# Patient Record
Sex: FEMALE | Race: WHITE | Employment: OTHER | URBAN - METROPOLITAN AREA
[De-identification: names, ages, dates, MRNs, and addresses within clinical notes are randomized per-mention and may not be internally consistent; named-entity substitution may affect disease eponyms.]

---

## 2017-11-01 ENCOUNTER — HOSPITAL ENCOUNTER (OUTPATIENT)
Age: 77
Setting detail: OUTPATIENT SURGERY
Discharge: HOME | End: 2017-11-01
Attending: INTERNAL MEDICINE | Admitting: INTERNAL MEDICINE
Payer: MEDICARE

## 2017-11-01 ENCOUNTER — ANESTHESIA EVENT (OUTPATIENT)
Dept: ENDOSCOPY | Age: 77
End: 2017-11-01
Payer: MEDICARE

## 2017-11-01 ENCOUNTER — ANESTHESIA (OUTPATIENT)
Dept: ENDOSCOPY | Age: 77
End: 2017-11-01
Payer: MEDICARE

## 2017-11-01 VITALS
BODY MASS INDEX: 23.73 KG/M2 | SYSTOLIC BLOOD PRESSURE: 146 MMHG | OXYGEN SATURATION: 100 % | DIASTOLIC BLOOD PRESSURE: 74 MMHG | RESPIRATION RATE: 16 BRPM | HEIGHT: 64 IN | HEART RATE: 77 BPM | TEMPERATURE: 97.8 F | WEIGHT: 139 LBS

## 2017-11-01 PROCEDURE — 74011250637 HC RX REV CODE- 250/637: Performed by: INTERNAL MEDICINE

## 2017-11-01 PROCEDURE — 76060000031 HC ANESTHESIA FIRST 0.5 HR: Performed by: INTERNAL MEDICINE

## 2017-11-01 PROCEDURE — 77030009426 HC FCPS BIOP ENDOSC BSC -B: Performed by: INTERNAL MEDICINE

## 2017-11-01 PROCEDURE — 77030013992 HC SNR POLYP ENDOSC BSC -B: Performed by: INTERNAL MEDICINE

## 2017-11-01 PROCEDURE — 74011250636 HC RX REV CODE- 250/636

## 2017-11-01 PROCEDURE — 77030027957 HC TBNG IRR ENDOGTR BUSS -B: Performed by: INTERNAL MEDICINE

## 2017-11-01 PROCEDURE — 76040000019: Performed by: INTERNAL MEDICINE

## 2017-11-01 PROCEDURE — 88305 TISSUE EXAM BY PATHOLOGIST: CPT | Performed by: INTERNAL MEDICINE

## 2017-11-01 PROCEDURE — 77030011640 HC PAD GRND REM COVD -A: Performed by: INTERNAL MEDICINE

## 2017-11-01 RX ORDER — ATROPINE SULFATE 0.1 MG/ML
0.5 INJECTION INTRAVENOUS
Status: DISCONTINUED | OUTPATIENT
Start: 2017-11-01 | End: 2017-11-01 | Stop reason: HOSPADM

## 2017-11-01 RX ORDER — EPINEPHRINE 0.1 MG/ML
1 INJECTION INTRACARDIAC; INTRAVENOUS
Status: DISCONTINUED | OUTPATIENT
Start: 2017-11-01 | End: 2017-11-01 | Stop reason: HOSPADM

## 2017-11-01 RX ORDER — SODIUM CHLORIDE 0.9 % (FLUSH) 0.9 %
5-10 SYRINGE (ML) INJECTION EVERY 8 HOURS
Status: DISCONTINUED | OUTPATIENT
Start: 2017-11-01 | End: 2017-11-01 | Stop reason: HOSPADM

## 2017-11-01 RX ORDER — PROPOFOL 10 MG/ML
INJECTION, EMULSION INTRAVENOUS AS NEEDED
Status: DISCONTINUED | OUTPATIENT
Start: 2017-11-01 | End: 2017-11-01 | Stop reason: HOSPADM

## 2017-11-01 RX ORDER — SODIUM CHLORIDE 9 MG/ML
150 INJECTION, SOLUTION INTRAVENOUS CONTINUOUS
Status: DISCONTINUED | OUTPATIENT
Start: 2017-11-01 | End: 2017-11-01 | Stop reason: HOSPADM

## 2017-11-01 RX ORDER — SODIUM CHLORIDE 0.9 % (FLUSH) 0.9 %
5-10 SYRINGE (ML) INJECTION AS NEEDED
Status: DISCONTINUED | OUTPATIENT
Start: 2017-11-01 | End: 2017-11-01 | Stop reason: HOSPADM

## 2017-11-01 RX ORDER — DEXTROMETHORPHAN/PSEUDOEPHED 2.5-7.5/.8
1.2 DROPS ORAL
Status: DISCONTINUED | OUTPATIENT
Start: 2017-11-01 | End: 2017-11-01 | Stop reason: HOSPADM

## 2017-11-01 RX ORDER — SODIUM CHLORIDE 9 MG/ML
INJECTION, SOLUTION INTRAVENOUS
Status: DISCONTINUED | OUTPATIENT
Start: 2017-11-01 | End: 2017-11-01 | Stop reason: HOSPADM

## 2017-11-01 RX ORDER — MIDAZOLAM HYDROCHLORIDE 1 MG/ML
.25-5 INJECTION, SOLUTION INTRAMUSCULAR; INTRAVENOUS
Status: DISCONTINUED | OUTPATIENT
Start: 2017-11-01 | End: 2017-11-01 | Stop reason: HOSPADM

## 2017-11-01 RX ADMIN — PROPOFOL 20 MG: 10 INJECTION, EMULSION INTRAVENOUS at 14:36

## 2017-11-01 RX ADMIN — SODIUM CHLORIDE: 9 INJECTION, SOLUTION INTRAVENOUS at 14:15

## 2017-11-01 RX ADMIN — PROPOFOL 20 MG: 10 INJECTION, EMULSION INTRAVENOUS at 14:45

## 2017-11-01 RX ADMIN — PROPOFOL 100 MG: 10 INJECTION, EMULSION INTRAVENOUS at 14:35

## 2017-11-01 RX ADMIN — PROPOFOL 20 MG: 10 INJECTION, EMULSION INTRAVENOUS at 14:48

## 2017-11-01 RX ADMIN — PROPOFOL 30 MG: 10 INJECTION, EMULSION INTRAVENOUS at 14:43

## 2017-11-01 RX ADMIN — PROPOFOL 30 MG: 10 INJECTION, EMULSION INTRAVENOUS at 14:41

## 2017-11-01 RX ADMIN — PROPOFOL 20 MG: 10 INJECTION, EMULSION INTRAVENOUS at 14:50

## 2017-11-01 NOTE — ROUTINE PROCESS
Rebecca Delvalle  1940  [id number]    Situation:  Verbal report received from: Reece Macias RN  Procedure: Procedure(s):  COLONOSCOPY  COLON BIOPSY  ENDOSCOPIC POLYPECTOMY    Background:    Preoperative diagnosis: RECTAL BLEEDING  Postoperative diagnosis: 1. Colon Polyps (Descending and Sigmoid)  2. Internal Hemmorhoids    :  Dr. Keon Kim  Assistant(s): Endoscopy Technician-1: Caro Waggoner  Endoscopy RN-1: Eulene Siemens    Specimens:   ID Type Source Tests Collected by Time Destination   1 : descending colon polyp Preservative Colon, Descending  Willia Hamman, MD 11/1/2017 1441 Pathology   2 : Sigmoid Colon Polyp Preservative Sigmoid  Willia Hamman, MD 11/1/2017 1449 Pathology     H. Pylori  no    Assessment:  Intra-procedure medications   Anesthesia gave intra-procedure sedation and medications, see anesthesia flow sheet yes    Intravenous fluids: NS@ KVO     Vital signs stable     Abdominal assessment: round and soft     Recommendation:  Discharge patient per MD order.   Family   Permission to share finding with family or friend yes

## 2017-11-01 NOTE — PERIOP NOTES

## 2017-11-01 NOTE — ANESTHESIA POSTPROCEDURE EVALUATION
Post-Anesthesia Evaluation and Assessment    Patient: Randa Paniagua MRN: [id number]  SSN: [id number]    YOB: 1940  Age: 78 y.o. Sex: female       Cardiovascular Function/Vital Signs  Visit Vitals    BP (!) 126/93    Pulse 82    Temp 36.6 °C (97.8 °F)    Resp 14    Ht 5' 4\" (1.626 m)    Wt 63 kg (139 lb)    SpO2 100%    Breastfeeding No    BMI 23.86 kg/m2       Patient is status post MAC anesthesia for Procedure(s):  COLONOSCOPY  COLON BIOPSY  ENDOSCOPIC POLYPECTOMY. Nausea/Vomiting: None    Postoperative hydration reviewed and adequate. Pain:  Pain Scale 1: Numeric (0 - 10) (11/01/17 1505)  Pain Intensity 1: 0 (11/01/17 1505)   Managed    Neurological Status: At baseline    Mental Status and Level of Consciousness: Arousable    Pulmonary Status:   O2 Device: Room air (11/01/17 1505)   Adequate oxygenation and airway patent    Complications related to anesthesia: None    Post-anesthesia assessment completed.  No concerns    Signed By: Dori Rg MD     November 1, 2017

## 2017-11-01 NOTE — PROCEDURES
Zaira [facility] 912 Ayaka Mistry M.D.  174 [facility], [address] [place] [address]  [phone]               Colonoscopy Procedure Note    NAME: Aleksandr Lopez  :    MRN:  [id number]    Indications:   Lower GI bleeding     : Ankita Merrill MD    Referring Provider:  Britney Zaidi MD    Medicines:  MAC anesthesia      Procedure Details:  After informed consent was obtained with all risks and benefits of the procedure explained and preprocedure exam completed, the patient was placed in the left lateral decubitus position. Universal protocol for patient identification was performed and documented in the nursing notes. Throughout the procedure, the patient's blood pressure was monitored at least every five minutes; pulse, and oxygen saturations were monitored continuously. All vital signs were documented in the nursing notes. A digital rectal exam was performed and was normal.  The Olympus videocolonoscope  was inserted in the rectum and carefully advanced to the cecum, which was identified by the ileocecal valve and appendiceal orifice. The colonoscope was slowly withdrawn with careful evaluation between folds. Retroflexion in the rectum was performed; findings and interventions are described below. Procedure start time, extent reached time/cecum time, and procedure end time are documented in the nursing notes. The quality of preparation was good. Findings:   Rectum: Grade moderate internal hemorrhoid(s);   Sigmoid: 1  Sessile polyp(s), the largest 9 mm in size; s/p hot snare polypectomy  Descending Colon: 1  Sessile polyp(s), the largest 7 mm in size; s/p jumbo cold forceps polypectomy  Transverse Colon: normal  Ascending Colon: normal  Cecum: normal    Interventions:    2 complete polypectomy were performed using hot snare  /cold forceps and the polyps were  retrieved    Specimens:     ID Type Source Tests Collected by Time Destination   1 : descending colon polyp Preservative Colon, Descending  Radha Monteiro MD 11/1/2017 1441 Pathology   2 : Sigmoid Colon Polyp Preservative Sigmoid  Radha Monteiro MD 11/1/2017 1449 Pathology       EBL:  None. Complications:   No immediate complications     Impression:  -Benign appearing colon polyps, removed as above. -Moderate internal hemorrhoids as the likely cause of the bleeding. Recommendations:   -Repeat colonoscopy in 5 years. If < 10 years, reason:  above average risk patient    Resume normal medication(s). Signed by:  Radha Monteiro MD          11/1/2017  2:59 PM

## 2017-11-01 NOTE — H&P
[address] [place] [facility] 12, 895 [facility]          Pre-procedure History and Physical       NAME:  Neeru Patel   :   1940   MRN:   [id number]     CHIEF COMPLAINT/HPI: See procedure note    PMH:  Past Medical History:   Diagnosis Date    Other ill-defined conditions(679.89)     IBS       PSH:  Past Surgical History:   Procedure Laterality Date    HX APPENDECTOMY      HX CHOLECYSTECTOMY      HX GI      surgery for hiatal hernia       Allergies: Allergies   Allergen Reactions    Penicillins Hives       Home Medications:  Prior to Admission Medications   Prescriptions Last Dose Informant Patient Reported? Taking? CALCIUM CARBONATE/VITAMIN D3 (CALTRATE 600 + D PO)   Yes No   Sig: Take 600 mg by mouth daily. MULTIVITAMIN PO   Yes No   Sig: Take  by mouth. Takes one women's one-a-day formula po daily.       Facility-Administered Medications: None       Hospital Medications:  Current Facility-Administered Medications   Medication Dose Route Frequency    0.9% sodium chloride infusion  150 mL/hr IntraVENous CONTINUOUS    sodium chloride (NS) flush 5-10 mL  5-10 mL IntraVENous Q8H    sodium chloride (NS) flush 5-10 mL  5-10 mL IntraVENous PRN    midazolam (VERSED) injection 0.25-5 mg  0.25-5 mg IntraVENous Multiple    simethicone (MYLICON) 29WY/2.3OV oral drops 80 mg  1.2 mL Oral Multiple    atropine injection 0.5 mg  0.5 mg IntraVENous ONCE PRN    EPINEPHrine (ADRENALIN) 0.1 mg/mL syringe 1 mg  1 mg Endoscopically ONCE PRN       Family History:  Family History   Problem Relation Age of Onset    Dementia Mother      alzheimers    Cancer Sister      gallbladder    Cancer Brother      pancreatic    Breast Cancer Paternal Grandmother     Dementia Sister      alzheimers    Heart Surgery Brother      bypass       Social History:  Social History   Substance Use Topics    Smoking status: Former Smoker    Smokeless tobacco: Not on file      Comment: quit smoking cigarettes 6 yrs ago    Alcohol use Yes      Comment: very occasional         PHYSICAL EXAM PRIOR TO SEDATION:  General: Alert, in no acute distress    Lungs:            CTA bilaterally  Heart:  Normal S1, S2    Abdomen: Soft, Non distended, Non tender. Normoactive bowel sounds. Assessment:   Stable for sedation administration.   Date of last colonoscopy: 4 yrs, Polyps  Yes    Plan:   · Endoscopic procedure with sedation     Signed By: Na Allan MD     11/1/2017  2:36 PM

## 2017-11-01 NOTE — IP AVS SNAPSHOT
[address] 
[phone] Patient: Gray Moralez MRN: [id number] IOP:4/10/7648 About your hospitalization You were admitted on:  November 1, 2017 You last received care in the:  [facility] ENDOSCOPY You were discharged on:  November 1, 2017 Why you were hospitalized Your primary diagnosis was:  Not on File Discharge Orders None A check alvarez indicates which time of day the medication should be taken. My Medications TAKE these medications as instructed Instructions Each Dose to Equal  
 Morning Noon Evening Bedtime CALTRATE 600 + D PO Your last dose was: Your next dose is: Take 600 mg by mouth daily. 600 mg MULTIVITAMIN PO Your last dose was: Your next dose is: Take  by mouth. Takes one women's one-a-day formula po daily. Discharge Instructions [address] [place] Madalyn Magaña. Sofía Aranda M.D. 
[address] [place], [place] [address] 
[phone] COLONOSCOPY DISCHARGE INSTRUCTIONS Gray Moralez 
[id number] 1940 DISCOMFORT: 
Redness at IV site- apply warm compress to area; if redness or soreness persist- contact your physician There may be a slight amount of blood passed from the rectum Gaseous discomfort- walking, belching will help relieve any discomfort You may not operate a vehicle for 12 hours You may not engage in an occupation involving machinery or appliances for the  rest of today You may not drink alcoholic beverages for at least 12 hours Avoid making any critical decisions for at least 24 hours DIET: 
 You may resume your normal diet, but some patients find that heavy or large  meals may lead to indigestion or vomiting. I suggest a light meal as first food  intake.  
 
ACTIVITY: 
 You may resume your normal daily activities. It is recommended that you spend the remainder of the day resting - avoid any strenuous activity. CALL SHARAN Lainez ANY SIGN OF: Increasing pain, nausea, vomiting Abdominal distension (swelling) Significant bleeding (oral or rectal) Fever Pain in chest area Shortness of breath Additional Instructions: 
 Call Dr. Vanesa Parkinson if any questions or problems at [phone] You should receive the biopsy results by phone or mail within 3 weeks, if not, call  my office for the results Should have a repeat colonoscopy in 5 years. Colon with 2 polyps removed, moderate internal hemorrhoids as the likely cause of bleeding. Introducing hospitals & HEALTH SERVICES! Dear Alexandro Epley: Thank you for requesting a [employer] account. Our records indicate that you already have an active [employer] account. You can access your account anytime at [email or web address] Did you know that you can access your hospital and ER discharge instructions at any time in [employer]? You can also review all of your test results from your hospital stay or ER visit. Additional Information If you have questions, please visit the Frequently Asked Questions section of the [employer] website at [email or web address] Remember, [employer] is NOT to be used for urgent needs. For medical emergencies, dial 911. Now available from your iPhone and Android! Providers Seen During Your Hospitalization Provider Specialty Primary office phone Nima Ritter MD Gastroenterology [phone] Your Primary Care Physician (PCP) Primary Care Physician Office Phone Office Fax [address] [phone] You are allergic to the following Allergen Reactions Penicillins Hives Recent Documentation Height Weight Breastfeeding? BMI OB Status Smoking Status 1.626 m 63 kg No 23.86 kg/m2 Postmenopausal Former Smoker Emergency Contacts Name Discharge Info Relation Home Work Mobile Jenny Elizalde  Other Relative [6]  [phone] Patient Belongings The following personal items are in your possession at time of discharge: 
  Dental Appliances: None  Visual Aid: None Please provide this summary of care documentation to your next provider. Signatures-by signing, you are acknowledging that this After Visit Summary has been reviewed with you and you have received a copy. Patient Signature:  ____________________________________________________________ Date:  ____________________________________________________________  
  
[facility] Provider Signature:  ____________________________________________________________ Date:  ____________________________________________________________

## 2017-11-01 NOTE — ANESTHESIA PREPROCEDURE EVALUATION
Anesthetic History   No history of anesthetic complications            Review of Systems / Medical History  Patient summary reviewed, nursing notes reviewed and pertinent labs reviewed    Pulmonary  Within defined limits                 Neuro/Psych   Within defined limits           Cardiovascular  Within defined limits                Exercise tolerance: >4 METS     GI/Hepatic/Renal  Within defined limits              Endo/Other  Within defined limits           Other Findings              Physical Exam    Airway  Mallampati: II  TM Distance: > 6 cm  Neck ROM: normal range of motion   Mouth opening: Normal     Cardiovascular  Regular rate and rhythm,  S1 and S2 normal,  no murmur, click, rub, or gallop             Dental    Dentition: Poor dentition     Pulmonary  Breath sounds clear to auscultation               Abdominal  GI exam deferred       Other Findings            Anesthetic Plan    ASA: 2  Anesthesia type: MAC          Induction: Intravenous  Anesthetic plan and risks discussed with: Patient

## 2019-01-01 ENCOUNTER — PATIENT OUTREACH (OUTPATIENT)
Dept: CASE MANAGEMENT | Age: 79
End: 2019-01-01

## 2019-01-01 ENCOUNTER — ANESTHESIA EVENT (OUTPATIENT)
Dept: ENDOSCOPY | Age: 79
End: 2019-01-01
Payer: MEDICARE

## 2019-01-01 ENCOUNTER — DOCUMENTATION ONLY (OUTPATIENT)
Dept: CASE MANAGEMENT | Age: 79
End: 2019-01-01

## 2019-01-01 ENCOUNTER — APPOINTMENT (OUTPATIENT)
Dept: GENERAL RADIOLOGY | Age: 79
DRG: 326 | End: 2019-01-01
Attending: INTERNAL MEDICINE
Payer: MEDICARE

## 2019-01-01 ENCOUNTER — TELEPHONE (OUTPATIENT)
Dept: CARDIOLOGY CLINIC | Age: 79
End: 2019-01-01

## 2019-01-01 ENCOUNTER — APPOINTMENT (OUTPATIENT)
Dept: GENERAL RADIOLOGY | Age: 79
DRG: 193 | End: 2019-01-01
Attending: INTERNAL MEDICINE
Payer: MEDICARE

## 2019-01-01 ENCOUNTER — HOSPITAL ENCOUNTER (INPATIENT)
Age: 79
LOS: 4 days | Discharge: HOME | DRG: 439 | End: 2019-01-19
Attending: EMERGENCY MEDICINE | Admitting: FAMILY MEDICINE
Payer: MEDICARE

## 2019-01-01 ENCOUNTER — APPOINTMENT (OUTPATIENT)
Dept: MRI IMAGING | Age: 79
DRG: 326 | End: 2019-01-01
Attending: INTERNAL MEDICINE
Payer: MEDICARE

## 2019-01-01 ENCOUNTER — HOSPITAL ENCOUNTER (OUTPATIENT)
Dept: CT IMAGING | Age: 79
Discharge: HOME | End: 2019-03-04
Attending: FAMILY MEDICINE
Payer: MEDICARE

## 2019-01-01 ENCOUNTER — TELEPHONE (OUTPATIENT)
Dept: SURGERY | Age: 79
End: 2019-01-01

## 2019-01-01 ENCOUNTER — HOSPITAL ENCOUNTER (INPATIENT)
Age: 79
LOS: 14 days | Discharge: NURSING, REHAB OR LONG-TERM CARE | DRG: 193 | End: 2019-02-08
Attending: EMERGENCY MEDICINE | Admitting: INTERNAL MEDICINE
Payer: MEDICARE

## 2019-01-01 ENCOUNTER — ANESTHESIA (OUTPATIENT)
Dept: ENDOSCOPY | Age: 79
End: 2019-01-01
Payer: MEDICARE

## 2019-01-01 ENCOUNTER — PATIENT OUTREACH (OUTPATIENT)
Dept: FAMILY MEDICINE CLINIC | Age: 79
End: 2019-01-01

## 2019-01-01 ENCOUNTER — OFFICE VISIT (OUTPATIENT)
Dept: SURGERY | Age: 79
End: 2019-01-01

## 2019-01-01 ENCOUNTER — APPOINTMENT (OUTPATIENT)
Dept: GENERAL RADIOLOGY | Age: 79
DRG: 326 | End: 2019-01-01
Attending: HOSPITALIST
Payer: MEDICARE

## 2019-01-01 ENCOUNTER — APPOINTMENT (OUTPATIENT)
Dept: NON INVASIVE DIAGNOSTICS | Age: 79
DRG: 193 | End: 2019-01-01
Attending: NURSE PRACTITIONER
Payer: MEDICARE

## 2019-01-01 ENCOUNTER — APPOINTMENT (OUTPATIENT)
Dept: CT IMAGING | Age: 79
DRG: 640 | End: 2019-01-01
Attending: EMERGENCY MEDICINE
Payer: MEDICARE

## 2019-01-01 ENCOUNTER — APPOINTMENT (OUTPATIENT)
Dept: GENERAL RADIOLOGY | Age: 79
DRG: 640 | End: 2019-01-01
Attending: EMERGENCY MEDICINE
Payer: MEDICARE

## 2019-01-01 ENCOUNTER — OFFICE VISIT (OUTPATIENT)
Dept: CARDIOLOGY CLINIC | Age: 79
End: 2019-01-01

## 2019-01-01 ENCOUNTER — APPOINTMENT (OUTPATIENT)
Dept: GENERAL RADIOLOGY | Age: 79
DRG: 193 | End: 2019-01-01
Attending: HOSPITALIST
Payer: MEDICARE

## 2019-01-01 ENCOUNTER — DOCUMENTATION ONLY (OUTPATIENT)
Dept: FAMILY MEDICINE CLINIC | Age: 79
End: 2019-01-01

## 2019-01-01 ENCOUNTER — HOSPITAL ENCOUNTER (OUTPATIENT)
Age: 79
Setting detail: OUTPATIENT SURGERY
Discharge: HOME | End: 2019-04-15
Attending: INTERNAL MEDICINE | Admitting: INTERNAL MEDICINE
Payer: MEDICARE

## 2019-01-01 ENCOUNTER — ANESTHESIA (OUTPATIENT)
Dept: ENDOSCOPY | Age: 79
DRG: 326 | End: 2019-01-01
Payer: MEDICARE

## 2019-01-01 ENCOUNTER — TELEPHONE (OUTPATIENT)
Dept: CARDIAC REHAB | Age: 79
End: 2019-01-01

## 2019-01-01 ENCOUNTER — APPOINTMENT (OUTPATIENT)
Dept: GENERAL RADIOLOGY | Age: 79
DRG: 193 | End: 2019-01-01
Attending: NURSE PRACTITIONER
Payer: MEDICARE

## 2019-01-01 ENCOUNTER — APPOINTMENT (OUTPATIENT)
Dept: GENERAL RADIOLOGY | Age: 79
DRG: 326 | End: 2019-01-01
Attending: NURSE PRACTITIONER
Payer: MEDICARE

## 2019-01-01 ENCOUNTER — APPOINTMENT (OUTPATIENT)
Dept: CT IMAGING | Age: 79
DRG: 326 | End: 2019-01-01
Attending: SURGERY
Payer: MEDICARE

## 2019-01-01 ENCOUNTER — ANESTHESIA EVENT (OUTPATIENT)
Dept: ENDOSCOPY | Age: 79
DRG: 326 | End: 2019-01-01
Payer: MEDICARE

## 2019-01-01 ENCOUNTER — APPOINTMENT (OUTPATIENT)
Dept: CT IMAGING | Age: 79
DRG: 193 | End: 2019-01-01
Attending: EMERGENCY MEDICINE
Payer: MEDICARE

## 2019-01-01 ENCOUNTER — APPOINTMENT (OUTPATIENT)
Dept: INTERVENTIONAL RADIOLOGY/VASCULAR | Age: 79
DRG: 193 | End: 2019-01-01
Attending: INTERNAL MEDICINE
Payer: MEDICARE

## 2019-01-01 ENCOUNTER — APPOINTMENT (OUTPATIENT)
Dept: GENERAL RADIOLOGY | Age: 79
DRG: 326 | End: 2019-01-01
Attending: SURGERY
Payer: MEDICARE

## 2019-01-01 ENCOUNTER — APPOINTMENT (OUTPATIENT)
Dept: ULTRASOUND IMAGING | Age: 79
End: 2019-01-01
Attending: INTERNAL MEDICINE
Payer: MEDICARE

## 2019-01-01 ENCOUNTER — HOSPITAL ENCOUNTER (INPATIENT)
Age: 79
LOS: 6 days | End: 2019-10-29
Attending: INTERNAL MEDICINE | Admitting: INTERNAL MEDICINE
Payer: MEDICARE

## 2019-01-01 ENCOUNTER — ANESTHESIA (OUTPATIENT)
Dept: SURGERY | Age: 79
DRG: 326 | End: 2019-01-01
Payer: MEDICARE

## 2019-01-01 ENCOUNTER — HOSPITAL ENCOUNTER (INPATIENT)
Age: 79
LOS: 26 days | Discharge: NURSING, REHAB OR LONG-TERM CARE | DRG: 326 | End: 2019-10-10
Attending: EMERGENCY MEDICINE | Admitting: INTERNAL MEDICINE
Payer: MEDICARE

## 2019-01-01 ENCOUNTER — HOSPITAL ENCOUNTER (EMERGENCY)
Age: 79
Discharge: HOME | End: 2019-01-21
Attending: EMERGENCY MEDICINE | Admitting: EMERGENCY MEDICINE
Payer: MEDICARE

## 2019-01-01 ENCOUNTER — ANESTHESIA EVENT (OUTPATIENT)
Dept: SURGERY | Age: 79
DRG: 326 | End: 2019-01-01
Payer: MEDICARE

## 2019-01-01 ENCOUNTER — TELEPHONE (OUTPATIENT)
Dept: ONCOLOGY | Age: 79
End: 2019-01-01

## 2019-01-01 ENCOUNTER — APPOINTMENT (OUTPATIENT)
Dept: ULTRASOUND IMAGING | Age: 79
DRG: 326 | End: 2019-01-01
Attending: INTERNAL MEDICINE
Payer: MEDICARE

## 2019-01-01 ENCOUNTER — APPOINTMENT (OUTPATIENT)
Dept: CT IMAGING | Age: 79
DRG: 193 | End: 2019-01-01
Attending: INTERNAL MEDICINE
Payer: MEDICARE

## 2019-01-01 ENCOUNTER — APPOINTMENT (OUTPATIENT)
Dept: MRI IMAGING | Age: 79
DRG: 439 | End: 2019-01-01
Attending: FAMILY MEDICINE
Payer: MEDICARE

## 2019-01-01 ENCOUNTER — HOSPICE ADMISSION (OUTPATIENT)
Dept: HOSPICE | Facility: HOSPICE | Age: 79
End: 2019-01-01
Payer: MEDICARE

## 2019-01-01 ENCOUNTER — APPOINTMENT (OUTPATIENT)
Dept: GENERAL RADIOLOGY | Age: 79
DRG: 326 | End: 2019-01-01
Attending: FAMILY MEDICINE
Payer: MEDICARE

## 2019-01-01 ENCOUNTER — HOSPITAL ENCOUNTER (INPATIENT)
Age: 79
LOS: 2 days | Discharge: HOSPICE | DRG: 640 | End: 2019-10-23
Attending: EMERGENCY MEDICINE | Admitting: HOSPITALIST
Payer: MEDICARE

## 2019-01-01 ENCOUNTER — APPOINTMENT (OUTPATIENT)
Dept: CT IMAGING | Age: 79
DRG: 326 | End: 2019-01-01
Attending: PHYSICIAN ASSISTANT
Payer: MEDICARE

## 2019-01-01 ENCOUNTER — APPOINTMENT (OUTPATIENT)
Dept: ULTRASOUND IMAGING | Age: 79
DRG: 439 | End: 2019-01-01
Attending: EMERGENCY MEDICINE
Payer: MEDICARE

## 2019-01-01 ENCOUNTER — APPOINTMENT (OUTPATIENT)
Dept: GENERAL RADIOLOGY | Age: 79
End: 2019-01-01
Attending: EMERGENCY MEDICINE
Payer: MEDICARE

## 2019-01-01 VITALS
BODY MASS INDEX: 24.65 KG/M2 | SYSTOLIC BLOOD PRESSURE: 133 MMHG | OXYGEN SATURATION: 93 % | RESPIRATION RATE: 16 BRPM | HEART RATE: 78 BPM | HEIGHT: 64 IN | WEIGHT: 144.4 LBS | DIASTOLIC BLOOD PRESSURE: 61 MMHG | TEMPERATURE: 98.4 F

## 2019-01-01 VITALS
RESPIRATION RATE: 15 BRPM | WEIGHT: 111.4 LBS | DIASTOLIC BLOOD PRESSURE: 66 MMHG | HEART RATE: 90 BPM | TEMPERATURE: 97.9 F | SYSTOLIC BLOOD PRESSURE: 88 MMHG | HEIGHT: 64 IN | OXYGEN SATURATION: 93 % | BODY MASS INDEX: 19.02 KG/M2

## 2019-01-01 VITALS
HEART RATE: 90 BPM | OXYGEN SATURATION: 100 % | DIASTOLIC BLOOD PRESSURE: 71 MMHG | SYSTOLIC BLOOD PRESSURE: 124 MMHG | BODY MASS INDEX: 21.77 KG/M2 | TEMPERATURE: 98.5 F | RESPIRATION RATE: 21 BRPM | HEIGHT: 64 IN | WEIGHT: 127.5 LBS

## 2019-01-01 VITALS
HEART RATE: 91 BPM | WEIGHT: 130.07 LBS | OXYGEN SATURATION: 99 % | HEIGHT: 64 IN | DIASTOLIC BLOOD PRESSURE: 72 MMHG | RESPIRATION RATE: 16 BRPM | TEMPERATURE: 97.8 F | BODY MASS INDEX: 22.21 KG/M2 | SYSTOLIC BLOOD PRESSURE: 116 MMHG

## 2019-01-01 VITALS
HEIGHT: 64 IN | OXYGEN SATURATION: 99 % | TEMPERATURE: 98 F | WEIGHT: 136 LBS | RESPIRATION RATE: 18 BRPM | HEART RATE: 63 BPM | BODY MASS INDEX: 23.22 KG/M2 | DIASTOLIC BLOOD PRESSURE: 73 MMHG | SYSTOLIC BLOOD PRESSURE: 132 MMHG

## 2019-01-01 VITALS
RESPIRATION RATE: 18 BRPM | OXYGEN SATURATION: 72 % | TEMPERATURE: 99.8 F | DIASTOLIC BLOOD PRESSURE: 38 MMHG | HEART RATE: 120 BPM | SYSTOLIC BLOOD PRESSURE: 90 MMHG

## 2019-01-01 VITALS
HEART RATE: 64 BPM | DIASTOLIC BLOOD PRESSURE: 60 MMHG | SYSTOLIC BLOOD PRESSURE: 110 MMHG | OXYGEN SATURATION: 99 % | WEIGHT: 125 LBS | HEIGHT: 64 IN | BODY MASS INDEX: 21.34 KG/M2 | RESPIRATION RATE: 16 BRPM

## 2019-01-01 VITALS
HEART RATE: 88 BPM | HEIGHT: 64 IN | SYSTOLIC BLOOD PRESSURE: 151 MMHG | WEIGHT: 149 LBS | BODY MASS INDEX: 25.44 KG/M2 | RESPIRATION RATE: 23 BRPM | DIASTOLIC BLOOD PRESSURE: 57 MMHG | OXYGEN SATURATION: 99 % | TEMPERATURE: 98 F

## 2019-01-01 VITALS
OXYGEN SATURATION: 92 % | HEART RATE: 96 BPM | DIASTOLIC BLOOD PRESSURE: 64 MMHG | RESPIRATION RATE: 18 BRPM | WEIGHT: 139.99 LBS | BODY MASS INDEX: 23.3 KG/M2 | TEMPERATURE: 99.7 F | SYSTOLIC BLOOD PRESSURE: 180 MMHG

## 2019-01-01 VITALS
SYSTOLIC BLOOD PRESSURE: 120 MMHG | BODY MASS INDEX: 23.22 KG/M2 | HEIGHT: 64 IN | OXYGEN SATURATION: 96 % | WEIGHT: 136 LBS | DIASTOLIC BLOOD PRESSURE: 60 MMHG | HEART RATE: 91 BPM | RESPIRATION RATE: 16 BRPM

## 2019-01-01 DIAGNOSIS — C25.9 MALIGNANT NEOPLASM OF PANCREAS, UNSPECIFIED LOCATION OF MALIGNANCY (HCC): ICD-10-CM

## 2019-01-01 DIAGNOSIS — K92.1 BLOOD IN STOOL: Primary | ICD-10-CM

## 2019-01-01 DIAGNOSIS — Z09 POSTOPERATIVE EXAMINATION: Primary | ICD-10-CM

## 2019-01-01 DIAGNOSIS — J10.1 INFLUENZA A: Primary | ICD-10-CM

## 2019-01-01 DIAGNOSIS — R53.1 WEAKNESS GENERALIZED: ICD-10-CM

## 2019-01-01 DIAGNOSIS — I25.10 CORONARY ARTERY DISEASE INVOLVING NATIVE CORONARY ARTERY OF NATIVE HEART WITHOUT ANGINA PECTORIS: ICD-10-CM

## 2019-01-01 DIAGNOSIS — R53.0 NEOPLASTIC (MALIGNANT) RELATED FATIGUE: ICD-10-CM

## 2019-01-01 DIAGNOSIS — K11.7 INCREASED OROPHARYNGEAL SECRETIONS: ICD-10-CM

## 2019-01-01 DIAGNOSIS — G89.3 NEOPLASM RELATED PAIN: ICD-10-CM

## 2019-01-01 DIAGNOSIS — I50.22 CHRONIC SYSTOLIC CONGESTIVE HEART FAILURE (HCC): Primary | ICD-10-CM

## 2019-01-01 DIAGNOSIS — S30.1XXD HEMATOMA OF RECTUS SHEATH, SUBSEQUENT ENCOUNTER: ICD-10-CM

## 2019-01-01 DIAGNOSIS — R77.8 ELEVATED TROPONIN I LEVEL: ICD-10-CM

## 2019-01-01 DIAGNOSIS — I50.22 SYSTOLIC CHF, CHRONIC (HCC): Primary | ICD-10-CM

## 2019-01-01 DIAGNOSIS — E78.5 DYSLIPIDEMIA: ICD-10-CM

## 2019-01-01 DIAGNOSIS — R50.81 FEVER IN OTHER DISEASES: ICD-10-CM

## 2019-01-01 DIAGNOSIS — Z71.89 GOALS OF CARE, COUNSELING/DISCUSSION: ICD-10-CM

## 2019-01-01 DIAGNOSIS — R05.8 COUGH DUE TO ACE INHIBITOR: ICD-10-CM

## 2019-01-01 DIAGNOSIS — K85.90 ACUTE PANCREATITIS, UNSPECIFIED COMPLICATION STATUS, UNSPECIFIED PANCREATITIS TYPE: Primary | ICD-10-CM

## 2019-01-01 DIAGNOSIS — N94.89 PELVIC HEMATOMA IN FEMALE: ICD-10-CM

## 2019-01-01 DIAGNOSIS — F41.9 ANXIETY: ICD-10-CM

## 2019-01-01 DIAGNOSIS — I21.4 NSTEMI (NON-ST ELEVATED MYOCARDIAL INFARCTION) (HCC): ICD-10-CM

## 2019-01-01 DIAGNOSIS — R64 MALIGNANT CACHEXIA (HCC): ICD-10-CM

## 2019-01-01 DIAGNOSIS — T46.4X5A COUGH DUE TO ACE INHIBITOR: ICD-10-CM

## 2019-01-01 DIAGNOSIS — R63.0 ANOREXIA: ICD-10-CM

## 2019-01-01 DIAGNOSIS — E86.0 DEHYDRATION: ICD-10-CM

## 2019-01-01 DIAGNOSIS — R05.9 COUGH: ICD-10-CM

## 2019-01-01 DIAGNOSIS — I25.118 CORONARY ARTERY DISEASE OF NATIVE ARTERY OF NATIVE HEART WITH STABLE ANGINA PECTORIS (HCC): ICD-10-CM

## 2019-01-01 DIAGNOSIS — E78.00 PURE HYPERCHOLESTEROLEMIA: ICD-10-CM

## 2019-01-01 DIAGNOSIS — Z51.5 COMFORT MEASURES ONLY STATUS: ICD-10-CM

## 2019-01-01 DIAGNOSIS — K85.90 ACUTE PANCREATITIS, UNSPECIFIED COMPLICATION STATUS, UNSPECIFIED PANCREATITIS TYPE: ICD-10-CM

## 2019-01-01 DIAGNOSIS — I95.9 HYPOTENSION, UNSPECIFIED HYPOTENSION TYPE: Primary | ICD-10-CM

## 2019-01-01 DIAGNOSIS — C25.7 MALIGNANT NEOPLASM OF OTHER PARTS OF PANCREAS (HCC): ICD-10-CM

## 2019-01-01 DIAGNOSIS — Z71.89 DNR (DO NOT RESUSCITATE) DISCUSSION: ICD-10-CM

## 2019-01-01 DIAGNOSIS — J18.9 COMMUNITY ACQUIRED PNEUMONIA, UNSPECIFIED LATERALITY: ICD-10-CM

## 2019-01-01 DIAGNOSIS — R06.4 LABORED BREATHING: ICD-10-CM

## 2019-01-01 DIAGNOSIS — C25.9 PANCREATIC CANCER METASTASIZED TO LIVER (HCC): ICD-10-CM

## 2019-01-01 DIAGNOSIS — R06.02 SOB (SHORTNESS OF BREATH): Primary | ICD-10-CM

## 2019-01-01 DIAGNOSIS — R11.15 NON-INTRACTABLE CYCLICAL VOMITING WITH NAUSEA: ICD-10-CM

## 2019-01-01 DIAGNOSIS — C78.7 PANCREATIC CANCER METASTASIZED TO LIVER (HCC): ICD-10-CM

## 2019-01-01 LAB
ABO + RH BLD: NORMAL
ALBUMIN SERPL-MCNC: 1.9 G/DL (ref 3.5–5)
ALBUMIN SERPL-MCNC: 2.1 G/DL (ref 3.5–5)
ALBUMIN SERPL-MCNC: 2.6 G/DL (ref 3.5–5)
ALBUMIN SERPL-MCNC: 2.6 G/DL (ref 3.5–5)
ALBUMIN SERPL-MCNC: 2.7 G/DL (ref 3.5–5)
ALBUMIN SERPL-MCNC: 2.8 G/DL (ref 3.5–5)
ALBUMIN SERPL-MCNC: 2.8 G/DL (ref 3.5–5)
ALBUMIN SERPL-MCNC: 2.9 G/DL (ref 3.5–5)
ALBUMIN SERPL-MCNC: 3 G/DL (ref 3.5–5)
ALBUMIN SERPL-MCNC: 3.1 G/DL (ref 3.5–5)
ALBUMIN SERPL-MCNC: 3.2 G/DL (ref 3.5–5)
ALBUMIN SERPL-MCNC: 3.2 G/DL (ref 3.5–5)
ALBUMIN SERPL-MCNC: 3.5 G/DL (ref 3.5–5)
ALBUMIN SERPL-MCNC: 3.6 G/DL (ref 3.5–5)
ALBUMIN SERPL-MCNC: 3.6 G/DL (ref 3.5–5)
ALBUMIN SERPL-MCNC: 3.8 G/DL (ref 3.5–5)
ALBUMIN SERPL-MCNC: 4 G/DL (ref 3.5–5)
ALBUMIN SERPL-MCNC: 4.1 G/DL (ref 3.5–5)
ALBUMIN SERPL-MCNC: 4.1 G/DL (ref 3.5–5)
ALBUMIN SERPL-MCNC: 4.2 G/DL (ref 3.5–5)
ALBUMIN SERPL-MCNC: 4.3 G/DL (ref 3.5–5)
ALBUMIN SERPL-MCNC: 4.5 G/DL (ref 3.5–5)
ALBUMIN/GLOB SERPL: 0.5 {RATIO} (ref 1.1–2.2)
ALBUMIN/GLOB SERPL: 0.6 {RATIO} (ref 1.1–2.2)
ALBUMIN/GLOB SERPL: 0.7 {RATIO} (ref 1.1–2.2)
ALBUMIN/GLOB SERPL: 0.7 {RATIO} (ref 1.1–2.2)
ALBUMIN/GLOB SERPL: 0.8 {RATIO} (ref 1.1–2.2)
ALBUMIN/GLOB SERPL: 0.8 {RATIO} (ref 1.1–2.2)
ALBUMIN/GLOB SERPL: 0.9 {RATIO} (ref 1.1–2.2)
ALBUMIN/GLOB SERPL: 1 {RATIO} (ref 1.1–2.2)
ALBUMIN/GLOB SERPL: 1.1 {RATIO} (ref 1.1–2.2)
ALBUMIN/GLOB SERPL: 1.3 {RATIO} (ref 1.1–2.2)
ALBUMIN/GLOB SERPL: 1.4 {RATIO} (ref 1.1–2.2)
ALBUMIN/GLOB SERPL: 1.7 {RATIO} (ref 1.1–2.2)
ALBUMIN/GLOB SERPL: 1.9 {RATIO} (ref 1.1–2.2)
ALP SERPL-CCNC: 102 U/L (ref 45–117)
ALP SERPL-CCNC: 104 U/L (ref 45–117)
ALP SERPL-CCNC: 110 U/L (ref 45–117)
ALP SERPL-CCNC: 121 U/L (ref 45–117)
ALP SERPL-CCNC: 127 U/L (ref 45–117)
ALP SERPL-CCNC: 131 U/L (ref 45–117)
ALP SERPL-CCNC: 135 U/L (ref 45–117)
ALP SERPL-CCNC: 144 U/L (ref 45–117)
ALP SERPL-CCNC: 206 U/L (ref 45–117)
ALP SERPL-CCNC: 30 U/L (ref 45–117)
ALP SERPL-CCNC: 35 U/L (ref 45–117)
ALP SERPL-CCNC: 40 U/L (ref 45–117)
ALP SERPL-CCNC: 55 U/L (ref 45–117)
ALP SERPL-CCNC: 629 U/L (ref 45–117)
ALP SERPL-CCNC: 75 U/L (ref 45–117)
ALP SERPL-CCNC: 87 U/L (ref 45–117)
ALP SERPL-CCNC: 942 U/L (ref 45–117)
ALP SERPL-CCNC: 96 U/L (ref 45–117)
ALT SERPL-CCNC: 11 U/L (ref 12–78)
ALT SERPL-CCNC: 12 U/L (ref 12–78)
ALT SERPL-CCNC: 13 U/L (ref 12–78)
ALT SERPL-CCNC: 13 U/L (ref 12–78)
ALT SERPL-CCNC: 14 U/L (ref 12–78)
ALT SERPL-CCNC: 15 U/L (ref 12–78)
ALT SERPL-CCNC: 17 U/L (ref 12–78)
ALT SERPL-CCNC: 19 U/L (ref 12–78)
ALT SERPL-CCNC: 25 U/L (ref 12–78)
ALT SERPL-CCNC: 29 U/L (ref 12–78)
ALT SERPL-CCNC: 39 U/L (ref 12–78)
ALT SERPL-CCNC: 44 U/L (ref 12–78)
ALT SERPL-CCNC: 45 U/L (ref 12–78)
ALT SERPL-CCNC: 51 U/L (ref 12–78)
ALT SERPL-CCNC: 61 U/L (ref 12–78)
ALT SERPL-CCNC: 65 U/L (ref 12–78)
ALT SERPL-CCNC: 73 U/L (ref 12–78)
ALT SERPL-CCNC: 83 U/L (ref 12–78)
AMYLASE SERPL-CCNC: 149 U/L (ref 25–115)
AMYLASE SERPL-CCNC: 260 U/L (ref 25–115)
ANION GAP SERPL CALC-SCNC: 10 MMOL/L (ref 5–15)
ANION GAP SERPL CALC-SCNC: 11 MMOL/L (ref 5–15)
ANION GAP SERPL CALC-SCNC: 12 MMOL/L (ref 5–15)
ANION GAP SERPL CALC-SCNC: 13 MMOL/L (ref 5–15)
ANION GAP SERPL CALC-SCNC: 14 MMOL/L (ref 5–15)
ANION GAP SERPL CALC-SCNC: 4 MMOL/L (ref 5–15)
ANION GAP SERPL CALC-SCNC: 4 MMOL/L (ref 5–15)
ANION GAP SERPL CALC-SCNC: 5 MMOL/L (ref 5–15)
ANION GAP SERPL CALC-SCNC: 6 MMOL/L (ref 5–15)
ANION GAP SERPL CALC-SCNC: 7 MMOL/L (ref 5–15)
ANION GAP SERPL CALC-SCNC: 8 MMOL/L (ref 5–15)
ANION GAP SERPL CALC-SCNC: 9 MMOL/L (ref 5–15)
APPEARANCE UR: ABNORMAL
APPEARANCE UR: CLEAR
APTT PPP: 100.9 SEC (ref 22.1–32)
APTT PPP: 105.5 SEC (ref 22.1–32)
APTT PPP: 109.4 SEC (ref 22.1–32)
APTT PPP: 21 SEC (ref 22.1–32)
APTT PPP: 22.5 SEC (ref 22.1–32)
APTT PPP: 22.8 SEC (ref 22.1–32)
APTT PPP: 23.5 SEC (ref 22.1–32)
APTT PPP: 24.1 SEC (ref 22.1–32)
APTT PPP: 26.5 SEC (ref 22.1–32)
APTT PPP: 26.8 SEC (ref 22.1–32)
APTT PPP: 28.3 SEC (ref 22.1–32)
APTT PPP: 32.8 SEC (ref 22.1–32)
APTT PPP: 43.5 SEC (ref 22.1–32)
APTT PPP: 44.7 SEC (ref 22.1–32)
APTT PPP: 46 SEC (ref 22.1–32)
APTT PPP: 48 SEC (ref 22.1–32)
APTT PPP: 54.1 SEC (ref 22.1–32)
APTT PPP: 57.3 SEC (ref 22.1–32)
APTT PPP: 61.1 SEC (ref 22.1–32)
APTT PPP: 61.4 SEC (ref 22.1–32)
APTT PPP: 61.8 SEC (ref 22.1–32)
APTT PPP: 62.4 SEC (ref 22.1–32)
APTT PPP: 62.8 SEC (ref 22.1–32)
APTT PPP: 64.1 SEC (ref 22.1–32)
APTT PPP: 65 SEC (ref 22.1–32)
APTT PPP: 65 SEC (ref 22.1–32)
APTT PPP: 65.5 SEC (ref 22.1–32)
APTT PPP: 67.8 SEC (ref 22.1–32)
APTT PPP: 68 SEC (ref 22.1–32)
APTT PPP: 70 SEC (ref 22.1–32)
APTT PPP: 72.7 SEC (ref 22.1–32)
APTT PPP: 76.8 SEC (ref 22.1–32)
APTT PPP: 76.9 SEC (ref 22.1–32)
APTT PPP: 79.5 SEC (ref 22.1–32)
APTT PPP: 80.5 SEC (ref 22.1–32)
APTT PPP: 85 SEC (ref 22.1–32)
APTT PPP: 93.5 SEC (ref 22.1–32)
ARTERIAL PATENCY WRIST A: ABNORMAL
ARTERIAL PATENCY WRIST A: YES
ARTERIAL PATENCY WRIST A: YES
AST SERPL-CCNC: 13 U/L (ref 15–37)
AST SERPL-CCNC: 14 U/L (ref 15–37)
AST SERPL-CCNC: 15 U/L (ref 15–37)
AST SERPL-CCNC: 17 U/L (ref 15–37)
AST SERPL-CCNC: 19 U/L (ref 15–37)
AST SERPL-CCNC: 19 U/L (ref 15–37)
AST SERPL-CCNC: 22 U/L (ref 15–37)
AST SERPL-CCNC: 23 U/L (ref 15–37)
AST SERPL-CCNC: 23 U/L (ref 15–37)
AST SERPL-CCNC: 24 U/L (ref 15–37)
AST SERPL-CCNC: 24 U/L (ref 15–37)
AST SERPL-CCNC: 25 U/L (ref 15–37)
AST SERPL-CCNC: 30 U/L (ref 15–37)
AST SERPL-CCNC: 38 U/L (ref 15–37)
AST SERPL-CCNC: 44 U/L (ref 15–37)
AST SERPL-CCNC: 62 U/L (ref 15–37)
AST SERPL-CCNC: 67 U/L (ref 15–37)
AST SERPL-CCNC: 76 U/L (ref 15–37)
ATRIAL RATE: 101 BPM
ATRIAL RATE: 107 BPM
ATRIAL RATE: 107 BPM
ATRIAL RATE: 147 BPM
ATRIAL RATE: 150 BPM
ATRIAL RATE: 78 BPM
ATRIAL RATE: 79 BPM
ATRIAL RATE: 85 BPM
ATRIAL RATE: 85 BPM
ATRIAL RATE: 89 BPM
ATRIAL RATE: 97 BPM
ATRIAL RATE: 98 BPM
BACTERIA SPEC CULT: NORMAL
BACTERIA URNS QL MICRO: ABNORMAL /HPF
BACTERIA URNS QL MICRO: NEGATIVE /HPF
BASE DEFICIT BLD-SCNC: 1 MMOL/L
BASE EXCESS BLD CALC-SCNC: 0 MMOL/L
BASE EXCESS BLD CALC-SCNC: 3 MMOL/L
BASOPHILS # BLD: 0 K/UL (ref 0–0.1)
BASOPHILS # BLD: 0.1 K/UL (ref 0–0.1)
BASOPHILS NFR BLD: 0 % (ref 0–1)
BASOPHILS NFR BLD: 1 % (ref 0–1)
BDY SITE: ABNORMAL
BILIRUB DIRECT SERPL-MCNC: 0.1 MG/DL (ref 0–0.2)
BILIRUB DIRECT SERPL-MCNC: 0.1 MG/DL (ref 0–0.2)
BILIRUB SERPL-MCNC: 0.3 MG/DL (ref 0.2–1)
BILIRUB SERPL-MCNC: 0.4 MG/DL (ref 0.2–1)
BILIRUB SERPL-MCNC: 0.4 MG/DL (ref 0.2–1)
BILIRUB SERPL-MCNC: 0.6 MG/DL (ref 0.2–1)
BILIRUB SERPL-MCNC: 0.6 MG/DL (ref 0.2–1)
BILIRUB SERPL-MCNC: 0.7 MG/DL (ref 0.2–1)
BILIRUB SERPL-MCNC: 0.8 MG/DL (ref 0.2–1)
BILIRUB SERPL-MCNC: 0.9 MG/DL (ref 0.2–1)
BILIRUB SERPL-MCNC: 0.9 MG/DL (ref 0.2–1)
BILIRUB SERPL-MCNC: 1 MG/DL (ref 0.2–1)
BILIRUB SERPL-MCNC: 1.1 MG/DL (ref 0.2–1)
BILIRUB SERPL-MCNC: 1.1 MG/DL (ref 0.2–1)
BILIRUB SERPL-MCNC: 1.3 MG/DL (ref 0.2–1)
BILIRUB SERPL-MCNC: 1.3 MG/DL (ref 0.2–1)
BILIRUB SERPL-MCNC: 1.5 MG/DL (ref 0.2–1)
BILIRUB UR QL CFM: NEGATIVE
BILIRUB UR QL: NEGATIVE
BLD PROD TYP BPU: NORMAL
BLOOD GROUP ANTIBODIES SERPL: NORMAL
BNP SERPL-MCNC: 241 PG/ML
BNP SERPL-MCNC: 6084 PG/ML (ref 0–450)
BNP SERPL-MCNC: ABNORMAL PG/ML (ref 0–450)
BPU ID: NORMAL
BUN SERPL-MCNC: 10 MG/DL (ref 6–20)
BUN SERPL-MCNC: 10 MG/DL (ref 6–20)
BUN SERPL-MCNC: 11 MG/DL (ref 6–20)
BUN SERPL-MCNC: 12 MG/DL (ref 6–20)
BUN SERPL-MCNC: 17 MG/DL (ref 6–20)
BUN SERPL-MCNC: 20 MG/DL (ref 6–20)
BUN SERPL-MCNC: 21 MG/DL (ref 6–20)
BUN SERPL-MCNC: 22 MG/DL (ref 6–20)
BUN SERPL-MCNC: 23 MG/DL (ref 6–20)
BUN SERPL-MCNC: 24 MG/DL (ref 6–20)
BUN SERPL-MCNC: 24 MG/DL (ref 6–20)
BUN SERPL-MCNC: 25 MG/DL (ref 6–20)
BUN SERPL-MCNC: 26 MG/DL (ref 6–20)
BUN SERPL-MCNC: 27 MG/DL (ref 6–20)
BUN SERPL-MCNC: 27 MG/DL (ref 6–20)
BUN SERPL-MCNC: 28 MG/DL (ref 6–20)
BUN SERPL-MCNC: 30 MG/DL (ref 6–20)
BUN SERPL-MCNC: 30 MG/DL (ref 6–20)
BUN SERPL-MCNC: 31 MG/DL (ref 6–20)
BUN SERPL-MCNC: 33 MG/DL (ref 6–20)
BUN SERPL-MCNC: 33 MG/DL (ref 6–20)
BUN SERPL-MCNC: 35 MG/DL (ref 6–20)
BUN SERPL-MCNC: 37 MG/DL (ref 6–20)
BUN SERPL-MCNC: 37 MG/DL (ref 6–20)
BUN SERPL-MCNC: 38 MG/DL (ref 6–20)
BUN SERPL-MCNC: 38 MG/DL (ref 6–20)
BUN SERPL-MCNC: 39 MG/DL (ref 6–20)
BUN SERPL-MCNC: 39 MG/DL (ref 6–20)
BUN SERPL-MCNC: 40 MG/DL (ref 6–20)
BUN SERPL-MCNC: 40 MG/DL (ref 6–20)
BUN SERPL-MCNC: 43 MG/DL (ref 6–20)
BUN SERPL-MCNC: 44 MG/DL (ref 6–20)
BUN SERPL-MCNC: 48 MG/DL (ref 6–20)
BUN SERPL-MCNC: 60 MG/DL (ref 6–20)
BUN SERPL-MCNC: 66 MG/DL (ref 6–20)
BUN SERPL-MCNC: 7 MG/DL (ref 6–20)
BUN SERPL-MCNC: 74 MG/DL (ref 6–20)
BUN SERPL-MCNC: 8 MG/DL (ref 6–20)
BUN SERPL-MCNC: 9 MG/DL (ref 6–20)
BUN/CREAT SERPL: 10 (ref 12–20)
BUN/CREAT SERPL: 12 (ref 12–20)
BUN/CREAT SERPL: 13 (ref 12–20)
BUN/CREAT SERPL: 14 (ref 12–20)
BUN/CREAT SERPL: 15 (ref 12–20)
BUN/CREAT SERPL: 15 (ref 12–20)
BUN/CREAT SERPL: 16 (ref 12–20)
BUN/CREAT SERPL: 17 (ref 12–20)
BUN/CREAT SERPL: 17 (ref 12–20)
BUN/CREAT SERPL: 18 (ref 12–20)
BUN/CREAT SERPL: 19 (ref 12–20)
BUN/CREAT SERPL: 20 (ref 12–20)
BUN/CREAT SERPL: 22 (ref 12–20)
BUN/CREAT SERPL: 22 (ref 12–20)
BUN/CREAT SERPL: 24 (ref 12–20)
BUN/CREAT SERPL: 24 (ref 12–20)
BUN/CREAT SERPL: 25 (ref 12–20)
BUN/CREAT SERPL: 28 (ref 12–20)
BUN/CREAT SERPL: 28 (ref 12–20)
BUN/CREAT SERPL: 29 (ref 12–20)
BUN/CREAT SERPL: 29 (ref 12–20)
BUN/CREAT SERPL: 30 (ref 12–20)
BUN/CREAT SERPL: 31 (ref 12–20)
BUN/CREAT SERPL: 33 (ref 12–20)
BUN/CREAT SERPL: 36 (ref 12–20)
BUN/CREAT SERPL: 36 (ref 12–20)
BUN/CREAT SERPL: 37 (ref 12–20)
BUN/CREAT SERPL: 40 (ref 12–20)
BUN/CREAT SERPL: 41 (ref 12–20)
BUN/CREAT SERPL: 42 (ref 12–20)
BUN/CREAT SERPL: 51 (ref 12–20)
BUN/CREAT SERPL: 52 (ref 12–20)
BUN/CREAT SERPL: 57 (ref 12–20)
BUN/CREAT SERPL: 9 (ref 12–20)
C-ANCA TITR SER IF: NORMAL TITER
CALCIUM SERPL-MCNC: 10 MG/DL (ref 8.5–10.1)
CALCIUM SERPL-MCNC: 10.3 MG/DL (ref 8.5–10.1)
CALCIUM SERPL-MCNC: 7.5 MG/DL (ref 8.5–10.1)
CALCIUM SERPL-MCNC: 7.5 MG/DL (ref 8.5–10.1)
CALCIUM SERPL-MCNC: 7.6 MG/DL (ref 8.5–10.1)
CALCIUM SERPL-MCNC: 7.8 MG/DL (ref 8.5–10.1)
CALCIUM SERPL-MCNC: 7.9 MG/DL (ref 8.5–10.1)
CALCIUM SERPL-MCNC: 8 MG/DL (ref 8.5–10.1)
CALCIUM SERPL-MCNC: 8.1 MG/DL (ref 8.5–10.1)
CALCIUM SERPL-MCNC: 8.2 MG/DL (ref 8.5–10.1)
CALCIUM SERPL-MCNC: 8.2 MG/DL (ref 8.5–10.1)
CALCIUM SERPL-MCNC: 8.3 MG/DL (ref 8.5–10.1)
CALCIUM SERPL-MCNC: 8.3 MG/DL (ref 8.5–10.1)
CALCIUM SERPL-MCNC: 8.4 MG/DL (ref 8.5–10.1)
CALCIUM SERPL-MCNC: 8.4 MG/DL (ref 8.5–10.1)
CALCIUM SERPL-MCNC: 8.5 MG/DL (ref 8.5–10.1)
CALCIUM SERPL-MCNC: 8.6 MG/DL (ref 8.5–10.1)
CALCIUM SERPL-MCNC: 8.7 MG/DL (ref 8.5–10.1)
CALCIUM SERPL-MCNC: 8.7 MG/DL (ref 8.5–10.1)
CALCIUM SERPL-MCNC: 8.8 MG/DL (ref 8.5–10.1)
CALCIUM SERPL-MCNC: 8.8 MG/DL (ref 8.5–10.1)
CALCIUM SERPL-MCNC: 8.9 MG/DL (ref 8.5–10.1)
CALCIUM SERPL-MCNC: 9 MG/DL (ref 8.5–10.1)
CALCIUM SERPL-MCNC: 9 MG/DL (ref 8.5–10.1)
CALCIUM SERPL-MCNC: 9.1 MG/DL (ref 8.5–10.1)
CALCIUM SERPL-MCNC: 9.2 MG/DL (ref 8.5–10.1)
CALCULATED P AXIS, ECG09: -16 DEGREES
CALCULATED P AXIS, ECG09: 12 DEGREES
CALCULATED P AXIS, ECG09: 51 DEGREES
CALCULATED P AXIS, ECG09: 58 DEGREES
CALCULATED P AXIS, ECG09: 61 DEGREES
CALCULATED P AXIS, ECG09: 68 DEGREES
CALCULATED P AXIS, ECG09: 72 DEGREES
CALCULATED P AXIS, ECG09: 73 DEGREES
CALCULATED P AXIS, ECG09: 74 DEGREES
CALCULATED P AXIS, ECG09: 8 DEGREES
CALCULATED R AXIS, ECG10: 22 DEGREES
CALCULATED R AXIS, ECG10: 26 DEGREES
CALCULATED R AXIS, ECG10: 3 DEGREES
CALCULATED R AXIS, ECG10: 33 DEGREES
CALCULATED R AXIS, ECG10: 35 DEGREES
CALCULATED R AXIS, ECG10: 39 DEGREES
CALCULATED R AXIS, ECG10: 46 DEGREES
CALCULATED R AXIS, ECG10: 47 DEGREES
CALCULATED R AXIS, ECG10: 47 DEGREES
CALCULATED R AXIS, ECG10: 51 DEGREES
CALCULATED R AXIS, ECG10: 54 DEGREES
CALCULATED R AXIS, ECG10: 66 DEGREES
CALCULATED T AXIS, ECG11: -111 DEGREES
CALCULATED T AXIS, ECG11: -133 DEGREES
CALCULATED T AXIS, ECG11: -134 DEGREES
CALCULATED T AXIS, ECG11: -143 DEGREES
CALCULATED T AXIS, ECG11: -53 DEGREES
CALCULATED T AXIS, ECG11: 149 DEGREES
CALCULATED T AXIS, ECG11: 175 DEGREES
CALCULATED T AXIS, ECG11: 34 DEGREES
CALCULATED T AXIS, ECG11: 35 DEGREES
CALCULATED T AXIS, ECG11: 59 DEGREES
CALCULATED T AXIS, ECG11: 63 DEGREES
CALCULATED T AXIS, ECG11: 79 DEGREES
CANCER AG19-9 SERPL-ACNC: 3596 U/ML (ref 0–35)
CC UR VC: NORMAL
CEA SERPL-MCNC: 16.8 NG/ML
CHLORIDE SERPL-SCNC: 101 MMOL/L (ref 97–108)
CHLORIDE SERPL-SCNC: 101 MMOL/L (ref 97–108)
CHLORIDE SERPL-SCNC: 102 MMOL/L (ref 97–108)
CHLORIDE SERPL-SCNC: 103 MMOL/L (ref 97–108)
CHLORIDE SERPL-SCNC: 104 MMOL/L (ref 97–108)
CHLORIDE SERPL-SCNC: 105 MMOL/L (ref 97–108)
CHLORIDE SERPL-SCNC: 106 MMOL/L (ref 97–108)
CHLORIDE SERPL-SCNC: 107 MMOL/L (ref 97–108)
CHLORIDE SERPL-SCNC: 108 MMOL/L (ref 97–108)
CHLORIDE SERPL-SCNC: 109 MMOL/L (ref 97–108)
CHLORIDE SERPL-SCNC: 109 MMOL/L (ref 97–108)
CHLORIDE SERPL-SCNC: 110 MMOL/L (ref 97–108)
CHLORIDE SERPL-SCNC: 111 MMOL/L (ref 97–108)
CHLORIDE SERPL-SCNC: 111 MMOL/L (ref 97–108)
CHLORIDE SERPL-SCNC: 112 MMOL/L (ref 97–108)
CHLORIDE SERPL-SCNC: 112 MMOL/L (ref 97–108)
CHLORIDE SERPL-SCNC: 114 MMOL/L (ref 97–108)
CHLORIDE SERPL-SCNC: 114 MMOL/L (ref 97–108)
CHLORIDE SERPL-SCNC: 115 MMOL/L (ref 97–108)
CHLORIDE SERPL-SCNC: 89 MMOL/L (ref 97–108)
CHLORIDE SERPL-SCNC: 95 MMOL/L (ref 97–108)
CHLORIDE SERPL-SCNC: 99 MMOL/L (ref 97–108)
CHLORIDE UR-SCNC: 23 MMOL/L
CHOLEST SERPL-MCNC: 185 MG/DL
CHOLEST SERPL-MCNC: 85 MG/DL
CO2 SERPL-SCNC: 17 MMOL/L (ref 21–32)
CO2 SERPL-SCNC: 19 MMOL/L (ref 21–32)
CO2 SERPL-SCNC: 20 MMOL/L (ref 21–32)
CO2 SERPL-SCNC: 21 MMOL/L (ref 21–32)
CO2 SERPL-SCNC: 22 MMOL/L (ref 21–32)
CO2 SERPL-SCNC: 23 MMOL/L (ref 21–32)
CO2 SERPL-SCNC: 24 MMOL/L (ref 21–32)
CO2 SERPL-SCNC: 25 MMOL/L (ref 21–32)
CO2 SERPL-SCNC: 26 MMOL/L (ref 21–32)
CO2 SERPL-SCNC: 26 MMOL/L (ref 21–32)
CO2 SERPL-SCNC: 27 MMOL/L (ref 21–32)
CO2 SERPL-SCNC: 28 MMOL/L (ref 21–32)
CO2 SERPL-SCNC: 29 MMOL/L (ref 21–32)
CO2 SERPL-SCNC: 29 MMOL/L (ref 21–32)
CO2 SERPL-SCNC: 30 MMOL/L (ref 21–32)
CO2 SERPL-SCNC: 30 MMOL/L (ref 21–32)
CO2 SERPL-SCNC: 32 MMOL/L (ref 21–32)
CO2 SERPL-SCNC: 33 MMOL/L (ref 21–32)
CO2 SERPL-SCNC: 33 MMOL/L (ref 21–32)
CO2 SERPL-SCNC: 34 MMOL/L (ref 21–32)
CO2 SERPL-SCNC: 36 MMOL/L (ref 21–32)
COLOR UR: ABNORMAL
COLOR UR: NORMAL
COMMENT, HOLDF: NORMAL
CREAT SERPL-MCNC: 0.67 MG/DL (ref 0.55–1.02)
CREAT SERPL-MCNC: 0.68 MG/DL (ref 0.55–1.02)
CREAT SERPL-MCNC: 0.72 MG/DL (ref 0.55–1.02)
CREAT SERPL-MCNC: 0.74 MG/DL (ref 0.55–1.02)
CREAT SERPL-MCNC: 0.78 MG/DL (ref 0.55–1.02)
CREAT SERPL-MCNC: 0.81 MG/DL (ref 0.55–1.02)
CREAT SERPL-MCNC: 0.82 MG/DL (ref 0.55–1.02)
CREAT SERPL-MCNC: 0.85 MG/DL (ref 0.55–1.02)
CREAT SERPL-MCNC: 0.85 MG/DL (ref 0.55–1.02)
CREAT SERPL-MCNC: 0.9 MG/DL (ref 0.55–1.02)
CREAT SERPL-MCNC: 0.9 MG/DL (ref 0.55–1.02)
CREAT SERPL-MCNC: 0.92 MG/DL (ref 0.55–1.02)
CREAT SERPL-MCNC: 0.94 MG/DL (ref 0.55–1.02)
CREAT SERPL-MCNC: 0.95 MG/DL (ref 0.55–1.02)
CREAT SERPL-MCNC: 0.99 MG/DL (ref 0.55–1.02)
CREAT SERPL-MCNC: 1 MG/DL (ref 0.55–1.02)
CREAT SERPL-MCNC: 1 MG/DL (ref 0.55–1.02)
CREAT SERPL-MCNC: 1.01 MG/DL (ref 0.55–1.02)
CREAT SERPL-MCNC: 1.01 MG/DL (ref 0.55–1.02)
CREAT SERPL-MCNC: 1.02 MG/DL (ref 0.55–1.02)
CREAT SERPL-MCNC: 1.04 MG/DL (ref 0.55–1.02)
CREAT SERPL-MCNC: 1.04 MG/DL (ref 0.55–1.02)
CREAT SERPL-MCNC: 1.06 MG/DL (ref 0.55–1.02)
CREAT SERPL-MCNC: 1.07 MG/DL (ref 0.55–1.02)
CREAT SERPL-MCNC: 1.09 MG/DL (ref 0.55–1.02)
CREAT SERPL-MCNC: 1.13 MG/DL (ref 0.55–1.02)
CREAT SERPL-MCNC: 1.14 MG/DL (ref 0.55–1.02)
CREAT SERPL-MCNC: 1.15 MG/DL (ref 0.55–1.02)
CREAT SERPL-MCNC: 1.16 MG/DL (ref 0.55–1.02)
CREAT SERPL-MCNC: 1.18 MG/DL (ref 0.55–1.02)
CREAT SERPL-MCNC: 1.18 MG/DL (ref 0.55–1.02)
CREAT SERPL-MCNC: 1.19 MG/DL (ref 0.55–1.02)
CREAT SERPL-MCNC: 1.23 MG/DL (ref 0.55–1.02)
CREAT SERPL-MCNC: 1.26 MG/DL (ref 0.55–1.02)
CREAT SERPL-MCNC: 1.27 MG/DL (ref 0.55–1.02)
CREAT SERPL-MCNC: 1.28 MG/DL (ref 0.55–1.02)
CREAT SERPL-MCNC: 1.29 MG/DL (ref 0.55–1.02)
CREAT SERPL-MCNC: 1.33 MG/DL (ref 0.55–1.02)
CREAT SERPL-MCNC: 1.44 MG/DL (ref 0.55–1.02)
CREAT SERPL-MCNC: 1.59 MG/DL (ref 0.55–1.02)
CREAT SERPL-MCNC: 1.77 MG/DL (ref 0.55–1.02)
CREAT SERPL-MCNC: 1.78 MG/DL (ref 0.55–1.02)
CREAT SERPL-MCNC: 1.8 MG/DL (ref 0.55–1.02)
CREAT SERPL-MCNC: 2.06 MG/DL (ref 0.55–1.02)
CREAT SERPL-MCNC: 2.09 MG/DL (ref 0.55–1.02)
CREAT SERPL-MCNC: 2.32 MG/DL (ref 0.55–1.02)
CREAT SERPL-MCNC: 2.41 MG/DL (ref 0.55–1.02)
CREAT UR-MCNC: 79.1 MG/DL
CROSSMATCH RESULT,%XM: NORMAL
DIAGNOSIS, 93000: NORMAL
DIFFERENTIAL METHOD BLD: ABNORMAL
ECHO AO ROOT DIAM: 2.66 CM
ECHO AV PEAK GRADIENT: 8.9 MMHG
ECHO AV PEAK VELOCITY: 149.49 CM/S
ECHO LA MAJOR AXIS: 4.11 CM
ECHO LA TO AORTIC ROOT RATIO: 1.55
ECHO LV E' LATERAL VELOCITY: 11.3 CM/S
ECHO LV E' SEPTAL VELOCITY: 6.75 CM/S
ECHO LV INTERNAL DIMENSION DIASTOLIC: 4.05 CM (ref 3.9–5.3)
ECHO LV INTERNAL DIMENSION SYSTOLIC: 2.98 CM
ECHO LV IVSD: 0.99 CM (ref 0.6–0.9)
ECHO LV MASS 2D: 138.2 G (ref 67–162)
ECHO LV MASS INDEX 2D: 81.2 G/M2 (ref 43–95)
ECHO LV POSTERIOR WALL DIASTOLIC: 0.93 CM (ref 0.6–0.9)
ECHO LVOT PEAK GRADIENT: 3 MMHG
ECHO LVOT PEAK VELOCITY: 85.96 CM/S
ECHO MV A VELOCITY: 86.51 CM/S
ECHO MV AREA PHT: 3.2 CM2
ECHO MV E DECELERATION TIME (DT): 235 MS
ECHO MV E VELOCITY: 0.91 CM/S
ECHO MV E/A RATIO: 0.01
ECHO MV E/E' LATERAL: 0.08
ECHO MV E/E' RATIO (AVERAGED): 0.11
ECHO MV E/E' SEPTAL: 0.13
ECHO MV PRESSURE HALF TIME (PHT): 68.2 MS
ECHO PV MAX VELOCITY: 107.2 CM/S
ECHO PV PEAK GRADIENT: 4.6 MMHG
ECHO RV TAPSE: 2 CM (ref 1.5–2)
ECHO TV REGURGITANT MAX VELOCITY: 236.67 CM/S
ECHO TV REGURGITANT PEAK GRADIENT: 22.4 MMHG
EOSINOPHIL # BLD: 0 K/UL (ref 0–0.4)
EOSINOPHIL # BLD: 0.1 K/UL (ref 0–0.4)
EOSINOPHIL # BLD: 0.2 K/UL (ref 0–0.4)
EOSINOPHIL # BLD: 0.3 K/UL (ref 0–0.4)
EOSINOPHIL # BLD: 0.4 K/UL (ref 0–0.4)
EOSINOPHIL NFR BLD: 0 % (ref 0–7)
EOSINOPHIL NFR BLD: 1 % (ref 0–7)
EOSINOPHIL NFR BLD: 2 % (ref 0–7)
EOSINOPHIL NFR BLD: 2 % (ref 0–7)
EOSINOPHIL NFR BLD: 3 % (ref 0–7)
EOSINOPHIL NFR BLD: 4 % (ref 0–7)
EOSINOPHIL NFR BLD: 4 % (ref 0–7)
EPITH CASTS URNS QL MICRO: ABNORMAL /LPF
EPITH CASTS URNS QL MICRO: NORMAL /LPF
ERYTHROCYTE [DISTWIDTH] IN BLOOD BY AUTOMATED COUNT: 12.2 % (ref 11.5–14.5)
ERYTHROCYTE [DISTWIDTH] IN BLOOD BY AUTOMATED COUNT: 12.7 % (ref 11.5–14.5)
ERYTHROCYTE [DISTWIDTH] IN BLOOD BY AUTOMATED COUNT: 12.8 % (ref 11.5–14.5)
ERYTHROCYTE [DISTWIDTH] IN BLOOD BY AUTOMATED COUNT: 12.9 % (ref 11.5–14.5)
ERYTHROCYTE [DISTWIDTH] IN BLOOD BY AUTOMATED COUNT: 13 % (ref 11.5–14.5)
ERYTHROCYTE [DISTWIDTH] IN BLOOD BY AUTOMATED COUNT: 13.1 % (ref 11.5–14.5)
ERYTHROCYTE [DISTWIDTH] IN BLOOD BY AUTOMATED COUNT: 13.2 % (ref 11.5–14.5)
ERYTHROCYTE [DISTWIDTH] IN BLOOD BY AUTOMATED COUNT: 13.4 % (ref 11.5–14.5)
ERYTHROCYTE [DISTWIDTH] IN BLOOD BY AUTOMATED COUNT: 13.5 % (ref 11.5–14.5)
ERYTHROCYTE [DISTWIDTH] IN BLOOD BY AUTOMATED COUNT: 13.6 % (ref 11.5–14.5)
ERYTHROCYTE [DISTWIDTH] IN BLOOD BY AUTOMATED COUNT: 14 % (ref 11.5–14.5)
ERYTHROCYTE [DISTWIDTH] IN BLOOD BY AUTOMATED COUNT: 14.1 % (ref 11.5–14.5)
ERYTHROCYTE [DISTWIDTH] IN BLOOD BY AUTOMATED COUNT: 14.1 % (ref 11.5–14.5)
ERYTHROCYTE [DISTWIDTH] IN BLOOD BY AUTOMATED COUNT: 14.2 % (ref 11.5–14.5)
ERYTHROCYTE [DISTWIDTH] IN BLOOD BY AUTOMATED COUNT: 14.4 % (ref 11.5–14.5)
ERYTHROCYTE [DISTWIDTH] IN BLOOD BY AUTOMATED COUNT: 14.4 % (ref 11.5–14.5)
ERYTHROCYTE [DISTWIDTH] IN BLOOD BY AUTOMATED COUNT: 14.6 % (ref 11.5–14.5)
ERYTHROCYTE [DISTWIDTH] IN BLOOD BY AUTOMATED COUNT: 15 % (ref 11.5–14.5)
ERYTHROCYTE [DISTWIDTH] IN BLOOD BY AUTOMATED COUNT: 15.2 % (ref 11.5–14.5)
ERYTHROCYTE [DISTWIDTH] IN BLOOD BY AUTOMATED COUNT: 15.3 % (ref 11.5–14.5)
ERYTHROCYTE [DISTWIDTH] IN BLOOD BY AUTOMATED COUNT: 15.4 % (ref 11.5–14.5)
ERYTHROCYTE [DISTWIDTH] IN BLOOD BY AUTOMATED COUNT: 15.6 % (ref 11.5–14.5)
ERYTHROCYTE [DISTWIDTH] IN BLOOD BY AUTOMATED COUNT: 15.9 % (ref 11.5–14.5)
ERYTHROCYTE [DISTWIDTH] IN BLOOD BY AUTOMATED COUNT: 16.3 % (ref 11.5–14.5)
ERYTHROCYTE [DISTWIDTH] IN BLOOD BY AUTOMATED COUNT: 16.4 % (ref 11.5–14.5)
ERYTHROCYTE [DISTWIDTH] IN BLOOD BY AUTOMATED COUNT: 16.4 % (ref 11.5–14.5)
ERYTHROCYTE [DISTWIDTH] IN BLOOD BY AUTOMATED COUNT: 16.5 % (ref 11.5–14.5)
ERYTHROCYTE [DISTWIDTH] IN BLOOD BY AUTOMATED COUNT: 16.6 % (ref 11.5–14.5)
ERYTHROCYTE [SEDIMENTATION RATE] IN BLOOD: 17 MM/HR (ref 0–30)
FIBRINOGEN PPP-MCNC: 237 MG/DL (ref 200–475)
FIBRINOGEN PPP-MCNC: 270 MG/DL (ref 200–475)
FLUAV AG NPH QL IA: NEGATIVE
FLUAV AG NPH QL IA: POSITIVE
FLUBV AG NOSE QL IA: NEGATIVE
FLUBV AG NOSE QL IA: NEGATIVE
GAS FLOW.O2 O2 DELIVERY SYS: ABNORMAL L/MIN
GAS FLOW.O2 SETTING OXYMISER: 4 L/M
GASTROCULT GAST QL: POSITIVE
GLOBULIN SER CALC-MCNC: 2.3 G/DL (ref 2–4)
GLOBULIN SER CALC-MCNC: 2.4 G/DL (ref 2–4)
GLOBULIN SER CALC-MCNC: 2.5 G/DL (ref 2–4)
GLOBULIN SER CALC-MCNC: 2.8 G/DL (ref 2–4)
GLOBULIN SER CALC-MCNC: 2.8 G/DL (ref 2–4)
GLOBULIN SER CALC-MCNC: 3.1 G/DL (ref 2–4)
GLOBULIN SER CALC-MCNC: 3.1 G/DL (ref 2–4)
GLOBULIN SER CALC-MCNC: 3.4 G/DL (ref 2–4)
GLOBULIN SER CALC-MCNC: 3.4 G/DL (ref 2–4)
GLOBULIN SER CALC-MCNC: 3.5 G/DL (ref 2–4)
GLOBULIN SER CALC-MCNC: 3.6 G/DL (ref 2–4)
GLOBULIN SER CALC-MCNC: 3.8 G/DL (ref 2–4)
GLOBULIN SER CALC-MCNC: 3.8 G/DL (ref 2–4)
GLOBULIN SER CALC-MCNC: 3.9 G/DL (ref 2–4)
GLOBULIN SER CALC-MCNC: 4.6 G/DL (ref 2–4)
GLOBULIN SER CALC-MCNC: 4.8 G/DL (ref 2–4)
GLOBULIN SER CALC-MCNC: 5 G/DL (ref 2–4)
GLOBULIN SER CALC-MCNC: 5.5 G/DL (ref 2–4)
GLUCOSE BLD STRIP.AUTO-MCNC: 101 MG/DL (ref 65–100)
GLUCOSE BLD STRIP.AUTO-MCNC: 102 MG/DL (ref 65–100)
GLUCOSE BLD STRIP.AUTO-MCNC: 105 MG/DL (ref 65–100)
GLUCOSE BLD STRIP.AUTO-MCNC: 105 MG/DL (ref 65–100)
GLUCOSE BLD STRIP.AUTO-MCNC: 106 MG/DL (ref 65–100)
GLUCOSE BLD STRIP.AUTO-MCNC: 108 MG/DL (ref 65–100)
GLUCOSE BLD STRIP.AUTO-MCNC: 110 MG/DL (ref 65–100)
GLUCOSE BLD STRIP.AUTO-MCNC: 111 MG/DL (ref 65–100)
GLUCOSE BLD STRIP.AUTO-MCNC: 112 MG/DL (ref 65–100)
GLUCOSE BLD STRIP.AUTO-MCNC: 114 MG/DL (ref 65–100)
GLUCOSE BLD STRIP.AUTO-MCNC: 115 MG/DL (ref 65–100)
GLUCOSE BLD STRIP.AUTO-MCNC: 117 MG/DL (ref 65–100)
GLUCOSE BLD STRIP.AUTO-MCNC: 117 MG/DL (ref 65–100)
GLUCOSE BLD STRIP.AUTO-MCNC: 119 MG/DL (ref 65–100)
GLUCOSE BLD STRIP.AUTO-MCNC: 120 MG/DL (ref 65–100)
GLUCOSE BLD STRIP.AUTO-MCNC: 124 MG/DL (ref 65–100)
GLUCOSE BLD STRIP.AUTO-MCNC: 125 MG/DL (ref 65–100)
GLUCOSE BLD STRIP.AUTO-MCNC: 132 MG/DL (ref 65–100)
GLUCOSE BLD STRIP.AUTO-MCNC: 132 MG/DL (ref 65–100)
GLUCOSE BLD STRIP.AUTO-MCNC: 133 MG/DL (ref 65–100)
GLUCOSE BLD STRIP.AUTO-MCNC: 134 MG/DL (ref 65–100)
GLUCOSE BLD STRIP.AUTO-MCNC: 137 MG/DL (ref 65–100)
GLUCOSE BLD STRIP.AUTO-MCNC: 137 MG/DL (ref 65–100)
GLUCOSE BLD STRIP.AUTO-MCNC: 139 MG/DL (ref 65–100)
GLUCOSE BLD STRIP.AUTO-MCNC: 139 MG/DL (ref 65–100)
GLUCOSE BLD STRIP.AUTO-MCNC: 141 MG/DL (ref 65–100)
GLUCOSE BLD STRIP.AUTO-MCNC: 141 MG/DL (ref 65–100)
GLUCOSE BLD STRIP.AUTO-MCNC: 143 MG/DL (ref 65–100)
GLUCOSE BLD STRIP.AUTO-MCNC: 145 MG/DL (ref 65–100)
GLUCOSE BLD STRIP.AUTO-MCNC: 145 MG/DL (ref 65–100)
GLUCOSE BLD STRIP.AUTO-MCNC: 153 MG/DL (ref 65–100)
GLUCOSE BLD STRIP.AUTO-MCNC: 157 MG/DL (ref 65–100)
GLUCOSE BLD STRIP.AUTO-MCNC: 161 MG/DL (ref 65–100)
GLUCOSE BLD STRIP.AUTO-MCNC: 179 MG/DL (ref 65–100)
GLUCOSE BLD STRIP.AUTO-MCNC: 183 MG/DL (ref 65–100)
GLUCOSE BLD STRIP.AUTO-MCNC: 185 MG/DL (ref 65–100)
GLUCOSE BLD STRIP.AUTO-MCNC: 98 MG/DL (ref 65–100)
GLUCOSE BLD STRIP.AUTO-MCNC: 99 MG/DL (ref 65–100)
GLUCOSE SERPL-MCNC: 100 MG/DL (ref 65–100)
GLUCOSE SERPL-MCNC: 101 MG/DL (ref 65–100)
GLUCOSE SERPL-MCNC: 105 MG/DL (ref 65–100)
GLUCOSE SERPL-MCNC: 112 MG/DL (ref 65–100)
GLUCOSE SERPL-MCNC: 112 MG/DL (ref 65–100)
GLUCOSE SERPL-MCNC: 113 MG/DL (ref 65–100)
GLUCOSE SERPL-MCNC: 114 MG/DL (ref 65–100)
GLUCOSE SERPL-MCNC: 115 MG/DL (ref 65–100)
GLUCOSE SERPL-MCNC: 116 MG/DL (ref 65–100)
GLUCOSE SERPL-MCNC: 118 MG/DL (ref 65–100)
GLUCOSE SERPL-MCNC: 118 MG/DL (ref 65–100)
GLUCOSE SERPL-MCNC: 120 MG/DL (ref 65–100)
GLUCOSE SERPL-MCNC: 121 MG/DL (ref 65–100)
GLUCOSE SERPL-MCNC: 122 MG/DL (ref 65–100)
GLUCOSE SERPL-MCNC: 126 MG/DL (ref 65–100)
GLUCOSE SERPL-MCNC: 126 MG/DL (ref 65–100)
GLUCOSE SERPL-MCNC: 127 MG/DL (ref 65–100)
GLUCOSE SERPL-MCNC: 127 MG/DL (ref 65–100)
GLUCOSE SERPL-MCNC: 129 MG/DL (ref 65–100)
GLUCOSE SERPL-MCNC: 132 MG/DL (ref 65–100)
GLUCOSE SERPL-MCNC: 132 MG/DL (ref 65–100)
GLUCOSE SERPL-MCNC: 136 MG/DL (ref 65–100)
GLUCOSE SERPL-MCNC: 137 MG/DL (ref 65–100)
GLUCOSE SERPL-MCNC: 138 MG/DL (ref 65–100)
GLUCOSE SERPL-MCNC: 139 MG/DL (ref 65–100)
GLUCOSE SERPL-MCNC: 150 MG/DL (ref 65–100)
GLUCOSE SERPL-MCNC: 150 MG/DL (ref 65–100)
GLUCOSE SERPL-MCNC: 157 MG/DL (ref 65–100)
GLUCOSE SERPL-MCNC: 161 MG/DL (ref 65–100)
GLUCOSE SERPL-MCNC: 165 MG/DL (ref 65–100)
GLUCOSE SERPL-MCNC: 167 MG/DL (ref 65–100)
GLUCOSE SERPL-MCNC: 176 MG/DL (ref 65–100)
GLUCOSE SERPL-MCNC: 89 MG/DL (ref 65–100)
GLUCOSE SERPL-MCNC: 96 MG/DL (ref 65–100)
GLUCOSE SERPL-MCNC: 98 MG/DL (ref 65–100)
GLUCOSE UR STRIP.AUTO-MCNC: NEGATIVE MG/DL
GRAN CASTS URNS QL MICRO: ABNORMAL /LPF
HCO3 BLD-SCNC: 23.4 MMOL/L (ref 22–26)
HCO3 BLD-SCNC: 25 MMOL/L (ref 22–26)
HCO3 BLD-SCNC: 27.4 MMOL/L (ref 22–26)
HCT VFR BLD AUTO: 17.5 % (ref 35–47)
HCT VFR BLD AUTO: 18.2 % (ref 35–47)
HCT VFR BLD AUTO: 18.5 % (ref 35–47)
HCT VFR BLD AUTO: 18.8 % (ref 35–47)
HCT VFR BLD AUTO: 18.9 % (ref 35–47)
HCT VFR BLD AUTO: 20.9 % (ref 35–47)
HCT VFR BLD AUTO: 22.1 % (ref 35–47)
HCT VFR BLD AUTO: 23.1 % (ref 35–47)
HCT VFR BLD AUTO: 24.7 % (ref 35–47)
HCT VFR BLD AUTO: 24.9 % (ref 35–47)
HCT VFR BLD AUTO: 25.4 % (ref 35–47)
HCT VFR BLD AUTO: 26 % (ref 35–47)
HCT VFR BLD AUTO: 26.3 % (ref 35–47)
HCT VFR BLD AUTO: 26.6 % (ref 35–47)
HCT VFR BLD AUTO: 26.7 % (ref 35–47)
HCT VFR BLD AUTO: 26.8 % (ref 35–47)
HCT VFR BLD AUTO: 27.1 % (ref 35–47)
HCT VFR BLD AUTO: 27.2 % (ref 35–47)
HCT VFR BLD AUTO: 28.3 % (ref 35–47)
HCT VFR BLD AUTO: 28.3 % (ref 35–47)
HCT VFR BLD AUTO: 28.6 % (ref 35–47)
HCT VFR BLD AUTO: 28.6 % (ref 35–47)
HCT VFR BLD AUTO: 29.7 % (ref 35–47)
HCT VFR BLD AUTO: 29.7 % (ref 35–47)
HCT VFR BLD AUTO: 29.8 % (ref 35–47)
HCT VFR BLD AUTO: 30.5 % (ref 35–47)
HCT VFR BLD AUTO: 30.6 % (ref 35–47)
HCT VFR BLD AUTO: 30.8 % (ref 35–47)
HCT VFR BLD AUTO: 31.1 % (ref 35–47)
HCT VFR BLD AUTO: 31.5 % (ref 35–47)
HCT VFR BLD AUTO: 31.8 % (ref 35–47)
HCT VFR BLD AUTO: 32.2 % (ref 35–47)
HCT VFR BLD AUTO: 32.8 % (ref 35–47)
HCT VFR BLD AUTO: 33.2 % (ref 35–47)
HCT VFR BLD AUTO: 34.9 % (ref 35–47)
HCT VFR BLD AUTO: 35 % (ref 35–47)
HCT VFR BLD AUTO: 35.1 % (ref 35–47)
HCT VFR BLD AUTO: 35.2 % (ref 35–47)
HCT VFR BLD AUTO: 35.4 % (ref 35–47)
HCT VFR BLD AUTO: 36.1 % (ref 35–47)
HCT VFR BLD AUTO: 36.1 % (ref 35–47)
HCT VFR BLD AUTO: 38 % (ref 35–47)
HCT VFR BLD AUTO: 40.6 % (ref 35–47)
HCT VFR BLD AUTO: 41.1 % (ref 35–47)
HCT VFR BLD AUTO: 41.3 % (ref 35–47)
HCT VFR BLD AUTO: 41.8 % (ref 35–47)
HDLC SERPL-MCNC: 43 MG/DL
HDLC SERPL-MCNC: 52 MG/DL
HDLC SERPL: 2 {RATIO} (ref 0–5)
HDLC SERPL: 3.6 {RATIO} (ref 0–5)
HEMOCCULT STL QL: POSITIVE
HGB BLD-MCNC: 10 G/DL (ref 11.5–16)
HGB BLD-MCNC: 10.3 G/DL (ref 11.5–16)
HGB BLD-MCNC: 10.3 G/DL (ref 11.5–16)
HGB BLD-MCNC: 10.5 G/DL (ref 11.5–16)
HGB BLD-MCNC: 10.6 G/DL (ref 11.5–16)
HGB BLD-MCNC: 10.9 G/DL (ref 11.5–16)
HGB BLD-MCNC: 11.1 G/DL (ref 11.5–16)
HGB BLD-MCNC: 11.2 G/DL (ref 11.5–16)
HGB BLD-MCNC: 11.3 G/DL (ref 11.5–16)
HGB BLD-MCNC: 11.4 G/DL (ref 11.5–16)
HGB BLD-MCNC: 11.4 G/DL (ref 11.5–16)
HGB BLD-MCNC: 11.5 G/DL (ref 11.5–16)
HGB BLD-MCNC: 12 G/DL (ref 11.5–16)
HGB BLD-MCNC: 12.5 G/DL (ref 11.5–16)
HGB BLD-MCNC: 12.8 G/DL (ref 11.5–16)
HGB BLD-MCNC: 13 G/DL (ref 11.5–16)
HGB BLD-MCNC: 13.3 G/DL (ref 11.5–16)
HGB BLD-MCNC: 13.7 G/DL (ref 11.5–16)
HGB BLD-MCNC: 5.4 G/DL (ref 11.5–16)
HGB BLD-MCNC: 5.6 G/DL (ref 11.5–16)
HGB BLD-MCNC: 5.7 G/DL (ref 11.5–16)
HGB BLD-MCNC: 6 G/DL (ref 11.5–16)
HGB BLD-MCNC: 6.2 G/DL (ref 11.5–16)
HGB BLD-MCNC: 6.8 G/DL (ref 11.5–16)
HGB BLD-MCNC: 7.3 G/DL (ref 11.5–16)
HGB BLD-MCNC: 7.5 G/DL (ref 11.5–16)
HGB BLD-MCNC: 7.8 G/DL (ref 11.5–16)
HGB BLD-MCNC: 8.1 G/DL (ref 11.5–16)
HGB BLD-MCNC: 8.2 G/DL (ref 11.5–16)
HGB BLD-MCNC: 8.4 G/DL (ref 11.5–16)
HGB BLD-MCNC: 8.6 G/DL (ref 11.5–16)
HGB BLD-MCNC: 8.8 G/DL (ref 11.5–16)
HGB BLD-MCNC: 8.8 G/DL (ref 11.5–16)
HGB BLD-MCNC: 8.9 G/DL (ref 11.5–16)
HGB BLD-MCNC: 9 G/DL (ref 11.5–16)
HGB BLD-MCNC: 9.1 G/DL (ref 11.5–16)
HGB BLD-MCNC: 9.2 G/DL (ref 11.5–16)
HGB BLD-MCNC: 9.2 G/DL (ref 11.5–16)
HGB BLD-MCNC: 9.3 G/DL (ref 11.5–16)
HGB BLD-MCNC: 9.4 G/DL (ref 11.5–16)
HGB BLD-MCNC: 9.6 G/DL (ref 11.5–16)
HGB BLD-MCNC: 9.8 G/DL (ref 11.5–16)
HGB BLD-MCNC: 9.8 G/DL (ref 11.5–16)
HGB UR QL STRIP: ABNORMAL
HGB UR QL STRIP: NEGATIVE
HYALINE CASTS URNS QL MICRO: >20 /LPF (ref 0–5)
HYALINE CASTS URNS QL MICRO: ABNORMAL /LPF (ref 0–5)
HYALINE CASTS URNS QL MICRO: NORMAL /LPF (ref 0–5)
IMM GRANULOCYTES # BLD AUTO: 0 K/UL
IMM GRANULOCYTES # BLD AUTO: 0 K/UL (ref 0–0.04)
IMM GRANULOCYTES # BLD AUTO: 0.1 K/UL (ref 0–0.04)
IMM GRANULOCYTES # BLD AUTO: 0.2 K/UL (ref 0–0.04)
IMM GRANULOCYTES # BLD AUTO: 0.3 K/UL (ref 0–0.04)
IMM GRANULOCYTES NFR BLD AUTO: 0 %
IMM GRANULOCYTES NFR BLD AUTO: 0 % (ref 0–0.5)
IMM GRANULOCYTES NFR BLD AUTO: 1 % (ref 0–0.5)
IMM GRANULOCYTES NFR BLD AUTO: 2 % (ref 0–0.5)
IMM GRANULOCYTES NFR BLD AUTO: 2 % (ref 0–0.5)
INR PPP: 1 (ref 0.9–1.1)
INR PPP: 1 (ref 0.9–1.1)
INR PPP: 1.2 (ref 0.9–1.1)
INR PPP: 1.3 (ref 0.9–1.1)
INR PPP: 1.3 (ref 0.9–1.1)
IRON SATN MFR SERPL: 18 % (ref 20–50)
IRON SERPL-MCNC: 61 UG/DL (ref 35–150)
KETONES UR QL STRIP.AUTO: 40 MG/DL
KETONES UR QL STRIP.AUTO: NEGATIVE MG/DL
LACTATE BLD-SCNC: 1.49 MMOL/L (ref 0.4–2)
LACTATE BLD-SCNC: 3.66 MMOL/L (ref 0.4–2)
LACTATE SERPL-SCNC: 1.1 MMOL/L (ref 0.4–2)
LACTATE SERPL-SCNC: 1.2 MMOL/L (ref 0.4–2)
LACTATE SERPL-SCNC: 1.6 MMOL/L (ref 0.4–2)
LACTATE SERPL-SCNC: 1.7 MMOL/L (ref 0.4–2)
LDLC SERPL CALC-MCNC: 109.6 MG/DL (ref 0–100)
LDLC SERPL CALC-MCNC: 29 MG/DL (ref 0–100)
LEUKOCYTE ESTERASE UR QL STRIP.AUTO: ABNORMAL
LEUKOCYTE ESTERASE UR QL STRIP.AUTO: NEGATIVE
LIPASE SERPL-CCNC: 1012 U/L (ref 73–393)
LIPASE SERPL-CCNC: 1139 U/L (ref 73–393)
LIPASE SERPL-CCNC: 2012 U/L (ref 73–393)
LIPASE SERPL-CCNC: 411 U/L (ref 73–393)
LIPASE SERPL-CCNC: 812 U/L (ref 73–393)
LIPASE SERPL-CCNC: >3000 U/L (ref 73–393)
LIPID PROFILE,FLP: ABNORMAL
LIPID PROFILE,FLP: NORMAL
LYMPHOCYTES # BLD: 0.4 K/UL (ref 0.8–3.5)
LYMPHOCYTES # BLD: 0.6 K/UL (ref 0.8–3.5)
LYMPHOCYTES # BLD: 0.8 K/UL (ref 0.8–3.5)
LYMPHOCYTES # BLD: 1.1 K/UL (ref 0.8–3.5)
LYMPHOCYTES # BLD: 1.1 K/UL (ref 0.8–3.5)
LYMPHOCYTES # BLD: 1.3 K/UL (ref 0.8–3.5)
LYMPHOCYTES # BLD: 1.3 K/UL (ref 0.8–3.5)
LYMPHOCYTES # BLD: 1.4 K/UL (ref 0.8–3.5)
LYMPHOCYTES # BLD: 1.4 K/UL (ref 0.8–3.5)
LYMPHOCYTES # BLD: 1.5 K/UL (ref 0.8–3.5)
LYMPHOCYTES # BLD: 1.6 K/UL (ref 0.8–3.5)
LYMPHOCYTES # BLD: 1.6 K/UL (ref 0.8–3.5)
LYMPHOCYTES # BLD: 1.8 K/UL (ref 0.8–3.5)
LYMPHOCYTES # BLD: 1.9 K/UL (ref 0.8–3.5)
LYMPHOCYTES # BLD: 2 K/UL (ref 0.8–3.5)
LYMPHOCYTES # BLD: 2.1 K/UL (ref 0.8–3.5)
LYMPHOCYTES # BLD: 2.2 K/UL (ref 0.8–3.5)
LYMPHOCYTES # BLD: 2.3 K/UL (ref 0.8–3.5)
LYMPHOCYTES # BLD: 2.4 K/UL (ref 0.8–3.5)
LYMPHOCYTES # BLD: 2.4 K/UL (ref 0.8–3.5)
LYMPHOCYTES NFR BLD: 10 % (ref 12–49)
LYMPHOCYTES NFR BLD: 12 % (ref 12–49)
LYMPHOCYTES NFR BLD: 13 % (ref 12–49)
LYMPHOCYTES NFR BLD: 13 % (ref 12–49)
LYMPHOCYTES NFR BLD: 14 % (ref 12–49)
LYMPHOCYTES NFR BLD: 15 % (ref 12–49)
LYMPHOCYTES NFR BLD: 16 % (ref 12–49)
LYMPHOCYTES NFR BLD: 17 % (ref 12–49)
LYMPHOCYTES NFR BLD: 17 % (ref 12–49)
LYMPHOCYTES NFR BLD: 18 % (ref 12–49)
LYMPHOCYTES NFR BLD: 3 % (ref 12–49)
LYMPHOCYTES NFR BLD: 35 % (ref 12–49)
LYMPHOCYTES NFR BLD: 4 % (ref 12–49)
LYMPHOCYTES NFR BLD: 5 % (ref 12–49)
LYMPHOCYTES NFR BLD: 6 % (ref 12–49)
LYMPHOCYTES NFR BLD: 8 % (ref 12–49)
LYMPHOCYTES NFR BLD: 9 % (ref 12–49)
MAGNESIUM SERPL-MCNC: 1.5 MG/DL (ref 1.6–2.4)
MAGNESIUM SERPL-MCNC: 1.6 MG/DL (ref 1.6–2.4)
MAGNESIUM SERPL-MCNC: 1.6 MG/DL (ref 1.6–2.4)
MAGNESIUM SERPL-MCNC: 1.7 MG/DL (ref 1.6–2.4)
MAGNESIUM SERPL-MCNC: 1.8 MG/DL (ref 1.6–2.4)
MAGNESIUM SERPL-MCNC: 1.8 MG/DL (ref 1.6–2.4)
MAGNESIUM SERPL-MCNC: 1.9 MG/DL (ref 1.6–2.4)
MAGNESIUM SERPL-MCNC: 2.1 MG/DL (ref 1.6–2.4)
MAGNESIUM SERPL-MCNC: 2.3 MG/DL (ref 1.6–2.4)
MAGNESIUM SERPL-MCNC: 2.4 MG/DL (ref 1.6–2.4)
MAGNESIUM SERPL-MCNC: 3 MG/DL (ref 1.6–2.4)
MCH RBC QN AUTO: 28.1 PG (ref 26–34)
MCH RBC QN AUTO: 28.4 PG (ref 26–34)
MCH RBC QN AUTO: 28.5 PG (ref 26–34)
MCH RBC QN AUTO: 28.6 PG (ref 26–34)
MCH RBC QN AUTO: 28.6 PG (ref 26–34)
MCH RBC QN AUTO: 28.7 PG (ref 26–34)
MCH RBC QN AUTO: 28.7 PG (ref 26–34)
MCH RBC QN AUTO: 28.8 PG (ref 26–34)
MCH RBC QN AUTO: 28.8 PG (ref 26–34)
MCH RBC QN AUTO: 29 PG (ref 26–34)
MCH RBC QN AUTO: 29.1 PG (ref 26–34)
MCH RBC QN AUTO: 29.2 PG (ref 26–34)
MCH RBC QN AUTO: 29.2 PG (ref 26–34)
MCH RBC QN AUTO: 29.5 PG (ref 26–34)
MCH RBC QN AUTO: 29.9 PG (ref 26–34)
MCH RBC QN AUTO: 30 PG (ref 26–34)
MCH RBC QN AUTO: 30 PG (ref 26–34)
MCH RBC QN AUTO: 30.1 PG (ref 26–34)
MCH RBC QN AUTO: 30.2 PG (ref 26–34)
MCH RBC QN AUTO: 30.3 PG (ref 26–34)
MCH RBC QN AUTO: 30.4 PG (ref 26–34)
MCH RBC QN AUTO: 30.4 PG (ref 26–34)
MCH RBC QN AUTO: 30.5 PG (ref 26–34)
MCH RBC QN AUTO: 30.5 PG (ref 26–34)
MCH RBC QN AUTO: 30.6 PG (ref 26–34)
MCH RBC QN AUTO: 30.6 PG (ref 26–34)
MCH RBC QN AUTO: 30.7 PG (ref 26–34)
MCH RBC QN AUTO: 30.7 PG (ref 26–34)
MCH RBC QN AUTO: 30.8 PG (ref 26–34)
MCH RBC QN AUTO: 30.9 PG (ref 26–34)
MCH RBC QN AUTO: 31.2 PG (ref 26–34)
MCH RBC QN AUTO: 31.3 PG (ref 26–34)
MCHC RBC AUTO-ENTMCNC: 30.2 G/DL (ref 30–36.5)
MCHC RBC AUTO-ENTMCNC: 30.3 G/DL (ref 30–36.5)
MCHC RBC AUTO-ENTMCNC: 30.3 G/DL (ref 30–36.5)
MCHC RBC AUTO-ENTMCNC: 30.6 G/DL (ref 30–36.5)
MCHC RBC AUTO-ENTMCNC: 30.8 G/DL (ref 30–36.5)
MCHC RBC AUTO-ENTMCNC: 31.1 G/DL (ref 30–36.5)
MCHC RBC AUTO-ENTMCNC: 31.2 G/DL (ref 30–36.5)
MCHC RBC AUTO-ENTMCNC: 31.3 G/DL (ref 30–36.5)
MCHC RBC AUTO-ENTMCNC: 31.4 G/DL (ref 30–36.5)
MCHC RBC AUTO-ENTMCNC: 31.5 G/DL (ref 30–36.5)
MCHC RBC AUTO-ENTMCNC: 31.6 G/DL (ref 30–36.5)
MCHC RBC AUTO-ENTMCNC: 31.8 G/DL (ref 30–36.5)
MCHC RBC AUTO-ENTMCNC: 31.9 G/DL (ref 30–36.5)
MCHC RBC AUTO-ENTMCNC: 32 G/DL (ref 30–36.5)
MCHC RBC AUTO-ENTMCNC: 32.2 G/DL (ref 30–36.5)
MCHC RBC AUTO-ENTMCNC: 32.4 G/DL (ref 30–36.5)
MCHC RBC AUTO-ENTMCNC: 32.4 G/DL (ref 30–36.5)
MCHC RBC AUTO-ENTMCNC: 32.5 G/DL (ref 30–36.5)
MCHC RBC AUTO-ENTMCNC: 32.5 G/DL (ref 30–36.5)
MCHC RBC AUTO-ENTMCNC: 32.7 G/DL (ref 30–36.5)
MCHC RBC AUTO-ENTMCNC: 32.8 G/DL (ref 30–36.5)
MCHC RBC AUTO-ENTMCNC: 32.8 G/DL (ref 30–36.5)
MCHC RBC AUTO-ENTMCNC: 32.9 G/DL (ref 30–36.5)
MCHC RBC AUTO-ENTMCNC: 33 G/DL (ref 30–36.5)
MCV RBC AUTO: 101.1 FL (ref 80–99)
MCV RBC AUTO: 101.7 FL (ref 80–99)
MCV RBC AUTO: 87.5 FL (ref 80–99)
MCV RBC AUTO: 88.7 FL (ref 80–99)
MCV RBC AUTO: 88.9 FL (ref 80–99)
MCV RBC AUTO: 89 FL (ref 80–99)
MCV RBC AUTO: 89.2 FL (ref 80–99)
MCV RBC AUTO: 89.3 FL (ref 80–99)
MCV RBC AUTO: 89.7 FL (ref 80–99)
MCV RBC AUTO: 89.7 FL (ref 80–99)
MCV RBC AUTO: 90.4 FL (ref 80–99)
MCV RBC AUTO: 91.7 FL (ref 80–99)
MCV RBC AUTO: 91.8 FL (ref 80–99)
MCV RBC AUTO: 92.1 FL (ref 80–99)
MCV RBC AUTO: 92.2 FL (ref 80–99)
MCV RBC AUTO: 92.6 FL (ref 80–99)
MCV RBC AUTO: 92.7 FL (ref 80–99)
MCV RBC AUTO: 92.9 FL (ref 80–99)
MCV RBC AUTO: 93.3 FL (ref 80–99)
MCV RBC AUTO: 94.3 FL (ref 80–99)
MCV RBC AUTO: 94.8 FL (ref 80–99)
MCV RBC AUTO: 95.5 FL (ref 80–99)
MCV RBC AUTO: 95.5 FL (ref 80–99)
MCV RBC AUTO: 95.6 FL (ref 80–99)
MCV RBC AUTO: 95.8 FL (ref 80–99)
MCV RBC AUTO: 95.8 FL (ref 80–99)
MCV RBC AUTO: 96.2 FL (ref 80–99)
MCV RBC AUTO: 96.2 FL (ref 80–99)
MCV RBC AUTO: 96.4 FL (ref 80–99)
MCV RBC AUTO: 96.4 FL (ref 80–99)
MCV RBC AUTO: 96.6 FL (ref 80–99)
MCV RBC AUTO: 97.1 FL (ref 80–99)
MCV RBC AUTO: 97.3 FL (ref 80–99)
MCV RBC AUTO: 97.8 FL (ref 80–99)
MCV RBC AUTO: 98.1 FL (ref 80–99)
MCV RBC AUTO: 98.6 FL (ref 80–99)
MCV RBC AUTO: 98.9 FL (ref 80–99)
MCV RBC AUTO: 99.2 FL (ref 80–99)
MCV RBC AUTO: 99.6 FL (ref 80–99)
METAMYELOCYTES NFR BLD MANUAL: 5 %
MONOCYTES # BLD: 0.3 K/UL (ref 0–1)
MONOCYTES # BLD: 0.3 K/UL (ref 0–1)
MONOCYTES # BLD: 0.4 K/UL (ref 0–1)
MONOCYTES # BLD: 0.4 K/UL (ref 0–1)
MONOCYTES # BLD: 0.6 K/UL (ref 0–1)
MONOCYTES # BLD: 0.7 K/UL (ref 0–1)
MONOCYTES # BLD: 0.7 K/UL (ref 0–1)
MONOCYTES # BLD: 0.8 K/UL (ref 0–1)
MONOCYTES # BLD: 0.9 K/UL (ref 0–1)
MONOCYTES # BLD: 1 K/UL (ref 0–1)
MONOCYTES # BLD: 1 K/UL (ref 0–1)
MONOCYTES # BLD: 1.1 K/UL (ref 0–1)
MONOCYTES # BLD: 1.1 K/UL (ref 0–1)
MONOCYTES # BLD: 1.2 K/UL (ref 0–1)
MONOCYTES # BLD: 1.2 K/UL (ref 0–1)
MONOCYTES # BLD: 1.5 K/UL (ref 0–1)
MONOCYTES NFR BLD: 10 % (ref 5–13)
MONOCYTES NFR BLD: 10 % (ref 5–13)
MONOCYTES NFR BLD: 3 % (ref 5–13)
MONOCYTES NFR BLD: 4 % (ref 5–13)
MONOCYTES NFR BLD: 6 % (ref 5–13)
MONOCYTES NFR BLD: 7 % (ref 5–13)
MONOCYTES NFR BLD: 8 % (ref 5–13)
MONOCYTES NFR BLD: 9 % (ref 5–13)
MYELOCYTES NFR BLD MANUAL: 1 %
MYELOPEROXIDASE AB SER IA-ACNC: <9 U/ML (ref 0–9)
NEUTS BAND NFR BLD MANUAL: 2 % (ref 0–6)
NEUTS SEG # BLD: 10.3 K/UL (ref 1.8–8)
NEUTS SEG # BLD: 11 K/UL (ref 1.8–8)
NEUTS SEG # BLD: 11.5 K/UL (ref 1.8–8)
NEUTS SEG # BLD: 11.7 K/UL (ref 1.8–8)
NEUTS SEG # BLD: 11.7 K/UL (ref 1.8–8)
NEUTS SEG # BLD: 13.9 K/UL (ref 1.8–8)
NEUTS SEG # BLD: 15.5 K/UL (ref 1.8–8)
NEUTS SEG # BLD: 17.9 K/UL (ref 1.8–8)
NEUTS SEG # BLD: 3.9 K/UL (ref 1.8–8)
NEUTS SEG # BLD: 5.1 K/UL (ref 1.8–8)
NEUTS SEG # BLD: 6.3 K/UL (ref 1.8–8)
NEUTS SEG # BLD: 6.5 K/UL (ref 1.8–8)
NEUTS SEG # BLD: 6.7 K/UL (ref 1.8–8)
NEUTS SEG # BLD: 6.9 K/UL (ref 1.8–8)
NEUTS SEG # BLD: 7.1 K/UL (ref 1.8–8)
NEUTS SEG # BLD: 8.2 K/UL (ref 1.8–8)
NEUTS SEG # BLD: 8.4 K/UL (ref 1.8–8)
NEUTS SEG # BLD: 9.1 K/UL (ref 1.8–8)
NEUTS SEG # BLD: 9.2 K/UL (ref 1.8–8)
NEUTS SEG # BLD: 9.6 K/UL (ref 1.8–8)
NEUTS SEG NFR BLD: 56 % (ref 32–75)
NEUTS SEG NFR BLD: 68 % (ref 32–75)
NEUTS SEG NFR BLD: 68 % (ref 32–75)
NEUTS SEG NFR BLD: 71 % (ref 32–75)
NEUTS SEG NFR BLD: 71 % (ref 32–75)
NEUTS SEG NFR BLD: 72 % (ref 32–75)
NEUTS SEG NFR BLD: 73 % (ref 32–75)
NEUTS SEG NFR BLD: 73 % (ref 32–75)
NEUTS SEG NFR BLD: 74 % (ref 32–75)
NEUTS SEG NFR BLD: 75 % (ref 32–75)
NEUTS SEG NFR BLD: 76 % (ref 32–75)
NEUTS SEG NFR BLD: 77 % (ref 32–75)
NEUTS SEG NFR BLD: 79 % (ref 32–75)
NEUTS SEG NFR BLD: 79 % (ref 32–75)
NEUTS SEG NFR BLD: 80 % (ref 32–75)
NEUTS SEG NFR BLD: 80 % (ref 32–75)
NEUTS SEG NFR BLD: 81 % (ref 32–75)
NEUTS SEG NFR BLD: 84 % (ref 32–75)
NEUTS SEG NFR BLD: 87 % (ref 32–75)
NEUTS SEG NFR BLD: 88 % (ref 32–75)
NEUTS SEG NFR BLD: 89 % (ref 32–75)
NEUTS SEG NFR BLD: 94 % (ref 32–75)
NITRITE UR QL STRIP.AUTO: NEGATIVE
NRBC # BLD: 0 K/UL (ref 0–0.01)
NRBC # BLD: 0.02 K/UL (ref 0–0.01)
NRBC # BLD: 0.02 K/UL (ref 0–0.01)
NRBC # BLD: 0.06 K/UL (ref 0–0.01)
NRBC # BLD: 0.09 K/UL (ref 0–0.01)
NRBC # BLD: 0.12 K/UL (ref 0–0.01)
NRBC # BLD: 0.13 K/UL (ref 0–0.01)
NRBC # BLD: 0.16 K/UL (ref 0–0.01)
NRBC # BLD: 0.35 K/UL (ref 0–0.01)
NRBC BLD-RTO: 0 PER 100 WBC
NRBC BLD-RTO: 0.1 PER 100 WBC
NRBC BLD-RTO: 0.2 PER 100 WBC
NRBC BLD-RTO: 0.6 PER 100 WBC
NRBC BLD-RTO: 0.6 PER 100 WBC
NRBC BLD-RTO: 0.9 PER 100 WBC
NRBC BLD-RTO: 1 PER 100 WBC
NRBC BLD-RTO: 1.4 PER 100 WBC
NRBC BLD-RTO: 2.3 PER 100 WBC
O2/TOTAL GAS SETTING VFR VENT: 60 %
P-ANCA ATYPICAL TITR SER IF: NORMAL TITER
P-ANCA TITR SER IF: NORMAL TITER
P-R INTERVAL, ECG05: 122 MS
P-R INTERVAL, ECG05: 128 MS
P-R INTERVAL, ECG05: 132 MS
P-R INTERVAL, ECG05: 138 MS
P-R INTERVAL, ECG05: 138 MS
P-R INTERVAL, ECG05: 140 MS
P-R INTERVAL, ECG05: 142 MS
P-R INTERVAL, ECG05: 146 MS
P-R INTERVAL, ECG05: 146 MS
P-R INTERVAL, ECG05: 148 MS
PATH REV BLD -IMP: ABNORMAL
PCO2 BLD: 35.9 MMHG (ref 35–45)
PCO2 BLD: 41.4 MMHG (ref 35–45)
PCO2 BLD: 42.1 MMHG (ref 35–45)
PEEP RESPIRATORY: 5 CMH2O
PH BLD: 7.38 [PH] (ref 7.35–7.45)
PH BLD: 7.42 [PH] (ref 7.35–7.45)
PH BLD: 7.43 [PH] (ref 7.35–7.45)
PH GAST: ABNORMAL [PH] (ref 1.5–3.5)
PH UR STRIP: 5.5 [PH] (ref 5–8)
PH UR STRIP: 5.5 [PH] (ref 5–8)
PH UR STRIP: 6 [PH] (ref 5–8)
PH UR STRIP: 6 [PH] (ref 5–8)
PHOSPHATE SERPL-MCNC: 1.6 MG/DL (ref 2.6–4.7)
PHOSPHATE SERPL-MCNC: 1.9 MG/DL (ref 2.6–4.7)
PHOSPHATE SERPL-MCNC: 2 MG/DL (ref 2.6–4.7)
PHOSPHATE SERPL-MCNC: 2.2 MG/DL (ref 2.6–4.7)
PHOSPHATE SERPL-MCNC: 2.6 MG/DL (ref 2.6–4.7)
PHOSPHATE SERPL-MCNC: 2.8 MG/DL (ref 2.6–4.7)
PHOSPHATE SERPL-MCNC: 3 MG/DL (ref 2.6–4.7)
PHOSPHATE SERPL-MCNC: 3.1 MG/DL (ref 2.6–4.7)
PHOSPHATE SERPL-MCNC: 3.1 MG/DL (ref 2.6–4.7)
PHOSPHATE SERPL-MCNC: 3.7 MG/DL (ref 2.6–4.7)
PHOSPHATE SERPL-MCNC: 3.7 MG/DL (ref 2.6–4.7)
PHOSPHATE SERPL-MCNC: 4.3 MG/DL (ref 2.6–4.7)
PHOSPHATE SERPL-MCNC: 5.2 MG/DL (ref 2.6–4.7)
PIP ISTAT,IPIP: 11
PLATELET # BLD AUTO: 110 K/UL (ref 150–400)
PLATELET # BLD AUTO: 117 K/UL (ref 150–400)
PLATELET # BLD AUTO: 118 K/UL (ref 150–400)
PLATELET # BLD AUTO: 121 K/UL (ref 150–400)
PLATELET # BLD AUTO: 123 K/UL (ref 150–400)
PLATELET # BLD AUTO: 128 K/UL (ref 150–400)
PLATELET # BLD AUTO: 136 K/UL (ref 150–400)
PLATELET # BLD AUTO: 137 K/UL (ref 150–400)
PLATELET # BLD AUTO: 139 K/UL (ref 150–400)
PLATELET # BLD AUTO: 145 K/UL (ref 150–400)
PLATELET # BLD AUTO: 152 K/UL (ref 150–400)
PLATELET # BLD AUTO: 157 K/UL (ref 150–400)
PLATELET # BLD AUTO: 161 K/UL (ref 150–400)
PLATELET # BLD AUTO: 162 K/UL (ref 150–400)
PLATELET # BLD AUTO: 170 K/UL (ref 150–400)
PLATELET # BLD AUTO: 170 K/UL (ref 150–400)
PLATELET # BLD AUTO: 171 K/UL (ref 150–400)
PLATELET # BLD AUTO: 171 K/UL (ref 150–400)
PLATELET # BLD AUTO: 173 K/UL (ref 150–400)
PLATELET # BLD AUTO: 175 K/UL (ref 150–400)
PLATELET # BLD AUTO: 176 K/UL (ref 150–400)
PLATELET # BLD AUTO: 181 K/UL (ref 150–400)
PLATELET # BLD AUTO: 185 K/UL (ref 150–400)
PLATELET # BLD AUTO: 186 K/UL (ref 150–400)
PLATELET # BLD AUTO: 187 K/UL (ref 150–400)
PLATELET # BLD AUTO: 194 K/UL (ref 150–400)
PLATELET # BLD AUTO: 196 K/UL (ref 150–400)
PLATELET # BLD AUTO: 196 K/UL (ref 150–400)
PLATELET # BLD AUTO: 200 K/UL (ref 150–400)
PLATELET # BLD AUTO: 201 K/UL (ref 150–400)
PLATELET # BLD AUTO: 203 K/UL (ref 150–400)
PLATELET # BLD AUTO: 206 K/UL (ref 150–400)
PLATELET # BLD AUTO: 231 K/UL (ref 150–400)
PLATELET # BLD AUTO: 246 K/UL (ref 150–400)
PLATELET # BLD AUTO: 247 K/UL (ref 150–400)
PLATELET # BLD AUTO: 251 K/UL (ref 150–400)
PLATELET # BLD AUTO: 298 K/UL (ref 150–400)
PLATELET # BLD AUTO: 318 K/UL (ref 150–400)
PLATELET # BLD AUTO: 328 K/UL (ref 150–400)
PLATELET COMMENTS,PCOM: ABNORMAL
PMV BLD AUTO: 10.1 FL (ref 8.9–12.9)
PMV BLD AUTO: 10.3 FL (ref 8.9–12.9)
PMV BLD AUTO: 10.6 FL (ref 8.9–12.9)
PMV BLD AUTO: 10.6 FL (ref 8.9–12.9)
PMV BLD AUTO: 10.8 FL (ref 8.9–12.9)
PMV BLD AUTO: 10.9 FL (ref 8.9–12.9)
PMV BLD AUTO: 11 FL (ref 8.9–12.9)
PMV BLD AUTO: 11.1 FL (ref 8.9–12.9)
PMV BLD AUTO: 11.1 FL (ref 8.9–12.9)
PMV BLD AUTO: 11.2 FL (ref 8.9–12.9)
PMV BLD AUTO: 11.3 FL (ref 8.9–12.9)
PMV BLD AUTO: 11.4 FL (ref 8.9–12.9)
PMV BLD AUTO: 11.5 FL (ref 8.9–12.9)
PMV BLD AUTO: 11.6 FL (ref 8.9–12.9)
PMV BLD AUTO: 11.7 FL (ref 8.9–12.9)
PMV BLD AUTO: 11.7 FL (ref 8.9–12.9)
PMV BLD AUTO: 11.8 FL (ref 8.9–12.9)
PMV BLD AUTO: 11.9 FL (ref 8.9–12.9)
PMV BLD AUTO: 12 FL (ref 8.9–12.9)
PMV BLD AUTO: 12.1 FL (ref 8.9–12.9)
PMV BLD AUTO: 12.5 FL (ref 8.9–12.9)
PMV BLD AUTO: 12.6 FL (ref 8.9–12.9)
PMV BLD AUTO: 12.7 FL (ref 8.9–12.9)
PO2 BLD: 115 MMHG (ref 80–100)
PO2 BLD: 17 MMHG (ref 80–100)
PO2 BLD: 69 MMHG (ref 80–100)
POTASSIUM SERPL-SCNC: 2.7 MMOL/L (ref 3.5–5.1)
POTASSIUM SERPL-SCNC: 2.9 MMOL/L (ref 3.5–5.1)
POTASSIUM SERPL-SCNC: 3 MMOL/L (ref 3.5–5.1)
POTASSIUM SERPL-SCNC: 3.1 MMOL/L (ref 3.5–5.1)
POTASSIUM SERPL-SCNC: 3.1 MMOL/L (ref 3.5–5.1)
POTASSIUM SERPL-SCNC: 3.2 MMOL/L (ref 3.5–5.1)
POTASSIUM SERPL-SCNC: 3.3 MMOL/L (ref 3.5–5.1)
POTASSIUM SERPL-SCNC: 3.3 MMOL/L (ref 3.5–5.1)
POTASSIUM SERPL-SCNC: 3.4 MMOL/L (ref 3.5–5.1)
POTASSIUM SERPL-SCNC: 3.5 MMOL/L (ref 3.5–5.1)
POTASSIUM SERPL-SCNC: 3.6 MMOL/L (ref 3.5–5.1)
POTASSIUM SERPL-SCNC: 3.7 MMOL/L (ref 3.5–5.1)
POTASSIUM SERPL-SCNC: 3.8 MMOL/L (ref 3.5–5.1)
POTASSIUM SERPL-SCNC: 3.8 MMOL/L (ref 3.5–5.1)
POTASSIUM SERPL-SCNC: 3.9 MMOL/L (ref 3.5–5.1)
POTASSIUM SERPL-SCNC: 3.9 MMOL/L (ref 3.5–5.1)
POTASSIUM SERPL-SCNC: 4 MMOL/L (ref 3.5–5.1)
POTASSIUM SERPL-SCNC: 4.1 MMOL/L (ref 3.5–5.1)
POTASSIUM SERPL-SCNC: 4.2 MMOL/L (ref 3.5–5.1)
POTASSIUM SERPL-SCNC: 4.2 MMOL/L (ref 3.5–5.1)
POTASSIUM SERPL-SCNC: 4.3 MMOL/L (ref 3.5–5.1)
POTASSIUM SERPL-SCNC: 4.4 MMOL/L (ref 3.5–5.1)
POTASSIUM SERPL-SCNC: 4.5 MMOL/L (ref 3.5–5.1)
POTASSIUM SERPL-SCNC: 4.5 MMOL/L (ref 3.5–5.1)
POTASSIUM SERPL-SCNC: 4.9 MMOL/L (ref 3.5–5.1)
POTASSIUM SERPL-SCNC: 5 MMOL/L (ref 3.5–5.1)
PRESSURE SUPPORT SETTING VENT: 5 CMH2O
PROT SERPL-MCNC: 4.9 G/DL (ref 6.4–8.2)
PROT SERPL-MCNC: 5.8 G/DL (ref 6.4–8.2)
PROT SERPL-MCNC: 5.8 G/DL (ref 6.4–8.2)
PROT SERPL-MCNC: 6.1 G/DL (ref 6.4–8.2)
PROT SERPL-MCNC: 6.1 G/DL (ref 6.4–8.2)
PROT SERPL-MCNC: 6.3 G/DL (ref 6.4–8.2)
PROT SERPL-MCNC: 6.5 G/DL (ref 6.4–8.2)
PROT SERPL-MCNC: 6.8 G/DL (ref 6.4–8.2)
PROT SERPL-MCNC: 6.8 G/DL (ref 6.4–8.2)
PROT SERPL-MCNC: 6.9 G/DL (ref 6.4–8.2)
PROT SERPL-MCNC: 7.4 G/DL (ref 6.4–8.2)
PROT SERPL-MCNC: 7.4 G/DL (ref 6.4–8.2)
PROT SERPL-MCNC: 7.8 G/DL (ref 6.4–8.2)
PROT SERPL-MCNC: 8.3 G/DL (ref 6.4–8.2)
PROT SERPL-MCNC: 8.6 G/DL (ref 6.4–8.2)
PROT UR STRIP-MCNC: 100 MG/DL
PROT UR STRIP-MCNC: 100 MG/DL
PROT UR STRIP-MCNC: ABNORMAL MG/DL
PROT UR STRIP-MCNC: NEGATIVE MG/DL
PROTEINASE3 AB SER IA-ACNC: <3.5 U/ML (ref 0–3.5)
PROTHROMBIN TIME: 10.2 SEC (ref 9–11.1)
PROTHROMBIN TIME: 10.6 SEC (ref 9–11.1)
PROTHROMBIN TIME: 12.4 SEC (ref 9–11.1)
PROTHROMBIN TIME: 12.8 SEC (ref 9–11.1)
PROTHROMBIN TIME: 13 SEC (ref 9–11.1)
Q-T INTERVAL, ECG07: 240 MS
Q-T INTERVAL, ECG07: 296 MS
Q-T INTERVAL, ECG07: 332 MS
Q-T INTERVAL, ECG07: 368 MS
Q-T INTERVAL, ECG07: 370 MS
Q-T INTERVAL, ECG07: 374 MS
Q-T INTERVAL, ECG07: 374 MS
Q-T INTERVAL, ECG07: 380 MS
Q-T INTERVAL, ECG07: 384 MS
Q-T INTERVAL, ECG07: 420 MS
Q-T INTERVAL, ECG07: 480 MS
Q-T INTERVAL, ECG07: 516 MS
QRS DURATION, ECG06: 62 MS
QRS DURATION, ECG06: 64 MS
QRS DURATION, ECG06: 66 MS
QRS DURATION, ECG06: 68 MS
QRS DURATION, ECG06: 70 MS
QRS DURATION, ECG06: 72 MS
QRS DURATION, ECG06: 72 MS
QRS DURATION, ECG06: 74 MS
QRS DURATION, ECG06: 76 MS
QRS DURATION, ECG06: 76 MS
QTC CALCULATION (BEZET), ECG08: 386 MS
QTC CALCULATION (BEZET), ECG08: 421 MS
QTC CALCULATION (BEZET), ECG08: 440 MS
QTC CALCULATION (BEZET), ECG08: 445 MS
QTC CALCULATION (BEZET), ECG08: 467 MS
QTC CALCULATION (BEZET), ECG08: 475 MS
QTC CALCULATION (BEZET), ECG08: 477 MS
QTC CALCULATION (BEZET), ECG08: 485 MS
QTC CALCULATION (BEZET), ECG08: 499 MS
QTC CALCULATION (BEZET), ECG08: 547 MS
QTC CALCULATION (BEZET), ECG08: 560 MS
QTC CALCULATION (BEZET), ECG08: 591 MS
RBC # BLD AUTO: 1.79 M/UL (ref 3.8–5.2)
RBC # BLD AUTO: 2.03 M/UL (ref 3.8–5.2)
RBC # BLD AUTO: 2.34 M/UL (ref 3.8–5.2)
RBC # BLD AUTO: 2.49 M/UL (ref 3.8–5.2)
RBC # BLD AUTO: 2.63 M/UL (ref 3.8–5.2)
RBC # BLD AUTO: 2.64 M/UL (ref 3.8–5.2)
RBC # BLD AUTO: 2.65 M/UL (ref 3.8–5.2)
RBC # BLD AUTO: 2.68 M/UL (ref 3.8–5.2)
RBC # BLD AUTO: 2.69 M/UL (ref 3.8–5.2)
RBC # BLD AUTO: 2.76 M/UL (ref 3.8–5.2)
RBC # BLD AUTO: 2.77 M/UL (ref 3.8–5.2)
RBC # BLD AUTO: 2.85 M/UL (ref 3.8–5.2)
RBC # BLD AUTO: 2.94 M/UL (ref 3.8–5.2)
RBC # BLD AUTO: 3.05 M/UL (ref 3.8–5.2)
RBC # BLD AUTO: 3.06 M/UL (ref 3.8–5.2)
RBC # BLD AUTO: 3.07 M/UL (ref 3.8–5.2)
RBC # BLD AUTO: 3.09 M/UL (ref 3.8–5.2)
RBC # BLD AUTO: 3.13 M/UL (ref 3.8–5.2)
RBC # BLD AUTO: 3.15 M/UL (ref 3.8–5.2)
RBC # BLD AUTO: 3.19 M/UL (ref 3.8–5.2)
RBC # BLD AUTO: 3.2 M/UL (ref 3.8–5.2)
RBC # BLD AUTO: 3.24 M/UL (ref 3.8–5.2)
RBC # BLD AUTO: 3.28 M/UL (ref 3.8–5.2)
RBC # BLD AUTO: 3.37 M/UL (ref 3.8–5.2)
RBC # BLD AUTO: 3.43 M/UL (ref 3.8–5.2)
RBC # BLD AUTO: 3.54 M/UL (ref 3.8–5.2)
RBC # BLD AUTO: 3.64 M/UL (ref 3.8–5.2)
RBC # BLD AUTO: 3.65 M/UL (ref 3.8–5.2)
RBC # BLD AUTO: 3.72 M/UL (ref 3.8–5.2)
RBC # BLD AUTO: 3.77 M/UL (ref 3.8–5.2)
RBC # BLD AUTO: 3.78 M/UL (ref 3.8–5.2)
RBC # BLD AUTO: 3.8 M/UL (ref 3.8–5.2)
RBC # BLD AUTO: 3.81 M/UL (ref 3.8–5.2)
RBC # BLD AUTO: 3.97 M/UL (ref 3.8–5.2)
RBC # BLD AUTO: 4.1 M/UL (ref 3.8–5.2)
RBC # BLD AUTO: 4.21 M/UL (ref 3.8–5.2)
RBC # BLD AUTO: 4.31 M/UL (ref 3.8–5.2)
RBC # BLD AUTO: 4.54 M/UL (ref 3.8–5.2)
RBC # BLD AUTO: 4.58 M/UL (ref 3.8–5.2)
RBC #/AREA URNS HPF: ABNORMAL /HPF (ref 0–5)
RBC #/AREA URNS HPF: NORMAL /HPF (ref 0–5)
RBC MORPH BLD: ABNORMAL
SAMPLES BEING HELD,HOLD: NORMAL
SAO2 % BLD: 25 % (ref 92–97)
SAO2 % BLD: 94 % (ref 92–97)
SAO2 % BLD: 98 % (ref 92–97)
SERVICE CMNT-IMP: ABNORMAL
SERVICE CMNT-IMP: NORMAL
SODIUM SERPL-SCNC: 132 MMOL/L (ref 136–145)
SODIUM SERPL-SCNC: 133 MMOL/L (ref 136–145)
SODIUM SERPL-SCNC: 134 MMOL/L (ref 136–145)
SODIUM SERPL-SCNC: 134 MMOL/L (ref 136–145)
SODIUM SERPL-SCNC: 135 MMOL/L (ref 136–145)
SODIUM SERPL-SCNC: 136 MMOL/L (ref 136–145)
SODIUM SERPL-SCNC: 138 MMOL/L (ref 136–145)
SODIUM SERPL-SCNC: 139 MMOL/L (ref 136–145)
SODIUM SERPL-SCNC: 140 MMOL/L (ref 136–145)
SODIUM SERPL-SCNC: 141 MMOL/L (ref 136–145)
SODIUM SERPL-SCNC: 142 MMOL/L (ref 136–145)
SODIUM SERPL-SCNC: 143 MMOL/L (ref 136–145)
SODIUM SERPL-SCNC: 144 MMOL/L (ref 136–145)
SODIUM SERPL-SCNC: 145 MMOL/L (ref 136–145)
SODIUM SERPL-SCNC: 147 MMOL/L (ref 136–145)
SODIUM SERPL-SCNC: 148 MMOL/L (ref 136–145)
SODIUM UR-SCNC: 16 MMOL/L
SP GR UR REFRACTOMETRY: 1.01 (ref 1–1.03)
SP GR UR REFRACTOMETRY: 1.01 (ref 1–1.03)
SP GR UR REFRACTOMETRY: 1.02 (ref 1–1.03)
SP GR UR REFRACTOMETRY: 1.03 (ref 1–1.03)
SPECIMEN EXP DATE BLD: NORMAL
SPECIMEN TYPE: ABNORMAL
STATUS OF UNIT,%ST: NORMAL
T3FREE SERPL-MCNC: 1.4 PG/ML (ref 2.2–4)
THERAPEUTIC RANGE,PTTT: ABNORMAL SECS (ref 58–77)
THERAPEUTIC RANGE,PTTT: NORMAL SECS (ref 58–77)
TIBC SERPL-MCNC: 344 UG/DL (ref 250–450)
TOTAL RESP. RATE, ITRR: 19
TOTAL RESP. RATE, ITRR: 30
TRIGL SERPL-MCNC: 117 MG/DL (ref ?–150)
TRIGL SERPL-MCNC: 65 MG/DL (ref ?–150)
TROPONIN I SERPL-MCNC: 0.11 NG/ML
TROPONIN I SERPL-MCNC: 0.15 NG/ML
TROPONIN I SERPL-MCNC: 0.26 NG/ML
TROPONIN I SERPL-MCNC: 2.38 NG/ML
TROPONIN I SERPL-MCNC: 3.26 NG/ML
TROPONIN I SERPL-MCNC: <0.05 NG/ML
TSH SERPL DL<=0.05 MIU/L-ACNC: 1.24 UIU/ML (ref 0.36–3.74)
UA: UC IF INDICATED,UAUC: ABNORMAL
UA: UC IF INDICATED,UAUC: NORMAL
UNIT DIVISION, %UDIV: 0
UR CULT HOLD, URHOLD: NORMAL
UR CULT HOLD, URHOLD: NORMAL
UROBILINOGEN UR QL STRIP.AUTO: 0.2 EU/DL (ref 0.2–1)
UROBILINOGEN UR QL STRIP.AUTO: 1 EU/DL (ref 0.2–1)
VENTRICULAR RATE, ECG03: 101 BPM
VENTRICULAR RATE, ECG03: 107 BPM
VENTRICULAR RATE, ECG03: 107 BPM
VENTRICULAR RATE, ECG03: 155 BPM
VENTRICULAR RATE, ECG03: 156 BPM
VENTRICULAR RATE, ECG03: 78 BPM
VENTRICULAR RATE, ECG03: 79 BPM
VENTRICULAR RATE, ECG03: 85 BPM
VENTRICULAR RATE, ECG03: 85 BPM
VENTRICULAR RATE, ECG03: 89 BPM
VENTRICULAR RATE, ECG03: 97 BPM
VENTRICULAR RATE, ECG03: 98 BPM
VLDLC SERPL CALC-MCNC: 13 MG/DL
VLDLC SERPL CALC-MCNC: 23.4 MG/DL
WBC # BLD AUTO: 10 K/UL (ref 3.6–11)
WBC # BLD AUTO: 10.1 K/UL (ref 3.6–11)
WBC # BLD AUTO: 10.2 K/UL (ref 3.6–11)
WBC # BLD AUTO: 10.6 K/UL (ref 3.6–11)
WBC # BLD AUTO: 10.7 K/UL (ref 3.6–11)
WBC # BLD AUTO: 10.9 K/UL (ref 3.6–11)
WBC # BLD AUTO: 11.1 K/UL (ref 3.6–11)
WBC # BLD AUTO: 11.5 K/UL (ref 3.6–11)
WBC # BLD AUTO: 11.8 K/UL (ref 3.6–11)
WBC # BLD AUTO: 11.8 K/UL (ref 3.6–11)
WBC # BLD AUTO: 11.9 K/UL (ref 3.6–11)
WBC # BLD AUTO: 12.7 K/UL (ref 3.6–11)
WBC # BLD AUTO: 13.1 K/UL (ref 3.6–11)
WBC # BLD AUTO: 13.2 K/UL (ref 3.6–11)
WBC # BLD AUTO: 13.9 K/UL (ref 3.6–11)
WBC # BLD AUTO: 14.7 K/UL (ref 3.6–11)
WBC # BLD AUTO: 14.7 K/UL (ref 3.6–11)
WBC # BLD AUTO: 15.3 K/UL (ref 3.6–11)
WBC # BLD AUTO: 15.8 K/UL (ref 3.6–11)
WBC # BLD AUTO: 15.9 K/UL (ref 3.6–11)
WBC # BLD AUTO: 15.9 K/UL (ref 3.6–11)
WBC # BLD AUTO: 19.3 K/UL (ref 3.6–11)
WBC # BLD AUTO: 20.1 K/UL (ref 3.6–11)
WBC # BLD AUTO: 20.1 K/UL (ref 3.6–11)
WBC # BLD AUTO: 6.9 K/UL (ref 3.6–11)
WBC # BLD AUTO: 7.3 K/UL (ref 3.6–11)
WBC # BLD AUTO: 7.4 K/UL (ref 3.6–11)
WBC # BLD AUTO: 7.5 K/UL (ref 3.6–11)
WBC # BLD AUTO: 7.6 K/UL (ref 3.6–11)
WBC # BLD AUTO: 7.6 K/UL (ref 3.6–11)
WBC # BLD AUTO: 7.7 K/UL (ref 3.6–11)
WBC # BLD AUTO: 8.4 K/UL (ref 3.6–11)
WBC # BLD AUTO: 8.5 K/UL (ref 3.6–11)
WBC # BLD AUTO: 8.5 K/UL (ref 3.6–11)
WBC # BLD AUTO: 8.6 K/UL (ref 3.6–11)
WBC # BLD AUTO: 8.6 K/UL (ref 3.6–11)
WBC # BLD AUTO: 8.7 K/UL (ref 3.6–11)
WBC # BLD AUTO: 9 K/UL (ref 3.6–11)
WBC # BLD AUTO: 9.1 K/UL (ref 3.6–11)
WBC # BLD AUTO: 9.5 K/UL (ref 3.6–11)
WBC # BLD AUTO: 9.7 K/UL (ref 3.6–11)
WBC URNS QL MICRO: ABNORMAL /HPF (ref 0–4)
WBC URNS QL MICRO: NORMAL /HPF (ref 0–4)

## 2019-01-01 PROCEDURE — 36430 TRANSFUSION BLD/BLD COMPNT: CPT

## 2019-01-01 PROCEDURE — 81001 URINALYSIS AUTO W/SCOPE: CPT

## 2019-01-01 PROCEDURE — 36415 COLL VENOUS BLD VENIPUNCTURE: CPT

## 2019-01-01 PROCEDURE — 85730 THROMBOPLASTIN TIME PARTIAL: CPT

## 2019-01-01 PROCEDURE — 80048 BASIC METABOLIC PNL TOTAL CA: CPT

## 2019-01-01 PROCEDURE — 71045 X-RAY EXAM CHEST 1 VIEW: CPT

## 2019-01-01 PROCEDURE — C1887 CATHETER, GUIDING: HCPCS

## 2019-01-01 PROCEDURE — 77030028837 HC SYR ANGI PWR INJ COEU -A

## 2019-01-01 PROCEDURE — 74011000250 HC RX REV CODE- 250: Performed by: INTERNAL MEDICINE

## 2019-01-01 PROCEDURE — 74011250636 HC RX REV CODE- 250/636: Performed by: INTERNAL MEDICINE

## 2019-01-01 PROCEDURE — 77030029684 HC NEB SM VOL KT MONA -A

## 2019-01-01 PROCEDURE — 74011250636 HC RX REV CODE- 250/636: Performed by: FAMILY MEDICINE

## 2019-01-01 PROCEDURE — C1894 INTRO/SHEATH, NON-LASER: HCPCS

## 2019-01-01 PROCEDURE — 36600 WITHDRAWAL OF ARTERIAL BLOOD: CPT

## 2019-01-01 PROCEDURE — 77030018836 HC SOL IRR NACL ICUM -A: Performed by: SURGERY

## 2019-01-01 PROCEDURE — 74011000258 HC RX REV CODE- 258: Performed by: RADIOLOGY

## 2019-01-01 PROCEDURE — 80069 RENAL FUNCTION PANEL: CPT

## 2019-01-01 PROCEDURE — 83735 ASSAY OF MAGNESIUM: CPT

## 2019-01-01 PROCEDURE — 74011000258 HC RX REV CODE- 258: Performed by: INTERNAL MEDICINE

## 2019-01-01 PROCEDURE — 77030018846 HC SOL IRR STRL H20 ICUM -A: Performed by: SURGERY

## 2019-01-01 PROCEDURE — 77010033678 HC OXYGEN DAILY

## 2019-01-01 PROCEDURE — 82962 GLUCOSE BLOOD TEST: CPT

## 2019-01-01 PROCEDURE — 74011000258 HC RX REV CODE- 258: Performed by: SURGERY

## 2019-01-01 PROCEDURE — 82803 BLOOD GASES ANY COMBINATION: CPT

## 2019-01-01 PROCEDURE — 74011250636 HC RX REV CODE- 250/636: Performed by: NURSE ANESTHETIST, CERTIFIED REGISTERED

## 2019-01-01 PROCEDURE — 94762 N-INVAS EAR/PLS OXIMTRY CONT: CPT

## 2019-01-01 PROCEDURE — 65270000032 HC RM SEMIPRIVATE

## 2019-01-01 PROCEDURE — 80053 COMPREHEN METABOLIC PANEL: CPT

## 2019-01-01 PROCEDURE — 65660000000 HC RM CCU STEPDOWN

## 2019-01-01 PROCEDURE — 74011250637 HC RX REV CODE- 250/637: Performed by: INTERNAL MEDICINE

## 2019-01-01 PROCEDURE — 74011250636 HC RX REV CODE- 250/636: Performed by: RADIOLOGY

## 2019-01-01 PROCEDURE — 74176 CT ABD & PELVIS W/O CONTRAST: CPT

## 2019-01-01 PROCEDURE — 65660000001 HC RM ICU INTERMED STEPDOWN

## 2019-01-01 PROCEDURE — P9016 RBC LEUKOCYTES REDUCED: HCPCS

## 2019-01-01 PROCEDURE — 83690 ASSAY OF LIPASE: CPT

## 2019-01-01 PROCEDURE — 83880 ASSAY OF NATRIURETIC PEPTIDE: CPT

## 2019-01-01 PROCEDURE — 74011250636 HC RX REV CODE- 250/636: Performed by: SURGERY

## 2019-01-01 PROCEDURE — 74018 RADEX ABDOMEN 1 VIEW: CPT

## 2019-01-01 PROCEDURE — 74011250636 HC RX REV CODE- 250/636: Performed by: NURSE PRACTITIONER

## 2019-01-01 PROCEDURE — 71046 X-RAY EXAM CHEST 2 VIEWS: CPT

## 2019-01-01 PROCEDURE — 74011250636 HC RX REV CODE- 250/636: Performed by: HOSPITALIST

## 2019-01-01 PROCEDURE — 85025 COMPLETE CBC W/AUTO DIFF WBC: CPT

## 2019-01-01 PROCEDURE — C9113 INJ PANTOPRAZOLE SODIUM, VIA: HCPCS | Performed by: INTERNAL MEDICINE

## 2019-01-01 PROCEDURE — 74011636320 HC RX REV CODE- 636/320: Performed by: RADIOLOGY

## 2019-01-01 PROCEDURE — 74011000250 HC RX REV CODE- 250: Performed by: NURSE PRACTITIONER

## 2019-01-01 PROCEDURE — 65270000029 HC RM PRIVATE

## 2019-01-01 PROCEDURE — 94640 AIRWAY INHALATION TREATMENT: CPT

## 2019-01-01 PROCEDURE — 0656 HSPC GENERAL INPATIENT

## 2019-01-01 PROCEDURE — 74011000250 HC RX REV CODE- 250: Performed by: SURGERY

## 2019-01-01 PROCEDURE — 76937 US GUIDE VASCULAR ACCESS: CPT

## 2019-01-01 PROCEDURE — 93005 ELECTROCARDIOGRAM TRACING: CPT

## 2019-01-01 PROCEDURE — 74011250637 HC RX REV CODE- 250/637: Performed by: SURGERY

## 2019-01-01 PROCEDURE — 83605 ASSAY OF LACTIC ACID: CPT

## 2019-01-01 PROCEDURE — 85027 COMPLETE CBC AUTOMATED: CPT

## 2019-01-01 PROCEDURE — 84484 ASSAY OF TROPONIN QUANT: CPT

## 2019-01-01 PROCEDURE — 76705 ECHO EXAM OF ABDOMEN: CPT

## 2019-01-01 PROCEDURE — C9113 INJ PANTOPRAZOLE SODIUM, VIA: HCPCS | Performed by: FAMILY MEDICINE

## 2019-01-01 PROCEDURE — 65620000000 HC RM CCU GENERAL

## 2019-01-01 PROCEDURE — 74011250637 HC RX REV CODE- 250/637: Performed by: HOSPITALIST

## 2019-01-01 PROCEDURE — 74011250637 HC RX REV CODE- 250/637: Performed by: SPECIALIST

## 2019-01-01 PROCEDURE — 0DB98ZX EXCISION OF DUODENUM, VIA NATURAL OR ARTIFICIAL OPENING ENDOSCOPIC, DIAGNOSTIC: ICD-10-PCS | Performed by: INTERNAL MEDICINE

## 2019-01-01 PROCEDURE — 0D9670Z DRAINAGE OF STOMACH WITH DRAINAGE DEVICE, VIA NATURAL OR ARTIFICIAL OPENING: ICD-10-PCS | Performed by: HOSPITALIST

## 2019-01-01 PROCEDURE — C1760 CLOSURE DEV, VASC: HCPCS

## 2019-01-01 PROCEDURE — 97161 PT EVAL LOW COMPLEX 20 MIN: CPT

## 2019-01-01 PROCEDURE — 99233 SBSQ HOSP IP/OBS HIGH 50: CPT | Performed by: INTERNAL MEDICINE

## 2019-01-01 PROCEDURE — 77030021668 HC NEB PREFIL KT VYRM -A

## 2019-01-01 PROCEDURE — 74011250636 HC RX REV CODE- 250/636

## 2019-01-01 PROCEDURE — 85610 PROTHROMBIN TIME: CPT

## 2019-01-01 PROCEDURE — 74019 RADEX ABDOMEN 2 VIEWS: CPT

## 2019-01-01 PROCEDURE — 94760 N-INVAS EAR/PLS OXIMETRY 1: CPT

## 2019-01-01 PROCEDURE — 87086 URINE CULTURE/COLONY COUNT: CPT

## 2019-01-01 PROCEDURE — 96361 HYDRATE IV INFUSION ADD-ON: CPT

## 2019-01-01 PROCEDURE — G0299 HHS/HOSPICE OF RN EA 15 MIN: HCPCS

## 2019-01-01 PROCEDURE — 84443 ASSAY THYROID STIM HORMONE: CPT

## 2019-01-01 PROCEDURE — 77030027138 HC INCENT SPIROMETER -A

## 2019-01-01 PROCEDURE — 74011000258 HC RX REV CODE- 258: Performed by: NURSE PRACTITIONER

## 2019-01-01 PROCEDURE — 93306 TTE W/DOPPLER COMPLETE: CPT

## 2019-01-01 PROCEDURE — 99285 EMERGENCY DEPT VISIT HI MDM: CPT

## 2019-01-01 PROCEDURE — 86923 COMPATIBILITY TEST ELECTRIC: CPT

## 2019-01-01 PROCEDURE — B41G1ZZ FLUOROSCOPY OF LEFT LOWER EXTREMITY ARTERIES USING LOW OSMOLAR CONTRAST: ICD-10-PCS | Performed by: RADIOLOGY

## 2019-01-01 PROCEDURE — A9585 GADOBUTROL INJECTION: HCPCS | Performed by: INTERNAL MEDICINE

## 2019-01-01 PROCEDURE — 83540 ASSAY OF IRON: CPT

## 2019-01-01 PROCEDURE — 74011250637 HC RX REV CODE- 250/637: Performed by: NURSE PRACTITIONER

## 2019-01-01 PROCEDURE — 51798 US URINE CAPACITY MEASURE: CPT

## 2019-01-01 PROCEDURE — 74011250637 HC RX REV CODE- 250/637: Performed by: EMERGENCY MEDICINE

## 2019-01-01 PROCEDURE — 77030005401 HC CATH RAD ARRO -A: Performed by: ANESTHESIOLOGY

## 2019-01-01 PROCEDURE — G0300 HHS/HOSPICE OF LPN EA 15 MIN: HCPCS

## 2019-01-01 PROCEDURE — 74177 CT ABD & PELVIS W/CONTRAST: CPT

## 2019-01-01 PROCEDURE — 74011250636 HC RX REV CODE- 250/636: Performed by: EMERGENCY MEDICINE

## 2019-01-01 PROCEDURE — 77030040361 HC SLV COMPR DVT MDII -B

## 2019-01-01 PROCEDURE — 74011000258 HC RX REV CODE- 258: Performed by: HOSPITALIST

## 2019-01-01 PROCEDURE — 82150 ASSAY OF AMYLASE: CPT

## 2019-01-01 PROCEDURE — 85018 HEMOGLOBIN: CPT

## 2019-01-01 PROCEDURE — 93308 TTE F-UP OR LMTD: CPT

## 2019-01-01 PROCEDURE — 84100 ASSAY OF PHOSPHORUS: CPT

## 2019-01-01 PROCEDURE — 97530 THERAPEUTIC ACTIVITIES: CPT

## 2019-01-01 PROCEDURE — P9047 ALBUMIN (HUMAN), 25%, 50ML: HCPCS | Performed by: HOSPITALIST

## 2019-01-01 PROCEDURE — 97116 GAIT TRAINING THERAPY: CPT

## 2019-01-01 PROCEDURE — 77030008771 HC TU NG SALEM SUMP -A: Performed by: INTERNAL MEDICINE

## 2019-01-01 PROCEDURE — 80076 HEPATIC FUNCTION PANEL: CPT

## 2019-01-01 PROCEDURE — 77030005402 HC CATH RAD ART LN KT TELE -B

## 2019-01-01 PROCEDURE — 82436 ASSAY OF URINE CHLORIDE: CPT

## 2019-01-01 PROCEDURE — 74011000250 HC RX REV CODE- 250: Performed by: FAMILY MEDICINE

## 2019-01-01 PROCEDURE — 87040 BLOOD CULTURE FOR BACTERIA: CPT

## 2019-01-01 PROCEDURE — 74011636637 HC RX REV CODE- 636/637: Performed by: NURSE PRACTITIONER

## 2019-01-01 PROCEDURE — 77030008771 HC TU NG SALEM SUMP -A

## 2019-01-01 PROCEDURE — 86301 IMMUNOASSAY TUMOR CA 19-9: CPT

## 2019-01-01 PROCEDURE — 05HM33Z INSERTION OF INFUSION DEVICE INTO RIGHT INTERNAL JUGULAR VEIN, PERCUTANEOUS APPROACH: ICD-10-PCS | Performed by: ANESTHESIOLOGY

## 2019-01-01 PROCEDURE — 77030019698 HC SYR ANGI MDLON MRTM -A

## 2019-01-01 PROCEDURE — 0DB58ZX EXCISION OF ESOPHAGUS, VIA NATURAL OR ARTIFICIAL OPENING ENDOSCOPIC, DIAGNOSTIC: ICD-10-PCS | Performed by: INTERNAL MEDICINE

## 2019-01-01 PROCEDURE — 74011000250 HC RX REV CODE- 250: Performed by: NURSE ANESTHETIST, CERTIFIED REGISTERED

## 2019-01-01 PROCEDURE — 74011636320 HC RX REV CODE- 636/320: Performed by: INTERNAL MEDICINE

## 2019-01-01 PROCEDURE — 74011000250 HC RX REV CODE- 250: Performed by: HOSPITALIST

## 2019-01-01 PROCEDURE — 74241 XR UPPER GI SERIES W KUB: CPT

## 2019-01-01 PROCEDURE — 77030008684 HC TU ET CUF COVD -B: Performed by: NURSE ANESTHETIST, CERTIFIED REGISTERED

## 2019-01-01 PROCEDURE — 74011000258 HC RX REV CODE- 258: Performed by: NURSE ANESTHETIST, CERTIFIED REGISTERED

## 2019-01-01 PROCEDURE — 74011250637 HC RX REV CODE- 250/637: Performed by: PHYSICIAN ASSISTANT

## 2019-01-01 PROCEDURE — 30233N1 TRANSFUSION OF NONAUTOLOGOUS RED BLOOD CELLS INTO PERIPHERAL VEIN, PERCUTANEOUS APPROACH: ICD-10-PCS | Performed by: SURGERY

## 2019-01-01 PROCEDURE — 02HV33Z INSERTION OF INFUSION DEVICE INTO SUPERIOR VENA CAVA, PERCUTANEOUS APPROACH: ICD-10-PCS | Performed by: INTERNAL MEDICINE

## 2019-01-01 PROCEDURE — 76040000019: Performed by: INTERNAL MEDICINE

## 2019-01-01 PROCEDURE — 76060000032 HC ANESTHESIA 0.5 TO 1 HR: Performed by: INTERNAL MEDICINE

## 2019-01-01 PROCEDURE — 87804 INFLUENZA ASSAY W/OPTIC: CPT

## 2019-01-01 PROCEDURE — 84481 FREE ASSAY (FT-3): CPT

## 2019-01-01 PROCEDURE — 99284 EMERGENCY DEPT VISIT MOD MDM: CPT

## 2019-01-01 PROCEDURE — 86900 BLOOD TYPING SEROLOGIC ABO: CPT

## 2019-01-01 PROCEDURE — 85384 FIBRINOGEN ACTIVITY: CPT

## 2019-01-01 PROCEDURE — 94660 CPAP INITIATION&MGMT: CPT

## 2019-01-01 PROCEDURE — 76010000133 HC OR TIME 3 TO 3.5 HR: Performed by: SURGERY

## 2019-01-01 PROCEDURE — 77030026438 HC STYL ET INTUB CARD -A: Performed by: NURSE ANESTHETIST, CERTIFIED REGISTERED

## 2019-01-01 PROCEDURE — 84300 ASSAY OF URINE SODIUM: CPT

## 2019-01-01 PROCEDURE — 77030011943

## 2019-01-01 PROCEDURE — 77030013797 HC KT TRNSDUC PRSSR EDWD -A

## 2019-01-01 PROCEDURE — 88305 TISSUE EXAM BY PATHOLOGIST: CPT

## 2019-01-01 PROCEDURE — 83520 IMMUNOASSAY QUANT NOS NONAB: CPT

## 2019-01-01 PROCEDURE — B41C1ZZ FLUOROSCOPY OF PELVIC ARTERIES USING LOW OSMOLAR CONTRAST: ICD-10-PCS | Performed by: RADIOLOGY

## 2019-01-01 PROCEDURE — P9047 ALBUMIN (HUMAN), 25%, 50ML: HCPCS | Performed by: INTERNAL MEDICINE

## 2019-01-01 PROCEDURE — 94664 DEMO&/EVAL PT USE INHALER: CPT

## 2019-01-01 PROCEDURE — 37244 VASC EMBOLIZE/OCCLUDE BLEED: CPT

## 2019-01-01 PROCEDURE — 74011000258 HC RX REV CODE- 258: Performed by: EMERGENCY MEDICINE

## 2019-01-01 PROCEDURE — 96374 THER/PROPH/DIAG INJ IV PUSH: CPT

## 2019-01-01 PROCEDURE — C1769 GUIDE WIRE: HCPCS

## 2019-01-01 PROCEDURE — 76060000031 HC ANESTHESIA FIRST 0.5 HR: Performed by: INTERNAL MEDICINE

## 2019-01-01 PROCEDURE — 74183 MRI ABD W/O CNTR FLWD CNTR: CPT

## 2019-01-01 PROCEDURE — 74011636637 HC RX REV CODE- 636/637: Performed by: SURGERY

## 2019-01-01 PROCEDURE — 80061 LIPID PANEL: CPT

## 2019-01-01 PROCEDURE — 77030020365 HC SOL INJ SOD CL 0.9% 50ML

## 2019-01-01 PROCEDURE — 77030014006 HC SPNG HEMSTAT J&J -A

## 2019-01-01 PROCEDURE — 5A09457 ASSISTANCE WITH RESPIRATORY VENTILATION, 24-96 CONSECUTIVE HOURS, CONTINUOUS POSITIVE AIRWAY PRESSURE: ICD-10-PCS | Performed by: INTERNAL MEDICINE

## 2019-01-01 PROCEDURE — 77030019563 HC DEV ATTCH FEED HOLL -A

## 2019-01-01 PROCEDURE — 0D160ZA BYPASS STOMACH TO JEJUNUM, OPEN APPROACH: ICD-10-PCS | Performed by: SURGERY

## 2019-01-01 PROCEDURE — 77030020847 HC STATLOK BARD -A

## 2019-01-01 PROCEDURE — 77030021593 HC FCPS BIOP ENDOSC BSC -A: Performed by: INTERNAL MEDICINE

## 2019-01-01 PROCEDURE — 77030014021 HC SYR ANGI FIX LOK MRTM -A

## 2019-01-01 PROCEDURE — 82570 ASSAY OF URINE CREATININE: CPT

## 2019-01-01 PROCEDURE — 74011000258 HC RX REV CODE- 258: Performed by: FAMILY MEDICINE

## 2019-01-01 PROCEDURE — 82271 OCCULT BLOOD OTHER SOURCES: CPT

## 2019-01-01 PROCEDURE — 74011636320 HC RX REV CODE- 636/320: Performed by: EMERGENCY MEDICINE

## 2019-01-01 PROCEDURE — 82378 CARCINOEMBRYONIC ANTIGEN: CPT

## 2019-01-01 PROCEDURE — 85652 RBC SED RATE AUTOMATED: CPT

## 2019-01-01 PROCEDURE — C1751 CATH, INF, PER/CENT/MIDLINE: HCPCS

## 2019-01-01 PROCEDURE — 77030005513 HC CATH URETH FOL11 MDII -B

## 2019-01-01 PROCEDURE — 71275 CT ANGIOGRAPHY CHEST: CPT

## 2019-01-01 PROCEDURE — 76040000007: Performed by: INTERNAL MEDICINE

## 2019-01-01 PROCEDURE — 76700 US EXAM ABDOM COMPLETE: CPT

## 2019-01-01 PROCEDURE — 77030031139 HC SUT VCRL2 J&J -A: Performed by: SURGERY

## 2019-01-01 PROCEDURE — 04L23DZ OCCLUSION OF GASTRIC ARTERY WITH INTRALUMINAL DEVICE, PERCUTANEOUS APPROACH: ICD-10-PCS | Performed by: RADIOLOGY

## 2019-01-01 PROCEDURE — 77030005513 HC CATH URETH FOL11 MDII -B: Performed by: SURGERY

## 2019-01-01 PROCEDURE — 96375 TX/PRO/DX INJ NEW DRUG ADDON: CPT

## 2019-01-01 PROCEDURE — 77030002996 HC SUT SLK J&J -A: Performed by: SURGERY

## 2019-01-01 PROCEDURE — 76210000016 HC OR PH I REC 1 TO 1.5 HR: Performed by: SURGERY

## 2019-01-01 PROCEDURE — 96365 THER/PROPH/DIAG IV INF INIT: CPT

## 2019-01-01 PROCEDURE — A9585 GADOBUTROL INJECTION: HCPCS | Performed by: FAMILY MEDICINE

## 2019-01-01 PROCEDURE — 82272 OCCULT BLD FECES 1-3 TESTS: CPT

## 2019-01-01 PROCEDURE — 77030002933 HC SUT MCRYL J&J -A: Performed by: SURGERY

## 2019-01-01 PROCEDURE — 77030020782 HC GWN BAIR PAWS FLX 3M -B

## 2019-01-01 PROCEDURE — 97116 GAIT TRAINING THERAPY: CPT | Performed by: PHYSICAL THERAPIST

## 2019-01-01 PROCEDURE — 3336500001 HSPC ELECTION

## 2019-01-01 PROCEDURE — 74011000250 HC RX REV CODE- 250

## 2019-01-01 PROCEDURE — 03HY32Z INSERTION OF MONITORING DEVICE INTO UPPER ARTERY, PERCUTANEOUS APPROACH: ICD-10-PCS | Performed by: ANESTHESIOLOGY

## 2019-01-01 PROCEDURE — 77030031753 HC SHR ENDO COAG HARM J&J -E: Performed by: SURGERY

## 2019-01-01 PROCEDURE — 77030021532 HC CATH ANGI DX IMPRS MRTM -B

## 2019-01-01 PROCEDURE — 0DJ68ZZ INSPECTION OF STOMACH, VIA NATURAL OR ARTIFICIAL OPENING ENDOSCOPIC: ICD-10-PCS | Performed by: INTERNAL MEDICINE

## 2019-01-01 PROCEDURE — 77030011640 HC PAD GRND REM COVD -A: Performed by: SURGERY

## 2019-01-01 PROCEDURE — 74011000272 HC RX REV CODE- 272: Performed by: SURGERY

## 2019-01-01 PROCEDURE — 77030010939 HC CLP LIG TELE -B: Performed by: SURGERY

## 2019-01-01 PROCEDURE — 77030021566 MRI ABD W MRCP W WO CONT

## 2019-01-01 PROCEDURE — 77030007163 HC COIL EMB AZUR TERU -G

## 2019-01-01 PROCEDURE — 76060000038 HC ANESTHESIA 3.5 TO 4 HR: Performed by: SURGERY

## 2019-01-01 PROCEDURE — 36573 INSJ PICC RS&I 5 YR+: CPT | Performed by: INTERNAL MEDICINE

## 2019-01-01 PROCEDURE — 3E0436Z INTRODUCTION OF NUTRITIONAL SUBSTANCE INTO CENTRAL VEIN, PERCUTANEOUS APPROACH: ICD-10-PCS | Performed by: INTERNAL MEDICINE

## 2019-01-01 PROCEDURE — 77030010516 HC APPL HEMA CLP TELE -B: Performed by: SURGERY

## 2019-01-01 PROCEDURE — 77030002966 HC SUT PDS J&J -A: Performed by: SURGERY

## 2019-01-01 PROCEDURE — 77030011256 HC DRSG MEPILEX <16IN NO BORD MOLN -A

## 2019-01-01 PROCEDURE — 77030008467 HC STPLR SKN COVD -B: Performed by: SURGERY

## 2019-01-01 PROCEDURE — P9040 RBC LEUKOREDUCED IRRADIATED: HCPCS

## 2019-01-01 RX ORDER — SODIUM CHLORIDE 0.9 % (FLUSH) 0.9 %
5-40 SYRINGE (ML) INJECTION AS NEEDED
Status: DISCONTINUED | OUTPATIENT
Start: 2019-01-01 | End: 2019-01-01 | Stop reason: HOSPADM

## 2019-01-01 RX ORDER — OXYCODONE HYDROCHLORIDE 5 MG/1
5 TABLET ORAL
Status: DISCONTINUED | OUTPATIENT
Start: 2019-01-01 | End: 2019-01-01 | Stop reason: HOSPADM

## 2019-01-01 RX ORDER — PANTOPRAZOLE SODIUM 40 MG/1
40 TABLET, DELAYED RELEASE ORAL 2 TIMES DAILY
Qty: 60 TAB | Refills: 0 | Status: SHIPPED | OUTPATIENT
Start: 2019-01-01

## 2019-01-01 RX ORDER — BUMETANIDE 1 MG/1
1 TABLET ORAL 2 TIMES DAILY
Qty: 60 TAB | Refills: 0 | Status: SHIPPED
Start: 2019-01-01 | End: 2019-01-01

## 2019-01-01 RX ORDER — MORPHINE SULFATE 2 MG/ML
2 INJECTION, SOLUTION INTRAMUSCULAR; INTRAVENOUS ONCE
Status: COMPLETED | OUTPATIENT
Start: 2019-01-01 | End: 2019-01-01

## 2019-01-01 RX ORDER — ACETYLCYSTEINE 200 MG/ML
200 SOLUTION ORAL; RESPIRATORY (INHALATION)
Status: DISCONTINUED | OUTPATIENT
Start: 2019-01-01 | End: 2019-01-01

## 2019-01-01 RX ORDER — CARVEDILOL 6.25 MG/1
TABLET ORAL 2 TIMES DAILY WITH MEALS
COMMUNITY
End: 2019-01-01 | Stop reason: SDUPTHER

## 2019-01-01 RX ORDER — LISINOPRIL 5 MG/1
2.5 TABLET ORAL DAILY
Status: DISCONTINUED | OUTPATIENT
Start: 2019-01-01 | End: 2019-01-01 | Stop reason: HOSPADM

## 2019-01-01 RX ORDER — SODIUM CHLORIDE 0.9 % (FLUSH) 0.9 %
5-40 SYRINGE (ML) INJECTION EVERY 8 HOURS
Status: DISCONTINUED | OUTPATIENT
Start: 2019-01-01 | End: 2019-01-01 | Stop reason: HOSPADM

## 2019-01-01 RX ORDER — POTASSIUM CHLORIDE 750 MG/1
20 TABLET, FILM COATED, EXTENDED RELEASE ORAL ONCE
Status: COMPLETED | OUTPATIENT
Start: 2019-01-01 | End: 2019-01-01

## 2019-01-01 RX ORDER — BUMETANIDE 1 MG/1
1 TABLET ORAL DAILY
COMMUNITY

## 2019-01-01 RX ORDER — METOCLOPRAMIDE HYDROCHLORIDE 5 MG/ML
5 INJECTION INTRAMUSCULAR; INTRAVENOUS
Status: DISCONTINUED | OUTPATIENT
Start: 2019-01-01 | End: 2019-01-01

## 2019-01-01 RX ORDER — HEPARIN SODIUM 1000 [USP'U]/ML
2080 INJECTION, SOLUTION INTRAVENOUS; SUBCUTANEOUS ONCE
Status: DISCONTINUED | OUTPATIENT
Start: 2019-01-01 | End: 2019-01-01

## 2019-01-01 RX ORDER — METRONIDAZOLE 500 MG/100ML
500 INJECTION, SOLUTION INTRAVENOUS EVERY 6 HOURS
Status: DISCONTINUED | OUTPATIENT
Start: 2019-01-01 | End: 2019-01-01 | Stop reason: CLARIF

## 2019-01-01 RX ORDER — ATROPINE SULFATE 0.1 MG/ML
0.5 INJECTION INTRAVENOUS
Status: DISCONTINUED | OUTPATIENT
Start: 2019-01-01 | End: 2019-01-01 | Stop reason: HOSPADM

## 2019-01-01 RX ORDER — SODIUM CHLORIDE 9 MG/ML
INJECTION, SOLUTION INTRAVENOUS
Status: DISCONTINUED | OUTPATIENT
Start: 2019-01-01 | End: 2019-01-01 | Stop reason: HOSPADM

## 2019-01-01 RX ORDER — MIDAZOLAM HYDROCHLORIDE 1 MG/ML
.25-1 INJECTION, SOLUTION INTRAMUSCULAR; INTRAVENOUS
Status: DISCONTINUED | OUTPATIENT
Start: 2019-01-01 | End: 2019-01-01 | Stop reason: HOSPADM

## 2019-01-01 RX ORDER — ACETAMINOPHEN 325 MG/1
650 TABLET ORAL
Status: DISCONTINUED | OUTPATIENT
Start: 2019-01-01 | End: 2019-01-01 | Stop reason: HOSPADM

## 2019-01-01 RX ORDER — ENOXAPARIN SODIUM 100 MG/ML
40 INJECTION SUBCUTANEOUS EVERY 24 HOURS
Status: DISCONTINUED | OUTPATIENT
Start: 2019-01-01 | End: 2019-01-01

## 2019-01-01 RX ORDER — POLYETHYLENE GLYCOL 3350 17 G/17G
17 POWDER, FOR SOLUTION ORAL
Status: DISCONTINUED | OUTPATIENT
Start: 2019-01-01 | End: 2019-01-01 | Stop reason: HOSPADM

## 2019-01-01 RX ORDER — POTASSIUM CHLORIDE 750 MG/1
40 TABLET, FILM COATED, EXTENDED RELEASE ORAL
Status: COMPLETED | OUTPATIENT
Start: 2019-01-01 | End: 2019-01-01

## 2019-01-01 RX ORDER — HEPARIN SODIUM 5000 [USP'U]/ML
2140 INJECTION, SOLUTION INTRAVENOUS; SUBCUTANEOUS ONCE
Status: COMPLETED | OUTPATIENT
Start: 2019-01-01 | End: 2019-01-01

## 2019-01-01 RX ORDER — SODIUM CHLORIDE 9 MG/ML
250 INJECTION, SOLUTION INTRAVENOUS AS NEEDED
Status: DISCONTINUED | OUTPATIENT
Start: 2019-01-01 | End: 2019-01-01

## 2019-01-01 RX ORDER — SUCCINYLCHOLINE CHLORIDE 20 MG/ML
INJECTION INTRAMUSCULAR; INTRAVENOUS AS NEEDED
Status: DISCONTINUED | OUTPATIENT
Start: 2019-01-01 | End: 2019-01-01 | Stop reason: HOSPADM

## 2019-01-01 RX ORDER — POTASSIUM CHLORIDE 750 MG/1
40 TABLET, FILM COATED, EXTENDED RELEASE ORAL DAILY
Status: DISCONTINUED | OUTPATIENT
Start: 2019-01-01 | End: 2019-01-01 | Stop reason: HOSPADM

## 2019-01-01 RX ORDER — ONDANSETRON 2 MG/ML
4 INJECTION INTRAMUSCULAR; INTRAVENOUS
Status: DISCONTINUED | OUTPATIENT
Start: 2019-01-01 | End: 2019-01-01 | Stop reason: HOSPADM

## 2019-01-01 RX ORDER — PROPOFOL 10 MG/ML
INJECTION, EMULSION INTRAVENOUS AS NEEDED
Status: DISCONTINUED | OUTPATIENT
Start: 2019-01-01 | End: 2019-01-01 | Stop reason: HOSPADM

## 2019-01-01 RX ORDER — CARVEDILOL 6.25 MG/1
6.25 TABLET ORAL 2 TIMES DAILY WITH MEALS
Status: DISCONTINUED | OUTPATIENT
Start: 2019-01-01 | End: 2019-01-01 | Stop reason: HOSPADM

## 2019-01-01 RX ORDER — SODIUM CHLORIDE 0.9 % (FLUSH) 0.9 %
5-40 SYRINGE (ML) INJECTION AS NEEDED
Status: DISCONTINUED | OUTPATIENT
Start: 2019-01-01 | End: 2019-01-01 | Stop reason: SDUPTHER

## 2019-01-01 RX ORDER — BUMETANIDE 0.25 MG/ML
0.5 INJECTION INTRAMUSCULAR; INTRAVENOUS ONCE
Status: COMPLETED | OUTPATIENT
Start: 2019-01-01 | End: 2019-01-01

## 2019-01-01 RX ORDER — SODIUM CHLORIDE 9 MG/ML
500 INJECTION, SOLUTION INTRAVENOUS ONCE
Status: COMPLETED | OUTPATIENT
Start: 2019-01-01 | End: 2019-01-01

## 2019-01-01 RX ORDER — AMOXICILLIN 250 MG
1 CAPSULE ORAL DAILY
Status: DISCONTINUED | OUTPATIENT
Start: 2019-01-01 | End: 2019-01-01 | Stop reason: HOSPADM

## 2019-01-01 RX ORDER — CARVEDILOL 6.25 MG/1
6.25 TABLET ORAL 2 TIMES DAILY WITH MEALS
Qty: 180 TAB | Refills: 1 | Status: ON HOLD | OUTPATIENT
Start: 2019-01-01 | End: 2019-01-01

## 2019-01-01 RX ORDER — POTASSIUM CHLORIDE 14.9 MG/ML
10 INJECTION INTRAVENOUS ONCE
Status: COMPLETED | OUTPATIENT
Start: 2019-01-01 | End: 2019-01-01

## 2019-01-01 RX ORDER — LISINOPRIL 5 MG/1
2.5 TABLET ORAL DAILY
Status: DISCONTINUED | OUTPATIENT
Start: 2019-01-01 | End: 2019-01-01

## 2019-01-01 RX ORDER — EPINEPHRINE 0.1 MG/ML
1 INJECTION INTRACARDIAC; INTRAVENOUS
Status: DISCONTINUED | OUTPATIENT
Start: 2019-01-01 | End: 2019-01-01 | Stop reason: HOSPADM

## 2019-01-01 RX ORDER — POLYETHYLENE GLYCOL 3350 17 G/17G
17 POWDER, FOR SOLUTION ORAL DAILY PRN
Status: DISCONTINUED | OUTPATIENT
Start: 2019-01-01 | End: 2019-01-01 | Stop reason: HOSPADM

## 2019-01-01 RX ORDER — SODIUM CHLORIDE 9 MG/ML
250 INJECTION, SOLUTION INTRAVENOUS AS NEEDED
Status: DISCONTINUED | OUTPATIENT
Start: 2019-01-01 | End: 2019-01-01 | Stop reason: HOSPADM

## 2019-01-01 RX ORDER — ROSUVASTATIN CALCIUM 10 MG/1
10 TABLET, COATED ORAL
COMMUNITY
End: 2019-01-01 | Stop reason: SDUPTHER

## 2019-01-01 RX ORDER — MORPHINE SULFATE 2 MG/ML
4 INJECTION, SOLUTION INTRAMUSCULAR; INTRAVENOUS EVERY 4 HOURS
Status: DISCONTINUED | OUTPATIENT
Start: 2019-01-01 | End: 2019-01-01

## 2019-01-01 RX ORDER — ONDANSETRON 2 MG/ML
4 INJECTION INTRAMUSCULAR; INTRAVENOUS
Status: COMPLETED | OUTPATIENT
Start: 2019-01-01 | End: 2019-01-01

## 2019-01-01 RX ORDER — ACETAMINOPHEN 325 MG/1
650 TABLET ORAL
Status: COMPLETED | OUTPATIENT
Start: 2019-01-01 | End: 2019-01-01

## 2019-01-01 RX ORDER — NITROGLYCERIN 0.4 MG/1
0.4 TABLET SUBLINGUAL
Status: DISCONTINUED | OUTPATIENT
Start: 2019-01-01 | End: 2019-01-01 | Stop reason: HOSPADM

## 2019-01-01 RX ORDER — SODIUM CHLORIDE, SODIUM LACTATE, POTASSIUM CHLORIDE, CALCIUM CHLORIDE 600; 310; 30; 20 MG/100ML; MG/100ML; MG/100ML; MG/100ML
125 INJECTION, SOLUTION INTRAVENOUS CONTINUOUS
Status: DISCONTINUED | OUTPATIENT
Start: 2019-01-01 | End: 2019-01-01

## 2019-01-01 RX ORDER — FACIAL-BODY WIPES
10 EACH TOPICAL DAILY PRN
Status: DISCONTINUED | OUTPATIENT
Start: 2019-01-01 | End: 2019-01-01 | Stop reason: HOSPADM

## 2019-01-01 RX ORDER — LORAZEPAM 0.5 MG/1
0.5 TABLET ORAL
Qty: 10 TAB | Refills: 0 | Status: SHIPPED | OUTPATIENT
Start: 2019-01-01

## 2019-01-01 RX ORDER — NITROGLYCERIN 0.4 MG/1
0.4 TABLET SUBLINGUAL
Status: DISCONTINUED | OUTPATIENT
Start: 2019-01-01 | End: 2019-01-01

## 2019-01-01 RX ORDER — LOPERAMIDE HYDROCHLORIDE 2 MG/1
2 CAPSULE ORAL
Status: DISCONTINUED | OUTPATIENT
Start: 2019-01-01 | End: 2019-01-01 | Stop reason: HOSPADM

## 2019-01-01 RX ORDER — KETAMINE HYDROCHLORIDE 10 MG/ML
INJECTION, SOLUTION INTRAMUSCULAR; INTRAVENOUS AS NEEDED
Status: DISCONTINUED | OUTPATIENT
Start: 2019-01-01 | End: 2019-01-01 | Stop reason: HOSPADM

## 2019-01-01 RX ORDER — DOCUSATE SODIUM 100 MG/1
100 CAPSULE, LIQUID FILLED ORAL 2 TIMES DAILY
Status: DISCONTINUED | OUTPATIENT
Start: 2019-01-01 | End: 2019-01-01 | Stop reason: HOSPADM

## 2019-01-01 RX ORDER — BACITRACIN 500 UNIT/G
1 PACKET (EA) TOPICAL
Status: COMPLETED | OUTPATIENT
Start: 2019-01-01 | End: 2019-01-01

## 2019-01-01 RX ORDER — IPRATROPIUM BROMIDE AND ALBUTEROL SULFATE 2.5; .5 MG/3ML; MG/3ML
3 SOLUTION RESPIRATORY (INHALATION)
Qty: 30 NEBULE | Refills: 0 | Status: SHIPPED
Start: 2019-01-01 | End: 2019-01-01

## 2019-01-01 RX ORDER — METOCLOPRAMIDE 10 MG/1
5 TABLET ORAL
Status: DISCONTINUED | OUTPATIENT
Start: 2019-01-01 | End: 2019-01-01

## 2019-01-01 RX ORDER — SODIUM CHLORIDE 9 MG/ML
50 INJECTION, SOLUTION INTRAVENOUS CONTINUOUS
Status: DISCONTINUED | OUTPATIENT
Start: 2019-01-01 | End: 2019-01-01

## 2019-01-01 RX ORDER — METRONIDAZOLE 500 MG/100ML
500 INJECTION, SOLUTION INTRAVENOUS ONCE
Status: COMPLETED | OUTPATIENT
Start: 2019-01-01 | End: 2019-01-01

## 2019-01-01 RX ORDER — LOSARTAN POTASSIUM 50 MG/1
50 TABLET ORAL DAILY
Qty: 30 TAB | Refills: 3 | Status: ON HOLD | OUTPATIENT
Start: 2019-01-01 | End: 2019-01-01

## 2019-01-01 RX ORDER — FLUMAZENIL 0.1 MG/ML
0.2 INJECTION INTRAVENOUS
Status: DISCONTINUED | OUTPATIENT
Start: 2019-01-01 | End: 2019-01-01 | Stop reason: HOSPADM

## 2019-01-01 RX ORDER — ROSUVASTATIN CALCIUM 10 MG/1
10 TABLET, COATED ORAL
Qty: 30 TAB | Refills: 0 | Status: SHIPPED
Start: 2019-01-01 | End: 2019-01-01 | Stop reason: SDUPTHER

## 2019-01-01 RX ORDER — SODIUM CHLORIDE 0.9 % (FLUSH) 0.9 %
5-40 SYRINGE (ML) INJECTION EVERY 8 HOURS
Status: DISCONTINUED | OUTPATIENT
Start: 2019-01-01 | End: 2019-01-01 | Stop reason: SDUPTHER

## 2019-01-01 RX ORDER — SODIUM CHLORIDE 0.9 % (FLUSH) 0.9 %
10 SYRINGE (ML) INJECTION
Status: COMPLETED | OUTPATIENT
Start: 2019-01-01 | End: 2019-01-01

## 2019-01-01 RX ORDER — MORPHINE SULFATE 4 MG/ML
4 INJECTION INTRAVENOUS
Status: DISCONTINUED | OUTPATIENT
Start: 2019-01-01 | End: 2019-01-01 | Stop reason: HOSPADM

## 2019-01-01 RX ORDER — SODIUM CHLORIDE 9 MG/ML
250 INJECTION, SOLUTION INTRAVENOUS AS NEEDED
Status: DISCONTINUED | OUTPATIENT
Start: 2019-01-01 | End: 2019-01-01 | Stop reason: ALTCHOICE

## 2019-01-01 RX ORDER — ONDANSETRON 2 MG/ML
8 INJECTION INTRAMUSCULAR; INTRAVENOUS
Status: COMPLETED | OUTPATIENT
Start: 2019-01-01 | End: 2019-01-01

## 2019-01-01 RX ORDER — BUMETANIDE 1 MG/1
1 TABLET ORAL DAILY
Status: DISCONTINUED | OUTPATIENT
Start: 2019-01-01 | End: 2019-01-01

## 2019-01-01 RX ORDER — HYDROMORPHONE HYDROCHLORIDE 2 MG/ML
INJECTION, SOLUTION INTRAMUSCULAR; INTRAVENOUS; SUBCUTANEOUS AS NEEDED
Status: DISCONTINUED | OUTPATIENT
Start: 2019-01-01 | End: 2019-01-01 | Stop reason: HOSPADM

## 2019-01-01 RX ORDER — LORAZEPAM 2 MG/ML
1 INJECTION INTRAMUSCULAR EVERY 4 HOURS
Status: DISCONTINUED | OUTPATIENT
Start: 2019-01-01 | End: 2019-01-01 | Stop reason: HOSPADM

## 2019-01-01 RX ORDER — POTASSIUM CHLORIDE 750 MG/1
20 TABLET, FILM COATED, EXTENDED RELEASE ORAL
Status: COMPLETED | OUTPATIENT
Start: 2019-01-01 | End: 2019-01-01

## 2019-01-01 RX ORDER — MAGNESIUM SULFATE 1 G/100ML
1 INJECTION INTRAVENOUS ONCE
Status: COMPLETED | OUTPATIENT
Start: 2019-01-01 | End: 2019-01-01

## 2019-01-01 RX ORDER — SODIUM CHLORIDE 9 MG/ML
500 INJECTION, SOLUTION INTRAVENOUS CONTINUOUS
Status: DISCONTINUED | OUTPATIENT
Start: 2019-01-01 | End: 2019-01-01 | Stop reason: HOSPADM

## 2019-01-01 RX ORDER — KETOROLAC TROMETHAMINE 30 MG/ML
30 INJECTION, SOLUTION INTRAMUSCULAR; INTRAVENOUS
Status: DISCONTINUED | OUTPATIENT
Start: 2019-01-01 | End: 2019-01-01 | Stop reason: HOSPADM

## 2019-01-01 RX ORDER — SODIUM CHLORIDE 9 MG/ML
75 INJECTION, SOLUTION INTRAVENOUS CONTINUOUS
Status: DISCONTINUED | OUTPATIENT
Start: 2019-01-01 | End: 2019-01-01

## 2019-01-01 RX ORDER — HEPARIN SODIUM 1000 [USP'U]/ML
2140 INJECTION, SOLUTION INTRAVENOUS; SUBCUTANEOUS ONCE
Status: COMPLETED | OUTPATIENT
Start: 2019-01-01 | End: 2019-01-01

## 2019-01-01 RX ORDER — LIDOCAINE 50 MG/G
1 PATCH TOPICAL EVERY 24 HOURS
Status: DISCONTINUED | OUTPATIENT
Start: 2019-01-01 | End: 2019-01-01 | Stop reason: HOSPADM

## 2019-01-01 RX ORDER — PHENYLEPHRINE HCL IN 0.9% NACL 0.4MG/10ML
SYRINGE (ML) INTRAVENOUS AS NEEDED
Status: DISCONTINUED | OUTPATIENT
Start: 2019-01-01 | End: 2019-01-01 | Stop reason: HOSPADM

## 2019-01-01 RX ORDER — PANTOPRAZOLE SODIUM 40 MG/1
40 TABLET, DELAYED RELEASE ORAL 2 TIMES DAILY
Status: DISCONTINUED | OUTPATIENT
Start: 2019-01-01 | End: 2019-01-01 | Stop reason: HOSPADM

## 2019-01-01 RX ORDER — HYOSCYAMINE SULFATE 0.12 MG/1
0.12 TABLET SUBLINGUAL
Status: DISCONTINUED | OUTPATIENT
Start: 2019-01-01 | End: 2019-01-01 | Stop reason: HOSPADM

## 2019-01-01 RX ORDER — FUROSEMIDE 10 MG/ML
40 INJECTION INTRAMUSCULAR; INTRAVENOUS 2 TIMES DAILY
Status: DISCONTINUED | OUTPATIENT
Start: 2019-01-01 | End: 2019-01-01

## 2019-01-01 RX ORDER — MIDAZOLAM HYDROCHLORIDE 1 MG/ML
.25-5 INJECTION, SOLUTION INTRAMUSCULAR; INTRAVENOUS
Status: DISCONTINUED | OUTPATIENT
Start: 2019-01-01 | End: 2019-01-01 | Stop reason: HOSPADM

## 2019-01-01 RX ORDER — GUAIFENESIN 100 MG/5ML
81 LIQUID (ML) ORAL DAILY
Qty: 30 TAB | Refills: 0 | Status: SHIPPED
Start: 2019-01-01 | End: 2019-01-01

## 2019-01-01 RX ORDER — GLYCOPYRROLATE 0.2 MG/ML
INJECTION INTRAMUSCULAR; INTRAVENOUS AS NEEDED
Status: DISCONTINUED | OUTPATIENT
Start: 2019-01-01 | End: 2019-01-01 | Stop reason: HOSPADM

## 2019-01-01 RX ORDER — FUROSEMIDE 10 MG/ML
INJECTION INTRAMUSCULAR; INTRAVENOUS
Status: DISPENSED
Start: 2019-01-01 | End: 2019-01-01

## 2019-01-01 RX ORDER — AMOXICILLIN 250 MG
1 CAPSULE ORAL 2 TIMES DAILY
Status: DISCONTINUED | OUTPATIENT
Start: 2019-01-01 | End: 2019-01-01 | Stop reason: HOSPADM

## 2019-01-01 RX ORDER — POTASSIUM CHLORIDE 29.8 MG/ML
20 INJECTION INTRAVENOUS
Status: COMPLETED | OUTPATIENT
Start: 2019-01-01 | End: 2019-01-01

## 2019-01-01 RX ORDER — GUAIFENESIN 600 MG/1
600 TABLET, EXTENDED RELEASE ORAL
Status: DISCONTINUED | OUTPATIENT
Start: 2019-01-01 | End: 2019-01-01 | Stop reason: HOSPADM

## 2019-01-01 RX ORDER — SODIUM CHLORIDE 0.9 % (FLUSH) 0.9 %
5-40 SYRINGE (ML) INJECTION EVERY 8 HOURS
Status: DISCONTINUED | OUTPATIENT
Start: 2019-01-01 | End: 2019-01-01

## 2019-01-01 RX ORDER — LORAZEPAM 0.5 MG/1
0.5 TABLET ORAL
Status: DISCONTINUED | OUTPATIENT
Start: 2019-01-01 | End: 2019-01-01

## 2019-01-01 RX ORDER — HALOPERIDOL 5 MG/ML
2 INJECTION INTRAMUSCULAR
Status: DISCONTINUED | OUTPATIENT
Start: 2019-01-01 | End: 2019-01-01 | Stop reason: HOSPADM

## 2019-01-01 RX ORDER — IPRATROPIUM BROMIDE AND ALBUTEROL SULFATE 2.5; .5 MG/3ML; MG/3ML
3 SOLUTION RESPIRATORY (INHALATION)
Status: DISCONTINUED | OUTPATIENT
Start: 2019-01-01 | End: 2019-01-01 | Stop reason: HOSPADM

## 2019-01-01 RX ORDER — POLYETHYLENE GLYCOL 3350 17 G/17G
17 POWDER, FOR SOLUTION ORAL DAILY
Status: DISCONTINUED | OUTPATIENT
Start: 2019-01-01 | End: 2019-01-01 | Stop reason: HOSPADM

## 2019-01-01 RX ORDER — GUAIFENESIN 600 MG/1
600 TABLET, EXTENDED RELEASE ORAL
Status: DISCONTINUED | OUTPATIENT
Start: 2019-01-01 | End: 2019-01-01

## 2019-01-01 RX ORDER — ONDANSETRON 2 MG/ML
4 INJECTION INTRAMUSCULAR; INTRAVENOUS
Status: DISCONTINUED | OUTPATIENT
Start: 2019-01-01 | End: 2019-01-01

## 2019-01-01 RX ORDER — POTASSIUM CHLORIDE 14.9 MG/ML
10 INJECTION INTRAVENOUS
Status: COMPLETED | OUTPATIENT
Start: 2019-01-01 | End: 2019-01-01

## 2019-01-01 RX ORDER — DEXAMETHASONE SODIUM PHOSPHATE 4 MG/ML
INJECTION, SOLUTION INTRA-ARTICULAR; INTRALESIONAL; INTRAMUSCULAR; INTRAVENOUS; SOFT TISSUE AS NEEDED
Status: DISCONTINUED | OUTPATIENT
Start: 2019-01-01 | End: 2019-01-01 | Stop reason: HOSPADM

## 2019-01-01 RX ORDER — SODIUM CHLORIDE 9 MG/ML
100 INJECTION, SOLUTION INTRAVENOUS CONTINUOUS
Status: DISCONTINUED | OUTPATIENT
Start: 2019-01-01 | End: 2019-01-01 | Stop reason: HOSPADM

## 2019-01-01 RX ORDER — LEVOFLOXACIN 5 MG/ML
250 INJECTION, SOLUTION INTRAVENOUS EVERY 24 HOURS
Status: COMPLETED | OUTPATIENT
Start: 2019-01-01 | End: 2019-01-01

## 2019-01-01 RX ORDER — LEVOFLOXACIN 5 MG/ML
750 INJECTION, SOLUTION INTRAVENOUS
Status: DISCONTINUED | OUTPATIENT
Start: 2019-01-01 | End: 2019-01-01

## 2019-01-01 RX ORDER — SODIUM CHLORIDE AND POTASSIUM CHLORIDE .9; .15 G/100ML; G/100ML
SOLUTION INTRAVENOUS CONTINUOUS
Status: DISCONTINUED | OUTPATIENT
Start: 2019-01-01 | End: 2019-01-01

## 2019-01-01 RX ORDER — METRONIDAZOLE 500 MG/100ML
500 INJECTION, SOLUTION INTRAVENOUS EVERY 12 HOURS
Status: DISCONTINUED | OUTPATIENT
Start: 2019-01-01 | End: 2019-01-01

## 2019-01-01 RX ORDER — SIMETHICONE 80 MG
80 TABLET,CHEWABLE ORAL
Status: DISCONTINUED | OUTPATIENT
Start: 2019-01-01 | End: 2019-01-01 | Stop reason: HOSPADM

## 2019-01-01 RX ORDER — BUMETANIDE 1 MG/1
1 TABLET ORAL 2 TIMES DAILY
Status: DISCONTINUED | OUTPATIENT
Start: 2019-01-01 | End: 2019-01-01 | Stop reason: HOSPADM

## 2019-01-01 RX ORDER — FUROSEMIDE 10 MG/ML
40 INJECTION INTRAMUSCULAR; INTRAVENOUS
Status: COMPLETED | OUTPATIENT
Start: 2019-01-01 | End: 2019-01-01

## 2019-01-01 RX ORDER — FUROSEMIDE 10 MG/ML
40 INJECTION INTRAMUSCULAR; INTRAVENOUS ONCE
Status: COMPLETED | OUTPATIENT
Start: 2019-01-01 | End: 2019-01-01

## 2019-01-01 RX ORDER — LEVOFLOXACIN 5 MG/ML
750 INJECTION, SOLUTION INTRAVENOUS
Status: COMPLETED | OUTPATIENT
Start: 2019-01-01 | End: 2019-01-01

## 2019-01-01 RX ORDER — SENNOSIDES 8.6 MG/1
1 TABLET ORAL DAILY
Status: ON HOLD | COMMUNITY
End: 2019-01-01

## 2019-01-01 RX ORDER — FENTANYL CITRATE 50 UG/ML
200 INJECTION, SOLUTION INTRAMUSCULAR; INTRAVENOUS
Status: DISCONTINUED | OUTPATIENT
Start: 2019-01-01 | End: 2019-01-01 | Stop reason: HOSPADM

## 2019-01-01 RX ORDER — DIPHENHYDRAMINE HYDROCHLORIDE 50 MG/ML
12.5 INJECTION, SOLUTION INTRAMUSCULAR; INTRAVENOUS ONCE
Status: COMPLETED | OUTPATIENT
Start: 2019-01-01 | End: 2019-01-01

## 2019-01-01 RX ORDER — LISINOPRIL 2.5 MG/1
2.5 TABLET ORAL DAILY
Qty: 30 TAB | Refills: 0 | Status: SHIPPED
Start: 2019-01-01 | End: 2019-01-01

## 2019-01-01 RX ORDER — CARVEDILOL 6.25 MG/1
6.25 TABLET ORAL 2 TIMES DAILY WITH MEALS
Qty: 180 TAB | Refills: 1 | Status: SHIPPED | OUTPATIENT
Start: 2019-01-01 | End: 2019-01-01 | Stop reason: SDUPTHER

## 2019-01-01 RX ORDER — AMIODARONE HYDROCHLORIDE 200 MG/1
400 TABLET ORAL 2 TIMES DAILY
Status: DISCONTINUED | OUTPATIENT
Start: 2019-01-01 | End: 2019-01-01

## 2019-01-01 RX ORDER — FENTANYL CITRATE 50 UG/ML
INJECTION, SOLUTION INTRAMUSCULAR; INTRAVENOUS AS NEEDED
Status: DISCONTINUED | OUTPATIENT
Start: 2019-01-01 | End: 2019-01-01 | Stop reason: HOSPADM

## 2019-01-01 RX ORDER — NYSTATIN 100000 U/G
CREAM TOPICAL 2 TIMES DAILY
Status: DISCONTINUED | OUTPATIENT
Start: 2019-01-01 | End: 2019-01-01 | Stop reason: HOSPADM

## 2019-01-01 RX ORDER — LEVOFLOXACIN 5 MG/ML
500 INJECTION, SOLUTION INTRAVENOUS EVERY 24 HOURS
Status: DISCONTINUED | OUTPATIENT
Start: 2019-01-01 | End: 2019-01-01

## 2019-01-01 RX ORDER — DEXTROMETHORPHAN/PSEUDOEPHED 2.5-7.5/.8
1.2 DROPS ORAL
Status: DISCONTINUED | OUTPATIENT
Start: 2019-01-01 | End: 2019-01-01 | Stop reason: HOSPADM

## 2019-01-01 RX ORDER — POLYETHYLENE GLYCOL 3350 17 G/17G
17 POWDER, FOR SOLUTION ORAL DAILY
Qty: 30 EACH | Refills: 0 | Status: SHIPPED
Start: 2019-01-01 | End: 2019-01-01

## 2019-01-01 RX ORDER — MORPHINE SULFATE 2 MG/ML
2 INJECTION, SOLUTION INTRAMUSCULAR; INTRAVENOUS
Status: DISCONTINUED | OUTPATIENT
Start: 2019-01-01 | End: 2019-01-01 | Stop reason: HOSPADM

## 2019-01-01 RX ORDER — HEPARIN SODIUM 10000 [USP'U]/100ML
18-36 INJECTION, SOLUTION INTRAVENOUS
Status: DISCONTINUED | OUTPATIENT
Start: 2019-01-01 | End: 2019-01-01

## 2019-01-01 RX ORDER — ALBUMIN HUMAN 250 G/1000ML
25 SOLUTION INTRAVENOUS EVERY 6 HOURS
Status: DISPENSED | OUTPATIENT
Start: 2019-01-01 | End: 2019-01-01

## 2019-01-01 RX ORDER — FACIAL-BODY WIPES
10 EACH TOPICAL ONCE
Status: COMPLETED | OUTPATIENT
Start: 2019-01-01 | End: 2019-01-01

## 2019-01-01 RX ORDER — ROSUVASTATIN CALCIUM 10 MG/1
10 TABLET, COATED ORAL
Status: DISCONTINUED | OUTPATIENT
Start: 2019-01-01 | End: 2019-01-01

## 2019-01-01 RX ORDER — POTASSIUM CHLORIDE 750 MG/1
40 TABLET, FILM COATED, EXTENDED RELEASE ORAL ONCE
Status: COMPLETED | OUTPATIENT
Start: 2019-01-01 | End: 2019-01-01

## 2019-01-01 RX ORDER — METOPROLOL TARTRATE 5 MG/5ML
5 INJECTION INTRAVENOUS EVERY 6 HOURS
Status: DISCONTINUED | OUTPATIENT
Start: 2019-01-01 | End: 2019-01-01

## 2019-01-01 RX ORDER — MORPHINE SULFATE 4 MG/ML
4 INJECTION INTRAVENOUS EVERY 4 HOURS
Status: DISCONTINUED | OUTPATIENT
Start: 2019-01-01 | End: 2019-01-01 | Stop reason: HOSPADM

## 2019-01-01 RX ORDER — BUMETANIDE 1 MG/1
1 TABLET ORAL DAILY
Status: DISCONTINUED | OUTPATIENT
Start: 2019-01-01 | End: 2019-01-01 | Stop reason: HOSPADM

## 2019-01-01 RX ORDER — CEFAZOLIN SODIUM 1 G/3ML
INJECTION, POWDER, FOR SOLUTION INTRAMUSCULAR; INTRAVENOUS
Status: COMPLETED
Start: 2019-01-01 | End: 2019-01-01

## 2019-01-01 RX ORDER — LORAZEPAM 0.5 MG/1
0.5 TABLET ORAL
Status: DISCONTINUED | OUTPATIENT
Start: 2019-01-01 | End: 2019-01-01 | Stop reason: HOSPADM

## 2019-01-01 RX ORDER — OSELTAMIVIR PHOSPHATE 6 MG/ML
30 FOR SUSPENSION ORAL EVERY 12 HOURS
Status: DISCONTINUED | OUTPATIENT
Start: 2019-01-01 | End: 2019-01-01

## 2019-01-01 RX ORDER — GUAIFENESIN 100 MG/5ML
81 LIQUID (ML) ORAL DAILY
Status: DISCONTINUED | OUTPATIENT
Start: 2019-01-01 | End: 2019-01-01 | Stop reason: HOSPADM

## 2019-01-01 RX ORDER — LANOLIN ALCOHOL/MO/W.PET/CERES
3 CREAM (GRAM) TOPICAL
Qty: 30 TAB | Refills: 1 | Status: SHIPPED | OUTPATIENT
Start: 2019-01-01

## 2019-01-01 RX ORDER — LANOLIN ALCOHOL/MO/W.PET/CERES
3 CREAM (GRAM) TOPICAL
Status: DISCONTINUED | OUTPATIENT
Start: 2019-01-01 | End: 2019-01-01

## 2019-01-01 RX ORDER — SENNOSIDES 8.8 MG/5ML
5 LIQUID ORAL
Status: DISCONTINUED | OUTPATIENT
Start: 2019-01-01 | End: 2019-01-01 | Stop reason: HOSPADM

## 2019-01-01 RX ORDER — POTASSIUM CHLORIDE 14.9 MG/ML
10 INJECTION INTRAVENOUS EVERY 6 HOURS
Status: COMPLETED | OUTPATIENT
Start: 2019-01-01 | End: 2019-01-01

## 2019-01-01 RX ORDER — ZOLPIDEM TARTRATE 5 MG/1
5 TABLET ORAL
Status: DISCONTINUED | OUTPATIENT
Start: 2019-01-01 | End: 2019-01-01 | Stop reason: HOSPADM

## 2019-01-01 RX ORDER — AMOXICILLIN 250 MG
2 CAPSULE ORAL
Status: DISCONTINUED | OUTPATIENT
Start: 2019-01-01 | End: 2019-01-01 | Stop reason: HOSPADM

## 2019-01-01 RX ORDER — POTASSIUM CHLORIDE 1.5 G/1.77G
20 POWDER, FOR SOLUTION ORAL 2 TIMES DAILY WITH MEALS
Status: DISCONTINUED | OUTPATIENT
Start: 2019-01-01 | End: 2019-01-01

## 2019-01-01 RX ORDER — MIDAZOLAM HYDROCHLORIDE 1 MG/ML
INJECTION, SOLUTION INTRAMUSCULAR; INTRAVENOUS AS NEEDED
Status: DISCONTINUED | OUTPATIENT
Start: 2019-01-01 | End: 2019-01-01 | Stop reason: HOSPADM

## 2019-01-01 RX ORDER — CARVEDILOL 6.25 MG/1
6.25 TABLET ORAL 2 TIMES DAILY WITH MEALS
Qty: 180 TAB | Refills: 3 | Status: SHIPPED | OUTPATIENT
Start: 2019-01-01

## 2019-01-01 RX ORDER — BALSAM PERU/CASTOR OIL
OINTMENT (GRAM) TOPICAL EVERY 8 HOURS
Status: DISCONTINUED | OUTPATIENT
Start: 2019-01-01 | End: 2019-01-01 | Stop reason: HOSPADM

## 2019-01-01 RX ORDER — DOCUSATE SODIUM 100 MG/1
100 CAPSULE, LIQUID FILLED ORAL 2 TIMES DAILY
Qty: 60 CAP | Refills: 2 | Status: ON HOLD
Start: 2019-01-01 | End: 2019-01-01

## 2019-01-01 RX ORDER — LORAZEPAM 2 MG/ML
1 INJECTION INTRAMUSCULAR
Status: DISCONTINUED | OUTPATIENT
Start: 2019-01-01 | End: 2019-01-01 | Stop reason: HOSPADM

## 2019-01-01 RX ORDER — SODIUM CHLORIDE 0.9 % (FLUSH) 0.9 %
10 SYRINGE (ML) INJECTION AS NEEDED
Status: DISCONTINUED | OUTPATIENT
Start: 2019-01-01 | End: 2019-01-01 | Stop reason: HOSPADM

## 2019-01-01 RX ORDER — KETOROLAC TROMETHAMINE 30 MG/ML
30 INJECTION, SOLUTION INTRAMUSCULAR; INTRAVENOUS
Status: DISCONTINUED | OUTPATIENT
Start: 2019-01-01 | End: 2019-01-01

## 2019-01-01 RX ORDER — NEOSTIGMINE METHYLSULFATE 1 MG/ML
INJECTION, SOLUTION INTRAVENOUS AS NEEDED
Status: DISCONTINUED | OUTPATIENT
Start: 2019-01-01 | End: 2019-01-01 | Stop reason: HOSPADM

## 2019-01-01 RX ORDER — ROCURONIUM BROMIDE 10 MG/ML
INJECTION, SOLUTION INTRAVENOUS AS NEEDED
Status: DISCONTINUED | OUTPATIENT
Start: 2019-01-01 | End: 2019-01-01 | Stop reason: HOSPADM

## 2019-01-01 RX ORDER — BACITRACIN 500 UNIT/G
PACKET (EA) TOPICAL
Status: DISCONTINUED
Start: 2019-01-01 | End: 2019-01-01 | Stop reason: HOSPADM

## 2019-01-01 RX ORDER — DOBUTAMINE HYDROCHLORIDE 200 MG/100ML
0-10 INJECTION INTRAVENOUS
Status: DISCONTINUED | OUTPATIENT
Start: 2019-01-01 | End: 2019-01-01

## 2019-01-01 RX ORDER — PROCHLORPERAZINE EDISYLATE 5 MG/ML
10 INJECTION INTRAMUSCULAR; INTRAVENOUS
Status: DISCONTINUED | OUTPATIENT
Start: 2019-01-01 | End: 2019-01-01 | Stop reason: HOSPADM

## 2019-01-01 RX ORDER — CARVEDILOL 6.25 MG/1
6.25 TABLET ORAL 2 TIMES DAILY WITH MEALS
Status: DISCONTINUED | OUTPATIENT
Start: 2019-01-01 | End: 2019-01-01

## 2019-01-01 RX ORDER — SODIUM CHLORIDE, SODIUM LACTATE, POTASSIUM CHLORIDE, CALCIUM CHLORIDE 600; 310; 30; 20 MG/100ML; MG/100ML; MG/100ML; MG/100ML
125 INJECTION, SOLUTION INTRAVENOUS CONTINUOUS
Status: DISCONTINUED | OUTPATIENT
Start: 2019-01-01 | End: 2019-01-01 | Stop reason: HOSPADM

## 2019-01-01 RX ORDER — ONDANSETRON 2 MG/ML
INJECTION INTRAMUSCULAR; INTRAVENOUS AS NEEDED
Status: DISCONTINUED | OUTPATIENT
Start: 2019-01-01 | End: 2019-01-01 | Stop reason: HOSPADM

## 2019-01-01 RX ORDER — NALOXONE HYDROCHLORIDE 0.4 MG/ML
0.4 INJECTION, SOLUTION INTRAMUSCULAR; INTRAVENOUS; SUBCUTANEOUS
Status: DISCONTINUED | OUTPATIENT
Start: 2019-01-01 | End: 2019-01-01 | Stop reason: HOSPADM

## 2019-01-01 RX ORDER — LANOLIN ALCOHOL/MO/W.PET/CERES
3 CREAM (GRAM) TOPICAL
Status: DISCONTINUED | OUTPATIENT
Start: 2019-01-01 | End: 2019-01-01 | Stop reason: HOSPADM

## 2019-01-01 RX ORDER — HEPARIN SODIUM 5000 [USP'U]/ML
5000 INJECTION, SOLUTION INTRAVENOUS; SUBCUTANEOUS EVERY 8 HOURS
Status: DISCONTINUED | OUTPATIENT
Start: 2019-01-01 | End: 2019-01-01 | Stop reason: HOSPADM

## 2019-01-01 RX ORDER — DOBUTAMINE HYDROCHLORIDE 200 MG/100ML
2.5 INJECTION INTRAVENOUS CONTINUOUS
Status: DISCONTINUED | OUTPATIENT
Start: 2019-01-01 | End: 2019-01-01

## 2019-01-01 RX ORDER — ACETYLCYSTEINE 200 MG/ML
200 SOLUTION ORAL; RESPIRATORY (INHALATION) 2 TIMES DAILY
Status: DISCONTINUED | OUTPATIENT
Start: 2019-01-01 | End: 2019-01-01

## 2019-01-01 RX ORDER — BUMETANIDE 1 MG/1
0.5 TABLET ORAL DAILY
Status: DISCONTINUED | OUTPATIENT
Start: 2019-01-01 | End: 2019-01-01

## 2019-01-01 RX ORDER — BUMETANIDE 0.25 MG/ML
INJECTION INTRAMUSCULAR; INTRAVENOUS
Status: DISPENSED
Start: 2019-01-01 | End: 2019-01-01

## 2019-01-01 RX ORDER — SUCRALFATE 1 G/1
1 TABLET ORAL
Qty: 120 TAB | Refills: 0 | Status: SHIPPED | OUTPATIENT
Start: 2019-01-01 | End: 2019-01-01

## 2019-01-01 RX ORDER — HYDROCORTISONE ACETATE 25 MG/1
25 SUPPOSITORY RECTAL EVERY 12 HOURS
Status: DISCONTINUED | OUTPATIENT
Start: 2019-01-01 | End: 2019-01-01 | Stop reason: HOSPADM

## 2019-01-01 RX ORDER — BUMETANIDE 1 MG/1
1 TABLET ORAL DAILY
Qty: 60 TAB | Refills: 0 | Status: SHIPPED
Start: 2019-01-01 | End: 2019-01-01 | Stop reason: SDUPTHER

## 2019-01-01 RX ORDER — SODIUM CHLORIDE, SODIUM LACTATE, POTASSIUM CHLORIDE, CALCIUM CHLORIDE 600; 310; 30; 20 MG/100ML; MG/100ML; MG/100ML; MG/100ML
INJECTION, SOLUTION INTRAVENOUS
Status: DISCONTINUED | OUTPATIENT
Start: 2019-01-01 | End: 2019-01-01 | Stop reason: HOSPADM

## 2019-01-01 RX ORDER — LEVOFLOXACIN 5 MG/ML
750 INJECTION, SOLUTION INTRAVENOUS EVERY 24 HOURS
Status: DISCONTINUED | OUTPATIENT
Start: 2019-01-01 | End: 2019-01-01 | Stop reason: DRUGHIGH

## 2019-01-01 RX ORDER — BUPIVACAINE HYDROCHLORIDE 2.5 MG/ML
INJECTION, SOLUTION EPIDURAL; INFILTRATION; INTRACAUDAL AS NEEDED
Status: DISCONTINUED | OUTPATIENT
Start: 2019-01-01 | End: 2019-01-01 | Stop reason: HOSPADM

## 2019-01-01 RX ORDER — LEVOFLOXACIN 750 MG/1
750 TABLET ORAL DAILY
Qty: 10 TAB | Refills: 0 | Status: SHIPPED | OUTPATIENT
Start: 2019-01-01 | End: 2019-01-01

## 2019-01-01 RX ORDER — ACETAMINOPHEN 325 MG/1
650 TABLET ORAL
COMMUNITY

## 2019-01-01 RX ORDER — MORPHINE SULFATE 2 MG/ML
4 INJECTION, SOLUTION INTRAMUSCULAR; INTRAVENOUS
Status: DISCONTINUED | OUTPATIENT
Start: 2019-01-01 | End: 2019-01-01

## 2019-01-01 RX ORDER — MORPHINE SULFATE 2 MG/ML
1 INJECTION, SOLUTION INTRAMUSCULAR; INTRAVENOUS
Status: DISCONTINUED | OUTPATIENT
Start: 2019-01-01 | End: 2019-01-01 | Stop reason: HOSPADM

## 2019-01-01 RX ORDER — BUMETANIDE 0.25 MG/ML
1 INJECTION INTRAMUSCULAR; INTRAVENOUS EVERY 12 HOURS
Status: DISCONTINUED | OUTPATIENT
Start: 2019-01-01 | End: 2019-01-01

## 2019-01-01 RX ORDER — CARVEDILOL 3.12 MG/1
3.12 TABLET ORAL 2 TIMES DAILY WITH MEALS
Qty: 60 TAB | Refills: 0 | Status: SHIPPED
Start: 2019-01-01 | End: 2019-01-01

## 2019-01-01 RX ORDER — POLYETHYLENE GLYCOL 3350 17 G/17G
17 POWDER, FOR SOLUTION ORAL
COMMUNITY

## 2019-01-01 RX ORDER — BUMETANIDE 1 MG/1
1 TABLET ORAL DAILY
Qty: 90 TAB | Refills: 2 | Status: ON HOLD | OUTPATIENT
Start: 2019-01-01 | End: 2019-01-01

## 2019-01-01 RX ORDER — ROSUVASTATIN CALCIUM 10 MG/1
10 TABLET, COATED ORAL
Status: DISCONTINUED | OUTPATIENT
Start: 2019-01-01 | End: 2019-01-01 | Stop reason: HOSPADM

## 2019-01-01 RX ORDER — ENOXAPARIN SODIUM 100 MG/ML
30 INJECTION SUBCUTANEOUS EVERY 24 HOURS
Status: DISCONTINUED | OUTPATIENT
Start: 2019-01-01 | End: 2019-01-01

## 2019-01-01 RX ORDER — BACITRACIN 500 UNIT/G
PACKET (EA) TOPICAL
Status: COMPLETED
Start: 2019-01-01 | End: 2019-01-01

## 2019-01-01 RX ORDER — ROSUVASTATIN CALCIUM 10 MG/1
10 TABLET, COATED ORAL
Qty: 90 TAB | Refills: 1 | Status: SHIPPED | OUTPATIENT
Start: 2019-01-01 | End: 2019-01-01

## 2019-01-01 RX ORDER — ENOXAPARIN SODIUM 100 MG/ML
40 INJECTION SUBCUTANEOUS EVERY 24 HOURS
Status: DISCONTINUED | OUTPATIENT
Start: 2019-01-01 | End: 2019-01-01 | Stop reason: HOSPADM

## 2019-01-01 RX ORDER — SODIUM CHLORIDE, SODIUM LACTATE, POTASSIUM CHLORIDE, CALCIUM CHLORIDE 600; 310; 30; 20 MG/100ML; MG/100ML; MG/100ML; MG/100ML
50 INJECTION, SOLUTION INTRAVENOUS CONTINUOUS
Status: DISCONTINUED | OUTPATIENT
Start: 2019-01-01 | End: 2019-01-01 | Stop reason: ALTCHOICE

## 2019-01-01 RX ORDER — ENOXAPARIN SODIUM 100 MG/ML
1 INJECTION SUBCUTANEOUS EVERY 12 HOURS
Status: DISCONTINUED | OUTPATIENT
Start: 2019-01-01 | End: 2019-01-01

## 2019-01-01 RX ORDER — GLYCOPYRROLATE 0.2 MG/ML
0.2 INJECTION INTRAMUSCULAR; INTRAVENOUS EVERY 6 HOURS
Status: DISCONTINUED | OUTPATIENT
Start: 2019-01-01 | End: 2019-01-01 | Stop reason: HOSPADM

## 2019-01-01 RX ORDER — LIDOCAINE HYDROCHLORIDE 20 MG/ML
20 INJECTION, SOLUTION INFILTRATION; PERINEURAL ONCE
Status: COMPLETED | OUTPATIENT
Start: 2019-01-01 | End: 2019-01-01

## 2019-01-01 RX ORDER — OSELTAMIVIR PHOSPHATE 75 MG/1
75 CAPSULE ORAL
Status: COMPLETED | OUTPATIENT
Start: 2019-01-01 | End: 2019-01-01

## 2019-01-01 RX ORDER — NITROGLYCERIN 0.4 MG/1
0.4 TABLET SUBLINGUAL
Qty: 1 BOTTLE | Refills: 0 | Status: SHIPPED
Start: 2019-01-01 | End: 2019-01-01

## 2019-01-01 RX ORDER — GUAIFENESIN 600 MG/1
600 TABLET, EXTENDED RELEASE ORAL EVERY 12 HOURS
Qty: 30 TAB | Refills: 0 | Status: SHIPPED
Start: 2019-01-01 | End: 2019-01-01

## 2019-01-01 RX ORDER — ROSUVASTATIN CALCIUM 10 MG/1
10 TABLET, COATED ORAL
Qty: 90 TAB | Refills: 2 | Status: ON HOLD | OUTPATIENT
Start: 2019-01-01 | End: 2019-01-01

## 2019-01-01 RX ORDER — GLYCOPYRROLATE 0.2 MG/ML
0.2 INJECTION INTRAMUSCULAR; INTRAVENOUS
Status: DISCONTINUED | OUTPATIENT
Start: 2019-01-01 | End: 2019-01-01

## 2019-01-01 RX ORDER — GUAIFENESIN 600 MG/1
600 TABLET, EXTENDED RELEASE ORAL
COMMUNITY
End: 2019-01-01

## 2019-01-01 RX ORDER — ENOXAPARIN SODIUM 100 MG/ML
40 INJECTION SUBCUTANEOUS EVERY 24 HOURS
Qty: 30 SYRINGE | Refills: 0 | Status: SHIPPED | OUTPATIENT
Start: 2019-01-01 | End: 2019-01-01

## 2019-01-01 RX ORDER — AMOXICILLIN 250 MG
1 CAPSULE ORAL 2 TIMES DAILY
Qty: 30 TAB | Refills: 1 | Status: SHIPPED | OUTPATIENT
Start: 2019-01-01

## 2019-01-01 RX ORDER — MORPHINE SULFATE 2 MG/ML
2 INJECTION, SOLUTION INTRAMUSCULAR; INTRAVENOUS
Status: DISCONTINUED | OUTPATIENT
Start: 2019-01-01 | End: 2019-01-01

## 2019-01-01 RX ORDER — ATROPINE SULFATE 10 MG/ML
3 SOLUTION/ DROPS OPHTHALMIC
Status: DISCONTINUED | OUTPATIENT
Start: 2019-01-01 | End: 2019-01-01 | Stop reason: HOSPADM

## 2019-01-01 RX ORDER — GUAIFENESIN 100 MG/5ML
324 LIQUID (ML) ORAL
Status: COMPLETED | OUTPATIENT
Start: 2019-01-01 | End: 2019-01-01

## 2019-01-01 RX ORDER — POLYETHYLENE GLYCOL 3350, SODIUM SULFATE, SODIUM CHLORIDE, POTASSIUM CHLORIDE, SODIUM ASCORBATE, AND ASCORBIC ACID 7.5-2.691G
KIT ORAL
COMMUNITY
End: 2019-01-01

## 2019-01-01 RX ORDER — BUMETANIDE 0.25 MG/ML
4 INJECTION INTRAMUSCULAR; INTRAVENOUS ONCE
Status: COMPLETED | OUTPATIENT
Start: 2019-01-01 | End: 2019-01-01

## 2019-01-01 RX ORDER — CEFAZOLIN SODIUM/WATER 2 G/20 ML
2 SYRINGE (ML) INTRAVENOUS ONCE
Status: DISCONTINUED | OUTPATIENT
Start: 2019-01-01 | End: 2019-01-01 | Stop reason: HOSPADM

## 2019-01-01 RX ORDER — EPHEDRINE SULFATE/0.9% NACL/PF 50 MG/5 ML
SYRINGE (ML) INTRAVENOUS AS NEEDED
Status: DISCONTINUED | OUTPATIENT
Start: 2019-01-01 | End: 2019-01-01 | Stop reason: HOSPADM

## 2019-01-01 RX ORDER — SODIUM CHLORIDE 9 MG/ML
150 INJECTION, SOLUTION INTRAVENOUS CONTINUOUS
Status: DISPENSED | OUTPATIENT
Start: 2019-01-01 | End: 2019-01-01

## 2019-01-01 RX ORDER — POTASSIUM CHLORIDE 1.5 G/1.77G
40 POWDER, FOR SOLUTION ORAL ONCE
Status: COMPLETED | OUTPATIENT
Start: 2019-01-01 | End: 2019-01-01

## 2019-01-01 RX ORDER — DOBUTAMINE HYDROCHLORIDE 200 MG/100ML
2.5 INJECTION INTRAVENOUS
Status: DISCONTINUED | OUTPATIENT
Start: 2019-01-01 | End: 2019-01-01

## 2019-01-01 RX ORDER — MIDAZOLAM HYDROCHLORIDE 1 MG/ML
5 INJECTION, SOLUTION INTRAMUSCULAR; INTRAVENOUS
Status: DISCONTINUED | OUTPATIENT
Start: 2019-01-01 | End: 2019-01-01 | Stop reason: HOSPADM

## 2019-01-01 RX ORDER — MORPHINE SULFATE 2 MG/ML
1 INJECTION, SOLUTION INTRAMUSCULAR; INTRAVENOUS ONCE
Status: COMPLETED | OUTPATIENT
Start: 2019-01-01 | End: 2019-01-01

## 2019-01-01 RX ORDER — LIDOCAINE HYDROCHLORIDE 20 MG/ML
INJECTION, SOLUTION EPIDURAL; INFILTRATION; INTRACAUDAL; PERINEURAL AS NEEDED
Status: DISCONTINUED | OUTPATIENT
Start: 2019-01-01 | End: 2019-01-01 | Stop reason: HOSPADM

## 2019-01-01 RX ORDER — CARVEDILOL 3.12 MG/1
3.12 TABLET ORAL 2 TIMES DAILY WITH MEALS
Status: DISCONTINUED | OUTPATIENT
Start: 2019-01-01 | End: 2019-01-01 | Stop reason: HOSPADM

## 2019-01-01 RX ORDER — ONDANSETRON 4 MG/1
4 TABLET, ORALLY DISINTEGRATING ORAL EVERY 6 HOURS
Status: DISCONTINUED | OUTPATIENT
Start: 2019-01-01 | End: 2019-01-01 | Stop reason: HOSPADM

## 2019-01-01 RX ORDER — ACETAMINOPHEN 650 MG/1
650 SUPPOSITORY RECTAL
Status: DISCONTINUED | OUTPATIENT
Start: 2019-01-01 | End: 2019-01-01

## 2019-01-01 RX ORDER — POTASSIUM CHLORIDE 7.45 MG/ML
10 INJECTION INTRAVENOUS
Status: COMPLETED | OUTPATIENT
Start: 2019-01-01 | End: 2019-01-01

## 2019-01-01 RX ORDER — HYDRALAZINE HYDROCHLORIDE 20 MG/ML
10 INJECTION INTRAMUSCULAR; INTRAVENOUS
Status: DISCONTINUED | OUTPATIENT
Start: 2019-01-01 | End: 2019-01-01 | Stop reason: HOSPADM

## 2019-01-01 RX ORDER — SODIUM CHLORIDE 9 MG/ML
150 INJECTION, SOLUTION INTRAVENOUS CONTINUOUS
Status: DISCONTINUED | OUTPATIENT
Start: 2019-01-01 | End: 2019-01-01

## 2019-01-01 RX ORDER — OSELTAMIVIR PHOSPHATE 75 MG/1
75 CAPSULE ORAL EVERY 12 HOURS
Status: DISPENSED | OUTPATIENT
Start: 2019-01-01 | End: 2019-01-01

## 2019-01-01 RX ORDER — HALOPERIDOL 2 MG/ML
2 SOLUTION ORAL
Status: DISCONTINUED | OUTPATIENT
Start: 2019-01-01 | End: 2019-01-01 | Stop reason: HOSPADM

## 2019-01-01 RX ORDER — FENTANYL CITRATE 50 UG/ML
100 INJECTION, SOLUTION INTRAMUSCULAR; INTRAVENOUS
Status: DISCONTINUED | OUTPATIENT
Start: 2019-01-01 | End: 2019-01-01 | Stop reason: HOSPADM

## 2019-01-01 RX ORDER — LISINOPRIL 5 MG/1
5 TABLET ORAL DAILY
Status: DISCONTINUED | OUTPATIENT
Start: 2019-01-01 | End: 2019-01-01

## 2019-01-01 RX ORDER — SODIUM CHLORIDE, SODIUM LACTATE, POTASSIUM CHLORIDE, CALCIUM CHLORIDE 600; 310; 30; 20 MG/100ML; MG/100ML; MG/100ML; MG/100ML
75 INJECTION, SOLUTION INTRAVENOUS CONTINUOUS
Status: DISCONTINUED | OUTPATIENT
Start: 2019-01-01 | End: 2019-01-01

## 2019-01-01 RX ORDER — MAGNESIUM SULFATE HEPTAHYDRATE 40 MG/ML
2 INJECTION, SOLUTION INTRAVENOUS ONCE
Status: COMPLETED | OUTPATIENT
Start: 2019-01-01 | End: 2019-01-01

## 2019-01-01 RX ORDER — LIDOCAINE HYDROCHLORIDE 20 MG/ML
15 SOLUTION OROPHARYNGEAL AS NEEDED
Status: DISCONTINUED | OUTPATIENT
Start: 2019-01-01 | End: 2019-01-01 | Stop reason: HOSPADM

## 2019-01-01 RX ORDER — ONDANSETRON 2 MG/ML
4 INJECTION INTRAMUSCULAR; INTRAVENOUS EVERY 4 HOURS
Status: DISCONTINUED | OUTPATIENT
Start: 2019-01-01 | End: 2019-01-01

## 2019-01-01 RX ORDER — GUAIFENESIN 600 MG/1
600 TABLET, EXTENDED RELEASE ORAL EVERY 12 HOURS
Status: DISCONTINUED | OUTPATIENT
Start: 2019-01-01 | End: 2019-01-01 | Stop reason: HOSPADM

## 2019-01-01 RX ORDER — ALBUMIN HUMAN 250 G/1000ML
25 SOLUTION INTRAVENOUS EVERY 6 HOURS
Status: COMPLETED | OUTPATIENT
Start: 2019-01-01 | End: 2019-01-01

## 2019-01-01 RX ORDER — SUCRALFATE 1 G/1
1 TABLET ORAL
Status: DISCONTINUED | OUTPATIENT
Start: 2019-01-01 | End: 2019-01-01 | Stop reason: HOSPADM

## 2019-01-01 RX ORDER — DIPHENHYDRAMINE HYDROCHLORIDE 50 MG/ML
50 INJECTION, SOLUTION INTRAMUSCULAR; INTRAVENOUS ONCE
Status: DISCONTINUED | OUTPATIENT
Start: 2019-01-01 | End: 2019-01-01 | Stop reason: HOSPADM

## 2019-01-01 RX ORDER — CARVEDILOL 3.12 MG/1
3.12 TABLET ORAL 2 TIMES DAILY WITH MEALS
Status: DISCONTINUED | OUTPATIENT
Start: 2019-01-01 | End: 2019-01-01

## 2019-01-01 RX ORDER — ONDANSETRON 2 MG/ML
INJECTION INTRAMUSCULAR; INTRAVENOUS
Status: DISPENSED
Start: 2019-01-01 | End: 2019-01-01

## 2019-01-01 RX ORDER — GUAIFENESIN 100 MG/5ML
81 LIQUID (ML) ORAL DAILY
Status: DISCONTINUED | OUTPATIENT
Start: 2019-01-01 | End: 2019-01-01

## 2019-01-01 RX ORDER — POLYETHYLENE GLYCOL 3350 17 G/17G
17 POWDER, FOR SOLUTION ORAL DAILY
Status: DISCONTINUED | OUTPATIENT
Start: 2019-01-01 | End: 2019-01-01

## 2019-01-01 RX ORDER — BUMETANIDE 1 MG/1
1 TABLET ORAL 2 TIMES DAILY
Status: DISCONTINUED | OUTPATIENT
Start: 2019-01-01 | End: 2019-01-01

## 2019-01-01 RX ADMIN — ENOXAPARIN SODIUM 70 MG: 80 INJECTION SUBCUTANEOUS at 21:56

## 2019-01-01 RX ADMIN — DOCUSATE SODIUM 100 MG: 100 CAPSULE, LIQUID FILLED ORAL at 17:12

## 2019-01-01 RX ADMIN — ONDANSETRON 4 MG: 2 INJECTION INTRAMUSCULAR; INTRAVENOUS at 14:49

## 2019-01-01 RX ADMIN — LORAZEPAM 1 MG: 2 INJECTION INTRAMUSCULAR; INTRAVENOUS at 07:49

## 2019-01-01 RX ADMIN — GUAIFENESIN 600 MG: 600 TABLET, EXTENDED RELEASE ORAL at 21:27

## 2019-01-01 RX ADMIN — SODIUM CHLORIDE, SODIUM LACTATE, POTASSIUM CHLORIDE, AND CALCIUM CHLORIDE 125 ML/HR: 600; 310; 30; 20 INJECTION, SOLUTION INTRAVENOUS at 20:33

## 2019-01-01 RX ADMIN — METOPROLOL TARTRATE 5 MG: 5 INJECTION INTRAVENOUS at 12:26

## 2019-01-01 RX ADMIN — IOPAMIDOL 100 ML: 755 INJECTION, SOLUTION INTRAVENOUS at 12:27

## 2019-01-01 RX ADMIN — SODIUM CHLORIDE, POTASSIUM CHLORIDE, SODIUM LACTATE AND CALCIUM CHLORIDE: 600; 310; 30; 20 INJECTION, SOLUTION INTRAVENOUS at 12:10

## 2019-01-01 RX ADMIN — NYSTATIN: 100000 CREAM TOPICAL at 09:18

## 2019-01-01 RX ADMIN — Medication 10 ML: at 09:48

## 2019-01-01 RX ADMIN — SUCRALFATE 1 G: 1 TABLET ORAL at 11:22

## 2019-01-01 RX ADMIN — SODIUM CHLORIDE 10 ML: 9 INJECTION INTRAMUSCULAR; INTRAVENOUS; SUBCUTANEOUS at 21:46

## 2019-01-01 RX ADMIN — SODIUM CHLORIDE 40 MG: 9 INJECTION, SOLUTION INTRAMUSCULAR; INTRAVENOUS; SUBCUTANEOUS at 20:46

## 2019-01-01 RX ADMIN — Medication 20 ML: at 19:44

## 2019-01-01 RX ADMIN — MORPHINE SULFATE 4 MG: 2 INJECTION, SOLUTION INTRAMUSCULAR; INTRAVENOUS at 20:04

## 2019-01-01 RX ADMIN — ALBUMIN (HUMAN) 25 G: 0.25 INJECTION, SOLUTION INTRAVENOUS at 09:56

## 2019-01-01 RX ADMIN — SODIUM CHLORIDE 10 ML: 9 INJECTION INTRAMUSCULAR; INTRAVENOUS; SUBCUTANEOUS at 05:23

## 2019-01-01 RX ADMIN — Medication 10 ML: at 21:57

## 2019-01-01 RX ADMIN — MIDAZOLAM HYDROCHLORIDE 0.5 MG: 1 INJECTION, SOLUTION INTRAMUSCULAR; INTRAVENOUS at 15:33

## 2019-01-01 RX ADMIN — SODIUM CHLORIDE 20 ML: 9 INJECTION INTRAMUSCULAR; INTRAVENOUS; SUBCUTANEOUS at 13:52

## 2019-01-01 RX ADMIN — SODIUM CHLORIDE 100 ML: 900 INJECTION, SOLUTION INTRAVENOUS at 09:48

## 2019-01-01 RX ADMIN — ONDANSETRON 4 MG: 4 TABLET, ORALLY DISINTEGRATING ORAL at 12:08

## 2019-01-01 RX ADMIN — SODIUM CHLORIDE 75 ML/HR: 900 INJECTION, SOLUTION INTRAVENOUS at 17:00

## 2019-01-01 RX ADMIN — HEPARIN SODIUM 19 UNITS/KG/HR: 10000 INJECTION, SOLUTION INTRAVENOUS at 01:07

## 2019-01-01 RX ADMIN — ASPIRIN 81 MG CHEWABLE TABLET 81 MG: 81 TABLET CHEWABLE at 09:05

## 2019-01-01 RX ADMIN — CALCIUM GLUCONATE: 94 INJECTION, SOLUTION INTRAVENOUS at 18:08

## 2019-01-01 RX ADMIN — SODIUM CHLORIDE 40 MG: 9 INJECTION INTRAMUSCULAR; INTRAVENOUS; SUBCUTANEOUS at 10:34

## 2019-01-01 RX ADMIN — ONDANSETRON 4 MG: 2 INJECTION INTRAMUSCULAR; INTRAVENOUS at 19:15

## 2019-01-01 RX ADMIN — ACETAMINOPHEN 650 MG: 325 TABLET, FILM COATED ORAL at 17:58

## 2019-01-01 RX ADMIN — CARVEDILOL 6.25 MG: 6.25 TABLET, FILM COATED ORAL at 08:21

## 2019-01-01 RX ADMIN — SODIUM CHLORIDE, POTASSIUM CHLORIDE, SODIUM LACTATE AND CALCIUM CHLORIDE: 600; 310; 30; 20 INJECTION, SOLUTION INTRAVENOUS at 16:18

## 2019-01-01 RX ADMIN — POTASSIUM CHLORIDE 10 MEQ: 10 INJECTION, SOLUTION INTRAVENOUS at 02:26

## 2019-01-01 RX ADMIN — GLYCOPYRROLATE 0.2 MG: 0.2 INJECTION, SOLUTION INTRAMUSCULAR; INTRAVENOUS at 13:03

## 2019-01-01 RX ADMIN — PHENYLEPHRINE HYDROCHLORIDE 30 MCG/MIN: 10 INJECTION INTRAVENOUS at 04:00

## 2019-01-01 RX ADMIN — FAMOTIDINE: 10 INJECTION, SOLUTION INTRAVENOUS at 19:15

## 2019-01-01 RX ADMIN — Medication 10 ML: at 13:36

## 2019-01-01 RX ADMIN — KETAMINE HYDROCHLORIDE 30 MG: 10 INJECTION, SOLUTION INTRAMUSCULAR; INTRAVENOUS at 13:28

## 2019-01-01 RX ADMIN — VANCOMYCIN HYDROCHLORIDE 1000 MG: 1 INJECTION, POWDER, LYOPHILIZED, FOR SOLUTION INTRAVENOUS at 16:40

## 2019-01-01 RX ADMIN — DOCUSATE SODIUM 100 MG: 100 CAPSULE, LIQUID FILLED ORAL at 17:19

## 2019-01-01 RX ADMIN — ASPIRIN 81 MG CHEWABLE TABLET 81 MG: 81 TABLET CHEWABLE at 08:18

## 2019-01-01 RX ADMIN — AMIODARONE HYDROCHLORIDE 0.5 MG/MIN: 50 INJECTION, SOLUTION INTRAVENOUS at 13:18

## 2019-01-01 RX ADMIN — BISACODYL 10 MG: 10 SUPPOSITORY RECTAL at 17:46

## 2019-01-01 RX ADMIN — POTASSIUM CHLORIDE AND SODIUM CHLORIDE: 900; 150 INJECTION, SOLUTION INTRAVENOUS at 13:36

## 2019-01-01 RX ADMIN — GLYCOPYRROLATE 0.2 MG: 0.2 INJECTION, SOLUTION INTRAMUSCULAR; INTRAVENOUS at 17:32

## 2019-01-01 RX ADMIN — KETOROLAC TROMETHAMINE 30 MG: 30 INJECTION, SOLUTION INTRAMUSCULAR at 22:11

## 2019-01-01 RX ADMIN — LORAZEPAM 1 MG: 2 INJECTION INTRAMUSCULAR; INTRAVENOUS at 11:07

## 2019-01-01 RX ADMIN — IPRATROPIUM BROMIDE AND ALBUTEROL SULFATE 3 ML: .5; 3 SOLUTION RESPIRATORY (INHALATION) at 21:06

## 2019-01-01 RX ADMIN — Medication 120 MCG: at 13:11

## 2019-01-01 RX ADMIN — SODIUM CHLORIDE 10 ML: 9 INJECTION INTRAMUSCULAR; INTRAVENOUS; SUBCUTANEOUS at 06:11

## 2019-01-01 RX ADMIN — AMIODARONE HYDROCHLORIDE 400 MG: 200 TABLET ORAL at 08:30

## 2019-01-01 RX ADMIN — HYDROMORPHONE HYDROCHLORIDE 0.5 MG: 2 INJECTION, SOLUTION INTRAMUSCULAR; INTRAVENOUS; SUBCUTANEOUS at 16:21

## 2019-01-01 RX ADMIN — LISINOPRIL 2.5 MG: 5 TABLET ORAL at 08:36

## 2019-01-01 RX ADMIN — ACETYLCYSTEINE 200 MG: 200 SOLUTION ORAL; RESPIRATORY (INHALATION) at 21:09

## 2019-01-01 RX ADMIN — SODIUM CHLORIDE 10 ML: 9 INJECTION INTRAMUSCULAR; INTRAVENOUS; SUBCUTANEOUS at 05:36

## 2019-01-01 RX ADMIN — Medication 10 ML: at 15:10

## 2019-01-01 RX ADMIN — PROPOFOL 10 MG: 10 INJECTION, EMULSION INTRAVENOUS at 15:23

## 2019-01-01 RX ADMIN — POTASSIUM CHLORIDE 10 MEQ: 14.9 INJECTION, SOLUTION INTRAVENOUS at 22:09

## 2019-01-01 RX ADMIN — SODIUM CHLORIDE 40 MG: 9 INJECTION INTRAMUSCULAR; INTRAVENOUS; SUBCUTANEOUS at 09:36

## 2019-01-01 RX ADMIN — CALCIUM GLUCONATE: 94 INJECTION, SOLUTION INTRAVENOUS at 19:36

## 2019-01-01 RX ADMIN — METRONIDAZOLE 500 MG: 500 INJECTION, SOLUTION INTRAVENOUS at 16:19

## 2019-01-01 RX ADMIN — Medication 10 ML: at 05:42

## 2019-01-01 RX ADMIN — MORPHINE SULFATE 4 MG: 2 INJECTION, SOLUTION INTRAMUSCULAR; INTRAVENOUS at 20:35

## 2019-01-01 RX ADMIN — METOPROLOL TARTRATE 5 MG: 5 INJECTION INTRAVENOUS at 18:04

## 2019-01-01 RX ADMIN — PHENYLEPHRINE HYDROCHLORIDE 75 MCG/MIN: 10 INJECTION INTRAVENOUS at 23:00

## 2019-01-01 RX ADMIN — ROSUVASTATIN CALCIUM 10 MG: 10 TABLET, FILM COATED ORAL at 21:53

## 2019-01-01 RX ADMIN — METOPROLOL TARTRATE 5 MG: 5 INJECTION INTRAVENOUS at 12:32

## 2019-01-01 RX ADMIN — DOCUSATE SODIUM 100 MG: 100 CAPSULE, LIQUID FILLED ORAL at 16:38

## 2019-01-01 RX ADMIN — ALBUMIN (HUMAN) 25 G: 0.25 INJECTION, SOLUTION INTRAVENOUS at 13:56

## 2019-01-01 RX ADMIN — LORAZEPAM 1 MG: 2 INJECTION INTRAMUSCULAR; INTRAVENOUS at 12:17

## 2019-01-01 RX ADMIN — SODIUM CHLORIDE, SODIUM LACTATE, POTASSIUM CHLORIDE, AND CALCIUM CHLORIDE 125 ML/HR: 600; 310; 30; 20 INJECTION, SOLUTION INTRAVENOUS at 14:18

## 2019-01-01 RX ADMIN — IOHEXOL 100 ML: 350 INJECTION, SOLUTION INTRAVENOUS at 07:00

## 2019-01-01 RX ADMIN — IPRATROPIUM BROMIDE AND ALBUTEROL SULFATE 3 ML: .5; 3 SOLUTION RESPIRATORY (INHALATION) at 21:09

## 2019-01-01 RX ADMIN — PANTOPRAZOLE SODIUM 40 MG: 40 TABLET, DELAYED RELEASE ORAL at 17:10

## 2019-01-01 RX ADMIN — GUAIFENESIN 600 MG: 600 TABLET, EXTENDED RELEASE ORAL at 22:28

## 2019-01-01 RX ADMIN — LORAZEPAM 1 MG: 2 INJECTION INTRAMUSCULAR; INTRAVENOUS at 11:39

## 2019-01-01 RX ADMIN — CARVEDILOL 6.25 MG: 6.25 TABLET, FILM COATED ORAL at 17:00

## 2019-01-01 RX ADMIN — CARVEDILOL 6.25 MG: 6.25 TABLET, FILM COATED ORAL at 07:02

## 2019-01-01 RX ADMIN — Medication 10 ML: at 06:53

## 2019-01-01 RX ADMIN — ONDANSETRON 4 MG: 2 INJECTION INTRAMUSCULAR; INTRAVENOUS at 05:24

## 2019-01-01 RX ADMIN — METOCLOPRAMIDE 5 MG: 5 INJECTION, SOLUTION INTRAMUSCULAR; INTRAVENOUS at 16:45

## 2019-01-01 RX ADMIN — POTASSIUM CHLORIDE 40 MEQ: 1.5 POWDER, FOR SOLUTION ORAL at 11:22

## 2019-01-01 RX ADMIN — SODIUM CHLORIDE 40 MG: 9 INJECTION INTRAMUSCULAR; INTRAVENOUS; SUBCUTANEOUS at 21:43

## 2019-01-01 RX ADMIN — POTASSIUM CHLORIDE 10 MEQ: 10 INJECTION, SOLUTION INTRAVENOUS at 03:32

## 2019-01-01 RX ADMIN — I.V. FAT EMULSION 500 ML: 20 EMULSION INTRAVENOUS at 20:07

## 2019-01-01 RX ADMIN — LORAZEPAM 1 MG: 2 INJECTION INTRAMUSCULAR; INTRAVENOUS at 04:38

## 2019-01-01 RX ADMIN — DIPHENHYDRAMINE HYDROCHLORIDE 12.5 MG: 50 INJECTION, SOLUTION INTRAMUSCULAR; INTRAVENOUS at 00:19

## 2019-01-01 RX ADMIN — POTASSIUM CHLORIDE 10 MEQ: 14.9 INJECTION, SOLUTION INTRAVENOUS at 17:46

## 2019-01-01 RX ADMIN — ASPIRIN 81 MG CHEWABLE TABLET 81 MG: 81 TABLET CHEWABLE at 11:57

## 2019-01-01 RX ADMIN — ONDANSETRON 4 MG: 2 INJECTION INTRAMUSCULAR; INTRAVENOUS at 19:14

## 2019-01-01 RX ADMIN — ALBUMIN (HUMAN) 25 G: 0.25 INJECTION, SOLUTION INTRAVENOUS at 02:07

## 2019-01-01 RX ADMIN — MORPHINE SULFATE 4 MG: 4 INJECTION INTRAVENOUS at 04:36

## 2019-01-01 RX ADMIN — MIDAZOLAM HYDROCHLORIDE 0.5 MG: 1 INJECTION, SOLUTION INTRAMUSCULAR; INTRAVENOUS at 15:53

## 2019-01-01 RX ADMIN — ALBUMIN (HUMAN) 25 G: 0.25 INJECTION, SOLUTION INTRAVENOUS at 23:06

## 2019-01-01 RX ADMIN — PANTOPRAZOLE SODIUM 40 MG: 40 TABLET, DELAYED RELEASE ORAL at 17:46

## 2019-01-01 RX ADMIN — ALBUMIN (HUMAN) 25 G: 0.25 INJECTION, SOLUTION INTRAVENOUS at 20:56

## 2019-01-01 RX ADMIN — POTASSIUM CHLORIDE 10 MEQ: 200 INJECTION, SOLUTION INTRAVENOUS at 09:00

## 2019-01-01 RX ADMIN — POTASSIUM CHLORIDE 10 MEQ: 200 INJECTION, SOLUTION INTRAVENOUS at 11:35

## 2019-01-01 RX ADMIN — SODIUM CHLORIDE 10 ML: 9 INJECTION INTRAMUSCULAR; INTRAVENOUS; SUBCUTANEOUS at 07:29

## 2019-01-01 RX ADMIN — MORPHINE SULFATE 4 MG: 2 INJECTION, SOLUTION INTRAMUSCULAR; INTRAVENOUS at 11:06

## 2019-01-01 RX ADMIN — ONDANSETRON 4 MG: 2 INJECTION INTRAMUSCULAR; INTRAVENOUS at 05:05

## 2019-01-01 RX ADMIN — SODIUM CHLORIDE 10 ML: 9 INJECTION INTRAMUSCULAR; INTRAVENOUS; SUBCUTANEOUS at 13:40

## 2019-01-01 RX ADMIN — SODIUM CHLORIDE 50 ML/HR: 900 INJECTION, SOLUTION INTRAVENOUS at 14:54

## 2019-01-01 RX ADMIN — SODIUM CHLORIDE 10 ML: 9 INJECTION INTRAMUSCULAR; INTRAVENOUS; SUBCUTANEOUS at 22:37

## 2019-01-01 RX ADMIN — LORAZEPAM 1 MG: 2 INJECTION INTRAMUSCULAR; INTRAVENOUS at 07:43

## 2019-01-01 RX ADMIN — CEFEPIME 2 G: 2 INJECTION, POWDER, FOR SOLUTION INTRAVENOUS at 15:53

## 2019-01-01 RX ADMIN — SODIUM CHLORIDE 250 ML: 900 INJECTION, SOLUTION INTRAVENOUS at 12:23

## 2019-01-01 RX ADMIN — SODIUM CHLORIDE 40 MG: 9 INJECTION INTRAMUSCULAR; INTRAVENOUS; SUBCUTANEOUS at 22:33

## 2019-01-01 RX ADMIN — CARVEDILOL 6.25 MG: 6.25 TABLET, FILM COATED ORAL at 09:05

## 2019-01-01 RX ADMIN — SODIUM CHLORIDE, SODIUM LACTATE, POTASSIUM CHLORIDE, AND CALCIUM CHLORIDE 75 ML/HR: 600; 310; 30; 20 INJECTION, SOLUTION INTRAVENOUS at 13:30

## 2019-01-01 RX ADMIN — MORPHINE SULFATE 4 MG: 2 INJECTION, SOLUTION INTRAMUSCULAR; INTRAVENOUS at 15:58

## 2019-01-01 RX ADMIN — MAGNESIUM SULFATE HEPTAHYDRATE 2 G: 40 INJECTION, SOLUTION INTRAVENOUS at 10:30

## 2019-01-01 RX ADMIN — PROPOFOL 90 MG: 10 INJECTION, EMULSION INTRAVENOUS at 13:11

## 2019-01-01 RX ADMIN — PHENYLEPHRINE HYDROCHLORIDE 100 MCG/MIN: 10 INJECTION INTRAVENOUS at 06:57

## 2019-01-01 RX ADMIN — FAMOTIDINE: 10 INJECTION, SOLUTION INTRAVENOUS at 18:50

## 2019-01-01 RX ADMIN — PROPOFOL 10 MG: 10 INJECTION, EMULSION INTRAVENOUS at 15:16

## 2019-01-01 RX ADMIN — POTASSIUM CHLORIDE 10 MEQ: 200 INJECTION, SOLUTION INTRAVENOUS at 10:15

## 2019-01-01 RX ADMIN — GLYCOPYRROLATE 0.4 MG: 0.2 INJECTION, SOLUTION INTRAMUSCULAR; INTRAVENOUS at 15:59

## 2019-01-01 RX ADMIN — PANTOPRAZOLE SODIUM 40 MG: 40 TABLET, DELAYED RELEASE ORAL at 17:00

## 2019-01-01 RX ADMIN — Medication 10 ML: at 12:56

## 2019-01-01 RX ADMIN — Medication 10 ML: at 21:31

## 2019-01-01 RX ADMIN — CEFTRIAXONE 1 G: 1 INJECTION, POWDER, FOR SOLUTION INTRAMUSCULAR; INTRAVENOUS at 07:28

## 2019-01-01 RX ADMIN — POTASSIUM CHLORIDE 10 MEQ: 200 INJECTION, SOLUTION INTRAVENOUS at 10:57

## 2019-01-01 RX ADMIN — BUMETANIDE 0.5 MG: 1 TABLET ORAL at 12:20

## 2019-01-01 RX ADMIN — AMIODARONE HYDROCHLORIDE 0.5 MG/MIN: 50 INJECTION, SOLUTION INTRAVENOUS at 00:15

## 2019-01-01 RX ADMIN — ONDANSETRON 4 MG: 2 INJECTION INTRAMUSCULAR; INTRAVENOUS at 13:40

## 2019-01-01 RX ADMIN — ONDANSETRON 4 MG: 2 INJECTION INTRAMUSCULAR; INTRAVENOUS at 00:53

## 2019-01-01 RX ADMIN — SODIUM CHLORIDE, SODIUM LACTATE, POTASSIUM CHLORIDE, AND CALCIUM CHLORIDE 125 ML/HR: 600; 310; 30; 20 INJECTION, SOLUTION INTRAVENOUS at 06:22

## 2019-01-01 RX ADMIN — ALBUMIN (HUMAN) 25 G: 0.25 INJECTION, SOLUTION INTRAVENOUS at 02:01

## 2019-01-01 RX ADMIN — MORPHINE SULFATE 4 MG: 4 INJECTION INTRAVENOUS at 15:59

## 2019-01-01 RX ADMIN — CARVEDILOL 6.25 MG: 6.25 TABLET, FILM COATED ORAL at 16:51

## 2019-01-01 RX ADMIN — CALCIUM GLUCONATE: 94 INJECTION, SOLUTION INTRAVENOUS at 19:46

## 2019-01-01 RX ADMIN — DOCUSATE SODIUM 100 MG: 100 CAPSULE, LIQUID FILLED ORAL at 08:36

## 2019-01-01 RX ADMIN — BUMETANIDE 1 MG: 1 TABLET ORAL at 18:37

## 2019-01-01 RX ADMIN — PANTOPRAZOLE SODIUM 40 MG: 40 TABLET, DELAYED RELEASE ORAL at 08:18

## 2019-01-01 RX ADMIN — ACETAMINOPHEN 650 MG: 325 TABLET, FILM COATED ORAL at 14:21

## 2019-01-01 RX ADMIN — CARVEDILOL 6.25 MG: 6.25 TABLET, FILM COATED ORAL at 17:42

## 2019-01-01 RX ADMIN — MORPHINE SULFATE 2 MG: 2 INJECTION, SOLUTION INTRAMUSCULAR; INTRAVENOUS at 08:54

## 2019-01-01 RX ADMIN — LORAZEPAM 1 MG: 2 INJECTION INTRAMUSCULAR; INTRAVENOUS at 00:30

## 2019-01-01 RX ADMIN — FAMOTIDINE: 10 INJECTION, SOLUTION INTRAVENOUS at 18:12

## 2019-01-01 RX ADMIN — ONDANSETRON 4 MG: 2 INJECTION INTRAMUSCULAR; INTRAVENOUS at 19:22

## 2019-01-01 RX ADMIN — METOPROLOL TARTRATE 5 MG: 5 INJECTION INTRAVENOUS at 16:45

## 2019-01-01 RX ADMIN — ONDANSETRON 4 MG: 4 TABLET, ORALLY DISINTEGRATING ORAL at 11:17

## 2019-01-01 RX ADMIN — METRONIDAZOLE 500 MG: 500 INJECTION, SOLUTION INTRAVENOUS at 16:45

## 2019-01-01 RX ADMIN — Medication 10 ML: at 20:46

## 2019-01-01 RX ADMIN — METOPROLOL TARTRATE 5 MG: 5 INJECTION INTRAVENOUS at 11:51

## 2019-01-01 RX ADMIN — ONDANSETRON 4 MG: 2 INJECTION INTRAMUSCULAR; INTRAVENOUS at 23:53

## 2019-01-01 RX ADMIN — SODIUM CHLORIDE 10 ML: 9 INJECTION INTRAMUSCULAR; INTRAVENOUS; SUBCUTANEOUS at 06:00

## 2019-01-01 RX ADMIN — METOCLOPRAMIDE 5 MG: 5 INJECTION, SOLUTION INTRAMUSCULAR; INTRAVENOUS at 16:19

## 2019-01-01 RX ADMIN — SODIUM CHLORIDE 40 MG: 9 INJECTION INTRAMUSCULAR; INTRAVENOUS; SUBCUTANEOUS at 21:32

## 2019-01-01 RX ADMIN — BUMETANIDE 1 MG: 0.25 INJECTION INTRAMUSCULAR; INTRAVENOUS at 21:45

## 2019-01-01 RX ADMIN — GUAIFENESIN 600 MG: 600 TABLET, EXTENDED RELEASE ORAL at 08:49

## 2019-01-01 RX ADMIN — HEPARIN SODIUM 14 UNITS/KG/HR: 10000 INJECTION, SOLUTION INTRAVENOUS at 12:20

## 2019-01-01 RX ADMIN — ROSUVASTATIN CALCIUM 10 MG: 10 TABLET, FILM COATED ORAL at 21:40

## 2019-01-01 RX ADMIN — SODIUM CHLORIDE 10 ML: 9 INJECTION INTRAMUSCULAR; INTRAVENOUS; SUBCUTANEOUS at 17:14

## 2019-01-01 RX ADMIN — CARVEDILOL 6.25 MG: 6.25 TABLET, FILM COATED ORAL at 07:05

## 2019-01-01 RX ADMIN — Medication 10 MG: at 13:13

## 2019-01-01 RX ADMIN — SIMETHICONE CHEW TAB 80 MG 80 MG: 80 TABLET ORAL at 06:18

## 2019-01-01 RX ADMIN — IPRATROPIUM BROMIDE AND ALBUTEROL SULFATE 3 ML: .5; 3 SOLUTION RESPIRATORY (INHALATION) at 07:38

## 2019-01-01 RX ADMIN — METRONIDAZOLE 500 MG: 500 SOLUTION INTRAVENOUS at 13:15

## 2019-01-01 RX ADMIN — SODIUM CHLORIDE 10 ML: 9 INJECTION INTRAMUSCULAR; INTRAVENOUS; SUBCUTANEOUS at 21:44

## 2019-01-01 RX ADMIN — MORPHINE SULFATE 4 MG: 4 INJECTION INTRAVENOUS at 00:27

## 2019-01-01 RX ADMIN — METOPROLOL TARTRATE 5 MG: 5 INJECTION INTRAVENOUS at 19:14

## 2019-01-01 RX ADMIN — FAMOTIDINE: 10 INJECTION, SOLUTION INTRAVENOUS at 19:09

## 2019-01-01 RX ADMIN — GUAIFENESIN 600 MG: 600 TABLET, EXTENDED RELEASE ORAL at 09:05

## 2019-01-01 RX ADMIN — METOPROLOL TARTRATE 5 MG: 5 INJECTION INTRAVENOUS at 18:11

## 2019-01-01 RX ADMIN — SODIUM CHLORIDE 40 MG: 9 INJECTION INTRAMUSCULAR; INTRAVENOUS; SUBCUTANEOUS at 21:45

## 2019-01-01 RX ADMIN — Medication 10 ML: at 21:41

## 2019-01-01 RX ADMIN — LEVOFLOXACIN 250 MG: 5 INJECTION, SOLUTION INTRAVENOUS at 12:00

## 2019-01-01 RX ADMIN — MORPHINE SULFATE 2 MG: 2 INJECTION, SOLUTION INTRAMUSCULAR; INTRAVENOUS at 21:41

## 2019-01-01 RX ADMIN — NYSTATIN: 100000 CREAM TOPICAL at 09:35

## 2019-01-01 RX ADMIN — METRONIDAZOLE 500 MG: 500 INJECTION, SOLUTION INTRAVENOUS at 05:05

## 2019-01-01 RX ADMIN — PROPOFOL 10 MG: 10 INJECTION, EMULSION INTRAVENOUS at 15:26

## 2019-01-01 RX ADMIN — OSELTAMIVIR PHOSPHATE 75 MG: 75 CAPSULE ORAL at 08:25

## 2019-01-01 RX ADMIN — PROPOFOL 10 MG: 10 INJECTION, EMULSION INTRAVENOUS at 15:19

## 2019-01-01 RX ADMIN — ASPIRIN 81 MG CHEWABLE TABLET 81 MG: 81 TABLET CHEWABLE at 09:07

## 2019-01-01 RX ADMIN — METOCLOPRAMIDE 5 MG: 5 INJECTION, SOLUTION INTRAMUSCULAR; INTRAVENOUS at 12:54

## 2019-01-01 RX ADMIN — SODIUM CHLORIDE 40 MG: 9 INJECTION INTRAMUSCULAR; INTRAVENOUS; SUBCUTANEOUS at 22:36

## 2019-01-01 RX ADMIN — CARVEDILOL 6.25 MG: 6.25 TABLET, FILM COATED ORAL at 12:50

## 2019-01-01 RX ADMIN — SODIUM CHLORIDE 40 MG: 9 INJECTION INTRAMUSCULAR; INTRAVENOUS; SUBCUTANEOUS at 20:45

## 2019-01-01 RX ADMIN — SODIUM CHLORIDE 10 ML: 9 INJECTION INTRAMUSCULAR; INTRAVENOUS; SUBCUTANEOUS at 21:43

## 2019-01-01 RX ADMIN — ONDANSETRON 4 MG: 2 INJECTION INTRAMUSCULAR; INTRAVENOUS at 12:54

## 2019-01-01 RX ADMIN — ONDANSETRON 4 MG: 2 INJECTION INTRAMUSCULAR; INTRAVENOUS at 19:09

## 2019-01-01 RX ADMIN — ONDANSETRON 4 MG: 2 INJECTION INTRAMUSCULAR; INTRAVENOUS at 11:48

## 2019-01-01 RX ADMIN — SODIUM CHLORIDE, SODIUM LACTATE, POTASSIUM CHLORIDE, AND CALCIUM CHLORIDE 125 ML/HR: 600; 310; 30; 20 INJECTION, SOLUTION INTRAVENOUS at 18:21

## 2019-01-01 RX ADMIN — HEPARIN SODIUM 16 UNITS/KG/HR: 10000 INJECTION, SOLUTION INTRAVENOUS at 19:57

## 2019-01-01 RX ADMIN — POTASSIUM CHLORIDE 10 MEQ: 200 INJECTION, SOLUTION INTRAVENOUS at 01:18

## 2019-01-01 RX ADMIN — ONDANSETRON 4 MG: 2 INJECTION INTRAMUSCULAR; INTRAVENOUS at 10:15

## 2019-01-01 RX ADMIN — POLYETHYLENE GLYCOL 3350 17 G: 17 POWDER, FOR SOLUTION ORAL at 09:28

## 2019-01-01 RX ADMIN — MORPHINE SULFATE 2 MG: 2 INJECTION, SOLUTION INTRAMUSCULAR; INTRAVENOUS at 14:01

## 2019-01-01 RX ADMIN — SODIUM CHLORIDE, SODIUM LACTATE, POTASSIUM CHLORIDE, AND CALCIUM CHLORIDE 125 ML/HR: 600; 310; 30; 20 INJECTION, SOLUTION INTRAVENOUS at 22:37

## 2019-01-01 RX ADMIN — Medication 10 ML: at 21:49

## 2019-01-01 RX ADMIN — CALCIUM GLUCONATE: 94 INJECTION, SOLUTION INTRAVENOUS at 19:53

## 2019-01-01 RX ADMIN — METOCLOPRAMIDE 5 MG: 5 INJECTION, SOLUTION INTRAMUSCULAR; INTRAVENOUS at 12:26

## 2019-01-01 RX ADMIN — ONDANSETRON 4 MG: 4 TABLET, ORALLY DISINTEGRATING ORAL at 17:24

## 2019-01-01 RX ADMIN — ALBUMIN (HUMAN) 25 G: 0.25 INJECTION, SOLUTION INTRAVENOUS at 12:57

## 2019-01-01 RX ADMIN — Medication 10 ML: at 06:00

## 2019-01-01 RX ADMIN — CARVEDILOL 6.25 MG: 6.25 TABLET, FILM COATED ORAL at 06:49

## 2019-01-01 RX ADMIN — Medication 10 ML: at 12:22

## 2019-01-01 RX ADMIN — FUROSEMIDE 40 MG: 10 INJECTION, SOLUTION INTRAMUSCULAR; INTRAVENOUS at 03:41

## 2019-01-01 RX ADMIN — PANTOPRAZOLE SODIUM 40 MG: 40 TABLET, DELAYED RELEASE ORAL at 08:14

## 2019-01-01 RX ADMIN — ASPIRIN 81 MG CHEWABLE TABLET 81 MG: 81 TABLET CHEWABLE at 08:27

## 2019-01-01 RX ADMIN — SODIUM CHLORIDE 10 ML: 9 INJECTION INTRAMUSCULAR; INTRAVENOUS; SUBCUTANEOUS at 20:01

## 2019-01-01 RX ADMIN — ACETYLCYSTEINE 200 MG: 200 SOLUTION ORAL; RESPIRATORY (INHALATION) at 07:44

## 2019-01-01 RX ADMIN — POTASSIUM CHLORIDE 20 MEQ: 750 TABLET, EXTENDED RELEASE ORAL at 11:17

## 2019-01-01 RX ADMIN — ONDANSETRON 4 MG: 2 INJECTION INTRAMUSCULAR; INTRAVENOUS at 09:38

## 2019-01-01 RX ADMIN — PANTOPRAZOLE SODIUM 40 MG: 40 TABLET, DELAYED RELEASE ORAL at 17:19

## 2019-01-01 RX ADMIN — HYDROMORPHONE HYDROCHLORIDE 0.5 MG: 2 INJECTION, SOLUTION INTRAMUSCULAR; INTRAVENOUS; SUBCUTANEOUS at 16:11

## 2019-01-01 RX ADMIN — BUMETANIDE 1 MG: 0.25 INJECTION INTRAMUSCULAR; INTRAVENOUS at 20:44

## 2019-01-01 RX ADMIN — DOCUSATE SODIUM 100 MG: 100 CAPSULE, LIQUID FILLED ORAL at 11:57

## 2019-01-01 RX ADMIN — HEPARIN SODIUM 5000 UNITS: 5000 INJECTION INTRAVENOUS; SUBCUTANEOUS at 10:56

## 2019-01-01 RX ADMIN — MORPHINE SULFATE 2 MG: 2 INJECTION, SOLUTION INTRAMUSCULAR; INTRAVENOUS at 08:10

## 2019-01-01 RX ADMIN — Medication 120 MCG: at 13:13

## 2019-01-01 RX ADMIN — ACETAMINOPHEN 650 MG: 325 TABLET, FILM COATED ORAL at 09:45

## 2019-01-01 RX ADMIN — ZOLPIDEM TARTRATE 5 MG: 5 TABLET, FILM COATED ORAL at 21:42

## 2019-01-01 RX ADMIN — MORPHINE SULFATE 2 MG: 2 INJECTION, SOLUTION INTRAMUSCULAR; INTRAVENOUS at 15:37

## 2019-01-01 RX ADMIN — MORPHINE SULFATE 2 MG: 2 INJECTION, SOLUTION INTRAMUSCULAR; INTRAVENOUS at 12:54

## 2019-01-01 RX ADMIN — Medication 10 ML: at 07:25

## 2019-01-01 RX ADMIN — LOPERAMIDE HYDROCHLORIDE 2 MG: 2 CAPSULE ORAL at 19:38

## 2019-01-01 RX ADMIN — ONDANSETRON 4 MG: 2 INJECTION INTRAMUSCULAR; INTRAVENOUS at 06:46

## 2019-01-01 RX ADMIN — MORPHINE SULFATE 4 MG: 2 INJECTION, SOLUTION INTRAMUSCULAR; INTRAVENOUS at 20:03

## 2019-01-01 RX ADMIN — IOPAMIDOL 80 ML: 755 INJECTION, SOLUTION INTRAVENOUS at 16:54

## 2019-01-01 RX ADMIN — ROCURONIUM BROMIDE 10 MG: 10 SOLUTION INTRAVENOUS at 13:11

## 2019-01-01 RX ADMIN — FENTANYL CITRATE 25 MCG: 50 INJECTION INTRAMUSCULAR; INTRAVENOUS at 15:17

## 2019-01-01 RX ADMIN — METOPROLOL TARTRATE 5 MG: 5 INJECTION INTRAVENOUS at 12:20

## 2019-01-01 RX ADMIN — MORPHINE SULFATE 4 MG: 2 INJECTION, SOLUTION INTRAMUSCULAR; INTRAVENOUS at 08:24

## 2019-01-01 RX ADMIN — SODIUM CHLORIDE 40 MG: 9 INJECTION INTRAMUSCULAR; INTRAVENOUS; SUBCUTANEOUS at 21:56

## 2019-01-01 RX ADMIN — ALBUMIN (HUMAN) 25 G: 0.25 INJECTION, SOLUTION INTRAVENOUS at 17:07

## 2019-01-01 RX ADMIN — SODIUM CHLORIDE 40 MG: 9 INJECTION, SOLUTION INTRAMUSCULAR; INTRAVENOUS; SUBCUTANEOUS at 09:17

## 2019-01-01 RX ADMIN — PROPOFOL 10 MG: 10 INJECTION, EMULSION INTRAVENOUS at 15:28

## 2019-01-01 RX ADMIN — MORPHINE SULFATE 4 MG: 2 INJECTION, SOLUTION INTRAMUSCULAR; INTRAVENOUS at 03:51

## 2019-01-01 RX ADMIN — GUAIFENESIN 600 MG: 600 TABLET, EXTENDED RELEASE ORAL at 08:24

## 2019-01-01 RX ADMIN — SUCRALFATE 1 G: 1 TABLET ORAL at 15:45

## 2019-01-01 RX ADMIN — ONDANSETRON 4 MG: 2 INJECTION INTRAMUSCULAR; INTRAVENOUS at 04:57

## 2019-01-01 RX ADMIN — HYDROCORTISONE ACETATE 25 MG: 25 SUPPOSITORY RECTAL at 09:17

## 2019-01-01 RX ADMIN — ONDANSETRON 4 MG: 2 INJECTION INTRAMUSCULAR; INTRAVENOUS at 16:00

## 2019-01-01 RX ADMIN — MORPHINE SULFATE 2 MG: 2 INJECTION, SOLUTION INTRAMUSCULAR; INTRAVENOUS at 12:56

## 2019-01-01 RX ADMIN — SODIUM CHLORIDE 10 ML: 9 INJECTION INTRAMUSCULAR; INTRAVENOUS; SUBCUTANEOUS at 21:15

## 2019-01-01 RX ADMIN — PANTOPRAZOLE SODIUM 40 MG: 40 TABLET, DELAYED RELEASE ORAL at 17:29

## 2019-01-01 RX ADMIN — SODIUM CHLORIDE 40 MG: 9 INJECTION INTRAMUSCULAR; INTRAVENOUS; SUBCUTANEOUS at 21:36

## 2019-01-01 RX ADMIN — BUMETANIDE 1 MG: 0.25 INJECTION INTRAMUSCULAR; INTRAVENOUS at 08:59

## 2019-01-01 RX ADMIN — POLYETHYLENE GLYCOL 3350 17 G: 17 POWDER, FOR SOLUTION ORAL at 08:31

## 2019-01-01 RX ADMIN — ONDANSETRON 4 MG: 2 INJECTION INTRAMUSCULAR; INTRAVENOUS at 07:10

## 2019-01-01 RX ADMIN — SODIUM CHLORIDE 1000 ML: 900 INJECTION, SOLUTION INTRAVENOUS at 14:23

## 2019-01-01 RX ADMIN — ONDANSETRON 4 MG: 2 INJECTION INTRAMUSCULAR; INTRAVENOUS at 16:16

## 2019-01-01 RX ADMIN — Medication 10 ML: at 12:34

## 2019-01-01 RX ADMIN — SODIUM CHLORIDE: 900 INJECTION, SOLUTION INTRAVENOUS at 16:04

## 2019-01-01 RX ADMIN — PROPOFOL 50 MG: 10 INJECTION, EMULSION INTRAVENOUS at 16:09

## 2019-01-01 RX ADMIN — SODIUM CHLORIDE 75 ML/HR: 900 INJECTION, SOLUTION INTRAVENOUS at 23:09

## 2019-01-01 RX ADMIN — Medication 10 ML: at 03:30

## 2019-01-01 RX ADMIN — METRONIDAZOLE 500 MG: 500 INJECTION, SOLUTION INTRAVENOUS at 04:57

## 2019-01-01 RX ADMIN — ACETYLCYSTEINE 200 MG: 200 SOLUTION ORAL; RESPIRATORY (INHALATION) at 07:43

## 2019-01-01 RX ADMIN — SODIUM CHLORIDE 40 MG: 9 INJECTION INTRAMUSCULAR; INTRAVENOUS; SUBCUTANEOUS at 10:45

## 2019-01-01 RX ADMIN — POTASSIUM CHLORIDE 40 MEQ: 750 TABLET, EXTENDED RELEASE ORAL at 11:17

## 2019-01-01 RX ADMIN — MORPHINE SULFATE 4 MG: 2 INJECTION, SOLUTION INTRAMUSCULAR; INTRAVENOUS at 00:33

## 2019-01-01 RX ADMIN — FAMOTIDINE: 10 INJECTION, SOLUTION INTRAVENOUS at 18:17

## 2019-01-01 RX ADMIN — ROSUVASTATIN CALCIUM 10 MG: 10 TABLET, FILM COATED ORAL at 20:15

## 2019-01-01 RX ADMIN — GUAIFENESIN 600 MG: 600 TABLET, EXTENDED RELEASE ORAL at 11:58

## 2019-01-01 RX ADMIN — Medication 10 ML: at 21:30

## 2019-01-01 RX ADMIN — SODIUM CHLORIDE 40 MG: 9 INJECTION, SOLUTION INTRAMUSCULAR; INTRAVENOUS; SUBCUTANEOUS at 20:32

## 2019-01-01 RX ADMIN — SODIUM CHLORIDE 40 MG: 9 INJECTION INTRAMUSCULAR; INTRAVENOUS; SUBCUTANEOUS at 09:35

## 2019-01-01 RX ADMIN — ACETYLCYSTEINE 200 MG: 200 SOLUTION ORAL; RESPIRATORY (INHALATION) at 07:47

## 2019-01-01 RX ADMIN — PHENOL 1 SPRAY: 1.5 LIQUID ORAL at 15:10

## 2019-01-01 RX ADMIN — ACETAMINOPHEN 650 MG: 325 TABLET ORAL at 13:42

## 2019-01-01 RX ADMIN — METRONIDAZOLE 500 MG: 500 INJECTION, SOLUTION INTRAVENOUS at 04:58

## 2019-01-01 RX ADMIN — ENOXAPARIN SODIUM 70 MG: 80 INJECTION SUBCUTANEOUS at 22:28

## 2019-01-01 RX ADMIN — ALBUMIN (HUMAN) 25 G: 0.25 INJECTION, SOLUTION INTRAVENOUS at 11:55

## 2019-01-01 RX ADMIN — LISINOPRIL 2.5 MG: 5 TABLET ORAL at 08:14

## 2019-01-01 RX ADMIN — ROSUVASTATIN CALCIUM 10 MG: 10 TABLET, FILM COATED ORAL at 22:24

## 2019-01-01 RX ADMIN — ONDANSETRON 4 MG: 2 INJECTION INTRAMUSCULAR; INTRAVENOUS at 17:08

## 2019-01-01 RX ADMIN — ENOXAPARIN SODIUM 40 MG: 40 INJECTION SUBCUTANEOUS at 15:25

## 2019-01-01 RX ADMIN — SODIUM CHLORIDE 1000 ML: 900 INJECTION, SOLUTION INTRAVENOUS at 15:52

## 2019-01-01 RX ADMIN — Medication 10 ML: at 14:09

## 2019-01-01 RX ADMIN — SODIUM CHLORIDE 10 ML: 9 INJECTION INTRAMUSCULAR; INTRAVENOUS; SUBCUTANEOUS at 21:57

## 2019-01-01 RX ADMIN — DOBUTAMINE HYDROCHLORIDE 2.5 MCG/KG/MIN: 200 INJECTION INTRAVENOUS at 09:04

## 2019-01-01 RX ADMIN — GUAIFENESIN 600 MG: 600 TABLET, EXTENDED RELEASE ORAL at 20:16

## 2019-01-01 RX ADMIN — SUCRALFATE 1 G: 1 TABLET ORAL at 17:17

## 2019-01-01 RX ADMIN — CALCIUM GLUCONATE: 94 INJECTION, SOLUTION INTRAVENOUS at 19:58

## 2019-01-01 RX ADMIN — ONDANSETRON 4 MG: 2 INJECTION INTRAMUSCULAR; INTRAVENOUS at 05:42

## 2019-01-01 RX ADMIN — SODIUM CHLORIDE 40 MG: 9 INJECTION, SOLUTION INTRAMUSCULAR; INTRAVENOUS; SUBCUTANEOUS at 11:02

## 2019-01-01 RX ADMIN — Medication 10 ML: at 07:13

## 2019-01-01 RX ADMIN — GLYCOPYRROLATE 0.2 MG: 0.2 INJECTION, SOLUTION INTRAMUSCULAR; INTRAVENOUS at 00:29

## 2019-01-01 RX ADMIN — Medication 10 ML: at 22:28

## 2019-01-01 RX ADMIN — ONDANSETRON 4 MG: 2 INJECTION INTRAMUSCULAR; INTRAVENOUS at 15:32

## 2019-01-01 RX ADMIN — FAMOTIDINE: 10 INJECTION, SOLUTION INTRAVENOUS at 20:03

## 2019-01-01 RX ADMIN — BUMETANIDE 1 MG: 0.25 INJECTION INTRAMUSCULAR; INTRAVENOUS at 08:46

## 2019-01-01 RX ADMIN — METOPROLOL TARTRATE 5 MG: 5 INJECTION INTRAVENOUS at 16:19

## 2019-01-01 RX ADMIN — POTASSIUM CHLORIDE 40 MEQ: 750 TABLET, EXTENDED RELEASE ORAL at 09:17

## 2019-01-01 RX ADMIN — CEFAZOLIN: 330 INJECTION, POWDER, FOR SOLUTION INTRAMUSCULAR; INTRAVENOUS at 15:00

## 2019-01-01 RX ADMIN — PHENYLEPHRINE HYDROCHLORIDE 40 MCG/MIN: 10 INJECTION INTRAVENOUS at 13:13

## 2019-01-01 RX ADMIN — SODIUM CHLORIDE 10 ML: 9 INJECTION INTRAMUSCULAR; INTRAVENOUS; SUBCUTANEOUS at 23:45

## 2019-01-01 RX ADMIN — PANTOPRAZOLE SODIUM 40 MG: 40 TABLET, DELAYED RELEASE ORAL at 17:45

## 2019-01-01 RX ADMIN — GUAIFENESIN 600 MG: 600 TABLET, EXTENDED RELEASE ORAL at 00:16

## 2019-01-01 RX ADMIN — PANTOPRAZOLE SODIUM 40 MG: 40 TABLET, DELAYED RELEASE ORAL at 17:42

## 2019-01-01 RX ADMIN — ONDANSETRON 4 MG: 2 INJECTION INTRAMUSCULAR; INTRAVENOUS at 02:07

## 2019-01-01 RX ADMIN — LEVOFLOXACIN 250 MG: 5 INJECTION, SOLUTION INTRAVENOUS at 11:54

## 2019-01-01 RX ADMIN — SODIUM CHLORIDE 10 ML: 9 INJECTION INTRAMUSCULAR; INTRAVENOUS; SUBCUTANEOUS at 13:53

## 2019-01-01 RX ADMIN — Medication 10 ML: at 05:14

## 2019-01-01 RX ADMIN — PANTOPRAZOLE SODIUM 40 MG: 40 TABLET, DELAYED RELEASE ORAL at 09:53

## 2019-01-01 RX ADMIN — LISINOPRIL 5 MG: 5 TABLET ORAL at 08:18

## 2019-01-01 RX ADMIN — ONDANSETRON 4 MG: 2 INJECTION INTRAMUSCULAR; INTRAVENOUS at 08:10

## 2019-01-01 RX ADMIN — ROSUVASTATIN CALCIUM 10 MG: 10 TABLET, FILM COATED ORAL at 21:45

## 2019-01-01 RX ADMIN — Medication 10 ML: at 07:19

## 2019-01-01 RX ADMIN — SODIUM CHLORIDE, SODIUM LACTATE, POTASSIUM CHLORIDE, AND CALCIUM CHLORIDE 125 ML/HR: 600; 310; 30; 20 INJECTION, SOLUTION INTRAVENOUS at 03:15

## 2019-01-01 RX ADMIN — HEPARIN SODIUM 5000 UNITS: 5000 INJECTION INTRAVENOUS; SUBCUTANEOUS at 03:00

## 2019-01-01 RX ADMIN — POTASSIUM CHLORIDE 40 MEQ: 750 TABLET, EXTENDED RELEASE ORAL at 08:27

## 2019-01-01 RX ADMIN — FENTANYL CITRATE 25 MCG: 50 INJECTION INTRAMUSCULAR; INTRAVENOUS at 14:59

## 2019-01-01 RX ADMIN — Medication 10 ML: at 21:27

## 2019-01-01 RX ADMIN — HEPARIN SODIUM 16 UNITS/KG/HR: 10000 INJECTION, SOLUTION INTRAVENOUS at 00:36

## 2019-01-01 RX ADMIN — POTASSIUM CHLORIDE 20 MEQ: 750 TABLET, EXTENDED RELEASE ORAL at 13:22

## 2019-01-01 RX ADMIN — CARVEDILOL 6.25 MG: 6.25 TABLET, FILM COATED ORAL at 17:55

## 2019-01-01 RX ADMIN — SODIUM CHLORIDE, SODIUM LACTATE, POTASSIUM CHLORIDE, AND CALCIUM CHLORIDE 100 ML/HR: 600; 310; 30; 20 INJECTION, SOLUTION INTRAVENOUS at 06:49

## 2019-01-01 RX ADMIN — MORPHINE SULFATE 2 MG: 2 INJECTION, SOLUTION INTRAMUSCULAR; INTRAVENOUS at 20:05

## 2019-01-01 RX ADMIN — SODIUM CHLORIDE 40 MG: 9 INJECTION INTRAMUSCULAR; INTRAVENOUS; SUBCUTANEOUS at 23:00

## 2019-01-01 RX ADMIN — PANTOPRAZOLE SODIUM 40 MG: 40 TABLET, DELAYED RELEASE ORAL at 18:57

## 2019-01-01 RX ADMIN — MORPHINE SULFATE 4 MG: 2 INJECTION, SOLUTION INTRAMUSCULAR; INTRAVENOUS at 04:06

## 2019-01-01 RX ADMIN — POTASSIUM CHLORIDE 20 MEQ: 400 INJECTION, SOLUTION INTRAVENOUS at 09:43

## 2019-01-01 RX ADMIN — FUROSEMIDE 40 MG: 10 INJECTION, SOLUTION INTRAMUSCULAR; INTRAVENOUS at 07:13

## 2019-01-01 RX ADMIN — SODIUM CHLORIDE 10 ML: 9 INJECTION INTRAMUSCULAR; INTRAVENOUS; SUBCUTANEOUS at 05:43

## 2019-01-01 RX ADMIN — BUMETANIDE 1 MG: 1 TABLET ORAL at 08:36

## 2019-01-01 RX ADMIN — ACETAMINOPHEN 650 MG: 325 TABLET, FILM COATED ORAL at 19:30

## 2019-01-01 RX ADMIN — PHENYLEPHRINE HYDROCHLORIDE 80 MCG: 10 INJECTION INTRAVENOUS at 11:07

## 2019-01-01 RX ADMIN — METOCLOPRAMIDE 5 MG: 5 INJECTION, SOLUTION INTRAMUSCULAR; INTRAVENOUS at 18:05

## 2019-01-01 RX ADMIN — SODIUM CHLORIDE 40 MG: 9 INJECTION INTRAMUSCULAR; INTRAVENOUS; SUBCUTANEOUS at 11:48

## 2019-01-01 RX ADMIN — SODIUM CHLORIDE 10 ML: 9 INJECTION INTRAMUSCULAR; INTRAVENOUS; SUBCUTANEOUS at 22:00

## 2019-01-01 RX ADMIN — SODIUM CHLORIDE 500 ML: 900 INJECTION, SOLUTION INTRAVENOUS at 15:45

## 2019-01-01 RX ADMIN — METOPROLOL TARTRATE 5 MG: 5 INJECTION INTRAVENOUS at 05:15

## 2019-01-01 RX ADMIN — ENOXAPARIN SODIUM 70 MG: 80 INJECTION SUBCUTANEOUS at 09:47

## 2019-01-01 RX ADMIN — METRONIDAZOLE 500 MG: 500 INJECTION, SOLUTION INTRAVENOUS at 18:04

## 2019-01-01 RX ADMIN — METOPROLOL TARTRATE 5 MG: 5 INJECTION INTRAVENOUS at 05:08

## 2019-01-01 RX ADMIN — ACETAMINOPHEN 650 MG: 650 SUPPOSITORY RECTAL at 14:26

## 2019-01-01 RX ADMIN — ROCURONIUM BROMIDE 30 MG: 10 SOLUTION INTRAVENOUS at 13:25

## 2019-01-01 RX ADMIN — FENTANYL CITRATE 50 MCG: 50 INJECTION, SOLUTION INTRAMUSCULAR; INTRAVENOUS at 13:11

## 2019-01-01 RX ADMIN — AMIODARONE HYDROCHLORIDE 400 MG: 200 TABLET ORAL at 17:10

## 2019-01-01 RX ADMIN — I.V. FAT EMULSION 500 ML: 20 EMULSION INTRAVENOUS at 18:50

## 2019-01-01 RX ADMIN — HEPARIN SODIUM 15 UNITS/KG/HR: 10000 INJECTION, SOLUTION INTRAVENOUS at 20:02

## 2019-01-01 RX ADMIN — ACETAMINOPHEN 650 MG: 650 SUPPOSITORY RECTAL at 10:18

## 2019-01-01 RX ADMIN — PANTOPRAZOLE SODIUM 40 MG: 40 TABLET, DELAYED RELEASE ORAL at 23:19

## 2019-01-01 RX ADMIN — POLYETHYLENE GLYCOL 3350 17 G: 17 POWDER, FOR SOLUTION ORAL at 08:27

## 2019-01-01 RX ADMIN — LIDOCAINE HYDROCHLORIDE 60 MG: 20 INJECTION, SOLUTION EPIDURAL; INFILTRATION; INTRACAUDAL; PERINEURAL at 13:11

## 2019-01-01 RX ADMIN — LEVOFLOXACIN 750 MG: 5 INJECTION, SOLUTION INTRAVENOUS at 16:24

## 2019-01-01 RX ADMIN — HEPARIN SODIUM 5000 UNITS: 5000 INJECTION INTRAVENOUS; SUBCUTANEOUS at 11:07

## 2019-01-01 RX ADMIN — LORAZEPAM 1 MG: 2 INJECTION INTRAMUSCULAR; INTRAVENOUS at 08:39

## 2019-01-01 RX ADMIN — MORPHINE SULFATE 4 MG: 2 INJECTION, SOLUTION INTRAMUSCULAR; INTRAVENOUS at 14:59

## 2019-01-01 RX ADMIN — ONDANSETRON 4 MG: 2 INJECTION INTRAMUSCULAR; INTRAVENOUS at 12:21

## 2019-01-01 RX ADMIN — SODIUM CHLORIDE 40 MG: 9 INJECTION, SOLUTION INTRAMUSCULAR; INTRAVENOUS; SUBCUTANEOUS at 22:05

## 2019-01-01 RX ADMIN — IPRATROPIUM BROMIDE AND ALBUTEROL SULFATE 3 ML: .5; 3 SOLUTION RESPIRATORY (INHALATION) at 11:54

## 2019-01-01 RX ADMIN — IPRATROPIUM BROMIDE AND ALBUTEROL SULFATE 3 ML: .5; 3 SOLUTION RESPIRATORY (INHALATION) at 21:10

## 2019-01-01 RX ADMIN — CARVEDILOL 3.12 MG: 3.12 TABLET, FILM COATED ORAL at 17:12

## 2019-01-01 RX ADMIN — MORPHINE SULFATE 4 MG: 2 INJECTION, SOLUTION INTRAMUSCULAR; INTRAVENOUS at 07:52

## 2019-01-01 RX ADMIN — CALCIUM GLUCONATE: 94 INJECTION, SOLUTION INTRAVENOUS at 19:13

## 2019-01-01 RX ADMIN — LORAZEPAM 1 MG: 2 INJECTION INTRAMUSCULAR; INTRAVENOUS at 15:57

## 2019-01-01 RX ADMIN — NYSTATIN: 100000 CREAM TOPICAL at 16:53

## 2019-01-01 RX ADMIN — IPRATROPIUM BROMIDE AND ALBUTEROL SULFATE 3 ML: .5; 3 SOLUTION RESPIRATORY (INHALATION) at 07:22

## 2019-01-01 RX ADMIN — IPRATROPIUM BROMIDE AND ALBUTEROL SULFATE 3 ML: .5; 3 SOLUTION RESPIRATORY (INHALATION) at 07:44

## 2019-01-01 RX ADMIN — HYDROCORTISONE ACETATE 25 MG: 25 SUPPOSITORY RECTAL at 09:41

## 2019-01-01 RX ADMIN — Medication 30 ML: at 20:29

## 2019-01-01 RX ADMIN — LORAZEPAM 1 MG: 2 INJECTION INTRAMUSCULAR; INTRAVENOUS at 14:58

## 2019-01-01 RX ADMIN — ALBUMIN (HUMAN) 25 G: 0.25 INJECTION, SOLUTION INTRAVENOUS at 13:27

## 2019-01-01 RX ADMIN — PANTOPRAZOLE SODIUM 40 MG: 40 TABLET, DELAYED RELEASE ORAL at 17:12

## 2019-01-01 RX ADMIN — GADOBUTROL 5 ML: 604.72 INJECTION INTRAVENOUS at 16:20

## 2019-01-01 RX ADMIN — Medication 10 ML: at 12:27

## 2019-01-01 RX ADMIN — CARVEDILOL 6.25 MG: 6.25 TABLET, FILM COATED ORAL at 19:20

## 2019-01-01 RX ADMIN — ONDANSETRON 4 MG: 2 INJECTION INTRAMUSCULAR; INTRAVENOUS at 01:35

## 2019-01-01 RX ADMIN — SODIUM CHLORIDE 40 MG: 9 INJECTION INTRAMUSCULAR; INTRAVENOUS; SUBCUTANEOUS at 09:45

## 2019-01-01 RX ADMIN — MORPHINE SULFATE 1 MG: 2 INJECTION, SOLUTION INTRAMUSCULAR; INTRAVENOUS at 02:57

## 2019-01-01 RX ADMIN — SODIUM CHLORIDE, SODIUM LACTATE, POTASSIUM CHLORIDE, AND CALCIUM CHLORIDE 125 ML/HR: 600; 310; 30; 20 INJECTION, SOLUTION INTRAVENOUS at 10:56

## 2019-01-01 RX ADMIN — GUAIFENESIN 600 MG: 600 TABLET, EXTENDED RELEASE ORAL at 08:46

## 2019-01-01 RX ADMIN — MORPHINE SULFATE 4 MG: 2 INJECTION, SOLUTION INTRAMUSCULAR; INTRAVENOUS at 12:40

## 2019-01-01 RX ADMIN — SENNOSIDES, DOCUSATE SODIUM 1 TABLET: 50; 8.6 TABLET, FILM COATED ORAL at 17:46

## 2019-01-01 RX ADMIN — GUAIFENESIN 600 MG: 600 TABLET, EXTENDED RELEASE ORAL at 08:39

## 2019-01-01 RX ADMIN — HEPARIN SODIUM 13 UNITS/KG/HR: 10000 INJECTION, SOLUTION INTRAVENOUS at 04:41

## 2019-01-01 RX ADMIN — PANTOPRAZOLE SODIUM 40 MG: 40 TABLET, DELAYED RELEASE ORAL at 08:21

## 2019-01-01 RX ADMIN — ACETAMINOPHEN 650 MG: 325 TABLET ORAL at 11:54

## 2019-01-01 RX ADMIN — MORPHINE SULFATE 4 MG: 2 INJECTION, SOLUTION INTRAMUSCULAR; INTRAVENOUS at 20:17

## 2019-01-01 RX ADMIN — ACETYLCYSTEINE 200 MG: 200 SOLUTION ORAL; RESPIRATORY (INHALATION) at 21:26

## 2019-01-01 RX ADMIN — PANTOPRAZOLE SODIUM 40 MG: 40 TABLET, DELAYED RELEASE ORAL at 08:46

## 2019-01-01 RX ADMIN — Medication 10 ML: at 22:39

## 2019-01-01 RX ADMIN — ACETYLCYSTEINE 200 MG: 200 SOLUTION ORAL; RESPIRATORY (INHALATION) at 08:17

## 2019-01-01 RX ADMIN — I.V. FAT EMULSION 500 ML: 20 EMULSION INTRAVENOUS at 18:12

## 2019-01-01 RX ADMIN — DOCUSATE SODIUM 100 MG: 100 CAPSULE, LIQUID FILLED ORAL at 17:45

## 2019-01-01 RX ADMIN — MIDAZOLAM HYDROCHLORIDE 0.5 MG: 1 INJECTION, SOLUTION INTRAMUSCULAR; INTRAVENOUS at 15:21

## 2019-01-01 RX ADMIN — POTASSIUM CHLORIDE AND SODIUM CHLORIDE: 900; 150 INJECTION, SOLUTION INTRAVENOUS at 17:29

## 2019-01-01 RX ADMIN — DOCUSATE SODIUM 100 MG: 100 CAPSULE, LIQUID FILLED ORAL at 08:27

## 2019-01-01 RX ADMIN — LISINOPRIL 2.5 MG: 5 TABLET ORAL at 08:21

## 2019-01-01 RX ADMIN — MORPHINE SULFATE 2 MG: 2 INJECTION, SOLUTION INTRAMUSCULAR; INTRAVENOUS at 00:48

## 2019-01-01 RX ADMIN — ONDANSETRON 4 MG: 2 INJECTION INTRAMUSCULAR; INTRAVENOUS at 02:09

## 2019-01-01 RX ADMIN — LORAZEPAM 1 MG: 2 INJECTION INTRAMUSCULAR; INTRAVENOUS at 20:02

## 2019-01-01 RX ADMIN — SODIUM CHLORIDE 10 ML: 9 INJECTION INTRAMUSCULAR; INTRAVENOUS; SUBCUTANEOUS at 21:36

## 2019-01-01 RX ADMIN — LORAZEPAM 1 MG: 2 INJECTION INTRAMUSCULAR; INTRAVENOUS at 11:56

## 2019-01-01 RX ADMIN — GLYCOPYRROLATE 0.2 MG: 0.2 INJECTION, SOLUTION INTRAMUSCULAR; INTRAVENOUS at 20:24

## 2019-01-01 RX ADMIN — ROSUVASTATIN CALCIUM 10 MG: 10 TABLET, COATED ORAL at 22:36

## 2019-01-01 RX ADMIN — Medication 10 ML: at 06:26

## 2019-01-01 RX ADMIN — SODIUM CHLORIDE 40 MG: 9 INJECTION INTRAMUSCULAR; INTRAVENOUS; SUBCUTANEOUS at 10:01

## 2019-01-01 RX ADMIN — OSELTAMIVIR PHOSPHATE 75 MG: 75 CAPSULE ORAL at 09:12

## 2019-01-01 RX ADMIN — HEPARIN SODIUM 18 UNITS/KG/HR: 10000 INJECTION, SOLUTION INTRAVENOUS at 18:58

## 2019-01-01 RX ADMIN — PANTOPRAZOLE SODIUM 40 MG: 40 TABLET, DELAYED RELEASE ORAL at 09:07

## 2019-01-01 RX ADMIN — BUMETANIDE 4 MG: 0.25 INJECTION INTRAMUSCULAR; INTRAVENOUS at 17:16

## 2019-01-01 RX ADMIN — GUAIFENESIN 600 MG: 600 TABLET, EXTENDED RELEASE ORAL at 22:24

## 2019-01-01 RX ADMIN — Medication 10 ML: at 15:17

## 2019-01-01 RX ADMIN — Medication 10 ML: at 05:07

## 2019-01-01 RX ADMIN — NYSTATIN: 100000 CREAM TOPICAL at 19:37

## 2019-01-01 RX ADMIN — ALTEPLASE 1 MG: 2.2 INJECTION, POWDER, LYOPHILIZED, FOR SOLUTION INTRAVENOUS at 17:00

## 2019-01-01 RX ADMIN — AMIODARONE HYDROCHLORIDE 150 MG: 50 INJECTION, SOLUTION INTRAVENOUS at 17:46

## 2019-01-01 RX ADMIN — ONDANSETRON 4 MG: 2 INJECTION INTRAMUSCULAR; INTRAVENOUS at 08:57

## 2019-01-01 RX ADMIN — Medication 10 ML: at 22:05

## 2019-01-01 RX ADMIN — HEPARIN SODIUM 5000 UNITS: 5000 INJECTION INTRAVENOUS; SUBCUTANEOUS at 10:31

## 2019-01-01 RX ADMIN — FUROSEMIDE 40 MG: 10 INJECTION, SOLUTION INTRAMUSCULAR; INTRAVENOUS at 00:10

## 2019-01-01 RX ADMIN — LIDOCAINE HYDROCHLORIDE 60 MG: 20 INJECTION, SOLUTION EPIDURAL; INFILTRATION; INTRACAUDAL; PERINEURAL at 11:07

## 2019-01-01 RX ADMIN — MORPHINE SULFATE 4 MG: 2 INJECTION, SOLUTION INTRAMUSCULAR; INTRAVENOUS at 00:12

## 2019-01-01 RX ADMIN — PANTOPRAZOLE SODIUM 40 MG: 40 TABLET, DELAYED RELEASE ORAL at 18:39

## 2019-01-01 RX ADMIN — HEPARIN SODIUM 17 UNITS/KG/HR: 10000 INJECTION, SOLUTION INTRAVENOUS at 01:45

## 2019-01-01 RX ADMIN — Medication 10 ML: at 14:00

## 2019-01-01 RX ADMIN — MIDAZOLAM HYDROCHLORIDE 1 MG: 1 INJECTION, SOLUTION INTRAMUSCULAR; INTRAVENOUS at 14:58

## 2019-01-01 RX ADMIN — OSELTAMIVIR PHOSPHATE 75 MG: 75 CAPSULE ORAL at 18:52

## 2019-01-01 RX ADMIN — AMIODARONE HYDROCHLORIDE 1 MG/MIN: 50 INJECTION, SOLUTION INTRAVENOUS at 18:07

## 2019-01-01 RX ADMIN — HEPARIN SODIUM 2140 UNITS: 1000 INJECTION INTRAVENOUS; SUBCUTANEOUS at 04:14

## 2019-01-01 RX ADMIN — ONDANSETRON 4 MG: 2 INJECTION INTRAMUSCULAR; INTRAVENOUS at 07:23

## 2019-01-01 RX ADMIN — CALCIUM GLUCONATE: 94 INJECTION, SOLUTION INTRAVENOUS at 16:40

## 2019-01-01 RX ADMIN — SODIUM CHLORIDE: 9 INJECTION, SOLUTION INTRAVENOUS at 15:09

## 2019-01-01 RX ADMIN — GUAIFENESIN 600 MG: 600 TABLET, EXTENDED RELEASE ORAL at 20:44

## 2019-01-01 RX ADMIN — POTASSIUM CHLORIDE 40 MEQ: 750 TABLET, EXTENDED RELEASE ORAL at 14:03

## 2019-01-01 RX ADMIN — SODIUM CHLORIDE 10 ML: 9 INJECTION INTRAMUSCULAR; INTRAVENOUS; SUBCUTANEOUS at 14:00

## 2019-01-01 RX ADMIN — GLYCOPYRROLATE 0.2 MG: 0.2 INJECTION, SOLUTION INTRAMUSCULAR; INTRAVENOUS at 05:55

## 2019-01-01 RX ADMIN — BUMETANIDE 1 MG: 0.25 INJECTION INTRAMUSCULAR; INTRAVENOUS at 22:24

## 2019-01-01 RX ADMIN — Medication 10 ML: at 21:54

## 2019-01-01 RX ADMIN — PANTOPRAZOLE SODIUM 40 MG: 40 TABLET, DELAYED RELEASE ORAL at 16:38

## 2019-01-01 RX ADMIN — ONDANSETRON HYDROCHLORIDE 4 MG: 2 INJECTION, SOLUTION INTRAMUSCULAR; INTRAVENOUS at 15:38

## 2019-01-01 RX ADMIN — GLYCOPYRROLATE 0.2 MG: 0.2 INJECTION, SOLUTION INTRAMUSCULAR; INTRAVENOUS at 12:27

## 2019-01-01 RX ADMIN — MORPHINE SULFATE 2 MG: 2 INJECTION, SOLUTION INTRAMUSCULAR; INTRAVENOUS at 01:35

## 2019-01-01 RX ADMIN — GLYCOPYRROLATE 0.2 MG: 0.2 INJECTION, SOLUTION INTRAMUSCULAR; INTRAVENOUS at 04:06

## 2019-01-01 RX ADMIN — FAMOTIDINE: 10 INJECTION, SOLUTION INTRAVENOUS at 21:47

## 2019-01-01 RX ADMIN — HYDROMORPHONE HYDROCHLORIDE 0.5 MG: 2 INJECTION, SOLUTION INTRAMUSCULAR; INTRAVENOUS; SUBCUTANEOUS at 16:05

## 2019-01-01 RX ADMIN — Medication 10 ML: at 13:19

## 2019-01-01 RX ADMIN — LOPERAMIDE HYDROCHLORIDE 2 MG: 2 CAPSULE ORAL at 15:26

## 2019-01-01 RX ADMIN — IOPAMIDOL 60 ML: 612 INJECTION, SOLUTION INTRAVENOUS at 16:08

## 2019-01-01 RX ADMIN — Medication 10 ML: at 13:27

## 2019-01-01 RX ADMIN — ONDANSETRON 4 MG: 2 INJECTION INTRAMUSCULAR; INTRAVENOUS at 16:19

## 2019-01-01 RX ADMIN — ONDANSETRON 4 MG: 2 INJECTION INTRAMUSCULAR; INTRAVENOUS at 12:26

## 2019-01-01 RX ADMIN — LIDOCAINE HYDROCHLORIDE 400 MG: 20 INJECTION, SOLUTION INFILTRATION; PERINEURAL at 15:09

## 2019-01-01 RX ADMIN — METOCLOPRAMIDE 5 MG: 5 INJECTION, SOLUTION INTRAMUSCULAR; INTRAVENOUS at 12:21

## 2019-01-01 RX ADMIN — POTASSIUM CHLORIDE 20 MEQ: 400 INJECTION, SOLUTION INTRAVENOUS at 08:33

## 2019-01-01 RX ADMIN — HEPARIN SODIUM 14 UNITS/KG/HR: 10000 INJECTION, SOLUTION INTRAVENOUS at 00:31

## 2019-01-01 RX ADMIN — SODIUM CHLORIDE 10 ML: 9 INJECTION INTRAMUSCULAR; INTRAVENOUS; SUBCUTANEOUS at 05:56

## 2019-01-01 RX ADMIN — PROCHLORPERAZINE EDISYLATE 5 MG: 5 INJECTION INTRAMUSCULAR; INTRAVENOUS at 06:30

## 2019-01-01 RX ADMIN — POTASSIUM CHLORIDE 10 MEQ: 10 INJECTION, SOLUTION INTRAVENOUS at 04:14

## 2019-01-01 RX ADMIN — SUCCINYLCHOLINE CHLORIDE 140 MG: 20 INJECTION, SOLUTION INTRAMUSCULAR; INTRAVENOUS at 13:11

## 2019-01-01 RX ADMIN — CARVEDILOL 6.25 MG: 6.25 TABLET, FILM COATED ORAL at 17:23

## 2019-01-01 RX ADMIN — PANTOPRAZOLE SODIUM 40 MG: 40 TABLET, DELAYED RELEASE ORAL at 09:11

## 2019-01-01 RX ADMIN — BUMETANIDE 1 MG: 1 TABLET ORAL at 08:18

## 2019-01-01 RX ADMIN — PROPOFOL 40 MG: 10 INJECTION, EMULSION INTRAVENOUS at 15:21

## 2019-01-01 RX ADMIN — OSELTAMIVIR PHOSPHATE 30 MG: 6 POWDER, FOR SUSPENSION ORAL at 09:07

## 2019-01-01 RX ADMIN — IPRATROPIUM BROMIDE AND ALBUTEROL SULFATE 3 ML: .5; 3 SOLUTION RESPIRATORY (INHALATION) at 08:17

## 2019-01-01 RX ADMIN — METOCLOPRAMIDE 5 MG: 5 INJECTION, SOLUTION INTRAMUSCULAR; INTRAVENOUS at 12:11

## 2019-01-01 RX ADMIN — LISINOPRIL 2.5 MG: 5 TABLET ORAL at 08:30

## 2019-01-01 RX ADMIN — IPRATROPIUM BROMIDE AND ALBUTEROL SULFATE 3 ML: .5; 3 SOLUTION RESPIRATORY (INHALATION) at 07:47

## 2019-01-01 RX ADMIN — OSELTAMIVIR PHOSPHATE 75 MG: 75 CAPSULE ORAL at 07:17

## 2019-01-01 RX ADMIN — GUAIFENESIN 600 MG: 600 TABLET, EXTENDED RELEASE ORAL at 21:41

## 2019-01-01 RX ADMIN — DEXAMETHASONE SODIUM PHOSPHATE 4 MG: 4 INJECTION, SOLUTION INTRAMUSCULAR; INTRAVENOUS at 14:08

## 2019-01-01 RX ADMIN — PANTOPRAZOLE SODIUM 40 MG: 40 TABLET, DELAYED RELEASE ORAL at 08:49

## 2019-01-01 RX ADMIN — Medication 10 ML: at 20:58

## 2019-01-01 RX ADMIN — POTASSIUM CHLORIDE 20 MEQ: 29.8 INJECTION, SOLUTION INTRAVENOUS at 14:10

## 2019-01-01 RX ADMIN — Medication 10 ML: at 05:52

## 2019-01-01 RX ADMIN — ENOXAPARIN SODIUM 70 MG: 80 INJECTION SUBCUTANEOUS at 09:33

## 2019-01-01 RX ADMIN — IOPAMIDOL: 612 INJECTION, SOLUTION INTRAVENOUS at 15:10

## 2019-01-01 RX ADMIN — Medication 10 ML: at 22:27

## 2019-01-01 RX ADMIN — CALCIUM GLUCONATE: 94 INJECTION, SOLUTION INTRAVENOUS at 19:22

## 2019-01-01 RX ADMIN — MORPHINE SULFATE 4 MG: 2 INJECTION, SOLUTION INTRAMUSCULAR; INTRAVENOUS at 14:20

## 2019-01-01 RX ADMIN — POTASSIUM CHLORIDE AND SODIUM CHLORIDE: 900; 150 INJECTION, SOLUTION INTRAVENOUS at 03:05

## 2019-01-01 RX ADMIN — ONDANSETRON 4 MG: 2 INJECTION INTRAMUSCULAR; INTRAVENOUS at 01:09

## 2019-01-01 RX ADMIN — LISINOPRIL 2.5 MG: 5 TABLET ORAL at 12:50

## 2019-01-01 RX ADMIN — FAMOTIDINE: 10 INJECTION, SOLUTION INTRAVENOUS at 20:24

## 2019-01-01 RX ADMIN — BUMETANIDE 1 MG: 0.25 INJECTION INTRAMUSCULAR; INTRAVENOUS at 08:49

## 2019-01-01 RX ADMIN — POTASSIUM CHLORIDE 20 MEQ: 29.8 INJECTION, SOLUTION INTRAVENOUS at 12:57

## 2019-01-01 RX ADMIN — ENOXAPARIN SODIUM 40 MG: 40 INJECTION SUBCUTANEOUS at 16:50

## 2019-01-01 RX ADMIN — LORAZEPAM 1 MG: 2 INJECTION INTRAMUSCULAR; INTRAVENOUS at 15:59

## 2019-01-01 RX ADMIN — DOBUTAMINE IN DEXTROSE 2.5 MCG/KG/MIN: 200 INJECTION, SOLUTION INTRAVENOUS at 15:55

## 2019-01-01 RX ADMIN — AMIODARONE HYDROCHLORIDE 0.5 MG/MIN: 50 INJECTION, SOLUTION INTRAVENOUS at 00:00

## 2019-01-01 RX ADMIN — LORAZEPAM 1 MG: 2 INJECTION INTRAMUSCULAR; INTRAVENOUS at 07:52

## 2019-01-01 RX ADMIN — PROPOFOL 50 MG: 10 INJECTION, EMULSION INTRAVENOUS at 16:06

## 2019-01-01 RX ADMIN — ONDANSETRON 4 MG: 2 INJECTION INTRAMUSCULAR; INTRAVENOUS at 08:34

## 2019-01-01 RX ADMIN — CEFEPIME HYDROCHLORIDE 2 G: 2 INJECTION, POWDER, FOR SOLUTION INTRAVENOUS at 14:25

## 2019-01-01 RX ADMIN — SODIUM CHLORIDE 10 ML: 9 INJECTION INTRAMUSCULAR; INTRAVENOUS; SUBCUTANEOUS at 06:10

## 2019-01-01 RX ADMIN — PROPOFOL 120 MG: 10 INJECTION, EMULSION INTRAVENOUS at 11:07

## 2019-01-01 RX ADMIN — POLYETHYLENE GLYCOL 3350 17 G: 17 POWDER, FOR SOLUTION ORAL at 08:46

## 2019-01-01 RX ADMIN — ACETYLCYSTEINE 200 MG: 200 SOLUTION ORAL; RESPIRATORY (INHALATION) at 07:54

## 2019-01-01 RX ADMIN — METOCLOPRAMIDE 5 MG: 5 INJECTION, SOLUTION INTRAMUSCULAR; INTRAVENOUS at 06:21

## 2019-01-01 RX ADMIN — PANTOPRAZOLE SODIUM 40 MG: 40 TABLET, DELAYED RELEASE ORAL at 17:06

## 2019-01-01 RX ADMIN — ENOXAPARIN SODIUM 40 MG: 40 INJECTION SUBCUTANEOUS at 15:59

## 2019-01-01 RX ADMIN — LORAZEPAM 1 MG: 2 INJECTION INTRAMUSCULAR; INTRAVENOUS at 20:05

## 2019-01-01 RX ADMIN — Medication 10 ML: at 22:09

## 2019-01-01 RX ADMIN — LEVOFLOXACIN 250 MG: 5 INJECTION, SOLUTION INTRAVENOUS at 11:17

## 2019-01-01 RX ADMIN — MORPHINE SULFATE 4 MG: 4 INJECTION INTRAVENOUS at 20:41

## 2019-01-01 RX ADMIN — LORAZEPAM 1 MG: 2 INJECTION INTRAMUSCULAR; INTRAVENOUS at 14:21

## 2019-01-01 RX ADMIN — OSELTAMIVIR PHOSPHATE 75 MG: 75 CAPSULE ORAL at 06:41

## 2019-01-01 RX ADMIN — SODIUM CHLORIDE 100 ML: 900 INJECTION, SOLUTION INTRAVENOUS at 11:29

## 2019-01-01 RX ADMIN — SODIUM CHLORIDE 40 MG: 9 INJECTION, SOLUTION INTRAMUSCULAR; INTRAVENOUS; SUBCUTANEOUS at 10:30

## 2019-01-01 RX ADMIN — FAMOTIDINE: 10 INJECTION, SOLUTION INTRAVENOUS at 19:59

## 2019-01-01 RX ADMIN — ROCURONIUM BROMIDE 5 MG: 10 SOLUTION INTRAVENOUS at 11:07

## 2019-01-01 RX ADMIN — SODIUM CHLORIDE 10 ML: 9 INJECTION INTRAMUSCULAR; INTRAVENOUS; SUBCUTANEOUS at 14:55

## 2019-01-01 RX ADMIN — MORPHINE SULFATE 4 MG: 2 INJECTION, SOLUTION INTRAMUSCULAR; INTRAVENOUS at 17:07

## 2019-01-01 RX ADMIN — MORPHINE SULFATE 4 MG: 2 INJECTION, SOLUTION INTRAMUSCULAR; INTRAVENOUS at 07:49

## 2019-01-01 RX ADMIN — POTASSIUM CHLORIDE 10 MEQ: 200 INJECTION, SOLUTION INTRAVENOUS at 11:23

## 2019-01-01 RX ADMIN — SODIUM CHLORIDE 10 ML: 9 INJECTION INTRAMUSCULAR; INTRAVENOUS; SUBCUTANEOUS at 06:31

## 2019-01-01 RX ADMIN — GUAIFENESIN 600 MG: 600 TABLET, EXTENDED RELEASE ORAL at 08:18

## 2019-01-01 RX ADMIN — PANTOPRAZOLE SODIUM 40 MG: 40 TABLET, DELAYED RELEASE ORAL at 08:27

## 2019-01-01 RX ADMIN — MORPHINE SULFATE 4 MG: 2 INJECTION, SOLUTION INTRAMUSCULAR; INTRAVENOUS at 12:17

## 2019-01-01 RX ADMIN — LORAZEPAM 1 MG: 2 INJECTION INTRAMUSCULAR; INTRAVENOUS at 17:12

## 2019-01-01 RX ADMIN — HEPARIN SODIUM 5000 UNITS: 5000 INJECTION INTRAVENOUS; SUBCUTANEOUS at 18:57

## 2019-01-01 RX ADMIN — BACITRACIN 1 PACKET: 500 OINTMENT TOPICAL at 16:27

## 2019-01-01 RX ADMIN — Medication 20 ML: at 06:00

## 2019-01-01 RX ADMIN — MORPHINE SULFATE 1 MG: 2 INJECTION, SOLUTION INTRAMUSCULAR; INTRAVENOUS at 14:27

## 2019-01-01 RX ADMIN — MORPHINE SULFATE 4 MG: 2 INJECTION, SOLUTION INTRAMUSCULAR; INTRAVENOUS at 04:10

## 2019-01-01 RX ADMIN — CEFEPIME HYDROCHLORIDE 2 G: 2 INJECTION, POWDER, FOR SOLUTION INTRAVENOUS at 10:00

## 2019-01-01 RX ADMIN — LORAZEPAM 1 MG: 2 INJECTION INTRAMUSCULAR; INTRAVENOUS at 00:26

## 2019-01-01 RX ADMIN — ALBUMIN (HUMAN) 25 G: 0.25 INJECTION, SOLUTION INTRAVENOUS at 11:28

## 2019-01-01 RX ADMIN — BACITRACIN: 500 OINTMENT TOPICAL at 18:00

## 2019-01-01 RX ADMIN — GLYCOPYRROLATE 0.2 MG: 0.2 INJECTION, SOLUTION INTRAMUSCULAR; INTRAVENOUS at 11:07

## 2019-01-01 RX ADMIN — LISINOPRIL 2.5 MG: 5 TABLET ORAL at 09:06

## 2019-01-01 RX ADMIN — Medication 10 MG: at 13:11

## 2019-01-01 RX ADMIN — ACETYLCYSTEINE 200 MG: 200 SOLUTION ORAL; RESPIRATORY (INHALATION) at 11:55

## 2019-01-01 RX ADMIN — POTASSIUM CHLORIDE 40 MEQ: 750 TABLET, EXTENDED RELEASE ORAL at 09:07

## 2019-01-01 RX ADMIN — MORPHINE SULFATE 4 MG: 2 INJECTION, SOLUTION INTRAMUSCULAR; INTRAVENOUS at 17:40

## 2019-01-01 RX ADMIN — MORPHINE SULFATE 4 MG: 4 INJECTION INTRAVENOUS at 08:39

## 2019-01-01 RX ADMIN — LEVOFLOXACIN 250 MG: 5 INJECTION, SOLUTION INTRAVENOUS at 12:33

## 2019-01-01 RX ADMIN — PROPOFOL 40 MG: 10 INJECTION, EMULSION INTRAVENOUS at 15:13

## 2019-01-01 RX ADMIN — ONDANSETRON 4 MG: 2 INJECTION INTRAMUSCULAR; INTRAVENOUS at 14:25

## 2019-01-01 RX ADMIN — FAMOTIDINE: 10 INJECTION, SOLUTION INTRAVENOUS at 19:07

## 2019-01-01 RX ADMIN — ZOLPIDEM TARTRATE 5 MG: 5 TABLET, FILM COATED ORAL at 22:00

## 2019-01-01 RX ADMIN — SODIUM CHLORIDE 40 MG: 9 INJECTION, SOLUTION INTRAMUSCULAR; INTRAVENOUS; SUBCUTANEOUS at 08:37

## 2019-01-01 RX ADMIN — MORPHINE SULFATE 4 MG: 2 INJECTION, SOLUTION INTRAMUSCULAR; INTRAVENOUS at 07:43

## 2019-01-01 RX ADMIN — SODIUM CHLORIDE 75 ML/HR: 900 INJECTION, SOLUTION INTRAVENOUS at 15:46

## 2019-01-01 RX ADMIN — FUROSEMIDE 40 MG: 10 INJECTION, SOLUTION INTRAMUSCULAR; INTRAVENOUS at 17:42

## 2019-01-01 RX ADMIN — AMIODARONE HYDROCHLORIDE 0.5 MG/MIN: 50 INJECTION, SOLUTION INTRAVENOUS at 12:26

## 2019-01-01 RX ADMIN — Medication 10 ML: at 16:54

## 2019-01-01 RX ADMIN — ACETYLCYSTEINE 200 MG: 200 SOLUTION ORAL; RESPIRATORY (INHALATION) at 21:10

## 2019-01-01 RX ADMIN — ONDANSETRON 4 MG: 2 INJECTION INTRAMUSCULAR; INTRAVENOUS at 09:52

## 2019-01-01 RX ADMIN — CARVEDILOL 6.25 MG: 6.25 TABLET, FILM COATED ORAL at 17:06

## 2019-01-01 RX ADMIN — Medication 10 ML: at 05:17

## 2019-01-01 RX ADMIN — ALBUMIN (HUMAN) 25 G: 0.25 INJECTION, SOLUTION INTRAVENOUS at 20:44

## 2019-01-01 RX ADMIN — Medication 120 MCG: at 13:12

## 2019-01-01 RX ADMIN — GUAIFENESIN 600 MG: 600 TABLET, EXTENDED RELEASE ORAL at 08:27

## 2019-01-01 RX ADMIN — LORAZEPAM 1 MG: 2 INJECTION INTRAMUSCULAR; INTRAVENOUS at 16:00

## 2019-01-01 RX ADMIN — SODIUM CHLORIDE 40 MG: 9 INJECTION INTRAMUSCULAR; INTRAVENOUS; SUBCUTANEOUS at 09:51

## 2019-01-01 RX ADMIN — METOCLOPRAMIDE 5 MG: 5 INJECTION, SOLUTION INTRAMUSCULAR; INTRAVENOUS at 16:15

## 2019-01-01 RX ADMIN — MORPHINE SULFATE 4 MG: 2 INJECTION, SOLUTION INTRAMUSCULAR; INTRAVENOUS at 17:12

## 2019-01-01 RX ADMIN — FAMOTIDINE: 10 INJECTION, SOLUTION INTRAVENOUS at 20:26

## 2019-01-01 RX ADMIN — CARVEDILOL 3.12 MG: 3.12 TABLET, FILM COATED ORAL at 07:37

## 2019-01-01 RX ADMIN — SODIUM CHLORIDE 40 MG: 9 INJECTION INTRAMUSCULAR; INTRAVENOUS; SUBCUTANEOUS at 09:30

## 2019-01-01 RX ADMIN — ACETAMINOPHEN 650 MG: 325 TABLET ORAL at 06:13

## 2019-01-01 RX ADMIN — SODIUM CHLORIDE 10 ML: 9 INJECTION INTRAMUSCULAR; INTRAVENOUS; SUBCUTANEOUS at 06:50

## 2019-01-01 RX ADMIN — ROSUVASTATIN CALCIUM 10 MG: 10 TABLET, FILM COATED ORAL at 21:41

## 2019-01-01 RX ADMIN — HEPARIN SODIUM 5000 UNITS: 5000 INJECTION INTRAVENOUS; SUBCUTANEOUS at 03:29

## 2019-01-01 RX ADMIN — LEVOFLOXACIN 500 MG: 5 INJECTION, SOLUTION INTRAVENOUS at 11:28

## 2019-01-01 RX ADMIN — METOPROLOL TARTRATE 5 MG: 5 INJECTION INTRAVENOUS at 12:54

## 2019-01-01 RX ADMIN — OSELTAMIVIR PHOSPHATE 75 MG: 75 CAPSULE ORAL at 19:36

## 2019-01-01 RX ADMIN — PANTOPRAZOLE SODIUM 40 MG: 40 TABLET, DELAYED RELEASE ORAL at 08:25

## 2019-01-01 RX ADMIN — DOCUSATE SODIUM 100 MG: 100 CAPSULE, LIQUID FILLED ORAL at 18:39

## 2019-01-01 RX ADMIN — SODIUM CHLORIDE 10 ML: 9 INJECTION INTRAMUSCULAR; INTRAVENOUS; SUBCUTANEOUS at 07:05

## 2019-01-01 RX ADMIN — LORAZEPAM 1 MG: 2 INJECTION INTRAMUSCULAR; INTRAVENOUS at 04:08

## 2019-01-01 RX ADMIN — SODIUM CHLORIDE 40 MG: 9 INJECTION INTRAMUSCULAR; INTRAVENOUS; SUBCUTANEOUS at 21:50

## 2019-01-01 RX ADMIN — ACETAMINOPHEN 650 MG: 325 TABLET, FILM COATED ORAL at 22:36

## 2019-01-01 RX ADMIN — MORPHINE SULFATE 4 MG: 2 INJECTION, SOLUTION INTRAMUSCULAR; INTRAVENOUS at 16:01

## 2019-01-01 RX ADMIN — ENOXAPARIN SODIUM 40 MG: 40 INJECTION SUBCUTANEOUS at 14:21

## 2019-01-01 RX ADMIN — ASPIRIN 81 MG CHEWABLE TABLET 81 MG: 81 TABLET CHEWABLE at 08:31

## 2019-01-01 RX ADMIN — FAMOTIDINE: 10 INJECTION, SOLUTION INTRAVENOUS at 18:21

## 2019-01-01 RX ADMIN — ONDANSETRON 8 MG: 2 INJECTION INTRAMUSCULAR; INTRAVENOUS at 15:47

## 2019-01-01 RX ADMIN — ACETAMINOPHEN 650 MG: 325 TABLET ORAL at 12:35

## 2019-01-01 RX ADMIN — POTASSIUM CHLORIDE 10 MEQ: 10 INJECTION, SOLUTION INTRAVENOUS at 05:35

## 2019-01-01 RX ADMIN — Medication 3 MG: at 15:59

## 2019-01-01 RX ADMIN — Medication 10 ML: at 14:33

## 2019-01-01 RX ADMIN — ONDANSETRON 4 MG: 2 INJECTION INTRAMUSCULAR; INTRAVENOUS at 10:30

## 2019-01-01 RX ADMIN — ONDANSETRON 4 MG: 2 INJECTION INTRAMUSCULAR; INTRAVENOUS at 07:15

## 2019-01-01 RX ADMIN — PROCHLORPERAZINE EDISYLATE 10 MG: 5 INJECTION INTRAMUSCULAR; INTRAVENOUS at 20:24

## 2019-01-01 RX ADMIN — POTASSIUM CHLORIDE 20 MEQ: 750 TABLET, EXTENDED RELEASE ORAL at 17:45

## 2019-01-01 RX ADMIN — SODIUM CHLORIDE 10 ML: 9 INJECTION INTRAMUSCULAR; INTRAVENOUS; SUBCUTANEOUS at 18:36

## 2019-01-01 RX ADMIN — SODIUM CHLORIDE, SODIUM LACTATE, POTASSIUM CHLORIDE, AND CALCIUM CHLORIDE 50 ML/HR: 600; 310; 30; 20 INJECTION, SOLUTION INTRAVENOUS at 11:03

## 2019-01-01 RX ADMIN — NYSTATIN: 100000 CREAM TOPICAL at 09:07

## 2019-01-01 RX ADMIN — SODIUM CHLORIDE: 900 INJECTION, SOLUTION INTRAVENOUS at 11:00

## 2019-01-01 RX ADMIN — MORPHINE SULFATE 2 MG: 2 INJECTION, SOLUTION INTRAMUSCULAR; INTRAVENOUS at 22:02

## 2019-01-01 RX ADMIN — GUAIFENESIN 600 MG: 600 TABLET, EXTENDED RELEASE ORAL at 21:45

## 2019-01-01 RX ADMIN — PROCHLORPERAZINE EDISYLATE 10 MG: 5 INJECTION INTRAMUSCULAR; INTRAVENOUS at 09:02

## 2019-01-01 RX ADMIN — ASPIRIN 81 MG CHEWABLE TABLET 81 MG: 81 TABLET CHEWABLE at 08:49

## 2019-01-01 RX ADMIN — ENOXAPARIN SODIUM 70 MG: 80 INJECTION SUBCUTANEOUS at 09:04

## 2019-01-01 RX ADMIN — LORAZEPAM 1 MG: 2 INJECTION INTRAMUSCULAR; INTRAVENOUS at 20:35

## 2019-01-01 RX ADMIN — MORPHINE SULFATE 2 MG: 2 INJECTION, SOLUTION INTRAMUSCULAR; INTRAVENOUS at 04:24

## 2019-01-01 RX ADMIN — DOCUSATE SODIUM 100 MG: 100 CAPSULE, LIQUID FILLED ORAL at 08:46

## 2019-01-01 RX ADMIN — MORPHINE SULFATE 2 MG: 2 INJECTION, SOLUTION INTRAMUSCULAR; INTRAVENOUS at 04:02

## 2019-01-01 RX ADMIN — IOPAMIDOL 120 ML: 612 INJECTION, SOLUTION INTRAVENOUS at 16:07

## 2019-01-01 RX ADMIN — ASPIRIN 81 MG CHEWABLE TABLET 81 MG: 81 TABLET CHEWABLE at 08:36

## 2019-01-01 RX ADMIN — I.V. FAT EMULSION 500 ML: 20 EMULSION INTRAVENOUS at 18:48

## 2019-01-01 RX ADMIN — SODIUM CHLORIDE 10 ML: 9 INJECTION INTRAMUSCULAR; INTRAVENOUS; SUBCUTANEOUS at 21:32

## 2019-01-01 RX ADMIN — MORPHINE SULFATE 4 MG: 2 INJECTION, SOLUTION INTRAMUSCULAR; INTRAVENOUS at 11:38

## 2019-01-01 RX ADMIN — Medication 40 ML: at 14:00

## 2019-01-01 RX ADMIN — CEFTRIAXONE 1 G: 1 INJECTION, POWDER, FOR SOLUTION INTRAMUSCULAR; INTRAVENOUS at 09:13

## 2019-01-01 RX ADMIN — LEVOFLOXACIN 750 MG: 5 INJECTION, SOLUTION INTRAVENOUS at 17:14

## 2019-01-01 RX ADMIN — Medication 10 ML: at 23:12

## 2019-01-01 RX ADMIN — POTASSIUM PHOSPHATE, MONOBASIC AND POTASSIUM PHOSPHATE, DIBASIC: 224; 236 INJECTION, SOLUTION INTRAVENOUS at 18:07

## 2019-01-01 RX ADMIN — POTASSIUM CHLORIDE 10 MEQ: 200 INJECTION, SOLUTION INTRAVENOUS at 17:57

## 2019-01-01 RX ADMIN — ONDANSETRON 4 MG: 2 INJECTION INTRAMUSCULAR; INTRAVENOUS at 09:01

## 2019-01-01 RX ADMIN — IOHEXOL 50 ML: 240 INJECTION, SOLUTION INTRATHECAL; INTRAVASCULAR; INTRAVENOUS; ORAL at 12:28

## 2019-01-01 RX ADMIN — HEPARIN SODIUM 14 UNITS/KG/HR: 10000 INJECTION, SOLUTION INTRAVENOUS at 16:24

## 2019-01-01 RX ADMIN — ENOXAPARIN SODIUM 70 MG: 80 INJECTION SUBCUTANEOUS at 20:44

## 2019-01-01 RX ADMIN — FAMOTIDINE: 10 INJECTION, SOLUTION INTRAVENOUS at 18:48

## 2019-01-01 RX ADMIN — CARVEDILOL 6.25 MG: 6.25 TABLET, FILM COATED ORAL at 08:14

## 2019-01-01 RX ADMIN — ONDANSETRON 4 MG: 2 INJECTION INTRAMUSCULAR; INTRAVENOUS at 22:01

## 2019-01-01 RX ADMIN — IPRATROPIUM BROMIDE AND ALBUTEROL SULFATE 3 ML: .5; 3 SOLUTION RESPIRATORY (INHALATION) at 07:54

## 2019-01-01 RX ADMIN — ONDANSETRON 4 MG: 2 INJECTION INTRAMUSCULAR; INTRAVENOUS at 18:35

## 2019-01-01 RX ADMIN — ROSUVASTATIN CALCIUM 10 MG: 10 TABLET, FILM COATED ORAL at 20:45

## 2019-01-01 RX ADMIN — LOPERAMIDE HYDROCHLORIDE 2 MG: 2 CAPSULE ORAL at 09:17

## 2019-01-01 RX ADMIN — BUMETANIDE 0.5 MG: 0.25 INJECTION INTRAMUSCULAR; INTRAVENOUS at 10:51

## 2019-01-01 RX ADMIN — ONDANSETRON 4 MG: 2 INJECTION INTRAMUSCULAR; INTRAVENOUS at 20:06

## 2019-01-01 RX ADMIN — FUROSEMIDE 40 MG: 10 INJECTION, SOLUTION INTRAMUSCULAR; INTRAVENOUS at 17:11

## 2019-01-01 RX ADMIN — KETOROLAC TROMETHAMINE 30 MG: 30 INJECTION, SOLUTION INTRAMUSCULAR at 05:56

## 2019-01-01 RX ADMIN — Medication 10 ML: at 11:29

## 2019-01-01 RX ADMIN — METOPROLOL TARTRATE 5 MG: 5 INJECTION INTRAVENOUS at 23:37

## 2019-01-01 RX ADMIN — GLYCOPYRROLATE 0.2 MG: 0.2 INJECTION, SOLUTION INTRAMUSCULAR; INTRAVENOUS at 08:04

## 2019-01-01 RX ADMIN — Medication 10 ML: at 15:05

## 2019-01-01 RX ADMIN — ASPIRIN 81 MG CHEWABLE TABLET 81 MG: 81 TABLET CHEWABLE at 09:11

## 2019-01-01 RX ADMIN — ENOXAPARIN SODIUM 70 MG: 80 INJECTION SUBCUTANEOUS at 09:45

## 2019-01-01 RX ADMIN — LORAZEPAM 1 MG: 2 INJECTION INTRAMUSCULAR; INTRAVENOUS at 00:11

## 2019-01-01 RX ADMIN — LORAZEPAM 1 MG: 2 INJECTION INTRAMUSCULAR; INTRAVENOUS at 20:40

## 2019-01-01 RX ADMIN — HEPARIN SODIUM 16 UNITS/KG/HR: 10000 INJECTION, SOLUTION INTRAVENOUS at 06:28

## 2019-01-01 RX ADMIN — PHENYLEPHRINE HYDROCHLORIDE 45 MCG/MIN: 10 INJECTION INTRAVENOUS at 07:14

## 2019-01-01 RX ADMIN — LIDOCAINE HYDROCHLORIDE 100 MG: 20 INJECTION, SOLUTION EPIDURAL; INFILTRATION; INTRACAUDAL; PERINEURAL at 16:06

## 2019-01-01 RX ADMIN — ONDANSETRON 4 MG: 4 TABLET, ORALLY DISINTEGRATING ORAL at 05:07

## 2019-01-01 RX ADMIN — ASPIRIN 81 MG CHEWABLE TABLET 81 MG: 81 TABLET CHEWABLE at 08:21

## 2019-01-01 RX ADMIN — SODIUM CHLORIDE 40 MG: 9 INJECTION, SOLUTION INTRAMUSCULAR; INTRAVENOUS; SUBCUTANEOUS at 21:31

## 2019-01-01 RX ADMIN — ACETYLCYSTEINE 200 MG: 200 SOLUTION ORAL; RESPIRATORY (INHALATION) at 07:38

## 2019-01-01 RX ADMIN — Medication 10 ML: at 22:00

## 2019-01-01 RX ADMIN — POTASSIUM CHLORIDE 10 MEQ: 200 INJECTION, SOLUTION INTRAVENOUS at 10:40

## 2019-01-01 RX ADMIN — HYDROMORPHONE HYDROCHLORIDE 0.5 MG: 2 INJECTION, SOLUTION INTRAMUSCULAR; INTRAVENOUS; SUBCUTANEOUS at 14:23

## 2019-01-01 RX ADMIN — BENZOCAINE AND MENTHOL 1 LOZENGE: 15; 3.6 LOZENGE ORAL at 16:00

## 2019-01-01 RX ADMIN — GUAIFENESIN 600 MG: 600 TABLET, EXTENDED RELEASE ORAL at 15:17

## 2019-01-01 RX ADMIN — ONDANSETRON 4 MG: 2 INJECTION INTRAMUSCULAR; INTRAVENOUS at 21:33

## 2019-01-01 RX ADMIN — IOPAMIDOL 100 ML: 755 INJECTION, SOLUTION INTRAVENOUS at 11:29

## 2019-01-01 RX ADMIN — LORAZEPAM 1 MG: 2 INJECTION INTRAMUSCULAR; INTRAVENOUS at 17:39

## 2019-01-01 RX ADMIN — ONDANSETRON HYDROCHLORIDE 4 MG: 2 INJECTION, SOLUTION INTRAMUSCULAR; INTRAVENOUS at 11:15

## 2019-01-01 RX ADMIN — GENTAMICIN SULFATE 260 MG: 40 INJECTION, SOLUTION INTRAMUSCULAR; INTRAVENOUS at 13:29

## 2019-01-01 RX ADMIN — POTASSIUM CHLORIDE 20 MEQ: 400 INJECTION, SOLUTION INTRAVENOUS at 06:48

## 2019-01-01 RX ADMIN — SODIUM CHLORIDE 50 ML/HR: 900 INJECTION, SOLUTION INTRAVENOUS at 14:26

## 2019-01-01 RX ADMIN — Medication 10 ML: at 06:41

## 2019-01-01 RX ADMIN — BUMETANIDE 1 MG: 1 TABLET ORAL at 17:12

## 2019-01-01 RX ADMIN — GUAIFENESIN 600 MG: 600 TABLET, EXTENDED RELEASE ORAL at 08:30

## 2019-01-01 RX ADMIN — GUAIFENESIN 600 MG: 600 TABLET, EXTENDED RELEASE ORAL at 08:20

## 2019-01-01 RX ADMIN — PANTOPRAZOLE SODIUM 40 MG: 40 TABLET, DELAYED RELEASE ORAL at 09:05

## 2019-01-01 RX ADMIN — ALBUMIN (HUMAN) 25 G: 0.25 INJECTION, SOLUTION INTRAVENOUS at 04:00

## 2019-01-01 RX ADMIN — SUCRALFATE 1 G: 1 TABLET ORAL at 21:13

## 2019-01-01 RX ADMIN — BUMETANIDE 1 MG: 1 TABLET ORAL at 08:27

## 2019-01-01 RX ADMIN — ONDANSETRON 4 MG: 2 INJECTION INTRAMUSCULAR; INTRAVENOUS at 05:56

## 2019-01-01 RX ADMIN — CASTOR OIL AND BALSAM, PERU: 788; 87 OINTMENT TOPICAL at 15:36

## 2019-01-01 RX ADMIN — I.V. FAT EMULSION 500 ML: 20 EMULSION INTRAVENOUS at 19:52

## 2019-01-01 RX ADMIN — SODIUM CHLORIDE 10 ML: 9 INJECTION INTRAMUSCULAR; INTRAVENOUS; SUBCUTANEOUS at 16:37

## 2019-01-01 RX ADMIN — ONDANSETRON 4 MG: 2 INJECTION INTRAMUSCULAR; INTRAVENOUS at 06:21

## 2019-01-01 RX ADMIN — MAGNESIUM SULFATE HEPTAHYDRATE 1 G: 1 INJECTION, SOLUTION INTRAVENOUS at 04:41

## 2019-01-01 RX ADMIN — CALCIUM GLUCONATE: 94 INJECTION, SOLUTION INTRAVENOUS at 19:50

## 2019-01-01 RX ADMIN — NYSTATIN: 100000 CREAM TOPICAL at 17:51

## 2019-01-01 RX ADMIN — METOCLOPRAMIDE 5 MG: 5 INJECTION, SOLUTION INTRAMUSCULAR; INTRAVENOUS at 07:16

## 2019-01-01 RX ADMIN — MORPHINE SULFATE 4 MG: 2 INJECTION, SOLUTION INTRAMUSCULAR; INTRAVENOUS at 23:46

## 2019-01-01 RX ADMIN — DOBUTAMINE HYDROCHLORIDE 5 MCG/KG/MIN: 200 INJECTION INTRAVENOUS at 14:00

## 2019-01-01 RX ADMIN — ONDANSETRON 4 MG: 2 INJECTION INTRAMUSCULAR; INTRAVENOUS at 18:46

## 2019-01-01 RX ADMIN — SUCCINYLCHOLINE CHLORIDE 140 MG: 20 INJECTION, SOLUTION INTRAMUSCULAR; INTRAVENOUS at 11:08

## 2019-01-01 RX ADMIN — GUAIFENESIN 600 MG: 600 TABLET, EXTENDED RELEASE ORAL at 21:40

## 2019-01-01 RX ADMIN — PANTOPRAZOLE SODIUM 40 MG: 40 TABLET, DELAYED RELEASE ORAL at 08:30

## 2019-01-01 RX ADMIN — LORAZEPAM 1 MG: 2 INJECTION INTRAMUSCULAR; INTRAVENOUS at 20:19

## 2019-01-01 RX ADMIN — SODIUM CHLORIDE 75 ML/HR: 900 INJECTION, SOLUTION INTRAVENOUS at 01:52

## 2019-01-01 RX ADMIN — LORAZEPAM 1 MG: 2 INJECTION INTRAMUSCULAR; INTRAVENOUS at 04:07

## 2019-01-01 RX ADMIN — ASPIRIN 81 MG CHEWABLE TABLET 81 MG: 81 TABLET CHEWABLE at 08:14

## 2019-01-01 RX ADMIN — HEPARIN SODIUM 19 UNITS/KG/HR: 10000 INJECTION, SOLUTION INTRAVENOUS at 07:52

## 2019-01-01 RX ADMIN — GUAIFENESIN 600 MG: 600 TABLET, EXTENDED RELEASE ORAL at 21:31

## 2019-01-01 RX ADMIN — ONDANSETRON 4 MG: 2 INJECTION INTRAMUSCULAR; INTRAVENOUS at 20:18

## 2019-01-01 RX ADMIN — BUMETANIDE 1 MG: 1 TABLET ORAL at 17:08

## 2019-01-01 RX ADMIN — ONDANSETRON 4 MG: 2 INJECTION INTRAMUSCULAR; INTRAVENOUS at 07:03

## 2019-01-01 RX ADMIN — ONDANSETRON 4 MG: 2 INJECTION INTRAMUSCULAR; INTRAVENOUS at 08:30

## 2019-01-01 RX ADMIN — AMIODARONE HYDROCHLORIDE 400 MG: 200 TABLET ORAL at 18:38

## 2019-01-01 RX ADMIN — ASPIRIN 81 MG CHEWABLE TABLET 81 MG: 81 TABLET CHEWABLE at 08:46

## 2019-01-01 RX ADMIN — DOCUSATE SODIUM 100 MG: 100 CAPSULE, LIQUID FILLED ORAL at 08:31

## 2019-01-01 RX ADMIN — ENOXAPARIN SODIUM 70 MG: 80 INJECTION SUBCUTANEOUS at 22:23

## 2019-01-01 RX ADMIN — LEVOFLOXACIN 750 MG: 5 INJECTION, SOLUTION INTRAVENOUS at 14:35

## 2019-01-01 RX ADMIN — Medication 10 ML: at 07:08

## 2019-01-01 RX ADMIN — ACETYLCYSTEINE 800 MG: 200 SOLUTION ORAL; RESPIRATORY (INHALATION) at 21:07

## 2019-01-01 RX ADMIN — SODIUM CHLORIDE 50 ML/HR: 900 INJECTION, SOLUTION INTRAVENOUS at 13:33

## 2019-01-01 RX ADMIN — SODIUM CHLORIDE 40 MG: 9 INJECTION INTRAMUSCULAR; INTRAVENOUS; SUBCUTANEOUS at 09:12

## 2019-01-01 RX ADMIN — Medication 40 MCG: at 13:10

## 2019-01-01 RX ADMIN — SODIUM CHLORIDE 10 ML: 9 INJECTION INTRAMUSCULAR; INTRAVENOUS; SUBCUTANEOUS at 18:10

## 2019-01-01 RX ADMIN — LEVOFLOXACIN 500 MG: 5 INJECTION, SOLUTION INTRAVENOUS at 11:19

## 2019-01-01 RX ADMIN — SODIUM CHLORIDE 100 ML: 900 INJECTION, SOLUTION INTRAVENOUS at 11:00

## 2019-01-01 RX ADMIN — CARVEDILOL 6.25 MG: 6.25 TABLET, FILM COATED ORAL at 16:54

## 2019-01-01 RX ADMIN — SODIUM CHLORIDE 10 ML: 9 INJECTION INTRAMUSCULAR; INTRAVENOUS; SUBCUTANEOUS at 21:22

## 2019-01-01 RX ADMIN — MORPHINE SULFATE 2 MG: 2 INJECTION, SOLUTION INTRAMUSCULAR; INTRAVENOUS at 13:28

## 2019-01-01 RX ADMIN — HEPARIN SODIUM 19.07 UNITS/KG/HR: 10000 INJECTION, SOLUTION INTRAVENOUS at 19:48

## 2019-01-01 RX ADMIN — ONDANSETRON 4 MG: 2 INJECTION INTRAMUSCULAR; INTRAVENOUS at 01:31

## 2019-01-01 RX ADMIN — POLYETHYLENE GLYCOL 3350 17 G: 17 POWDER, FOR SOLUTION ORAL at 15:05

## 2019-01-01 RX ADMIN — SODIUM CHLORIDE 100 ML: 900 INJECTION, SOLUTION INTRAVENOUS at 12:27

## 2019-01-01 RX ADMIN — SODIUM CHLORIDE 1000 ML: 900 INJECTION, SOLUTION INTRAVENOUS at 12:50

## 2019-01-01 RX ADMIN — OSELTAMIVIR PHOSPHATE 75 MG: 75 CAPSULE ORAL at 19:29

## 2019-01-01 RX ADMIN — HEPARIN SODIUM 2140 UNITS: 5000 INJECTION INTRAVENOUS; SUBCUTANEOUS at 02:37

## 2019-01-01 RX ADMIN — GLYCOPYRROLATE 0.2 MG: 0.2 INJECTION, SOLUTION INTRAMUSCULAR; INTRAVENOUS at 20:35

## 2019-01-01 RX ADMIN — HEPARIN SODIUM 18 UNITS/KG/HR: 10000 INJECTION, SOLUTION INTRAVENOUS at 19:27

## 2019-01-01 RX ADMIN — SUCRALFATE 1 G: 1 TABLET ORAL at 06:39

## 2019-01-01 RX ADMIN — IPRATROPIUM BROMIDE AND ALBUTEROL SULFATE 3 ML: .5; 3 SOLUTION RESPIRATORY (INHALATION) at 21:26

## 2019-01-01 RX ADMIN — METOPROLOL TARTRATE 5 MG: 5 INJECTION INTRAVENOUS at 06:17

## 2019-01-01 RX ADMIN — ONDANSETRON 4 MG: 2 INJECTION INTRAMUSCULAR; INTRAVENOUS at 17:11

## 2019-01-01 RX ADMIN — HEPARIN SODIUM 5000 UNITS: 5000 INJECTION INTRAVENOUS; SUBCUTANEOUS at 19:48

## 2019-01-01 RX ADMIN — GLYCOPYRROLATE 0.2 MG: 0.2 INJECTION, SOLUTION INTRAMUSCULAR; INTRAVENOUS at 00:28

## 2019-01-01 RX ADMIN — SUCRALFATE 1 G: 1 TABLET ORAL at 21:31

## 2019-01-01 RX ADMIN — ROSUVASTATIN CALCIUM 10 MG: 10 TABLET, FILM COATED ORAL at 21:27

## 2019-01-01 RX ADMIN — CARVEDILOL 6.25 MG: 6.25 TABLET, FILM COATED ORAL at 07:18

## 2019-01-01 RX ADMIN — METOCLOPRAMIDE 5 MG: 5 INJECTION, SOLUTION INTRAMUSCULAR; INTRAVENOUS at 07:18

## 2019-01-01 RX ADMIN — SODIUM CHLORIDE 10 ML: 9 INJECTION INTRAMUSCULAR; INTRAVENOUS; SUBCUTANEOUS at 06:55

## 2019-01-01 RX ADMIN — CEFTRIAXONE 1 G: 1 INJECTION, POWDER, FOR SOLUTION INTRAMUSCULAR; INTRAVENOUS at 07:01

## 2019-01-01 RX ADMIN — ALBUMIN (HUMAN) 25 G: 0.25 INJECTION, SOLUTION INTRAVENOUS at 08:22

## 2019-01-01 RX ADMIN — ACETAMINOPHEN 650 MG: 325 TABLET ORAL at 20:33

## 2019-01-01 RX ADMIN — ENOXAPARIN SODIUM 40 MG: 40 INJECTION SUBCUTANEOUS at 16:13

## 2019-01-01 RX ADMIN — SODIUM CHLORIDE 40 MG: 9 INJECTION INTRAMUSCULAR; INTRAVENOUS; SUBCUTANEOUS at 10:55

## 2019-01-01 RX ADMIN — POLYETHYLENE GLYCOL 3350 17 G: 17 POWDER, FOR SOLUTION ORAL at 11:57

## 2019-01-01 RX ADMIN — Medication 10 ML: at 18:21

## 2019-01-01 RX ADMIN — ONDANSETRON 4 MG: 2 INJECTION INTRAMUSCULAR; INTRAVENOUS at 04:01

## 2019-01-01 RX ADMIN — SODIUM CHLORIDE 10 ML: 9 INJECTION INTRAMUSCULAR; INTRAVENOUS; SUBCUTANEOUS at 17:36

## 2019-01-01 RX ADMIN — IOPAMIDOL 100 ML: 755 INJECTION, SOLUTION INTRAVENOUS at 09:48

## 2019-01-01 RX ADMIN — ACETYLCYSTEINE 200 MG: 200 SOLUTION ORAL; RESPIRATORY (INHALATION) at 07:22

## 2019-01-01 RX ADMIN — BUMETANIDE 1 MG: 0.25 INJECTION INTRAMUSCULAR; INTRAVENOUS at 08:31

## 2019-01-01 RX ADMIN — POTASSIUM CHLORIDE 40 MEQ: 750 TABLET, EXTENDED RELEASE ORAL at 07:11

## 2019-01-01 RX ADMIN — ONDANSETRON 4 MG: 2 INJECTION INTRAMUSCULAR; INTRAVENOUS at 21:27

## 2019-01-01 RX ADMIN — ENOXAPARIN SODIUM 70 MG: 80 INJECTION SUBCUTANEOUS at 21:30

## 2019-01-01 RX ADMIN — MORPHINE SULFATE 4 MG: 2 INJECTION, SOLUTION INTRAMUSCULAR; INTRAVENOUS at 04:08

## 2019-01-01 RX ADMIN — PROPOFOL 10 MG: 10 INJECTION, EMULSION INTRAVENOUS at 15:30

## 2019-01-01 RX ADMIN — SODIUM CHLORIDE 500 ML: 900 INJECTION, SOLUTION INTRAVENOUS at 16:37

## 2019-01-01 RX ADMIN — Medication 10 ML: at 15:00

## 2019-01-01 RX ADMIN — SENNOSIDES, DOCUSATE SODIUM 1 TABLET: 50; 8.6 TABLET, FILM COATED ORAL at 09:53

## 2019-01-01 RX ADMIN — SODIUM CHLORIDE 40 MG: 9 INJECTION INTRAMUSCULAR; INTRAVENOUS; SUBCUTANEOUS at 11:30

## 2019-01-01 RX ADMIN — FAMOTIDINE: 10 INJECTION, SOLUTION INTRAVENOUS at 18:44

## 2019-01-01 RX ADMIN — CARVEDILOL 3.12 MG: 3.12 TABLET, FILM COATED ORAL at 07:11

## 2019-01-01 RX ADMIN — ONDANSETRON 4 MG: 2 INJECTION INTRAMUSCULAR; INTRAVENOUS at 13:59

## 2019-01-01 RX ADMIN — GLYCOPYRROLATE 0.2 MG: 0.2 INJECTION, SOLUTION INTRAMUSCULAR; INTRAVENOUS at 16:00

## 2019-01-01 RX ADMIN — SODIUM CHLORIDE, SODIUM LACTATE, POTASSIUM CHLORIDE, AND CALCIUM CHLORIDE 125 ML/HR: 600; 310; 30; 20 INJECTION, SOLUTION INTRAVENOUS at 20:27

## 2019-01-01 RX ADMIN — FUROSEMIDE 40 MG: 10 INJECTION, SOLUTION INTRAMUSCULAR; INTRAVENOUS at 09:32

## 2019-01-01 RX ADMIN — CALCIUM GLUCONATE: 94 INJECTION, SOLUTION INTRAVENOUS at 19:26

## 2019-01-01 RX ADMIN — GADOBUTROL 6 ML: 604.72 INJECTION INTRAVENOUS at 21:00

## 2019-01-01 RX ADMIN — OSELTAMIVIR PHOSPHATE 75 MG: 75 CAPSULE ORAL at 18:56

## 2019-01-01 RX ADMIN — LEVOFLOXACIN 500 MG: 5 INJECTION, SOLUTION INTRAVENOUS at 12:57

## 2019-01-01 RX ADMIN — LORAZEPAM 1 MG: 2 INJECTION INTRAMUSCULAR; INTRAVENOUS at 08:21

## 2019-01-01 RX ADMIN — ONDANSETRON 4 MG: 2 INJECTION INTRAMUSCULAR; INTRAVENOUS at 04:58

## 2019-01-01 RX ADMIN — SODIUM CHLORIDE, SODIUM LACTATE, POTASSIUM CHLORIDE, AND CALCIUM CHLORIDE 125 ML/HR: 600; 310; 30; 20 INJECTION, SOLUTION INTRAVENOUS at 00:14

## 2019-01-01 RX ADMIN — MORPHINE SULFATE 4 MG: 2 INJECTION, SOLUTION INTRAMUSCULAR; INTRAVENOUS at 00:29

## 2019-01-01 RX ADMIN — Medication 10 ML: at 20:17

## 2019-01-01 RX ADMIN — LORAZEPAM 1 MG: 2 INJECTION INTRAMUSCULAR; INTRAVENOUS at 00:34

## 2019-01-01 RX ADMIN — LORAZEPAM 1 MG: 2 INJECTION INTRAMUSCULAR; INTRAVENOUS at 12:40

## 2019-01-01 RX ADMIN — ENOXAPARIN SODIUM 40 MG: 30 INJECTION SUBCUTANEOUS at 09:10

## 2019-01-01 RX ADMIN — Medication 20 MG: at 13:12

## 2019-01-01 RX ADMIN — ONDANSETRON 4 MG: 2 INJECTION INTRAMUSCULAR; INTRAVENOUS at 00:11

## 2019-01-01 RX ADMIN — ALBUMIN (HUMAN) 25 G: 0.25 INJECTION, SOLUTION INTRAVENOUS at 17:00

## 2019-01-01 RX ADMIN — OSELTAMIVIR PHOSPHATE 75 MG: 75 CAPSULE ORAL at 18:39

## 2019-01-01 RX ADMIN — HEPARIN SODIUM 5000 UNITS: 5000 INJECTION INTRAVENOUS; SUBCUTANEOUS at 20:32

## 2019-01-01 RX ADMIN — Medication 10 ML: at 17:30

## 2019-01-01 RX ADMIN — LIDOCAINE HYDROCHLORIDE 40 MG: 20 INJECTION, SOLUTION EPIDURAL; INFILTRATION; INTRACAUDAL; PERINEURAL at 15:13

## 2019-01-01 RX ADMIN — METOPROLOL TARTRATE 5 MG: 5 INJECTION INTRAVENOUS at 00:09

## 2019-01-01 RX ADMIN — PHENYLEPHRINE HYDROCHLORIDE 75 MCG/MIN: 10 INJECTION INTRAVENOUS at 09:23

## 2019-01-01 RX ADMIN — ONDANSETRON 4 MG: 2 INJECTION INTRAMUSCULAR; INTRAVENOUS at 19:44

## 2019-01-01 RX ADMIN — MIDAZOLAM 1 MG: 1 INJECTION INTRAMUSCULAR; INTRAVENOUS at 12:50

## 2019-01-01 RX ADMIN — ACETAMINOPHEN 650 MG: 325 TABLET, FILM COATED ORAL at 11:48

## 2019-01-01 RX ADMIN — PANTOPRAZOLE SODIUM 40 MG: 40 TABLET, DELAYED RELEASE ORAL at 12:04

## 2019-01-01 RX ADMIN — Medication 10 ML: at 06:21

## 2019-01-01 RX ADMIN — MORPHINE SULFATE 4 MG: 2 INJECTION, SOLUTION INTRAMUSCULAR; INTRAVENOUS at 22:28

## 2019-01-01 RX ADMIN — Medication 10 ML: at 21:46

## 2019-01-01 RX ADMIN — SODIUM CHLORIDE 40 MG: 9 INJECTION INTRAMUSCULAR; INTRAVENOUS; SUBCUTANEOUS at 21:15

## 2019-01-01 RX ADMIN — ACETAMINOPHEN 650 MG: 650 SUPPOSITORY RECTAL at 18:33

## 2019-01-01 RX ADMIN — SODIUM CHLORIDE 40 MG: 9 INJECTION INTRAMUSCULAR; INTRAVENOUS; SUBCUTANEOUS at 09:00

## 2019-01-01 RX ADMIN — FENTANYL CITRATE 50 MCG: 50 INJECTION, SOLUTION INTRAMUSCULAR; INTRAVENOUS at 14:01

## 2019-01-01 RX ADMIN — SODIUM CHLORIDE 40 MG: 9 INJECTION INTRAMUSCULAR; INTRAVENOUS; SUBCUTANEOUS at 21:21

## 2019-01-01 RX ADMIN — PANTOPRAZOLE SODIUM 40 MG: 40 TABLET, DELAYED RELEASE ORAL at 08:36

## 2019-01-01 RX ADMIN — MORPHINE SULFATE 2 MG: 2 INJECTION, SOLUTION INTRAMUSCULAR; INTRAVENOUS at 01:45

## 2019-01-01 RX ADMIN — HEPARIN SODIUM 18 UNITS/KG/HR: 10000 INJECTION, SOLUTION INTRAVENOUS at 18:35

## 2019-01-01 RX ADMIN — ASPIRIN 81 MG CHEWABLE TABLET 324 MG: 81 TABLET CHEWABLE at 17:32

## 2019-01-01 RX ADMIN — LORAZEPAM 1 MG: 2 INJECTION INTRAMUSCULAR; INTRAVENOUS at 04:16

## 2019-01-01 RX ADMIN — ONDANSETRON 4 MG: 2 INJECTION INTRAMUSCULAR; INTRAVENOUS at 00:04

## 2019-01-01 RX ADMIN — POTASSIUM CHLORIDE 10 MEQ: 14.9 INJECTION, SOLUTION INTRAVENOUS at 16:37

## 2019-01-01 RX ADMIN — METOCLOPRAMIDE 5 MG: 5 INJECTION, SOLUTION INTRAMUSCULAR; INTRAVENOUS at 06:12

## 2019-01-01 RX ADMIN — MORPHINE SULFATE 4 MG: 2 INJECTION, SOLUTION INTRAMUSCULAR; INTRAVENOUS at 11:56

## 2019-01-15 PROBLEM — K85.90 PANCREATITIS: Status: ACTIVE | Noted: 2019-01-01

## 2019-01-15 NOTE — ED NOTES
1318 Upon assessing pt, pt reports x9 vomiting in one day about 5 days ago. Pt reporting black emesis each time. Pt denies nausea at the moment. Denies abd pain. Tenderness upon palpation near lower ribs. 1329 RT at bedside for VBG. 1540 Pt ambulatory with steady gait to restroom. Asked for urine specimen. Pt did not give urine specimen. Instructed to do so again when need to void. 1600 Pt resting comfortably with no complaints at this time. NAD, VSS. Will continue to monitor.

## 2019-01-15 NOTE — ROUTINE PROCESS
TRANSFER - OUT REPORT: 
 
Verbal report given to Marlon Sarah RN(name) on Cassy Gentile  being transferred to (unit) for routine progression of care Report consisted of patients Situation, Background, Assessment and  
Recommendations(SBAR). Information from the following report(s) SBAR, ED Summary, Procedure Summary, MAR and Recent Results was reviewed with the receiving nurse. Lines:  
Peripheral IV 01/15/19 Left Antecubital (Active) Site Assessment Clean, dry, & intact 1/15/2019 12:38 PM  
Phlebitis Assessment 0 1/15/2019 12:38 PM  
Infiltration Assessment 4 1/15/2019 12:38 PM  
Dressing Status Clean, dry, & intact; Occlusive 1/15/2019 12:38 PM  
Dressing Type 4 X 4;Tape;Transparent 1/15/2019 12:38 PM  
Hub Color/Line Status Pink;Flushed;Patent 1/15/2019 12:38 PM  
Action Taken Blood drawn 1/15/2019 12:38 PM  
  
 
Opportunity for questions and clarification was provided.

## 2019-01-15 NOTE — CONSULTS
[address] [facility]. 
 [address] [place] [address] GASTROENTEROLOGY CONSULTATION Lori Corrigan, [phone] office [phone] NP/PA in-hospital cell phone M-F until 4:30PM 
After 5PM or on weekends, please call  for physician on call NAME:  Lindsey Clancy :   1940 MRN:   [id number] Referring Physician: Dr. Danni Enciso Consult Date: 1/15/2019 4:04 PM 
 
Chief Complaint: abdominal pain History of Present Illness:  Patient is a 70 y.o. who is seen in consultation at the request of Dr. Radha Rucker for other. Patient presented to the ED with complaints of nausea, vomiting, hematemesis, and epigastric abdominal pain. She was admitted to the hospital on 1/15/19. Patient complains of nausea, vomiting, and epigastric abdominal pain that started approximately 4 days. She reports multiple episodes of black emesis at that time. Nausea and vomiting resolved. She has had persistent complaints of epigastric abdominal pain that she describes as an intermittent, sharp stabbing sensation. Pain is worse with eating and drinking. No clear food triggers. No reflux. No change in bowel habits, diarrhea, or constipation. No melena or hematochezia. No bowel movement since the start of these symptoms. No fevers or chills. No NSAIDs. No anticoagulation. No alcohol or tobacco use. History of cholecystectomy and hiatal hernia repair. No history of EGD. Colonoscopy (17) by Dr. Christiano Jc for lower GI bleeding benign appearing colon polyps; moderate internal hemorrhoids as the likely cause of the bleeding. Pathology showed tubular adenomas. A repeat colonoscopy was recommended in 5 years. I have reviewed the emergency room note, hospital admission note, notes by all other clinicians who have seen the patient during this hospitalization to date.  I have reviewed the problem list and the reason for this hospitalization. I have reviewed the allergies and the medications the patient was taking at home prior to this hospitalization. PMH: 
Past Medical History:  
Diagnosis Date  Other ill-defined conditions(799.89) IBS  
 
 
PSH: 
Past Surgical History:  
Procedure Laterality Date  COLONOSCOPY Left 11/1/2017 COLONOSCOPY performed by Sarrah Cabot, MD at [address]  HX CHOLECYSTECTOMY  HX GI    
 surgery for hiatal hernia Allergies: Allergies Allergen Reactions  Penicillins Hives Home Medications: 
Prior to Admission Medications Prescriptions Last Dose Informant Patient Reported? Taking? CALCIUM CARBONATE/VITAMIN D3 (CALTRATE 600 + D PO)   Yes No  
Sig: Take 600 mg by mouth daily. MULTIVITAMIN PO   Yes No  
Sig: Take  by mouth. Takes one women's one-a-day formula po daily. Facility-Administered Medications: None Hospital Medications: No current facility-administered medications for this encounter. Current Outpatient Medications Medication Sig  MULTIVITAMIN PO Take  by mouth. Takes one women's one-a-day formula po daily.  CALCIUM CARBONATE/VITAMIN D3 (CALTRATE 600 + D PO) Take 600 mg by mouth daily. Social History: 
Social History Tobacco Use  Smoking status: Former Smoker  Tobacco comment: quit smoking cigarettes 6 yrs ago Substance Use Topics  Alcohol use: Yes Comment: very occasional  
 
 
Family History: 
Family History Problem Relation Age of Onset  Dementia Mother   
     alzheimers  Cancer Sister   
     gallbladder  Cancer Brother   
     pancreatic  Breast Cancer Paternal Grandmother  Dementia Sister   
     alzheimers  Heart Surgery Brother   
     bypass Review of Systems: 
Constitutional: negative fever, negative chills, negative weight loss Eyes:   negative visual changes ENT:   negative sore throat, tongue or lip swelling Respiratory:  negative cough, negative dyspnea Cards:  negative for chest pain, palpitations, lower extremity edema GI:   See HPI 
:  negative for frequency, dysuria Integument:  negative for rash and pruritus Heme:  negative for easy bruising and gum/nose bleeding Musculoskeletal:negative for myalgias, back pain and muscle weakness Neuro:    negative for headaches, dizziness, vertigo Psych: negative for feelings of anxiety, depression Objective:  
 
Patient Vitals for the past 8 hrs: 
 BP Temp Pulse Resp SpO2  
01/15/19 1551 (!) 111/95      
01/15/19 1429   97 24 (!) 89 % 01/15/19 1225 114/59 97.9 °F (36.6 °C) 98 14 99 % 01/15/19 1211   (!) 116  99 % No intake/output data recorded. No intake/output data recorded. EXAM:   
 CONST:  Pleasant female lying in bed, no acute distress NEURO:  Alert and oriented x 3 HEENT: EOMI, no scleral icterus LUNGS: CTA bilaterally anteriorly CARD:  S1 S2 
 ABD:  Soft, non distended, epigastric tenderness, no rebound, no guarding. + Bowel sounds. EXT:  Warm PSYCH: Not anxious or agitated Data Review Recent Labs  
  01/15/19 
1229 WBC 11.8* HGB 13.3 HCT 41.1  Recent Labs  
  01/15/19 
1229 * K 4.2  CO2 25 BUN 25* CREA 1.27* * CA 9.2 Recent Labs  
  01/15/19 
1229 SGOT 44* AP 87  
TP 8.6* ALB 3.6 GLOB 5.0*  
LPSE >3,000* Recent Labs  
  01/15/19 
1229 INR 1.0 PTP 10.2 1/15/19 EXAM:  ABDOMEN, LTD - US*  
  
INDICATION: Abdominal pain and vomiting for 2 days. . 
  
COMPARISON: None. 
  
TECHNIQUE:  
Limited real-time sonography of the right upper quadrant of the abdomen was 
performed with multiple static images of the liver, gallbladder, pancreatic head 
and right kidney obtained. 
  
FINDINGS: 
LIVER:  
The liver is heterogeneous and echogenic with 3 discrete foci in the left 
hepatic lobe.  One is echogenic and measures 2.4 x 2.7 x 1.4 cm, compatible with 
a hemangioma. 1 is cystic and measures 9 x 5 x 11 mm. The third is a complex 
structure due to adjacent to the left hepatic lobe that measures 26 x 10 x 22 mm 
and may represent a pancreatic pseudocyst..  
  
GALLBLADDER: 
The gallbladder is surgically absent. .  
  
COMMON BILE DUCT: 
There is mild intrahepatic biliary duct dilatation but the common duct measures 19 mm in diameter.  
  
PANCREAS: 
The pancreatic head is normal. 
  
KIDNEYS: 
The right kidney demonstrates normal echogenicity with no mass, stone or 
hydronephrosis. The right kidney measures 9.3 cm in length. 
  
The body and tail of the pancreas, left kidney, spleen and retroperitoneum were 
not evaluated on this right upper quadrant examination. 
  
IMPRESSION IMPRESSION:  
  
Double duct sign without an obvious pancreatic mass. Recommend CT or MRCP to 
further characterize. At that time, it can be determined whether there is a pancreatic pseudocyst or 
complex left hepatic cyst. 
 
  
Assessment:  
· Nausea/vomiting and epigastric abdominal pain: WBC 11.8, Hgb 13.3, AST: 44, ALT: 19, alkaline phosphatase: 87, total bilirubin: 1.3, lipase > 3,000. RUQ ultrasound (1/15/19): double duct sign without an obvious pancreatic mass, recommend CT or MRCP to further characterize. Patient Active Problem List  
Diagnosis Code  Pancreatitis K85.90 Plan:  
 
· NPO · PPI BID · LR 
· Supportive measures: antiemetics and analgesics PRN 
· MRI/MRCP 
· Monitor LFTs and lipase · Next consider EGD/ERCP · Thank you for allowing me to participate in care of Jayla Gallegos Signed By: ARIANA Veloz   
 1/15/2019  4:04 PM 
  
 
GI attending note: 
 
Gen: NAD; Cardiac: nml S1, S2; Pulm: CTA B; Abd: normoactive BS, epigastric tenderness, nd, no rebound/guarding. Vitals, labs, and imaging reviewed. Agree with the history, physical, and plan of the SISI. Pancreatitis with \"double duct\" sign on imaging.  Need to further evaluate with MRI for pancreatic lesion. No EtOH use. No medications. History of CCY. Will monitor for UGIB-PPI for now. Thank you for this consultation. Dr. Jesus Mendez

## 2019-01-15 NOTE — H&P
[address] [place]  HISTORY AND PHYSICAL Chloe Caro 
MR#: [id number] : 1940 ACCOUNT #: [de-identified] ADMIT DATE: 01/15/2019 CHIEF COMPLAINT:  Abdominal pain. HISTORY OF PRESENT ILLNESS:  The patient is a 72-year-old female with a past medical history of IBS, who presented to the hospital with the above-mentioned symptoms. The patient reports her symptoms started about 5 days back. She had an episode of black, tarry vomitus, post that started having some significant abdominal pain. The patient reports the abdominal pain continued. She had poor appetite, had nausea, and the epigastric abdominal pain persisted. Patient reports that she does not take any anticoagulants or antiplatelets. Reports that pain gets worse with eating. Reports that she has not had a good bowel movement, but all her bowel movements have not been black as far as she knows. The patient had a colonoscopy done by Dr. Christiano Jc for lower GI bleeding with benign-appearing colon polyps, moderate internal hemorrhoids, which were considered to be the likely cause of bleeding. The patient came to the ER, was found to have elevated lipase, and was requested to be admitted under the hospitalist service. The patient denies any headache, blurry vision, sore throat, trouble swallowing, trouble with speech, any chest pain, shortness of breath, cough, fever, chills, urinary symptoms, focal or generalized neurological weakness, recent travel, sick contacts, falls, injuries. Does admit to hematemesis, but no melena or hemoptysis. PAST MEDICAL HISTORY:  See above. HOME MEDICATIONS:  Multivitamin 1 tablet daily, calcium carbonate daily. SOCIAL HISTORY:  Former smoker, quit 6 years ago. Occasional alcohol. No IV drug abuse. FAMILY HISTORY:  Mother has a history of dementia. Sister has a history of gallbladder cancer. Brother has a history of pancreatic cancer. REVIEW OF SYSTEMS:  All systems were reviewed and found to be essentially negative except for the symptoms mentioned above. PHYSICAL EXAMINATION: 
VITAL SIGNS:  Temperature 98, pulse rate 101, respiratory rate 22, blood pressure 126/62, pulse ox 96% on room air. GENERAL:  Alert x3, awake, mildly distressed, pleasant female, appears to be stated age. HEENT:  Pupils equal and reactive to light. Dry mucous membranes. Tympanic membranes clear. NECK:  Supple. CHEST:  Clear to auscultation bilaterally. CORONARY:  Tachycardic. ABDOMEN:  Soft, tender to palpation in the epigastric region. No rebound, no guarding. Bowel sounds were hypoactive. EXTREMITIES:  No clubbing, no cyanosis, no edema. NEUROPSYCHIATRIC:  Pleasant mood and affect. Cranial nerves II-XII grossly intact. Sensory grossly within normal limits. DTRs 2+/4. Strength 5/5. SKIN:  Warm. LABORATORY DATA:  White count 11.8, hemoglobin 13.3, hematocrit 41.1, platelets 767. INR 1. Sodium 135, potassium 4.2, chloride 101, bicarbonate 25, anion gap 9, glucose 126, BUN 25, creatinine 1.27, calcium 9.2, bilirubin total 1.3, ALT 19, AST 44, alkaline phosphatase 87, lipase greater than 3000. VBG shows a pH of 7.430, pCO2 of 41.4, pO2 of 17. Ultrasound of the abdomen shows double duct sign without any obvious pancreatic mass. Recommend CT or MRCP. It can be determined then if there is pancreatic pseudocyst or complex hepatic cyst.  It cannot be determined at this time. EKG shows sinus tachycardia with normal sinus rhythm with nonspecific ST changes. ASSESSMENT AND PLAN: 
1. Abdominal pain, most likely secondary to pancreatitis. There may be a component of gastrointestinal bleed associated with the same. The patient will be admitted to a medical bed. We will start the patient on aggressive IV hydration. We will provide pain control. Keep n.p.o. for now. Appreciate GI consult. We will get an MRCP.   The patient may have a component of gastrointestinal bleed associated with the same. We will provide Protonix b.i.d., monitor hemoglobin and hematocrit, and further intervention will be per hospital course. Continue to closely monitor. If symptoms persist, may consider further management. May consider getting a CT. Await MRI results. Reassess as needed. Continue to monitor. 2.  Tachycardia, most likely secondary to severe pain. No chest pain. The patient denies any cardiac symptoms. Monitor for now. 3.  Gastrointestinal and deep venous thrombosis prophylaxis. Patient will be on sequential compression devices. Radha Kim MD MM/MILEY 
D: 01/15/2019 17:08    
T: 01/15/2019 17:50 
JOB #: [id number]

## 2019-01-15 NOTE — ED NOTES
70 y.o. female presents ambulatory to the ED, with chief complaint of abdominal pain. Pt reports experiencing an episode of vomiting that occurred 4 days ago. She states that she vomited x 9 on 1/11/19 and that her vomit was \"half black and half caramel color\". Denies vomiting since. Pt also complains of upper abdominal pain that she describes as \"sore\" and is exacerbated with palpation. Notes that her last bowel movement was \"3-4 days ago\". Pt reports that she was seen at [facility] earlier today. She reports that she is s/p appendectomy and cholecystectomy. Denies receiving an endoscopy in the past or ever seeing a gastroenterologist. Pt specifically denies dysuria. There are no other acute medical concerns. PCP: Cookie Gillette MD 
 
Note written by Zahira Beach as dictated by Regino Srivastava MD 1:00 PM 
 
ROS: Positive for abdominal pain (epigastric), vomiting. Negative for dysuria. Physical Exam  
Constitutional: She is oriented to person, place, and time and well-developed, well-nourished, and in no distress. No distress. HENT:  
Head: Normocephalic and atraumatic. Neck: Normal range of motion. Neck supple. Cardiovascular: Normal rate, regular rhythm and normal pulses. Pulmonary/Chest: Effort normal and breath sounds normal.  
Abdominal: Soft. Normal appearance and bowel sounds are normal. She exhibits no distension and no fluid wave. There is tenderness in the epigastric area. There is no rebound. Neurological: She is alert and oriented to person, place, and time. She has intact cranial nerves. Gait normal. GCS score is 15. Skin: She is not diaphoretic. Psychiatric: Mood, memory, affect and judgment normal.  
 
 
 
This is a 70year old female with past medical history, review of systems, physical exam as above, presenting with complaints of nausea, vomiting, hematemesis, and epigastric abdominal pain.  Patient states that symptoms onset, and resolved approximately 4 days ago, however states epigastric abdominal pain persists. She was evaluated by her primary care physician, and referred to the emergency department for further evaluation. She endorses a past history of hiatal hernia, denies cardiac disease, states previous abdominal surgeries including appendectomy, and cholecystectomy. Physical exam is remarkable for well-appearing elderly female, in no acute distress, noted to be afebrile, without tachycardia or hypotension. She has clear breath sounds, mild epigastric tenderness to palpation, with an otherwise benign abdomen, regular rate and rhythm without murmurs gallops or rubs. Differential includes acute coronary syndrome, gastritis, gastroenteritis. Plan to provide pain control, obtain CMP, CBC chem EKG, cardiac enzymes, lipase, UA. We will reevaluate, and make a disposition based on the patient's diagnostics and response to therapy. EKG:  
Rhythm: normal sinus rhythm; Rate: 85 bpm; ST/T Wave: ST flattening V4-V6; no acute ischemia; Note written by Zahira Rosado as dictated by Carlos Santana MD 1:00 PM 
 
Consult Note: 
3:23 PM 
Audelia Kraus MD spoke with Dr. Jose Aragon MD, Consult for Hospitalist. Discussed all results and pertinent findings. Dr. Gema Oconnell will evaluate the patient for admission to the hospital. 
 
Consult Note: 
4:45 PM 
Carlos Santana MD spoke with Dr. Jaylin Murguia MD, Consult for Gastroenterology. Discussed all results and pertinent findings. Dr. Jaylin Murguia recommends admitting the patient. He will see the patient.

## 2019-01-15 NOTE — PROGRESS NOTES
Problem: Falls - Risk of 
Goal: *Absence of Falls Document Eduin Pham Fall Risk and appropriate interventions in the flowsheet. Outcome: Progressing Towards Goal 
Fall Risk Interventions:

## 2019-01-15 NOTE — ED TRIAGE NOTES
Patient reports she has been vomiting for 2 days. Patient reports she has been vomiting black \"flacks\" and carmlooking \"stuff\". Patient states her doctor \"is concerned about a place in her stomach\".

## 2019-01-15 NOTE — PROGRESS NOTES
Primary Nurse Sanda Severin and Beck Miranda RN performed a dual skin assessment on this patient No impairment noted Travis score is 20

## 2019-01-16 NOTE — PROGRESS NOTES
Problem: Falls - Risk of 
Goal: *Absence of Falls Document Sherif Romie Fall Risk and appropriate interventions in the flowsheet. Outcome: Progressing Towards Goal 
Fall Risk Interventions:

## 2019-01-16 NOTE — PROGRESS NOTES
[facility] 
[facility], [address] GI PROGRESS NOTE Will Luis , [phone] office [phone] NP/PA in-hospital cell phone M-F until 4:30PM 
After 5PM or on weekends, please call  for physician on call NAME: Yefri Potts :  1940 MRN:  [id number] Subjective:  
Patient is sitting upright in bed and reports improvement in abdominal pain. No nausea or vomiting. No bowel movement. Son and sister are at the bedside. Objective: VITALS:  
Last 24hrs VS reviewed since prior progress note. Most recent are: 
Visit Vitals /76 (BP 1 Location: Left arm, BP Patient Position: At rest) Pulse 95 Temp 98.4 °F (36.9 °C) Resp 18 Wt 63.1 kg (139 lb 1.6 oz) SpO2 95% Breastfeeding? No  
BMI 23.15 kg/m² PHYSICAL EXAM: 
General: Cooperative, no acute distress   
Neurologic:  Alert and oriented HEENT: EOMI, no scleral icterus Lungs:  CTA bilaterally anteriorly Heart:  S1 S2 Abdomen: Soft, non-distended, mild generalized tenderness, no guarding, no rebound. +Bowel sounds. Extremities: Warm Psych:   Not anxious or agitated Lab Data Reviewed:  
 
Recent Results (from the past 24 hour(s)) TYPE & SCREEN Collection Time: 01/15/19  1:28 PM  
Result Value Ref Range Crossmatch Expiration 2019 ABO/Rh(D) A POSITIVE Antibody screen NEG   
POC G3 - PUL Collection Time: 01/15/19  1:30 PM  
Result Value Ref Range pH (POC) 7.430 7.35 - 7.45    
 pCO2 (POC) 41.4 35.0 - 45.0 MMHG  
 pO2 (POC) 17 (LL) 80 - 100 MMHG  
 HCO3 (POC) 27.4 (H) 22 - 26 MMOL/L  
 sO2 (POC) 25 (L) 92 - 97 % Base excess (POC) 3 mmol/L Site OTHER Device: ROOM AIR Allens test (POC) N/A Specimen type (POC) OTHER Total resp. rate 19 URINALYSIS W/MICROSCOPIC Collection Time: 01/15/19 10:16 PM  
Result Value Ref Range Color DARK YELLOW  Appearance CLOUDY (A) CLEAR    
 Specific gravity 1.030 1.003 - 1.030    
 pH (UA) 6.0 5.0 - 8.0 Protein 100 (A) NEG mg/dL Glucose NEGATIVE  NEG mg/dL Ketone 40 (A) NEG mg/dL Blood NEGATIVE  NEG Urobilinogen 1.0 0.2 - 1.0 EU/dL Nitrites NEGATIVE  NEG Leukocyte Esterase SMALL (A) NEG    
 WBC 10-20 0 - 4 /hpf  
 RBC 0-5 0 - 5 /hpf Epithelial cells MODERATE (A) FEW /lpf Bacteria 1+ (A) NEG /hpf  
 Granular cast 0-2 (A) NEG /lpf URINE CULTURE HOLD SAMPLE Collection Time: 01/15/19 10:16 PM  
Result Value Ref Range Urine culture hold URINE ON HOLD IN MICROBIOLOGY DEPT FOR 3 DAYS. IF UNPRESERVED URINE IS SUBMITTED, IT CANNOT BE USED FOR ADDITIONAL TESTING AFTER 24 HRS, RECOLLECTION WILL BE REQUIRED. BILIRUBIN, CONFIRM Collection Time: 01/15/19 10:16 PM  
Result Value Ref Range Bilirubin UA, confirm NEGATIVE  NEG    
METABOLIC PANEL, COMPREHENSIVE Collection Time: 01/16/19  3:06 AM  
Result Value Ref Range Sodium 140 136 - 145 mmol/L Potassium 3.4 (L) 3.5 - 5.1 mmol/L Chloride 105 97 - 108 mmol/L  
 CO2 23 21 - 32 mmol/L Anion gap 12 5 - 15 mmol/L Glucose 116 (H) 65 - 100 mg/dL BUN 21 (H) 6 - 20 MG/DL Creatinine 0.94 0.55 - 1.02 MG/DL  
 BUN/Creatinine ratio 22 (H) 12 - 20 GFR est AA >60 >60 ml/min/1.73m2 GFR est non-AA 58 (L) >60 ml/min/1.73m2 Calcium 8.6 8.5 - 10.1 MG/DL Bilirubin, total 1.0 0.2 - 1.0 MG/DL  
 ALT (SGPT) 13 12 - 78 U/L  
 AST (SGOT) 13 (L) 15 - 37 U/L Alk. phosphatase 75 45 - 117 U/L Protein, total 6.5 6.4 - 8.2 g/dL Albumin 3.1 (L) 3.5 - 5.0 g/dL Globulin 3.4 2.0 - 4.0 g/dL A-G Ratio 0.9 (L) 1.1 - 2.2    
CBC WITH AUTOMATED DIFF Collection Time: 01/16/19  3:06 AM  
Result Value Ref Range WBC 11.8 (H) 3.6 - 11.0 K/uL  
 RBC 3.97 3.80 - 5.20 M/uL  
 HGB 11.4 (L) 11.5 - 16.0 g/dL HCT 35.2 35.0 - 47.0 % MCV 88.7 80.0 - 99.0 FL  
 MCH 28.7 26.0 - 34.0 PG  
 MCHC 32.4 30.0 - 36.5 g/dL  
 RDW 12.7 11.5 - 14.5 % PLATELET 234 139 - 389 K/uL MPV 11.9 8.9 - 12.9 FL  
 NRBC 0.0 0  WBC ABSOLUTE NRBC 0.00 0.00 - 0.01 K/uL NEUTROPHILS 81 (H) 32 - 75 % LYMPHOCYTES 9 (L) 12 - 49 % MONOCYTES 9 5 - 13 % EOSINOPHILS 0 0 - 7 % BASOPHILS 0 0 - 1 % IMMATURE GRANULOCYTES 0 0.0 - 0.5 % ABS. NEUTROPHILS 9.6 (H) 1.8 - 8.0 K/UL  
 ABS. LYMPHOCYTES 1.1 0.8 - 3.5 K/UL  
 ABS. MONOCYTES 1.0 0.0 - 1.0 K/UL  
 ABS. EOSINOPHILS 0.0 0.0 - 0.4 K/UL  
 ABS. BASOPHILS 0.0 0.0 - 0.1 K/UL  
 ABS. IMM. GRANS. 0.1 (H) 0.00 - 0.04 K/UL  
 DF AUTOMATED    
TROPONIN I Collection Time: 01/16/19  3:06 AM  
Result Value Ref Range Troponin-I, Qt. <0.05 <0.05 ng/mL GLUCOSE, POC Collection Time: 01/16/19  6:58 AM  
Result Value Ref Range Glucose (POC) 112 (H) 65 - 100 mg/dL Performed by Tera Miller   
GLUCOSE, POC Collection Time: 01/16/19 11:26 AM  
Result Value Ref Range Glucose (POC) 108 (H) 65 - 100 mg/dL Performed by Katherine Fields 1/15/19 
  
EXAM:  MRI ABD W MRCP W WO CONT 
  
INDICATION:   Pancreatitis - acute 
  
COMPARISON: Abdominal ultrasound 1/15/2019. 
  
CONTRAST:  6 mL Gadavist. 
  
TECHNIQUE: MR of the abdomen was performed and the following sequences were 
obtained: Coronal HASTE, multiplanar MRCP, axial in and out-of-phase T1, axial 
T1 fat-saturated, axial T2, and pre- and post contrast T1-weighted images.   
  
FINDINGS: 
The liver contains multiple simple appearing cysts and a 9 x 12 mm relatively 
well-circumscribed T2 hyperintense and T1 hypointense lesion at the inferior 
right lobe subcapsular liver in segment 6 which shows some mildly heterogeneous 
early phase enhancement which persists on delayed imaging. The gallbladder is 
normal with no filling defects. The spleen is normal.  There is a 1.3 x 1.7 x 
3.0 cm multilocular cystic lesion in the body of pancreas which appears to 
communicating with the mildly dilated main pancreatic duct. No suspicious nodular enhancing tissue is seen associated with this lesion. The pancreatic 
parenchyma is otherwise grossly unremarkable. The adrenal glands are normal.  
The kidneys are normal. 
  
There is a small hiatal hernia with an otherwise unremarkable appearance to the 
stomach. The visualized large and small bowel appear grossly unremarkable. 
  
There is mild extrahepatic biliary ductal dilation with the common bile duct 
measuring 10 mm with no biliary strictures or filling defects are seen. There is 
no intrahepatic biliary ductal dilation. There is no pancreas divisum. 
  
There is no lymphadenopathy or ascites. The visualized lower chest unremarkable. The surrounding musculoskeletal and soft tissue structures are unremarkable. 
  
IMPRESSION IMPRESSION:   
  
1. 1.3 x 1.7 x 3.0 cm multilocular cystic lesion of the pancreas at the body 
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 Follow-up, preferably by MRI, in 6-12 months is recommended. 2. Hepatic cysts and probable flash fill inferior right lobe hepatic hemangioma. 3. Mild extra hepatic biliary dilation which may be sequela of prior obstruction 
or inflammation. Correlate for evidence of ongoing biliary obstruction with 
clinical and laboratory data. 4. Small sliding hiatal hernia. Assessment:  
· Nausea/vomiting and epigastric abdominal pain: WBC 11.8, Hgb 11.4, LFTs normal, lipase > 3,000 yesterday. RUQ ultrasound (1/15/19): double duct sign without an obvious pancreatic mass, recommend CT or MRCP to further characterize. 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 Patient Active Problem List  
Diagnosis Code  Pancreatitis K85.90 Plan: · NPO · PPI BID · LR 
· Supportive measures: antiemetics and analgesics PRN 
· Monitor LFTs and lipase · Further recommendations per Dr. Prema Alvarez. Next consider outpatient EUS versus repeat MRI. Signed By: ARIANA Drake   
 1/16/2019  1:12 PM 
  
 
GI attending note: I personally examined the patient. Agree with the physical, assessment, and plan of the SISI. Start FLD, advance as tolerated. Monitor fever curve. Based on symptoms, will determine discharge plan. No CBD stone. Dr. Prema Alvarez

## 2019-01-16 NOTE — PROGRESS NOTES
Hospitalist Progress Note Derrick Delvalle MD 
Answering service: [phone] OR [phone] from in house phone Cell: [phone] Date of Service:  2019 NAME:  Jayla Gallegos :  1940 MRN:  [id number] Admission Summary:  
 
74-year-old female with a past medical history of IBS, who presented to the hospital with the above-mentioned symptoms. The patient reports her symptoms started about 5 days back. She had an episode of black, tarry vomitus, post that started having some significant abdominal pain. The patient reports the abdominal pain continued. She had poor appetite, had nausea, and the epigastric abdominal pain persisted. Patient reports that she does not take any anticoagulants or antiplatelets. Reports that pain gets worse with eating. Reports that she has not had a good bowel movement, but all her bowel movements have not been black as far as she knows. The patient had a colonoscopy done by Dr. Michel Avila for lower GI bleeding with benign-appearing colon polyps, moderate internal hemorrhoids, which were considered to be the likely cause of bleeding. The patient came to the ER, was found to have elevated lipase, and was requested to be admitted under the hospitalist service. Interval history / Subjective:  
 
Patient with less abdominal pain this AM. Remains NPO.  
 
MRI/MRCP with multilocular custic lesion of the pancreas that ?may be tumor. Per GI note - may need repeat MRI vs. EUS. Assessment & Plan: Abdominal pain, most likely secondary to pancreatitis: 
-patient with ongoing pain, though it has lessened   
-MRI/MRCP with multilocular custic lesion of the pancreas that ?may be tumor. Per GI note - may need repeat MRI vs. EUS. -NPO 
-PPI 
-fluids -appreciate further GI recs Tachycardia, most likely secondary to severe pain. Resolved. Code status: FULL 
DVT prophylaxis: SCDs Care Plan discussed with: Patient/Family and Nurse Disposition: Home w/Family and TBD Hospital Problems  Date Reviewed: 1/15/2019 Codes Class Noted POA * (Principal) Pancreatitis ICD-10-CM: K85.90 ICD-9-CM: 480.5  1/15/2019 Unknown Review of Systems: A comprehensive review of systems was negative except for that written in the HPI. Vital Signs:  
 Last 24hrs VS reviewed since prior progress note. Most recent are: 
Visit Vitals /76 (BP 1 Location: Left arm, BP Patient Position: At rest) Pulse 95 Temp 98.4 °F (36.9 °C) Resp 18 Wt 63.1 kg (139 lb 1.6 oz) SpO2 95% Breastfeeding? No  
BMI 23.15 kg/m² No intake or output data in the 24 hours ending 01/16/19 1435 Physical Examination:  
 
     
Constitutional:  No acute distress, cooperative, pleasant   
ENT:  Neck supple Resp:  CTA bilaterally. No accessory muscle use CV:  Regular rhythm, normal rate GI:  Soft, non distended, tender in epigastrium, hypoactive bowel sounds Musculoskeletal:  No edema, warm Neurologic:  Moves all extremities. AAOx3 Data Review:  
 Review and/or order of clinical lab test 
Review and/or order of tests in the radiology section of CPT Review and/or order of tests in the medicine section of CPT Labs:  
 
Recent Labs  
  01/16/19 
0306 01/15/19 
1229 WBC 11.8* 11.8* HGB 11.4* 13.3 HCT 35.2 41.1  206 Recent Labs  
  01/16/19 
0306 01/15/19 
1229  135* K 3.4* 4.2  101 CO2 23 25 BUN 21* 25* CREA 0.94 1.27* * 126* CA 8.6 9.2 MG  --  2.3 PHOS  --  3.1 Recent Labs  
  01/16/19 
0306 01/15/19 
1229 SGOT 13* 44* ALT 13 19 AP 75 87 TBILI 1.0 1.3* TP 6.5 8.6* ALB 3.1* 3.6 GLOB 3.4 5.0*  
LPSE  --  >3,000* Recent Labs  
  01/15/19 
1229 INR 1.0 PTP 10.2 No results for input(s): FE, TIBC, PSAT, FERR in the last 72 hours.   
No results found for: FOL, RBCF  
 No results for input(s): PH, PCO2, PO2 in the last 72 hours. Recent Labs  
  01/16/19 
0306 01/15/19 
1229 TROIQ <0.05 <0.05 Lab Results Component Value Date/Time Cholesterol, total 185 01/15/2019 12:29 PM  
 HDL Cholesterol 52 01/15/2019 12:29 PM  
 LDL, calculated 109.6 (H) 01/15/2019 12:29 PM  
 Triglyceride 117 01/15/2019 12:29 PM  
 CHOL/HDL Ratio 3.6 01/15/2019 12:29 PM  
 
Lab Results Component Value Date/Time Glucose (POC) 108 (H) 01/16/2019 11:26 AM  
 Glucose (POC) 112 (H) 01/16/2019 06:58 AM  
 
Lab Results Component Value Date/Time Color DARK YELLOW 01/15/2019 10:16 PM  
 Appearance CLOUDY (A) 01/15/2019 10:16 PM  
 Specific gravity 1.030 01/15/2019 10:16 PM  
 pH (UA) 6.0 01/15/2019 10:16 PM  
 Protein 100 (A) 01/15/2019 10:16 PM  
 Glucose NEGATIVE  01/15/2019 10:16 PM  
 Ketone 40 (A) 01/15/2019 10:16 PM  
 Urobilinogen 1.0 01/15/2019 10:16 PM  
 Nitrites NEGATIVE  01/15/2019 10:16 PM  
 Leukocyte Esterase SMALL (A) 01/15/2019 10:16 PM  
 Epithelial cells MODERATE (A) 01/15/2019 10:16 PM  
 Bacteria 1+ (A) 01/15/2019 10:16 PM  
 WBC 10-20 01/15/2019 10:16 PM  
 RBC 0-5 01/15/2019 10:16 PM  
 
 
 
Medications Reviewed:  
 
Current Facility-Administered Medications Medication Dose Route Frequency  acetaminophen (TYLENOL) tablet 650 mg  650 mg Oral Q6H PRN  
 sodium chloride (NS) flush 5-40 mL  5-40 mL IntraVENous Q8H  
 sodium chloride (NS) flush 5-40 mL  5-40 mL IntraVENous PRN  
 morphine injection 1 mg  1 mg IntraVENous Q4H PRN  
 ondansetron (ZOFRAN) injection 4 mg  4 mg IntraVENous Q4H PRN  
 heparin (porcine) injection 5,000 Units  5,000 Units SubCUTAneous Q8H  
 pantoprazole (PROTONIX) 40 mg in sodium chloride 0.9% 10 mL injection  40 mg IntraVENous Q12H  
 lactated Ringers infusion  125 mL/hr IntraVENous CONTINUOUS  
 
______________________________________________________________________ EXPECTED LENGTH OF STAY: 2d 12h ACTUAL LENGTH OF STAY:          1 
 
 Rowena Naranjo MD

## 2019-01-17 NOTE — PROGRESS NOTES
Hospitalist Progress Note Clementina Lopez MD 
Answering service: [phone] OR [phone] from in house phone Cell: [phone] Date of Service:  2019 NAME:  Tyrese Cameron :  1940 MRN:  [id number] Admission Summary:  
 
16-year-old female with a past medical history of IBS, who presented to the hospital with the above-mentioned symptoms. The patient reports her symptoms started about 5 days back. She had an episode of black, tarry vomitus, post that started having some significant abdominal pain. The patient reports the abdominal pain continued. She had poor appetite, had nausea, and the epigastric abdominal pain persisted. Patient reports that she does not take any anticoagulants or antiplatelets. Reports that pain gets worse with eating. Reports that she has not had a good bowel movement, but all her bowel movements have not been black as far as she knows. The patient had a colonoscopy done by Dr. Andrey Anderson for lower GI bleeding with benign-appearing colon polyps, moderate internal hemorrhoids, which were considered to be the likely cause of bleeding. The patient came to the ER, was found to have elevated lipase, and was requested to be admitted under the hospitalist service. Interval history / Subjective:  
 
Patient with less abdominal pain this AM. Now on liquid diet. MRI/MRCP with multilocular custic lesion of the pancreas that may be tumor. Per GI note - may need outpatient EUS. Assessment & Plan: Abdominal pain, most likely secondary to pancreatitis: 
-patient with ongoing pain, though it has lessened   
-MRI/MRCP with multilocular custic lesion of the pancreas that ?may be tumor. Per GI note - may need repeat MRI vs. EUS as an outpatient 
-liquid diet 
-PPI 
-fluids -appreciate further GI recs Tachycardia, most likely secondary to severe pain. Resolved.  
   
Code status: FULL 
 DVT prophylaxis: SCDs Care Plan discussed with: Patient/Family and Nurse Disposition: Home w/Family and TBD, ?tomorrow Hospital Problems  Date Reviewed: 1/15/2019 Codes Class Noted POA * (Principal) Pancreatitis ICD-10-CM: K85.90 ICD-9-CM: 304.6  1/15/2019 Unknown Review of Systems: A comprehensive review of systems was negative except for that written in the HPI. Vital Signs:  
 Last 24hrs VS reviewed since prior progress note. Most recent are: 
Visit Vitals /62 (BP 1 Location: Right arm, BP Patient Position: At rest) Pulse 90 Temp 99.6 °F (37.6 °C) Resp 18 Wt 63.1 kg (139 lb 1.6 oz) SpO2 94% Breastfeeding? No  
BMI 23.15 kg/m² Intake/Output Summary (Last 24 hours) at 1/17/2019 6019 Last data filed at 1/16/2019 2031 Gross per 24 hour Intake 2972 ml Output  Net 2972 ml Physical Examination:  
 
     
Constitutional:  No acute distress, cooperative, pleasant   
ENT:  Neck supple Resp:  CTA bilaterally. No accessory muscle use CV:  Regular rhythm, normal rate GI:  Soft, non distended, tender in epigastrium, hypoactive bowel sounds Musculoskeletal:  No edema, warm Neurologic:  Moves all extremities. AAOx3 Data Review:  
 Review and/or order of clinical lab test 
Review and/or order of tests in the radiology section of CPT Review and/or order of tests in the medicine section of CPT Labs:  
 
Recent Labs  
  01/16/19 
0306 01/15/19 
1229 WBC 11.8* 11.8* HGB 11.4* 13.3 HCT 35.2 41.1  206 Recent Labs  
  01/16/19 
0306 01/15/19 
1229  135* K 3.4* 4.2  101 CO2 23 25 BUN 21* 25* CREA 0.94 1.27* * 126* CA 8.6 9.2 MG  --  2.3 PHOS  --  3.1 Recent Labs  
  01/16/19 
0306 01/15/19 
1229 SGOT 13* 44* ALT 13 19 AP 75 87 TBILI 1.0 1.3* TP 6.5 8.6* ALB 3.1* 3.6 GLOB 3.4 5.0*  
LPSE  --  >3,000* Recent Labs  
  01/15/19 
1229 INR 1.0 PTP 10.2 No results for input(s): FE, TIBC, PSAT, FERR in the last 72 hours. No results found for: FOL, RBCF No results for input(s): PH, PCO2, PO2 in the last 72 hours. Recent Labs  
  01/16/19 
0306 01/15/19 
1229 TROIQ <0.05 <0.05 Lab Results Component Value Date/Time Cholesterol, total 185 01/15/2019 12:29 PM  
 HDL Cholesterol 52 01/15/2019 12:29 PM  
 LDL, calculated 109.6 (H) 01/15/2019 12:29 PM  
 Triglyceride 117 01/15/2019 12:29 PM  
 CHOL/HDL Ratio 3.6 01/15/2019 12:29 PM  
 
Lab Results Component Value Date/Time Glucose (POC) 132 (H) 01/16/2019 09:13 PM  
 Glucose (POC) 99 01/16/2019 04:06 PM  
 Glucose (POC) 108 (H) 01/16/2019 11:26 AM  
 Glucose (POC) 112 (H) 01/16/2019 06:58 AM  
 
Lab Results Component Value Date/Time Color DARK YELLOW 01/15/2019 10:16 PM  
 Appearance CLOUDY (A) 01/15/2019 10:16 PM  
 Specific gravity 1.030 01/15/2019 10:16 PM  
 pH (UA) 6.0 01/15/2019 10:16 PM  
 Protein 100 (A) 01/15/2019 10:16 PM  
 Glucose NEGATIVE  01/15/2019 10:16 PM  
 Ketone 40 (A) 01/15/2019 10:16 PM  
 Urobilinogen 1.0 01/15/2019 10:16 PM  
 Nitrites NEGATIVE  01/15/2019 10:16 PM  
 Leukocyte Esterase SMALL (A) 01/15/2019 10:16 PM  
 Epithelial cells MODERATE (A) 01/15/2019 10:16 PM  
 Bacteria 1+ (A) 01/15/2019 10:16 PM  
 WBC 10-20 01/15/2019 10:16 PM  
 RBC 0-5 01/15/2019 10:16 PM  
 
 
 
Medications Reviewed:  
 
Current Facility-Administered Medications Medication Dose Route Frequency  acetaminophen (TYLENOL) tablet 650 mg  650 mg Oral Q6H PRN  
 sodium chloride (NS) flush 5-40 mL  5-40 mL IntraVENous Q8H  
 sodium chloride (NS) flush 5-40 mL  5-40 mL IntraVENous PRN  
 morphine injection 1 mg  1 mg IntraVENous Q4H PRN  
 ondansetron (ZOFRAN) injection 4 mg  4 mg IntraVENous Q4H PRN  
 heparin (porcine) injection 5,000 Units  5,000 Units SubCUTAneous Q8H  
 pantoprazole (PROTONIX) 40 mg in sodium chloride 0.9% 10 mL injection  40 mg IntraVENous Q12H  lactated Ringers infusion  125 mL/hr IntraVENous CONTINUOUS  
 
______________________________________________________________________ EXPECTED LENGTH OF STAY: 2d 12h ACTUAL LENGTH OF STAY:          2 Ranjana Amezquita MD

## 2019-01-17 NOTE — PROGRESS NOTES
Problem: Patient Education: Go to Patient Education Activity Goal: Patient/Family Education Outcome: Resolved/Met Date Met: 01/17/19 NUTRITION Chart reviewed. Pancreatitis diet instruction completed; provided pt with handouts and reviewed. Will gladly follow up for additional questions as needed. Thank you.   
 
Hank Robb RD

## 2019-01-17 NOTE — PROGRESS NOTES
Problem: Falls - Risk of 
Goal: *Absence of Falls Document Erica Smith Fall Risk and appropriate interventions in the flowsheet. Outcome: Progressing Towards Goal 
Fall Risk Interventions:

## 2019-01-17 NOTE — PROGRESS NOTES
[facility] 
[address] [facility], [address] GI PROGRESS NOTE Will Deborah Grimm, [phone] office [phone] NP/PA in-hospital cell phone M-F until 4:30PM 
After 5PM or on weekends, please call  for physician on call NAME: Jayla Gallegos :  1940 MRN:  [id number] Subjective:  
Patient complains of persistent epigastric abdominal pain and burping. No nausea or vomiting. No bowel movement. She had full liquids for breakfast this morning. Daughter is at the bedside. Objective: VITALS:  
Last 24hrs VS reviewed since prior progress note. Most recent are: 
Visit Vitals /70 (BP 1 Location: Right arm, BP Patient Position: At rest) Pulse 89 Temp 98.7 °F (37.1 °C) Resp 18 Wt 63.1 kg (139 lb 1.6 oz) SpO2 94% Breastfeeding? No  
BMI 23.15 kg/m² PHYSICAL EXAM: 
General: Cooperative, no acute distress   
Neurologic:  Alert and oriented HEENT: EOMI, no scleral icterus Lungs:  CTA bilaterally anteriorly Heart:  S1 S2 Abdomen: Soft, non-distended, mild epigastric tenderness, no guarding, no rebound. +Bowel sounds. Extremities: Warm Psych:   Not anxious or agitated Lab Data Reviewed:  
 
Recent Results (from the past 24 hour(s)) GLUCOSE, POC Collection Time: 19 11:26 AM  
Result Value Ref Range Glucose (POC) 108 (H) 65 - 100 mg/dL Performed by Artemio Edu, POC Collection Time: 19  4:06 PM  
Result Value Ref Range Glucose (POC) 99 65 - 100 mg/dL Performed by Anibal Ling GLUCOSE, POC Collection Time: 19  9:13 PM  
Result Value Ref Range Glucose (POC) 132 (H) 65 - 100 mg/dL Performed by Rosita Severe Assessment:  
· Nausea/vomiting and epigastric abdominal pain: WBC 11.8, Hgb 11.4, LFTs normal, lipase > 3,000 yesterday.  RUQ ultrasound (1/15/19): double duct sign without an obvious pancreatic mass, recommend CT or MRCP to further characterize. 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 Patient Active Problem List  
Diagnosis Code  Pancreatitis K85.90 Plan:  
· Continue full liquid diet for now · PPI BID · LR 
· Supportive measures: antiemetics and analgesics PRN 
· Monitor LFTs and lipase · Plan for outpatient follow up with Dr. Molly Sanchez in 4 weeks for discussion of EUS Signed By: ARIANA Anna   
 1/17/2019  10:26 AM 
  
 
GI attending note: I personally examined the patient. Agree with the physical, assessment, and plan of the SISI. Keep on FLD today. If better, consider diet advancement tomorrow. Added carafate. Dr. Jaya Thorne

## 2019-01-17 NOTE — PROGRESS NOTES
A Spiritual Care Partner Volunteer visited patient in Rm 612 on 1/17/2019. Documented by: 
Chaplain Arreaga MDiv, MS, [facility] 
[phone] ([phone])

## 2019-01-18 NOTE — PROGRESS NOTES
0930: Pt RRT, transferred to [facility] bedside report from [address] TRANSFER - IN REPORT: 
 
Verbal report received from ellis muñoz rn(name) on Nisa Green  being received from (unit) for routine progression of care urgent transfer. Report consisted of patients Situation, Background, Assessment and  
Recommendations(SBAR). Information from the following report(s) SBAR, Kardex, ED Summary, Intake/Output, MAR and Recent Results was reviewed with the receiving nurse. Opportunity for questions and clarification was provided. 8260: s/w dr Jon Rojas. Made aware pt is in ST, orders to hold iv cardizem. Awaiting cardiology consult. 1220: pt temp elevated, s/w dr Jon Rojas, orders for [facility] obtained and sent. 1851: TRANSFER - OUT REPORT: 
 
Verbal report given to palma mccormack(name) on Nisa Green  being transferred to [facility](unit) for routine progression of care Report consisted of patients Situation, Background, Assessment and  
Recommendations(SBAR). Information from the following report(s) SBAR, Kardex, ED Summary, Intake/Output, MAR and Recent Results was reviewed with the receiving nurse. Lines:  
Peripheral IV 01/18/19 Anterior;Right Wrist (Active) Site Assessment Clean, dry, & intact 1/18/2019  3:03 PM  
Phlebitis Assessment 0 1/18/2019  3:03 PM  
Infiltration Assessment 0 1/18/2019  3:03 PM  
Dressing Status Clean, dry, & intact 1/18/2019  3:03 PM  
Dressing Type Tape;Transparent 1/18/2019  3:03 PM  
Hub Color/Line Status Blue; Infusing 1/18/2019  3:03 PM  
Action Taken Open ports on tubing capped 1/18/2019  3:03 PM  
Alcohol Cap Used Yes 1/18/2019  3:03 PM  
  
 
Opportunity for questions and clarification was provided. Patient transported with: 
 [employer]

## 2019-01-18 NOTE — PROGRESS NOTES
Note dictated Period of afib resolved Minor non-specific ST-T segment/T wave findings No cp,sob No hx of AFib Likely metabolic trigger from pancreatitis No meds needed unless recurrent Avoid anti-coags with bleed risk Echo normal 
We will see back PRN if recurs, hospitalist to fu on TSH in am

## 2019-01-18 NOTE — PROGRESS NOTES
Gabriela Champion [address] [place] [address], [address] GI PROGRESS NOTE 
 
 
NAME: Gareth Lewis :  1940 MRN:  [id number] Subjective: No nausea or vomiting. No bowel movement. Tolerating liquid diet. No pain medication needed. Objective: VITALS:  
Last 24hrs VS reviewed since prior progress note. Most recent are: 
Visit Vitals /67 (BP 1 Location: Left arm, BP Patient Position: At rest) Pulse (!) 110 Temp 99.7 °F (37.6 °C) Resp 18 Wt 63.1 kg (139 lb 1.6 oz) SpO2 96% Breastfeeding? No  
BMI 23.15 kg/m² PHYSICAL EXAM: 
General: Cooperative, no acute distress   
Neurologic:  Alert and oriented HEENT: EOMI, no scleral icterus Lungs:  CTA bilaterally anteriorly Heart:  S1 S2, tachycardia Abdomen: Soft, non-distended, non tender, no guarding, no rebound. +Bowel sounds. Extremities: Warm Psych:   Not anxious or agitated Lab Data Reviewed:  
 
Recent Results (from the past 24 hour(s)) GLUCOSE, POC Collection Time: 19 11:41 AM  
Result Value Ref Range Glucose (POC) 106 (H) 65 - 100 mg/dL Performed by Abram Diamond GLUCOSE, POC Collection Time: 19  4:31 PM  
Result Value Ref Range Glucose (POC) 114 (H) 65 - 100 mg/dL Performed by Emma Perla GLUCOSE, POC Collection Time: 19  9:05 PM  
Result Value Ref Range Glucose (POC) 139 (H) 65 - 100 mg/dL Performed by Nini Navarrete METABOLIC PANEL, BASIC Collection Time: 19  3:34 AM  
Result Value Ref Range Sodium 141 136 - 145 mmol/L Potassium 3.2 (L) 3.5 - 5.1 mmol/L Chloride 105 97 - 108 mmol/L  
 CO2 27 21 - 32 mmol/L Anion gap 9 5 - 15 mmol/L Glucose 101 (H) 65 - 100 mg/dL BUN 7 6 - 20 MG/DL Creatinine 0.68 0.55 - 1.02 MG/DL  
 BUN/Creatinine ratio 10 (L) 12 - 20 GFR est AA >60 >60 ml/min/1.73m2 GFR est non-AA >60 >60 ml/min/1.73m2 Calcium 8.0 (L) 8.5 - 10.1 MG/DL MAGNESIUM  
 Collection Time: 01/18/19  3:34 AM  
Result Value Ref Range Magnesium 1.7 1.6 - 2.4 mg/dL TROPONIN I Collection Time: 01/18/19  3:34 AM  
Result Value Ref Range Troponin-I, Qt. <0.05 <0.05 ng/mL CBC W/O DIFF Collection Time: 01/18/19  3:35 AM  
Result Value Ref Range WBC 11.9 (H) 3.6 - 11.0 K/uL  
 RBC 3.54 (L) 3.80 - 5.20 M/uL  
 HGB 10.3 (L) 11.5 - 16.0 g/dL HCT 31.5 (L) 35.0 - 47.0 % MCV 89.0 80.0 - 99.0 FL  
 MCH 29.1 26.0 - 34.0 PG  
 MCHC 32.7 30.0 - 36.5 g/dL  
 RDW 12.2 11.5 - 14.5 % PLATELET 807 708 - 385 K/uL MPV 11.8 8.9 - 12.9 FL  
 NRBC 0.0 0  WBC ABSOLUTE NRBC 0.00 0.00 - 0.01 K/uL GLUCOSE, POC Collection Time: 01/18/19  6:23 AM  
Result Value Ref Range Glucose (POC) 102 (H) 65 - 100 mg/dL Performed by [facility] JANETH VIGIL   
EKG, 12 LEAD, INITIAL Collection Time: 01/18/19  8:23 AM  
Result Value Ref Range Ventricular Rate 156 BPM  
 Atrial Rate 147 BPM  
 QRS Duration 76 ms  
 Q-T Interval 240 ms QTC Calculation (Bezet) 386 ms Calculated R Axis 35 degrees Calculated T Axis -134 degrees Diagnosis Atrial fibrillation ST & T wave abnormality, consider inferior ischemia or digitalis effect Abnormal ECG When compared with ECG of 15-FRED-2019 12:43, Atrial fibrillation has replaced Sinus rhythm Vent. rate has increased BY  71 BPM 
T wave inversion more evident in Inferior leads Inverted T waves have replaced nonspecific T wave abnormality in Lateral  
leads Assessment:  
· Nausea/vomiting and epigastric abdominal pain: WBC 11.8, Hgb 11.4, LFTs normal, lipase > 3,000 yesterday. RUQ ultrasound (1/15/19): double duct sign without an obvious pancreatic mass, recommend CT or MRCP to further characterize.  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 · New irregularly irregular tachycardia-possible A fib Patient Active Problem List  
Diagnosis Code  Pancreatitis K85.90 Plan:  
· GI lite diet · PPI BID/Carafate for 1 month · Supportive measures: antiemetics and analgesics PRN 
· Plan for outpatient follow up with Dr. Suha Chung in 4 weeks for discussion of EUS · Pt without headache, chest pain, or low BP. Ordered for 12 lead EKG. Notified Dr. Pierre Coleman to follow-up on pt and EKG. · From a GI perspective, if she tolerates her diet she can be discharged and follow-up with Dr. Suha Chung · Call weekend coverage if needed.   
 
Signed By: Sarrah Cabot, MD   
 1/18/2019  10:26 AM

## 2019-01-18 NOTE — PROGRESS NOTES
TRANSFER - IN REPORT: 
 
Verbal report received from Nica(name) on Adriane Power  being received from [facility](unit) for routine progression of care Report consisted of patients Situation, Background, Assessment and  
Recommendations(SBAR). Information from the following report(s) SBAR, Procedure Summary, Intake/Output, MAR and Cardiac Rhythm ST/SR was reviewed with the receiving nurse. Opportunity for questions and clarification was provided. Assessment completed upon patients arrival to unit and care assumed.

## 2019-01-18 NOTE — PROGRESS NOTES
0715:Patient stated he wanted to be disconnected from the IV and was upset that he had maintenance fluids going Patient was informed that it was an infection risk to disconnect and reconnect the IV lines; patient then became verbally aggressive/disrespectful  and noncompliant with the education provided. Left patient's room

## 2019-01-19 NOTE — PROGRESS NOTES
Hospitalist Progress Note Ronan Zaidi MD 
Answering service: [phone] from in house phone Cell: [phone] Date of Service:  2019 NAME:  Ana Hernandez :  1940 MRN:  [id number] Admission Summary:  
 
61-year-old female with a past medical history of IBS, who presented to the hospital with the above-mentioned symptoms. The patient reports her symptoms started about 5 days back. She had an episode of black, tarry vomitus, post that started having some significant abdominal pain. The patient reports the abdominal pain continued. She had poor appetite, had nausea, and the epigastric abdominal pain persisted. Patient reports that she does not take any anticoagulants or antiplatelets. Reports that pain gets worse with eating. Reports that she has not had a good bowel movement, but all her bowel movements have not been black as far as she knows. The patient had a colonoscopy done by Dr. Paz for lower GI bleeding with benign-appearing colon polyps, moderate internal hemorrhoids, which were considered to be the likely cause of bleeding. The patient came to the ER, was found to have elevated lipase, and was requested to be admitted under the hospitalist service. Interval history / Subjective: F/u A fib Abdominal pain controlled No nausea or vomiting Tolerating diet Assessment & Plan: Abdominal pain, most likely secondary to pancreatitis: 
-now much improved  
-MRI/MRCP with multilocular custic lesion of the pancreas that ?may be tumor. Per GI note - may need repeat MRI vs. EUS as an outpatient 
-liquid diet 
-PPI 
-GI lite diet 
-Will discharge the patient with outpatient follow up with Dr Ba Velázquez Brief episode of A fib noted 18 am  
-Consult cardiology, transfer to tele Code status: FULL 
DVT prophylaxis: SCDs Care Plan discussed with: Patient/Family and Nurse Disposition: Home w/Family and TBD, ?tomorrow Hospital Problems  Date Reviewed: 1/15/2019 Codes Class Noted POA * (Principal) Pancreatitis ICD-10-CM: K85.90 ICD-9-CM: 000.3  1/15/2019 Unknown Review of Systems: A comprehensive review of systems was negative except for that written in the HPI. Vital Signs:  
 Last 24hrs VS reviewed since prior progress note. Most recent are: 
Visit Vitals /77 (BP 1 Location: Left arm, BP Patient Position: At rest;Sitting) Pulse (!) 102 Temp 99.2 °F (37.3 °C) Resp 18 Wt 63.5 kg (139 lb 15.9 oz) SpO2 90% Breastfeeding? No  
BMI 23.30 kg/m² Intake/Output Summary (Last 24 hours) at 1/19/2019 1022 Last data filed at 1/18/2019 1727 Gross per 24 hour Intake 240 ml Output  Net 240 ml Physical Examination:  
 
     
Constitutional:  No acute distress, cooperative, pleasant   
ENT:  Neck supple Resp:  CTA bilaterally. No accessory muscle use CV:  Regular rhythm, normal rate GI:  Soft, non distended, tender in epigastrium, hypoactive bowel sounds Musculoskeletal:  No edema, warm Neurologic:  Moves all extremities. AAOx3 Data Review:  
 Review and/or order of clinical lab test 
Review and/or order of tests in the radiology section of CPT Review and/or order of tests in the medicine section of CPT Labs:  
 
Recent Labs  
  01/18/19 
0335 WBC 11.9* HGB 10.3* HCT 31.5*  Recent Labs  
  01/18/19 
0852 01/18/19 
0334 NA  --  141 K  --  3.2*  
CL  --  105 CO2  --  27 BUN  --  7  
CREA  --  0.68 GLU  --  101* CA  --  8.0*  
MG 1.6 1.7 Recent Labs  
  01/18/19 
1071 SGOT 30 ALT 12  
AP 96 TBILI 0.9 TP 6.1* ALB 2.6*  
GLOB 3.5 No results for input(s): INR, PTP, APTT in the last 72 hours. No lab exists for component: INREXT, INREXT No results for input(s): FE, TIBC, PSAT, FERR in the last 72 hours.   
No results found for: FOL, RBCF  
 No results for input(s): PH, PCO2, PO2 in the last 72 hours. Recent Labs  
  01/18/19 
0852 01/18/19 
0334 TROIQ <0.05 <0.05 Lab Results Component Value Date/Time Cholesterol, total 185 01/15/2019 12:29 PM  
 HDL Cholesterol 52 01/15/2019 12:29 PM  
 LDL, calculated 109.6 (H) 01/15/2019 12:29 PM  
 Triglyceride 117 01/15/2019 12:29 PM  
 CHOL/HDL Ratio 3.6 01/15/2019 12:29 PM  
 
Lab Results Component Value Date/Time Glucose (POC) 102 (H) 01/18/2019 06:23 AM  
 Glucose (POC) 139 (H) 01/17/2019 09:05 PM  
 Glucose (POC) 114 (H) 01/17/2019 04:31 PM  
 Glucose (POC) 106 (H) 01/17/2019 11:41 AM  
 Glucose (POC) 132 (H) 01/16/2019 09:13 PM  
 
Lab Results Component Value Date/Time Color DARK YELLOW 01/15/2019 10:16 PM  
 Appearance CLOUDY (A) 01/15/2019 10:16 PM  
 Specific gravity 1.030 01/15/2019 10:16 PM  
 pH (UA) 6.0 01/15/2019 10:16 PM  
 Protein 100 (A) 01/15/2019 10:16 PM  
 Glucose NEGATIVE  01/15/2019 10:16 PM  
 Ketone 40 (A) 01/15/2019 10:16 PM  
 Urobilinogen 1.0 01/15/2019 10:16 PM  
 Nitrites NEGATIVE  01/15/2019 10:16 PM  
 Leukocyte Esterase SMALL (A) 01/15/2019 10:16 PM  
 Epithelial cells MODERATE (A) 01/15/2019 10:16 PM  
 Bacteria 1+ (A) 01/15/2019 10:16 PM  
 WBC 10-20 01/15/2019 10:16 PM  
 RBC 0-5 01/15/2019 10:16 PM  
 
 
 
Medications Reviewed:  
 
Current Facility-Administered Medications Medication Dose Route Frequency  sucralfate (CARAFATE) tablet 1 g  1 g Oral AC&HS  acetaminophen (TYLENOL) tablet 650 mg  650 mg Oral Q6H PRN  
 sodium chloride (NS) flush 5-40 mL  5-40 mL IntraVENous Q8H  
 sodium chloride (NS) flush 5-40 mL  5-40 mL IntraVENous PRN  
 morphine injection 1 mg  1 mg IntraVENous Q4H PRN  
 ondansetron (ZOFRAN) injection 4 mg  4 mg IntraVENous Q4H PRN  
 heparin (porcine) injection 5,000 Units  5,000 Units SubCUTAneous Q8H  
 pantoprazole (PROTONIX) 40 mg in sodium chloride 0.9% 10 mL injection  40 mg IntraVENous Q12H  lactated Ringers infusion  125 mL/hr IntraVENous CONTINUOUS  
 
______________________________________________________________________ EXPECTED LENGTH OF STAY: 2d 12h ACTUAL LENGTH OF STAY:          4 Gutierrez Nowak MD

## 2019-01-19 NOTE — DISCHARGE INSTRUCTIONS
DISCHARGE SUMMARY from Nurse    PATIENT INSTRUCTIONS:      These are general instructions for a healthy lifestyle:    No smoking/ No tobacco products/ Avoid exposure to second hand smoke  Surgeon General's Warning:  Quitting smoking now greatly reduces serious risk to your health. Obesity, smoking, and sedentary lifestyle greatly increases your risk for illness    A healthy diet, regular physical exercise & weight monitoring are important for maintaining a healthy lifestyle    You may be retaining fluid if you have a history of heart failure or if you experience any of the following symptoms:  Weight gain of 3 pounds or more overnight or 5 pounds in a week, increased swelling in our hands or feet or shortness of breath while lying flat in bed. Please call your doctor as soon as you notice any of these symptoms; do not wait until your next office visit. Recognize signs and symptoms of STROKE:    F-face looks uneven    A-arms unable to move or move unevenly    S-speech slurred or non-existent    T-time-call 911 as soon as signs and symptoms begin-DO NOT go       Back to bed or wait to see if you get better-TIME IS BRAIN. Warning Signs of HEART ATTACK     Call 911 if you have these symptoms:   Chest discomfort. Most heart attacks involve discomfort in the center of the chest that lasts more than a few minutes, or that goes away and comes back. It can feel like uncomfortable pressure, squeezing, fullness, or pain.  Discomfort in other areas of the upper body. Symptoms can include pain or discomfort in one or both arms, the back, neck, jaw, or stomach.  Shortness of breath with or without chest discomfort.  Other signs may include breaking out in a cold sweat, nausea, or lightheadedness. Don't wait more than five minutes to call 911 - MINUTES MATTER! Fast action can save your life. Calling 911 is almost always the fastest way to get lifesaving treatment.  Emergency Medical Services staff can begin treatment when they arrive -- up to an hour sooner than if someone gets to the hospital by car. The discharge information has been reviewed with the patient. The patient verbalized understanding. Discharge medications reviewed with the patient and daughter and appropriate educational materials and side effects teaching were provided. ___________________________________________________________________________________________________________________________________   Discharge Instructions       PATIENT ID: Shira Mckeon  MRN: [id number]   YOB: 1940    DATE OF ADMISSION: 1/15/2019 12:24 PM    DATE OF DISCHARGE: 1/19/2019    PRIMARY CARE PROVIDER: Jannet Cody MD     ATTENDING PHYSICIAN: Alma Zaman MD  DISCHARGING PROVIDER: Jahaira Enamorado MD    To contact this individual call [phone] and ask the  to page. If unavailable ask to be transferred the Adult Hospitalist Department. DISCHARGE DIAGNOSES   Acute pancreatitis    CONSULTATIONS: IP CONSULT TO GASTROENTEROLOGY  IP CONSULT TO HOSPITALIST  IP CONSULT TO CARDIOLOGY    PROCEDURES/SURGERIES: * No surgery found *    PENDING TEST RESULTS:   At the time of discharge the following test results are still pending: none    FOLLOW UP APPOINTMENTS:   Follow-up Information     Follow up With Specialties Details Why Nate Woodruff MD [facility] In 1 week  [address] [facility]      Tara Blount MD Gastroenterology In 4 weeks  [address]  [phone]             ADDITIONAL CARE RECOMMENDATIONS:   Follow up with PMD  Follow up with Dr Natalia Montemayor in 4 weeks    DIET: GI lite Diet, advance as tolerated    ACTIVITY: Activity as tolerated      DISCHARGE MEDICATIONS:   See Medication Reconciliation Form    · It is important that you take the medication exactly as they are prescribed. · Keep your medication in the bottles provided by the pharmacist and keep a list of the medication names, dosages, and times to be taken in your wallet. · Do not take other medications without consulting your doctor. NOTIFY YOUR PHYSICIAN FOR ANY OF THE FOLLOWING:   Fever over 101 degrees for 24 hours. Chest pain, shortness of breath, fever, chills, nausea, vomiting, diarrhea, change in mentation, falling, weakness, bleeding. Severe pain or pain not relieved by medications. Or, any other signs or symptoms that you may have questions about.       DISPOSITION:   x Home With:   OT  PT  HH  RN       SNF/Inpatient Rehab/LTAC    Independent/assisted living    Hospice    Other:     CDMP Checked:   Yes x     PROBLEM LIST Updated:  Yes x       Signed:   Shyla Vance MD  1/19/2019  10:39 AM

## 2019-01-19 NOTE — ROUTINE PROCESS
1102:  Paged Dr. Olivia Amos about SBP 180s and low saturation. Will push pulmonary toileting. 1132:  Second page out to Dr. Olivia Amos about above prior to discharging patient. Oxygenation improved to 92% on RA. 
 
1225: Finished reviewing discharge instructions with patient and her family. Pt sign paper discharge instruction- placed in chart. Difficulty with signature pads. Pt discharge to family with all belongings.

## 2019-01-19 NOTE — DISCHARGE SUMMARY
Discharge Summary PATIENT ID: Jason Clancy MRN: [id number] YOB: 1940 DATE OF ADMISSION: 1/15/2019 12:24 PM   
DATE OF DISCHARGE: 1/19/2019 PRIMARY CARE PROVIDER: Walt Kuo MD  
 
ATTENDING PHYSICIAN: Dr Francisco Javier Arriaza DISCHARGING PROVIDER: Francisco Javier Arriaza MD   
To contact this individual call [phone] and ask the  to page. If unavailable ask to be transferred the Adult Hospitalist Department. CONSULTATIONS: IP CONSULT TO GASTROENTEROLOGY 
IP CONSULT TO HOSPITALIST 
IP CONSULT TO CARDIOLOGY PROCEDURES/SURGERIES: * No surgery found * [address] COURSE:  
Abdominal pain, most likely secondary to pancreatitis: 
-now much improved  
-MRI/MRCP with multilocular custic lesion of the pancreas that ?may be tumor. Per GI note - may need repeat MRI vs. EUS as an outpatient 
-liquid diet 
-PPI 
-GI lite diet 
-Will discharge the patient with outpatient follow up with Dr Delaney Juarez Brief episode of A fib noted 1/18 am  
-The patient went into sinus spontaneously 
-Echo with EF 55-60% with no RWMA 
-Troponin unremarkable -TSH 1.23, Free T3 1.4 Code status: FULL 
DVT prophylaxis: SCDs DISCHARGE DIAGNOSES / PLAN:   
 
1. Acute pancreatitis PENDING TEST RESULTS:  
At the time of discharge the following test results are still pending: none FOLLOW UP APPOINTMENTS:   
Follow-up Information Follow up With Specialties Details Why Contact Info Walt Kuo MD Family Practice In 1 week  [place] [address] [address] 
[phone] Robbin Felipe MD Gastroenterology In 4 weeks  [address] [facility] [address] [facility] [address] [phone] ADDITIONAL CARE RECOMMENDATIONS:  
Follow up with PMD 
Follow up with Dr Delaney Juarez in 4 weeks DIET: GI lite, advance as tolerated ACTIVITY: Activity as tolerated DISCHARGE MEDICATIONS: 
 Current Discharge Medication List  
  
START taking these medications Details  
sucralfate (CARAFATE) 1 gram tablet Take 1 Tab by mouth Before breakfast, lunch, dinner and at bedtime. Qty: 120 Tab, Refills: 0  
  
pantoprazole (PROTONIX) 40 mg tablet Take 1 Tab by mouth two (2) times a day. Qty: 60 Tab, Refills: 0 CONTINUE these medications which have NOT CHANGED Details MULTIVITAMIN PO Take  by mouth. Takes one women's one-a-day formula po daily. CALCIUM CARBONATE/VITAMIN D3 (CALTRATE 600 + D PO) Take 600 mg by mouth daily. NOTIFY YOUR PHYSICIAN FOR ANY OF THE FOLLOWING:  
Fever over 101 degrees for 24 hours. Chest pain, shortness of breath, fever, chills, nausea, vomiting, diarrhea, change in mentation, falling, weakness, bleeding. Severe pain or pain not relieved by medications. Or, any other signs or symptoms that you may have questions about. DISPOSITION: 
x  Home With: 
 OT  PT  HH  RN  
  
 Long term SNF/Inpatient Rehab Independent/assisted living Hospice Other:  
 
 
PATIENT CONDITION AT DISCHARGE:  
 
Functional status Poor Deconditioned   
x Independent Cognition  
 x Lucid Forgetful Dementia Catheters/lines (plus indication) Joyce PICC   
 PEG   
x None Code status  
 x Full code DNR   
 
PHYSICAL EXAMINATION AT DISCHARGE: 
Please see progress note CHRONIC MEDICAL DIAGNOSES: 
Problem List as of 1/19/2019 Date Reviewed: 1/15/2019 Codes Class Noted - Resolved * (Principal) Pancreatitis ICD-10-CM: K85.90 ICD-9-CM: 726.5  1/15/2019 - Present Greater than 39 minutes were spent with the patient on counseling and coordination of care Signed:  
Maude Malave MD 
1/19/2019 10:40 AM

## 2019-01-19 NOTE — CONSULTS
[address] [facility] 
MR#: [id number] : 1940 ACCOUNT #: [de-identified] DATE OF SERVICE: 2019 Assessment/Plan/Discussion: 1. Brief onset of atrial fibrillation, likely related to metabolic conditions with her pancreatitis. She has no structural heart disease. She has no angina. Her TSH will be checked and has been ordered by the hospitalist service. 2.  At this point, I do not think anything would need to be changed and this is a single episode. If she has recurrent episodes we consider the addition of beta blocker and then start on her anticoagulation. I would be hesitant to give her anticoagulation of the pancreatitis and the risk of bleeding. So at this point, I would not recommend aspirin unless this recurs. She is already on subcutaneous heparin. 3.  We will check back on her during this hospitalization if her atrial fibrillation recurs. If it does not recur, I think no specific cardiac followup. If it does recover we are happy to see her. We will make arrangements. At discharge, she should follow up with Dr. Hernán Lao, her primary care physician. HISTORY OF PRESENT ILLNESS:  The patient is a 53-year-old female admitted with pancreatitis. This morning, she was noted to be in atrial fibrillation with a mildly rapid rate. This spontaneously resolved. She has maintained sinus rhythm through the morning. She denies any chest pain, chest pressure, shortness of breath. She reports she has a hiatal hernia in the past, but no other medical history. Denies any previous admissions for her heart. She has felt her heart racing a little bit and felt a little fullness in the subxiphoid region, but no shortness of breath. PAST MEDICAL HISTORY:  No history of hypertension, diabetes. SOCIAL HISTORY:  Nonsmoker. No alcohol. FAMILY HISTORY:  Negative for coronary disease or atrial fibrillation. REVIEW OF SYSTEMS:  Currently pain free without shortness of breath, PND, orthopnea, claudication, syncope, bleeding, urinary frequency stroke, seizures. PHYSICAL EXAMINATION: 
VITAL SIGNS:  Temperature 98.9, pulse 87, respirations 16, blood pressure 132/49, sats 98%. HEENT:  Normocephalic, atraumatic. Anicteric sclerae. NECK:  No JVD. LUNGS:  Clear lungs. HEART:  Regular rate and rhythm without murmur or gallop. EXTREMITIES:  No pedal edema. NEUROLOGIC:  Grossly nonfocal. 
SKIN:  Moist and warm normal. 
NEUROLOGIC:  Grossly afocal. 
 
LABORATORY DATA:  White count 11.9, hemoglobin 10.3, platelet count 259. Sodium 141, K 3.2, chloride 105, BUN 7, creatinine 0.68, magnesium 1.7. Lipase is greater than 3000. Triglycerides 117, cholesterol 185, HDL 52, , protein 6.1. There is no TSH. EKG 01/18/2019 at 8:23 a.m. showed atrial fibrillation with minor ST-T wave changes. Telemetry currently shows sinus rhythm. EKG 01/15/2019 showed sinus rhythm with normal limits. nonspecific findings. Echocardiogram 01/18/2019 shows normal LV function with mild mitral regurgitation and is essentially within normal limits. CURRENT MEDICATIONS:  Include heparin, magnesium, pantoprazole and sucralfate. Diltiazem was ordered, but I do not believe was given and I will discontinue the order. A TSH has been ordered for the morning. MD SETH Keith / DIEGO 
D: 01/18/2019 18:34    
T: 01/18/2019 19:17 
JOB #: [id number] CC: Paz Love MD 
CC: Van Stewart MD 
CC: Nito Morales MD

## 2019-01-19 NOTE — PROGRESS NOTES
Hospitalist Progress Note Deysi Lewis MD 
Answering service: [phone] from in house phone Cell: [phone] Date of Service:  2019 NAME:  Cassy Gentile :  1940 MRN:  [id number] Admission Summary:  
 
53-year-old female with a past medical history of IBS, who presented to the hospital with the above-mentioned symptoms. The patient reports her symptoms started about 5 days back. She had an episode of black, tarry vomitus, post that started having some significant abdominal pain. The patient reports the abdominal pain continued. She had poor appetite, had nausea, and the epigastric abdominal pain persisted. Patient reports that she does not take any anticoagulants or antiplatelets. Reports that pain gets worse with eating. Reports that she has not had a good bowel movement, but all her bowel movements have not been black as far as she knows. The patient had a colonoscopy done by Dr. Thompson Blandon for lower GI bleeding with benign-appearing colon polyps, moderate internal hemorrhoids, which were considered to be the likely cause of bleeding. The patient came to the ER, was found to have elevated lipase, and was requested to be admitted under the hospitalist service. Interval history / Subjective: F/u A fib Abdominal pain controlled, claims \"just slightly sore\" No nausea or vomiting Tolerating diet Assessment & Plan: Abdominal pain, most likely secondary to pancreatitis: 
-now much improved  
-MRI/MRCP with multilocular custic lesion of the pancreas that ?may be tumor. Per GI note - may need repeat MRI vs. EUS as an outpatient 
-liquid diet 
-PPI 
-GI lite diet 
-Will discharge the patient with outpatient follow up with Dr Neo Landry Brief episode of A fib noted  am  
-The patient went into sinus spontaneously 
-Echo with EF 55-60% with no RWMA 
-Troponin unremarkable -TSH 1.23, Free T3 1.4 Code status: FULL 
DVT prophylaxis: SCDs Plan: Discharge the patient home Care Plan discussed with: Patient/Family and Nurse Disposition: Home w/Family and TBD Hospital Problems  Date Reviewed: 1/15/2019 Codes Class Noted POA * (Principal) Pancreatitis ICD-10-CM: K85.90 ICD-9-CM: 035.7  1/15/2019 Unknown Review of Systems: A comprehensive review of systems was negative except for that written in the HPI. Vital Signs:  
 Last 24hrs VS reviewed since prior progress note. Most recent are: 
Visit Vitals /77 (BP 1 Location: Left arm, BP Patient Position: At rest;Sitting) Pulse (!) 102 Temp 99.2 °F (37.3 °C) Resp 18 Wt 63.5 kg (139 lb 15.9 oz) SpO2 90% Breastfeeding? No  
BMI 23.30 kg/m² Intake/Output Summary (Last 24 hours) at 1/19/2019 1027 Last data filed at 1/18/2019 1727 Gross per 24 hour Intake 240 ml Output  Net 240 ml Physical Examination:  
 
     
Constitutional:  No acute distress, cooperative, pleasant   
ENT:  Neck supple Resp:  CTA bilaterally. No accessory muscle use CV:  Regular rhythm, normal rate GI:  Soft, non distended, tender in epigastrium, hypoactive bowel sounds Musculoskeletal:  No edema, warm Neurologic:  Moves all extremities. AAOx3 Data Review:  
 Review and/or order of clinical lab test 
Review and/or order of tests in the radiology section of CPT Review and/or order of tests in the medicine section of CPT Labs:  
 
Recent Labs  
  01/18/19 
0335 WBC 11.9* HGB 10.3* HCT 31.5*  Recent Labs  
  01/18/19 
0852 01/18/19 
0334 NA  --  141 K  --  3.2*  
CL  --  105 CO2  --  27 BUN  --  7  
CREA  --  0.68 GLU  --  101* CA  --  8.0*  
MG 1.6 1.7 Recent Labs  
  01/18/19 
5716 SGOT 30 ALT 12  
AP 96 TBILI 0.9 TP 6.1* ALB 2.6*  
GLOB 3.5 No results for input(s): INR, PTP, APTT in the last 72 hours. No lab exists for component: INREXT, INREXT No results for input(s): FE, TIBC, PSAT, FERR in the last 72 hours. No results found for: FOL, RBCF No results for input(s): PH, PCO2, PO2 in the last 72 hours. Recent Labs  
  01/18/19 
0852 01/18/19 
0334 TROIQ <0.05 <0.05 Lab Results Component Value Date/Time Cholesterol, total 185 01/15/2019 12:29 PM  
 HDL Cholesterol 52 01/15/2019 12:29 PM  
 LDL, calculated 109.6 (H) 01/15/2019 12:29 PM  
 Triglyceride 117 01/15/2019 12:29 PM  
 CHOL/HDL Ratio 3.6 01/15/2019 12:29 PM  
 
Lab Results Component Value Date/Time Glucose (POC) 102 (H) 01/18/2019 06:23 AM  
 Glucose (POC) 139 (H) 01/17/2019 09:05 PM  
 Glucose (POC) 114 (H) 01/17/2019 04:31 PM  
 Glucose (POC) 106 (H) 01/17/2019 11:41 AM  
 Glucose (POC) 132 (H) 01/16/2019 09:13 PM  
 
Lab Results Component Value Date/Time Color DARK YELLOW 01/15/2019 10:16 PM  
 Appearance CLOUDY (A) 01/15/2019 10:16 PM  
 Specific gravity 1.030 01/15/2019 10:16 PM  
 pH (UA) 6.0 01/15/2019 10:16 PM  
 Protein 100 (A) 01/15/2019 10:16 PM  
 Glucose NEGATIVE  01/15/2019 10:16 PM  
 Ketone 40 (A) 01/15/2019 10:16 PM  
 Urobilinogen 1.0 01/15/2019 10:16 PM  
 Nitrites NEGATIVE  01/15/2019 10:16 PM  
 Leukocyte Esterase SMALL (A) 01/15/2019 10:16 PM  
 Epithelial cells MODERATE (A) 01/15/2019 10:16 PM  
 Bacteria 1+ (A) 01/15/2019 10:16 PM  
 WBC 10-20 01/15/2019 10:16 PM  
 RBC 0-5 01/15/2019 10:16 PM  
 
 
 
Medications Reviewed:  
 
Current Facility-Administered Medications Medication Dose Route Frequency  sucralfate (CARAFATE) tablet 1 g  1 g Oral AC&HS  acetaminophen (TYLENOL) tablet 650 mg  650 mg Oral Q6H PRN  
 sodium chloride (NS) flush 5-40 mL  5-40 mL IntraVENous Q8H  
 sodium chloride (NS) flush 5-40 mL  5-40 mL IntraVENous PRN  
 morphine injection 1 mg  1 mg IntraVENous Q4H PRN  
 ondansetron (ZOFRAN) injection 4 mg  4 mg IntraVENous Q4H PRN  
  heparin (porcine) injection 5,000 Units  5,000 Units SubCUTAneous Q8H  
 pantoprazole (PROTONIX) 40 mg in sodium chloride 0.9% 10 mL injection  40 mg IntraVENous Q12H  
 lactated Ringers infusion  125 mL/hr IntraVENous CONTINUOUS  
 
______________________________________________________________________ EXPECTED LENGTH OF STAY: 2d 12h ACTUAL LENGTH OF STAY:          4 Daja Fletcher MD

## 2019-01-21 NOTE — DISCHARGE INSTRUCTIONS
Patient Education        Pneumonia: Care Instructions  Your Care Instructions    Pneumonia is an infection of the lungs. Most cases are caused by infections from bacteria or viruses. Pneumonia may be mild or very severe. If it is caused by bacteria, you will be treated with antibiotics. It may take a few weeks to a few months to recover fully from pneumonia, depending on how sick you were and whether your overall health is good. Follow-up care is a key part of your treatment and safety. Be sure to make and go to all appointments, and call your doctor if you are having problems. It's also a good idea to know your test results and keep a list of the medicines you take. How can you care for yourself at home? · Take your antibiotics exactly as directed. Do not stop taking the medicine just because you are feeling better. You need to take the full course of antibiotics. · Take your medicines exactly as prescribed. Call your doctor if you think you are having a problem with your medicine. · Get plenty of rest and sleep. You may feel weak and tired for a while, but your energy level will improve with time. · To prevent dehydration, drink plenty of fluids, enough so that your urine is light yellow or clear like water. Choose water and other caffeine-free clear liquids until you feel better. If you have kidney, heart, or liver disease and have to limit fluids, talk with your doctor before you increase the amount of fluids you drink. · Take care of your cough so you can rest. A cough that brings up mucus from your lungs is common with pneumonia. It is one way your body gets rid of the infection. But if coughing keeps you from resting or causes severe fatigue and chest-wall pain, talk to your doctor. He or she may suggest that you take a medicine to reduce the cough. · Use a vaporizer or humidifier to add moisture to your bedroom. Follow the directions for cleaning the machine.   · Do not smoke or allow others to smoke around you. Smoke will make your cough last longer. If you need help quitting, talk to your doctor about stop-smoking programs and medicines. These can increase your chances of quitting for good. · Take an over-the-counter pain medicine, such as acetaminophen (Tylenol), ibuprofen (Advil, Motrin), or naproxen (Aleve). Read and follow all instructions on the label. · Do not take two or more pain medicines at the same time unless the doctor told you to. Many pain medicines have acetaminophen, which is Tylenol. Too much acetaminophen (Tylenol) can be harmful. · If you were given a spirometer to measure how well your lungs are working, use it as instructed. This can help your doctor tell how your recovery is going. · To prevent pneumonia in the future, talk to your doctor about getting a flu vaccine (once a year) and a pneumococcal vaccine (one time only for most people). When should you call for help? Call 911 anytime you think you may need emergency care. For example, call if:    · You have severe trouble breathing.    Call your doctor now or seek immediate medical care if:    · You cough up dark brown or bloody mucus (sputum).     · You have new or worse trouble breathing.     · You are dizzy or lightheaded, or you feel like you may faint.    Watch closely for changes in your health, and be sure to contact your doctor if:    · You have a new or higher fever.     · You are coughing more deeply or more often.     · You are not getting better after 2 days (48 hours).     · You do not get better as expected. Where can you learn more? Go to http://anita-maxime.info/. Enter [id number] in the search box to learn more about \"Pneumonia: Care Instructions. \"  Current as of: September 5, 2018  Content Version: 11.9  © 0549-3581 [employer]. Care instructions adapted under license by [employer] (which disclaims liability or warranty for this information).  If you have questions about a medical condition or this instruction, always ask your healthcare professional. [place] [address] any warranty or liability for your use of this information.

## 2019-01-21 NOTE — ED PROVIDER NOTES
70 y.o. female with past medical history significant for IBS and pancreatitis who presents from PCP office with chief complaint of SOB. Per relative, patient was admitted to [facility] for pancreatitis from 1/15/19 to 1/19/19. Since being discharged two days ago, patient reports that she has had \"trouble catching her breath\" and a \"deep, dry\" cough. She notes that her cough became productive today. She complains of associated intermittent tightness in her chest as well. Per relative, patient also has audible \"whistling and rattling\" while breathing. She notes that her symptoms are more pronounced in the morning immediately after waking. Patient also complains of an associated low-grade fever that was measured to be \"99.7 degrees F\" this morning. Patient visited her PCP this morning and had an X-ray, EKG, and labs performed while there. She reportedly was not febrile while at her PCP's office. She was advised to visit the ED by her PCP to \"rule out pneumonia. \"  Of note, patient continues to complain of abdominal pain at the same site as her pancreatitis. She notes aggravation of her abdominal pain when palpated or breathing deeply. Patient denies chest pain, vomiting, leg swelling, and leg pain. There are no other acute medical concerns at this time. Social hx: former tobacco use; occasional alcohol use; negative drug use PCP: Earnestine Leonard MD 
 
Note written by Zahira Toledo, as dictated by Yas Blount MD 1:48 PM 
 
 
 
The history is provided by the patient and a relative. No  was used. Past Medical History:  
Diagnosis Date  Other ill-defined conditions(799.89) IBS  Pancreatitis Past Surgical History:  
Procedure Laterality Date  COLONOSCOPY Left 11/1/2017 COLONOSCOPY performed by Rigoberto Flores MD at [address]  HX CHOLECYSTECTOMY  HX GI    
 surgery for hiatal hernia Family History: Problem Relation Age of Onset  Dementia Mother   
     alzheimers  Cancer Sister   
     gallbladder  Cancer Brother   
     pancreatic  Breast Cancer Paternal Grandmother  Dementia Sister   
     alzheimers  Heart Surgery Brother   
     bypass Social History Socioeconomic History  Marital status:  Spouse name: Not on file  Number of children: Not on file  Years of education: Not on file  Highest education level: Not on file Social Needs  Financial resource strain: Not on file  Food insecurity - worry: Not on file  Food insecurity - inability: Not on file  Transportation needs - medical: Not on file  Transportation needs - non-medical: Not on file Occupational History  Not on file Tobacco Use  Smoking status: Former Smoker  Smokeless tobacco: Never Used  Tobacco comment: quit smoking cigarettes 6 yrs ago Substance and Sexual Activity  Alcohol use: Yes Comment: very occasional  
 Drug use: No  
 Sexual activity: Not on file Other Topics Concern  Not on file Social History Narrative  Not on file ALLERGIES: Penicillins Review of Systems Constitutional: Positive for fever (low-grade fever). Negative for chills. Eyes: Negative for visual disturbance. Respiratory: Positive for cough, chest tightness, shortness of breath and wheezing (\"whistling and rattling\"). Cardiovascular: Negative for chest pain and leg swelling. Gastrointestinal: Positive for abdominal pain (at same site as pancreatitis discomfort). Negative for diarrhea, nausea and vomiting. Genitourinary: Negative for dysuria. Musculoskeletal: Negative. Negative for back pain, myalgias and neck stiffness. Skin: Negative for rash. Neurological: Negative. Negative for syncope and headaches. Psychiatric/Behavioral: Negative for confusion. All other systems reviewed and are negative. Vitals: 01/21/19 1247 01/21/19 1317 BP:  171/71 Pulse: 88 90 Resp:  16 Temp:  98 °F (36.7 °C) SpO2: 95% 99% Weight:  67.6 kg (149 lb) Height:  5' 4\" (1.626 m) Physical Exam  
Constitutional: She appears well-developed and well-nourished. No distress. HENT:  
Head: Normocephalic. Eyes: Pupils are equal, round, and reactive to light. Neck: Normal range of motion. Cardiovascular: Normal rate and regular rhythm. No murmur heard. Pulmonary/Chest: Effort normal. No respiratory distress. She has decreased breath sounds (bilaterally). Abdominal: Soft. There is no tenderness. Musculoskeletal: Normal range of motion. She exhibits no edema. Neurological: She is alert. She has normal strength. No cranial nerve deficit. Skin: Skin is warm and dry. Psychiatric: She has a normal mood and affect. Her behavior is normal.  
Nursing note and vitals reviewed. Note written by Zahira Riggins, as dictated by Edmar Pak MD 1:48 PM 
 
 
MDM Procedures ED EKG interpretation: 
Rhythm: normal sinus rhythm; Rate (approx.): 85 bpm; ST/T wave: no acute ST changes; Note written by Zahira Riggins, as dictated by Edmar Pak MD 1:19 PM 
 
PROGRESS NOTE: 
3:18 PM 
Discussed labs with patient. Radiologist does not think the patient has pneumonia. Worried that there is a small area of pneumonia due to the patient's symptoms. Will treat as if the patient has pneumonia. Since current saturation levels are okay, she can be treated as an outpatient. Even though lipase levels are still elevated, patient's abdomen is soft and she does not have any abdominal pain at this point.

## 2019-01-21 NOTE — ED TRIAGE NOTES
Pt was referred to ED by [facility] for r/u PNA. Pt recently admitted, discharged Saturday, for pancreatitis. Pt c/o intermittent shortness of breath, congested mucous cough, and chest tightness worse with inspiration.

## 2019-01-22 NOTE — PROGRESS NOTES
Care Management - Met with patient and her daughter Gearld Burkitt ([phone]) in the patient's ED room for readmission assessment. Confirmed address and phone number. Updated patient's emergency contact list. Patient's twin sister was also in ED at the same time for vertigo. Patient was completely independent prior to her admission last week. Patient was discharged on 19th. Per daughter, patient has been weaker that day and she needed more assistance. On the 20th, patient was able to get up and take a shower independently. Per daughter, patient has been a bit confused at times. Daughter will be staying with her for a while. Patient's son lives about 30 minutes away. Her twin sister and her twin sister's son live only 10 minutes away. ACP: Patient has an advance care plan. She will bring it to her next visit to hospital. 
DME: none ADLs: independent Emergency Contacts:  
Daughter - Kenneth Hernandez ([phone]) Son - Kori Vasquez ([phone]) Sister - Nash Fuentes ([phone]) Date of previous inpatient admission/ ED visit? 1-15 to 1-19-19 for pancreatitis, lesions on pancreas (?tumor), short run of afib  On 1-18-19. What brought the patient back to ED? Patient seen by her PCP today. He sent patient to ED for suspected pneumonia. Discharge diagnosis was pneumonia. Did patient decline recommended services during last admission/ ED visit (if yes, what)? no 
 
Has patient seen a provider since their last inpatient admission/ED visit (if yes, when)? Yes,today. CM Interventions: 
From previous inpatient admission/ED visit: Home with office follow up. From current inpatient admission/ED visit: Home with po antibiotics and office follow up. Daughter who lives in [place], [place] is currently staying with her since her discharge on  1-19-19. Care Management Interventions PCP Verified by CM: Yes(Dr. Mike Sr) Last Visit to PCP: 01/21/19 Mode of Transport at Discharge: ALS(daughter) Discharge Durable Medical Equipment: No 
Physical Therapy Consult: No 
Occupational Therapy Consult: No 
Speech Therapy Consult: No 
Current Support Network: Lives Alone Confirm Follow Up Transport: Family Plan discussed with Pt/Family/Caregiver: Yes [employer] Information Provided?: No 
Discharge Location Discharge Placement: Home [facility]

## 2019-01-22 NOTE — CALL BACK NOTE
[facility] Emergency Department Follow Up Call Record Discharged to : Home/Family Home/Home Health/Skilled Facility/Rehab/Assisted Living/Other_Home______ 1) Did you receive your discharge instructions? Yes       
2) Do you understand them? Yes        
3) Are you able to follow them? Yes If NO, what can I clarify for you? 4) Do you understand your diagnosis? Yes        
5) Do you know which symptoms should prompt you to call the doctor? Yes    
6) Were you able to fill and  any medications that were prescribed? Yes  
 
7) You were prescribed ___________for ____________________. Common side effects of this medication are____________________. This is not a complete list so please review the forms given from the pharmacy for a complete list. 8) Are there any questions about your medications? No     
 
  
 Have you scheduled any recommended doctors appointments (specialty, PCP) Encouraged this patient make follow up appointment with her PCP. If NO, what barriers are you encountering (transportation/lost contact info/cost/ 
didnt think necessary/no PCP 
9) If discharged with Home Health, has the agency contacted you to schedule visit? Not applicable 10) Is there anyone available to help you at home (meals, errands, transportation   
monitoring) (adult children, neighbors, private duty companions) Yes, daughter 11) Are you on a special diet? No        
If YES, do you understand the requirements for this diet? Education provided? 12) If presented with cough, bronchitis, COPD, asthma, is it ok to ask that the 
 respiratory disease management educator call you? Not applicable 13)  A) If presented with fall, were you issued an assistive device in the ED Are you using? Not applicable B) If given RX for device, have you obtained? Not applicable If NO, barriers? C) Therapist recommended: 
 Are you able to implement the suggestions? Not applicable If NO, barriers to implementation? D) Are you having any difficulties with mobility inside your home?   
 (steps, bed, tub)No 
 If YES, ask if the [facility] PT can contact patient and good time and number? 
14)  At the end of your discharge instructions, there is information about accessing [facility] SERVICES, have you had a chance to review those? Yes Do you have any questions about signing up for this service? We encourage our patients to be active participants in their healthcare and this site is one of the ways to do that. It will allow you to access parts of your medical record, email your doctors office, schedule appointments, and request medications refills . 15) Are there any other questions that I can answer for you regarding  
 your Emergency department visit? NO Estimated Call Time:__11:20 AM 
_________________ Date/Time:_______________

## 2019-01-25 PROBLEM — R06.00 DYSPNEA: Status: ACTIVE | Noted: 2019-01-01

## 2019-01-25 NOTE — ED TRIAGE NOTES
Pt arrives with her son c/o SOB and loss of appetite. Pt taking abx for bronchitis but reports vomiting it up. Pt was seen here last Monday for chest pain and abd pain, d/c on abx.

## 2019-01-25 NOTE — ED NOTES
Patient with some shortness of breath after coming back from CT scan. Oxygen 2 LNC applied and feeling better with the oxygen on. Patient has dry cough now. Patient is breathing much better but still remains with RR of 30. Depends changed and fresh one applied

## 2019-01-25 NOTE — ED PROVIDER NOTES
76yo F accompanied by son with cc of sob. Started about a week ago. Seen here last week and diagnosed with pancreatitis. Discharged 5 days ago. Saw PCP 2 days later and diagnosed with bronchitis. Sent home on abx. Today with worsening sob, generalized weakness, sob. Unable to eat. Vomiting up antibiotics. No fever. + soft stool. No CP, abd pain, back pain. Past Medical History:  
Diagnosis Date  Other ill-defined conditions(799.89) IBS  Pancreatitis Past Surgical History:  
Procedure Laterality Date  COLONOSCOPY Left 11/1/2017 COLONOSCOPY performed by Franklin Freeman MD at [address] [place] [address] Blvd  HX CHOLECYSTECTOMY  HX GI    
 surgery for hiatal hernia Family History:  
Problem Relation Age of Onset  Dementia Mother   
     alzheimers  Cancer Sister   
     gallbladder  Cancer Brother   
     pancreatic  Breast Cancer Paternal Grandmother  Dementia Sister   
     alzheimers  Heart Surgery Brother   
     bypass Social History Socioeconomic History  Marital status:  Spouse name: Not on file  Number of children: Not on file  Years of education: Not on file  Highest education level: Not on file Social Needs  Financial resource strain: Not on file  Food insecurity - worry: Not on file  Food insecurity - inability: Not on file  Transportation needs - medical: Not on file  Transportation needs - non-medical: Not on file Occupational History  Not on file Tobacco Use  Smoking status: Former Smoker  Smokeless tobacco: Never Used  Tobacco comment: quit smoking cigarettes 6 yrs ago Substance and Sexual Activity  Alcohol use: Yes Comment: very occasional  
 Drug use: No  
 Sexual activity: Not on file Other Topics Concern  Not on file Social History Narrative  Not on file ALLERGIES: Penicillins Review of Systems Constitutional: Negative for fever. HENT: Negative for facial swelling. Eyes: Negative for visual disturbance. Respiratory: Positive for cough and shortness of breath. Negative for chest tightness. Cardiovascular: Negative for chest pain. Gastrointestinal: Negative for abdominal pain. Genitourinary: Negative for difficulty urinating and dysuria. Musculoskeletal: Negative for arthralgias. Skin: Negative for rash. Neurological: Positive for weakness. Negative for dizziness. Hematological: Negative for adenopathy. Psychiatric/Behavioral: Negative for suicidal ideas. Vitals:  
 01/25/19 1329 Pulse: (!) 115 SpO2: 96% Physical Exam  
Constitutional: She is oriented to person, place, and time. She appears well-developed and well-nourished. No distress. HENT:  
Head: Normocephalic and atraumatic. Mouth/Throat: Oropharynx is clear and moist.  
Eyes: Pupils are equal, round, and reactive to light. No scleral icterus. Neck: Normal range of motion. Neck supple. No thyromegaly present. Cardiovascular: Regular rhythm, normal heart sounds and intact distal pulses. No murmur heard. tachycardia Pulmonary/Chest: Effort normal. No respiratory distress. She has decreased breath sounds. She has no wheezes. She has rhonchi. She has no rales. Abdominal: Soft. Bowel sounds are normal. She exhibits no distension. There is no tenderness. Musculoskeletal: Normal range of motion. She exhibits no edema. Neurological: She is alert and oriented to person, place, and time. Skin: Skin is warm and dry. No rash noted. She is not diaphoretic. Nursing note and vitals reviewed. MDM Number of Diagnoses or Management Options Elevated troponin I level:  
Influenza A:  
 
  
 
ED EKG interpretation: 
Rhythm: sinus tach. Rate (approx.): 107. Axis: normal.  ST segment:  No concerning ST elevations or depressions. This EKG was interpreted by Jennifer Bloom MD,ED Provider. Trop elevated Flu A positive ASA given 
tamiflu ordered. Procedures PROGRESS NOTE: 
6:04 PM 
Updated the pt on all currently available labs and imaging results. Hospitalist Kimi for Admission 6:11 PM 
 
ED Room Number: ER30/30 Patient Name and age:  Jayla Gallegos 70 y.o.  female Working Diagnosis:  
1. Influenza A 2. Elevated troponin I level Readmission: no 
Isolation Requirements:  no 
Recommended Level of Care:  ICU Code Status:  Full Other:  Trop 3. Influenza pos. VS stable. Cardiology called. CONSULT NOTE: 
6:31 PM Yani Gillespie MD spoke with Dr. Alexander De La Cruz, Consult for Cardiology. Discussed available diagnostic tests and clinical findings. Dr. Alexander De La Cruz agrees with hospitalist admission and recommends repeat EKG. 
 
6:52 PM 
Patient is being admitted to the hospital.  The results of their tests and reasons for their admission have been discussed with them and/or available family. They convey agreement and understanding for the need to be admitted and for their admission diagnosis. Consultation will be made now with the inpatient physician for hospitalization. 7:27 PM 
I have spent **41* minutes of critical care time involved in lab review, consultations with specialist, family decision-making, and documentation. During this entire length of time I was immediately available to the patient and/or family.

## 2019-01-25 NOTE — PROGRESS NOTES
Admission Medication Reconciliation: 
 
Information obtained from:  Patient/visitor/RxQuery Comments/Recommendations: Updated PTA meds/reviewed patient's allergies. 1)  Spoke with patient and visitor in the room. Patient took her morning doses of levaquin and protonix today. Patient took one dose of guaifenesin yesterday. Patient denies other OTC/supplements at this time. 2)  Medication changes (since last review): 
 
Removed 
-Calcium carbonate/vitamin D3 600mg + D PO daily 
-Multivitamin PO 
-Sucralfate 1gram QIDACHS Allergies:  Penicillins Significant PMH/Disease States:  
Past Medical History:  
Diagnosis Date  Other ill-defined conditions(409.86) IBS  Pancreatitis Chief Complaint for this Admission: Chief Complaint Patient presents with  Shortness of Breath  Fatigue Prior to Admission Medications:  
Prior to Admission Medications Prescriptions Last Dose Informant Patient Reported? Taking?  
levoFLOXacin (LEVAQUIN) 750 mg tablet 1/25/2019 at Unknown time  No Yes Sig: Take 1 Tab by mouth daily for 10 days. pantoprazole (PROTONIX) 40 mg tablet 1/25/2019 at Unknown time  No Yes Sig: Take 1 Tab by mouth two (2) times a day. Facility-Administered Medications: None

## 2019-01-26 NOTE — PROGRESS NOTES
Day #1 of Tamiflu Indication:  Flu 
Current regimen:  30mg BID Recent Labs  
  19 
0428 19 
2220 19 
1512 WBC 7.5 8.5 9.7 CREA 0.72  --  0.99  
BUN 9  --  12 Est CrCl: 63.3 ml/min Temp (24hrs), Av.4 °F (36.9 °C), Min:97.7 °F (36.5 °C), Max:99.1 °F (37.3 °C) Plan: Change to 75mg BID for CrCl >60ml/min.

## 2019-01-26 NOTE — ROUTINE PROCESS
TRANSFER - OUT REPORT: 
 
Verbal report given to SUKHWINDER Cao(name) on Kimberli Cantor  being transferred to [facility](unit) for routine progression of care Report consisted of patients Situation, Background, Assessment and  
Recommendations(SBAR). Information from the following report(s) SBAR, Kardex, OR Summary, MAR and Recent Results was reviewed with the receiving nurse. Lines:  
Peripheral IV 01/25/19 Left Antecubital (Active) Site Assessment Clean, dry, & intact 1/25/2019  3:18 PM  
Phlebitis Assessment 0 1/25/2019  3:18 PM  
Infiltration Assessment 0 1/25/2019  3:18 PM  
Dressing Status Clean, dry, & intact 1/25/2019  3:18 PM  
Dressing Type Transparent 1/25/2019  3:18 PM  
Hub Color/Line Status Pink;Capped;Flushed;Patent 1/25/2019  3:18 PM  
Action Taken Blood drawn 1/25/2019  3:18 PM  
  
 
Opportunity for questions and clarification was provided. Patient transported with: 
 [employer]

## 2019-01-26 NOTE — PROGRESS NOTES
[facility] Progress Note       NAME:  Anastasia Mohan :   1940 MRN:   [id number] Assessment/Plan:CMP: severe by echo today with EF (preliminary) of 20% and +rwma. Unclear if this related to viral illness or underlying cad given elevation in troponin. start core and lisinopril for now. Careful with diuresis in acute pancreatitis 
probnp elevated Continue as and lovenox as well Troponin trending down She will need cardiac catheterization Influenza A: as per primary team 
 
SOB: likely related to cmp, chest ct noted Pancreatitis as per primary team 
  
 
Subjective:  
Cardiac ROS: sob no cp repotted Review of Systems:  
Pertinent items are noted in the History of Present Illness. Objective: No intake/output data recorded. 1901 -  0700 In: 120 [P.O.:120] Out: - Telemetry: normal sinus rhythm Physical Exam: 
Visit Vitals /49 (BP 1 Location: Left arm, BP Patient Position: At rest) Pulse (!) 104 Temp 97.7 °F (36.5 °C) Resp 23 Ht 5' 4\" (1.626 m) Wt 68.7 kg (151 lb 8 oz) SpO2 97% Breastfeeding? No  
BMI 26.00 kg/m² Neck: no JVD Heart: regular rate and rhythm Lungs: rales R base, L base, diminished breath sounds R base, L base Abdomen: soft, non-tender. Bowel sounds normal. No masses,  no organomegaly Extremities: edema trace Additional comments: None Care Plan discussed with: 
  Comments Patient Family RN Care Manager Consultant:     
 
Data Review: No results for input(s): TNIPOC in the last 72 hours. No lab exists for component: ITNL Recent Labs  
  19 
1512 TROIQ 2.38* 3.26* Recent Labs  
  19 
0428 19 
2220 19 
1512   --  139  
K 3.6  --  3.7   --  101 CO2 26  --  27  
BUN 9  --  12  
CREA 0.72  --  0.99 GLU 98  --  112* MG  --   --  1.9 ALB  --   --  2.8*  
 WBC 7.5 8.5 9.7 HGB 9.1* 9.2* 10.6* HCT 28.3* 28.6* 33.2*  
 246 328 No results for input(s): INR, PTP, APTT in the last 72 hours. No lab exists for component: INREXT Medications reviewed Current Facility-Administered Medications Medication Dose Route Frequency  pantoprazole (PROTONIX) tablet 40 mg  40 mg Oral BID  sodium chloride (NS) flush 5-40 mL  5-40 mL IntraVENous Q8H  
 sodium chloride (NS) flush 5-40 mL  5-40 mL IntraVENous PRN  
 aspirin chewable tablet 81 mg  81 mg Oral DAILY  nitroglycerin (NITROSTAT) tablet 0.4 mg  0.4 mg SubLINGual Q5MIN PRN  
 ondansetron (ZOFRAN) injection 4 mg  4 mg IntraVENous Q4H PRN  
 enoxaparin (LOVENOX) injection 70 mg  1 mg/kg SubCUTAneous Q12H  
 oseltamivir (TAMIFLU) 6 mg/mL oral suspension 30 mg  30 mg Oral Q12H  
 morphine injection 2 mg  2 mg IntraVENous Q4H PRN  
 0.9% sodium chloride infusion  75 mL/hr IntraVENous CONTINUOUS Data Reviewed: current meds, labs,recent radiology, intake/output/weight and problem list reviewed Jaky Olea MD

## 2019-01-26 NOTE — PROGRESS NOTES
Bedside shift change report given to Yuan Menendez RN (oncoming nurse) by Swathi Pena RN (offgoing nurse). Report included the following information SBAR, Kardex, Intake/Output, MAR, Recent Results and Cardiac Rhythm NSR.

## 2019-01-26 NOTE — H&P
History and Physical 
Primary Care Provider: Cookie Gillette MD 
 
Subjective:  
 
Carmina Reeves is a 70 y.o. female who presents with dyspnea. Was recently just discharged from hospital 5 days ago for pancreatitis and large pancreatic cyst. 
Two to three days after discharge, pt began having intermittent cough and dyspnea. Associated with generalized weakness and fatigue. Denies chest pain, significant sputum, calf pain or erythema, fever, chills. Does have some persistent nausea. Review of Systems: A comprehensive review of systems was negative except for that written in the History of Present Illness. Past Medical History:  
Diagnosis Date  Other ill-defined conditions(799.89) IBS  Pancreatitis Past Surgical History:  
Procedure Laterality Date  COLONOSCOPY Left 11/1/2017 COLONOSCOPY performed by [place] Jaime MD at [address] [place] [address]  HX CHOLECYSTECTOMY  HX GI    
 surgery for hiatal hernia Prior to Admission medications Medication Sig Start Date End Date Taking? Authorizing Provider  
levoFLOXacin (LEVAQUIN) 750 mg tablet Take 1 Tab by mouth daily for 10 days. 1/21/19 1/31/19 Yes Martha Valdes MD  
pantoprazole (PROTONIX) 40 mg tablet Take 1 Tab by mouth two (2) times a day. 1/19/19  Yes Jessi Green MD  
 
Allergies Allergen Reactions  Penicillins Hives Family History Problem Relation Age of Onset  Dementia Mother   
     alzheimers  Cancer Sister   
     gallbladder  Cancer Brother   
     pancreatic  Breast Cancer Paternal Grandmother  Dementia Sister   
     alzheimers  Heart Surgery Brother   
     bypass SOCIAL HISTORY: 
Social History Socioeconomic History  Marital status:  Spouse name: Not on file  Number of children: Not on file  Years of education: Not on file  Highest education level: Not on file Social Needs  Financial resource strain: Not on file  Food insecurity - worry: Not on file  Food insecurity - inability: Not on file  Transportation needs - medical: Not on file  Transportation needs - non-medical: Not on file Occupational History  Not on file Tobacco Use  Smoking status: Former Smoker  Smokeless tobacco: Never Used  Tobacco comment: quit smoking cigarettes 6 yrs ago Substance and Sexual Activity  Alcohol use: Yes Comment: very occasional  
 Drug use: No  
 Sexual activity: Not on file Other Topics Concern  Not on file Social History Narrative  Not on file Objective:  
 
 
Physical Exam:  
General:    Alert, cooperative, no distress, appears stated age. HEENT: Atraumatic, anicteric sclerae Neck:  Supple, symmetrical 
Lungs:   Some coarse breath sounds,  No Wheezing or Rhonchi. No rales. No Accessory muscle use. Heart:   Regular  rhythm,  Normal S1 and S2   No edema Abdomen:   Soft, non-tender. Not distended. Bowel sounds normal.  No rebound Extremities: No cyanosis. No clubbing Skin:     Warm, dry Neurologic: No gross focal neuro deficit Data Review: All diagnostic labs and studies have been reviewed. Assessment:  
 
Active Problems: * No active hospital problems. * Indeterminate troponin elevation up to 3.26. Possibly 2/2 myocarditis. - discussed with cardiology 
- started on empiric ASA and therapeutic lovenox 
- serial trops 
- ECHO 
- will need stress test prior to d/c - chest pain protocol New onset dyspnea. Influenzae A positive. Does have small R pleural effusion with atelectasis on CT. CT-A negative for PE 
- started on tamiflu 
- treat symptomatically for now 
- o2 per protocol 
- respiratory care Recent discharge for acute pancreatitis. MRCP remarkable for large pancreatic cyst 
- repeat lipase level - prn anti-emetics - pain management 
- IVF hydration - clear liquids for now PPX: lovenox therapeutic Dispo: Inpt, step down Signed By: Anuel Pittman MD   
 January 25, 2019

## 2019-01-26 NOTE — CONSULTS
[facility] ([facility] 
[address] [place], [address] 
[phone] GI CONSULTATION NOTE 
 
 
NAME:  Mayra Crum :   1940 MRN:   [id number] Referring Physician: Dr Guicho Kelly Consult Date: 2019 Chief Complaint: Acute pancreattis History of Present Illness:  Patient is a 70 y.o. who is seen in consultation at the request of Dr. Guicho Kelly for acute pancreatitis. Ms Florinda Langston is admitted with worsening SOB and cough for 2-3 days now. She was recently discharged from hospital 5 days ago for pancreatitis. MRI showed a multiloculated cyst in body of pancreas with communication to the main PD. About 2-3 days after discharge, she began having intermittent cough and dyspnea.  She had generalized weakness and fatigue.  Denies chest pain, significant sputum, calf pain or erythema, fever, chills. Does have some persistent nausea. PMH: 
Past Medical History:  
Diagnosis Date  Other ill-defined conditions(799.89) IBS  Pancreatitis PSH: 
Past Surgical History:  
Procedure Laterality Date  COLONOSCOPY Left 2017 COLONOSCOPY performed by Tiffanie Shah MD at [facility]  HX CHOLECYSTECTOMY  HX GI    
 surgery for hiatal hernia Allergies: Allergies Allergen Reactions  Penicillins Hives Home Medications: 
Prior to Admission Medications Prescriptions Last Dose Informant Patient Reported? Taking?  
levoFLOXacin (LEVAQUIN) 750 mg tablet 2019 at Unknown time  No Yes Sig: Take 1 Tab by mouth daily for 10 days. pantoprazole (PROTONIX) 40 mg tablet 2019 at Unknown time  No Yes Sig: Take 1 Tab by mouth two (2) times a day. Facility-Administered Medications: None Hospital Medications: 
Current Facility-Administered Medications Medication Dose Route Frequency  carvedilol (COREG) tablet 6.25 mg  6.25 mg Oral BID WITH MEALS  
  lisinopril (PRINIVIL, ZESTRIL) tablet 2.5 mg  2.5 mg Oral DAILY  oseltamivir (TAMIFLU) capsule 75 mg  75 mg Oral Q12H  pantoprazole (PROTONIX) tablet 40 mg  40 mg Oral BID  sodium chloride (NS) flush 5-40 mL  5-40 mL IntraVENous Q8H  
 sodium chloride (NS) flush 5-40 mL  5-40 mL IntraVENous PRN  
 aspirin chewable tablet 81 mg  81 mg Oral DAILY  nitroglycerin (NITROSTAT) tablet 0.4 mg  0.4 mg SubLINGual Q5MIN PRN  
 ondansetron (ZOFRAN) injection 4 mg  4 mg IntraVENous Q4H PRN  
 enoxaparin (LOVENOX) injection 70 mg  1 mg/kg SubCUTAneous Q12H  
 morphine injection 2 mg  2 mg IntraVENous Q4H PRN  
 0.9% sodium chloride infusion  75 mL/hr IntraVENous CONTINUOUS Social History: 
Social History Tobacco Use  Smoking status: Former Smoker  Smokeless tobacco: Never Used  Tobacco comment: quit smoking cigarettes 6 yrs ago Substance Use Topics  Alcohol use: Yes Comment: very occasional  
 
 
Family History: 
Family History Problem Relation Age of Onset  Dementia Mother   
     alzheimers  Cancer Sister   
     gallbladder  Cancer Brother   
     pancreatic  Breast Cancer Paternal Grandmother  Dementia Sister   
     alzheimers  Heart Surgery Brother   
     bypass Review of Systems: 
 
Constitutional: negative fever, negative chills, negative weight loss Eyes:   negative visual changes ENT:   negative sore throat, tongue or lip swelling Respiratory:  negative cough, negative dyspnea Cards:  negative for chest pain, palpitations, lower extremity edema GI:   See HPI 
:  negative for frequency, dysuria Integument:  negative for rash and pruritus Heme:  negative for easy bruising and gum/nose bleeding Musculoskel: negative for myalgias,  back pain and muscle weakness Neuro: negative for headaches, dizziness, vertigo Psych:  negative for feelings of anxiety, depression Objective:  
 
Patient Vitals for the past 8 hrs: BP Temp Pulse Resp SpO2  
01/26/19 1109 134/49 97.7 °F (36.5 °C) (!) 104 23 97 % 01/26/19 0721 123/52 97.7 °F (36.5 °C) 93 25 99 % No intake/output data recorded. 01/24 1901 - 01/26 0700 In: 120 [P.O.:120] Out: - PHYSICAL EXAM: 
General: WD, WN. Alert, cooperative, no acute distress   
HEENT: NC, Atraumatic. PERRLA, EOMI. Anicteric sclerae. Lungs:  CTA Bilaterally. No Wheezing/Rhonchi/Rales. Heart:  Regular  rhythm,  No murmur (), No Rubs, No Gallops Abdomen: Healthy scars of previous cholecystectomy and fundoplication, soft, Non distended, mildly tender epigastric and left para-umbilical region.  +Bowel sounds, no HSM Extremities: No c/c/e Neurologic:  CN 2-12 gi, Alert and oriented X 3. No acute neurological distress Psych:   Good insight. Not anxious nor agitated. Data Review Recent Labs  
  01/26/19 0428 01/25/19 2220 WBC 7.5 8.5 HGB 9.1* 9.2* HCT 28.3* 28.6*  
 246 Recent Labs  
  01/26/19 
0428 01/25/19 
1512  139  
K 3.6 3.7  101 CO2 26 27 BUN 9 12 CREA 0.72 0.99 GLU 98 112* CA 7.6* 8.5 Recent Labs  
  01/25/19 2220 01/25/19 
1512 SGOT  --  67* AP  --  104 TP  --  7.4 ALB  --  2.8*  
GLOB  --  4.6* LPSE >3,000*  -- No results for input(s): INR, PTP, APTT in the last 72 hours. No lab exists for component: INREXT Imaging studies reviewed Assessment:  
Patient Active Problem List  
Diagnosis Code  Pancreatitis K85.90  Dyspnea R06.00 Plan: Ms Wojciech Burrell has acute pancreatitis and is likely caused by expulsion of mucin into the main pancreatic duct as the cyst appears to be an IPMN. Would recommend clears by mouth, fluid resuscitation and pain control prn She would need EUS to assess the nature of the cyst at some point Will follow closely with you Thanks for the kind referral

## 2019-01-26 NOTE — PROGRESS NOTES
Hospitalist Progress Note Sobeida Edmonds MD 
Answering service: [phone] OR [phone] from in house phone Date of Service:  2019 NAME:  Nisa Green :  1940 MRN:  [id number] Admission Summary:  
Nisa Green is a 70 y.o. female who presents with dyspnea. Was recently just discharged from hospital 5 days ago for pancreatitis and large pancreatic cyst. 
Two to three days after discharge, pt began having intermittent cough and dyspnea. Associated with generalized weakness and fatigue. Denies chest pain, significant sputum, calf pain or erythema, fever, chills. Does have some persistent nausea. Interval history / Subjective:  
Pt doing well coughing a lot d/w pt and son at bedside Assessment & Plan:  
 
Indeterminate troponin elevation up to 3.26--> 2.6 . Possibly 2/2 myocarditis. - discussed with cardiology 
- started on empiric ASA and therapeutic lovenox 
- serial trops 
- ECHO pending 
- will need stress test prior to d/c 
- ACS protocol for now 
- cardiology on boardl 
  
New onset dyspnea. Influenzae A positive. Does have small R pleural effusion with atelectasis on CT. CT-A negative for PE 
- started on tamiflu 
- treat symptomatically for now 
- o2 per protocol 
- respiratory care 
  
Recent discharge for acute pancreatitis. MRCP remarkable for large pancreatic cyst 
- repeat lipase level - prn anti-emetics - pain management 
- IVF hydration - clear liquids for now - GI re consult Anemia of chronic dz ? 
- check FOBT 
  
Code status: Full DVT prophylaxis: Lovenox Care Plan discussed with: Patient/Family and Nurse Disposition: TBD Hospital Problems  Date Reviewed: 1/15/2019 Codes Class Noted POA Dyspnea ICD-10-CM: R06.00 
ICD-9-CM: 786.09  2019 Unknown Review of Systems: A comprehensive review of systems was negative. Vital Signs: Last 24hrs VS reviewed since prior progress note. Most recent are: 
Visit Vitals /52 (BP 1 Location: Left arm, BP Patient Position: At rest) Pulse 93 Temp 97.7 °F (36.5 °C) Resp 25 Ht 5' 4\" (1.626 m) Wt 68.7 kg (151 lb 8 oz) SpO2 99% Breastfeeding? No  
BMI 26.00 kg/m² Intake/Output Summary (Last 24 hours) at 1/26/2019 1022 Last data filed at 1/25/2019 2249 Gross per 24 hour Intake 120 ml Output  Net 120 ml Physical Examination:  
 
 
     
Constitutional:  No acute distress, cooperative, pleasant   
ENT:  Oral mucous moist, oropharynx benign. Neck supple, Resp:  CTA bilaterally. No wheezing/rhonchi/rales. No accessory muscle use CV:  Regular rhythm, normal rate, no murmurs, gallops, rubs GI:  Soft, non distended, non tender. normoactive bowel sounds, no hepatosplenomegaly Musculoskeletal:  No edema, warm, 2+ pulses throughout Neurologic:  Moves all extremities. AAOx3, CN II-XII reviewed Psych:  Good insight, Not anxious nor agitated. Data Review:  
 I personally reviewed  Image and labs Cta Chest W Or W Wo Cont Result Date: 1/25/2019 IMPRESSION: Small right pleural effusion with underlying atelectasis. No evidence of pulmonary embolism. Ill-defined mass lesion either arising from or secondarily involving the left hepatic lobe likely corresponding to the pancreas mass seen on the recent MR but likely increased in the interval. 
 
Recent MRI abd 1/15/2019 
 
1. 1.3 x 1.7 x 3.0 cm multilocular cystic lesion of the pancreas at the body 
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 Follow-up, preferably by MRI, in 6-12 months is recommended. 2. Hepatic cysts and probable flash fill inferior right lobe hepatic hemangioma. 3. Mild extra hepatic biliary dilation which may be sequela of prior obstruction 
or inflammation. Correlate for evidence of ongoing biliary obstruction with 
clinical and laboratory data. 4. Small sliding hiatal hernia. Labs:  
 
Recent Labs  
  01/26/19 
0428 01/25/19 2220 WBC 7.5 8.5 HGB 9.1* 9.2* HCT 28.3* 28.6*  
 246 Recent Labs  
  01/26/19 
0428 01/25/19 
1512  139  
K 3.6 3.7  101 CO2 26 27 BUN 9 12 CREA 0.72 0.99 GLU 98 112* CA 7.6* 8.5 MG  --  1.9 Recent Labs  
  01/25/19 2220 01/25/19 
1512 SGOT  --  67* ALT  --  25  
AP  --  104 TBILI  --  0.3 TP  --  7.4 ALB  --  2.8*  
GLOB  --  4.6* LPSE >3,000*  -- No results for input(s): INR, PTP, APTT in the last 72 hours. No lab exists for component: INREXT No results for input(s): FE, TIBC, PSAT, FERR in the last 72 hours. No results found for: FOL, RBCF No results for input(s): PH, PCO2, PO2 in the last 72 hours. Recent Labs  
  01/25/19 2220 01/25/19 
1512 TROIQ 2.38* 3.26* Lab Results Component Value Date/Time Cholesterol, total 185 01/15/2019 12:29 PM  
 HDL Cholesterol 52 01/15/2019 12:29 PM  
 LDL, calculated 109.6 (H) 01/15/2019 12:29 PM  
 Triglyceride 117 01/15/2019 12:29 PM  
 CHOL/HDL Ratio 3.6 01/15/2019 12:29 PM  
 
Lab Results Component Value Date/Time Glucose (POC) 102 (H) 01/18/2019 06:23 AM  
 Glucose (POC) 139 (H) 01/17/2019 09:05 PM  
 Glucose (POC) 114 (H) 01/17/2019 04:31 PM  
 Glucose (POC) 106 (H) 01/17/2019 11:41 AM  
 Glucose (POC) 132 (H) 01/16/2019 09:13 PM  
 
Lab Results Component Value Date/Time  Color DARK YELLOW 01/15/2019 10:16 PM  
 Appearance CLOUDY (A) 01/15/2019 10:16 PM  
 Specific gravity 1.030 01/15/2019 10:16 PM  
 pH (UA) 6.0 01/15/2019 10:16 PM  
 Protein 100 (A) 01/15/2019 10:16 PM  
 Glucose NEGATIVE  01/15/2019 10:16 PM  
 Ketone 40 (A) 01/15/2019 10:16 PM  
 Urobilinogen 1.0 01/15/2019 10:16 PM  
 Nitrites NEGATIVE  01/15/2019 10:16 PM  
 Leukocyte Esterase SMALL (A) 01/15/2019 10:16 PM  
 Epithelial cells MODERATE (A) 01/15/2019 10:16 PM  
 Bacteria 1+ (A) 01/15/2019 10:16 PM  
 WBC 10-20 01/15/2019 10:16 PM  
 RBC 0-5 01/15/2019 10:16 PM  
 
 
 
Medications Reviewed:  
 
Current Facility-Administered Medications Medication Dose Route Frequency  pantoprazole (PROTONIX) tablet 40 mg  40 mg Oral BID  sodium chloride (NS) flush 5-40 mL  5-40 mL IntraVENous Q8H  
 sodium chloride (NS) flush 5-40 mL  5-40 mL IntraVENous PRN  
 aspirin chewable tablet 81 mg  81 mg Oral DAILY  nitroglycerin (NITROSTAT) tablet 0.4 mg  0.4 mg SubLINGual Q5MIN PRN  
 ondansetron (ZOFRAN) injection 4 mg  4 mg IntraVENous Q4H PRN  
 enoxaparin (LOVENOX) injection 70 mg  1 mg/kg SubCUTAneous Q12H  
 oseltamivir (TAMIFLU) 6 mg/mL oral suspension 30 mg  30 mg Oral Q12H  
 morphine injection 2 mg  2 mg IntraVENous Q4H PRN  
 0.9% sodium chloride infusion  75 mL/hr IntraVENous CONTINUOUS  
 
______________________________________________________________________ EXPECTED LENGTH OF STAY: - - - 
ACTUAL LENGTH OF STAY:          1 Addis Kapoor MD

## 2019-01-26 NOTE — PROGRESS NOTES
Day #1 of Tamiflu Indication:  Influenza A Current regimen:  75 mg BID x 5 days Recent Labs  
  19 
1512 WBC 9.7 CREA 0.99 BUN 12 Est CrCl: 45 ml/min; Temp (24hrs), Av.8 °F (37.1 °C), Min:98.4 °F (36.9 °C), Max:99.1 °F (37.3 °C) Plan: Change to 30mg BID x 5 days for CrCl 31-60 ml/min **close i-vent after initial review. Remove this text prior to posting to note.

## 2019-01-26 NOTE — PROGRESS NOTES
Problem: Falls - Risk of 
Goal: *Absence of Falls Document Bina Jesse Fall Risk and appropriate interventions in the flowsheet. Outcome: Progressing Towards Goal 
Fall Risk Interventions: 
  
 
  
 
  
 
Elimination Interventions: Call light in reach, Patient to call for help with toileting needs

## 2019-01-26 NOTE — CONSULTS
Cardiology Consult Patient: Angella Curry MRN: [id number]  SSN: [id number] YOB: 1940  Age: 70 y.o. Sex: female Subjective:  
  
Date of  Admission: 1/25/2019 Admission type: Emergency Angella Curry is a 70 y.o. female admitted for Dyspnea. Reason: elevated troponin Referring: Dr. David Che Primary cardiologist: Dr. Florence 
Pt with recent admit pancreatitis, brief afib seen by Dr. Florence last week, here now with continued poor appetite, short of breath, weakness, vomiting, found to be flu +. Pt denies any current or recent chest pain. Primary Care Provider: Cong Álvarez MD 
Past Medical History:  
Diagnosis Date  Other ill-defined conditions(919.10) IBS  Pancreatitis Past Surgical History:  
Procedure Laterality Date  COLONOSCOPY Left 11/1/2017 COLONOSCOPY performed by Tressa López MD at [address]  HX CHOLECYSTECTOMY  HX GI    
 surgery for hiatal hernia Family History Problem Relation Age of Onset  Dementia Mother   
     alzheimers  Cancer Sister   
     gallbladder  Cancer Brother   
     pancreatic  Breast Cancer Paternal Grandmother  Dementia Sister   
     alzheimers  Heart Surgery Brother   
     bypass Social History Tobacco Use  Smoking status: Former Smoker  Smokeless tobacco: Never Used  Tobacco comment: quit smoking cigarettes 6 yrs ago Substance Use Topics  Alcohol use: Yes Comment: very occasional  
  
Current Facility-Administered Medications Medication Dose Route Frequency  sodium chloride 0.9 % bolus infusion 100 mL  100 mL IntraVENous RAD ONCE  
 sodium chloride 0.9 % bolus infusion 100 mL  100 mL IntraVENous RAD ONCE Current Outpatient Medications Medication Sig  levoFLOXacin (LEVAQUIN) 750 mg tablet Take 1 Tab by mouth daily for 10 days.  pantoprazole (PROTONIX) 40 mg tablet Take 1 Tab by mouth two (2) times a day. Allergies Allergen Reactions  Penicillins Hives Review of Systems: A comprehensive review of systems was negative except for that written in the History of Present Illness. Subjective:  
 
Visit Vitals /56 Pulse (!) 103 Temp 99 °F (37.2 °C) Resp 27 Ht 5' 4\" (1.626 m) Wt 149 lb (67.6 kg) SpO2 99% BMI 25.58 kg/m² Physical Exam: 
Visit Vitals /56 Pulse (!) 103 Temp 99 °F (37.2 °C) Resp 27 Ht 5' 4\" (1.626 m) Wt 149 lb (67.6 kg) SpO2 99% BMI 25.58 kg/m² General Appearance:  Well developed, well nourished,alert and oriented x 3, and individual in no acute distress. Ears/Nose/Mouth/Throat:   Hearing grossly normal. 
  
    Neck: Supple. Chest:   Lungs clear to auscultation bilaterally. Cardiovascular:  Regular rate and rhythm, S1, S2 normal, no murmur. Abdomen:   Soft, non-tender, bowel sounds are active. Extremities: No edema bilaterally. Skin: Warm and dry. Cardiographics: 
Telemetry: normal sinus rhythm ECG: normal sinus rhythm, nonspecific ST and T waves changes Echocardiogram: pending Data Reviewed: All lab results for the last 24 hours reviewed. Assessment:  
  
  
Hospital Problems  Date Reviewed: 1/15/2019 Codes Class Noted POA Dyspnea ICD-10-CM: R06.00 
ICD-9-CM: 786.09  1/25/2019 Unknown Plan:  
 
Influenza A. Per primary team. 
Nausea, vomiting, recent admit pancreatitis. MRCP noted large cyst.  Per primary team 
Elevated troponin. Pt without chest pain but troponin elevated at 3. EKG without ischemic changes. Recent echo with normal EF. Myocarditis is a possibility. Would get serial cardiac enzymes, repeat limited echo for wall motion. If LV function remains without wall motion abnormality and cardiac enzymes trending down/flat, would consider stress test prior to hospital d/c.   On the other hand, if echo abnormal or enzymes trending up, may need to consider more invasive measures. Agree with ASA and lovenox ACS dosing for now. Dr. Manas Warren to follow over weekend and then Dr. Ival Nissen. 
 
Signed By: Adrián Etienne MD   
 January 25, 2019

## 2019-01-26 NOTE — PROGRESS NOTES
Problem: Activity Intolerance Goal: *Oxygen saturation during activity within specified parameters Outcome: Progressing Towards Goal 
Oxygen saturations are within defined normal limits on 2L nasal cannula. Pt has no complaints of shortness of breath. Will continue to monitor. Bedside shift change report given to Tyler Yan RN by Rachel English RN. Report included the following information SBAR, Kardex, ED Summary, Intake/Output, MAR, Recent Results and Cardiac Rhythm NSR.

## 2019-01-27 NOTE — PROGRESS NOTES
[facility] Progress Note       NAME:  Delia Hennessy :   1940 MRN:   [id number] Assessment/Plan:she feels about the same she tells me 
 still quite a bit of cough CMP: severe by last echo  with EFof 20% and +rwma. Unclear if this related to viral illness or underlying cad given elevation in troponin. Continue  coreg and lisinopril for now. Careful with diuresis in acute pancreatitis(lasix aldactone on hold for now) 
probnp elevated Continue asa and lovenox as well Troponin trending down She will need cardiac catheterization eventually 
  Influenza A: as per primary team 
  
  
Pancreatitis as per primary team gi also closely following Discussed with patient and her daughter Subjective:  
Cardiac ROS:sob and cough Review of Systems:  
Pertinent items are noted in the History of Present Illness. Objective: No intake/output data recorded.  190 -  0700 In: 480 [P.O.:480] Out: - Telemetry: normal sinus rhythm Physical Exam: 
Visit Vitals /56 (BP 1 Location: Left arm, BP Patient Position: At rest) Pulse 87 Temp 97.5 °F (36.4 °C) Resp 25 Ht 5' 4\" (1.626 m) Wt 65.8 kg (145 lb 1 oz) SpO2 99% Breastfeeding? No  
BMI 24.90 kg/m² Neck: no JVD Heart: regular rate and rhythm Lungs: rhonchi R anterior, L anterior, diminished breath sounds R anterior, L anterior, L base Abdomen: soft, non-tender. Bowel sounds normal. No masses,  no organomegaly Extremities: edema trace Additional comments: None Care Plan discussed with: 
  Comments Patient Family RN Care Manager Consultant:     
 
Data Review: No results for input(s): TNIPOC in the last 72 hours. No lab exists for component: ITNL Recent Labs  
  19 
1512 TROIQ 2.38* 3.26* Recent Labs  
  19 
0301 19 
0428 19 
1512 NA  --  140  --  139 K  --  3.6  --  3.7 CL  --  106  --  101 CO2  --  26  --  27 BUN  --  9  --  12  
CREA  --  0.72  --  0.99 GLU  --  98  --  112* MG  --   --   --  1.9 ALB  --   --   --  2.8* WBC 7.3 7.5 8.5 9.7 HGB 8.8* 9.1* 9.2* 10.6* HCT 28.3* 28.3* 28.6* 33.2*  
 247 246 328 No results for input(s): INR, PTP, APTT in the last 72 hours. No lab exists for component: INREXT Medications reviewed Current Facility-Administered Medications Medication Dose Route Frequency  carvedilol (COREG) tablet 6.25 mg  6.25 mg Oral BID WITH MEALS  lisinopril (PRINIVIL, ZESTRIL) tablet 2.5 mg  2.5 mg Oral DAILY  oseltamivir (TAMIFLU) capsule 75 mg  75 mg Oral Q12H  pantoprazole (PROTONIX) tablet 40 mg  40 mg Oral BID  sodium chloride (NS) flush 5-40 mL  5-40 mL IntraVENous Q8H  
 sodium chloride (NS) flush 5-40 mL  5-40 mL IntraVENous PRN  
 aspirin chewable tablet 81 mg  81 mg Oral DAILY  nitroglycerin (NITROSTAT) tablet 0.4 mg  0.4 mg SubLINGual Q5MIN PRN  
 ondansetron (ZOFRAN) injection 4 mg  4 mg IntraVENous Q4H PRN  
 enoxaparin (LOVENOX) injection 70 mg  1 mg/kg SubCUTAneous Q12H  
 morphine injection 2 mg  2 mg IntraVENous Q4H PRN  
 0.9% sodium chloride infusion  75 mL/hr IntraVENous CONTINUOUS Data Reviewed: current meds, labs,recent radiology, intake/output/weight and problem list reviewed Becka Cisneros MD

## 2019-01-27 NOTE — PROGRESS NOTES
Bedside shift change report given to Randee Escalante RN (oncoming nurse) by Yoni Topete RN (offgoing nurse). Report included the following information SBAR, Kardex, Intake/Output, MAR, Recent Results and Cardiac Rhythm NSR.

## 2019-01-27 NOTE — PROGRESS NOTES
Hospitalist Progress Note Nataly Gupta MD 
Answering service: [phone] OR [phone] from in house phone Date of Service:  2019 NAME:  Lemond Boeck :  1940 MRN:  [id number] Admission Summary:  
Lemond Boeck is a 70 y.o. female who presents with dyspnea. Was recently just discharged from hospital 5 days ago for pancreatitis and large pancreatic cyst. 
Two to three days after discharge, pt began having intermittent cough and dyspnea. Associated with generalized weakness and fatigue. Denies chest pain, significant sputum, calf pain or erythema, fever, chills. Does have some persistent nausea. Interval history / Subjective:  
Pt had hypotension in AM, seen by cardiology and GI Assessment & Plan:  
 
Indeterminate troponin elevation up to 3.26--> 2.6 . Possibly 2/2 myocarditis. - discussed with cardiology 
- started on empiric ASA and therapeutic lovenox 
- serial trops 
- ECHO - The ventricle was mildly dilated. Systolic function was Severely reduced. Ejection fraction was estimated in the range of 20 % to 25 %. There was severe diffuse hypokinesis with distinct regional wall motion abnormalities. - will need stress test prior to d/c VS CATH  
- ACS protocol for now 
- cardiology on board 
  
New onset dyspnea. Influenzae A positive. Does have small R pleural effusion with atelectasis on CT. CT-A negative for PE 
- started on tamiflu 
- treat symptomatically for now 
- o2 per protocol 
- respiratory care 
  
Recent discharge for acute pancreatitis. MRCP remarkable for large pancreatic cyst 
- repeat lipase level > 3000 
- prn anti-emetics - pain management 
- IVF hydration - clear liquids for now - GI re consult Anemia of chronic dz ? 
- check FOBT 
- hgb 8.8  
  
Code status: Full DVT prophylaxis: Lovenox Care Plan discussed with: Patient/Family and Nurse Disposition: TBD  
 
 Hospital Problems  Date Reviewed: 1/15/2019 Codes Class Noted POA Dyspnea ICD-10-CM: R06.00 
ICD-9-CM: 786.09  1/25/2019 Unknown Review of Systems: A comprehensive review of systems was negative. Vital Signs:  
 Last 24hrs VS reviewed since prior progress note. Most recent are: 
Visit Vitals /53 Pulse 81 Temp 97.8 °F (36.6 °C) Resp 26 Ht 5' 4\" (1.626 m) Wt 65.8 kg (145 lb 1 oz) SpO2 99% Breastfeeding? No  
BMI 24.90 kg/m² Intake/Output Summary (Last 24 hours) at 1/27/2019 1357 Last data filed at 1/26/2019 1800 Gross per 24 hour Intake 120 ml Output  Net 120 ml Physical Examination:  
 
 
     
Constitutional:  No acute distress, cooperative, ENT:  Oral mucous moist, Resp:  CTA bilaterally. No wheezing/rhonchi/rales. CV:  Regular rhythm, normal rate, GI:  Soft, non distended, non tender. bs+ Musculoskeletal:  No edema, warm, 2+ pulses throughout Neurologic:  Moves all extremities. AAOx3, CN II-XII reviewed Data Review:  
 I personally reviewed  Image and labs Cta Chest W Or W Wo Cont Result Date: 1/25/2019 IMPRESSION: Small right pleural effusion with underlying atelectasis. No evidence of pulmonary embolism. Ill-defined mass lesion either arising from or secondarily involving the left hepatic lobe likely corresponding to the pancreas mass seen on the recent MR but likely increased in the interval. 
 
Recent MRI abd 1/15/2019 
 
1. 1.3 x 1.7 x 3.0 cm multilocular cystic lesion of the pancreas at the body 
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 Follow-up, preferably by MRI, in 6-12 months is recommended. 2. Hepatic cysts and probable flash fill inferior right lobe hepatic hemangioma. 3. Mild extra hepatic biliary dilation which may be sequela of prior obstruction 
or inflammation. Correlate for evidence of ongoing biliary obstruction with 
clinical and laboratory data. 4. Small sliding hiatal hernia. Labs:  
 
Recent Labs  
  01/27/19 
0301 01/26/19 
3137 WBC 7.3 7.5 HGB 8.8* 9.1*  
HCT 28.3* 28.3*  
 247 Recent Labs  
  01/26/19 
0428 01/25/19 
1512  139  
K 3.6 3.7  101 CO2 26 27 BUN 9 12 CREA 0.72 0.99 GLU 98 112* CA 7.6* 8.5 MG  --  1.9 Recent Labs  
  01/25/19 
2220 01/25/19 
1512 SGOT  --  67* ALT  --  25  
AP  --  104 TBILI  --  0.3 TP  --  7.4 ALB  --  2.8*  
GLOB  --  4.6* LPSE >3,000*  -- No results for input(s): INR, PTP, APTT in the last 72 hours. No lab exists for component: INREXT, INREXT No results for input(s): FE, TIBC, PSAT, FERR in the last 72 hours. No results found for: FOL, RBCF No results for input(s): PH, PCO2, PO2 in the last 72 hours. Recent Labs  
  01/25/19 
2220 01/25/19 
1512 TROIQ 2.38* 3.26* Lab Results Component Value Date/Time Cholesterol, total 185 01/15/2019 12:29 PM  
 HDL Cholesterol 52 01/15/2019 12:29 PM  
 LDL, calculated 109.6 (H) 01/15/2019 12:29 PM  
 Triglyceride 117 01/15/2019 12:29 PM  
 CHOL/HDL Ratio 3.6 01/15/2019 12:29 PM  
 
Lab Results Component Value Date/Time Glucose (POC) 102 (H) 01/18/2019 06:23 AM  
 Glucose (POC) 139 (H) 01/17/2019 09:05 PM  
 Glucose (POC) 114 (H) 01/17/2019 04:31 PM  
 Glucose (POC) 106 (H) 01/17/2019 11:41 AM  
 Glucose (POC) 132 (H) 01/16/2019 09:13 PM  
 
Lab Results Component Value Date/Time  Color DARK YELLOW 01/15/2019 10:16 PM  
 Appearance CLOUDY (A) 01/15/2019 10:16 PM  
 Specific gravity 1.030 01/15/2019 10:16 PM  
 pH (UA) 6.0 01/15/2019 10:16 PM  
 Protein 100 (A) 01/15/2019 10:16 PM  
 Glucose NEGATIVE  01/15/2019 10:16 PM  
 Ketone 40 (A) 01/15/2019 10:16 PM  
 Urobilinogen 1.0 01/15/2019 10:16 PM  
 Nitrites NEGATIVE  01/15/2019 10:16 PM  
 Leukocyte Esterase SMALL (A) 01/15/2019 10:16 PM  
 Epithelial cells MODERATE (A) 01/15/2019 10:16 PM  
 Bacteria 1+ (A) 01/15/2019 10:16 PM  
 WBC 10-20 01/15/2019 10:16 PM  
 RBC 0-5 01/15/2019 10:16 PM  
 
 
 
Medications Reviewed:  
 
Current Facility-Administered Medications Medication Dose Route Frequency  albumin human 25% (BUMINATE) solution 25 g  25 g IntraVENous Q6H  
 carvedilol (COREG) tablet 6.25 mg  6.25 mg Oral BID WITH MEALS  lisinopril (PRINIVIL, ZESTRIL) tablet 2.5 mg  2.5 mg Oral DAILY  oseltamivir (TAMIFLU) capsule 75 mg  75 mg Oral Q12H  pantoprazole (PROTONIX) tablet 40 mg  40 mg Oral BID  sodium chloride (NS) flush 5-40 mL  5-40 mL IntraVENous Q8H  
 sodium chloride (NS) flush 5-40 mL  5-40 mL IntraVENous PRN  
 aspirin chewable tablet 81 mg  81 mg Oral DAILY  nitroglycerin (NITROSTAT) tablet 0.4 mg  0.4 mg SubLINGual Q5MIN PRN  
 ondansetron (ZOFRAN) injection 4 mg  4 mg IntraVENous Q4H PRN  
 enoxaparin (LOVENOX) injection 70 mg  1 mg/kg SubCUTAneous Q12H  
 morphine injection 2 mg  2 mg IntraVENous Q4H PRN  
 0.9% sodium chloride infusion  75 mL/hr IntraVENous CONTINUOUS  
 
______________________________________________________________________ EXPECTED LENGTH OF STAY: - - - 
ACTUAL LENGTH OF STAY:          2 Bill Vrama MD

## 2019-01-27 NOTE — PROGRESS NOTES
Problem: Activity Intolerance Goal: *Oxygen saturation during activity within specified parameters Outcome: Progressing Towards Goal 
Oxygen saturations are within defined normal limits on 2L nasal cannula. Pt able to ambulate to the bathroom on 2L nasal cannula without shortness of breath. Will continue to monitor. 1132- BP 97/43. MD notified. Orders received for Albumin human 25% 25g q6h. 
 
1155- First bottle of albumin being administered. 1200- /49. Bedside shift change report given to Karyna Wong RN by Phu Marlow RN. Report included the following information SBAR, Kardex, ED Summary, Intake/Output, MAR, Recent Results and Cardiac Rhythm NSR.

## 2019-01-27 NOTE — PROGRESS NOTES
[address] 
[address] [facility], [address] 
[phone] GI PROGRESS NOTE 
 
 
NAME:   Cathy Farnsworth :    1940 MRN:    [id number] Assessment/Plan Acute pancreatitis and is likely caused by expulsion of mucin into the main pancreatic duct as the cyst appears to be an IPMN. Would recommend clear liquids, careful fluid resuscitation and pain control prn Influenza and cardiac dysfunction management per primary team and cardiology Patient Active Problem List  
Diagnosis Code  Pancreatitis K85.90  Dyspnea R06.00 Subjective:  
 
Cathy Farnsworth is a 70 y.o.  female who is little drowsy today. She has some abdominal pain once she coughs. No fever or chills. She has not had anything by mouth other than water and po meds. Review of Systems Constitutional: negative fever, negative chills, negative weight loss Eyes:   negative visual changes ENT:   negative sore throat, tongue or lip swelling Respiratory:  negative cough, negative dyspnea Cards:  negative for chest pain, palpitations, lower extremity edema GI:   See HPI 
:  negative for frequency, dysuria Integument:  negative for rash and pruritus Heme:  negative for easy bruising and gum/nose bleeding Musculoskel: negative for myalgias,  back pain and muscle weakness Neuro: negative for headaches, dizziness, vertigo Psych:  negative for feelings of anxiety, depression Objective: VITALS:  
Last 24hrs VS reviewed since prior hospitalist progress note. Most recent are: 
Visit Vitals BP 97/43 (BP 1 Location: Left arm, BP Patient Position: At rest) Pulse 76 Temp 97.8 °F (36.6 °C) Resp 24 Ht 5' 4\" (1.626 m) Wt 65.8 kg (145 lb 1 oz) SpO2 99% Breastfeeding? No  
BMI 24.90 kg/m² Intake/Output Summary (Last 24 hours) at 2019 1248 Last data filed at 2019 1800 Gross per 24 hour Intake 240 ml Output  Net 240 ml  
  
 
 PHYSICAL EXAM: 
General   well developed, well nourished, appears stated age, in no acute distress EENT  Normocephalic, Atraumatic, PERRLA, EOMI, sclera clear, nares clear, pharynx normal 
Neck   Supple without nodes or mass. No thyromegaly or bruit Respiratory   Clear To Auscultation bilaterally - no wheezes, rales, rhonchi, or crackles Cardiology  Regular Rate and Rythmn  - no murmurs, rubs or gallops Abdominal  Soft, non-tender, non-distended, positive bowel sounds, no hepatosplenomegaly, no palpable mass Extremities  No clubbing, cyanosis, or edema. Pulses intact. Back  No spinal or muscle pain. No CVAT. Skin  Normal skin turgor. No rashes or skin ulcers noted Neurological  No focal neurological deficits noted Psychological  Oriented x 3. Normal affect. Lab Data Recent Results (from the past 12 hour(s)) CBC W/O DIFF Collection Time: 01/27/19  3:01 AM  
Result Value Ref Range WBC 7.3 3.6 - 11.0 K/uL  
 RBC 3.07 (L) 3.80 - 5.20 M/uL HGB 8.8 (L) 11.5 - 16.0 g/dL HCT 28.3 (L) 35.0 - 47.0 % MCV 92.2 80.0 - 99.0 FL  
 MCH 28.7 26.0 - 34.0 PG  
 MCHC 31.1 30.0 - 36.5 g/dL  
 RDW 14.4 11.5 - 14.5 % PLATELET 228 792 - 811 K/uL MPV 10.3 8.9 - 12.9 FL  
 NRBC 0.0 0  WBC ABSOLUTE NRBC 0.00 0.00 - 0.01 K/uL Medications: Reviewed PMH/SH reviewed - no change compared to H&P Attending Physician: Gloria Viera MD  
Date/Time:  1/27/2019

## 2019-01-27 NOTE — PROGRESS NOTES
Problem: Falls - Risk of 
Goal: *Absence of Falls Document Barton Officer Fall Risk and appropriate interventions in the flowsheet. Outcome: Progressing Towards Goal 
Fall Risk Interventions: 
Mobility Interventions: Patient to call before getting OOB Medication Interventions: Assess postural VS orthostatic hypotension, Evaluate medications/consider consulting pharmacy, Patient to call before getting OOB, Teach patient to arise slowly Elimination Interventions: Call light in reach

## 2019-01-28 NOTE — PROGRESS NOTES
Reuben Kwok [address] [place] [address], [address] GI PROGRESS NOTE Celi Verdin, [facility] [place] office [phone] NP in-hospital cell phone M-F until 4:30 After 5pm or on weekends, please call  for physician on call NAME: Israel Ann :  1940 MRN:  [id number] Subjective: No GI complaints - no abdominal pain Objective: VITALS:  
Last 24hrs VS reviewed since prior progress note. Most recent are: 
Visit Vitals BP (!) 89/49 (BP 1 Location: Right arm, BP Patient Position: At rest) Pulse 63 Temp 97.6 °F (36.4 °C) Resp 16 Ht 5' 4\" (1.626 m) Wt 68.3 kg (150 lb 9.2 oz) SpO2 100% Breastfeeding? No  
BMI 25.85 kg/m² PHYSICAL EXAM: 
General: Cooperative, no acute distress   
Neurologic:  Alert and oriented X 3. HEENT: EOMI, no scleral icterus Lungs:  Scattered rhonchi Heart:  S1 S2 Abdomen: Soft, non-distended, no tenderness. +Bowel sounds Extremities: warm Psych:   Good insight. Not anxious or agitated. Lab Data Reviewed:  
 
Recent Results (from the past 24 hour(s)) EKG, 12 LEAD, INITIAL Collection Time: 19  3:35 AM  
Result Value Ref Range Ventricular Rate 98 BPM  
 Atrial Rate 98 BPM  
 P-R Interval 142 ms QRS Duration 70 ms Q-T Interval 380 ms QTC Calculation (Bezet) 485 ms Calculated P Axis 72 degrees Calculated R Axis 3 degrees Calculated T Axis 175 degrees Diagnosis Normal sinus rhythm Anteroseptal infarct (cited on or before 2019) ST & T wave abnormality, consider lateral ischemia When compared with ECG of 2019 18:13, 
QT has shortened AMYLASE Collection Time: 19  3:37 AM  
Result Value Ref Range Amylase 260 (H) 25 - 115 U/L  
CBC WITH AUTOMATED DIFF Collection Time: 19  3:37 AM  
Result Value Ref Range WBC 6.9 3.6 - 11.0 K/uL  
 RBC 3.15 (L) 3.80 - 5.20 M/uL HGB 9.0 (L) 11.5 - 16.0 g/dL HCT 29.7 (L) 35.0 - 47.0 % MCV 94.3 80.0 - 99.0 FL  
 MCH 28.6 26.0 - 34.0 PG  
 MCHC 30.3 30.0 - 36.5 g/dL  
 RDW 14.6 (H) 11.5 - 14.5 % PLATELET 136 177 - 089 K/uL MPV 10.8 8.9 - 12.9 FL  
 NRBC 0.0 0  WBC ABSOLUTE NRBC 0.00 0.00 - 0.01 K/uL NEUTROPHILS 56 32 - 75 % LYMPHOCYTES 35 12 - 49 % MONOCYTES 6 5 - 13 % EOSINOPHILS 1 0 - 7 % BASOPHILS 1 0 - 1 % IMMATURE GRANULOCYTES 1 (H) 0.0 - 0.5 % ABS. NEUTROPHILS 3.9 1.8 - 8.0 K/UL  
 ABS. LYMPHOCYTES 2.4 0.8 - 3.5 K/UL  
 ABS. MONOCYTES 0.4 0.0 - 1.0 K/UL  
 ABS. EOSINOPHILS 0.1 0.0 - 0.4 K/UL  
 ABS. BASOPHILS 0.0 0.0 - 0.1 K/UL  
 ABS. IMM. GRANS. 0.0 0.00 - 0.04 K/UL  
 DF AUTOMATED    
LIPASE Collection Time: 01/28/19  3:37 AM  
Result Value Ref Range Lipase 2,012 (H) 73 - 393 U/L MAGNESIUM Collection Time: 01/28/19  3:37 AM  
Result Value Ref Range Magnesium 2.1 1.6 - 2.4 mg/dL METABOLIC PANEL, BASIC Collection Time: 01/28/19  3:37 AM  
Result Value Ref Range Sodium 142 136 - 145 mmol/L Potassium 3.6 3.5 - 5.1 mmol/L Chloride 108 97 - 108 mmol/L  
 CO2 24 21 - 32 mmol/L Anion gap 10 5 - 15 mmol/L Glucose 120 (H) 65 - 100 mg/dL BUN 10 6 - 20 MG/DL Creatinine 0.74 0.55 - 1.02 MG/DL  
 BUN/Creatinine ratio 14 12 - 20 GFR est AA >60 >60 ml/min/1.73m2 GFR est non-AA >60 >60 ml/min/1.73m2 Calcium 7.9 (L) 8.5 - 10.1 MG/DL  
NT-PRO BNP Collection Time: 01/28/19  3:37 AM  
Result Value Ref Range NT pro-BNP 6,084 (H) 0 - 450 PG/ML Assessment:  
Pancreatitis: MRI 1/15 1.3 x 1.7 x 3.0 cm multilocular cystic lesion of the pancreas at the body most likely reflects intraductal papillary mucinous tumor · CHF with Troponin elevation · Flu · Anemia Patient Active Problem List  
Diagnosis Code  Pancreatitis K85.90  Dyspnea R06.00 Plan: · Clear liquid diet · Monitor lipase and LFT's · Continue supportive measures Signed By: Juan Manuel Portillo NP   
 1/28/2019  1:16 PM 
  
 
 GI attending note: I personally examined the patient. Agree with the physical, assessment, and plan of the SISI. Advance diet as tolerated. No plan for EUS as inpatient with other significant co-morbidities. Dr. Michelle Oliveira

## 2019-01-28 NOTE — PROGRESS NOTES
Hospitalist Progress Note Anish Gutierres MD 
Answering service: [phone] OR [phone] from in house phone Date of Service:  2019 NAME:  Bruna Stacy :  1940 MRN:  [id number] Admission Summary:  
Bruna Stacy is a 70 y.o. female who presents with dyspnea. Was recently just discharged from hospital 5 days ago for pancreatitis and large pancreatic cyst. 
Two to three days after discharge, pt began having intermittent cough and dyspnea. Associated with generalized weakness and fatigue. Denies chest pain, significant sputum, calf pain or erythema, fever, chills. Does have some persistent nausea. Interval history / Subjective:  
Pt had hypotension in AM, seen by cardiology and GI She has low EF bad PNA / Flu as well Will add mucomyst and duoneb, lasix 1 dose in AM given Her breathing and cardiac function remains very poor , she used BiPAP overnight Assessment & Plan:  
 
Indeterminate troponin elevation up to 3.26--> 2.6 . Possibly 2/2 myocarditis. - discussed with cardiology 
- started on empiric ASA and therapeutic lovenox 
- ECHO - The ventricle was mildly dilated. Systolic function was Severely reduced. Ejection fraction was estimated in the range of 20 % to 25 %. There was severe diffuse hypokinesis with distinct regional wall motion abnormalities. - will need stress test prior to d/c VS CATH  
- ACS protocol for now 
- cardiology on board - I and O 
  
New onset dyspnea. Influenzae A positive. Does have small R pleural effusion with atelectasis on CT. CT-A negative for PE 
- started on tamiflu 
- treat symptomatically for now 
- O2 per protocol 
- respiratory care - WBC normal cxr atelectasis - vance start cover with abx for bacterial PNA  
  
Recent discharge for acute pancreatitis. MRCP remarkable for large pancreatic cyst 
- repeat lipase level > 3000 
- prn anti-emetics - pain management 
- IVF hydration stopped due to poor cardiac function - clear liquids for now - GI re consult Anemia of chronic dz ? 
- check FOBT 
- hgb 8.8  
  
Code status: Full DVT prophylaxis: Lovenox Care Plan discussed with: Patient/Family and Nurse Disposition: TBD will need cardiac eval and rehab at d/c Hospital Problems  Date Reviewed: 1/15/2019 Codes Class Noted POA Dyspnea ICD-10-CM: R06.00 
ICD-9-CM: 786.09  1/25/2019 Unknown Review of Systems: A comprehensive review of systems was negative. Vital Signs:  
 Last 24hrs VS reviewed since prior progress note. Most recent are: 
Visit Vitals /59 Pulse 82 Temp 97 °F (36.1 °C) Resp 18 Ht 5' 4\" (1.626 m) Wt 68.3 kg (150 lb 9.2 oz) SpO2 100% Breastfeeding? No  
BMI 25.85 kg/m² Intake/Output Summary (Last 24 hours) at 1/28/2019 1011 Last data filed at 1/28/2019 6463 Gross per 24 hour Intake 200 ml Output 650 ml Net -450 ml Physical Examination:  
 
 
     
Constitutional:  No acute distress, cooperative, ENT:  Oral mucous moist, Resp:  Coarse breathe sound  Scattered wheezing/rhonchi/rales. CV:  Regular rhythm, normal rate, GI:  Soft, non distended, non tender. bs+ Musculoskeletal:  No edema, warm, 2+ pulses throughout Neurologic:  Moves all extremities. AAOx3, CN II-XII reviewed Data Review:  
 I personally reviewed  Image and labs Cta Chest W Or W Wo Cont Result Date: 1/25/2019 IMPRESSION: Small right pleural effusion with underlying atelectasis. No evidence of pulmonary embolism. Ill-defined mass lesion either arising from or secondarily involving the left hepatic lobe likely corresponding to the pancreas mass seen on the recent MR but likely increased in the interval. 
 
Xr Chest [facility] Result Date: 1/28/2019 IMPRESSION: Small bibasilar atelectasis/effusions are again noted slightly improved on the right and mildly progressed on the left Recent MRI abd 1/15/2019 
 
1. 1.3 x 1.7 x 3.0 cm multilocular cystic lesion of the pancreas at the body 
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 Follow-up, preferably by MRI, in 6-12 months is recommended. 2. Hepatic cysts and probable flash fill inferior right lobe hepatic hemangioma. 3. Mild extra hepatic biliary dilation which may be sequela of prior obstruction 
or inflammation. Correlate for evidence of ongoing biliary obstruction with 
clinical and laboratory data. 4. Small sliding hiatal hernia. Labs:  
 
Recent Labs  
  01/28/19 
1929 01/27/19 
0301 WBC 6.9 7.3 HGB 9.0* 8.8* HCT 29.7* 28.3*  
 231 Recent Labs  
  01/28/19 
4946 01/26/19 
0428 01/25/19 
1512  140 139  
K 3.6 3.6 3.7  106 101 CO2 24 26 27 BUN 10 9 12 CREA 0.74 0.72 0.99 * 98 112* CA 7.9* 7.6* 8.5 MG 2.1  --  1.9 Recent Labs  
  01/28/19 
0337 01/25/19 2220 01/25/19 
1512 SGOT  --   --  67* ALT  --   --  25  
AP  --   --  104 TBILI  --   --  0.3 TP  --   --  7.4 ALB  --   --  2.8*  
GLOB  --   --  4.6* *  --   --   
LPSE 2,012* >3,000*  -- No results for input(s): INR, PTP, APTT in the last 72 hours. No lab exists for component: INREXT, INREXT No results for input(s): FE, TIBC, PSAT, FERR in the last 72 hours. No results found for: FOL, RBCF No results for input(s): PH, PCO2, PO2 in the last 72 hours. Recent Labs  
  01/25/19 2220 01/25/19 
1512 TROIQ 2.38* 3.26* Lab Results Component Value Date/Time Cholesterol, total 185 01/15/2019 12:29 PM  
 HDL Cholesterol 52 01/15/2019 12:29 PM  
 LDL, calculated 109.6 (H) 01/15/2019 12:29 PM  
 Triglyceride 117 01/15/2019 12:29 PM  
 CHOL/HDL Ratio 3.6 01/15/2019 12:29 PM  
 
Lab Results Component Value Date/Time  Glucose (POC) 102 (H) 01/18/2019 06:23 AM  
 Glucose (POC) 139 (H) 01/17/2019 09:05 PM  
 Glucose (POC) 114 (H) 01/17/2019 04:31 PM  
 Glucose (POC) 106 (H) 01/17/2019 11:41 AM  
 Glucose (POC) 132 (H) 01/16/2019 09:13 PM  
 
Lab Results Component Value Date/Time Color DARK YELLOW 01/15/2019 10:16 PM  
 Appearance CLOUDY (A) 01/15/2019 10:16 PM  
 Specific gravity 1.030 01/15/2019 10:16 PM  
 pH (UA) 6.0 01/15/2019 10:16 PM  
 Protein 100 (A) 01/15/2019 10:16 PM  
 Glucose NEGATIVE  01/15/2019 10:16 PM  
 Ketone 40 (A) 01/15/2019 10:16 PM  
 Urobilinogen 1.0 01/15/2019 10:16 PM  
 Nitrites NEGATIVE  01/15/2019 10:16 PM  
 Leukocyte Esterase SMALL (A) 01/15/2019 10:16 PM  
 Epithelial cells MODERATE (A) 01/15/2019 10:16 PM  
 Bacteria 1+ (A) 01/15/2019 10:16 PM  
 WBC 10-20 01/15/2019 10:16 PM  
 RBC 0-5 01/15/2019 10:16 PM  
 
 
 
Medications Reviewed:  
 
Current Facility-Administered Medications Medication Dose Route Frequency  furosemide (LASIX) 10 mg/mL injection  albumin human 25% (BUMINATE) solution 25 g  25 g IntraVENous Q6H  
 albuterol-ipratropium (DUO-NEB) 2.5 MG-0.5 MG/3 ML  3 mL Nebulization Q6H PRN  
 albumin human 25% (BUMINATE) solution 25 g  25 g IntraVENous Q6H  
 guaiFENesin ER (MUCINEX) tablet 600 mg  600 mg Oral Q12H  
 acetylcysteine (MUCOMYST) 200 mg/mL (20 %) solution 200 mg  200 mg Nebulization BID  carvedilol (COREG) tablet 6.25 mg  6.25 mg Oral BID WITH MEALS  lisinopril (PRINIVIL, ZESTRIL) tablet 2.5 mg  2.5 mg Oral DAILY  oseltamivir (TAMIFLU) capsule 75 mg  75 mg Oral Q12H  pantoprazole (PROTONIX) tablet 40 mg  40 mg Oral BID  sodium chloride (NS) flush 5-40 mL  5-40 mL IntraVENous Q8H  
 sodium chloride (NS) flush 5-40 mL  5-40 mL IntraVENous PRN  
 aspirin chewable tablet 81 mg  81 mg Oral DAILY  nitroglycerin (NITROSTAT) tablet 0.4 mg  0.4 mg SubLINGual Q5MIN PRN  
 ondansetron (ZOFRAN) injection 4 mg  4 mg IntraVENous Q4H PRN  
 enoxaparin (LOVENOX) injection 70 mg  1 mg/kg SubCUTAneous Q12H  morphine injection 2 mg  2 mg IntraVENous Q4H PRN  
 0.9% sodium chloride infusion  75 mL/hr IntraVENous CONTINUOUS  
 
______________________________________________________________________ EXPECTED LENGTH OF STAY: - - - 
ACTUAL LENGTH OF STAY:          3 Filomena Gan MD

## 2019-01-28 NOTE — PROGRESS NOTES
0330-Tech ambulated to  for void. Called. Stated, \"I think I'm going to faint. \"  Dyspnic. Increased WOB. Assisted back to bed. Conforted to relieve anxiety. Breath sounds with rales/ronchi throughout. IV fluids stopped. Charge nurse notified. Rapid response called. 0345-EKG complete and reviewed by ICU nurse. Physician not in attendance. Lasix 40 IV. Joyce inserted. 0400-Continued with severe dyspnea and anxiety. BiPap applied. Stated relief. WOB . Anxiety decreased. Sats to 100%. Resps to 20-28. 
 
0700-Morning albumin held r/t fluid balance.

## 2019-01-28 NOTE — PROGRESS NOTES
Problem: Falls - Risk of 
Goal: *Absence of Falls Document Lizy Primes Fall Risk and appropriate interventions in the flowsheet. Outcome: Progressing Towards Goal 
Fall Risk Interventions: 
Mobility Interventions: Patient to call before getting OOB Medication Interventions: Evaluate medications/consider consulting pharmacy, Patient to call before getting OOB, Teach patient to arise slowly Elimination Interventions: Patient to call for help with toileting needs Walks with steady gait with one person standby. Instructed to call for assist prior to OOB. Has been compliant with instruction. Problem: Pressure Injury - Risk of 
Goal: *Prevention of pressure injury Document Travis Scale and appropriate interventions in the flowsheet. Pressure Injury Interventions: 
Sensory Interventions: Assess need for specialty bed, Keep linens dry and wrinkle-free, Minimize linen layers, Pressure redistribution bed/mattress (bed type), Maintain/enhance activity level Moisture Interventions: Absorbent underpads, Apply protective barrier, creams and emollients, Check for incontinence Q2 hours and as needed Activity Interventions: Increase time out of bed Mobility Interventions: Assess need for specialty bed, HOB 30 degrees or less, Pressure redistribution bed/mattress (bed type) Stress incontinence but calls when brief is wet. Barrier cream applied. Moves and turns self independently. No s/s skin irritation. Nutrition Interventions: Document food/fluid/supplement intake, Offer support with meals,snacks and hydration Friction and Shear Interventions: Apply protective barrier, creams and emollients, HOB 30 degrees or less, Lift sheet, Minimize layers Problem: Activity Intolerance Goal: *Oxygen saturation during activity within specified parameters Outcome: Progressing Towards Goal 
Saturation WNL during ambulation and at rest.  Minimal dyspnea during activity.

## 2019-01-28 NOTE — PROGRESS NOTES
Bedside shift change report given to SUKHWINDER Petit (oncoming nurse) by Royal Manzo RN (offgoing nurse). Report included the following information SBAR, Kardex, Intake/Output, MAR, Recent Results and Cardiac Rhythm NSR/ST.

## 2019-01-28 NOTE — PROGRESS NOTES
Advance Care Planning Note Name: Yefri Potts YOB: 1940 MRN: [id number] Admission Date: 1/25/2019  3:13 PM 
 
Date of discussion: 1/28/2019 Active Diagnoses: 
 
Hospital Problems  Date Reviewed: 1/15/2019 Codes Class Noted POA Dyspnea ICD-10-CM: R06.00 
ICD-9-CM: 786.09  1/25/2019 Unknown These active diagnoses are of sufficient risk that focused discussion on advance care planning is indicated in order to allow the patient to thoughtfully consider personal goals of care, and if situations arise that prevent the ability to personally give input, to ensure appropriate representation of their personal desires for different levels and aggressiveness of care. Discussion:  
 
Persons present and participating in discussion: Whit Olsen MD, Daughter Discussion: I Discussed her current medical issues and chance for deterioration. She is very much in understanding and leave it onto her physiscians to decide  And if any real chance for recovery to support her with ventilation and CPR. I also explained that will poor cardiac function if ventilated there is less chance for recovery as well. Will keep on full code but she is willing to talk about it further. Time Spent:  
 
Total time spent face-to-face in education and discussion: 20  minutes.   
 
Ryder Melgar MD 
1/28/2019 
10:06 AM

## 2019-01-28 NOTE — PROGRESS NOTES
Problem: Falls - Risk of 
Goal: *Absence of Falls Document Dinh Thrasher Fall Risk and appropriate interventions in the flowsheet. Outcome: Progressing Towards Goal 
Fall Risk Interventions: 
Mobility Interventions: Communicate number of staff needed for ambulation/transfer, Mechanical lift, OT consult for ADLs, PT Consult for mobility concerns, PT Consult for assist device competence Medication Interventions: Assess postural VS orthostatic hypotension, Evaluate medications/consider consulting pharmacy, Patient to call before getting OOB Elimination Interventions: Call light in reach, Patient to call for help with toileting needs, Toileting schedule/hourly rounds Problem: Pressure Injury - Risk of 
Goal: *Prevention of pressure injury Document Travis Scale and appropriate interventions in the flowsheet. Outcome: Progressing Towards Goal 
Pressure Injury Interventions: 
Sensory Interventions: Avoid rigorous massage over bony prominences, Check visual cues for pain, Discuss PT/OT consult with provider, Float heels, Keep linens dry and wrinkle-free, Monitor skin under medical devices Moisture Interventions: Apply protective barrier, creams and emollients, Check for incontinence Q2 hours and as needed, Limit adult briefs, Maintain skin hydration (lotion/cream), Minimize layers Activity Interventions: Assess need for specialty bed, Increase time out of bed, PT/OT evaluation Mobility Interventions: Float heels, PT/OT evaluation, Turn and reposition approx. every two hours(pillow and wedges), Assess need for specialty bed Nutrition Interventions: Document food/fluid/supplement intake Friction and Shear Interventions: Apply protective barrier, creams and emollients, Lift sheet, Lift team/patient mobility team, Minimize layers Problem: Heart Failure: Day 3 Goal: Activity/Safety Outcome: Progressing Towards Goal 
Pt. On bedrest,  
Goal: Diagnostic Test/Procedures Outcome: Progressing Towards Goal 
 CXR done & labs drawn Goal: Nutrition/Diet Outcome: Progressing Towards Goal 
Pt. On clear liquid diet. Goal: Respiratory Outcome: Progressing Towards Goal 
Pt. Weaned to 2 L, Pt's O2 sats above 95% Bedside and Verbal shift change report given to Fiona Hernandez RN (oncoming nurse) by Gamal Zelaya RN (offgoing nurse). Report included the following information SBAR, Kardex, MAR, Recent Results and Cardiac Rhythm NSR. Visit Vitals /49 (BP 1 Location: Right arm, BP Patient Position: At rest) Pulse 72 Temp 97.6 °F (36.4 °C) Resp 20 Ht 5' 4\" (1.626 m) Wt 68.3 kg (150 lb 9.2 oz) SpO2 98% Breastfeeding? No  
BMI 25.85 kg/m²

## 2019-01-29 NOTE — PROGRESS NOTES
Hospitalist Progress Note [address] [facility],  Answering service: [phone] OR [phone] from in house phone Date of Service:  2019 NAME:  Cathy Farnsworth :  1940 MRN:  [id number] Admission Summary:  
Cathy Farnsworth is a 70 y.o. female who presents with dyspnea. Was recently just discharged from hospital 5 days ago for pancreatitis and large pancreatic cyst. 
Two to three days after discharge, pt began having intermittent cough and dyspnea. Associated with generalized weakness and fatigue. Denies chest pain, significant sputum, calf pain or erythema, fever, chills. Does have some persistent nausea. Interval history / Subjective:  
Patient seen and examined. Overnight, patient remained on Bipap with worsening respiratory distress. On my exam, patient AAOx3, however, tachypnic and increased respiratory distress. ABG with pH 7.3. Chest x-ray with worsening edema. Due to worsening status, will transfer patient to ICU. She is high risk for further decompensation and possible intubation. Discussed with daughter at bedside, patient, and nurse. Morning labs pending. Assessment & Plan:  
 
Acute hypoxic respiratory failure, POA: (hypoxic, tachypneic, respiratory distress) -multifactorial due to acute influenza, suspected superimposed pneumonia, volume overload due to acute systolic heart failure, EF 25-25% 
-continue Bipap, lasix 40 mg x 1 this AM, transfer to ICU, monitor I&Os 
-CTA negative for PE Influenza A with possible superimposed pneumonia:  
-continue tamiflu, levaquin  
-check MRSA swab Acute systolic heart failure, EF 20-25% -Echo with severely reduced EF, diffuse hypokinesis 
-cardiology consulted- acute illness vs CAD not excluded 
-patient unstable for cath/stress test 
-initiated on empiric aspirin, lovenox, BB, lisinopril Elevated troponin: unclear etiology, possible demand ischemia 
  
 Recent discharge for acute pancreatitis:  MRCP remarkable for large pancreatic cyst 
-Lipase remains elevated 
-GI consulted and following 
-IVF held secondary to volume overload and worsening respiratory status  
-prn pain meds, anti-emetics Anemia, normocytic: 
-continue to monitor  
  
Code status: Full DVT prophylaxis: Lovenox Care Plan discussed with: Patient/Family and Nurse Disposition: TBD Hospital Problems  Date Reviewed: 1/15/2019 Codes Class Noted POA Dyspnea ICD-10-CM: R06.00 
ICD-9-CM: 786.09  1/25/2019 Unknown Review of Systems:  
Negative unless stated above Vital Signs:  
 Last 24hrs VS reviewed since prior progress note. Most recent are: 
Visit Vitals /55 Pulse 83 Temp 97.4 °F (36.3 °C) Resp 27 Ht 5' 4\" (1.626 m) Wt 67.7 kg (149 lb 4 oz) SpO2 100% Breastfeeding? No  
BMI 25.62 kg/m² Intake/Output Summary (Last 24 hours) at 1/29/2019 1035 Last data filed at 1/29/2019 5847 Gross per 24 hour Intake  Output 850 ml Net -850 ml Physical Examination:  
 
 
     
Constitutional:  moderate respiratory distress, no acute complaints, on Bipap ENT:  Oral mucous moist, Resp:  Coarse breathe sounds, increased work of breathing CV:  Regular rhythm, normal rate GI:  Soft, non distended, mildy tender . bs+ Musculoskeletal:  No edema, warm, 2+ pulses throughout Neurologic:  Moves all extremities. AAOx3 Data Review:  
 I personally reviewed  Image and labs Cta Chest W Or W Wo Cont Result Date: 1/25/2019 IMPRESSION: Small right pleural effusion with underlying atelectasis. No evidence of pulmonary embolism. Ill-defined mass lesion either arising from or secondarily involving the left hepatic lobe likely corresponding to the pancreas mass seen on the recent MR but likely increased in the interval. 
 
Xr Chest [facility] Result Date: 1/29/2019 IMPRESSION: 1. Similar to slightly worsening bilateral perihilar predominant opacities, most suspicious for edema. 2.  Slightly increased small left effusion and a increasing left lung base consolidation, possibly atelectasis. Xr Chest [facility] Result Date: 1/28/2019 Impression: Stable trace bilateral effusions with underlying atelectasis. Xr Chest [facility] Result Date: 1/28/2019 IMPRESSION: Small bibasilar atelectasis/effusions are again noted slightly improved on the right and mildly progressed on the left Recent MRI abd 1/15/2019 
 
1. 1.3 x 1.7 x 3.0 cm multilocular cystic lesion of the pancreas at the body 
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 Follow-up, preferably by MRI, in 6-12 months is recommended. 2. Hepatic cysts and probable flash fill inferior right lobe hepatic hemangioma. 3. Mild extra hepatic biliary dilation which may be sequela of prior obstruction 
or inflammation. Correlate for evidence of ongoing biliary obstruction with 
clinical and laboratory data. 4. Small sliding hiatal hernia. Labs:  
 
Recent Labs  
  01/28/19 
3378 01/27/19 
0301 WBC 6.9 7.3 HGB 9.0* 8.8* HCT 29.7* 28.3*  
 231 Recent Labs  
  01/28/19 
2493   
K 3.6  CO2 24 BUN 10  
CREA 0.74 * CA 7.9*  
MG 2.1 Recent Labs  
  01/28/19 
0183 * LPSE 2,012* No results for input(s): INR, PTP, APTT in the last 72 hours. No lab exists for component: INREXT, INREXT No results for input(s): FE, TIBC, PSAT, FERR in the last 72 hours. No results found for: FOL, RBCF No results for input(s): PH, PCO2, PO2 in the last 72 hours. No results for input(s): CPK, CKNDX, TROIQ in the last 72 hours. No lab exists for component: CPKMB Lab Results Component Value Date/Time  Cholesterol, total 185 01/15/2019 12:29 PM  
 HDL Cholesterol 52 01/15/2019 12:29 PM  
 LDL, calculated 109.6 (H) 01/15/2019 12:29 PM  
 Triglyceride 117 01/15/2019 12:29 PM  
 CHOL/HDL Ratio 3.6 01/15/2019 12:29 PM  
 
Lab Results Component Value Date/Time Glucose (POC) 102 (H) 01/18/2019 06:23 AM  
 Glucose (POC) 139 (H) 01/17/2019 09:05 PM  
 Glucose (POC) 114 (H) 01/17/2019 04:31 PM  
 Glucose (POC) 106 (H) 01/17/2019 11:41 AM  
 Glucose (POC) 132 (H) 01/16/2019 09:13 PM  
 
Lab Results Component Value Date/Time Color DARK YELLOW 01/15/2019 10:16 PM  
 Appearance CLOUDY (A) 01/15/2019 10:16 PM  
 Specific gravity 1.030 01/15/2019 10:16 PM  
 pH (UA) 6.0 01/15/2019 10:16 PM  
 Protein 100 (A) 01/15/2019 10:16 PM  
 Glucose NEGATIVE  01/15/2019 10:16 PM  
 Ketone 40 (A) 01/15/2019 10:16 PM  
 Urobilinogen 1.0 01/15/2019 10:16 PM  
 Nitrites NEGATIVE  01/15/2019 10:16 PM  
 Leukocyte Esterase SMALL (A) 01/15/2019 10:16 PM  
 Epithelial cells MODERATE (A) 01/15/2019 10:16 PM  
 Bacteria 1+ (A) 01/15/2019 10:16 PM  
 WBC 10-20 01/15/2019 10:16 PM  
 RBC 0-5 01/15/2019 10:16 PM  
 
 
 
Medications Reviewed:  
 
Current Facility-Administered Medications Medication Dose Route Frequency  albuterol-ipratropium (DUO-NEB) 2.5 MG-0.5 MG/3 ML  3 mL Nebulization Q6H PRN  
 levoFLOXacin (LEVAQUIN) 500 mg in D5W IVPB  500 mg IntraVENous Q24H  
 guaiFENesin ER (MUCINEX) tablet 600 mg  600 mg Oral Q12H  
 acetylcysteine (MUCOMYST) 200 mg/mL (20 %) solution 200 mg  200 mg Nebulization BID  carvedilol (COREG) tablet 6.25 mg  6.25 mg Oral BID WITH MEALS  lisinopril (PRINIVIL, ZESTRIL) tablet 2.5 mg  2.5 mg Oral DAILY  oseltamivir (TAMIFLU) capsule 75 mg  75 mg Oral Q12H  pantoprazole (PROTONIX) tablet 40 mg  40 mg Oral BID  sodium chloride (NS) flush 5-40 mL  5-40 mL IntraVENous Q8H  
 sodium chloride (NS) flush 5-40 mL  5-40 mL IntraVENous PRN  
 aspirin chewable tablet 81 mg  81 mg Oral DAILY  nitroglycerin (NITROSTAT) tablet 0.4 mg  0.4 mg SubLINGual Q5MIN PRN  
 ondansetron (ZOFRAN) injection 4 mg  4 mg IntraVENous Q4H PRN  
  enoxaparin (LOVENOX) injection 70 mg  1 mg/kg SubCUTAneous Q12H  
 morphine injection 2 mg  2 mg IntraVENous Q4H PRN  
 
______________________________________________________________________ EXPECTED LENGTH OF STAY: 4d 4h 
ACTUAL LENGTH OF STAY:          4 [address] [facility],

## 2019-01-29 NOTE — PROGRESS NOTES
1/29/2019   Cristy Deal. ZEYAD Kurtz  [facility] 
 
. Lavada Saucer Lavada Saucer Lavada Saucer Mayra Crum is a 70 y.o. female Earlier in month seen with afib in setting of acute pancreatitis Currently denies cp or palpitations. C/o SOB. Is tachypneic. On bipap Discussion/Plans/Recs 1. SOB/dyspnea, acute hypoxic respiratory failure: on bipap. Rhonchi noted, tachypneic 
-Will check CXR now.  
-Check pro BNP 2. Cardiomyopathy, acute systolic CHF 
-EF 32% with wall motion abnormalities -May relate to acute illness but CAD certainly possible, not candidate for invasive procedures right now will plan medical therapy 
-Continue lisinopril 2.5 mg daily, coreg 6.25 mg BID. -strict I/os. Poor po intake. Received IVF 75 ml/hr until yesterday. Also received albumin yesterday.  
-check lytes and repeat proBNP. -Lasix 40 mg IV x 1 now. 3. NQMI: Troponin elevation and fall. Peak of 3.26 
-Will recheck  
-repeat 12 lead EKG 
-ASA, add statin, continue BB, ace-I 4. PAF: none noted on telemetry review -ST-SR, few episodes of sinus arrhythmia 
-Continue coreg 
-On therapeutic lovenox 5. Influenza A-acute dyspnea 6. Pancreatitis with cyst:  
-Will need to watch diuresis closely with pancreatitis. -CMP pending. Pt with worsened dyspnea, rhonchi on exam.  Low urine output overnight. CXR from yesterday reviewed- pleural effusions, increased vascular markings. Repeat CXR pending. D/W Dr. Zaira Newton- will give lasix 40 mg IV x 1. Pt frail, weak. Plan to transfer to ICU. Dr. Renetta Harding will see pt this afternoon. Assessment/Plan/Discussion:Cardiology Attending:  
 
Patient seen on the day of progress note and examined  and agree with Advance Practice Provider (SISI, NP,PA)  assessment and plans. Mayra Crum is a 70 y.o. female Acutely worse this am so moved to CCU When seen mid afternoon, daughter at bedside Pt less shortness of breath and able to speak more clearly with less wheeze Cardiac asthma with acute systolic CHF, previous EF on 18th normal now reduced With NQMI with positive troponin Has acute pancreatitis with mucinous cystic tumor, not pseudocyst 
And likely influenza and sepsis PAF now NSR Will treat CHF and CAD/NQMI medically and as bp tolerates · Coreg 6.25mg bid · ASA 81 mg · Lovenox for afib · Lasix 40 mg bid IV · ACE-low dose due to low bp · Lipids on high potency statin as appropriate for secondary prevention. Discussed with pt and family at bedside EF 20-25% Trop drop from mid 3s to low now indicates ischemic event High ProN+BNP got better then suddenly worse Difficult prognosis Caryl Gibbs MD   
 
 
  
 
Cardiac Studies/Hx: 
No specialty comments available. Patient Vitals for the past 12 hrs: 
 Temp Pulse Resp BP SpO2  
01/29/19 0817 97.4 °F (36.3 °C) 96 (!) 33 150/67 100 % 01/29/19 0745     100 % 01/29/19 0448 98 °F (36.7 °C) 82 22 134/71 100 % 01/29/19 0334     100 % 01/29/19 0237     100 % 01/29/19 0013 97.8 °F (36.6 °C) 73 22 130/65 98 % Past Medical History:  
Diagnosis Date  Other ill-defined conditions(799.89) IBS  Pancreatitis ROS-pertinents  negative except as above  The pertinent portions of the medical history,physician and nursing notes, meds,vitals , labs and Ins/Outs,are reviewed in the electronic record. Results for orders placed or performed during the hospital encounter of 01/25/19 EKG, 12 LEAD, INITIAL Result Value Ref Range Ventricular Rate 98 BPM  
 Atrial Rate 98 BPM  
 P-R Interval 142 ms QRS Duration 70 ms Q-T Interval 380 ms QTC Calculation (Bezet) 485 ms Calculated P Axis 72 degrees Calculated R Axis 3 degrees Calculated T Axis 175 degrees Diagnosis Normal sinus rhythm Anteroseptal infarct (cited on or before 25-JAN-2019) ST & T wave abnormality, consider lateral ischemia When compared with ECG of 25-JAN-2019 18:13, 
QT has shortened Confirmed by Mohsen Thorpe M.D., Suzette Murphy ([id number]) on 1/28/2019 1:24:02 PM 
  
  
Vitals:  
 01/29/19 9893 01/29/19 0448 01/29/19 0745 01/29/19 7999 BP:  134/71  150/67 BP 1 Location:  Right arm  Right arm BP Patient Position:  At rest  At rest  
Pulse:  82  96 Resp:  22  (!) 33 Temp:  98 °F (36.7 °C)  97.4 °F (36.3 °C) SpO2: 100% 100% 100% 100% Weight:  149 lb 4 oz (67.7 kg) Height:      
 
 
Objective:  
 Physical Exam:  
Patient Vitals for the past 12 hrs: 
 Temp Pulse Resp BP SpO2  
01/29/19 0817 97.4 °F (36.3 °C) 96 (!) 33 150/67 100 % 01/29/19 0745     100 % 01/29/19 0448 98 °F (36.7 °C) 82 22 134/71 100 % 01/29/19 0334     100 % 01/29/19 0237     100 % 01/29/19 0013 97.8 °F (36.6 °C) 73 22 130/65 98 % General:  alert, cooperative, appears stated age, toxic, frail appearing. Dyspneic  
ENT, Neck:  no jvd Chest Wall: inspection normal - no chest wall deformities or tenderness, +tachypnea Lung: Diffuse rhonchi bilaterally Heart:  normal rate, regular rhythm, normal S1, S2, no murmurs, rubs, clicks or gallops Abdomen: Mild distension, soft, +ttp Extremities: extremities normal, atraumatic, no cyanosis or edema Last 24hr Input/Output: 
 
Intake/Output Summary (Last 24 hours) at 1/29/2019 0830 Last data filed at 1/29/2019 0173 Gross per 24 hour Intake  Output 850 ml Net -850 ml Data Review: No results found for this or any previous visit (from the past 24 hour(s)). Yuan Kurtz NP 1/29/2019

## 2019-01-29 NOTE — CONSULTS
Initial Pulmonary / Critical Care Consultation Assessment / Plan:   
 
Acute hypoxemic respiratory failure with pulmonary edema and L effusion in setting of recent pancreatitis and cardiac dysfunction L effusion related to CHF (systolic with EF 83%)ZD sympathetic from pancreatitis - if continued resp compromise and or fever would consider diagnostic / therapeutic L pleural drainage Influenza positive Elevated BNP and troponin with EKG with NSST changes laterally - NSTEMI vs myocarditis Paroxysmal afib Pancreatitis with multilocular cystic lesion being followed by GI - possible intraductal tumor 
 
--bipap if needed for work of breathing 
--receiving neb therapy and mucolytics 
--receiving albumin + diuretic 
--on empiric abx with levaquin 
--on tamiflu 
--cardiology to see Acutely ill being monitored in the CCU. At significant risk for cardiopulmonary decompensation History / Subjective: 
Reason:  Hypoxemia Requesting Provider:  Dr Laly Farnsworth is a 70 y.o.  female who  has a past medical history of Other ill-defined conditions(409.32) and Pancreatitis. She also has no past medical history of Unspecified sleep apnea. admitted 1/25/2019 with dyspnea. She was recently discharged after an admission for pancreatitis with pancreatic cyst.  She developed cough and dyspnea. Her influenza A swab was positive and has been started on tamiflu. She has cardiomyopathy with an EF of 25%. She had increased dyspnea today and was placed on bipap, given lasix and transferred to the CCU She says that she is breathing easier now. She has not mobilized much secretions She has been afebrile Her lipase is elevated but down from her previous admission Pro BNP and troponin were elevated as well Allergies Allergen Reactions  Penicillins Hives Past Medical History:  
Diagnosis Date  Other ill-defined conditions(809.67) IBS  Pancreatitis Past Surgical History: Procedure Laterality Date  COLONOSCOPY Left 2017 COLONOSCOPY performed by Tressa Vance MD at [address]  HX CHOLECYSTECTOMY  HX GI    
 surgery for hiatal hernia Prior to Admission medications Medication Sig Start Date End Date Taking? Authorizing Provider  
levoFLOXacin (LEVAQUIN) 750 mg tablet Take 1 Tab by mouth daily for 10 days. 19 Yes Marko Hightower MD  
pantoprazole (PROTONIX) 40 mg tablet Take 1 Tab by mouth two (2) times a day. 19  Yes James Serrano MD  
  
Family History Problem Relation Age of Onset  Dementia Mother   
     alzheimers  Cancer Sister   
     gallbladder  Cancer Brother   
     pancreatic  Breast Cancer Paternal Grandmother  Dementia Sister   
     alzheimers  Heart Surgery Brother   
     bypass Social History Tobacco Use  Smoking status: Former Smoker  Smokeless tobacco: Never Used  Tobacco comment: quit smoking cigarettes 6 yrs ago Substance Use Topics  Alcohol use: Yes Comment: very occasional  
  
ROS:  A comprehensive review of systems was negative except for that written in the HPI. Objective: 
Patient Vitals for the past 4 hrs: 
 BP Temp Pulse Resp SpO2  
19 1300 125/64 98.2 °F (36.8 °C) 79 25 97 % 19 1102 109/50 97.7 °F (36.5 °C) 70 25 100 % Temp (24hrs), Av.8 °F (36.6 °C), Min:97.4 °F (36.3 °C), Max:98.2 °F (36.8 °C) CVP:       
 
Intake/Output Summary (Last 24 hours) at 2019 1341 Last data filed at 2019 1300 Gross per 24 hour Intake 200 ml Output 1500 ml Net -1300 ml Blood Sugar: 
Glucose Date Value Ref Range Status 2019 118 (H) 65 - 100 mg/dL Final  
2019 120 (H) 65 - 100 mg/dL Final  
2019 98 65 - 100 mg/dL Final  
2019 112 (H) 65 - 100 mg/dL Final  
2019 89 65 - 100 mg/dL Final  
 
Exam: 
Alert on NC O2 
MMM No accessory use Trachea midline Symmetrical chest expansion Rales, rhonchi and mild exp wheezes RRR Protuberant, no guarding or rebound Warm and dry Trace edema No cyanosis or clubbing Lab data was reviewed. Radiology images were independently viewed and available reports were reviewed. CXR - CM, pulm edema, L effusion and L base atx Lab: 
Recent Labs  
  01/29/19 
0932 01/28/19 
3163 01/27/19 
0301 WBC  --  6.9 7.3 HGB  --  9.0* 8.8* PLT  --  203 231  142  --   
K 3.4* 3.6  --   
 108  --   
CO2 26 24  --   
BUN 8 10  --   
CREA 0.67 0.74  --   
* 120*  --   
CA 7.8* 7.9*  --   
MG 1.8 2.1  --   
TBILI 0.8  --   --   
SGOT 25  --   --   
 
ABG: 
Recent Labs  
  01/29/19 
0918 PHI 7.382 PCO2I 42.1 PO2I 115* HCO3I 25.0 SO2I 98* FIO2I 60 All Micro Results Procedure Component Value Units Date/Time CULTURE, MRSA [[id number]] Order Status:  Sent Specimen:  Nares CULTURE, BLOOD, PAIRED [[id number]] Collected:  01/25/19 1512 Order Status:  Completed Specimen:  Blood Updated:  01/29/19 0510 Special Requests: NO SPECIAL REQUESTS Culture result: NO GROWTH 4 DAYS URINE CULTURE HOLD SAMPLE [[id number]] Collected:  01/25/19 1815 Order Status:  Canceled Specimen:  Urine INFLUENZA A & B AG (RAPID TEST) [[id number]]  (Abnormal) Collected:  01/25/19 1608 Order Status:  Completed Specimen:  Nasopharyngeal from Nasal washing Updated:  01/25/19 1647 Influenza A Antigen POSITIVE Influenza B Antigen NEGATIVE Results for Nancy Cancel (MRN [id number]) as of 1/29/2019 13:39 Ref. Range 1/25/2019 15:12 1/25/2019 22:20 1/26/2019 04:28 1/28/2019 03:37 1/29/2019 09:32 Lipase Latest Ref Range: 73 - 393 U/L  >3,000 (H)  2,012 (H) 812 (H) Results for Nancy Cancel (MRN [id number]) as of 1/29/2019 13:39 Ref. Range 1/25/2019 15:12 1/25/2019 22:20 1/26/2019 04:28 1/28/2019 03:37 1/29/2019 09:32 NT pro-BNP Latest Ref Range: 0 - 450 PG/ML 16,948 (H)   6,084 (H) 11,916 (H)  
 
 
Eder Bird MD

## 2019-01-29 NOTE — PROGRESS NOTES
1/28/2019   Opal Martinez MD  [facility] 
 
. Pretty Serrato Husbands is a 70 y.o. female Earlier in month seen with afib in setting of acute pancreatitis Currently denies cp, sob or palps Discussion/Plans/Recs 
 
cardiomyopathy EF 20% with wall motion abnormalities May relate to acute illness but CAD certainly possible, not candidate for invasive procedures right now will plan medical therapy PAF Influenza A-acute dyspnea Pancreatitis with cyst 
 
 
  
 
Cardiac Studies/Hx: 
No specialty comments available. Patient Vitals for the past 12 hrs: 
 Temp Pulse Resp BP SpO2  
01/28/19 1921 97.6 °F (36.4 °C) 72 20 111/49 98 % 01/28/19 1706  75  115/51   
01/28/19 1600  68 20 106/47 99 % 01/28/19 1516 97.6 °F (36.4 °C) 70 20 100/45 100 % 01/28/19 1400  69 24 (!) 90/39 99 % 01/28/19 1305  74 19 91/43 98 % 01/28/19 1300  74 20 (!) 101/25 99 % 01/28/19 1200  71 23 109/53 100 % 01/28/19 1155     100 % 01/28/19 1100 97.6 °F (36.4 °C) 63 16 (!) 89/49 100 % 01/28/19 1000  73 22 (!) 85/44 100 % Past Medical History:  
Diagnosis Date  Other ill-defined conditions(799.89) IBS  Pancreatitis ROS-pertinents  negative except as above  The pertinent portions of the medical history,physician and nursing notes, meds,vitals , labs and Ins/Outs,are reviewed in the electronic record. Results for orders placed or performed during the hospital encounter of 01/25/19 EKG, 12 LEAD, INITIAL Result Value Ref Range Ventricular Rate 98 BPM  
 Atrial Rate 98 BPM  
 P-R Interval 142 ms QRS Duration 70 ms Q-T Interval 380 ms QTC Calculation (Bezet) 485 ms Calculated P Axis 72 degrees Calculated R Axis 3 degrees Calculated T Axis 175 degrees Diagnosis Normal sinus rhythm Anteroseptal infarct (cited on or before 25-JAN-2019) ST & T wave abnormality, consider lateral ischemia When compared with ECG of 25-JAN-2019 18:13, 
QT has shortened Confirmed by Escobar Bliss M.D., [facility] ([id number]) on 1/28/2019 1:24:02 PM 
  
  
Vitals:  
 01/28/19 1516 01/28/19 1600 01/28/19 1706 01/28/19 1921 BP: 100/45 106/47 115/51 111/49 BP 1 Location: Right arm   Right arm BP Patient Position: At rest   At rest  
Pulse: 70 68 75 72 Resp: 20 20  20 Temp: 97.6 °F (36.4 °C)   97.6 °F (36.4 °C) SpO2: 100% 99%  98% Weight:      
Height:      
 
 
Objective:  
 Physical Exam:  
Patient Vitals for the past 12 hrs: 
 Temp Pulse Resp BP SpO2  
01/28/19 1921 97.6 °F (36.4 °C) 72 20 111/49 98 % 01/28/19 1706  75  115/51   
01/28/19 1600  68 20 106/47 99 % 01/28/19 1516 97.6 °F (36.4 °C) 70 20 100/45 100 % 01/28/19 1400  69 24 (!) 90/39 99 % 01/28/19 1305  74 19 91/43 98 % 01/28/19 1300  74 20 (!) 101/25 99 % 01/28/19 1200  71 23 109/53 100 % 01/28/19 1155     100 % 01/28/19 1100 97.6 °F (36.4 °C) 63 16 (!) 89/49 100 % 01/28/19 1000  73 22 (!) 85/44 100 % General:  alert, cooperative, mild distress, appears stated age, toxic  
ENT, Neck:  no jvd Chest Wall: inspection normal - no chest wall deformities or tenderness, respiratory effort normal  
Lung: clear to auscultation bilaterally Heart:  normal rate, regular rhythm, normal S1, S2, no murmurs, rubs, clicks or gallops Abdomen: nondistended Extremities: extremities normal, atraumatic, no cyanosis or edema Last 24hr Input/Output: 
 
Intake/Output Summary (Last 24 hours) at 1/28/2019 2142 Last data filed at 1/28/2019 1600 Gross per 24 hour Intake  Output 1200 ml Net -1200 ml Data Review:  
Recent Results (from the past 24 hour(s)) EKG, 12 LEAD, INITIAL Collection Time: 01/28/19  3:35 AM  
Result Value Ref Range Ventricular Rate 98 BPM  
 Atrial Rate 98 BPM  
 P-R Interval 142 ms QRS Duration 70 ms Q-T Interval 380 ms QTC Calculation (Bezet) 485 ms Calculated P Axis 72 degrees Calculated R Axis 3 degrees Calculated T Axis 175 degrees Diagnosis Normal sinus rhythm Anteroseptal infarct (cited on or before 25-JAN-2019) ST & T wave abnormality, consider lateral ischemia When compared with ECG of 25-JAN-2019 18:13, 
QT has shortened Confirmed by Justine Fay M.D., Patricia Anne ([id number]) on 1/28/2019 1:24:02 PM 
  
AMYLASE Collection Time: 01/28/19  3:37 AM  
Result Value Ref Range Amylase 260 (H) 25 - 115 U/L  
CBC WITH AUTOMATED DIFF Collection Time: 01/28/19  3:37 AM  
Result Value Ref Range WBC 6.9 3.6 - 11.0 K/uL  
 RBC 3.15 (L) 3.80 - 5.20 M/uL HGB 9.0 (L) 11.5 - 16.0 g/dL HCT 29.7 (L) 35.0 - 47.0 % MCV 94.3 80.0 - 99.0 FL  
 MCH 28.6 26.0 - 34.0 PG  
 MCHC 30.3 30.0 - 36.5 g/dL  
 RDW 14.6 (H) 11.5 - 14.5 % PLATELET 249 988 - 590 K/uL MPV 10.8 8.9 - 12.9 FL  
 NRBC 0.0 0  WBC ABSOLUTE NRBC 0.00 0.00 - 0.01 K/uL NEUTROPHILS 56 32 - 75 % LYMPHOCYTES 35 12 - 49 % MONOCYTES 6 5 - 13 % EOSINOPHILS 1 0 - 7 % BASOPHILS 1 0 - 1 % IMMATURE GRANULOCYTES 1 (H) 0.0 - 0.5 % ABS. NEUTROPHILS 3.9 1.8 - 8.0 K/UL  
 ABS. LYMPHOCYTES 2.4 0.8 - 3.5 K/UL  
 ABS. MONOCYTES 0.4 0.0 - 1.0 K/UL  
 ABS. EOSINOPHILS 0.1 0.0 - 0.4 K/UL  
 ABS. BASOPHILS 0.0 0.0 - 0.1 K/UL  
 ABS. IMM. GRANS. 0.0 0.00 - 0.04 K/UL  
 DF AUTOMATED    
LIPASE Collection Time: 01/28/19  3:37 AM  
Result Value Ref Range Lipase 2,012 (H) 73 - 393 U/L MAGNESIUM Collection Time: 01/28/19  3:37 AM  
Result Value Ref Range Magnesium 2.1 1.6 - 2.4 mg/dL METABOLIC PANEL, BASIC Collection Time: 01/28/19  3:37 AM  
Result Value Ref Range Sodium 142 136 - 145 mmol/L Potassium 3.6 3.5 - 5.1 mmol/L Chloride 108 97 - 108 mmol/L  
 CO2 24 21 - 32 mmol/L Anion gap 10 5 - 15 mmol/L Glucose 120 (H) 65 - 100 mg/dL BUN 10 6 - 20 MG/DL Creatinine 0.74 0.55 - 1.02 MG/DL  
 BUN/Creatinine ratio 14 12 - 20 GFR est AA >60 >60 ml/min/1.73m2 GFR est non-AA >60 >60 ml/min/1.73m2 Calcium 7.9 (L) 8.5 - 10.1 MG/DL  
NT-PRO BNP Collection Time: 01/28/19  3:37 AM  
Result Value Ref Range NT pro-BNP 6,084 (H) 0 - 450 PG/ML Radha Oropeza MD 1/28/2019

## 2019-01-29 NOTE — PROGRESS NOTES
Patient coarse this morning and very short of breath. Did not tolerate weaning to nasal oxygen for breakfast. Cardiology at bedside. Chest xray ordered. Dr. Hanny Eddy notified. Lasix and labs ordered. Patient weaned to nasal cannula. Notified Dr. Hanny Eddy of improved status. Confirmed with hospitalist of transfer order. She stated she still wanted patient to go to higher level of care for risk of decompensation. TRANSFER - OUT REPORT: 
 
Verbal report given to SUKHWINDER Greer(name) on [place]ho[place]  being transferred to CCU(unit) for change in patient condition(respiratory status ) Report consisted of patients Situation, Background, Assessment and  
Recommendations(SBAR). Information from the following report(s) SBAR, Kardex, Intake/Output, MAR, Accordion and Recent Results was reviewed with the receiving nurse. Lines:  
Peripheral IV 01/25/19 Left Antecubital (Active) Site Assessment Clean, dry, & intact 1/29/2019 11:02 AM  
Phlebitis Assessment 0 1/29/2019 11:02 AM  
Infiltration Assessment 0 1/29/2019 11:02 AM  
Dressing Status Clean, dry, & intact 1/29/2019  8:17 AM  
Dressing Type Transparent;Tape 1/29/2019 11:02 AM  
Hub Color/Line Status Pink; Infusing 1/29/2019 11:02 AM  
Action Taken Open ports on tubing capped 1/29/2019 11:02 AM  
Alcohol Cap Used Yes 1/29/2019 11:02 AM  
  
 
Opportunity for questions and clarification was provided. Patient transported with: 
 O2 @ 4 liters Registered Nurse Problem: Falls - Risk of 
Goal: *Absence of Falls Document Orvel Buffy Fall Risk and appropriate interventions in the flowsheet. Outcome: Progressing Towards Goal 
Fall Risk Interventions: 
Mobility Interventions: Communicate number of staff needed for ambulation/transfer, Patient to call before getting OOB, Strengthening exercises (ROM-active/passive) Medication Interventions: Patient to call before getting OOB, Teach patient to arise slowly Elimination Interventions: Call light in reach, Patient to call for help with toileting needs, Toileting schedule/hourly rounds Problem: Pressure Injury - Risk of 
Goal: *Prevention of pressure injury Document Travis Scale and appropriate interventions in the flowsheet. Outcome: Progressing Towards Goal 
Pressure Injury Interventions: 
Sensory Interventions: Assess changes in LOC, Float heels, Maintain/enhance activity level, Minimize linen layers, Pressure redistribution bed/mattress (bed type), Turn and reposition approx. every two hours (pillows and wedges if needed) Moisture Interventions: Absorbent underpads, Internal/External urinary devices, Limit adult briefs, Minimize layers Activity Interventions: Increase time out of bed, PT/OT evaluation Mobility Interventions: Float heels, HOB 30 degrees or less, Turn and reposition approx. every two hours(pillow and wedges) Nutrition Interventions: Document food/fluid/supplement intake, Offer support with meals,snacks and hydration Friction and Shear Interventions: HOB 30 degrees or less, Lift sheet, Minimize layers Problem: Heart Failure: Day 4 Goal: Respiratory Outcome: Not Progressing Towards Goal 
Patient on BIPAP. Did not tolerate weaning to 2L nasal cannula this morning. Become dyspneic. Placed back on BIPAP.

## 2019-01-29 NOTE — PROGRESS NOTES
[facility] 
[address] [facility], [address] GI PROGRESS NOTE Gracy Westbrook, [facility] office [phone] NP in-hospital cell phone M-F until 4:30 After 5pm or on weekends, please call  for physician on call NAME: Gareth Lewis :  1940 MRN:  [id number] Subjective:  
Her respiratory status has been worsening, on Bipap with plans to transfer to ICU. She reports LLQ abdominal pain - worse with coughing (however quite tender in epigastric area on exam). Objective: VITALS:  
Last 24hrs VS reviewed since prior progress note. Most recent are: 
Visit Vitals /55 Pulse 83 Temp 97.4 °F (36.3 °C) Resp 27 Ht 5' 4\" (1.626 m) Wt 67.7 kg (149 lb 4 oz) SpO2 100% Breastfeeding? No  
BMI 25.62 kg/m² PHYSICAL EXAM: 
General: Cooperative, no acute distress   
Neurologic:  Alert and oriented X 3. HEENT: EOMI, no scleral icterus Lungs:  Scattered rhonchi, wheezing Heart:  S1 S2 Abdomen: Soft, non-distended, epigastric tenderness. +Bowel sounds Extremities: warm Psych:   Good insight. Not anxious or agitated. Lab Data Reviewed:  
 
Recent Results (from the past 24 hour(s)) POC G3 - PUL Collection Time: 19  9:18 AM  
Result Value Ref Range FIO2 (POC) 60 % pH (POC) 7.382 7.35 - 7.45    
 pCO2 (POC) 42.1 35.0 - 45.0 MMHG  
 pO2 (POC) 115 (H) 80 - 100 MMHG  
 HCO3 (POC) 25.0 22 - 26 MMOL/L  
 sO2 (POC) 98 (H) 92 - 97 % Base excess (POC) 0 mmol/L Site RIGHT RADIAL Device: BIPAP    
 PEEP/CPAP (POC) 5 cmH2O  
 PIP (POC) 11 Pressure support 5 cmH2O Allens test (POC) YES Specimen type (POC) ARTERIAL Total resp. rate 30 SAMPLES BEING HELD Collection Time: 19  9:32 AM  
Result Value Ref Range SAMPLES BEING HELD 1pst   
 COMMENT   Add-on orders for these samples will be processed based on acceptable specimen integrity and analyte stability, which may vary by analyte. Assessment:  
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  
Diagnosis Code  Pancreatitis K85.90  Dyspnea R06.00 Plan: · Monitor lipase and LFT's - today's labs pending · Continue supportive measures Signed By: Shauna Pinedo NP   
 1/29/2019  1:16 PM  
  
GI attending note: I personally examined the patient. Agree with the physical, assessment, and plan of the SISI. Advance to FLD. Dr. Trevor Murrieta

## 2019-01-29 NOTE — PROGRESS NOTES
Transitional Care Team: Initial HUG Note Date of Assessment: 01/29/19 Time of Assessment:  11:10 AM 
Hospital Nurse Navigator: Gato Pardo RN Assessment and Plan as below: 1. Pt admitted currently for Flu A positive, possible PNA secondary to flu and Acute on Chronic Systolic HF, EF 56-64%. She was previously admitted to [facility] 1/15/19- 1/19/19 for pancreatitis. 2. Was started on Tamiflu, Levaquin. Blood cultures show NGTD. Swabbed for MRSA today, pending. 3. Attempted to meet with pt today, however she was doing poorly and in the process of being transferred to CCU. HUG team will start following patient again once transferred back to Floor bed. 4.  Pt does not have an AMD on file Amrit Samano is a 70 y.o. female inpatient at [facility] with Dyspnea on  1/25/2019. This Nurse Navigator accessed chart to offer support and placement in the [address] [place] [address] Team bridges the gaps in care and education surrounding discharge from the acute care facility. The objective is to empower the patient and family in taking a proactive role in the task of preventing readmission within the first thirty days after discharge from the acute care setting. The team is also involved in the efforts to reduce readmission to the acute care setting after stabilization and discharge from the acute care environment either to the skilled nursing facilities or community. Primary Diagnosis Influenza A positive PNA secondary to flu Acute on Chronic Systolic HF Will communicate with outpatient HF NNs, César Farooq RN and Rachael Day RN once discharging planning in place.

## 2019-01-30 NOTE — PROCEDURES
Interventional Radiology Procedure Note 1/30/2019 4:32 PM 
 
Patient: Genesis Fuller Informed consent obtained Diagnosis: Left rectus and pelvic hemorrhage Procedure(s): Abdominal/pelvic angiogram without evidence for active hemorrhage. Prophylactic coil embolization of the deep left inferior epigastric artery was performed. Specimens removed:  None Complications: None Primary Physician: Antony Spears MD 
 
Recomendations: N/A Discharge Disposition: Floor Full dictated report to follow Signed By: Antony Spears MD

## 2019-01-30 NOTE — PROGRESS NOTES
Central Line Note Indication: Inadequate venous access Risks, benefits, alternatives explained and patient agrees to proceed. Patient positioned in Trendelenburg. 7-Step Sterility Protocol followed. (cap, mask sterile gown, sterile gloves, large sterile sheet, hand hygiene, 2% chlorhexidine for cutaneous antisepsis) 5 mL 1% Lidocaine placed at insertion site. Live Ultrasound scan performed. Right internal jugular cannulated x 1 attempt(s) utilizing the Seldinger technique. Venous cannulation confirmed with column drop test prior to dilation. Catheter secured & Biopatch applied. Sterile Tegaderm placed. 8.5 fr quad lumen catheter CXR pending.

## 2019-01-30 NOTE — PROGRESS NOTES
Lisa Le / Pulmonary / Critical Care Assessment / Plan:   
Acute hypoxemic respiratory failure with pulmonary edema and L effusion in setting of recent pancreatitis and cardiac dysfunction 
  
L effusion related to CHF (systolic with EF 29%)IB sympathetic from pancreatitis - if continued resp compromise and or fever would consider diagnostic / therapeutic L pleural drainage 
  Influenza positive 
  
Elevated BNP and troponin with EKG with NSST changes laterally - NSTEMI vs myocarditis 
  
Paroxysmal afib 
  
Pancreatitis with multilocular cystic lesion being followed by GI - possible intraductal tumor Anemia - with 3 g drop in hgb without obvious visual blood loss - pancreatitis with abd pain - will repeat abd ct to rule out intra-abd / retroperitoneal hemorrhage --Abd and pelvic ct with oral contrast only 
--follow hgb --transfusion of 2 units PRBC's has been ordered --check coags --continue nebs and pulm toilet 
--on empiric abx and tamiflu 
--cardiology and gi following The patient is critically ill and is at significant risk of decompensation. Critical Care time spent exclusive of procedures 30 minutes. History / Subjective: 
Hospital Day: 6 - Interval history and notes reviewed 1/30 Tenuous Hemoglobin dropped over 3 g overnight. Patient with marginal blood pressure. Has had nausea and vomiting which is bilious no hematemesis noted Complaining of abdominal pain Has known pancreatitis Was on BiPAP intermittently Objective: 
Patient Vitals for the past 4 hrs: 
 BP Temp Pulse Resp SpO2  
01/30/19 0739     100 % 01/30/19 0630 106/45  71 17 100 % 01/30/19 0600 98/42  72 18 98 % 01/30/19 0515 91/40  73 17 99 % 01/30/19 0510 91/40 98.9 °F (37.2 °C) 74 16 100 % 01/30/19 0500 (!) 92/39  70 15 100 % 01/30/19 0445 (!) 96/39  72 18 99 % 01/30/19 0430 92/40  74 19 100 % 01/30/19 0415 (!) 87/39  78 18 95 % Temp (24hrs), Av.3 °F (36.8 °C), Min:97.4 °F (36.3 °C), Max:98.9 °F (37.2 °C) Intake/Output Summary (Last 24 hours) at 2019 4489 Last data filed at 2019 0400 Gross per 24 hour Intake 300 ml Output 2790 ml Net -2490 ml Lab Results Component Value Date/Time Glucose (POC) 102 (H) 2019 06:23 AM  
 Glucose (POC) 139 (H) 2019 09:05 PM  
 Glucose (POC) 114 (H) 2019 04:31 PM  
 Glucose (POC) 106 (H) 2019 11:41 AM  
 Glucose (POC) 132 (H) 2019 09:13 PM  
 
Exam: 
Pale Ill appearing No resp distress on NC O2 No accessory use Symmetrical chest expansion Rales at bases with diminished bs at bases RRR 
abd  With mild tenderness Warm and dry Trace edema Data:  
Interval lab and diagnostic data was reviewed. Interval radiology images were independently viewed and available reports were reviewed. CXR - basilar atx Lab: 
Recent Labs  
  19 
0224 19 
0932 19 
0928 WBC 8.7  --  6.9 HGB 5.7*  --  9.0*  
  --  203  141 142  
K 3.4* 3.4* 3.6  106 108 CO2 28 26 24 BUN 10 8 10 CREA 1.06* 0.67 0.74 * 118* 120* CA 7.8* 7.8* 7.9*  
MG 1.5* 1.8 2.1 PHOS 2.2*  --   --   
TROIQ 0.15* 0.26*  --   
TBILI 1.1* 0.8  --   
SGOT  25  --   
 
ABG: 
Recent Labs  
  19 PHI 7.382 PCO2I 42.1 PO2I 115* HCO3I 25.0 SO2I 98* FIO2I 60 All Micro Results Procedure Component Value Units Date/Time CULTURE, BLOOD, PAIRED [[id number]] Collected:  19 1512 Order Status:  Completed Specimen:  Blood Updated:  19 0715 Special Requests: NO SPECIAL REQUESTS Culture result: NO GROWTH 5 DAYS     
 CULTURE, MRSA [[id number]] Collected:  19 1121 Order Status:  Completed Specimen:  Nares Updated:  19 5851 URINE CULTURE HOLD SAMPLE [[id number]] Collected:  19 1815 Order Status:  Canceled Specimen:  Urine INFLUENZA A & B AG (RAPID TEST) [[id number]]  (Abnormal) Collected:  01/25/19 1608 Order Status:  Completed Specimen:  Nasopharyngeal from Nasal washing Updated:  01/25/19 1647 Influenza A Antigen POSITIVE Influenza B Antigen NEGATIVE Eloy Caputo MD

## 2019-01-30 NOTE — PROGRESS NOTES
Hospitalist Progress Note [facility] [facility], DO Answering service: [phone] OR [phone] from in house phone Date of Service:  2019 NAME:  Delia Hennessy :  1940 MRN:  [id number] Admission Summary:  
Delia Hennessy is a 70 y.o. female who presents with dyspnea. Was recently just discharged from hospital 5 days ago for pancreatitis and large pancreatic cyst. 
Two to three days after discharge, pt began having intermittent cough and dyspnea. Associated with generalized weakness and fatigue. Denies chest pain, significant sputum, calf pain or erythema, fever, chills. Does have some persistent nausea. Interval history / Subjective:  
Patient seen and examined. Overnight, patient with emesis, persistently nauseous, hypotensive. Low hgb, 2 units pRBCs ordered, pressors started. CVL ordered this AM. CT abd/pel with oral contrast ordered. Daughter updated. Assessment & Plan:  
 
Acute blood loss anemia:  
-hgb 5.7 from 9.0. 2 units pRBCs ordered with lasix 
-CT abd/pelvis ordered with oral contrast 
 
Shock, hypotensive:  
-suspect secondary to acute blood loss, sepsis not ruled out  
-CVL placement Acute hypoxic respiratory failure, POA: (hypoxic, tachypneic, respiratory distress) -multifactorial due to acute influenza, suspected superimposed pneumonia, volume overload due to acute systolic heart failure, EF 25-25% 
-improving, now on nasal cannula, intermittent BiPAP as needed 
-lasix BID for now Influenza A with possible superimposed pneumonia:  
-continue tamiflu, levaquin. MRSA swab negative Acute systolic heart failure, EF 20-25% -Echo with severely reduced EF, diffuse hypokinesis 
-cardiology consulted- acute illness vs CAD not excluded 
-patient unstable for cath/stress test 
-initiated on empiric aspirin, lovenox, BB, lisinopril Elevated troponin: unclear etiology, possible demand ischemia 
  
Recent discharge for acute pancreatitis:   
-MRCP with cystic lesion, possible intraductal papillary mucinous tumor  
-CT abdomen/pelvis this AM  
-Lipase remains elevated 
-GI consulted and following  
-IVF held secondary to volume overload and worsening respiratory status  
-prn pain meds, anti-emetics  
 
  
Code status: Full DVT prophylaxis: Lovenox Care Plan discussed with: Patient/Family and Nurse Disposition: TBD Hospital Problems  Date Reviewed: 1/15/2019 Codes Class Noted POA Dyspnea ICD-10-CM: R06.00 
ICD-9-CM: 786.09  1/25/2019 Unknown Review of Systems:  
Negative unless stated above Vital Signs:  
 Last 24hrs VS reviewed since prior progress note. Most recent are: 
Visit Vitals BP 98/47 Pulse 73 Temp 97.5 °F (36.4 °C) Resp 16 Ht 5' 4\" (1.626 m) Wt 64.8 kg (142 lb 13.7 oz) SpO2 99% Breastfeeding? No  
BMI 24.52 kg/m² Intake/Output Summary (Last 24 hours) at 1/30/2019 9013 Last data filed at 1/30/2019 0830 Gross per 24 hour Intake 300 ml Output 2940 ml Net -2640 ml Physical Examination:  
 
 
     
Constitutional:  drowsy, alert ENT:  Oral mucous moist, Resp:  Coarse breathe sounds, no increased work of breathing CV:  Regular rhythm, normal rate GI:  Soft, non distended, mildy tender . bs+ Musculoskeletal:  No edema, warm, 2+ pulses throughout Neurologic:  Moves all extremities. AAOx3 Data Review:  
 I personally reviewed  Image and labs Cta Chest W Or W Wo Cont Result Date: 1/25/2019 IMPRESSION: Small right pleural effusion with underlying atelectasis. No evidence of pulmonary embolism. Ill-defined mass lesion either arising from or secondarily involving the left hepatic lobe likely corresponding to the pancreas mass seen on the recent MR but likely increased in the interval. 
 
Xr Chest [facility] Result Date: 1/30/2019 IMPRESSION: Bibasilar densities consistent bibasilar airspace disease and/or small bilateral pleural effusions. Xr Chest [facility] Result Date: 1/29/2019 IMPRESSION: 1. Similar to slightly worsening bilateral perihilar predominant opacities, most suspicious for edema. 2.  Slightly increased small left effusion and a increasing left lung base consolidation, possibly atelectasis. Xr Chest [facility] Result Date: 1/28/2019 Impression: Stable trace bilateral effusions with underlying atelectasis. Xr Chest [facility] Result Date: 1/28/2019 IMPRESSION: Small bibasilar atelectasis/effusions are again noted slightly improved on the right and mildly progressed on the left Recent MRI abd 1/15/2019 
 
1. 1.3 x 1.7 x 3.0 cm multilocular cystic lesion of the pancreas at the body 
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 Follow-up, preferably by MRI, in 6-12 months is recommended. 2. Hepatic cysts and probable flash fill inferior right lobe hepatic hemangioma. 3. Mild extra hepatic biliary dilation which may be sequela of prior obstruction 
or inflammation. Correlate for evidence of ongoing biliary obstruction with 
clinical and laboratory data. 4. Small sliding hiatal hernia. Labs:  
 
Recent Labs  
  01/30/19 0224 01/28/19 
6638 WBC 8.7 6.9 HGB 5.7* 9.0*  
HCT 18.8* 29.7*  
 203 Recent Labs  
  01/30/19 0224 01/29/19 
0932 01/28/19 
1154  141 142  
K 3.4* 3.4* 3.6  106 108 CO2 28 26 24 BUN 10 8 10 CREA 1.06* 0.67 0.74 * 118* 120* CA 7.8* 7.8* 7.9*  
MG 1.5* 1.8 2.1 PHOS 2.2*  --   --   
 
Recent Labs  
  01/30/19 0224 01/29/19 
0932 01/28/19 
0723 SGOT 19 25  --   
ALT 15 17  --   
AP 40* 55  --   
TBILI 1.1* 0.8  --   
TP 6.1* 6.3*  --   
ALB 3.6 3.5  --   
GLOB 2.5 2.8  -- AML  --   --  260* LPSE  --  812* 2,012* No results for input(s): INR, PTP, APTT in the last 72 hours.  
 
No lab exists for component: INREXT, INREXT  
 No results for input(s): FE, TIBC, PSAT, FERR in the last 72 hours. No results found for: FOL, RBCF No results for input(s): PH, PCO2, PO2 in the last 72 hours. Recent Labs  
  01/30/19 
0224 01/29/19 
0932 TROIQ 0.15* 0.26* Lab Results Component Value Date/Time Cholesterol, total 185 01/15/2019 12:29 PM  
 HDL Cholesterol 52 01/15/2019 12:29 PM  
 LDL, calculated 109.6 (H) 01/15/2019 12:29 PM  
 Triglyceride 117 01/15/2019 12:29 PM  
 CHOL/HDL Ratio 3.6 01/15/2019 12:29 PM  
 
Lab Results Component Value Date/Time Glucose (POC) 102 (H) 01/18/2019 06:23 AM  
 Glucose (POC) 139 (H) 01/17/2019 09:05 PM  
 Glucose (POC) 114 (H) 01/17/2019 04:31 PM  
 Glucose (POC) 106 (H) 01/17/2019 11:41 AM  
 Glucose (POC) 132 (H) 01/16/2019 09:13 PM  
 
Lab Results Component Value Date/Time Color DARK YELLOW 01/15/2019 10:16 PM  
 Appearance CLOUDY (A) 01/15/2019 10:16 PM  
 Specific gravity 1.030 01/15/2019 10:16 PM  
 pH (UA) 6.0 01/15/2019 10:16 PM  
 Protein 100 (A) 01/15/2019 10:16 PM  
 Glucose NEGATIVE  01/15/2019 10:16 PM  
 Ketone 40 (A) 01/15/2019 10:16 PM  
 Urobilinogen 1.0 01/15/2019 10:16 PM  
 Nitrites NEGATIVE  01/15/2019 10:16 PM  
 Leukocyte Esterase SMALL (A) 01/15/2019 10:16 PM  
 Epithelial cells MODERATE (A) 01/15/2019 10:16 PM  
 Bacteria 1+ (A) 01/15/2019 10:16 PM  
 WBC 10-20 01/15/2019 10:16 PM  
 RBC 0-5 01/15/2019 10:16 PM  
 
 
 
Medications Reviewed:  
 
Current Facility-Administered Medications Medication Dose Route Frequency  0.9% sodium chloride infusion 250 mL  250 mL IntraVENous PRN  
 PHENYLephrine (NASEEM-SYNEPHRINE) 30 mg in 0.9% sodium chloride 250 mL infusion   mcg/min IntraVENous TITRATE  
 0.9% sodium chloride infusion 250 mL  250 mL IntraVENous PRN  
 albumin human 25% (BUMINATE) solution 25 g  25 g IntraVENous Q6H  
 furosemide (LASIX) injection 40 mg  40 mg IntraVENous BID  rosuvastatin (CRESTOR) tablet 10 mg  10 mg Oral QHS  albuterol-ipratropium (DUO-NEB) 2.5 MG-0.5 MG/3 ML  3 mL Nebulization Q6H PRN  
 levoFLOXacin (LEVAQUIN) 500 mg in D5W IVPB  500 mg IntraVENous Q24H  
 guaiFENesin ER (MUCINEX) tablet 600 mg  600 mg Oral Q12H  
 acetylcysteine (MUCOMYST) 200 mg/mL (20 %) solution 200 mg  200 mg Nebulization BID  oseltamivir (TAMIFLU) capsule 75 mg  75 mg Oral Q12H  pantoprazole (PROTONIX) tablet 40 mg  40 mg Oral BID  sodium chloride (NS) flush 5-40 mL  5-40 mL IntraVENous Q8H  
 sodium chloride (NS) flush 5-40 mL  5-40 mL IntraVENous PRN  
 aspirin chewable tablet 81 mg  81 mg Oral DAILY  nitroglycerin (NITROSTAT) tablet 0.4 mg  0.4 mg SubLINGual Q5MIN PRN  
 ondansetron (ZOFRAN) injection 4 mg  4 mg IntraVENous Q4H PRN  
 morphine injection 2 mg  2 mg IntraVENous Q4H PRN  
 
______________________________________________________________________ EXPECTED LENGTH OF STAY: 4d 4h 
ACTUAL LENGTH OF STAY:          5 [address] [facility]

## 2019-01-30 NOTE — PROGRESS NOTES
1715:  TRANSFER - IN REPORT: 
Verbal report received from IR (name) on Bruna Stacy  being received from [facility] (unit) for routine progression of care Report consisted of patients Situation, Background, Assessment and  
Recommendations(SBAR). Information from the following report(s) SBAR, Kardex, Procedure Summary, Intake/Output, MAR, Accordion and Recent Results was reviewed with the receiving nurse. Opportunity for questions and clarification was provided. Assessment completed upon patients arrival to unit and care assumed. 1720: Bedside shift report received from [facility]. Groin site clean dry and intact, no hematoma, pedal pulses palpable. 1730: Patient confused, bending leg. Pulling at lines. Nauseous and vomiting. Zofran given. 1800: Pt still nauseous and vomiting. Urine output bloody. Hospitalist made aware. Orders received for compazine. Jamil Bourne MD. 
Ela Caballero MD called to check in with patients mental status. Pt less confused and impulsive. Restraints off. ABG ordered. 2100: HGB- 6.2. Hospitalist paged. Orders for 2 units PRBCs ordered. Dwain Pena MD. 
2130: Pt refusing to wear bipap. 2136: 1st unit of blood started. 0100: Urine output minimal 5-10 an hour. Pt complaining of urgency to urinate. Bladder scanned, >999. Could be picking up abdominal bleeding/hematoma. Joyce catheter changed. 0200: CVP monitoring set up- CVP reading 2-3. Urine output remains 5-10cc/hr. Creatinine and BUN elevated. Hospitalist paged. Orders for routine consult to Nephrology. 0315: 2nd unit RBC transfusion complete. 9190: AM labs drawn. 0500: Hgb - 8.6.  
0645: Dr. Brendon Campbell, nephrology at bedside. 0730: Bedside and Verbal shift change report given to [facility] (oncoming nurse) by Colgatcortney Toussaint (offgoing nurse). Report included the following information SBAR, Kardex, Procedure Summary, Intake/Output, MAR and Accordion.

## 2019-01-30 NOTE — PROGRESS NOTES
1/30/2019   Emaline Book. ZEYAD Kurtz  [facility] 
 
. Ashley Levigrace Ashley Cortez Dylan Diego Curry is a 70 y.o. female Earlier in month seen with afib in setting of acute pancreatitis. Overnight pt had hypotension and repeat CBC indicated marked anemia, hgb 5.7. Started on neosynephrine overnight. Currently receiving 1 of 2 PRBC transfusion. CT abdomen is pending. Pt. Denies chest pain, palpitations, dizziness. Reports some SOB. O2 sats 100% on 4 L NC. Denies cp or palpitations. C/o abdominal pain and some  Nausea and vomiting (no hematemesis) Neosynephrine at 76 mcgs currently Assessment/Plan/Discussion:Cardiology Attending:  
 
Patient seen on the day of progress note and examined  and agree with Advance Practice Provider (SISI, NP,PA)  assessment and plans. Angella Curry is a 70 y.o. female Worsening, anemic , more septic/toxic CT with large hematoma with fluid collection I went and saw pt twice in IR, on pressor but holding steady with HR and BP with that Sedated on versed so limited ROS but less rales and less edema Critically ill with acute CHF, likely USA,CAD with NQMI and other issues as below Benard Opitz, MD   
 
 
 
Discussion/Plans 1. Cardiomyopathy, acute systolic CHF: 
-NYHA IV (complicated by marked anemia) -EF 20-25%% with WMA  
-May relate to acute illness but CAD certainly possible, not candidate for invasive procedures right now. Medical therapy as able- severe anemia limiting  
-Stop lisinopril and coreg for now d/t low BP on vasopressor . Hopefully PRBC transfusions will improve BP and vasopressor able to be weaned off.  
-NT ProBNP trending up 18,962. Net -2.4 L in 24 hours. Continue lasix (now receiving PRBCs)-  
-CXR 1/30 with L pleural effusion and edema. 2. Hypotension: suspect d/t marked anemia but WBC trend up. -CVL planned. On neosynephrine. 3. NQMI: Troponin elevation and fall.   Peak of 3.26 
 -Repeat troponins x 2 continue to trend down. 0.15 today 
-Repeat 12 lead EKG today more pronounced T wave abnormality inferior and anterior leads likely due to demand of severe anemia.  
-Holding ASA d/t marked anemia.   
-Continue high potency statin. Off BB, ace-I due to hypotension 4. PAF:  
-SR on tele review, no PAF. -Off coreg d/t hypotension. -[id number]=8 (age, CHF, Female). Stopped Lovenox d/t anemia. No asa for now until severe anemia sorted out. 5. Anemia:  hgb dropped to 5.7 today from 9 on 1/28/19 
-No obvious source of bleeding but c/o abdominal pain- CT abdomen pending. 
-Holding ASA and lovenox. 
-transfusion in process (2 units ordered). Repeat cbc after transfusion. 6.Influenza A 7. Pancreatitis with cystic lesion-  
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 
-Will need to watch diuresis closely with pancreatitis. -Repeat CT abdomen pending. 8. Hypokalemia/hypomagnesemia: 
-K=3.4, repleted.-Mg=1.5 Repleted Pt with hypotension and marked drop in hgb. PRBCs infusing. ?source of bleed- CT abdomen pending. Suspect hypotension from marked anemia- more pronounced t wave abnormality likely from demand of severe anemia. Troponin still trending down. Agree with transfusion- would continue diuretic- CXR with pleural effusion and some edema and getting transfusion. Stopped BB and ace-I for now obviously d/t hypotension- resume when BP allows. Status tenuous. Dr. Richardson Ards to see this afternoon. . 
Cardiac Studies/Hx: 
No specialty comments available. Patient Vitals for the past 12 hrs: 
 Temp Pulse Resp BP SpO2  
01/30/19 0830 97.2 °F (36.2 °C) 92 23 (!) 81/66 99 % 01/30/19 0820 98 °F (36.7 °C) 82 19 (!) 102/39 100 % 01/30/19 0800 98 °F (36.7 °C) 80 17 110/49 100 % 01/30/19 0739     100 % 01/30/19 0700  76 18 117/52 100 % 01/30/19 0630  71 17 106/45 100 % 01/30/19 0600  72 18 98/42 98 % 01/30/19 0515  73 17 91/40 99 % 01/30/19 0510 98.9 °F (37.2 °C) 74 16 91/40 100 % 01/30/19 0500  70 15 (!) 92/39 100 % 01/30/19 0445  72 18 (!) 96/39 99 % 01/30/19 0430  74 19 92/40 100 % 01/30/19 0415  78 18 (!) 87/39 95 % 01/30/19 0400 98.7 °F (37.1 °C) 81 21 (!) 87/39 99 % 01/30/19 0300  87 27 (!) 90/37 94 % 01/30/19 0200  97 20 (!) 83/43 99 % 01/30/19 0100  86 23 104/59 97 % 01/30/19 0038  93 22 100/41 97 % 01/30/19 0000 98.4 °F (36.9 °C) 86 16 (!) 87/39 97 % 01/29/19 2300  66 17 (!) 96/38 97 % 01/29/19 2200  77 22 97/67 98 % 01/29/19 2111     95 % Past Medical History:  
Diagnosis Date  Other ill-defined conditions(799.44) IBS  Pancreatitis ROS-pertinents  negative except as above  The pertinent portions of the medical history,physician and nursing notes, meds,vitals , labs and Ins/Outs,are reviewed in the electronic record. Results for orders placed or performed during the hospital encounter of 01/25/19 EKG, 12 LEAD, INITIAL Result Value Ref Range Ventricular Rate 78 BPM  
 Atrial Rate 78 BPM  
 P-R Interval 128 ms QRS Duration 68 ms Q-T Interval 480 ms QTC Calculation (Bezet) 547 ms Calculated P Axis 8 degrees Calculated R Axis 39 degrees Calculated T Axis 149 degrees Diagnosis Normal sinus rhythm Anterior infarct (cited on or before 25-JAN-2019) When compared with ECG of 28-JAN-2019 03:35, Serial changes of Anterior infarct present Confirmed by Redd Cody M.D., Rg Aissatou ([id number]) on 1/30/2019 5:39:04 AM 
  
  
Vitals:  
 01/30/19 7109 01/30/19 0800 01/30/19 0820 01/30/19 0830 BP:  110/49 (!) 102/39 (!) 81/66 BP 1 Location:      
BP Patient Position:      
Pulse:  80 82 92 Resp:  17 19 23 Temp:  98 °F (36.7 °C) 98 °F (36.7 °C) 97.2 °F (36.2 °C) SpO2: 100% 100% 100% 99% Weight:      
Height:      
 
 
Objective:  
 Physical Exam:  
Patient Vitals for the past 12 hrs: Temp Pulse Resp BP SpO2  
01/30/19 0830 97.2 °F (36.2 °C) 92 23 (!) 81/66 99 % 01/30/19 0820 98 °F (36.7 °C) 82 19 (!) 102/39 100 % 01/30/19 0800 98 °F (36.7 °C) 80 17 110/49 100 % 01/30/19 0739     100 % 01/30/19 0700  76 18 117/52 100 % 01/30/19 0630  71 17 106/45 100 % 01/30/19 0600  72 18 98/42 98 % 01/30/19 0515  73 17 91/40 99 % 01/30/19 0510 98.9 °F (37.2 °C) 74 16 91/40 100 % 01/30/19 0500  70 15 (!) 92/39 100 % 01/30/19 0445  72 18 (!) 96/39 99 % 01/30/19 0430  74 19 92/40 100 % 01/30/19 0415  78 18 (!) 87/39 95 % 01/30/19 0400 98.7 °F (37.1 °C) 81 21 (!) 87/39 99 % 01/30/19 0300  87 27 (!) 90/37 94 % 01/30/19 0200  97 20 (!) 83/43 99 % 01/30/19 0100  86 23 104/59 97 % 01/30/19 0038  93 22 100/41 97 % 01/30/19 0000 98.4 °F (36.9 °C) 86 16 (!) 87/39 97 % 01/29/19 2300  66 17 (!) 96/38 97 % 01/29/19 2200  77 22 97/67 98 % 01/29/19 2111     95 % General:  alert, weak, pale. ENT, Neck:  no jvd Chest Wall: inspection normal - no chest wall deformities or tenderness,  
Lung: Course crackles bilaterally Heart:  normal rate, regular rhythm, normal S1, S2, no murmurs, rubs, clicks or gallops Abdomen: Distended, soft, +ttp Extremities: extremities normal, atraumatic, no cyanosis or edema Last 24hr Input/Output: 
 
Intake/Output Summary (Last 24 hours) at 1/30/2019 9107 Last data filed at 1/30/2019 0830 Gross per 24 hour Intake 300 ml Output 2940 ml Net -2640 ml Data Review:  
Recent Results (from the past 24 hour(s)) POC G3 - PUL Collection Time: 01/29/19  9:18 AM  
Result Value Ref Range FIO2 (POC) 60 % pH (POC) 7.382 7.35 - 7.45    
 pCO2 (POC) 42.1 35.0 - 45.0 MMHG  
 pO2 (POC) 115 (H) 80 - 100 MMHG  
 HCO3 (POC) 25.0 22 - 26 MMOL/L  
 sO2 (POC) 98 (H) 92 - 97 % Base excess (POC) 0 mmol/L Site RIGHT RADIAL Device: BIPAP    
 PEEP/CPAP (POC) 5 cmH2O  
 PIP (POC) 11  Pressure support 5 cmH2O  
 Allens test (POC) YES Specimen type (POC) ARTERIAL Total resp. rate 30 MAGNESIUM Collection Time: 01/29/19  9:32 AM  
Result Value Ref Range Magnesium 1.8 1.6 - 2.4 mg/dL NT-PRO BNP Collection Time: 01/29/19  9:32 AM  
Result Value Ref Range NT pro-BNP 11,916 (H) 0 - 980 PG/ML  
METABOLIC PANEL, COMPREHENSIVE Collection Time: 01/29/19  9:32 AM  
Result Value Ref Range Sodium 141 136 - 145 mmol/L Potassium 3.4 (L) 3.5 - 5.1 mmol/L Chloride 106 97 - 108 mmol/L  
 CO2 26 21 - 32 mmol/L Anion gap 9 5 - 15 mmol/L Glucose 118 (H) 65 - 100 mg/dL BUN 8 6 - 20 MG/DL Creatinine 0.67 0.55 - 1.02 MG/DL  
 BUN/Creatinine ratio 12 12 - 20 GFR est AA >60 >60 ml/min/1.73m2 GFR est non-AA >60 >60 ml/min/1.73m2 Calcium 7.8 (L) 8.5 - 10.1 MG/DL Bilirubin, total 0.8 0.2 - 1.0 MG/DL  
 ALT (SGPT) 17 12 - 78 U/L  
 AST (SGOT) 25 15 - 37 U/L Alk. phosphatase 55 45 - 117 U/L Protein, total 6.3 (L) 6.4 - 8.2 g/dL Albumin 3.5 3.5 - 5.0 g/dL Globulin 2.8 2.0 - 4.0 g/dL A-G Ratio 1.3 1.1 - 2.2 LIPASE Collection Time: 01/29/19  9:32 AM  
Result Value Ref Range Lipase 812 (H) 73 - 393 U/L  
SAMPLES BEING HELD Collection Time: 01/29/19  9:32 AM  
Result Value Ref Range SAMPLES BEING HELD 1pst   
 COMMENT Add-on orders for these samples will be processed based on acceptable specimen integrity and analyte stability, which may vary by analyte. TROPONIN I Collection Time: 01/29/19  9:32 AM  
Result Value Ref Range Troponin-I, Qt. 0.26 (H) <0.05 ng/mL CULTURE, MRSA Collection Time: 01/29/19 11:21 AM  
Result Value Ref Range Special Requests: NO SPECIAL REQUESTS Culture result: MRSA NOT PRESENT AT 16 HOURS    
EKG, 12 LEAD, INITIAL Collection Time: 01/29/19  2:54 PM  
Result Value Ref Range Ventricular Rate 78 BPM  
 Atrial Rate 78 BPM  
 P-R Interval 128 ms QRS Duration 68 ms Q-T Interval 480 ms QTC Calculation (Bezet) 547 ms Calculated P Axis 8 degrees Calculated R Axis 39 degrees Calculated T Axis 149 degrees Diagnosis Normal sinus rhythm Anterior infarct (cited on or before 25-JAN-2019) When compared with ECG of 28-JAN-2019 03:35, Serial changes of Anterior infarct present Confirmed by Alfredo Alexis M.D., Nathalie Pastor ([id number]) on 1/30/2019 5:39:04 AM 
  
CBC WITH AUTOMATED DIFF Collection Time: 01/30/19  2:24 AM  
Result Value Ref Range WBC 8.7 3.6 - 11.0 K/uL  
 RBC 2.03 (L) 3.80 - 5.20 M/uL HGB 5.7 (LL) 11.5 - 16.0 g/dL HCT 18.8 (L) 35.0 - 47.0 % MCV 92.6 80.0 - 99.0 FL  
 MCH 28.1 26.0 - 34.0 PG  
 MCHC 30.3 30.0 - 36.5 g/dL  
 RDW 14.2 11.5 - 14.5 % PLATELET 290 580 - 332 K/uL MPV 11.5 8.9 - 12.9 FL  
 NRBC 0.0 0  WBC ABSOLUTE NRBC 0.00 0.00 - 0.01 K/uL NEUTROPHILS 79 (H) 32 - 75 % LYMPHOCYTES 16 12 - 49 % MONOCYTES 4 (L) 5 - 13 % EOSINOPHILS 0 0 - 7 % BASOPHILS 0 0 - 1 % IMMATURE GRANULOCYTES 1 (H) 0.0 - 0.5 % ABS. NEUTROPHILS 6.9 1.8 - 8.0 K/UL  
 ABS. LYMPHOCYTES 1.4 0.8 - 3.5 K/UL  
 ABS. MONOCYTES 0.3 0.0 - 1.0 K/UL  
 ABS. EOSINOPHILS 0.0 0.0 - 0.4 K/UL  
 ABS. BASOPHILS 0.0 0.0 - 0.1 K/UL  
 ABS. IMM. GRANS. 0.1 (H) 0.00 - 0.04 K/UL  
 DF SMEAR SCANNED    
 PLATELET COMMENTS Large Platelets RBC COMMENTS ANISOCYTOSIS 1+ 
    
 RBC COMMENTS OVALOCYTES PRESENT 
    
 RBC COMMENTS Pathology Review Requested MAGNESIUM Collection Time: 01/30/19  2:24 AM  
Result Value Ref Range Magnesium 1.5 (L) 1.6 - 2.4 mg/dL METABOLIC PANEL, COMPREHENSIVE Collection Time: 01/30/19  2:24 AM  
Result Value Ref Range Sodium 142 136 - 145 mmol/L Potassium 3.4 (L) 3.5 - 5.1 mmol/L Chloride 102 97 - 108 mmol/L  
 CO2 28 21 - 32 mmol/L Anion gap 12 5 - 15 mmol/L Glucose 161 (H) 65 - 100 mg/dL BUN 10 6 - 20 MG/DL  Creatinine 1.06 (H) 0.55 - 1.02 MG/DL  
 BUN/Creatinine ratio 9 (L) 12 - 20    
 GFR est AA >60 >60 ml/min/1.73m2 GFR est non-AA 50 (L) >60 ml/min/1.73m2 Calcium 7.8 (L) 8.5 - 10.1 MG/DL Bilirubin, total 1.1 (H) 0.2 - 1.0 MG/DL  
 ALT (SGPT) 15 12 - 78 U/L  
 AST (SGOT) 19 15 - 37 U/L Alk. phosphatase 40 (L) 45 - 117 U/L Protein, total 6.1 (L) 6.4 - 8.2 g/dL Albumin 3.6 3.5 - 5.0 g/dL Globulin 2.5 2.0 - 4.0 g/dL A-G Ratio 1.4 1.1 - 2.2 PHOSPHORUS Collection Time: 01/30/19  2:24 AM  
Result Value Ref Range Phosphorus 2.2 (L) 2.6 - 4.7 MG/DL  
TROPONIN I Collection Time: 01/30/19  2:24 AM  
Result Value Ref Range Troponin-I, Qt. 0.15 (H) <0.05 ng/mL NT-PRO BNP Collection Time: 01/30/19  2:24 AM  
Result Value Ref Range NT pro-BNP 18,962 (H) 0 - 450 PG/ML  
TYPE + CROSSMATCH Collection Time: 01/30/19  3:37 AM  
Result Value Ref Range Crossmatch Expiration 02/02/2019 ABO/Rh(D) A POSITIVE Antibody screen NEG Unit number [id number] Blood component type  LRIR Unit division 00 Status of unit ISSUED Crossmatch result Compatible Unit number [id number] Blood component type  LR Unit division 00 Status of unit ALLOCATED Crossmatch result Compatible Unit number [id number] Blood component type [facility] Unit division 00 Status of unit ISSUED Crossmatch result Compatible Joyce Kurtz, ZEYAD 1/30/2019

## 2019-01-30 NOTE — INTERDISCIPLINARY ROUNDS
IDR/SLIDR Summary Patient: Christiano Sharma MRN: [id number]    Age: 70 y.o. YOB: 1940 Room/Bed: [address] [place] [address] Admit Diagnosis: Dyspnea  Principal Diagnosis: <principal problem not specified>  
Goals: Decrease respiratory distress Bipap as needed Antibiotics Readmission: NO  Quality Measure: CHF and PNA 
VTE Prophylaxis: Mechanical 
Influenza Vaccine screening completed? YES Pneumococcal Vaccine screening completed? YES Mobility needs: Yes   Nutrition plan:Yes 
Consults:P.T, Respiratory and Case Management Financial concerns:Yes  Escalated to CM? YES 
RRAT Score: 15   Interventions:H2H Testing due for pt today? YES 
LOS: 4 days Expected length of stay ? days Discharge plan: Home   PCP: Tony Shetty MD 
Transportation needs: Yes Days before discharge:two or more days before discharge Discharge disposition: Home Signed:  
 
Winston Fox 1/29/2019 
7:45 PM

## 2019-01-30 NOTE — PROGRESS NOTES
1600: report received from [id number] [facility] [address], rn.  
 
1930: Bedside shift change report given to Heber Crespo RN (oncoming nurse) by Alden Rahman RN (offgoing nurse). Report included the following information SBAR, Kardex, Procedure Summary, MAR, Recent Results and Cardiac Rhythm NSR.

## 2019-01-30 NOTE — PROGRESS NOTES
1930: Bedside shift report received from [facility]. VSS. 0000: Pt lung sounds still very coarse and wet. Pt denying SOB, and sating 98%. 0030: Bipap offered and placed on patient. 0045: Pt switched back to NC-4L per patient request. 
Pt complaining of 6/10 pain in abdomen. PRN morphine given. 0130: Pt complaining of nausea, zofran given. 0200: Pt SBP 80s. Scheduled albumin given. AM labs drawn. 0300: Pt throwing up green bile, diaphoretic. EKG obtained. BP 90/35. Hospitalist paged. Orders to continue to monitor. Urmila Stuart MD. 
Aristophanes.Ingles: Hgb 5.7 on AM labs. Hospitalist paged. 0330Marce Motta MD at bedside. Blood consent signed. Type and screen drawn. Orders received for 2 units of PRBCs. Lasix 40mg after first unit. 0430: 1 gram Mg given for magnesium level 1.5. 
0510: Blood transfusion started. 0700: Dr. Florencio Nance, pulmonology at bedside. 0710: Blood transfusion complete. 9 AM lasix given early. 0730: Bedside and Verbal shift change report given to [facility] (oncoming nurse) by Jayleen Dick (offgoing nurse). Report included the following information SBAR, Kardex, Intake/Output, MAR, Accordion and Recent Results.

## 2019-01-30 NOTE — NURSE NAVIGATOR
Chart reviewed by Heart Failure Nurse Navigator. Heart Failure database completed. EF:  20-25%  Previous EF 55-60% (echo dated 1/18/19) ACEi/ARB/ARNi: currently on hold d/t hypotension BB: currently on hold d/t hypotension Aldosterone Antagonist:  
 
Obstructive Sleep Apnea Screening: STOP-BANG score: 
 Referred to Sleep Medicine:  
 
CRT . Currently not indicated NYHA Functional Class IV on admission. Heart Failure Teach Back in Patient Education. Heart Failure Avoiding Triggers on Discharge Instructions. Cardiologist: Dr. Roberto Mir [id number] [facility] Post discharge follow up phone call to be made within 48-72 hours of discharge. READMISSION 
1/15/19-1/19/19 Final code; acute pancreatitis No follow up appts scheduled at time of discharge

## 2019-01-30 NOTE — PROGRESS NOTES
TRANSFER - OUT REPORT: 
 
Verbal report given to Etelvina(name) on Betsy Crum  being transferred to CCU(unit) for routine progression of care Report consisted of patients Situation, Background, Assessment and  
Recommendations(SBAR). Information from the following report(s) Kardex, Procedure Summary and MAR was reviewed with the receiving nurse. Lines:  
Quad Lumen 01/30/19 Right Internal jugular (Active) Central Line Being Utilized Yes 1/30/2019 12:00 PM  
Criteria for Appropriate Use Hemodynamically unstable, requiring monitoring lines, vasopressors, or volume resuscitation 1/30/2019 12:00 PM  
Site Assessment Clean, dry, & intact 1/30/2019 12:00 PM  
Infiltration Assessment 0 1/30/2019 12:00 PM  
Affected Extremity/Extremities Color distal to insertion site pink (or appropriate for race); Pulses palpable;Range of motion performed 1/30/2019 12:00 PM  
Date of Last Dressing Change 01/30/19 1/30/2019 12:00 PM  
Dressing Status Clean, dry, & intact 1/30/2019 12:00 PM  
Dressing Type Transparent 1/30/2019 12:00 PM  
Action Taken Open ports on tubing capped 1/30/2019 12:00 PM  
Proximal Hub Color/Line Status White 1/30/2019 12:00 PM  
Positive Blood Return (Medial Site) Yes 1/30/2019 12:00 PM  
Medial 1 Hub Color/Line Status Gray 1/30/2019 12:00 PM  
Positive Blood Return (Lateral Site) Yes 1/30/2019 12:00 PM  
Medial 2 Hub Color/Line Status Blue 1/30/2019 12:00 PM  
Positive Blood Return (Site #3) Yes 1/30/2019 12:00 PM  
Distal Hub Color/Line Status Brown 1/30/2019 12:00 PM  
Positive Blood Return (Site #4) Yes 1/30/2019 12:00 PM  
Alcohol Cap Used Yes 1/30/2019 12:00 PM  
   
Peripheral IV 01/25/19 Left Antecubital (Active) Site Assessment Clean, dry, & intact 1/30/2019 12:00 PM  
Phlebitis Assessment 0 1/30/2019 12:00 PM  
Infiltration Assessment 0 1/30/2019 12:00 PM  
Dressing Status Clean, dry, & intact 1/30/2019 12:00 PM  
Dressing Type Transparent 1/30/2019 12:00 PM  
 Hub Color/Line Status Pink 1/30/2019 12:00 PM  
Action Taken Open ports on tubing capped 1/30/2019 12:00 PM  
Alcohol Cap Used Yes 1/30/2019 12:00 PM  
   
Peripheral IV 01/30/19 Left Arm (Active) Site Assessment Clean, dry, & intact 1/30/2019 12:00 PM  
Phlebitis Assessment 0 1/30/2019 12:00 PM  
Infiltration Assessment 0 1/30/2019 12:00 PM  
Dressing Status Clean, dry, & intact 1/30/2019 12:00 PM  
Dressing Type Transparent 1/30/2019 12:00 PM  
Hub Color/Line Status Blue 1/30/2019 12:00 PM  
Action Taken Open ports on tubing capped 1/30/2019 12:00 PM  
Alcohol Cap Used Yes 1/30/2019 12:00 PM  
  
 
Opportunity for questions and clarification was provided. Patient transported with: 
 Registered Nurse Tech

## 2019-01-30 NOTE — PROGRESS NOTES
Problem: Falls - Risk of 
Goal: *Absence of Falls Document Jewel Bradford Fall Risk and appropriate interventions in the flowsheet. Outcome: Progressing Towards Goal 
Fall Risk Interventions: 
Mobility Interventions: Communicate number of staff needed for ambulation/transfer Medication Interventions: Evaluate medications/consider consulting pharmacy, Patient to call before getting OOB, Teach patient to arise slowly Elimination Interventions: Call light in reach, Elevated toilet seat, Patient to call for help with toileting needs Problem: Pressure Injury - Risk of 
Goal: *Prevention of pressure injury Document Travis Scale and appropriate interventions in the flowsheet. Outcome: Progressing Towards Goal 
Pressure Injury Interventions: 
Sensory Interventions: Assess need for specialty bed, Discuss PT/OT consult with provider, Float heels, Keep linens dry and wrinkle-free, Maintain/enhance activity level, Minimize linen layers, Monitor skin under medical devices, Pressure redistribution bed/mattress (bed type), Turn and reposition approx. every two hours (pillows and wedges if needed) Moisture Interventions: Absorbent underpads, Internal/External urinary devices, Limit adult briefs, Check for incontinence Q2 hours and as needed Activity Interventions: Increase time out of bed, Pressure redistribution bed/mattress(bed type), PT/OT evaluation Mobility Interventions: Float heels, HOB 30 degrees or less, Pressure redistribution bed/mattress (bed type), PT/OT evaluation Nutrition Interventions: Document food/fluid/supplement intake Friction and Shear Interventions: HOB 30 degrees or less, Lift sheet, Lift team/patient mobility team

## 2019-01-30 NOTE — PROGRESS NOTES
0730: Report received from Patria, [facility]: 1 unit of PRBCs started. 0830: Spoke with Dr. Zaira Newton, hospitalist, orders received for central line. 0840: notified pt's daughter of events overnight. 1000: , anesthesia, at bedside for central line. 1105: Transported pt to CT scan. 1136: Pt returned to CCU 19.  
 
1315: Dr. Zaira Newton, hospitalist, at bedside to discuss CT scan with family. Labs drawn and sent. 1415: pt transported to IR with RN. 1700: Bedside shift change report given to SUKHWINDER España  (oncoming nurse) by Cali Youssef RN (offgoing nurse). Report included the following information SBAR, Kardex, Procedure Summary, Intake/Output, MAR, Recent Results and Cardiac Rhythm NSR.

## 2019-01-30 NOTE — H&P
Interventional and Vascular Radiology History and Physical 
 
Patient: Jules Sandhoff 70 y.o. female Chief Complaint: Shortness of Breath and Fatigue History of Present Illness: Left rectus and pelvic hemorrhage History: 
 
Past Medical History:  
Diagnosis Date  Other ill-defined conditions(029.89) IBS  Pancreatitis Family History Problem Relation Age of Onset  Dementia Mother   
     alzheimers  Cancer Sister   
     gallbladder  Cancer Brother   
     pancreatic  Breast Cancer Paternal Grandmother  Dementia Sister   
     alzheimers  Heart Surgery Brother   
     bypass Social History Socioeconomic History  Marital status:  Spouse name: Not on file  Number of children: Not on file  Years of education: Not on file  Highest education level: Not on file Social Needs  Financial resource strain: Not on file  Food insecurity - worry: Not on file  Food insecurity - inability: Not on file  Transportation needs - medical: Not on file  Transportation needs - non-medical: Not on file Occupational History  Not on file Tobacco Use  Smoking status: Former Smoker  Smokeless tobacco: Never Used  Tobacco comment: quit smoking cigarettes 6 yrs ago Substance and Sexual Activity  Alcohol use: Yes Comment: very occasional  
 Drug use: No  
 Sexual activity: Not on file Other Topics Concern  Not on file Social History Narrative  Not on file Allergies: Allergies Allergen Reactions  Penicillins Hives Current Medications: 
Current Facility-Administered Medications Medication Dose Route Frequency  0.9% sodium chloride infusion 250 mL  250 mL IntraVENous PRN  
 PHENYLephrine (NASEEM-SYNEPHRINE) 30 mg in 0.9% sodium chloride 250 mL infusion   mcg/min IntraVENous TITRATE  
 0.9% sodium chloride infusion 250 mL  250 mL IntraVENous PRN  
  potassium chloride 20 mEq in 50 ml IVPB  20 mEq IntraVENous Q1H  
 lidocaine (XYLOCAINE) 20 mg/mL (2 %) injection 400 mg  20 mL SubCUTAneous ONCE  
 iopamidol (ISOVUE 300) 61 % contrast injection 100 mL  100 mL IntraCATHeter RAD ONCE  
 iopamidol (ISOVUE 300) 61 % contrast injection 150 mL  150 mL IntraCATHeter RAD ONCE  
 midazolam (VERSED) injection 5 mg  5 mg IntraVENous Multiple  fentaNYL citrate (PF) injection 200 mcg  200 mcg IntraVENous Multiple  0.9% sodium chloride infusion 500 mL  500 mL IntraVENous CONTINUOUS  
 saline peripheral flush soln 10 mL  10 mL InterCATHeter PRN  
 furosemide (LASIX) injection 40 mg  40 mg IntraVENous BID  rosuvastatin (CRESTOR) tablet 10 mg  10 mg Oral QHS  albuterol-ipratropium (DUO-NEB) 2.5 MG-0.5 MG/3 ML  3 mL Nebulization Q6H PRN  
 levoFLOXacin (LEVAQUIN) 500 mg in D5W IVPB  500 mg IntraVENous Q24H  
 guaiFENesin ER (MUCINEX) tablet 600 mg  600 mg Oral Q12H  
 acetylcysteine (MUCOMYST) 200 mg/mL (20 %) solution 200 mg  200 mg Nebulization BID  oseltamivir (TAMIFLU) capsule 75 mg  75 mg Oral Q12H  pantoprazole (PROTONIX) tablet 40 mg  40 mg Oral BID  sodium chloride (NS) flush 5-40 mL  5-40 mL IntraVENous Q8H  
 sodium chloride (NS) flush 5-40 mL  5-40 mL IntraVENous PRN  
 aspirin chewable tablet 81 mg  81 mg Oral DAILY  nitroglycerin (NITROSTAT) tablet 0.4 mg  0.4 mg SubLINGual Q5MIN PRN  
 ondansetron (ZOFRAN) injection 4 mg  4 mg IntraVENous Q4H PRN  
 morphine injection 2 mg  2 mg IntraVENous Q4H PRN Physical Exam: 
Blood pressure 113/48, pulse 74, temperature 98.1 °F (36.7 °C), resp. rate 21, height 5' 4\" (1.626 m), weight 64.8 kg (142 lb 13.7 oz), SpO2 100 %, not currently breastfeeding. LUNG: clear to auscultation bilaterally, HEART: regular rate and rhythm, S1, S2 normal, no murmur, click, rub or gallop Alerts:   
Hospital Problems  Date Reviewed: 1/15/2019 Codes Class Noted POA Dyspnea ICD-10-CM: R06.00 
ICD-9-CM: 786.09  1/25/2019 Unknown Laboratory:     
Recent Labs  
  01/30/19 
1323 01/30/19 
0224 HGB 7.5* 5.7* HCT 23.1* 18.8* WBC 13.2* 8.7  185 INR 1.3*  --   
BUN  --  10  
CREA  --  1.06* K  --  3.4* Plan of Care/Planned Procedure: 
Risks, benefits, and alternatives reviewed with patient and she agrees to proceed with the procedure. Conscious sedation will be performed with IV fentanyl and versed. Plan is for pelvic angio with possible embolization Rolinda Nyhan, MD

## 2019-01-31 NOTE — PROGRESS NOTES
Deepak Clark / Pulmonary / Critical Care Assessment / Plan:   
Acute hypoxemic respiratory failure with pulmonary edema and L effusion in setting of recent pancreatitis and cardiac dysfunction - off bipap and on NC O2 
  
L effusion related to CHF (systolic with EF 66%)VH sympathetic from pancreatitis - if continued resp compromise and or fever would consider diagnostic / therapeutic L pleural drainage 
  Influenza positive 
  
Elevated BNP and troponin with EKG with NSST changes laterally - NSTEMI vs myocarditis 
  
Paroxysmal afib 
  
Pancreatitis with multilocular cystic lesion being followed by GI - possible intraductal tumor Anemia - with 3 g drop in hgb without obvious visual blood loss - pancreatitis with abd pain - abd ct with rectus and pelvic hematoma - s/p angio with embolization of deep L inferior epigastric artery - s/p 4 units PRBC's TOÑA - hypoperfusion and contrast related 
 
 
--follow hgb 
--being started on dobutamine 
--renal following 
--continue nebs and pulm toilet 
--on empiric abx and s/p  tamiflu treatment 
--cardiology and gi following History / Subjective: 
Hospital Day: 7 - Interval history and notes reviewed  Events noted Underwent angiographic embolization of inferior epigastric artery for pelvic and rectus sheath hematomas She has received a total of 4 units of packed red cells. She is becoming oliguric Nephrology is following - appears to have acute kidney injury from hypoperfusion and contrast 
She has refused BiPAP. Is on nasal cannula oxygen at 4 L/m 
 
 Tenuous Hemoglobin dropped over 3 g overnight. Patient with marginal blood pressure. Has had nausea and vomiting which is bilious no hematemesis noted Complaining of abdominal pain Has known pancreatitis Was on BiPAP intermittently Objective: 
No data found. Temp (24hrs), Av.7 °F (36.5 °C), Min:96.5 °F (35.8 °C), Max:98.7 °F (37.1 °C) Intake/Output Summary (Last 24 hours) at 1/31/2019 6619 Last data filed at 1/31/2019 0600 Gross per 24 hour Intake 1876.55 ml Output 480 ml Net 1396.55 ml Lab Results Component Value Date/Time Glucose (POC) 102 (H) 01/18/2019 06:23 AM  
 Glucose (POC) 139 (H) 01/17/2019 09:05 PM  
 Glucose (POC) 114 (H) 01/17/2019 04:31 PM  
 Glucose (POC) 106 (H) 01/17/2019 11:41 AM  
 Glucose (POC) 132 (H) 01/16/2019 09:13 PM  
 
Exam: 
Pale Ill appearing No resp distress on NC O2 No accessory use Symmetrical chest expansion Rales at bases with diminished bs at bases RRR 
abd  With mild tenderness Warm and dry Trace edema Data:  
Interval lab and diagnostic data was reviewed. Interval radiology images were independently viewed and available reports were reviewed. CXR - basilar atx Lab: 
Recent Labs  
  01/31/19 
5779 01/31/19 
0009 01/30/19 
2031 01/30/19 
1323 01/30/19 
0224  01/29/19 
0932 WBC 19.3*  --   --  13.2* 8.7  --   --   
HGB 8.9*  --  6.2* 7.5* 5.7*   < >  --   
  --   --  185 185  --   --   
 145  --   --  142  --  141  
K 4.1 4.4  --   --  3.4*  --  3.4*  
 106  --   --  102  --  106 CO2 25 25  --   --  28  --  26 BUN 24* 22*  --   --  10  --  8  
CREA 1.80* 1.78*  --   --  1.06*  --  0.67 * 165*  --   --  161*  --  118* CA 7.5* 7.5*  --   --  7.8*  --  7.8*  
MG 2.1  --   --   --  1.5*  --  1.8 PHOS 3.7 3.7  --   --  2.2*  --   --   
INR 1.3*  --   --  1.3*  --   --   --   
TROIQ 0.11*  --   --   --  0.15*  --  0.26* TBILI 1.3*  --   --   --  1.1*  --  0.8 SGOT 14*  --   --   --  19  --  25  
 < > = values in this interval not displayed. ABG: 
Recent Labs  
  01/30/19 2154 01/29/19 
8712 PHI 7.423 7.382 PCO2I 35.9 42.1 PO2I 69* 115* HCO3I 23.4 25.0 SO2I 94 98* FIO2I  --  60 All Micro Results Procedure Component Value Units Date/Time CULTURE, MRSA [[id number]] Collected:  01/29/19 1121 Order Status:  Completed Specimen:  Nares Updated:  01/30/19 1757 Special Requests: NO SPECIAL REQUESTS Culture result: MRSA NOT PRESENT Screening of patient nares for MRSA is for surveillance purposes and, if positive, to facilitate isolation considerations in high risk settings. It is not intended for automatic decolonization interventions per se as regimens are not sufficiently effective to warrant routine use. CULTURE, BLOOD, PAIRED [[id number]] Collected:  01/25/19 1512 Order Status:  Completed Specimen:  Blood Updated:  01/30/19 0715 Special Requests: NO SPECIAL REQUESTS Culture result: NO GROWTH 5 DAYS URINE CULTURE HOLD SAMPLE [[id number]] Collected:  01/25/19 1815 Order Status:  Canceled Specimen:  Urine INFLUENZA A & B AG (RAPID TEST) [[id number]]  (Abnormal) Collected:  01/25/19 1608 Order Status:  Completed Specimen:  Nasopharyngeal from Nasal washing Updated:  01/25/19 1647 Influenza A Antigen POSITIVE Influenza B Antigen NEGATIVE Gilbert Domingo MD

## 2019-01-31 NOTE — PROGRESS NOTES
Problem: Falls - Risk of 
Goal: *Absence of Falls Document [place] Jeff Fall Risk and appropriate interventions in the flowsheet. Outcome: Progressing Towards Goal 
Fall Risk Interventions: 
Mobility Interventions: Patient to call before getting OOB, Communicate number of staff needed for ambulation/transfer Mentation Interventions: Evaluate medications/consider consulting pharmacy, Door open when patient unattended, Room close to nurse's station, Reorient patient, More frequent rounding Medication Interventions: Assess postural VS orthostatic hypotension, Evaluate medications/consider consulting pharmacy Elimination Interventions: Call light in reach, Patient to call for help with toileting needs Problem: Pressure Injury - Risk of 
Goal: *Prevention of pressure injury Document Travis Scale and appropriate interventions in the flowsheet. Outcome: Progressing Towards Goal 
Pressure Injury Interventions: 
Sensory Interventions: Avoid rigorous massage over bony prominences, Assess need for specialty bed, Minimize linen layers, Monitor skin under medical devices, Turn and reposition approx. every two hours (pillows and wedges if needed) Moisture Interventions: Apply protective barrier, creams and emollients, Check for incontinence Q2 hours and as needed, Minimize layers, Maintain skin hydration (lotion/cream) Activity Interventions: Pressure redistribution bed/mattress(bed type), Increase time out of bed Mobility Interventions: HOB 30 degrees or less, Pressure redistribution bed/mattress (bed type), Turn and reposition approx. every two hours(pillow and wedges) Nutrition Interventions: Document food/fluid/supplement intake, Discuss nutritional consult with provider Friction and Shear Interventions: Apply protective barrier, creams and emollients, HOB 30 degrees or less, Minimize layers

## 2019-01-31 NOTE — PROGRESS NOTES
0900: Dobutamine started. 0915: Patient given water. Within minutes patient vomited it back up. Compazine given. 1300: Mercedes notified of urine output. 1350: Hbg 7.3 MD notified. No orders to transfuse right now. Shift Summary: Patient drowsy through out the day. Gus-synephrine stopped. Started on Dobutamine. Urine output low. 4 mg Bumex given this afternoon. Hbg dropped to 7.3. No transfusion at this time. Patient has had minimal amount of oral intake. Nauseous this am. Only sips all day.

## 2019-01-31 NOTE — PROGRESS NOTES
1/31/2019     [facility] [address] [place] [address] 
 
. Agueda Peaks Agueda Peaks Agueda Peaks Melony Moore is a 70 y.o. female Earlier in month seen with afib in setting of acute pancreatitis. Cardiology following for elevated troponins, PAF and new systolic CHF/cardiomyopathy. 1/30/19: marked anemia and hypotension/hypovolemic shock with left pelvic and rectus sheath hematoma/bleed s/p IR embolization. 1/31/19: required PRBCs x 4 total yesterday. ( Hgb improved this a.m. but renal function and urine output has diminished overnight . Started on low dose dobutamine per renal to increase renal perfusion. MAP goal >65. Subjective:  Pt weak, denies chest pain. Some SOB but improved. Feels weak and Mostly complaining of Rt sided abdominal pain. Daughter at bedside reports pt has vomited bile few times overnight/this a.m. Daughter also reports pt has been confused. Assessment/Plan/Discussion:Cardiology Attending:  
 
Patient seen on the day of progress note and examined  and agree with Advance Practice Provider (SISI, NP,PA)  assessment and plans. Melony Moore is a 70 y.o. female Off Gus Contining low dose of dobutamine Awake, alert, tired, 120s Got bumex this evening CVP 5 Mild SOB and no CP No overt rales or edema CHF with iv diuretics, dobutamine reasonable as she is holding her own today Yesterday in IR, no source of bleed id'd 
Tj Mcintyre MD 1/31/2019 5:36 PM   
 
 
 
Discussion/Plans 1. Cardiomyopathy, acute systolic CHF: 
-NYHA IV (complicated by anemia, critical illness) -EF 40-78%% with WMA  
-Uncertain etiology, suspect acute illness. CAD possible. Not candidate for invasive cardiac testing.  
-Dobutamine 2.5 mcg/kg/min for renal perfusion 
-No ACE-I/coreg d/t requirements for renal perfusion and on vasopressor and dobutamine 
-CVP 2 -Diuretics on hold-  (net +1.4 L/24/hours). -CXR 1/31 unchanged  L pleural effusion and edema. - monitor. 2. Hypotension: BP normalized currently -s/p 4 units PRBCs,  Now on dobutamine low dose . -Remains on neosynephrine but dose now reduced to 40 mcg/kg/min 3. NQMI: Troponin elevation and fall. Peak of 3.26 
-Troponins continue to trend down. 0.11 today. 
-No ASA d/t pelvic/Holding ASA d/t marked anemia.   
-Continue high potency statin. Off BB, ace-I due to hypotension 4. PAF:  
-SR on tele review, no PAF. -Off coreg 
-[id number]=2 (age, CHF, Female). No anticoags or ASA d/t bleeding. 5. TOÑA: creatinine 1.8 
-Renal following. TOÑA d/t hypoperfusion and contrast (IR procedure) 
-Dobutamine started for renal perfusion 
-CVP 2- receiving IV albumin per renal 
 
6. Pelvic/rectus sheath hematomas/bleed: 
-s/p IR embolization 7. Anemia, acute blood loss:  hgb improved 8.9 s/p 4 units PRBCs  
-Off ASA and lovenox. 
-Notified renal of hematuria. 8.  Pancreatitis with cystic lesion-  
-Cystic lesion likely reflects intraductal papillary mucinous tumor per GI 9. Leukocytosis:  WBC trending up now 19, afebrile 10. Influenza S/p IR embolization for rectus sheath and pelvic hematoma, required additional 2 units PRBCs overnight (4 units). Now hematuria noted. Unfortunately also with  TOÑA d/t contrast and hypoperfusion- nephrology following. MAP goal >60- Inotrope added in setting of cardiomyopathy to promote improved renal perfusion. Neosynephrine weaned down to 45 mcg/kg/min. CVP is low so albumin is continued and diuretics on hold. Fortunately Troponins continue to trend down and no chest pain. However, pt status remains tenuous. Dr. Favian Adams will see pt this afternoon Dorian Herrera MD   
  
. Cardiac Studies/Hx: 
No specialty comments available. Patient Vitals for the past 12 hrs: 
 Temp Pulse Resp BP SpO2  
01/31/19 0400 97.4 °F (36.3 °C) 77 12 135/58 100 % 01/31/19 0300  88 20 140/54 99 % 01/31/19 0230  78 13 118/51 100 % 01/31/19 0200 98 °F (36.7 °C) 79 13 113/54 100 % 01/31/19 0130 97.6 °F (36.4 °C) 78 13 (!) 113/37 100 % 01/31/19 0115 97.9 °F (36.6 °C) 80 13 92/59   
01/31/19 0100  87 24 (!) 75/52 99 % 01/31/19 0000 97.8 °F (36.6 °C) 84 26 116/53 100 % Past Medical History:  
Diagnosis Date  Other ill-defined conditions(799.89) IBS  Pancreatitis ROS-pertinents  negative except as above  The pertinent portions of the medical history,physician and nursing notes, meds,vitals , labs and Ins/Outs,are reviewed in the electronic record. Results for orders placed or performed during the hospital encounter of 01/25/19 EKG, 12 LEAD, INITIAL Result Value Ref Range Ventricular Rate 79 BPM  
 Atrial Rate 79 BPM  
 P-R Interval 138 ms QRS Duration 70 ms Q-T Interval 516 ms  
 QTC Calculation (Bezet) 591 ms Calculated P Axis 12 degrees Calculated R Axis 47 degrees Calculated T Axis -133 degrees Diagnosis Normal sinus rhythm Marked T wave abnormality, consider anterior ischemia Prolonged QT When compared with ECG of 29-JAN-2019 14:54, 
Marked T wave abnormality, consider anterior ischemia now present Confirmed by Susan Monroe M.D., [facility] ([id number]) on 1/30/2019 9:52:22 AM 
  
  
Vitals:  
 01/31/19 0200 01/31/19 0230 01/31/19 0300 01/31/19 0400 BP: 113/54 118/51 140/54 135/58 BP 1 Location: Right arm   Right arm BP Patient Position: At rest   At rest  
Pulse: 79 78 88 77 Resp: 13 13 20 12 Temp: 98 °F (36.7 °C)   97.4 °F (36.3 °C) SpO2: 100% 100% 99% 100% Weight:    147 lb 4.3 oz (66.8 kg) Height:      
 
 
Objective:  
 Physical Exam:  
Patient Vitals for the past 12 hrs: 
 Temp Pulse Resp BP SpO2  
01/31/19 0400 97.4 °F (36.3 °C) 77 12 135/58 100 % 01/31/19 0300  88 20 140/54 99 % 01/31/19 0230  78 13 118/51 100 % 01/31/19 0200 98 °F (36.7 °C) 79 13 113/54 100 % 01/31/19 0130 97.6 °F (36.4 °C) 78 13 (!) 113/37 100 % 01/31/19 0115 97.9 °F (36.6 °C) 80 13 92/59   
01/31/19 0100  87 24 (!) 75/52 99 % 01/31/19 0000 97.8 °F (36.6 °C) 84 26 116/53 100 % General:  Drowsy but arousable, weak,.  
ENT, Neck:  no jvd Chest Wall: inspection normal - no chest wall deformities or tenderness,  
Lung:  crackles bilateral bases. Heart:  normal rate, regular rhythm, normal S1, S2, no murmurs, rubs, clicks or gallops Abdomen: Distended, soft, +ttp Extremities: extremities normal, atraumatic, no cyanosis or edema :  sm amount urin noted in urometer- + hematuria Last 24hr Input/Output: 
 
Intake/Output Summary (Last 24 hours) at 1/31/2019 1102 Last data filed at 1/31/2019 0600 Gross per 24 hour Intake 1764.05 ml Output 480 ml Net 1284.05 ml Data Review:  
Recent Results (from the past 24 hour(s)) PROTHROMBIN TIME + INR Collection Time: 01/30/19  1:23 PM  
Result Value Ref Range INR 1.3 (H) 0.9 - 1.1 Prothrombin time 13.0 (H) 9.0 - 11.1 sec PTT Collection Time: 01/30/19  1:23 PM  
Result Value Ref Range aPTT 32.8 (H) 22.1 - 32.0 sec  
 aPTT, therapeutic range     58.0 - 77.0 SECS FIBRINOGEN Collection Time: 01/30/19  1:23 PM  
Result Value Ref Range Fibrinogen 270 200 - 475 mg/dL CBC W/O DIFF Collection Time: 01/30/19  1:23 PM  
Result Value Ref Range WBC 13.2 (H) 3.6 - 11.0 K/uL  
 RBC 2.64 (L) 3.80 - 5.20 M/uL HGB 7.5 (L) 11.5 - 16.0 g/dL HCT 23.1 (L) 35.0 - 47.0 % MCV 87.5 80.0 - 99.0 FL  
 MCH 28.4 26.0 - 34.0 PG  
 MCHC 32.5 30.0 - 36.5 g/dL  
 RDW 15.3 (H) 11.5 - 14.5 % PLATELET 886 631 - 195 K/uL MPV 11.6 8.9 - 12.9 FL  
 NRBC 0.2 (H) 0  WBC ABSOLUTE NRBC 0.02 (H) 0.00 - 0.01 K/uL HGB & HCT Collection Time: 01/30/19  8:31 PM  
Result Value Ref Range HGB 6.2 (L) 11.5 - 16.0 g/dL HCT 18.9 (L) 35.0 - 47.0 % POC G3 - PUL Collection Time: 01/30/19  9:54 PM  
Result Value Ref Range  pH (POC) 7.423 7.35 - 7.45    
 pCO2 (POC) 35.9 35.0 - 45.0 MMHG  
 pO2 (POC) 69 (L) 80 - 100 MMHG  
 HCO3 (POC) 23.4 22 - 26 MMOL/L  
 sO2 (POC) 94 92 - 97 % Base deficit (POC) 1 mmol/L Site LEFT RADIAL Device: NASAL CANNULA Flow rate (POC) 4 L/M Allens test (POC) YES Specimen type (POC) ARTERIAL RENAL FUNCTION PANEL Collection Time: 01/31/19 12:09 AM  
Result Value Ref Range Sodium 145 136 - 145 mmol/L Potassium 4.4 3.5 - 5.1 mmol/L Chloride 106 97 - 108 mmol/L  
 CO2 25 21 - 32 mmol/L Anion gap 14 5 - 15 mmol/L Glucose 165 (H) 65 - 100 mg/dL BUN 22 (H) 6 - 20 MG/DL Creatinine 1.78 (H) 0.55 - 1.02 MG/DL  
 BUN/Creatinine ratio 12 12 - 20 GFR est AA 34 (L) >60 ml/min/1.73m2 GFR est non-AA 28 (L) >60 ml/min/1.73m2 Calcium 7.5 (L) 8.5 - 10.1 MG/DL Phosphorus 3.7 2.6 - 4.7 MG/DL Albumin 4.0 3.5 - 5.0 g/dL CBC WITH AUTOMATED DIFF Collection Time: 01/31/19  4:37 AM  
Result Value Ref Range WBC 19.3 (H) 3.6 - 11.0 K/uL  
 RBC 3.05 (L) 3.80 - 5.20 M/uL HGB 8.9 (L) 11.5 - 16.0 g/dL HCT 27.1 (L) 35.0 - 47.0 % MCV 88.9 80.0 - 99.0 FL  
 MCH 29.2 26.0 - 34.0 PG  
 MCHC 32.8 30.0 - 36.5 g/dL  
 RDW 15.2 (H) 11.5 - 14.5 % PLATELET 952 243 - 142 K/uL MPV 11.9 8.9 - 12.9 FL  
 NRBC 0.6 (H) 0  WBC ABSOLUTE NRBC 0.12 (H) 0.00 - 0.01 K/uL NEUTROPHILS 80 (H) 32 - 75 % LYMPHOCYTES 12 12 - 49 % MONOCYTES 6 5 - 13 % EOSINOPHILS 0 0 - 7 % BASOPHILS 0 0 - 1 % IMMATURE GRANULOCYTES 1 (H) 0.0 - 0.5 % ABS. NEUTROPHILS 15.5 (H) 1.8 - 8.0 K/UL  
 ABS. LYMPHOCYTES 2.4 0.8 - 3.5 K/UL  
 ABS. MONOCYTES 1.1 (H) 0.0 - 1.0 K/UL  
 ABS. EOSINOPHILS 0.0 0.0 - 0.4 K/UL  
 ABS. BASOPHILS 0.0 0.0 - 0.1 K/UL  
 ABS. IMM. GRANS. 0.3 (H) 0.00 - 0.04 K/UL  
 DF AUTOMATED MAGNESIUM Collection Time: 01/31/19  4:37 AM  
Result Value Ref Range Magnesium 2.1 1.6 - 2.4 mg/dL METABOLIC PANEL, COMPREHENSIVE Collection Time: 01/31/19  4:37 AM  
Result Value Ref Range Sodium 144 136 - 145 mmol/L Potassium 4.1 3.5 - 5.1 mmol/L  Chloride 108 97 - 108 mmol/L  
 CO2 25 21 - 32 mmol/L Anion gap 11 5 - 15 mmol/L Glucose 157 (H) 65 - 100 mg/dL BUN 24 (H) 6 - 20 MG/DL Creatinine 1.80 (H) 0.55 - 1.02 MG/DL  
 BUN/Creatinine ratio 13 12 - 20 GFR est AA 33 (L) >60 ml/min/1.73m2 GFR est non-AA 27 (L) >60 ml/min/1.73m2 Calcium 7.5 (L) 8.5 - 10.1 MG/DL Bilirubin, total 1.3 (H) 0.2 - 1.0 MG/DL  
 ALT (SGPT) 13 12 - 78 U/L  
 AST (SGOT) 14 (L) 15 - 37 U/L Alk. phosphatase 35 (L) 45 - 117 U/L Protein, total 6.3 (L) 6.4 - 8.2 g/dL Albumin 4.0 3.5 - 5.0 g/dL Globulin 2.3 2.0 - 4.0 g/dL A-G Ratio 1.7 1.1 - 2.2 PHOSPHORUS Collection Time: 01/31/19  4:37 AM  
Result Value Ref Range Phosphorus 3.7 2.6 - 4.7 MG/DL PROTHROMBIN TIME + INR Collection Time: 01/31/19  4:37 AM  
Result Value Ref Range INR 1.3 (H) 0.9 - 1.1 Prothrombin time 12.8 (H) 9.0 - 11.1 sec PTT Collection Time: 01/31/19  4:37 AM  
Result Value Ref Range aPTT 28.3 22.1 - 32.0 sec  
 aPTT, therapeutic range     58.0 - 77.0 SECS FIBRINOGEN Collection Time: 01/31/19  4:37 AM  
Result Value Ref Range Fibrinogen 237 200 - 475 mg/dL TROPONIN I Collection Time: 01/31/19  4:37 AM  
Result Value Ref Range Troponin-I, Qt. 0.11 (H) <0.05 ng/mL SAMPLES BEING HELD Collection Time: 01/31/19  9:30 AM  
Result Value Ref Range SAMPLES BEING HELD 1UHOLD COMMENT Add-on orders for these samples will be processed based on acceptable specimen integrity and analyte stability, which may vary by analyte. Edgardo Kurtz NP 1/31/2019

## 2019-01-31 NOTE — CONSULTS
Pt seen and examined Thank you for the consult. Full Note to follow Imp : 
 
TOÑA · 2/2 Renal Hypoperfusion state + Heavy load of contrast 
· Renal Hypoperfusion 2/2 severely depressed LV function, blood loss, hypotension (hemorrhagic shock) · She is oliguric with worsening renal function · Intra peritoneal portion of hematoma was compressing bladder, but morales is in good place · CVP low : ordered albumin infusions · Hold lasix for now. Lisinopril stopped · Started Dobutamine to improve renal perfusion · She is at high risk for needing dialysis but hopefully she will turn around in the next 48 hrs once contrast wears off and hemodynamically stable · Will d/w daughter later today · Labs ordered Q12 Acid base/ Lytes : 
· Stable Rectus Abdominus bleed/ Hematoma · Presumed spontaneous. 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 
[facility] Office :[phone] Fax: [phone]

## 2019-01-31 NOTE — PROGRESS NOTES
Spiritual Care Assessment/Progress Note [address] 
 
 
NAME: Genesis Fuller      MRN: [id number] AGE: 70 y.o. SEX: female Hoahaoism Affiliation: [facility]  
Language: Delfin Epps 1/31/2019     Total Time (in minutes): 10 Spiritual Assessment begun in [facility] 4 CORONARY CARE through conversation with: 
  
    [x]Patient        [x] Family    [] Friend(s) Reason for Consult: Initial/Spiritual assessment, critical care Spiritual beliefs: (Please include comment if needed) [x] Identifies with a joo tradition:     
   [x] Supported by a joo community:        
   [] Claims no spiritual orientation:       
   [] Seeking spiritual identity:            
   [] Adheres to an individual form of spirituality:       
   [] Not able to assess:                   
 
    
Identified resources for coping:  
   [x] Prayer                           
   [] Music                  [] Guided Imagery [x] Family/friends                 [] Pet visits [] Devotional reading                         [] Unknown 
   [] Other:                                       
 
 
Interventions offered during this visit: (See comments for more details) Patient Interventions: Affirmation of joo, Affirmation of emotions/emotional suffering, Coping skills reviewed/reinforced, Iconic (affirming the presence of God/Higher Power), Initial/Spiritual assessment, Critical care, Normalization of emotional/spiritual concerns, Prayer (assurance of), Prayer (actual), Hoahaoism beliefs/image of God discussed Family/Friend(s): Affirmation of joo, Affirmation of emotions/emotional suffering, Coping skills reviewed/reinforced, Iconic (affirming the presence of God/Higher Power), Normalization of emotional/spiritual concerns, Prayer (assurance of), Prayer (actual), Hoahaoism beliefs/image of God discussed Plan of Care: 
 
 [x] Support spiritual and/or cultural needs  
 [] Support AMD and/or advance care planning process [] Support grieving process 
 [] Coordinate Rites and/or Rituals  
 [] Coordination with community clergy [] No spiritual needs identified at this time 
 [] Detailed Plan of Care below (See Comments)  [] Make referral to Music Therapy 
[] Make referral to Pet Therapy    
[] Make referral to Addiction services 
[] Make referral to [facility] 
[] Make referral to Spiritual Care Partner 
[] No future visits requested       
[x] Follow up visits as needed Comments:  visited with pt and family in room 4219.  listened as family shared fond memories of life with pt. Pt has a twin sister that was in the room at time of visit. Family and pt is supported by a joo community. Pt's  visited and offered prayer. This  prayed and offered continued Spiritual Support. Narayan Biswas,  Intern, MDiv 
[phone])

## 2019-01-31 NOTE — PROGRESS NOTES
1/31/19; 10:00 -  
ISABELLA participated in IDRs on patient. Patient is experiencing rectal bleeding. Patient appears to have a rectal hematoma that was attempted to be cauterized yesterday, 1/30. Patient is on 4L O2 via NC and is on clear liquid diet, but patient has not been able to keep liquids down. Nephro is now following. CRM: Jessica Petit, MPH, [address] [place] [address]; Z: [phone]

## 2019-01-31 NOTE — PROGRESS NOTES
Day #4 of levofloxacin Indication:  CAP (superimposed PNA on influenza infection) Current regimen:  500 mg IV Q 24 hours x7days Abx regimen: Levaquin + Tamiflu (positive for influenza A) Recent Labs  
  19 
0437 19 
0009 19 
1323 19 
2926 WBC 19.3*  --  13.2* 8.7 CREA 1.80* 1.78*  --  1.06* BUN 24* 22*  --  10  
 
Est CrCl: 25 ml/min; UO: 0.4 ml/kg/hr Temp (24hrs), Av.9 °F (36.6 °C), Min:97.4 °F (36.3 °C), Max:98.7 °F (37.1 °C) Cultures:  
 Blood: NG- final 
 
Plan: Change to 250 mg IV x4 days (to complete 7 day duration)  for CrCl 20-49 ml/min

## 2019-01-31 NOTE — PROGRESS NOTES
[facility] 
[address] [facility], [address] GI PROGRESS NOTE 
 
 
NAME: Cassy Gentile :  1940 MRN:  [id number] Subjective:  
Hematoma s/p IR embolization. On pressors with wrist restraints. Objective: VITALS:  
Last 24hrs VS reviewed since prior progress note. Most recent are: 
Visit Vitals /47 (BP 1 Location: Right arm, BP Patient Position: At rest) Pulse 90 Temp 98.1 °F (36.7 °C) Resp 19 Ht 5' 4\" (1.626 m) Wt 64.8 kg (142 lb 13.7 oz) SpO2 98% Breastfeeding? No  
BMI 24.52 kg/m² PHYSICAL EXAM: 
General: Cooperative, no acute distress   
Neurologic:  Alert and oriented X 3. HEENT: EOMI, no scleral icterus Lungs:  Scattered rhonchi, wheezing Heart:  S1 S2 Abdomen: Soft, mild distended, generalized tenderness. +Bowel sounds Extremities: warm Psych:   Good insight. Not anxious or agitated. Lab Data Reviewed:  
 
Recent Results (from the past 24 hour(s)) CBC WITH AUTOMATED DIFF Collection Time: 19  2:24 AM  
Result Value Ref Range WBC 8.7 3.6 - 11.0 K/uL  
 RBC 2.03 (L) 3.80 - 5.20 M/uL HGB 5.7 (LL) 11.5 - 16.0 g/dL HCT 18.8 (L) 35.0 - 47.0 % MCV 92.6 80.0 - 99.0 FL  
 MCH 28.1 26.0 - 34.0 PG  
 MCHC 30.3 30.0 - 36.5 g/dL  
 RDW 14.2 11.5 - 14.5 % PLATELET 816 867 - 749 K/uL MPV 11.5 8.9 - 12.9 FL  
 NRBC 0.0 0  WBC ABSOLUTE NRBC 0.00 0.00 - 0.01 K/uL NEUTROPHILS 79 (H) 32 - 75 % LYMPHOCYTES 16 12 - 49 % MONOCYTES 4 (L) 5 - 13 % EOSINOPHILS 0 0 - 7 % BASOPHILS 0 0 - 1 % IMMATURE GRANULOCYTES 1 (H) 0.0 - 0.5 % ABS. NEUTROPHILS 6.9 1.8 - 8.0 K/UL  
 ABS. LYMPHOCYTES 1.4 0.8 - 3.5 K/UL  
 ABS. MONOCYTES 0.3 0.0 - 1.0 K/UL  
 ABS. EOSINOPHILS 0.0 0.0 - 0.4 K/UL  
 ABS. BASOPHILS 0.0 0.0 - 0.1 K/UL  
 ABS. IMM. GRANS. 0.1 (H) 0.00 - 0.04 K/UL  
 DF SMEAR SCANNED    
 PLATELET COMMENTS Large Platelets RBC COMMENTS ANISOCYTOSIS 1+ 
    
 RBC COMMENTS OVALOCYTES 
 PRESENT 
    
 RBC COMMENTS Pathology Review Requested Normocytic,normochromic anemia. WBCs and platelets within normal limits. Smear reviewed by Dr. Artie Amaya RBC COMMENTS    
  CORRECTED ON 01/30 AT 1640: PREVIOUSLY REPORTED AS ANISOCYTOSIS 1+ OVALOCYTES PRESENT Pathology Review Requested MAGNESIUM Collection Time: 01/30/19  2:24 AM  
Result Value Ref Range Magnesium 1.5 (L) 1.6 - 2.4 mg/dL METABOLIC PANEL, COMPREHENSIVE Collection Time: 01/30/19  2:24 AM  
Result Value Ref Range Sodium 142 136 - 145 mmol/L Potassium 3.4 (L) 3.5 - 5.1 mmol/L Chloride 102 97 - 108 mmol/L  
 CO2 28 21 - 32 mmol/L Anion gap 12 5 - 15 mmol/L Glucose 161 (H) 65 - 100 mg/dL BUN 10 6 - 20 MG/DL Creatinine 1.06 (H) 0.55 - 1.02 MG/DL  
 BUN/Creatinine ratio 9 (L) 12 - 20 GFR est AA >60 >60 ml/min/1.73m2 GFR est non-AA 50 (L) >60 ml/min/1.73m2 Calcium 7.8 (L) 8.5 - 10.1 MG/DL Bilirubin, total 1.1 (H) 0.2 - 1.0 MG/DL  
 ALT (SGPT) 15 12 - 78 U/L  
 AST (SGOT) 19 15 - 37 U/L Alk. phosphatase 40 (L) 45 - 117 U/L Protein, total 6.1 (L) 6.4 - 8.2 g/dL Albumin 3.6 3.5 - 5.0 g/dL Globulin 2.5 2.0 - 4.0 g/dL A-G Ratio 1.4 1.1 - 2.2 PHOSPHORUS Collection Time: 01/30/19  2:24 AM  
Result Value Ref Range Phosphorus 2.2 (L) 2.6 - 4.7 MG/DL  
TROPONIN I Collection Time: 01/30/19  2:24 AM  
Result Value Ref Range Troponin-I, Qt. 0.15 (H) <0.05 ng/mL NT-PRO BNP Collection Time: 01/30/19  2:24 AM  
Result Value Ref Range NT pro-BNP 18,962 (H) 0 - 450 PG/ML  
EKG, 12 LEAD, INITIAL Collection Time: 01/30/19  3:14 AM  
Result Value Ref Range Ventricular Rate 79 BPM  
 Atrial Rate 79 BPM  
 P-R Interval 138 ms QRS Duration 70 ms Q-T Interval 516 ms  
 QTC Calculation (Bezet) 591 ms Calculated P Axis 12 degrees Calculated R Axis 47 degrees Calculated T Axis -133 degrees Diagnosis Normal sinus rhythm Marked T wave abnormality, consider anterior ischemia Prolonged QT When compared with ECG of 29-JAN-2019 14:54, 
Marked T wave abnormality, consider anterior ischemia now present Confirmed by Oly Parekh M.D., [facility] ([id number]) on 1/30/2019 9:52:22 AM 
  
TYPE + CROSSMATCH Collection Time: 01/30/19  3:37 AM  
Result Value Ref Range Crossmatch Expiration 02/02/2019 ABO/Rh(D) A POSITIVE Antibody screen NEG Unit number [id number] Blood component type  LRIR Unit division 00 Status of unit ISSUED Crossmatch result Compatible Unit number [id number] Blood component type  LR Unit division 00 Status of unit ALLOCATED Crossmatch result Compatible Unit number [id number] Blood component type [facility] Unit division 00 Status of unit ISSUED Crossmatch result Compatible PROTHROMBIN TIME + INR Collection Time: 01/30/19  1:23 PM  
Result Value Ref Range INR 1.3 (H) 0.9 - 1.1 Prothrombin time 13.0 (H) 9.0 - 11.1 sec PTT Collection Time: 01/30/19  1:23 PM  
Result Value Ref Range aPTT 32.8 (H) 22.1 - 32.0 sec  
 aPTT, therapeutic range     58.0 - 77.0 SECS FIBRINOGEN Collection Time: 01/30/19  1:23 PM  
Result Value Ref Range Fibrinogen 270 200 - 475 mg/dL CBC W/O DIFF Collection Time: 01/30/19  1:23 PM  
Result Value Ref Range WBC 13.2 (H) 3.6 - 11.0 K/uL  
 RBC 2.64 (L) 3.80 - 5.20 M/uL HGB 7.5 (L) 11.5 - 16.0 g/dL HCT 23.1 (L) 35.0 - 47.0 % MCV 87.5 80.0 - 99.0 FL  
 MCH 28.4 26.0 - 34.0 PG  
 MCHC 32.5 30.0 - 36.5 g/dL  
 RDW 15.3 (H) 11.5 - 14.5 % PLATELET 121 560 - 397 K/uL MPV 11.6 8.9 - 12.9 FL  
 NRBC 0.2 (H) 0  WBC ABSOLUTE NRBC 0.02 (H) 0.00 - 0.01 K/uL Assessment:  
· Pancreatitis: MRI 1/15 1.3 x 1.7 x 3.0 cm multilocular cystic lesion of the pancreas at the body most likely reflects intraductal papillary mucinous tumor · CHF with Troponin elevation · Flu 
 · Anemia: acute worsening due to large hematomas s/p IR intervention. Patient Active Problem List  
Diagnosis Code  Pancreatitis K85.90  Dyspnea R06.00 Plan:  
· Continue supportive measures · Serial CBCs, transfuse PRN Signed By: Leonie Jimenez MD   
 1/30/2019  1:16 PM

## 2019-01-31 NOTE — CONSULTS
[facility] 
 [address] [facility], [facility] [address] [facility] Phone: [phone] NOTE Patient: Katey Wakefield MRN: [id number]  PCP: Yosvany Justin MD  
:     1940  Age:   70 y.o. Sex:  female Referring physician: Viann Cooks, DO Reason for consultation: 70 y.o. female with Dyspnea complicated by TOÑA Admission Date: 2019  3:13 PM  LOS: 6 days ASSESSMENT and PLAN :  
TOÑA · 2/2 Renal Hypoperfusion state + Heavy load of contrast 
· Renal Hypoperfusion 2/2 severely depressed LV function, blood loss, hypotension (hemorrhagic shock) · She is oliguric with worsening renal function · Intra peritoneal portion of hematoma was compressing bladder, but morales is in good place · CVP low : ordered albumin infusions · Hold lasix for now. Lisinopril stopped · Started Dobutamine to improve renal perfusion · She is at high risk for needing dialysis but hopefully she will turn around in the next 48 hrs once contrast wears off and hemodynamically stable · Will d/w daughter later today · Labs ordered Q12 
  
Acid base/ Lytes : 
· Stable  
  
Rectus Abdominus bleed/ Hematoma · Presumed spontaneous. No AC / trauma / aneurysms · S/p IR embolization  · Serial H/H 
  
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  
  
D/w CCU nursing staff and Dr Geri Cano Thank you for consulting [facility] in the care of your patient. Subjective: HPI: Katey Wakefield is a 70 y.o.  female who has been admitted to the hospital for abdominal pain. She was found to have Abdominal wall hematoma with intra peritoneal extension. She underwent IR embolization of epigastric blood vessels yesterday. She developed ARF with steep rise in Creatinine.  She was also found to have severely depressed EF w/ CHF this admission and has been started on low dose B blockers, ACE (I), lasix in the last 2 days. She has been hypotensive needing pressor support. She was severely anemic from blood loss and needed transfusion. Her CVP is still low this morning. Past Medical Hx:  
Past Medical History:  
Diagnosis Date  Other ill-defined conditions(799.89) IBS  Pancreatitis Past Surgical Hx: 
  
Past Surgical History:  
Procedure Laterality Date  COLONOSCOPY Left 11/1/2017 COLONOSCOPY performed by [place] Jaime MD at [address]  HX CHOLECYSTECTOMY  HX GI    
 surgery for hiatal hernia  IR OCCL Marc Valdes W SI  1/30/2019 Allergies Allergen Reactions  Penicillins Hives Social Hx:  reports that she has quit smoking. she has never used smokeless tobacco. She reports that she drinks alcohol. She reports that she does not use drugs. Family History Problem Relation Age of Onset  Dementia Mother   
     alzheimers  Cancer Sister   
     gallbladder  Cancer Brother   
     pancreatic  Breast Cancer Paternal Grandmother  Dementia Sister   
     alzheimers  Heart Surgery Brother   
     bypass Review of Systems: A thorough twelve point review of system was performed today. Pertinent positives and negatives are mentioned in the HPI. The reminder of the ROS is negative and noncontributory. Objective:  
Vitals:   
Vitals:  
 01/31/19 0200 01/31/19 0230 01/31/19 0300 01/31/19 0400 BP: 113/54 118/51 140/54 135/58 Pulse: 79 78 88 77 Resp: 13 13 20 12 Temp: 98 °F (36.7 °C)   97.4 °F (36.3 °C) SpO2: 100% 100% 99% 100% Weight:    66.8 kg (147 lb 4.3 oz) Height:      
 
I&O's:  01/30 0701 - 01/31 0700 In: 2052.8 [I.V.:1136.5] Out: 630 [Urine:630] Visit Vitals /58 (BP 1 Location: Right arm, BP Patient Position: At rest) Pulse 77 Temp 97.4 °F (36.3 °C) Resp 12 Ht 5' 4\" (1.626 m) Wt 66.8 kg (147 lb 4.3 oz) SpO2 100% Breastfeeding? No  
BMI 25.28 kg/m² Physical Exam: 
General:  Lethargic HEENT: PERRL,  Pale Neck: lines + Lungs : rales + CVS: RRR, S1 S2 normal, No murmur Abdomen: large hematoma in lower abdomen Extremities: no Edema MS: No joint swelling, erythema, warmth Neurologic: non focal, lethargic Psych: Unable to assess Laboratory Results: 
 
Recent Labs  
  01/31/19 
3780 01/31/19 
0009 01/30/19 
1323 01/30/19 
9367 01/29/19 
0932  145  --  142 141  
K 4.1 4.4  --  3.4* 3.4*  
 106  --  102 106 CO2 25 25  --  28 26 * 165*  --  161* 118* BUN 24* 22*  --  10 8 CREA 1.80* 1.78*  --  1.06* 0.67 CA 7.5* 7.5*  --  7.8* 7.8*  
MG 2.1  --   --  1.5* 1.8 PHOS 3.7 3.7  --  2.2*  --   
ALB 4.0 4.0  --  3.6 3.5 SGOT 14*  --   --  19 25 ALT 13  --   --  15 17 INR 1.3*  --  1.3*  --   --   
 
Recent Labs  
  01/31/19 
0437 01/30/19 
2031 01/30/19 
1323 01/30/19 
0224 WBC 19.3*  --  13.2* 8.7 HGB 8.9* 6.2* 7.5* 5.7* HCT 27.1* 18.9* 23.1* 18.8*  
  --  185 185 No results found for: SDES Lab Results Component Value Date/Time Culture result: MRSA NOT PRESENT 01/29/2019 11:21 AM  
 Culture result:  01/29/2019 11:21 AM  
      Screening of patient nares for MRSA is for surveillance purposes and, if positive, to facilitate isolation considerations in high risk settings. It is not intended for automatic decolonization interventions per se as regimens are not sufficiently effective to warrant routine use. Culture result: NO GROWTH 5 DAYS 01/25/2019 03:12 PM  
 
Recent Results (from the past 24 hour(s)) PROTHROMBIN TIME + INR Collection Time: 01/30/19  1:23 PM  
Result Value Ref Range INR 1.3 (H) 0.9 - 1.1 Prothrombin time 13.0 (H) 9.0 - 11.1 sec PTT Collection Time: 01/30/19  1:23 PM  
Result Value Ref Range  aPTT 32.8 (H) 22.1 - 32.0 sec  
 aPTT, therapeutic range     58.0 - 77.0 SECS  
 FIBRINOGEN Collection Time: 01/30/19  1:23 PM  
Result Value Ref Range Fibrinogen 270 200 - 475 mg/dL CBC W/O DIFF Collection Time: 01/30/19  1:23 PM  
Result Value Ref Range WBC 13.2 (H) 3.6 - 11.0 K/uL  
 RBC 2.64 (L) 3.80 - 5.20 M/uL HGB 7.5 (L) 11.5 - 16.0 g/dL HCT 23.1 (L) 35.0 - 47.0 % MCV 87.5 80.0 - 99.0 FL  
 MCH 28.4 26.0 - 34.0 PG  
 MCHC 32.5 30.0 - 36.5 g/dL  
 RDW 15.3 (H) 11.5 - 14.5 % PLATELET 225 615 - 814 K/uL MPV 11.6 8.9 - 12.9 FL  
 NRBC 0.2 (H) 0  WBC ABSOLUTE NRBC 0.02 (H) 0.00 - 0.01 K/uL HGB & HCT Collection Time: 01/30/19  8:31 PM  
Result Value Ref Range HGB 6.2 (L) 11.5 - 16.0 g/dL HCT 18.9 (L) 35.0 - 47.0 % POC G3 - PUL Collection Time: 01/30/19  9:54 PM  
Result Value Ref Range pH (POC) 7.423 7.35 - 7.45    
 pCO2 (POC) 35.9 35.0 - 45.0 MMHG  
 pO2 (POC) 69 (L) 80 - 100 MMHG  
 HCO3 (POC) 23.4 22 - 26 MMOL/L  
 sO2 (POC) 94 92 - 97 % Base deficit (POC) 1 mmol/L Site LEFT RADIAL Device: NASAL CANNULA Flow rate (POC) 4 L/M Allens test (POC) YES Specimen type (POC) ARTERIAL RENAL FUNCTION PANEL Collection Time: 01/31/19 12:09 AM  
Result Value Ref Range Sodium 145 136 - 145 mmol/L Potassium 4.4 3.5 - 5.1 mmol/L Chloride 106 97 - 108 mmol/L  
 CO2 25 21 - 32 mmol/L Anion gap 14 5 - 15 mmol/L Glucose 165 (H) 65 - 100 mg/dL BUN 22 (H) 6 - 20 MG/DL Creatinine 1.78 (H) 0.55 - 1.02 MG/DL  
 BUN/Creatinine ratio 12 12 - 20 GFR est AA 34 (L) >60 ml/min/1.73m2 GFR est non-AA 28 (L) >60 ml/min/1.73m2 Calcium 7.5 (L) 8.5 - 10.1 MG/DL Phosphorus 3.7 2.6 - 4.7 MG/DL Albumin 4.0 3.5 - 5.0 g/dL CBC WITH AUTOMATED DIFF Collection Time: 01/31/19  4:37 AM  
Result Value Ref Range WBC 19.3 (H) 3.6 - 11.0 K/uL  
 RBC 3.05 (L) 3.80 - 5.20 M/uL HGB 8.9 (L) 11.5 - 16.0 g/dL HCT 27.1 (L) 35.0 - 47.0 %  MCV 88.9 80.0 - 99.0 FL  
 MCH 29.2 26.0 - 34.0 PG  
 MCHC 32.8 30.0 - 36.5 g/dL  
 RDW 15.2 (H) 11.5 - 14.5 % PLATELET 291 255 - 093 K/uL MPV 11.9 8.9 - 12.9 FL  
 NRBC 0.6 (H) 0  WBC ABSOLUTE NRBC 0.12 (H) 0.00 - 0.01 K/uL NEUTROPHILS 80 (H) 32 - 75 % LYMPHOCYTES 12 12 - 49 % MONOCYTES 6 5 - 13 % EOSINOPHILS 0 0 - 7 % BASOPHILS 0 0 - 1 % IMMATURE GRANULOCYTES 1 (H) 0.0 - 0.5 % ABS. NEUTROPHILS 15.5 (H) 1.8 - 8.0 K/UL  
 ABS. LYMPHOCYTES 2.4 0.8 - 3.5 K/UL  
 ABS. MONOCYTES 1.1 (H) 0.0 - 1.0 K/UL  
 ABS. EOSINOPHILS 0.0 0.0 - 0.4 K/UL  
 ABS. BASOPHILS 0.0 0.0 - 0.1 K/UL  
 ABS. IMM. GRANS. 0.3 (H) 0.00 - 0.04 K/UL  
 DF AUTOMATED MAGNESIUM Collection Time: 01/31/19  4:37 AM  
Result Value Ref Range Magnesium 2.1 1.6 - 2.4 mg/dL METABOLIC PANEL, COMPREHENSIVE Collection Time: 01/31/19  4:37 AM  
Result Value Ref Range Sodium 144 136 - 145 mmol/L Potassium 4.1 3.5 - 5.1 mmol/L Chloride 108 97 - 108 mmol/L  
 CO2 25 21 - 32 mmol/L Anion gap 11 5 - 15 mmol/L Glucose 157 (H) 65 - 100 mg/dL BUN 24 (H) 6 - 20 MG/DL Creatinine 1.80 (H) 0.55 - 1.02 MG/DL  
 BUN/Creatinine ratio 13 12 - 20 GFR est AA 33 (L) >60 ml/min/1.73m2 GFR est non-AA 27 (L) >60 ml/min/1.73m2 Calcium 7.5 (L) 8.5 - 10.1 MG/DL Bilirubin, total 1.3 (H) 0.2 - 1.0 MG/DL  
 ALT (SGPT) 13 12 - 78 U/L  
 AST (SGOT) 14 (L) 15 - 37 U/L Alk. phosphatase 35 (L) 45 - 117 U/L Protein, total 6.3 (L) 6.4 - 8.2 g/dL Albumin 4.0 3.5 - 5.0 g/dL Globulin 2.3 2.0 - 4.0 g/dL A-G Ratio 1.7 1.1 - 2.2 PHOSPHORUS Collection Time: 01/31/19  4:37 AM  
Result Value Ref Range Phosphorus 3.7 2.6 - 4.7 MG/DL PROTHROMBIN TIME + INR Collection Time: 01/31/19  4:37 AM  
Result Value Ref Range INR 1.3 (H) 0.9 - 1.1 Prothrombin time 12.8 (H) 9.0 - 11.1 sec PTT Collection Time: 01/31/19  4:37 AM  
Result Value Ref Range aPTT 28.3 22.1 - 32.0 sec  
 aPTT, therapeutic range     58.0 - 77.0 SECS FIBRINOGEN  
 Collection Time: 01/31/19  4:37 AM  
Result Value Ref Range Fibrinogen 237 200 - 475 mg/dL TROPONIN I Collection Time: 01/31/19  4:37 AM  
Result Value Ref Range Troponin-I, Qt. 0.11 (H) <0.05 ng/mL SAMPLES BEING HELD Collection Time: 01/31/19  9:30 AM  
Result Value Ref Range SAMPLES BEING HELD 1UHOLD COMMENT Add-on orders for these samples will be processed based on acceptable specimen integrity and analyte stability, which may vary by analyte. Urine dipstick:  
Lab Results Component Value Date/Time Color DARK YELLOW 01/15/2019 10:16 PM  
 Appearance CLOUDY (A) 01/15/2019 10:16 PM  
 Specific gravity 1.030 01/15/2019 10:16 PM  
 pH (UA) 6.0 01/15/2019 10:16 PM  
 Protein 100 (A) 01/15/2019 10:16 PM  
 Glucose NEGATIVE  01/15/2019 10:16 PM  
 Ketone 40 (A) 01/15/2019 10:16 PM  
 Urobilinogen 1.0 01/15/2019 10:16 PM  
 Nitrites NEGATIVE  01/15/2019 10:16 PM  
 Leukocyte Esterase SMALL (A) 01/15/2019 10:16 PM  
 Epithelial cells MODERATE (A) 01/15/2019 10:16 PM  
 Bacteria 1+ (A) 01/15/2019 10:16 PM  
 WBC 10-20 01/15/2019 10:16 PM  
 RBC 0-5 01/15/2019 10:16 PM  
 
 
I have reviewed the following: All pertinent labs, microbiology data, radiology imaging for my assessment Medications list Personally Reviewed   [x]      Yes     []               No    
 
Medications: 
Prior to Admission medications Medication Sig Start Date End Date Taking? Authorizing Provider  
levoFLOXacin (LEVAQUIN) 750 mg tablet Take 1 Tab by mouth daily for 10 days. 1/21/19 1/31/19 Yes Verito Sun MD  
pantoprazole (PROTONIX) 40 mg tablet Take 1 Tab by mouth two (2) times a day. 1/19/19  Yes Wanye Cool MD  
  
 
Thank you for allowing us to participate in the care of this patient. We will follow patient. Please dont hesitate to call with any questions Garry Esparza, [address] [facility] [address] [facility], [address] 
 [facility] Phone - [phone] Fax - [phone] 
[email or web address]

## 2019-01-31 NOTE — PROGRESS NOTES
Hospitalist Progress Note 12[address] [facility],  Answering service: [phone] OR [phone] from in house phone Date of Service:  2019 NAME:  Nisa Green :  1940 MRN:  [id number] Admission Summary:  
Nisa Green is a 70 y.o. female who presents with dyspnea. Was recently just discharged from hospital 5 days ago for pancreatitis and large pancreatic cyst. 
Two to three days after discharge, pt began having intermittent cough and dyspnea. Associated with generalized weakness and fatigue. Denies chest pain, significant sputum, calf pain or erythema, fever, chills. Does have some persistent nausea. Interval history / Subjective:  
Patient seen and examined. Yesterday, found to have left rectus and pelvic hemorrhage. No evidence of active hemorrhage, IR placed prophylactic coil to left inferior epigastric artery. Overnight, patient required 2 additional units pRBCs. Minimal urine output, creatinine increased. Nephrology following. Initiated on dobutamine drip. I updated the daughter at bedside on current status, multiple medical diagnoses. Assessment & Plan:  
 
Acute blood loss anemia secondary to left rectus and pelvic hemorrhage:  
-acute blood loss , CT with left rectus hematoma and pelvic hemorrhage 
-Underwent IR angio  with prophylactic coil to left inferior epigastric artery -s/p 4 units pRBCs, CBC q 8 hours Shock, hypotensive:  
-secondary to acute blood loss -CVL placement Acute renal failure: 
-nephrology consulted, no acute dialysis needs 
-dobutamine initiated  
-continue albumin, bumex IV x 1 Acute hypoxic respiratory failure, POA: (hypoxic, tachypneic, respiratory distress) 
-persistent multifactorial due to acute influenza, suspected superimposed pneumonia, volume overload due to acute systolic heart failure, EF 25-25%, and effusion possibly secondary to pancreatitis -continue supplemental oxygen, intermittent BiPAP as needed Influenza A with possible superimposed pneumonia:  
-continue tamiflu, levaquin. Acute systolic heart failure, EF 20-25% -Echo with severely reduced EF, diffuse hypokinesis 
-cardiology consulted- acute illness vs CAD not excluded 
-patient unstable for cath/stress test 
 
Elevated troponin: suspect demand ischemia 
  
Pancreatitis: 
-recent discharge for pancreatitis and redemonstration on CT 1/31 
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 Will need repeat MRI in 6 months, no acute EUS needed 
-Lipase remains elevated -IVF held secondary to volume overload and worsening respiratory status  
-prn pain meds, anti-emetics  
 
  
Code status: Full DVT prophylaxis: held secondary to bleed Care Plan discussed with: Patient/Family and Nurse Disposition: TBD Hospital Problems  Date Reviewed: 1/15/2019 Codes Class Noted POA Dyspnea ICD-10-CM: R06.00 
ICD-9-CM: 786.09  1/25/2019 Unknown Review of Systems:  
Negative unless stated above Vital Signs:  
 Last 24hrs VS reviewed since prior progress note. Most recent are: 
Visit Vitals /47 Pulse 94 Temp 97.9 °F (36.6 °C) Resp 15 Ht 5' 4\" (1.626 m) Wt 66.8 kg (147 lb 4.3 oz) SpO2 99% Breastfeeding? No  
BMI 25.28 kg/m² Intake/Output Summary (Last 24 hours) at 1/31/2019 1653 Last data filed at 1/31/2019 1300 Gross per 24 hour Intake 1643.13 ml Output 585 ml Net 1058.13 ml Physical Examination:  
 
 
     
Constitutional:  drowsy, alert ENT:  Oral mucous moist, Resp:  Coarse breathe sounds, + increased work of breathing CV:  Regular rhythm, normal rate GI:  distended, tender to mild palpation, superficial bruising Musculoskeletal:  No edema, warm, 2+ pulses throughout Neurologic:  Moves all extremities. AAOx2 Data Review:  
 I personally reviewed  Image and labs Cta Chest W Or W Wo Cont Result Date: 1/25/2019 IMPRESSION: Small right pleural effusion with underlying atelectasis. No evidence of pulmonary embolism. Ill-defined mass lesion either arising from or secondarily involving the left hepatic lobe likely corresponding to the pancreas mass seen on the recent MR but likely increased in the interval. 
 
Ct Abd Pelv W Cont Result Date: 1/30/2019 IMPRESSION: 1. 7.2 x 5.0 x 8.4 cm left rectus abdominis intramuscular hematoma is acute on subacute and new since 15 days ago. This communicates with a 15.8 x 11.8 x 18.3 cm anterior pelvic extraperitoneal subacute hematoma. Mass effect on the surrounding structures. There may be active bleeding from the left inferior epigastric artery. 2. Unchanged pancreatitis. Increased size of the presumed developing pseudocyst in the pancreatic head. Reevaluate on follow-up imaging in a couple of months. 3. New moderate right and small left pleural simple effusions with adjacent compressive and dependent atelectasis. 4. New small volume of ascites is nonhemorrhagic. I discussed this impression with Dr. Luz Villanueva at  hours on 1/30/2019. Xr Chest Osorio Nielsen Result Date: 1/31/2019 IMPRESSION: Stable mild interstitial opacities, bibasilar opacities, and small pleural effusions. Xr Chest Osorio Nielsen Result Date: 1/30/2019 IMPRESSION: 1. Right IJ catheter overlies the SVC. There is no pneumothorax Xr Chest Osorio Nielsen Result Date: 1/30/2019 IMPRESSION: Bibasilar densities consistent bibasilar airspace disease and/or small bilateral pleural effusions. Xr Chest Osorio Nielsen Result Date: 1/29/2019 IMPRESSION: 1. Similar to slightly worsening bilateral perihilar predominant opacities, most suspicious for edema. 2.  Slightly increased small left effusion and a increasing left lung base consolidation, possibly atelectasis. Xr Chest Osorio Nielsen Result Date: 1/28/2019 Impression: Stable trace bilateral effusions with underlying atelectasis. Xr Chest [facility] Result Date: 1/28/2019 IMPRESSION: Small bibasilar atelectasis/effusions are again noted slightly improved on the right and mildly progressed on the left Ir Occl [place] [facility] W Si 
 
Result Date: 1/30/2019 IMPRESSION: Lower abdominal aortic and pelvic arteriogram without evidence for active hemorrhage or pseudoaneurysm. Prophylactic coil embolization of the proximal deep left inferior epigastric artery was performed as described above. Patient tolerated the procedure well without immediate complication. Recent MRI abd 1/15/2019 
 
1. 1.3 x 1.7 x 3.0 cm multilocular cystic lesion of the pancreas at the body 
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 Follow-up, preferably by MRI, in 6-12 months is recommended. 2. Hepatic cysts and probable flash fill inferior right lobe hepatic hemangioma. 3. Mild extra hepatic biliary dilation which may be sequela of prior obstruction 
or inflammation. Correlate for evidence of ongoing biliary obstruction with 
clinical and laboratory data. 4. Small sliding hiatal hernia. Labs:  
 
Recent Labs  
  01/31/19 
1230 01/31/19 
0437  01/30/19 
1323 WBC  --  19.3*  --  13.2* HGB 7.3* 8.9*   < > 7.5* HCT 22.1* 27.1*   < > 23.1*  
PLT  --  157  --  185  
 < > = values in this interval not displayed. Recent Labs  
  01/31/19 
1545 01/31/19 
7899 01/31/19 
0009 01/30/19 
9766  01/29/19 
0081  144 145 142  --  141  
K 3.7 4.1 4.4 3.4*  --  3.4*  
 108 106 102  --  106 CO2 25 25 25 28  --  26 BUN 30* 24* 22* 10  --  8  
CREA 2.06* 1.80* 1.78* 1.06*  --  0.67 * 157* 165* 161*  --  118* CA 7.9* 7.5* 7.5* 7.8*  --  7.8*  
MG  --  2.1  --  1.5*  --  1.8 PHOS 3.1 3.7 3.7 2.2*   < >  --   
 < > = values in this interval not displayed. Recent Labs  
  01/31/19 
1545 01/31/19 
9338 01/31/19 
0009 01/30/19 
0217 01/29/19 
0500 SGOT  --  14*  --  19 25 ALT  --  13  --  15 17  
 AP  --  35*  --  40* 55 TBILI  --  1.3*  --  1.1* 0.8 TP  --  6.3*  --  6.1* 6.3* ALB 4.2 4.0 4.0 3.6 3.5 GLOB  --  2.3  --  2.5 2.8 LPSE  --   --   --   --  812* Recent Labs  
  01/31/19 
0437 01/30/19 
1323 INR 1.3* 1.3* PTP 12.8* 13.0* APTT 28.3 32.8* No results for input(s): FE, TIBC, PSAT, FERR in the last 72 hours. No results found for: FOL, RBCF No results for input(s): PH, PCO2, PO2 in the last 72 hours. Recent Labs  
  01/31/19 
0437 01/30/19 
0224 01/29/19 
0932 TROIQ 0.11* 0.15* 0.26* Lab Results Component Value Date/Time Cholesterol, total 185 01/15/2019 12:29 PM  
 HDL Cholesterol 52 01/15/2019 12:29 PM  
 LDL, calculated 109.6 (H) 01/15/2019 12:29 PM  
 Triglyceride 117 01/15/2019 12:29 PM  
 CHOL/HDL Ratio 3.6 01/15/2019 12:29 PM  
 
Lab Results Component Value Date/Time Glucose (POC) 102 (H) 01/18/2019 06:23 AM  
 Glucose (POC) 139 (H) 01/17/2019 09:05 PM  
 Glucose (POC) 114 (H) 01/17/2019 04:31 PM  
 Glucose (POC) 106 (H) 01/17/2019 11:41 AM  
 Glucose (POC) 132 (H) 01/16/2019 09:13 PM  
 
Lab Results Component Value Date/Time Color DARK YELLOW 01/15/2019 10:16 PM  
 Appearance CLOUDY (A) 01/15/2019 10:16 PM  
 Specific gravity 1.030 01/15/2019 10:16 PM  
 pH (UA) 6.0 01/15/2019 10:16 PM  
 Protein 100 (A) 01/15/2019 10:16 PM  
 Glucose NEGATIVE  01/15/2019 10:16 PM  
 Ketone 40 (A) 01/15/2019 10:16 PM  
 Urobilinogen 1.0 01/15/2019 10:16 PM  
 Nitrites NEGATIVE  01/15/2019 10:16 PM  
 Leukocyte Esterase SMALL (A) 01/15/2019 10:16 PM  
 Epithelial cells MODERATE (A) 01/15/2019 10:16 PM  
 Bacteria 1+ (A) 01/15/2019 10:16 PM  
 WBC 10-20 01/15/2019 10:16 PM  
 RBC 0-5 01/15/2019 10:16 PM  
 
 
 
Medications Reviewed:  
 
Current Facility-Administered Medications Medication Dose Route Frequency  DOBUTamine (DOBUTREX) 500 mg/250 mL (2,000 mcg/mL) infusion  0-10 mcg/kg/min IntraVENous TITRATE  albumin human 25% (BUMINATE) solution 25 g  25 g IntraVENous Q6H  
 levoFLOXacin (LEVAQUIN) 250 mg in D5W IVPB  250 mg IntraVENous Q24H  
 bumetanide (BUMEX) injection 4 mg  4 mg IntraVENous ONCE  
 PHENYLephrine (NASEEM-SYNEPHRINE) 30 mg in 0.9% sodium chloride 250 mL infusion   mcg/min IntraVENous TITRATE  prochlorperazine (COMPAZINE) with saline injection 10 mg  10 mg IntraVENous Q6H PRN  
 rosuvastatin (CRESTOR) tablet 10 mg  10 mg Oral QHS  albuterol-ipratropium (DUO-NEB) 2.5 MG-0.5 MG/3 ML  3 mL Nebulization Q6H PRN  
 guaiFENesin ER (MUCINEX) tablet 600 mg  600 mg Oral Q12H  
 acetylcysteine (MUCOMYST) 200 mg/mL (20 %) solution 200 mg  200 mg Nebulization BID  pantoprazole (PROTONIX) tablet 40 mg  40 mg Oral BID  sodium chloride (NS) flush 5-40 mL  5-40 mL IntraVENous Q8H  
 sodium chloride (NS) flush 5-40 mL  5-40 mL IntraVENous PRN  
 aspirin chewable tablet 81 mg  81 mg Oral DAILY  nitroglycerin (NITROSTAT) tablet 0.4 mg  0.4 mg SubLINGual Q5MIN PRN  
 ondansetron (ZOFRAN) injection 4 mg  4 mg IntraVENous Q4H PRN  
 morphine injection 2 mg  2 mg IntraVENous Q4H PRN  
 
______________________________________________________________________ EXPECTED LENGTH OF STAY: 4d 4h 
ACTUAL LENGTH OF STAY:          6 [address] [facility],

## 2019-02-01 NOTE — PROGRESS NOTES
Problem: Falls - Risk of 
Goal: *Absence of Falls Document Kayajolene Masterson Fall Risk and appropriate interventions in the flowsheet. Outcome: Progressing Towards Goal 
Fall Risk Interventions: 
Mobility Interventions: Communicate number of staff needed for ambulation/transfer, Patient to call before getting OOB Mentation Interventions: Evaluate medications/consider consulting pharmacy, Reorient patient, Room close to nurse's station, Update white board Medication Interventions: Evaluate medications/consider consulting pharmacy, Patient to call before getting OOB Elimination Interventions: Call light in reach, Patient to call for help with toileting needs, Toilet paper/wipes in reach, Toileting schedule/hourly rounds Problem: Pressure Injury - Risk of 
Goal: *Prevention of pressure injury Document Travis Scale and appropriate interventions in the flowsheet. Outcome: Progressing Towards Goal 
Pressure Injury Interventions: 
Sensory Interventions: Avoid rigorous massage over bony prominences, Monitor skin under medical devices, Minimize linen layers, Turn and reposition approx. every two hours (pillows and wedges if needed), Assess need for specialty bed Moisture Interventions: Apply protective barrier, creams and emollients, Check for incontinence Q2 hours and as needed, Maintain skin hydration (lotion/cream), Minimize layers Activity Interventions: Pressure redistribution bed/mattress(bed type), Increase time out of bed Mobility Interventions: HOB 30 degrees or less, Pressure redistribution bed/mattress (bed type), Turn and reposition approx. every two hours(pillow and wedges) Nutrition Interventions: Discuss nutritional consult with provider, Document food/fluid/supplement intake Friction and Shear Interventions: Lift sheet, Lift team/patient mobility team, Minimize layers, Apply protective barrier, creams and emollients

## 2019-02-01 NOTE — PROGRESS NOTES
[facility] [address] [place] [address], [address] GI PROGRESS NOTE 
 
 
NAME: Osorio Ojeda :  1940 MRN:  [id number] Subjective:  
Hemoptysis. Hgb down-needing 2 units pRBCs. Objective: VITALS:  
Last 24hrs VS reviewed since prior progress note. Most recent are: 
Visit Vitals /48 (BP 1 Location: Right arm, BP Patient Position: At rest) Pulse 83 Temp 98.5 °F (36.9 °C) Resp 15 Ht 5' 4\" (1.626 m) Wt 67.3 kg (148 lb 5.9 oz) SpO2 98% Breastfeeding? No  
BMI 25.47 kg/m² PHYSICAL EXAM: 
General: Cooperative, no acute distress   
Neurologic:  Alert and oriented X 3. HEENT: EOMI, no scleral icterus Lungs:  Scattered rhonchi, wheezing Heart:  S1 S2 Abdomen: Soft, mild distended, generalized tenderness. +Bowel sounds Extremities: warm Psych:   Good insight. Not anxious or agitated. Lab Data Reviewed:  
 
Recent Results (from the past 24 hour(s)) SAMPLES BEING HELD Collection Time: 19  9:30 AM  
Result Value Ref Range SAMPLES BEING HELD 1UHOLD COMMENT Add-on orders for these samples will be processed based on acceptable specimen integrity and analyte stability, which may vary by analyte. CHLORIDE, URINE RANDOM Collection Time: 19 10:00 AM  
Result Value Ref Range Chloride,urine random 23 MMOL/L  
SODIUM, UR, RANDOM Collection Time: 19 10:00 AM  
Result Value Ref Range Sodium,urine random 16 MMOL/L  
CREATININE, UR, RANDOM Collection Time: 19 10:00 AM  
Result Value Ref Range Creatinine, urine 79.10 mg/dL HGB & HCT Collection Time: 19 12:30 PM  
Result Value Ref Range HGB 7.3 (L) 11.5 - 16.0 g/dL HCT 22.1 (L) 35.0 - 47.0 % RENAL FUNCTION PANEL Collection Time: 19  3:45 PM  
Result Value Ref Range Sodium 145 136 - 145 mmol/L Potassium 3.7 3.5 - 5.1 mmol/L Chloride 107 97 - 108 mmol/L  
 CO2 25 21 - 32 mmol/L  Anion gap 13 5 - 15 mmol/L  
 Glucose 132 (H) 65 - 100 mg/dL BUN 30 (H) 6 - 20 MG/DL Creatinine 2.06 (H) 0.55 - 1.02 MG/DL  
 BUN/Creatinine ratio 15 12 - 20 GFR est AA 28 (L) >60 ml/min/1.73m2 GFR est non-AA 23 (L) >60 ml/min/1.73m2 Calcium 7.9 (L) 8.5 - 10.1 MG/DL Phosphorus 3.1 2.6 - 4.7 MG/DL Albumin 4.2 3.5 - 5.0 g/dL HGB & HCT Collection Time: 01/31/19  9:01 PM  
Result Value Ref Range HGB 6.0 (L) 11.5 - 16.0 g/dL HCT 18.5 (L) 35.0 - 47.0 % CBC WITH AUTOMATED DIFF Collection Time: 02/01/19  2:42 AM  
Result Value Ref Range WBC 13.9 (H) 3.6 - 11.0 K/uL  
 RBC 2.34 (L) 3.80 - 5.20 M/uL HGB 6.8 (L) 11.5 - 16.0 g/dL HCT 20.9 (L) 35.0 - 47.0 % MCV 89.3 80.0 - 99.0 FL  
 MCH 29.1 26.0 - 34.0 PG  
 MCHC 32.5 30.0 - 36.5 g/dL  
 RDW 15.3 (H) 11.5 - 14.5 % PLATELET 228 692 - 921 K/uL MPV 11.1 8.9 - 12.9 FL  
 NRBC 0.9 (H) 0  WBC ABSOLUTE NRBC 0.13 (H) 0.00 - 0.01 K/uL NEUTROPHILS 79 (H) 32 - 75 % LYMPHOCYTES 13 12 - 49 % MONOCYTES 7 5 - 13 % EOSINOPHILS 0 0 - 7 % BASOPHILS 0 0 - 1 % IMMATURE GRANULOCYTES 1 (H) 0.0 - 0.5 % ABS. NEUTROPHILS 11.0 (H) 1.8 - 8.0 K/UL  
 ABS. LYMPHOCYTES 1.8 0.8 - 3.5 K/UL  
 ABS. MONOCYTES 1.0 0.0 - 1.0 K/UL  
 ABS. EOSINOPHILS 0.0 0.0 - 0.4 K/UL  
 ABS. BASOPHILS 0.0 0.0 - 0.1 K/UL  
 ABS. IMM. GRANS. 0.1 (H) 0.00 - 0.04 K/UL  
 DF SMEAR SCANNED    
 RBC COMMENTS ANISOCYTOSIS 1+ 
    
 RBC COMMENTS OVALOCYTES PRESENT 
    
 RBC COMMENTS HYPOCHROMIA PRESENT 
    
 RBC COMMENTS POLYCHROMASIA PRESENT 
    
MAGNESIUM Collection Time: 02/01/19  2:42 AM  
Result Value Ref Range Magnesium 2.1 1.6 - 2.4 mg/dL METABOLIC PANEL, COMPREHENSIVE Collection Time: 02/01/19  2:42 AM  
Result Value Ref Range Sodium 145 136 - 145 mmol/L Potassium 2.9 (L) 3.5 - 5.1 mmol/L Chloride 106 97 - 108 mmol/L  
 CO2 28 21 - 32 mmol/L Anion gap 11 5 - 15 mmol/L Glucose 122 (H) 65 - 100 mg/dL  BUN 28 (H) 6 - 20 MG/DL  
 Creatinine 1.77 (H) 0.55 - 1.02 MG/DL  
 BUN/Creatinine ratio 16 12 - 20 GFR est AA 34 (L) >60 ml/min/1.73m2 GFR est non-AA 28 (L) >60 ml/min/1.73m2 Calcium 8.1 (L) 8.5 - 10.1 MG/DL Bilirubin, total 1.5 (H) 0.2 - 1.0 MG/DL  
 ALT (SGPT) 11 (L) 12 - 78 U/L  
 AST (SGOT) 24 15 - 37 U/L Alk. phosphatase 30 (L) 45 - 117 U/L Protein, total 6.9 6.4 - 8.2 g/dL Albumin 4.5 3.5 - 5.0 g/dL Globulin 2.4 2.0 - 4.0 g/dL A-G Ratio 1.9 1.1 - 2.2 PHOSPHORUS Collection Time: 02/01/19  2:42 AM  
Result Value Ref Range Phosphorus 2.8 2.6 - 4.7 MG/DL PROTHROMBIN TIME + INR Collection Time: 02/01/19  2:42 AM  
Result Value Ref Range INR 1.2 (H) 0.9 - 1.1 Prothrombin time 12.4 (H) 9.0 - 11.1 sec PTT Collection Time: 02/01/19  2:42 AM  
Result Value Ref Range aPTT 26.5 22.1 - 32.0 sec  
 aPTT, therapeutic range     58.0 - 77.0 SECS Assessment:  
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 Patient Active Problem List  
Diagnosis Code  Pancreatitis K85.90  Dyspnea R06.00 Plan:  
· Continue supportive measures · Serial CBCs, transfuse PRN 
· Agree with CT scan · Pancreatic cyst to be evaluated electively as outpatient if she gets through her current issues and the benefits outweigh the risks · FLD for now with current hematoma issue, eventual advancement of diet. · Will sign off. Please call with any questions.   
 
Signed By: Nicholas Hernandez MD   
 2/1/2019  1:16 PM

## 2019-02-01 NOTE — PROGRESS NOTES
NUTRITION COMPLETE ASSESSMENT 
 
RECOMMENDATIONS:  
?Schedule anti-emetics prior to meals Interventions/Plan:  
Food/Nutrient Delivery:    Commercial supplements-trial Ensure Clear, Ensure Compact, Magic cup Assessment:  
Reason for Assessment:  
[x]LOS [x]At Nutrition Risk Diet: Full liquids Supplements: none Nutritionally Significant Medications: [x] Reviewed & Includes: Bumex, KCL, Dobutamine, Zofran and Compazine prn 
Meal Intake: No data found. Subjective: Thinking about food makes me feel nauseated. Family reports patient has been eating poorly for about 3 weeks (prior to first admission for Pancreatitis). Objective: Ms Roma Colón was admitted with Dyspnea. Noted: +Flu, PNA, acute hypoxemic respiratory failure with pulmonary edema-improved; CMP, EF 20%; acute blood loss anemia 2/2 left rectus and pelvic hematoma-s/p coil embolization; TOÑA. PMHx: Pancreatitis with pancreatic cyst, IBS. Ms Roma Colón is eating very little; reports nausea. Per nursing patient also vomited a couple of times yesterday. Apparently po intake was poor PTA; no significant weight loss noted however. Will add po supplements for patient to trial when feeling better/up to eating. Suggest scheduling anti-emetics prior to meals to see if this helps. Patient did not even want to talk about food this afternoon. May wish to consider supplemental parenteral nutrition if po intake remains poor. Potassium replaced today. BUN and creatinine elevated but better today. Estimated Nutrition Needs:  
Kcals/day: 1500 Kcals/day(MSJ x 1.3) Protein: 67 g(67-80 (1.0-1.2g/kg) Fluid: (1 ml/kcal) Based On: [place] 1896 Weight Used: Actual wt(67 kg) Pt expected to meet estimated nutrient needs:  []   Yes     [x]  No  [] Unable to predict at this time Nutrition Diagnosis:  
1. Inadequate oral intake related to N/V as evidenced by less than 10% intake of meals. Goals: Increased oral intake to at least 50% of meals and 1-2 supplements per day x 5-7 days. Monitoring & Evaluation: - Total energy intake - Weight/weight change, Electrolyte and renal profile Previous Nutrition Goals Met: N/A Previous Recommendations: N/A Education & Discharge Needs: 
 [x] None Identified 
 [] Identified and addressed [x] Participated in care plan, discharge planning, and/or interdisciplinary rounds Cultural, Mosque and ethnic food preferences identified: 
 None Skin Integrity: [x]Intact  []Other Edema: [x]None []Other Last BM: 1/29 Food Allergies: [x]None []Other Anthropometrics:   
Weight Loss Metrics 2/1/2019 1/21/2019 1/19/2019 11/1/2017 6/17/2013 Today's Wt 148 lb 5.9 oz 149 lb 139 lb 15.9 oz 139 lb 139 lb BMI 25.47 kg/m2 25.58 kg/m2 23.3 kg/m2 23.86 kg/m2 23.5 kg/m2 Last 3 Recorded Weights in this Encounter 01/31/19 0400 02/01/19 0400 02/01/19 1158 Weight: 66.8 kg (147 lb 4.3 oz) 67.3 kg (148 lb 5.9 oz) 67.3 kg (148 lb 5.9 oz) Weight Source: Bed Height: 5' 4\" (162.6 cm), Body mass index is 25.47 kg/m². IBW : 54.4 kg (120 lb), % IBW (Calculated): 123.64 % 
 ,   
 
Labs:   
Lab Results Component Value Date/Time Sodium 145 02/01/2019 02:42 AM  
 Potassium 2.9 (L) 02/01/2019 02:42 AM  
 Chloride 106 02/01/2019 02:42 AM  
 CO2 28 02/01/2019 02:42 AM  
 Glucose 122 (H) 02/01/2019 02:42 AM  
 BUN 28 (H) 02/01/2019 02:42 AM  
 Creatinine 1.77 (H) 02/01/2019 02:42 AM  
 Calcium 8.1 (L) 02/01/2019 02:42 AM  
 Magnesium 2.1 02/01/2019 02:42 AM  
 Phosphorus 2.8 02/01/2019 02:42 AM  
 Albumin 4.5 02/01/2019 02:42 AM  
 
No results found for: HBA1C, HGBE8, [id number], VYR9MPWK Lab Results Component Value Date/Time ALT (SGPT) 11 (L) 02/01/2019 02:42 AM  
 AST (SGOT) 24 02/01/2019 02:42 AM  
 Alk.  phosphatase 30 (L) 02/01/2019 02:42 AM  
 Bilirubin, direct 0.1 01/18/2019 08:52 AM  
 Bilirubin, total 1.5 (H) 02/01/2019 02:42 AM  
  
 Suresh Jones RD CNSC

## 2019-02-01 NOTE — PROGRESS NOTES
2/1/2019     [facility] [address] [place] [address] 
 
. Argenis More Brooklynn Gandhi is a 70 y.o. female HPI: Earlier in month seen with afib in setting of acute pancreatitis. Cardiology following for elevated troponins, PAF and new systolic CHF/cardiomyopathy. 1/30/19: marked anemia and hypotension/hypovolemic shock with left pelvic and rectus sheath hematoma/bleed s/p IR embolization. 1/31/19: required PRBCs x 4 total yesterday. ( Hgb improved this a.m. but renal function and urine output has diminished overnight . Started on low dose dobutamine per renal to increase renal perfusion. MAP goal >65. 
2/1/19: hgb 6.2 overnight- additional 2 units PRBCs Subjective:  Pt weak, c/o some SOB but no c/o chest pain. Daughter states pt slept most of yesterday. Assessment/Plan/Discussion:Cardiology Attending:  
 
Patient seen on the day of progress note and examined  and agree with Advance Practice Provider (SISI, NP,PA)  assessment and plans. Fariba Gandhi is a 70 y.o. female Looks more fatigued this am compared to last evening More congestion and cough but no edema, rales and sats are fair 
bp stable Renal wants to continue dobutamine for 24 more hours and that is fine Continue IV diuretics for acute systolic CHF Conservative therapy for suspected CAD Real problem is anemia and source of bleeding Following Critically ill Osman Edwards MD   
 
 
 
Discussion/Plans 1. Cardiomyopathy, acute systolic CHF: 
-NYHA IV (complicated by anemia, critical illness) -EF 37-52%% with WMA  
-Uncertain etiology. Suspect d/t acute illness.  
-Not candidate for invasive cardiac testing.  
-Agree with continuing Dobutamine 2.5 mcg/kg/min  
-CXR 2/1. Mild interstitial edema. Argenis Overton -CVP 5.net neg 271 mls/24 hrs. Bumex 1 mg IV BID today 
-No ACE-I/coreg d/t renal dysfunction/need for increased renal perfusion. 2. Shock/Hypotension: BP normalized -s/p 4 units PRBCs,   
-Dobutamine low dose . -Off neosynephrine 3. NQMI: Troponin elevation and fall. Peak of 3.26 
-Troponins trend down. 0.11 1/31. 
-No ASA d/t pelvic/rectus sheath bleeds   
-Continue high potency statin. 
-Off BB, ace-I d/t hypotension, need for increased MAP for renal 
 
4. PAF:  
-SR on tele review, no PAF. -Off coreg 
-[id number]=5 (age, CHF, Female). No anticoags or ASA d/t bleeding. 5. TOÑA: creatinine 1.77 
-Renal following. TOÑA d/t hypoperfusion , contrast, CRS 
-Continue Dobutamine, IV bumex but hold diuretic if contrast needed again 6. Pelvic/rectus sheath hematomas/bleed: 
-s/p IR embolization 1/30/19 
-Repeat CT abdomen today w/o contrast. 
 
7. Anemia, acute blood loss:  hgb 8.2 s/p 6 units PRBCs  
-Off ASA and lovenox. 8.  Pancreatitis with cystic lesion-  
-Cystic lesion likely reflects intraductal papillary mucinous tumor per GI 9. Leukocytosis:  WBC trending down 10. Influenza/superimposed pneumonia Seen by Dr. Santiago Acosta.  A little more SOB this a.m. And fatigued as compared to yesterday evening. Hgb still dropping, requiring PRBC transfusion. Repeat CT abdomen pending. BP improved. Agree with continued low dose dobutamine, IV diuretics. ZEYAD Kirkpatrick Cardiac Studies/Hx: 
No specialty comments available. Patient Vitals for the past 12 hrs: 
 Temp Pulse Resp BP SpO2  
02/01/19 0900  (!) 103 (!) 31 130/52 99 % 02/01/19 0800 98.6 °F (37 °C) (!) 110 26 164/66 100 % 02/01/19 0748     96 % 02/01/19 0700  95 15 147/48 98 % 02/01/19 0600  83 15 119/48 98 % 02/01/19 0500  86 13 122/50 98 % 02/01/19 0400 98.5 °F (36.9 °C) 96 14 128/41 99 % 02/01/19 0300  (!) 101 14 129/44 96 % 02/01/19 0200  (!) 108 22 136/48 97 % 02/01/19 0130  (!) 104 26 (!) 128/111 98 % 02/01/19 0100  100 14 123/48 99 % 02/01/19 0000 99 °F (37.2 °C) (!) 102 11 129/44 98 % 01/31/19 2330  (!) 107 20 127/49 96 % 01/31/19 2300  (!) 103 15 110/43 98 % Past Medical History:  
Diagnosis Date  Other ill-defined conditions(799.89) IBS  Pancreatitis ROS-pertinents  negative except as above  The pertinent portions of the medical history,physician and nursing notes, meds,vitals , labs and Ins/Outs,are reviewed in the electronic record. Results for orders placed or performed during the hospital encounter of 01/25/19 EKG, 12 LEAD, INITIAL Result Value Ref Range Ventricular Rate 79 BPM  
 Atrial Rate 79 BPM  
 P-R Interval 138 ms QRS Duration 70 ms Q-T Interval 516 ms  
 QTC Calculation (Bezet) 591 ms Calculated P Axis 12 degrees Calculated R Axis 47 degrees Calculated T Axis -133 degrees Diagnosis Normal sinus rhythm Marked T wave abnormality, consider anterior ischemia Prolonged QT When compared with ECG of 29-JAN-2019 14:54, 
Marked T wave abnormality, consider anterior ischemia now present Confirmed by Margo Amaya M.D., Maximino Broccoli ([id number]) on 1/30/2019 9:52:22 AM 
  
  
Vitals:  
 02/01/19 0700 02/01/19 0748 02/01/19 0800 02/01/19 0900 BP: 147/48  164/66 130/52 BP 1 Location:   Right arm BP Patient Position:   At rest   
Pulse: 95  (!) 110 (!) 103 Resp: 15  26 (!) 31 Temp:   98.6 °F (37 °C) SpO2: 98% 96% 100% 99% Weight:      
Height:      
 
 
Objective:  
 Physical Exam:  
Patient Vitals for the past 12 hrs: 
 Temp Pulse Resp BP SpO2  
02/01/19 0900  (!) 103 (!) 31 130/52 99 % 02/01/19 0800 98.6 °F (37 °C) (!) 110 26 164/66 100 % 02/01/19 0748     96 % 02/01/19 0700  95 15 147/48 98 % 02/01/19 0600  83 15 119/48 98 % 02/01/19 0500  86 13 122/50 98 % 02/01/19 0400 98.5 °F (36.9 °C) 96 14 128/41 99 % 02/01/19 0300  (!) 101 14 129/44 96 % 02/01/19 0200  (!) 108 22 136/48 97 % 02/01/19 0130  (!) 104 26 (!) 128/111 98 % 02/01/19 0100  100 14 123/48 99 % 02/01/19 0000 99 °F (37.2 °C) (!) 102 11 129/44 98 % 01/31/19 2330  (!) 107 20 127/49 96 % 01/31/19 2300  (!) 103 15 110/43 98 % General:  Weak appearing. Mild SOB  
ENT, Neck:  no jvd Chest Wall: inspection normal - no chest wall deformities or tenderness,  
Lung:  scattered course breath sounds. , mild tachypnea Heart:  normal rate, regular rhythm, normal S1, S2, no murmurs, rubs, clicks or gallops Abdomen: Distended, soft, +ttp Extremities: extremities normal, atraumatic, no cyanosis or edema :  Urine light cranberry color Last 24hr Input/Output: 
 
Intake/Output Summary (Last 24 hours) at 2/1/2019 1032 Last data filed at 2/1/2019 0900 Gross per 24 hour Intake 1417.01 ml Output 1940 ml Net -522.99 ml Data Review:  
Recent Results (from the past 24 hour(s)) HGB & HCT Collection Time: 01/31/19 12:30 PM  
Result Value Ref Range HGB 7.3 (L) 11.5 - 16.0 g/dL HCT 22.1 (L) 35.0 - 47.0 % RENAL FUNCTION PANEL Collection Time: 01/31/19  3:45 PM  
Result Value Ref Range Sodium 145 136 - 145 mmol/L Potassium 3.7 3.5 - 5.1 mmol/L Chloride 107 97 - 108 mmol/L  
 CO2 25 21 - 32 mmol/L Anion gap 13 5 - 15 mmol/L Glucose 132 (H) 65 - 100 mg/dL BUN 30 (H) 6 - 20 MG/DL Creatinine 2.06 (H) 0.55 - 1.02 MG/DL  
 BUN/Creatinine ratio 15 12 - 20 GFR est AA 28 (L) >60 ml/min/1.73m2 GFR est non-AA 23 (L) >60 ml/min/1.73m2 Calcium 7.9 (L) 8.5 - 10.1 MG/DL Phosphorus 3.1 2.6 - 4.7 MG/DL Albumin 4.2 3.5 - 5.0 g/dL HGB & HCT Collection Time: 01/31/19  9:01 PM  
Result Value Ref Range HGB 6.0 (L) 11.5 - 16.0 g/dL HCT 18.5 (L) 35.0 - 47.0 % CBC WITH AUTOMATED DIFF Collection Time: 02/01/19  2:42 AM  
Result Value Ref Range WBC 13.9 (H) 3.6 - 11.0 K/uL  
 RBC 2.34 (L) 3.80 - 5.20 M/uL HGB 6.8 (L) 11.5 - 16.0 g/dL HCT 20.9 (L) 35.0 - 47.0 % MCV 89.3 80.0 - 99.0 FL  
 MCH 29.1 26.0 - 34.0 PG  
 MCHC 32.5 30.0 - 36.5 g/dL  
 RDW 15.3 (H) 11.5 - 14.5 % PLATELET 362 956 - 148 K/uL  MPV 11.1 8.9 - 12.9 FL  
 NRBC 0.9 (H) 0  WBC ABSOLUTE NRBC 0.13 (H) 0.00 - 0.01 K/uL NEUTROPHILS 79 (H) 32 - 75 % LYMPHOCYTES 13 12 - 49 % MONOCYTES 7 5 - 13 % EOSINOPHILS 0 0 - 7 % BASOPHILS 0 0 - 1 % IMMATURE GRANULOCYTES 1 (H) 0.0 - 0.5 % ABS. NEUTROPHILS 11.0 (H) 1.8 - 8.0 K/UL  
 ABS. LYMPHOCYTES 1.8 0.8 - 3.5 K/UL  
 ABS. MONOCYTES 1.0 0.0 - 1.0 K/UL  
 ABS. EOSINOPHILS 0.0 0.0 - 0.4 K/UL  
 ABS. BASOPHILS 0.0 0.0 - 0.1 K/UL  
 ABS. IMM. GRANS. 0.1 (H) 0.00 - 0.04 K/UL  
 DF SMEAR SCANNED    
 RBC COMMENTS ANISOCYTOSIS 1+ 
    
 RBC COMMENTS OVALOCYTES PRESENT 
    
 RBC COMMENTS HYPOCHROMIA PRESENT 
    
 RBC COMMENTS POLYCHROMASIA PRESENT 
    
MAGNESIUM Collection Time: 02/01/19  2:42 AM  
Result Value Ref Range Magnesium 2.1 1.6 - 2.4 mg/dL METABOLIC PANEL, COMPREHENSIVE Collection Time: 02/01/19  2:42 AM  
Result Value Ref Range Sodium 145 136 - 145 mmol/L Potassium 2.9 (L) 3.5 - 5.1 mmol/L Chloride 106 97 - 108 mmol/L  
 CO2 28 21 - 32 mmol/L Anion gap 11 5 - 15 mmol/L Glucose 122 (H) 65 - 100 mg/dL BUN 28 (H) 6 - 20 MG/DL Creatinine 1.77 (H) 0.55 - 1.02 MG/DL  
 BUN/Creatinine ratio 16 12 - 20 GFR est AA 34 (L) >60 ml/min/1.73m2 GFR est non-AA 28 (L) >60 ml/min/1.73m2 Calcium 8.1 (L) 8.5 - 10.1 MG/DL Bilirubin, total 1.5 (H) 0.2 - 1.0 MG/DL  
 ALT (SGPT) 11 (L) 12 - 78 U/L  
 AST (SGOT) 24 15 - 37 U/L Alk. phosphatase 30 (L) 45 - 117 U/L Protein, total 6.9 6.4 - 8.2 g/dL Albumin 4.5 3.5 - 5.0 g/dL Globulin 2.4 2.0 - 4.0 g/dL A-G Ratio 1.9 1.1 - 2.2 PHOSPHORUS Collection Time: 02/01/19  2:42 AM  
Result Value Ref Range Phosphorus 2.8 2.6 - 4.7 MG/DL PROTHROMBIN TIME + INR Collection Time: 02/01/19  2:42 AM  
Result Value Ref Range INR 1.2 (H) 0.9 - 1.1 Prothrombin time 12.4 (H) 9.0 - 11.1 sec PTT Collection Time: 02/01/19  2:42 AM  
Result Value Ref Range aPTT 26.5 22.1 - 32.0 sec aPTT, therapeutic range     58.0 - 77.0 SECS  
HGB & HCT Collection Time: 02/01/19  7:19 AM  
Result Value Ref Range HGB 8.2 (L) 11.5 - 16.0 g/dL HCT 24.9 (L) 35.0 - 47.0 % SED RATE (ESR) Collection Time: 02/01/19  7:19 AM  
Result Value Ref Range Sed rate, automated 17 0 - 30 mm/hr Edgardo Kurtz NP 2/1/2019

## 2019-02-01 NOTE — PROGRESS NOTES
[facility] / Pulmonary / Critical Care Assessment / Plan:   
Acute hypoxemic respiratory failure with pulmonary edema and L effusion in setting of recent pancreatitis and cardiac dysfunction - off bipap and on NC O2 
  
L effusion related to CHF (systolic with EF 63%)OV sympathetic from pancreatitis  
  Influenza positive s/p treatment 
  
Elevated BNP and troponin with EKG with NSST changes laterally - NSTEMI vs myocarditis 
  
Paroxysmal afib 
  
Pancreatitis with multilocular cystic lesion being followed by GI - possible intraductal tumor Anemia - with 3 g drop in hgb without obvious visual blood loss - pancreatitis with abd pain - abd ct with rectus and pelvic hematoma - s/p angio with embolization of deep L inferior epigastric artery - now s/p 6 units PRBC's - follow up abd ct has been ordered TOÑA - hypoperfusion and contrast related 
 
 
--follow hgb --on dobutamine 
--renal following 
--continue nebs and pulm toilet 
--on empiric abx and s/p  tamiflu treatment 
--cardiology and gi following 
--follow up abd ct today with continued fall in hgb Will be available to see over the weekend if needed History / Subjective: 
Hospital Day: 8 - Interval history and notes reviewed 2/1 On NC O2 - no bipap Required another 2 units PRBC's Follow up abd and pelvic ct has been requested On dobutamine - no other vasopressors. Cr improving 1/31 Events noted Underwent angiographic embolization of inferior epigastric artery for pelvic and rectus sheath hematomas She has received a total of 4 units of packed red cells. She is becoming oliguric Nephrology is following - appears to have acute kidney injury from hypoperfusion and contrast 
She has refused BiPAP. Is on nasal cannula oxygen at 4 L/m 
 
1/30 Tenuous Hemoglobin dropped over 3 g overnight. Patient with marginal blood pressure. Has had nausea and vomiting which is bilious no hematemesis noted Complaining of abdominal pain Has known pancreatitis Was on BiPAP intermittently Objective: 
Patient Vitals for the past 4 hrs: 
 BP Temp Pulse Resp SpO2  
19 0900 130/52  (!) 103 (!) 31 99 % 19 0800 164/66 98.6 °F (37 °C) (!) 110 26 100 % 19 0748     96 % 19 0700 147/48  95 15 98 % 19 0600 119/48  83 15 98 % Temp (24hrs), Av.7 °F (37.1 °C), Min:97.9 °F (36.6 °C), Max:99.2 °F (37.3 °C) Intake/Output Summary (Last 24 hours) at 2019 9926 Last data filed at 2019 0900 Gross per 24 hour Intake 1441.68 ml Output 1955 ml Net -513.32 ml Lab Results Component Value Date/Time Glucose (POC) 102 (H) 2019 06:23 AM  
 Glucose (POC) 139 (H) 2019 09:05 PM  
 Glucose (POC) 114 (H) 2019 04:31 PM  
 Glucose (POC) 106 (H) 2019 11:41 AM  
 Glucose (POC) 132 (H) 2019 09:13 PM  
 
Exam: No resp distress on NC O2 No accessory use Symmetrical chest expansion Rales at bases with diminished bs at bases RRR 
abd  With mild tenderness Warm and dry Trace edema Data:  
Interval lab and diagnostic data was reviewed. Interval radiology images were independently viewed and available reports were reviewed. CXR - mild edema Lab: 
Recent Labs 19 
0243 19 
8721 19 
2101 19 
1545  19 
2655  19 
1323 19 
6041 WBC  --  13.9*  --   --   --  19.3*  --  13.2* 8.7 HGB 8.2* 6.8* 6.0*  --    < > 8.9*   < > 7.5* 5.7* PLT  --  152  --   --   --  157  --  185 185 NA  --  145  --  145  --  144   < >  --  142 K  --  2.9*  --  3.7  --  4.1   < >  --  3.4*  
CL  --  106  --  107  --  108   < >  --  102 CO2  --  28  --  25  --  25   < >  --  28 BUN  --  28*  --  30*  --  24*   < >  --  10  
CREA  --  1.77*  --  2.06*  --  1.80*   < >  --  1.06* GLU  --  122*  --  132*  --  157*   < >  --  161* CA  --  8.1*  --  7.9*  --  7.5*   < >  --  7.8*  
MG  --  2.1  --   --   --  2.1  --   --  1.5*  
 PHOS  --  2.8  --  3.1  --  3.7   < >  --  2.2* INR  --  1.2*  --   --   --  1.3*  --  1.3*  --   
TROIQ  --   --   --   --   --  0.11*  --   --  0.15* TBILI  --  1.5*  --   --   --  1.3*  --   --  1.1*  
SGOT  --  24  --   --   --  14*  --   --  19  
 < > = values in this interval not displayed. ABG: 
Recent Labs  
  01/30/19 
2154 PHI 7.423 PCO2I 35.9 PO2I 69* HCO3I 23.4 SO2I 94 All Micro Results Procedure Component Value Units Date/Time CULTURE, MRSA [[id number]] Collected:  01/29/19 1121 Order Status:  Completed Specimen:  Nares Updated:  01/30/19 7273 Special Requests: NO SPECIAL REQUESTS Culture result: MRSA NOT PRESENT Screening of patient nares for MRSA is for surveillance purposes and, if positive, to facilitate isolation considerations in high risk settings. It is not intended for automatic decolonization interventions per se as regimens are not sufficiently effective to warrant routine use. CULTURE, BLOOD, PAIRED [[id number]] Collected:  01/25/19 1512 Order Status:  Completed Specimen:  Blood Updated:  01/30/19 0715 Special Requests: NO SPECIAL REQUESTS Culture result: NO GROWTH 5 DAYS URINE CULTURE HOLD SAMPLE [[id number]] Collected:  01/25/19 1815 Order Status:  Canceled Specimen:  Urine INFLUENZA A & B AG (RAPID TEST) [[id number]]  (Abnormal) Collected:  01/25/19 1608 Order Status:  Completed Specimen:  Nasopharyngeal from Nasal washing Updated:  01/25/19 1647 Influenza A Antigen POSITIVE Influenza B Antigen NEGATIVE Heber Crain MD

## 2019-02-01 NOTE — PROGRESS NOTES
0730 Bedside shift change report given to 140Sweta Alvarado (oncoming nurse) by Tanja Best (offgoing nurse). Report included the following information SBAR, Kardex, Procedure Summary, Intake/Output, MAR, Recent Results and Cardiac Rhythm Sinus Tach. 0800 VSS. Family at bedside, pt aware of plan to go to CT. Will hold PO meds until then. 0945 Back to CCU from CT. 
1600 Pt has not had any complaints all day. 1930 Bedside shift change report given to Colton (oncoming nurse) by Carmela1 Steve Alvarado (offgoing nurse). Report included the following information SBAR, Kardex, Procedure Summary, Intake/Output, MAR, Recent Results and Cardiac Rhythm Sinus Tach. Problem: Falls - Risk of 
Goal: *Absence of Falls Document Cornelious Booty Fall Risk and appropriate interventions in the flowsheet. Outcome: Progressing Towards Goal 
Fall Risk Interventions: 
Mobility Interventions: Communicate number of staff needed for ambulation/transfer, Patient to call before getting OOB Mentation Interventions: Evaluate medications/consider consulting pharmacy, Reorient patient, Room close to nurse's station, Update white board Medication Interventions: Evaluate medications/consider consulting pharmacy, Patient to call before getting OOB Elimination Interventions: Call light in reach, Patient to call for help with toileting needs, Toilet paper/wipes in reach, Toileting schedule/hourly rounds Problem: Pressure Injury - Risk of 
Goal: *Prevention of pressure injury Document Travis Scale and appropriate interventions in the flowsheet. Outcome: Progressing Towards Goal 
Pressure Injury Interventions: 
Sensory Interventions: Avoid rigorous massage over bony prominences, Monitor skin under medical devices, Minimize linen layers, Turn and reposition approx. every two hours (pillows and wedges if needed), Assess need for specialty bed Moisture Interventions: Apply protective barrier, creams and emollients, Check for incontinence Q2 hours and as needed, Maintain skin hydration (lotion/cream), Minimize layers Activity Interventions: Pressure redistribution bed/mattress(bed type), Increase time out of bed Mobility Interventions: HOB 30 degrees or less, Pressure redistribution bed/mattress (bed type), Turn and reposition approx. every two hours(pillow and wedges) Nutrition Interventions: Discuss nutritional consult with provider, Document food/fluid/supplement intake Friction and Shear Interventions: Lift sheet, Lift team/patient mobility team, Minimize layers, Apply protective barrier, creams and emollients

## 2019-02-01 NOTE — PROGRESS NOTES
Hospitalist Progress Note [address] [facility]  Answering service: [phone] OR 0927 from in house phone Date of Service:  2019 NAME:  Ana Hernandez :  1940 MRN:  [id number] Admission Summary:  
Ana Hernandez is a 70 y.o. female who presents with dyspnea. Was recently just discharged from hospital 5 days ago for pancreatitis and large pancreatic cyst. 
Two to three days after discharge, pt began having intermittent cough and dyspnea. Associated with generalized weakness and fatigue. Denies chest pain, significant sputum, calf pain or erythema, fever, chills. Does have some persistent nausea. Interval history / Subjective:  
Patient seen and examined. Appears more alert today. Overnight, required 2 units pRBCs. CT abdomen/pelvis ordered this AM. Continues on dobutamine drip. .  
Assessment & Plan:  
 
Acute blood loss anemia secondary to left rectus and pelvic hemorrhage:  
-acute blood loss , CT with left rectus hematoma and pelvic hemorrhage 
-Underwent IR angio  with prophylactic coil to left inferior epigastric artery -s/p 6 units pRBCs, CBC q 8 hours, transfuse hgb <7 
-repeat CT abd/pel w/o contrast  Shock, hypotensive vs cardiogenic:  
-secondary to acute blood loss -CVL placement 
-dobutamine Acute renal failure: 
-nephrology consulted, no acute dialysis needs 
-dobutamine initiated  Acute hypoxic respiratory failure, POA: (hypoxic, tachypneic, respiratory distress) 
-persistent multifactorial due to acute influenza, suspected superimposed pneumonia, volume overload due to acute systolic heart failure, EF 25-25%, and effusion possibly secondary to pancreatitis  
-continue supplemental oxygen, intermittent BiPAP as needed Influenza A with possible superimposed pneumonia:  
-s/p tamiflu, continue levaquin.   
 
Acute systolic heart failure, EF 20-25%, NYHA Class IV: 
 -Echo with severely reduced EF, diffuse hypokinesis 
-cardiology consulted- acute illness vs CAD not excluded 
-patient unstable for cath/stress test 
-continue dobutamine Elevated troponin: suspect demand ischemia 
  
Pancreatitis: 
-recent discharge for pancreatitis and redemonstration on CT 1/31 
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 Will need repeat MRI in 6 months, no acute EUS needed 
-Lipase remains elevated -IVF held secondary to volume overload and worsening respiratory status  
-prn pain meds, anti-emetics Hypokalemia: replaced  
 
  
Code status: Full DVT prophylaxis: held secondary to bleed Care Plan discussed with: Patient/Family and Nurse Disposition: TBD Hospital Problems  Date Reviewed: 1/15/2019 Codes Class Noted POA Dyspnea ICD-10-CM: R06.00 
ICD-9-CM: 786.09  1/25/2019 Unknown Review of Systems:  
Negative unless stated above Vital Signs:  
 Last 24hrs VS reviewed since prior progress note. Most recent are: 
Visit Vitals /52 Pulse (!) 103 Temp 98.6 °F (37 °C) Resp (!) 31 Ht 5' 4\" (1.626 m) Wt 67.3 kg (148 lb 5.9 oz) SpO2 99% Breastfeeding? No  
BMI 25.47 kg/m² Intake/Output Summary (Last 24 hours) at 2/1/2019 1013 Last data filed at 2/1/2019 0900 Gross per 24 hour Intake 1417.01 ml Output 1940 ml Net -522.99 ml Physical Examination:  
 
 
     
Constitutional:   Alert, no acute distress ENT:  Oral mucous moist, Resp:  Coarse breathe sounds, no increased work of breathing CV:  Regular rhythm, normal rate GI:  distended, tender to mild palpation, superficial bruising Musculoskeletal:  No edema, warm, 2+ pulses throughout Neurologic:  Moves all extremities. Data Review:  
 I personally reviewed  Image and labs Cta Chest W Or W Wo Cont Result Date: 1/25/2019 IMPRESSION: Small right pleural effusion with underlying atelectasis.  No evidence of pulmonary embolism. Ill-defined mass lesion either arising from or secondarily involving the left hepatic lobe likely corresponding to the pancreas mass seen on the recent MR but likely increased in the interval. 
 
Ct Abd Pelv W Cont Result Date: 1/30/2019 IMPRESSION: 1. 7.2 x 5.0 x 8.4 cm left rectus abdominis intramuscular hematoma is acute on subacute and new since 15 days ago. This communicates with a 15.8 x 11.8 x 18.3 cm anterior pelvic extraperitoneal subacute hematoma. Mass effect on the surrounding structures. There may be active bleeding from the left inferior epigastric artery. 2. Unchanged pancreatitis. Increased size of the presumed developing pseudocyst in the pancreatic head. Reevaluate on follow-up imaging in a couple of months. 3. New moderate right and small left pleural simple effusions with adjacent compressive and dependent atelectasis. 4. New small volume of ascites is nonhemorrhagic. I discussed this impression with Dr. Hanny Eddy at  hours on 1/30/2019. Xr Chest Cathaleen Wilmerding Result Date: 2/1/2019 IMPRESSION: Mild interstitial edema. No interval change. Xr Chest Cathaleen Wilmerding Result Date: 1/31/2019 IMPRESSION: Stable mild interstitial opacities, bibasilar opacities, and small pleural effusions. Xr Chest Cathaleen Wilmerding Result Date: 1/30/2019 IMPRESSION: 1. Right IJ catheter overlies the SVC. There is no pneumothorax Xr Chest Cathaleen Wilmerding Result Date: 1/30/2019 IMPRESSION: Bibasilar densities consistent bibasilar airspace disease and/or small bilateral pleural effusions. Xr Chest Cathaleen Wilmerding Result Date: 1/29/2019 IMPRESSION: 1. Similar to slightly worsening bilateral perihilar predominant opacities, most suspicious for edema. 2.  Slightly increased small left effusion and a increasing left lung base consolidation, possibly atelectasis. Xr Chest Cathaleen Wilmerding Result Date: 1/28/2019 Impression: Stable trace bilateral effusions with underlying atelectasis. Xr Chest Cathaleen Wilmerding Result Date: 1/28/2019 IMPRESSION: Small bibasilar atelectasis/effusions are again noted slightly improved on the right and mildly progressed on the left Ir Occl Sandra BLANTON Si 
 
Result Date: 1/30/2019 IMPRESSION: Lower abdominal aortic and pelvic arteriogram without evidence for active hemorrhage or pseudoaneurysm. Prophylactic coil embolization of the proximal deep left inferior epigastric artery was performed as described above. Patient tolerated the procedure well without immediate complication. Recent MRI abd 1/15/2019 
 
1. 1.3 x 1.7 x 3.0 cm multilocular cystic lesion of the pancreas at the body 
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 Follow-up, preferably by MRI, in 6-12 months is recommended. 2. Hepatic cysts and probable flash fill inferior right lobe hepatic hemangioma. 3. Mild extra hepatic biliary dilation which may be sequela of prior obstruction 
or inflammation. Correlate for evidence of ongoing biliary obstruction with 
clinical and laboratory data. 4. Small sliding hiatal hernia. Labs:  
 
Recent Labs 02/01/19 
6139 02/01/19 
0242  01/31/19 
3606 WBC  --  13.9*  --  19.3* HGB 8.2* 6.8*   < > 8.9* HCT 24.9* 20.9*   < > 27.1*  
PLT  --  152  --  157  
 < > = values in this interval not displayed. Recent Labs 02/01/19 
3046 01/31/19 
1545 01/31/19 
0412  01/30/19 
3459  145 144   < > 142  
K 2.9* 3.7 4.1   < > 3.4*  
 107 108   < > 102 CO2 28 25 25   < > 28 BUN 28* 30* 24*   < > 10 CREA 1.77* 2.06* 1.80*   < > 1.06* * 132* 157*   < > 161* CA 8.1* 7.9* 7.5*   < > 7.8*  
MG 2.1  --  2.1  --  1.5* PHOS 2.8 3.1 3.7   < > 2.2*  
 < > = values in this interval not displayed. Recent Labs 02/01/19 
6198 01/31/19 
1545 01/31/19 
8906  01/30/19 
0287 SGOT 24  --  14*  --  19 ALT 11*  --  13  --  15  
AP 30*  --  35*  --  40* TBILI 1.5*  --  1.3*  --  1.1* TP 6.9  --  6.3*  --  6.1*  
 ALB 4.5 4.2 4.0   < > 3.6 GLOB 2.4  --  2.3  --  2.5  
 < > = values in this interval not displayed. Recent Labs 02/01/19 
0242 01/31/19 
0437 01/30/19 
1323 INR 1.2* 1.3* 1.3* PTP 12.4* 12.8* 13.0* APTT 26.5 28.3 32.8* No results for input(s): FE, TIBC, PSAT, FERR in the last 72 hours. No results found for: FOL, RBCF No results for input(s): PH, PCO2, PO2 in the last 72 hours. Recent Labs  
  01/31/19 
0437 01/30/19 
0224 TROIQ 0.11* 0.15* Lab Results Component Value Date/Time Cholesterol, total 185 01/15/2019 12:29 PM  
 HDL Cholesterol 52 01/15/2019 12:29 PM  
 LDL, calculated 109.6 (H) 01/15/2019 12:29 PM  
 Triglyceride 117 01/15/2019 12:29 PM  
 CHOL/HDL Ratio 3.6 01/15/2019 12:29 PM  
 
Lab Results Component Value Date/Time Glucose (POC) 102 (H) 01/18/2019 06:23 AM  
 Glucose (POC) 139 (H) 01/17/2019 09:05 PM  
 Glucose (POC) 114 (H) 01/17/2019 04:31 PM  
 Glucose (POC) 106 (H) 01/17/2019 11:41 AM  
 Glucose (POC) 132 (H) 01/16/2019 09:13 PM  
 
Lab Results Component Value Date/Time Color DARK YELLOW 01/15/2019 10:16 PM  
 Appearance CLOUDY (A) 01/15/2019 10:16 PM  
 Specific gravity 1.030 01/15/2019 10:16 PM  
 pH (UA) 6.0 01/15/2019 10:16 PM  
 Protein 100 (A) 01/15/2019 10:16 PM  
 Glucose NEGATIVE  01/15/2019 10:16 PM  
 Ketone 40 (A) 01/15/2019 10:16 PM  
 Urobilinogen 1.0 01/15/2019 10:16 PM  
 Nitrites NEGATIVE  01/15/2019 10:16 PM  
 Leukocyte Esterase SMALL (A) 01/15/2019 10:16 PM  
 Epithelial cells MODERATE (A) 01/15/2019 10:16 PM  
 Bacteria 1+ (A) 01/15/2019 10:16 PM  
 WBC 10-20 01/15/2019 10:16 PM  
 RBC 0-5 01/15/2019 10:16 PM  
 
 
 
Medications Reviewed:  
 
Current Facility-Administered Medications Medication Dose Route Frequency  0.9% sodium chloride infusion 250 mL  250 mL IntraVENous PRN  potassium chloride 20 mEq in 50 ml IVPB  20 mEq IntraVENous Q1H  
 bumetanide (BUMEX) injection 1 mg  1 mg IntraVENous Q12H  DOBUTamine (DOBUTREX) 500 mg/250 mL (2,000 mcg/mL) infusion  0-10 mcg/kg/min IntraVENous TITRATE  levoFLOXacin (LEVAQUIN) 250 mg in D5W IVPB  250 mg IntraVENous Q24H  
 0.9% sodium chloride infusion 250 mL  250 mL IntraVENous PRN  
 PHENYLephrine (NASEEM-SYNEPHRINE) 30 mg in 0.9% sodium chloride 250 mL infusion   mcg/min IntraVENous TITRATE  prochlorperazine (COMPAZINE) with saline injection 10 mg  10 mg IntraVENous Q6H PRN  
 rosuvastatin (CRESTOR) tablet 10 mg  10 mg Oral QHS  albuterol-ipratropium (DUO-NEB) 2.5 MG-0.5 MG/3 ML  3 mL Nebulization Q6H PRN  
 guaiFENesin ER (MUCINEX) tablet 600 mg  600 mg Oral Q12H  
 acetylcysteine (MUCOMYST) 200 mg/mL (20 %) solution 200 mg  200 mg Nebulization BID  pantoprazole (PROTONIX) tablet 40 mg  40 mg Oral BID  sodium chloride (NS) flush 5-40 mL  5-40 mL IntraVENous Q8H  
 sodium chloride (NS) flush 5-40 mL  5-40 mL IntraVENous PRN  
 aspirin chewable tablet 81 mg  81 mg Oral DAILY  nitroglycerin (NITROSTAT) tablet 0.4 mg  0.4 mg SubLINGual Q5MIN PRN  
 ondansetron (ZOFRAN) injection 4 mg  4 mg IntraVENous Q4H PRN  
 morphine injection 2 mg  2 mg IntraVENous Q4H PRN  
 
______________________________________________________________________ EXPECTED LENGTH OF STAY: 4d 4h 
ACTUAL LENGTH OF STAY:          7 [id number] [facility]

## 2019-02-01 NOTE — PROGRESS NOTES
[facility] 
 [address] [facility], [facility] [address] [facility] Phone: [phone]   [phone]   
  
Nephrology Progress Note Gareth Lewis     1940     [id number] Date of Admission : 1/25/2019 02/01/19 CC:  Follow up for  TOÑA Assessment and Plan TOÑA · 2/2 Blood loss, Shock, IV contrast (260cc), CRS 
· Non oliguric now and Cr trending down · Continue Dobutamine for now ( if ok w/ Cardiology) · Bumex 1 mg BID today. Hold dose if she needs CTA again · Labs Q12 Multifocal Hemorrhage : 
· Hematuria, +ve hemoptysis , Extra and Intra peritoneal bleed · Exclude Vasculitis - ordered ANCA panel , sed rate · No aneurysms or large vessel vasculitis on Arteriogram  
 
Hypokalemia · Replacement ordered   
  
Rectus Abdominus bleed/ Hematoma · Presumed spontaneous. No AC / trauma / aneurysms · S/p IR embolization 1/30 · Ongoing bleed ?? ==> ordered repeat CT A/P  
  
Severe CMP w/ EF 20% · Per cardiology · Would avoid ACE (I) until ARF resolves Interval History: 
Seen and examined Needed 2 more units of blood overnight  
1200 cc UOP after Bumex dose Denies any more ABDO pain. No N/V Review of Systems: Pertinent items are noted in HPI. Current Medications:  
Current Facility-Administered Medications Medication Dose Route Frequency  0.9% sodium chloride infusion 250 mL  250 mL IntraVENous PRN  potassium chloride 20 mEq in 50 ml IVPB  20 mEq IntraVENous Q1H  
 potassium chloride SR (KLOR-CON 10) tablet 40 mEq  40 mEq Oral NOW  bumetanide (BUMEX) injection 1 mg  1 mg IntraVENous Q12H  
 DOBUTamine (DOBUTREX) 500 mg/250 mL (2,000 mcg/mL) infusion  0-10 mcg/kg/min IntraVENous TITRATE  levoFLOXacin (LEVAQUIN) 250 mg in D5W IVPB  250 mg IntraVENous Q24H  
 0.9% sodium chloride infusion 250 mL  250 mL IntraVENous PRN  
 PHENYLephrine (NASEEM-SYNEPHRINE) 30 mg in 0.9% sodium chloride 250 mL infusion   mcg/min IntraVENous TITRATE  prochlorperazine (COMPAZINE) with saline injection 10 mg  10 mg IntraVENous Q6H PRN  
 rosuvastatin (CRESTOR) tablet 10 mg  10 mg Oral QHS  albuterol-ipratropium (DUO-NEB) 2.5 MG-0.5 MG/3 ML  3 mL Nebulization Q6H PRN  
 guaiFENesin ER (MUCINEX) tablet 600 mg  600 mg Oral Q12H  
 acetylcysteine (MUCOMYST) 200 mg/mL (20 %) solution 200 mg  200 mg Nebulization BID  pantoprazole (PROTONIX) tablet 40 mg  40 mg Oral BID  sodium chloride (NS) flush 5-40 mL  5-40 mL IntraVENous Q8H  
 sodium chloride (NS) flush 5-40 mL  5-40 mL IntraVENous PRN  
 aspirin chewable tablet 81 mg  81 mg Oral DAILY  nitroglycerin (NITROSTAT) tablet 0.4 mg  0.4 mg SubLINGual Q5MIN PRN  
 ondansetron (ZOFRAN) injection 4 mg  4 mg IntraVENous Q4H PRN  
 morphine injection 2 mg  2 mg IntraVENous Q4H PRN Allergies Allergen Reactions  Penicillins Hives Objective: 
Vitals:   
Vitals:  
 02/01/19 0300 02/01/19 0400 02/01/19 0500 02/01/19 0600 BP: 129/44 128/41 122/50 119/48 Pulse: (!) 101 96 86 83 Resp: 14 14 13 15 Temp:  98.5 °F (36.9 °C) SpO2: 96% 99% 98% 98% Weight:  67.3 kg (148 lb 5.9 oz) Height:      
 
Intake and Output: 
No intake/output data recorded. 01/30 1901 - 02/01 0700 In: 2413.8 [I.V.:1192.4] Out: 2205 [HIUSN:1088] Physical Examination: 
General:  Lethargic HEENT: PERRL,  Pale Neck: lines + Lungs : rales + CVS: RRR, S1 S2 normal, No murmur Abdomen: large hematoma in lower abdomen Extremities: no Edema MS: No joint swelling, erythema, warmth Neurologic: non focal 
 
 
 
[]    High complexity decision making was performed 
[]    Patient is at high-risk of decompensation with multiple organ involvement Lab Data Personally Reviewed: I have reviewed all the pertinent labs, microbiology data and radiology studies during assessment. Recent Labs 02/01/19 
7546 01/31/19 
1545 01/31/19 
0959 01/31/19 
0009 01/30/19 
1323 01/30/19 
0224 01/29/19 
2582  145 144 145  --  142 141  
K 2.9* 3.7 4.1 4.4  --  3.4* 3.4*  
 107 108 106  --  102 106 CO2 28 25 25 25  --  28 26 * 132* 157* 165*  --  161* 118* BUN 28* 30* 24* 22*  --  10 8 CREA 1.77* 2.06* 1.80* 1.78*  --  1.06* 0.67 CA 8.1* 7.9* 7.5* 7.5*  --  7.8* 7.8*  
MG 2.1  --  2.1  --   --  1.5* 1.8 PHOS 2.8 3.1 3.7 3.7  --  2.2*  --   
ALB 4.5 4.2 4.0 4.0  --  3.6 3.5 SGOT 24  --  14*  --   --  19 25 ALT 11*  --  13  --   --  15 17 INR 1.2*  --  1.3*  --  1.3*  --   --   
 
Recent Labs 02/01/19 
8611 01/31/19 
2101 01/31/19 
1230 01/31/19 
1310 01/30/19 
2031 01/30/19 
1323 01/30/19 
5554 WBC 13.9*  --   --  19.3*  --  13.2* 8.7 HGB 6.8* 6.0* 7.3* 8.9* 6.2* 7.5* 5.7* HCT 20.9* 18.5* 22.1* 27.1* 18.9* 23.1* 18.8*  
  --   --  157  --  185 185 No results found for: SDES Lab Results Component Value Date/Time Culture result: MRSA NOT PRESENT 01/29/2019 11:21 AM  
 Culture result:  01/29/2019 11:21 AM  
      Screening of patient nares for MRSA is for surveillance purposes and, if positive, to facilitate isolation considerations in high risk settings. It is not intended for automatic decolonization interventions per se as regimens are not sufficiently effective to warrant routine use. Culture result: NO GROWTH 5 DAYS 01/25/2019 03:12 PM  
 Culture result: NO GROWTH 5 DAYS 01/21/2019 02:25 PM  
 Culture result: NO GROWTH 5 DAYS 01/18/2019 12:29 PM  
 
Recent Results (from the past 24 hour(s)) SAMPLES BEING HELD Collection Time: 01/31/19  9:30 AM  
Result Value Ref Range SAMPLES BEING HELD 1UHOLD COMMENT Add-on orders for these samples will be processed based on acceptable specimen integrity and analyte stability, which may vary by analyte. CHLORIDE, URINE RANDOM Collection Time: 01/31/19 10:00 AM  
Result Value Ref Range Chloride,urine random 23 MMOL/L  
SODIUM, UR, RANDOM  Collection Time: 01/31/19 10:00 AM  
 Result Value Ref Range Sodium,urine random 16 MMOL/L  
CREATININE, UR, RANDOM Collection Time: 01/31/19 10:00 AM  
Result Value Ref Range Creatinine, urine 79.10 mg/dL HGB & HCT Collection Time: 01/31/19 12:30 PM  
Result Value Ref Range HGB 7.3 (L) 11.5 - 16.0 g/dL HCT 22.1 (L) 35.0 - 47.0 % RENAL FUNCTION PANEL Collection Time: 01/31/19  3:45 PM  
Result Value Ref Range Sodium 145 136 - 145 mmol/L Potassium 3.7 3.5 - 5.1 mmol/L Chloride 107 97 - 108 mmol/L  
 CO2 25 21 - 32 mmol/L Anion gap 13 5 - 15 mmol/L Glucose 132 (H) 65 - 100 mg/dL BUN 30 (H) 6 - 20 MG/DL Creatinine 2.06 (H) 0.55 - 1.02 MG/DL  
 BUN/Creatinine ratio 15 12 - 20 GFR est AA 28 (L) >60 ml/min/1.73m2 GFR est non-AA 23 (L) >60 ml/min/1.73m2 Calcium 7.9 (L) 8.5 - 10.1 MG/DL Phosphorus 3.1 2.6 - 4.7 MG/DL Albumin 4.2 3.5 - 5.0 g/dL HGB & HCT Collection Time: 01/31/19  9:01 PM  
Result Value Ref Range HGB 6.0 (L) 11.5 - 16.0 g/dL HCT 18.5 (L) 35.0 - 47.0 % CBC WITH AUTOMATED DIFF Collection Time: 02/01/19  2:42 AM  
Result Value Ref Range WBC 13.9 (H) 3.6 - 11.0 K/uL  
 RBC 2.34 (L) 3.80 - 5.20 M/uL HGB 6.8 (L) 11.5 - 16.0 g/dL HCT 20.9 (L) 35.0 - 47.0 % MCV 89.3 80.0 - 99.0 FL  
 MCH 29.1 26.0 - 34.0 PG  
 MCHC 32.5 30.0 - 36.5 g/dL  
 RDW 15.3 (H) 11.5 - 14.5 % PLATELET 251 600 - 122 K/uL MPV 11.1 8.9 - 12.9 FL  
 NRBC 0.9 (H) 0  WBC ABSOLUTE NRBC 0.13 (H) 0.00 - 0.01 K/uL NEUTROPHILS 79 (H) 32 - 75 % LYMPHOCYTES 13 12 - 49 % MONOCYTES 7 5 - 13 % EOSINOPHILS 0 0 - 7 % BASOPHILS 0 0 - 1 % IMMATURE GRANULOCYTES 1 (H) 0.0 - 0.5 % ABS. NEUTROPHILS 11.0 (H) 1.8 - 8.0 K/UL  
 ABS. LYMPHOCYTES 1.8 0.8 - 3.5 K/UL  
 ABS. MONOCYTES 1.0 0.0 - 1.0 K/UL  
 ABS. EOSINOPHILS 0.0 0.0 - 0.4 K/UL  
 ABS. BASOPHILS 0.0 0.0 - 0.1 K/UL  
 ABS. IMM.  GRANS. 0.1 (H) 0.00 - 0.04 K/UL  
 DF SMEAR SCANNED    
 RBC COMMENTS ANISOCYTOSIS 
1+ 
    
 RBC COMMENTS OVALOCYTES PRESENT 
    
 RBC COMMENTS HYPOCHROMIA PRESENT 
    
 RBC COMMENTS POLYCHROMASIA PRESENT 
    
MAGNESIUM Collection Time: 02/01/19  2:42 AM  
Result Value Ref Range Magnesium 2.1 1.6 - 2.4 mg/dL METABOLIC PANEL, COMPREHENSIVE Collection Time: 02/01/19  2:42 AM  
Result Value Ref Range Sodium 145 136 - 145 mmol/L Potassium 2.9 (L) 3.5 - 5.1 mmol/L Chloride 106 97 - 108 mmol/L  
 CO2 28 21 - 32 mmol/L Anion gap 11 5 - 15 mmol/L Glucose 122 (H) 65 - 100 mg/dL BUN 28 (H) 6 - 20 MG/DL Creatinine 1.77 (H) 0.55 - 1.02 MG/DL  
 BUN/Creatinine ratio 16 12 - 20 GFR est AA 34 (L) >60 ml/min/1.73m2 GFR est non-AA 28 (L) >60 ml/min/1.73m2 Calcium 8.1 (L) 8.5 - 10.1 MG/DL Bilirubin, total 1.5 (H) 0.2 - 1.0 MG/DL  
 ALT (SGPT) 11 (L) 12 - 78 U/L  
 AST (SGOT) 24 15 - 37 U/L Alk. phosphatase 30 (L) 45 - 117 U/L Protein, total 6.9 6.4 - 8.2 g/dL Albumin 4.5 3.5 - 5.0 g/dL Globulin 2.4 2.0 - 4.0 g/dL A-G Ratio 1.9 1.1 - 2.2 PHOSPHORUS Collection Time: 02/01/19  2:42 AM  
Result Value Ref Range Phosphorus 2.8 2.6 - 4.7 MG/DL PROTHROMBIN TIME + INR Collection Time: 02/01/19  2:42 AM  
Result Value Ref Range INR 1.2 (H) 0.9 - 1.1 Prothrombin time 12.4 (H) 9.0 - 11.1 sec PTT Collection Time: 02/01/19  2:42 AM  
Result Value Ref Range aPTT 26.5 22.1 - 32.0 sec  
 aPTT, therapeutic range     58.0 - 77.0 SECS I have reviewed the flowsheets. Chart and Pertinent Notes have been reviewed. No change in PMH ,family and social history from Consult note.  
 
 
Gina Oakes MD

## 2019-02-01 NOTE — PROGRESS NOTES
1950: Bedside shift report received from [facility]. 2000: CHG bath completed. 2200: HGB drawn. 6.0--- 1 unit of RBCs ordered. 0130: Blood transfusion completed. 0245: AM labs drawn. Hgb 6.8. Additional unit of RBCs ordered. 9639: 2nd blood transfusion complete.  
0700: Kvng Pope MD at bedside. Orders received for K replacement and repeat CT scan. 0715: Repeat hgb drawn. 0730: Bedside and Verbal shift change report given to [address] [place] [address] (oncoming nurse) by Arthur Luis (offgoing nurse). Report included the following information SBAR, Kardex, Procedure Summary, Intake/Output, MAR, Accordion and Recent Results.

## 2019-02-01 NOTE — INTERDISCIPLINARY ROUNDS
IDR/SLIDR Summary Patient: Christiano Sharma MRN: [id number]    Age: 70 y.o. YOB: 1940 Room/Bed: [address] [place] [address] Admit Diagnosis: Dyspnea  Principal Diagnosis: <principal problem not specified>  
Goals: Decrease respiratory distress, Decrease bleeding Bipap as needed Antibiotics Readmission: NO  Quality Measure: CHF and PNA 
VTE Prophylaxis: Mechanical 
Influenza Vaccine screening completed? YES Pneumococcal Vaccine screening completed? YES Mobility needs: Yes   Nutrition plan:Yes 
Consults:P.T, Respiratory and Case Management Financial concerns:Yes  Escalated to CM? YES 
RRAT Score: 15   Interventions:H2H Testing due for pt today? YES 
LOS: 7 days Expected length of stay ? days Discharge plan: Home   PCP: Tony Shetty MD 
Transportation needs: Yes Days before discharge:two or more days before discharge Discharge disposition: Home Signed:  
 
Tamara Jurado RN 
2/1/2019 
7:45 PM

## 2019-02-02 NOTE — PROGRESS NOTES
2000 Bedside shift change report given to Karthikeyan Sunshine (oncoming nurse) by Elle Kapadia (offgoing nurse). Report included the following information SBAR, Kardex, Intake/Output, MAR, Med Rec Status and Cardiac Rhythm NSR-ST.  
 
2150  Sched meds given with applesauce. 2300  Complete CHG bath done and linens changed. 0000  No changes in assessment. VSS. 0200  Pt dozing intermittently. VSS. 0400  Pt repositioned no other changes in assessment. Labs drawn and sent. 0730  Bedside shift change report given to Elle Kapadia (oncoming nurse) by Karthikeyan Sunshine (offgoing nurse). Report included the following information SBAR, Kardex, Intake/Output, MAR and Cardiac Rhythm NSR.

## 2019-02-02 NOTE — PROGRESS NOTES
2/2/2019     [facility] [address] [place] [address] 
 
. Neal eLwis is a 70 y.o. female HPI: Earlier in month seen with afib in setting of acute pancreatitis. Cardiology following for elevated troponins, PAF and new systolic CHF/cardiomyopathy. 1/30/19: marked anemia and hypotension/hypovolemic shock with left pelvic and rectus sheath hematoma/bleed s/p IR embolization. 1/31/19: required PRBCs x 4 total yesterday. ( Hgb improved this a.m. but renal function and urine output has diminished overnight . Started on low dose dobutamine per renal to increase renal perfusion. MAP goal >65. 
2/1/19: hgb 6.2 overnight- additional 2 units PRBCs 
2/2/19 Hgb 8.1 today Subjective:  Pt weak, c/o some SOB but no c/o chest pain. Daughter states pt slept most of yesterday. Assessment/Plan/Discussion:Cardiology Attending:  
Cont dobutamine Awake, alert did not sleep last night. Tired, no chest pain, no dyspnea. Discussion/Plans 1. Cardiomyopathy, acute systolic CHF: 
-NYHA IV (complicated by anemia, critical illness) -EF 15-27%% with WMA  
-Uncertain etiology. Suspect d/t acute illness.  
-Not candidate for invasive cardiac testing.  
-Agree with continuing Dobutamine 2.5 mcg/kg/min  
-CXR 2/1. Mild interstitial edema. Neal Arellano -CVP 5.net neg 271 mls/24 hrs. Bumex 1 mg IV BID today 
-No ACE-I/coreg d/t renal dysfunction/need for increased renal perfusion. 2. Shock/Hypotension: BP normalized -s/p PRBCs,   
-Dobutamine low dose . -Off neosynephrine 3. NQMI: Troponin elevation and fall. Peak of 3.26 
-Troponins trend down. 0.11 1/31. 
-No ASA d/t pelvic/rectus sheath bleeds   
-Continue high potency statin. 
-Off BB, ace-I d/t hypotension, need for increased MAP for renal 
 
4. PAF:  
-SR on tele review, no PAF. -Off coreg 
-[id number]=2 (age, CHF, Female). No anticoags or ASA d/t bleeding. 5. TOÑA: creatinine 1.77 
-Renal following.  TOÑA d/t hypoperfusion , contrast, CRS 
 -Continue Dobutamine, IV bumex but hold diuretic if contrast needed again 6. Pelvic/rectus sheath hematomas/bleed: 
-s/p IR embolization 1/30/19 
-Repeat CT abdomen today w/o contrast. 
 
7. Anemia, acute blood loss:  hgb 8.2 s/p 6 units PRBCs  
-Off ASA and lovenox. 8.  Pancreatitis with cystic lesion-  
-Cystic lesion likely reflects intraductal papillary mucinous tumor per GI 9. Leukocytosis:  WBC trending down 10. Influenza/superimposed pneumonia Morna Rout Cardiac Studies/Hx: 
No specialty comments available. Patient Vitals for the past 12 hrs: 
 Temp Pulse Resp BP SpO2  
02/02/19 0700  99 17 146/62 100 % 02/02/19 0600  85 13 136/48 99 % 02/02/19 0500  (!) 103 17 150/67 98 % 02/02/19 0400 98.4 °F (36.9 °C) 100 20 144/56 99 % 02/02/19 0300  88 14 139/47 100 % 02/02/19 0200  (!) 103 17 153/65 98 % 02/02/19 0130  96 17    
02/02/19 0100  (!) 104 16    
02/02/19 0030  (!) 103 16    
02/02/19 0000 99.5 °F (37.5 °C) 92 18 160/56 99 % 02/01/19 2330  99 20    
02/01/19 2200  (!) 107 17 151/54 99 % 02/01/19 2109     100 % 02/01/19 2100  (!) 105 23 148/56 100 % 02/01/19 2000 (!) 100.7 °F (38.2 °C) (!) 104 20 146/58 99 % Past Medical History:  
Diagnosis Date  Other ill-defined conditions(799.89) IBS  Pancreatitis ROS-pertinents  negative except as above  The pertinent portions of the medical history,physician and nursing notes, meds,vitals , labs and Ins/Outs,are reviewed in the electronic record. Results for orders placed or performed during the hospital encounter of 01/25/19 EKG, 12 LEAD, INITIAL Result Value Ref Range Ventricular Rate 79 BPM  
 Atrial Rate 79 BPM  
 P-R Interval 138 ms QRS Duration 70 ms Q-T Interval 516 ms  
 QTC Calculation (Bezet) 591 ms Calculated P Axis 12 degrees Calculated R Axis 47 degrees Calculated T Axis -133 degrees Diagnosis Normal sinus rhythm Marked T wave abnormality, consider anterior ischemia Prolonged QT When compared with ECG of 29-JAN-2019 14:54, 
Marked T wave abnormality, consider anterior ischemia now present Confirmed by Yudi Gibbs M.D., Kristiantessne Benson ([id number]) on 1/30/2019 9:52:22 AM 
  
  
Vitals:  
 02/02/19 0400 02/02/19 0500 02/02/19 0600 02/02/19 0700 BP: 144/56 150/67 136/48 146/62 BP 1 Location:      
Pulse: 100 (!) 103 85 99 Resp: 20 17 13 17 Temp: 98.4 °F (36.9 °C) SpO2: 99% 98% 99% 100% Weight: 145 lb 4.5 oz (65.9 kg) Height:      
 
 
Objective:  
 Physical Exam:  
Patient Vitals for the past 12 hrs: 
 Temp Pulse Resp BP SpO2  
02/02/19 0700  99 17 146/62 100 % 02/02/19 0600  85 13 136/48 99 % 02/02/19 0500  (!) 103 17 150/67 98 % 02/02/19 0400 98.4 °F (36.9 °C) 100 20 144/56 99 % 02/02/19 0300  88 14 139/47 100 % 02/02/19 0200  (!) 103 17 153/65 98 % 02/02/19 0130  96 17    
02/02/19 0100  (!) 104 16    
02/02/19 0030  (!) 103 16    
02/02/19 0000 99.5 °F (37.5 °C) 92 18 160/56 99 % 02/01/19 2330  99 20    
02/01/19 2200  (!) 107 17 151/54 99 % 02/01/19 2109     100 % 02/01/19 2100  (!) 105 23 148/56 100 % 02/01/19 2000 (!) 100.7 °F (38.2 °C) (!) 104 20 146/58 99 % General:  Weak appearing. Mild SOB  
ENT, Neck:  no jvd Chest Wall: inspection normal - no chest wall deformities or tenderness,  
Lung:  scattered course breath sounds. , mild tachypnea Heart:  normal rate, regular rhythm, normal S1, S2, no murmurs, rubs, clicks or gallops Abdomen: Distended, soft, +ttp Extremities: extremities normal, atraumatic, no cyanosis or edema :  Urine light cranberry color Last 24hr Input/Output: 
 
Intake/Output Summary (Last 24 hours) at 2/2/2019 3150 Last data filed at 2/2/2019 0700 Gross per 24 hour Intake 390 ml Output 2010 ml Net -1620 ml Data Review:  
Recent Results (from the past 24 hour(s)) RENAL FUNCTION PANEL  Collection Time: 02/01/19  1:57 PM  
 Result Value Ref Range Sodium 148 (H) 136 - 145 mmol/L Potassium 3.9 3.5 - 5.1 mmol/L Chloride 110 (H) 97 - 108 mmol/L  
 CO2 30 21 - 32 mmol/L Anion gap 8 5 - 15 mmol/L Glucose 112 (H) 65 - 100 mg/dL BUN 25 (H) 6 - 20 MG/DL Creatinine 1.44 (H) 0.55 - 1.02 MG/DL  
 BUN/Creatinine ratio 17 12 - 20 GFR est AA 43 (L) >60 ml/min/1.73m2 GFR est non-AA 35 (L) >60 ml/min/1.73m2 Calcium 8.3 (L) 8.5 - 10.1 MG/DL Phosphorus 1.9 (L) 2.6 - 4.7 MG/DL Albumin 4.3 3.5 - 5.0 g/dL RENAL FUNCTION PANEL Collection Time: 02/02/19  3:54 AM  
Result Value Ref Range Sodium 147 (H) 136 - 145 mmol/L Potassium 3.4 (L) 3.5 - 5.1 mmol/L Chloride 107 97 - 108 mmol/L  
 CO2 32 21 - 32 mmol/L Anion gap 8 5 - 15 mmol/L Glucose 113 (H) 65 - 100 mg/dL BUN 20 6 - 20 MG/DL Creatinine 1.19 (H) 0.55 - 1.02 MG/DL  
 BUN/Creatinine ratio 17 12 - 20 GFR est AA 53 (L) >60 ml/min/1.73m2 GFR est non-AA 44 (L) >60 ml/min/1.73m2 Calcium 8.2 (L) 8.5 - 10.1 MG/DL Phosphorus 2.6 2.6 - 4.7 MG/DL Albumin 4.1 3.5 - 5.0 g/dL CBC W/O DIFF Collection Time: 02/02/19  3:54 AM  
Result Value Ref Range WBC 10.1 3.6 - 11.0 K/uL  
 RBC 2.77 (L) 3.80 - 5.20 M/uL HGB 8.1 (L) 11.5 - 16.0 g/dL HCT 25.4 (L) 35.0 - 47.0 % MCV 91.7 80.0 - 99.0 FL  
 MCH 29.2 26.0 - 34.0 PG  
 MCHC 31.9 30.0 - 36.5 g/dL  
 RDW 15.6 (H) 11.5 - 14.5 % PLATELET 832 053 - 712 K/uL MPV 10.9 8.9 - 12.9 FL  
 NRBC 0.6 (H) 0  WBC ABSOLUTE NRBC 0.06 (H) 0.00 - 0.01 K/uL Radha Izaguirre MD 2/2/2019

## 2019-02-02 NOTE — PROGRESS NOTES
Problem: Falls - Risk of 
Goal: *Absence of Falls Document Hossein Dye Fall Risk and appropriate interventions in the flowsheet. Outcome: Progressing Towards Goal 
Fall Risk Interventions: 
Mobility Interventions: Communicate number of staff needed for ambulation/transfer, Bed/chair exit alarm Mentation Interventions: Bed/chair exit alarm, Door open when patient unattended, More frequent rounding Medication Interventions: Evaluate medications/consider consulting pharmacy, Bed/chair exit alarm Elimination Interventions: Call light in reach Problem: Pressure Injury - Risk of 
Goal: *Prevention of pressure injury Document Travis Scale and appropriate interventions in the flowsheet. Outcome: Progressing Towards Goal 
Pressure Injury Interventions: 
Sensory Interventions: Assess changes in LOC, Keep linens dry and wrinkle-free, Float heels, Minimize linen layers, Turn and reposition approx. every two hours (pillows and wedges if needed) Moisture Interventions: Absorbent underpads Activity Interventions: Pressure redistribution bed/mattress(bed type) Mobility Interventions: Pressure redistribution bed/mattress (bed type) Nutrition Interventions: Discuss nutritional consult with provider, Document food/fluid/supplement intake, Offer support with meals,snacks and hydration Friction and Shear Interventions: Apply protective barrier, creams and emollients

## 2019-02-02 NOTE — PROGRESS NOTES
[facility] 
 [address] [address], [address] [facility] Phone: [phone]   [phone]   
  
Nephrology Progress Note Katey Wakefield     1940     [id number] Date of Admission : 1/25/2019 02/02/19 CC:  Follow up for  TOÑA Assessment and Plan TOÑA · 2/2 Blood loss, Shock, IV contrast (260cc), CRS 
· Non oliguric now and Cr trending down · Cont current diuretics · Dobutamine per cards · Serial labs Multifocal Hemorrhage : 
· Hematuria, +ve hemoptysis , Extra and Intra peritoneal bleed · Exclude Vasculitis - ordered ANCA panel , sed rate · No aneurysms or large vessel vasculitis on Arteriogram  
 
Hypokalemia · Oral repletion ordered 
  
Rectus Abdominus bleed/ Hematoma · Presumed spontaneous. No AC / trauma / aneurysms · S/p IR embolization 1/30 · Ongoing bleed ?? ==> ordered repeat CT A/P  
  
Severe CMP w/ EF 20% · Per cardiology · Would avoid ACE (I) until ARF resolves Interval History: 
Seen and examined . Cr stable. Voiding well. On dobutamine drip. BP and HR stable. No cp, sob, n/v/d reported at this time. Review of Systems: Pertinent items are noted in HPI. Current Medications:  
Current Facility-Administered Medications Medication Dose Route Frequency  potassium chloride SR (KLOR-CON 10) tablet 20 mEq  20 mEq Oral NOW  
 0.9% sodium chloride infusion 250 mL  250 mL IntraVENous PRN  
 bumetanide (BUMEX) injection 1 mg  1 mg IntraVENous Q12H  
 acetylcysteine (MUCOMYST) 200 mg/mL (20 %) solution 200 mg  200 mg Nebulization BID RT  
 DOBUTamine (DOBUTREX) 500 mg/250 mL (2,000 mcg/mL) infusion  0-10 mcg/kg/min IntraVENous TITRATE  levoFLOXacin (LEVAQUIN) 250 mg in D5W IVPB  250 mg IntraVENous Q24H  
 0.9% sodium chloride infusion 250 mL  250 mL IntraVENous PRN  
 PHENYLephrine (NASEEM-SYNEPHRINE) 30 mg in 0.9% sodium chloride 250 mL infusion   mcg/min IntraVENous TITRATE  prochlorperazine (COMPAZINE) with saline injection 10 mg  10 mg IntraVENous Q6H PRN  
 rosuvastatin (CRESTOR) tablet 10 mg  10 mg Oral QHS  albuterol-ipratropium (DUO-NEB) 2.5 MG-0.5 MG/3 ML  3 mL Nebulization Q6H PRN  
 guaiFENesin ER (MUCINEX) tablet 600 mg  600 mg Oral Q12H  pantoprazole (PROTONIX) tablet 40 mg  40 mg Oral BID  sodium chloride (NS) flush 5-40 mL  5-40 mL IntraVENous Q8H  
 sodium chloride (NS) flush 5-40 mL  5-40 mL IntraVENous PRN  
 aspirin chewable tablet 81 mg  81 mg Oral DAILY  nitroglycerin (NITROSTAT) tablet 0.4 mg  0.4 mg SubLINGual Q5MIN PRN  
 ondansetron (ZOFRAN) injection 4 mg  4 mg IntraVENous Q4H PRN  
 morphine injection 2 mg  2 mg IntraVENous Q4H PRN Allergies Allergen Reactions  Penicillins Hives Objective: 
Vitals:   
Vitals:  
 02/02/19 0800 02/02/19 0818 02/02/19 0900 02/02/19 1000 BP: 138/57  145/60 145/57 Pulse: 86  (!) 115 (!) 103 Resp: 15  26 25 Temp: 98.6 °F (37 °C) SpO2: 100% 100% 100% 98% Weight:      
Height:      
 
Intake and Output: 
No intake/output data recorded. 01/31 1901 - 02/02 0700 In: 1627.6 [P.O.:240; I.V.:772.5] Out: 3210 [[id number]] Physical Examination:General:  Lethargic HEENT: PERRL,  Pale Neck: lines + Lungs : rales + CVS: RRR, S1 S2 normal, No murmur Abdomen: large hematoma in lower abdomen Extremities: no Edema MS: No joint swelling, erythema, warmth Neurologic: non focal 
 
 
 
[]    High complexity decision making was performed 
[]    Patient is at high-risk of decompensation with multiple organ involvement Lab Data Personally Reviewed: I have reviewed all the pertinent labs, microbiology data and radiology studies during assessment. Recent Labs 02/02/19 
0354 02/01/19 
1357 02/01/19 
0242 01/31/19 
1545 01/31/19 
0437  01/30/19 
1323 * 148* 145 145 144   < >  --   
K 3.4* 3.9 2.9* 3.7 4.1   < >  --   
 110* 106 107 108   < >  --   
 CO2 32 30 28 25 25   < >  --   
* 112* 122* 132* 157*   < >  --   
BUN 20 25* 28* 30* 24*   < >  --   
CREA 1.19* 1.44* 1.77* 2.06* 1.80*   < >  --   
CA 8.2* 8.3* 8.1* 7.9* 7.5*   < >  --   
MG  --   --  2.1  --  2.1  --   --   
PHOS 2.6 1.9* 2.8 3.1 3.7   < >  --   
ALB 4.1 4.3 4.5 4.2 4.0   < >  --   
SGOT  --   --  24  --  14*  --   --   
ALT  --   --  11*  --  13  --   --   
INR  --   --  1.2*  --  1.3*  --  1.3*  
 < > = values in this interval not displayed. Recent Labs 02/02/19 
0354 02/01/19 
0719 02/01/19 
0242 01/31/19 
2101 01/31/19 
1230 01/31/19 
0437  01/30/19 
1323 WBC 10.1  --  13.9*  --   --  19.3*  --  13.2* HGB 8.1* 8.2* 6.8* 6.0* 7.3* 8.9*   < > 7.5* HCT 25.4* 24.9* 20.9* 18.5* 22.1* 27.1*   < > 23.1*  
  --  152  --   --  157  --  185  
 < > = values in this interval not displayed. No results found for: [facility] [place] Lab Results Component Value Date/Time Culture result: MRSA NOT PRESENT 01/29/2019 11:21 AM  
 Culture result:  01/29/2019 11:21 AM  
      Screening of patient nares for MRSA is for surveillance purposes and, if positive, to facilitate isolation considerations in high risk settings. It is not intended for automatic decolonization interventions per se as regimens are not sufficiently effective to warrant routine use. Culture result: NO GROWTH 5 DAYS 01/25/2019 03:12 PM  
 Culture result: NO GROWTH 5 DAYS 01/21/2019 02:25 PM  
 Culture result: NO GROWTH 5 DAYS 01/18/2019 12:29 PM  
 
Recent Results (from the past 24 hour(s)) RENAL FUNCTION PANEL Collection Time: 02/01/19  1:57 PM  
Result Value Ref Range Sodium 148 (H) 136 - 145 mmol/L Potassium 3.9 3.5 - 5.1 mmol/L Chloride 110 (H) 97 - 108 mmol/L  
 CO2 30 21 - 32 mmol/L Anion gap 8 5 - 15 mmol/L Glucose 112 (H) 65 - 100 mg/dL BUN 25 (H) 6 - 20 MG/DL Creatinine 1.44 (H) 0.55 - 1.02 MG/DL  
 BUN/Creatinine ratio 17 12 - 20 GFR est AA 43 (L) >60 ml/min/1.73m2 GFR est non-AA 35 (L) >60 ml/min/1.73m2 Calcium 8.3 (L) 8.5 - 10.1 MG/DL Phosphorus 1.9 (L) 2.6 - 4.7 MG/DL Albumin 4.3 3.5 - 5.0 g/dL RENAL FUNCTION PANEL Collection Time: 02/02/19  3:54 AM  
Result Value Ref Range Sodium 147 (H) 136 - 145 mmol/L Potassium 3.4 (L) 3.5 - 5.1 mmol/L Chloride 107 97 - 108 mmol/L  
 CO2 32 21 - 32 mmol/L Anion gap 8 5 - 15 mmol/L Glucose 113 (H) 65 - 100 mg/dL BUN 20 6 - 20 MG/DL Creatinine 1.19 (H) 0.55 - 1.02 MG/DL  
 BUN/Creatinine ratio 17 12 - 20 GFR est AA 53 (L) >60 ml/min/1.73m2 GFR est non-AA 44 (L) >60 ml/min/1.73m2 Calcium 8.2 (L) 8.5 - 10.1 MG/DL Phosphorus 2.6 2.6 - 4.7 MG/DL Albumin 4.1 3.5 - 5.0 g/dL CBC W/O DIFF Collection Time: 02/02/19  3:54 AM  
Result Value Ref Range WBC 10.1 3.6 - 11.0 K/uL  
 RBC 2.77 (L) 3.80 - 5.20 M/uL HGB 8.1 (L) 11.5 - 16.0 g/dL HCT 25.4 (L) 35.0 - 47.0 % MCV 91.7 80.0 - 99.0 FL  
 MCH 29.2 26.0 - 34.0 PG  
 MCHC 31.9 30.0 - 36.5 g/dL  
 RDW 15.6 (H) 11.5 - 14.5 % PLATELET 227 672 - 610 K/uL MPV 10.9 8.9 - 12.9 FL  
 NRBC 0.6 (H) 0  WBC ABSOLUTE NRBC 0.06 (H) 0.00 - 0.01 K/uL I have reviewed the flowsheets. Chart and Pertinent Notes have been reviewed. No change in PMH ,family and social history from Consult note.  
 
 
Mian Yang MD

## 2019-02-02 NOTE — PROGRESS NOTES
0730 Bedside shift change report given to Citlaly Ocasio (oncoming nurse) by Trisha George (offgoing nurse). Report included the following information SBAR, Kardex, Procedure Summary, Intake/Output, MAR, Recent Results and Cardiac Rhythm NSR/ST. 0900 Able to sit patient EOB 
 
1345 TRANSFER - OUT REPORT: 
 
Verbal report given to Kori Lambert (name) on Genesis Fuller  being transferred to [facility] (unit) for routine progression of care Report consisted of patients Situation, Background, Assessment and  
Recommendations(SBAR). Information from the following report(s) SBAR, Kardex, Procedure Summary, Intake/Output, MAR, Recent Results and Cardiac Rhythm NSR was reviewed with the receiving nurse. Lines:  
Quad Lumen 01/30/19 Right Internal jugular (Active) Central Line Being Utilized Yes 2/2/2019  2:05 PM  
Criteria for Appropriate Use Hemodynamically unstable, requiring monitoring lines, vasopressors, or volume resuscitation 2/2/2019  2:05 PM  
Site Assessment Clean, dry, & intact 2/2/2019  2:05 PM  
Infiltration Assessment 0 2/2/2019  2:05 PM  
Affected Extremity/Extremities Color distal to insertion site pink (or appropriate for race); Pulses palpable;Range of motion performed 2/2/2019  2:05 PM  
Date of Last Dressing Change 01/30/19 2/2/2019  2:05 PM  
Dressing Status Clean, dry, & intact 2/2/2019  2:05 PM  
Dressing Type Disk with Chlorhexadine gluconate (CHG); Transparent 2/2/2019  2:05 PM  
Action Taken Open ports on tubing capped 2/2/2019  2:05 PM  
Proximal Hub Color/Line Status White;Capped 2/2/2019  2:05 PM  
Positive Blood Return (Medial Site) Yes 2/2/2019  2:05 PM  
Medial 1 Hub Color/Line Status Melene Bullion; Infusing 2/2/2019  2:05 PM  
Positive Blood Return (Lateral Site) Yes 2/2/2019  2:05 PM  
Medial 2 Hub Color/Line Status Blue; Infusing 2/2/2019  2:05 PM  
Positive Blood Return (Site #3) Yes 2/2/2019  2:05 PM  
Distal Hub Color/Line Status Brown;Capped 2/2/2019  2:05 PM  
 Positive Blood Return (Site #4) Yes 2/2/2019  2:05 PM  
Alcohol Cap Used Yes 2/2/2019  2:05 PM  
   
Peripheral IV 01/25/19 Left Antecubital (Active) Site Assessment Clean, dry, & intact 2/2/2019  2:05 PM  
Phlebitis Assessment 0 2/2/2019  2:05 PM  
Infiltration Assessment 0 2/2/2019  2:05 PM  
Dressing Status Clean, dry, & intact 2/2/2019  2:05 PM  
Dressing Type Tape;Transparent 2/2/2019  2:05 PM  
Hub Color/Line Status Pink;Capped 2/2/2019  2:05 PM  
Action Taken Open ports on tubing capped 2/2/2019  2:05 PM  
Alcohol Cap Used Yes 2/2/2019  2:05 PM  
   
Peripheral IV 01/30/19 Left Arm (Active) Site Assessment Clean, dry, & intact 2/2/2019  2:05 PM  
Phlebitis Assessment 0 2/2/2019  2:05 PM  
Infiltration Assessment 0 2/2/2019  2:05 PM  
Dressing Status Clean, dry, & intact 2/2/2019  2:05 PM  
Dressing Type Tape;Transparent 2/2/2019  2:05 PM  
Hub Color/Line Status Blue;Capped 2/2/2019  2:05 PM  
Action Taken Open ports on tubing capped 2/2/2019  2:05 PM  
Alcohol Cap Used Yes 2/2/2019  2:05 PM  
  
 
Opportunity for questions and clarification was provided. Patient transported with: 
 Monitor O2 @ 2 liters Registered Nurse Tech

## 2019-02-02 NOTE — PROGRESS NOTES
1700: Dr. Gómez Beyer notified of lab results of potassium and phosphorus, orders received. 1716: Pt's , /95, pt asymptomatic. EKG performed. Dr. Ambrose Bello paged. Dr. Gómez Beyer notified. Pt placed on 2L NC.  
 
1726: Received phone call from Dr. Ambrose Bello, Dr. Ambrose Bello notified of pt's increased HR, orders received. Will continue to monitor. Problem: Tissue Perfusion - Cardiopulmonary, Altered Goal: *Absence of hypoxia Outcome: Progressing Towards Goal 
Pt O2 weaned to 1L NC, pt baseline is room air, pt O2 remaining above 90%. Will continue to wean as tolerated. Bedside shift change report given to Chetna Jovel RN (oncoming nurse) by April Valerio (offgoing nurse). Report included the following information SBAR, Kardex, ED Summary, Procedure Summary, Intake/Output, MAR, Recent Results, Med Rec Status, Cardiac Rhythm Afib, Alarm Parameters  and Quality Measures.

## 2019-02-02 NOTE — INTERDISCIPLINARY ROUNDS
IDR/SLIDR Summary Patient: Tyrese Cameron MRN: [id number]    Age: 70 y.o. YOB: 1940 Room/Bed: [address] [place] [address] Admit Diagnosis: Dyspnea  Principal Diagnosis: <principal problem not specified>  
Goals: Decrease respiratory distress, Decrease bleeding Bipap as needed Antibiotics Readmission: NO  Quality Measure: CHF and PNA 
VTE Prophylaxis: Mechanical 
Influenza Vaccine screening completed? YES Pneumococcal Vaccine screening completed? YES Mobility needs: Yes   Nutrition plan:Yes 
Consults:P.T, Respiratory and Case Management Financial concerns:Yes  Escalated to CM? YES 
RRAT Score: 15   Interventions:H2H Testing due for pt today? YES 
LOS: 8 days Expected length of stay ? days Discharge plan: Home   PCP: Ramses Pollock MD 
Transportation needs: Yes Days before discharge:two or more days before discharge Discharge disposition: Home Signed:  
 
Kendal Sorto RN 
2/2/2019 
7:45 PM

## 2019-02-02 NOTE — PROGRESS NOTES
Addendum: CBC q day now due to no acute signs of bleeding Hospitalist Progress Note Riana DO [place] Answering service: [phone] OR [phone] from in house phone Date of Service:  2019 NAME:  Yefri Potts YOB: 1940 MRN:  [id number] Admission Summary:  
Yefri Potts is a 70 y.o. female who presents with dyspnea. Was recently just discharged from hospital 5 days ago for pancreatitis and large pancreatic cyst. 
Two to three days after discharge, pt began having intermittent cough and dyspnea. Associated with generalized weakness and fatigue. Denies chest pain, significant sputum, calf pain or erythema, fever, chills. Does have some persistent nausea. Interval history / Subjective:  
Patient seen and examined. Minimal sleep overnight. Pain, nausea improving. BP stable, continues on dobutamine drip. H/H stable. Repeat CT  with slightly decreased size of hematomas. Urine clear with improved creatinine. Will transfer to [facility]. Alvaro Bain Assessment & Plan:  
 
Acute blood loss anemia secondary to left rectus and pelvic hemorrhage:  
-acute blood loss , CT with left rectus hematoma and pelvic hemorrhage 
-Underwent IR angio  with prophylactic coil to left inferior epigastric artery 
-Repeat CT abd/pel w/o contrast  with slightly decreased size hematomas 
-s/p 6 units pRBCs, CBC q 8 hours, transfuse hgb <7 
-no further evidence of bleeding Shock, hypotensive vs cardiogenic:  
-secondary to acute blood loss -CVL placement 
-dobutamine Acute renal failure: improving 
-nephrology following, no acute dialysis needs, improving Acute hypoxic respiratory failure, POA: (hypoxic, tachypneic, respiratory distress) -multifactorial due to acute influenza, suspected superimposed pneumonia, volume overload due to acute systolic heart failure, EF 25-25%, and effusion possibly secondary to pancreatitis -continue supplemental oxygen, wean as tolerated, no further Bipap needs Influenza A with possible superimposed pneumonia:  
-s/p tamiflu, continue levaquin. Acute systolic heart failure, EF 20-25%, NYHA Class IV: 
-Echo with severely reduced EF, diffuse hypokinesis 
-cardiology consulted- acute illness vs CAD not excluded 
-patient unstable for cath/stress test 
-continue dobutamine Elevated troponin: suspect demand ischemia 
  
Pancreatitis: 
-recent discharge for pancreatitis and redemonstration on CT 1/31 
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 Will need repeat MRI in 6 months, no acute EUS needed 
-Lipase remains elevated -IVF held secondary to volume overload and worsening respiratory status  
-prn pain meds, anti-emetics Hypokalemia: replace as needed Hypernatremia: continue to monitor Right IJ CVL Joyce catheter Code status: Full DVT prophylaxis: held secondary to bleed Care Plan discussed with: Patient/Family and Nurse Disposition: TBD Hospital Problems  Date Reviewed: 1/15/2019 Codes Class Noted POA Dyspnea ICD-10-CM: R06.00 
ICD-9-CM: 786.09  1/25/2019 Unknown Review of Systems:  
Negative unless stated above Vital Signs:  
 Last 24hrs VS reviewed since prior progress note. Most recent are: 
Visit Vitals /57 Pulse (!) 103 Temp 98.6 °F (37 °C) Resp 25 Ht 5' 4\" (1.626 m) Wt 65.9 kg (145 lb 4.5 oz) SpO2 98% Breastfeeding? No  
BMI 24.94 kg/m² Intake/Output Summary (Last 24 hours) at 2/2/2019 1049 Last data filed at 2/2/2019 0700 Gross per 24 hour Intake 610 ml Output 1610 ml Net -1000 ml Physical Examination:  
 
 
     
Constitutional:   Alert, no acute distress ENT:  Oral mucous moist, Resp:  Coarse breathe sounds, no increased work of breathing CV:  Regular rhythm, normal rate GI:  distended, tender to mild palpation, superficial bruising Musculoskeletal:  No edema, warm, 2+ pulses throughout Neurologic:  Moves all extremities. Data Review:  
 I personally reviewed  Image and labs Cta Chest W Or W Wo Cont Result Date: 1/25/2019 IMPRESSION: Small right pleural effusion with underlying atelectasis. No evidence of pulmonary embolism. Ill-defined mass lesion either arising from or secondarily involving the left hepatic lobe likely corresponding to the pancreas mass seen on the recent MR but likely increased in the interval. 
 
Ct Abd Pelv Wo Cont Result Date: 2/1/2019 IMPRESSION: 1. Slightly diminished size of rectus abdominis muscular hematoma. Stable size of pelvic collection Mass effect on the surrounding structures. Post coil embolization of the deep inferior epigastric artery without evidence of active extravasation. The patient was not administered IV contrast for this examination which limits sensitivity for active extravasation. . 2. Imaging findings related to pancreatitis with stable pancreatic cyst.. 3. Moderate left and small right effusion not significantly changed. . 4. Interval mild bilateral hydroureteronephrosis when compared to the prior study. Ct Abd Pelv W Cont Result Date: 1/30/2019 IMPRESSION: 1. 7.2 x 5.0 x 8.4 cm left rectus abdominis intramuscular hematoma is acute on subacute and new since 15 days ago. This communicates with a 15.8 x 11.8 x 18.3 cm anterior pelvic extraperitoneal subacute hematoma. Mass effect on the surrounding structures. There may be active bleeding from the left inferior epigastric artery. 2. Unchanged pancreatitis. Increased size of the presumed developing pseudocyst in the pancreatic head. Reevaluate on follow-up imaging in a couple of months. 3. New moderate right and small left pleural simple effusions with adjacent compressive and dependent atelectasis. 4. New small volume of ascites is nonhemorrhagic.  I discussed this impression with Dr. Shin Laguerre at  hours on 1/30/2019. Xr Chest [facility] Result Date: 2/1/2019 IMPRESSION: Mild interstitial edema. No interval change. Xr Chest [facility] Result Date: 1/31/2019 IMPRESSION: Stable mild interstitial opacities, bibasilar opacities, and small pleural effusions. Xr [facility] Result Date: 1/30/2019 IMPRESSION: 1. Right IJ catheter overlies the SVC. There is no pneumothorax Xr [facility] Result Date: 1/30/2019 IMPRESSION: Bibasilar densities consistent bibasilar airspace disease and/or small bilateral pleural effusions. Xr [facility] Result Date: 1/29/2019 IMPRESSION: 1. Similar to slightly worsening bilateral perihilar predominant opacities, most suspicious for edema. 2.  Slightly increased small left effusion and a increasing left lung base consolidation, possibly atelectasis. Xr Chest [facility] Result Date: 1/28/2019 Impression: Stable trace bilateral effusions with underlying atelectasis. Xr [facility] Result Date: 1/28/2019 IMPRESSION: Small bibasilar atelectasis/effusions are again noted slightly improved on the right and mildly progressed on the left Ir Occl [facility] 
 
Result Date: 1/30/2019 IMPRESSION: Lower abdominal aortic and pelvic arteriogram without evidence for active hemorrhage or pseudoaneurysm. Prophylactic coil embolization of the proximal deep left inferior epigastric artery was performed as described above. Patient tolerated the procedure well without immediate complication. Recent MRI abd 1/15/2019 
 
1. 1.3 x 1.7 x 3.0 cm multilocular cystic lesion of the pancreas at the body 
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 Follow-up, preferably by MRI, in 6-12 months is recommended. 2. Hepatic cysts and probable flash fill inferior right lobe hepatic hemangioma. 3. Mild extra hepatic biliary dilation which may be sequela of prior obstruction 
or inflammation. Correlate for evidence of ongoing biliary obstruction with clinical and laboratory data. 4. Small sliding hiatal hernia. Labs:  
 
Recent Labs 02/02/19 
1819 02/01/19 
0719 02/01/19 
1821 WBC 10.1  --  13.9* HGB 8.1* 8.2* 6.8* HCT 25.4* 24.9* 20.9*  
  --  152 Recent Labs 02/02/19 
0354 02/01/19 
1357 02/01/19 0242  01/31/19 
5423 * 148* 145   < > 144 K 3.4* 3.9 2.9*   < > 4.1  110* 106   < > 108 CO2 32 30 28   < > 25 BUN 20 25* 28*   < > 24* CREA 1.19* 1.44* 1.77*   < > 1.80* * 112* 122*   < > 157* CA 8.2* 8.3* 8.1*   < > 7.5* MG  --   --  2.1  --  2.1 PHOS 2.6 1.9* 2.8   < > 3.7  
 < > = values in this interval not displayed. Recent Labs 02/02/19 
0354 02/01/19 
1357 02/01/19 0242 01/31/19 
1344 SGOT  --   --  24  --  14* ALT  --   --  11*  --  13  
AP  --   --  30*  --  35* TBILI  --   --  1.5*  --  1.3* TP  --   --  6.9  --  6.3* ALB 4.1 4.3 4.5   < > 4.0  
GLOB  --   --  2.4  --  2.3  
 < > = values in this interval not displayed. Recent Labs 02/01/19 
0242 01/31/19 
0437 01/30/19 
1323 INR 1.2* 1.3* 1.3* PTP 12.4* 12.8* 13.0* APTT 26.5 28.3 32.8* No results for input(s): FE, TIBC, PSAT, FERR in the last 72 hours. No results found for: FOL, RBCF No results for input(s): PH, PCO2, PO2 in the last 72 hours. Recent Labs  
  01/31/19 
1197 TROIQ 0.11* Lab Results Component Value Date/Time Cholesterol, total 185 01/15/2019 12:29 PM  
 HDL Cholesterol 52 01/15/2019 12:29 PM  
 LDL, calculated 109.6 (H) 01/15/2019 12:29 PM  
 Triglyceride 117 01/15/2019 12:29 PM  
 CHOL/HDL Ratio 3.6 01/15/2019 12:29 PM  
 
Lab Results Component Value Date/Time Glucose (POC) 102 (H) 01/18/2019 06:23 AM  
 Glucose (POC) 139 (H) 01/17/2019 09:05 PM  
 Glucose (POC) 114 (H) 01/17/2019 04:31 PM  
 Glucose (POC) 106 (H) 01/17/2019 11:41 AM  
 Glucose (POC) 132 (H) 01/16/2019 09:13 PM  
 
Lab Results Component Value Date/Time  Color DARK YELLOW 01/15/2019 10:16 PM  
 Appearance CLOUDY (A) 01/15/2019 10:16 PM  
 Specific gravity 1.030 01/15/2019 10:16 PM  
 pH (UA) 6.0 01/15/2019 10:16 PM  
 Protein 100 (A) 01/15/2019 10:16 PM  
 Glucose NEGATIVE  01/15/2019 10:16 PM  
 Ketone 40 (A) 01/15/2019 10:16 PM  
 Urobilinogen 1.0 01/15/2019 10:16 PM  
 Nitrites NEGATIVE  01/15/2019 10:16 PM  
 Leukocyte Esterase SMALL (A) 01/15/2019 10:16 PM  
 Epithelial cells MODERATE (A) 01/15/2019 10:16 PM  
 Bacteria 1+ (A) 01/15/2019 10:16 PM  
 WBC 10-20 01/15/2019 10:16 PM  
 RBC 0-5 01/15/2019 10:16 PM  
 
 
 
Medications Reviewed:  
 
Current Facility-Administered Medications Medication Dose Route Frequency  potassium chloride SR (KLOR-CON 10) tablet 20 mEq  20 mEq Oral NOW  
 0.9% sodium chloride infusion 250 mL  250 mL IntraVENous PRN  
 bumetanide (BUMEX) injection 1 mg  1 mg IntraVENous Q12H  
 acetylcysteine (MUCOMYST) 200 mg/mL (20 %) solution 200 mg  200 mg Nebulization BID RT  
 DOBUTamine (DOBUTREX) 500 mg/250 mL (2,000 mcg/mL) infusion  0-10 mcg/kg/min IntraVENous TITRATE  levoFLOXacin (LEVAQUIN) 250 mg in D5W IVPB  250 mg IntraVENous Q24H  
 0.9% sodium chloride infusion 250 mL  250 mL IntraVENous PRN  
 PHENYLephrine (NASEEM-SYNEPHRINE) 30 mg in 0.9% sodium chloride 250 mL infusion   mcg/min IntraVENous TITRATE  prochlorperazine (COMPAZINE) with saline injection 10 mg  10 mg IntraVENous Q6H PRN  
 rosuvastatin (CRESTOR) tablet 10 mg  10 mg Oral QHS  albuterol-ipratropium (DUO-NEB) 2.5 MG-0.5 MG/3 ML  3 mL Nebulization Q6H PRN  
 guaiFENesin ER (MUCINEX) tablet 600 mg  600 mg Oral Q12H  pantoprazole (PROTONIX) tablet 40 mg  40 mg Oral BID  sodium chloride (NS) flush 5-40 mL  5-40 mL IntraVENous Q8H  
 sodium chloride (NS) flush 5-40 mL  5-40 mL IntraVENous PRN  
 aspirin chewable tablet 81 mg  81 mg Oral DAILY  nitroglycerin (NITROSTAT) tablet 0.4 mg  0.4 mg SubLINGual Q5MIN PRN  
 ondansetron (ZOFRAN) injection 4 mg  4 mg IntraVENous Q4H PRN  
  morphine injection 2 mg  2 mg IntraVENous Q4H PRN  
 
______________________________________________________________________ EXPECTED LENGTH OF STAY: 4d 4h 
ACTUAL LENGTH OF STAY:          [id number] [facility],

## 2019-02-03 NOTE — PROGRESS NOTES
[facility] 
 [id number] [address], [address] [facility] Phone: [phone]   [phone]   
  
Nephrology Progress Note Betsy Crum     1940     [id number] Date of Admission : 1/25/2019 02/03/19 CC:  Follow up for  TOÑA Assessment and Plan TOÑA · 2/2 Blood loss, Shock, IV contrast (260cc), CRS 
· Non oliguric now and Cr stable · Cont IV diuretics for now · inotropes per cardiology Multifocal Hemorrhage : 
· Hematuria, +ve hemoptysis , Extra and Intra peritoneal bleed · Exclude Vasculitis - ANCA pending · No aneurysms or large vessel vasculitis on Arteriogram  
 
Hypokalemia · Replete PRN 
  
Rectus Abdominus bleed/ Hematoma · Presumed spontaneous. No AC / trauma / aneurysms · S/p IR embolization 1/30  
· hgb stable 
  
Severe CMP w/ EF 20% · Per cardiology · Would avoid ACE (I) until ARF resolves Interval History: 
Seen and examined . Cr stable. On dobutamine at low dose this AM, off pressors. UOP stable. No cp or sob reported. Review of Systems: Pertinent items are noted in HPI. Current Medications:  
Current Facility-Administered Medications Medication Dose Route Frequency  bisacodyl (DULCOLAX) suppository 10 mg  10 mg Rectal DAILY PRN  
 DOBUTamine (DOBUTREX) 500 mg/250 mL (2,000 mcg/mL) infusion  2.5 mcg/kg/min IntraVENous CONTINUOUS  
 docusate sodium (COLACE) capsule 100 mg  100 mg Oral BID  polyethylene glycol (MIRALAX) packet 17 g  17 g Oral DAILY  amiodarone (CORDARONE) 375 mg/250 mL D5W infusion  0.5-1 mg/min IntraVENous TITRATE  
 0.9% sodium chloride infusion 250 mL  250 mL IntraVENous PRN  
 bumetanide (BUMEX) injection 1 mg  1 mg IntraVENous Q12H  
 acetylcysteine (MUCOMYST) 200 mg/mL (20 %) solution 200 mg  200 mg Nebulization BID RT  
 levoFLOXacin (LEVAQUIN) 250 mg in D5W IVPB  250 mg IntraVENous Q24H  
 0.9% sodium chloride infusion 250 mL  250 mL IntraVENous PRN  
  prochlorperazine (COMPAZINE) with saline injection 10 mg  10 mg IntraVENous Q6H PRN  
 rosuvastatin (CRESTOR) tablet 10 mg  10 mg Oral QHS  albuterol-ipratropium (DUO-NEB) 2.5 MG-0.5 MG/3 ML  3 mL Nebulization Q6H PRN  
 guaiFENesin ER (MUCINEX) tablet 600 mg  600 mg Oral Q12H  pantoprazole (PROTONIX) tablet 40 mg  40 mg Oral BID  sodium chloride (NS) flush 5-40 mL  5-40 mL IntraVENous Q8H  
 sodium chloride (NS) flush 5-40 mL  5-40 mL IntraVENous PRN  
 aspirin chewable tablet 81 mg  81 mg Oral DAILY  nitroglycerin (NITROSTAT) tablet 0.4 mg  0.4 mg SubLINGual Q5MIN PRN  
 ondansetron (ZOFRAN) injection 4 mg  4 mg IntraVENous Q4H PRN  
 morphine injection 2 mg  2 mg IntraVENous Q4H PRN Allergies Allergen Reactions  Penicillins Hives Objective: 
Vitals:   
Vitals:  
 02/03/19 0301 02/03/19 0754 02/03/19 0800 02/03/19 1110 BP: 129/75  167/62 126/53 Pulse: 86  75 77 Resp: 28  21 23 Temp: 98 °F (36.7 °C)  98.9 °F (37.2 °C) 98.1 °F (36.7 °C) SpO2: 95% 97% 96% 96% Weight: 67.5 kg (148 lb 13 oz) Height:      
 
Intake and Output: 
02/03 0701 - 02/03 1900 In: 240 [P.O.:240] Out: 600 [Urine:600] 02/01 1901 - 02/03 0700 In: 1584 [P. O.:780; I.V.:804] Out: 1665 [OPEQF:6089] Physical Examination:General:  Lethargic HEENT: PERRL,  Pale Neck: lines + Lungs : rales + CVS: RRR, S1 S2 normal, No murmur Abdomen: large hematoma in lower abdomen Extremities: no Edema MS: No joint swelling, erythema, warmth Neurologic: non focal 
 
 
 
[]    High complexity decision making was performed 
[]    Patient is at high-risk of decompensation with multiple organ involvement Lab Data Personally Reviewed: I have reviewed all the pertinent labs, microbiology data and radiology studies during assessment. Recent Labs 02/03/19 
0112 02/02/19 
1557 02/02/19 
0354 02/01/19 
1357 02/01/19 
0242  145 147* 148* 145  
K 3.9 3.5 3.4* 3.9 2.9*  
 103 107 110* 106 CO2 33* 33* 32 30 28 * 118* 113* 112* 122* BUN 21* 22* 20 25* 28* CREA 1.16* 1.18* 1.19* 1.44* 1.77* CA 8.0* 8.4* 8.2* 8.3* 8.1*  
MG  --   --   --   --  2.1 PHOS 4.3 2.0* 2.6 1.9* 2.8 ALB 3.8 4.1 4.1 4.3 4.5 SGOT  --   --   --   --  24 ALT  --   --   --   --  11* INR  --   --   --   --  1.2* Recent Labs 02/03/19 
0112 02/02/19 
0354 02/01/19 
0719 02/01/19 0242 01/31/19 
2101 WBC 9.0 10.1  --  13.9*  --   
HGB 8.4* 8.1* 8.2* 6.8* 6.0*  
HCT 26.6* 25.4* 24.9* 20.9* 18.5*  171  --  152  -- No results found for: SDES Lab Results Component Value Date/Time Culture result: MRSA NOT PRESENT 01/29/2019 11:21 AM  
 Culture result:  01/29/2019 11:21 AM  
      Screening of patient nares for MRSA is for surveillance purposes and, if positive, to facilitate isolation considerations in high risk settings. It is not intended for automatic decolonization interventions per se as regimens are not sufficiently effective to warrant routine use. Culture result: NO GROWTH 5 DAYS 01/25/2019 03:12 PM  
 Culture result: NO GROWTH 5 DAYS 01/21/2019 02:25 PM  
 Culture result: NO GROWTH 5 DAYS 01/18/2019 12:29 PM  
 
Recent Results (from the past 24 hour(s)) RENAL FUNCTION PANEL Collection Time: 02/02/19  3:57 PM  
Result Value Ref Range Sodium 145 136 - 145 mmol/L Potassium 3.5 3.5 - 5.1 mmol/L Chloride 103 97 - 108 mmol/L  
 CO2 33 (H) 21 - 32 mmol/L Anion gap 9 5 - 15 mmol/L Glucose 118 (H) 65 - 100 mg/dL BUN 22 (H) 6 - 20 MG/DL Creatinine 1.18 (H) 0.55 - 1.02 MG/DL  
 BUN/Creatinine ratio 19 12 - 20 GFR est AA 54 (L) >60 ml/min/1.73m2 GFR est non-AA 45 (L) >60 ml/min/1.73m2 Calcium 8.4 (L) 8.5 - 10.1 MG/DL Phosphorus 2.0 (L) 2.6 - 4.7 MG/DL Albumin 4.1 3.5 - 5.0 g/dL EKG, 12 LEAD, INITIAL Collection Time: 02/02/19  5:22 PM  
Result Value Ref Range Ventricular Rate 155 BPM  
 Atrial Rate 150 BPM  
 QRS Duration 72 ms Q-T Interval 296 ms QTC Calculation (Bezet) 475 ms Calculated R Axis 46 degrees Calculated T Axis -143 degrees Diagnosis Atrial fibrillation with rapid ventricular response T wave abnormality, consider anterolateral ischemia or digitalis effect When compared with ECG of 30-JAN-2019 03:14, Atrial fibrillation has replaced Sinus rhythm Vent. rate has increased BY  76 BPM 
T wave inversion less evident in Anterior leads Confirmed by Topher Kowalski M.D., [employer] ([id number]) on 2/3/2019 11:56:13 AM 
  
RENAL FUNCTION PANEL Collection Time: 02/03/19  1:12 AM  
Result Value Ref Range Sodium 144 136 - 145 mmol/L Potassium 3.9 3.5 - 5.1 mmol/L Chloride 103 97 - 108 mmol/L  
 CO2 33 (H) 21 - 32 mmol/L Anion gap 8 5 - 15 mmol/L Glucose 127 (H) 65 - 100 mg/dL BUN 21 (H) 6 - 20 MG/DL Creatinine 1.16 (H) 0.55 - 1.02 MG/DL  
 BUN/Creatinine ratio 18 12 - 20 GFR est AA 55 (L) >60 ml/min/1.73m2 GFR est non-AA 45 (L) >60 ml/min/1.73m2 Calcium 8.0 (L) 8.5 - 10.1 MG/DL Phosphorus 4.3 2.6 - 4.7 MG/DL Albumin 3.8 3.5 - 5.0 g/dL CBC W/O DIFF Collection Time: 02/03/19  1:12 AM  
Result Value Ref Range WBC 9.0 3.6 - 11.0 K/uL  
 RBC 2.85 (L) 3.80 - 5.20 M/uL HGB 8.4 (L) 11.5 - 16.0 g/dL HCT 26.6 (L) 35.0 - 47.0 % MCV 93.3 80.0 - 99.0 FL  
 MCH 29.5 26.0 - 34.0 PG  
 MCHC 31.6 30.0 - 36.5 g/dL  
 RDW 16.3 (H) 11.5 - 14.5 % PLATELET 260 966 - 840 K/uL MPV 10.6 8.9 - 12.9 FL  
 NRBC 0.0 0  WBC ABSOLUTE NRBC 0.00 0.00 - 0.01 K/uL I have reviewed the flowsheets. Chart and Pertinent Notes have been reviewed. No change in PMH ,family and social history from Consult note.  
 
 
Kate Fuentes MD

## 2019-02-03 NOTE — PROGRESS NOTES
Problem: Infection - Risk of, Urinary Catheter-Associated Urinary Tract Infection Goal: *Absence of infection signs and symptoms Outcome: Progressing Towards Goal 
Joyce catheter removed per Dr. Brendon Kwan, pt encouraged to call out when she needs to urinate. Will continue to monitor. Problem: Infection - Risk of, Central Venous Catheter-Associated Bloodstream Infection Goal: *Absence of infection signs and symptoms Outcome: Progressing Towards Goal 
CHG bath performed, line clean dry and intact. Orders received to keep central line per Dr. Brendon Kwan. Will continue to monitor. Problem: Afib Pathway: Day 2 Goal: Treatments/Interventions/Procedures Outcome: Progressing Towards Goal 
Pt on amiodarone gtt, pt educated on medication including side effects, pt verbalized understanding. Pt converted to NSR overnight, EKG performed this morning, Dr. Topher Kowalski aware of rhythm change, orders received to continue amiodarone gtt, pt and family updated on plan. Will continue to monitor and educate. 1700: Pt has not urinated, attempted to ambulate patient to [facility], pt states she does not want to get up. Asked pt if she could try to urinate on a bedpan, pt states she does not need to urinate. Bladder scan performed, 575ml, Dr. Brendon Kwan notified of above, orders received to straight cath. Straight cath performed 400ml removed. Will continue to monitor. Bedside shift change report given to Althea Rivera RN (oncoming nurse) by Uzma Judd (offgoing nurse). Report included the following information SBAR, Kardex, ED Summary, Procedure Summary, Intake/Output, MAR, Recent Results, Med Rec Status, Cardiac Rhythm NSR, Alarm Parameters  and Quality Measures.

## 2019-02-03 NOTE — PROGRESS NOTES
Addendum: CBC q day now due to no acute signs of bleeding Hospitalist Progress Note [address] [facility], DO Answering service: [phone] OR [phone] from in house phone Date of Service:  2/3/2019 NAME:  Betsy Crum :  1940 MRN:  [id number] Admission Summary:  
Betsy Crum is a 70 y.o. female who presents with dyspnea. Was recently just discharged from hospital 5 days ago for pancreatitis and large pancreatic cyst. 
Two to three days after discharge, pt began having intermittent cough and dyspnea. Associated with generalized weakness and fatigue. Denies chest pain, significant sputum, calf pain or erythema, fever, chills. Does have some persistent nausea. Interval history / Subjective:  
Patient seen and examined. Yesterday evening, patient went into atrial fib with RVR, placed on amiodarone drip. Patient with minimal appetite, encouraged oral intake. Creatinine stable, discontinue morales. .  
Assessment & Plan:  
 
Atrial fibrillation with RVR: resolved 
-cardiology following, continue amiodarone drip 
-hold anticoagulation due to recent bleed Shock, hypotensive vs cardiogenic:  
-secondary to acute blood loss -CVL placement 
-dobutamine management per cardiology Acute blood loss anemia secondary to left rectus and pelvic hemorrhage:  
-acute blood loss , CT with left rectus hematoma and pelvic hemorrhage 
-Underwent IR angio  with prophylactic coil to left inferior epigastric artery 
-Repeat CT abd/pel w/o contrast  with slightly decreased size hematomas 
-s/p 6 units pRBCs, CBC q 8 hours, transfuse hgb <7 
-no further evidence of bleeding Acute renal failure: improving 
-nephrology following, no acute dialysis needs, improving 
-bumex 1 mg BID Acute hypoxic respiratory failure, POA: (hypoxic, tachypneic, respiratory distress) -multifactorial due to acute influenza, suspected superimposed pneumonia, volume overload due to acute systolic heart failure, EF 25-25%, and effusion possibly secondary to pancreatitis  
-continue supplemental oxygen, wean as tolerated, no further Bipap needs Influenza A with possible superimposed pneumonia:  
-s/p tamiflu, continue levaquin. Acute systolic heart failure, EF 20-25%, NYHA Class IV: 
-Echo with severely reduced EF, diffuse hypokinesis 
-cardiology consulted- acute illness vs CAD not excluded. Considering stress test 
-continue dobutamine Elevated troponin: suspect demand ischemia 
  
Pancreatitis: 
-recent discharge for pancreatitis and redemonstration on CT 1/31 
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 Will need repeat MRI in 6 months, no acute EUS needed 
-Lipase remains elevated -IVF held secondary to volume overload and worsening respiratory status  
-prn pain meds, anti-emetics Hypokalemia: replace as needed Hypernatremia: continue to monitor Right IJ CVL Code status: Full DVT prophylaxis: held secondary to bleed Care Plan discussed with: Patient/Family and Nurse Disposition: TBD Hospital Problems  Date Reviewed: 1/15/2019 Codes Class Noted POA Dyspnea ICD-10-CM: R06.00 
ICD-9-CM: 786.09  1/25/2019 Unknown Review of Systems:  
Negative unless stated above Vital Signs:  
 Last 24hrs VS reviewed since prior progress note. Most recent are: 
Visit Vitals /53 (BP 1 Location: Right arm, BP Patient Position: At rest) Pulse 77 Temp 98.1 °F (36.7 °C) Resp 23 Ht 5' 4\" (1.626 m) Wt 67.5 kg (148 lb 13 oz) SpO2 96% Breastfeeding? No  
BMI 25.54 kg/m² Intake/Output Summary (Last 24 hours) at 2/3/2019 1554 Last data filed at 2/3/2019 1023 Gross per 24 hour Intake 1324 ml Output 950 ml Net 374 ml Physical Examination: Constitutional:   Alert, no acute distress ENT:  Oral mucous moist, Resp:  Coarse breathe sounds, no increased work of breathing CV:  Regular rhythm, normal rate GI:  distended, tender to mild palpation, superficial bruising Musculoskeletal:  No edema, warm, 2+ pulses throughout Neurologic:  Moves all extremities. Data Review:  
 I personally reviewed  Image and labs Cta Chest W Or W Wo Cont Result Date: 1/25/2019 IMPRESSION: Small right pleural effusion with underlying atelectasis. No evidence of pulmonary embolism. Ill-defined mass lesion either arising from or secondarily involving the left hepatic lobe likely corresponding to the pancreas mass seen on the recent MR but likely increased in the interval. 
 
Ct Abd Pelv Wo Cont Result Date: 2/1/2019 IMPRESSION: 1. Slightly diminished size of rectus abdominis muscular hematoma. Stable size of pelvic collection Mass effect on the surrounding structures. Post coil embolization of the deep inferior epigastric artery without evidence of active extravasation. The patient was not administered IV contrast for this examination which limits sensitivity for active extravasation. . 2. Imaging findings related to pancreatitis with stable pancreatic cyst.. 3. Moderate left and small right effusion not significantly changed. . 4. Interval mild bilateral hydroureteronephrosis when compared to the prior study. Ct Abd Pelv W Cont Result Date: 1/30/2019 IMPRESSION: 1. 7.2 x 5.0 x 8.4 cm left rectus abdominis intramuscular hematoma is acute on subacute and new since 15 days ago. This communicates with a 15.8 x 11.8 x 18.3 cm anterior pelvic extraperitoneal subacute hematoma. Mass effect on the surrounding structures. There may be active bleeding from the left inferior epigastric artery. 2. Unchanged pancreatitis.  Increased size of the presumed developing pseudocyst in the pancreatic head. Reevaluate on follow-up imaging in a couple of months. 3. New moderate right and small left pleural simple effusions with adjacent compressive and dependent atelectasis. 4. New small volume of ascites is nonhemorrhagic. I discussed this impression with Dr. Lorena Ambrosio at  hours on 1/30/2019. Xr Chest [facility] Result Date: 2/1/2019 IMPRESSION: Mild interstitial edema. No interval change. Xr Chest [facility] Result Date: 1/31/2019 IMPRESSION: Stable mild interstitial opacities, bibasilar opacities, and small pleural effusions. Xr Chest [facility] Result Date: 1/30/2019 IMPRESSION: 1. Right IJ catheter overlies the SVC. There is no pneumothorax Xr Chest [facility] Result Date: 1/30/2019 IMPRESSION: Bibasilar densities consistent bibasilar airspace disease and/or small bilateral pleural effusions. Xr Chest [facility] Result Date: 1/29/2019 IMPRESSION: 1. Similar to slightly worsening bilateral perihilar predominant opacities, most suspicious for edema. 2.  Slightly increased small left effusion and a increasing left lung base consolidation, possibly atelectasis. Xr Chest [facility] Result Date: 1/28/2019 Impression: Stable trace bilateral effusions with underlying atelectasis. Xr Chest [facility] Result Date: 1/28/2019 IMPRESSION: Small bibasilar atelectasis/effusions are again noted slightly improved on the right and mildly progressed on the left Ir Occl [place] [facility] W Si 
 
Result Date: 1/30/2019 IMPRESSION: Lower abdominal aortic and pelvic arteriogram without evidence for active hemorrhage or pseudoaneurysm. Prophylactic coil embolization of the proximal deep left inferior epigastric artery was performed as described above. Patient tolerated the procedure well without immediate complication. Recent MRI abd 1/15/2019 
 
1. 1.3 x 1.7 x 3.0 cm multilocular cystic lesion of the pancreas at the body 
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  Follow-up, preferably by MRI, in 6-12 months is recommended. 2. Hepatic cysts and probable flash fill inferior right lobe hepatic hemangioma. 3. Mild extra hepatic biliary dilation which may be sequela of prior obstruction 
or inflammation. Correlate for evidence of ongoing biliary obstruction with 
clinical and laboratory data. 4. Small sliding hiatal hernia. Labs:  
 
Recent Labs 02/03/19 0112 02/02/19 0354 WBC 9.0 10.1 HGB 8.4* 8.1* HCT 26.6* 25.4*  
 171 Recent Labs 02/03/19 0112 02/02/19 
1557 02/02/19 0354 02/01/19 
2814  145 147*   < > 145  
K 3.9 3.5 3.4*   < > 2.9*  
 103 107   < > 106 CO2 33* 33* 32   < > 28 BUN 21* 22* 20   < > 28* CREA 1.16* 1.18* 1.19*   < > 1.77* * 118* 113*   < > 122* CA 8.0* 8.4* 8.2*   < > 8.1*  
MG  --   --   --   --  2.1 PHOS 4.3 2.0* 2.6   < > 2.8  
 < > = values in this interval not displayed. Recent Labs 02/03/19 0112 02/02/19 1557 02/02/19 0354 02/01/19 
2503 SGOT  --   --   --   --  24 ALT  --   --   --   --  11* AP  --   --   --   --  30* TBILI  --   --   --   --  1.5* TP  --   --   --   --  6.9 ALB 3.8 4.1 4.1   < > 4.5 GLOB  --   --   --   --  2.4  
 < > = values in this interval not displayed. Recent Labs 02/01/19 
0242 INR 1.2* PTP 12.4* APTT 26.5 No results for input(s): FE, TIBC, PSAT, FERR in the last 72 hours. No results found for: FOL, RBCF No results for input(s): PH, PCO2, PO2 in the last 72 hours. No results for input(s): CPK, CKNDX, TROIQ in the last 72 hours. No lab exists for component: CPKMB Lab Results Component Value Date/Time Cholesterol, total 185 01/15/2019 12:29 PM  
 HDL Cholesterol 52 01/15/2019 12:29 PM  
 LDL, calculated 109.6 (H) 01/15/2019 12:29 PM  
 Triglyceride 117 01/15/2019 12:29 PM  
 CHOL/HDL Ratio 3.6 01/15/2019 12:29 PM  
 
Lab Results Component Value Date/Time  Glucose (POC) 102 (H) 01/18/2019 06:23 AM  
 Glucose (POC) 139 (H) 01/17/2019 09:05 PM  
 Glucose (POC) 114 (H) 01/17/2019 04:31 PM  
 Glucose (POC) 106 (H) 01/17/2019 11:41 AM  
 Glucose (POC) 132 (H) 01/16/2019 09:13 PM  
 
Lab Results Component Value Date/Time Color DARK YELLOW 01/15/2019 10:16 PM  
 Appearance CLOUDY (A) 01/15/2019 10:16 PM  
 Specific gravity 1.030 01/15/2019 10:16 PM  
 pH (UA) 6.0 01/15/2019 10:16 PM  
 Protein 100 (A) 01/15/2019 10:16 PM  
 Glucose NEGATIVE  01/15/2019 10:16 PM  
 Ketone 40 (A) 01/15/2019 10:16 PM  
 Urobilinogen 1.0 01/15/2019 10:16 PM  
 Nitrites NEGATIVE  01/15/2019 10:16 PM  
 Leukocyte Esterase SMALL (A) 01/15/2019 10:16 PM  
 Epithelial cells MODERATE (A) 01/15/2019 10:16 PM  
 Bacteria 1+ (A) 01/15/2019 10:16 PM  
 WBC 10-20 01/15/2019 10:16 PM  
 RBC 0-5 01/15/2019 10:16 PM  
 
 
 
Medications Reviewed:  
 
Current Facility-Administered Medications Medication Dose Route Frequency  bisacodyl (DULCOLAX) suppository 10 mg  10 mg Rectal DAILY PRN  
 DOBUTamine (DOBUTREX) 500 mg/250 mL (2,000 mcg/mL) infusion  2.5 mcg/kg/min IntraVENous CONTINUOUS  
 docusate sodium (COLACE) capsule 100 mg  100 mg Oral BID  polyethylene glycol (MIRALAX) packet 17 g  17 g Oral DAILY  amiodarone (CORDARONE) 375 mg/250 mL D5W infusion  0.5-1 mg/min IntraVENous TITRATE  
 0.9% sodium chloride infusion 250 mL  250 mL IntraVENous PRN  
 bumetanide (BUMEX) injection 1 mg  1 mg IntraVENous Q12H  
 acetylcysteine (MUCOMYST) 200 mg/mL (20 %) solution 200 mg  200 mg Nebulization BID RT  
 0.9% sodium chloride infusion 250 mL  250 mL IntraVENous PRN  prochlorperazine (COMPAZINE) with saline injection 10 mg  10 mg IntraVENous Q6H PRN  
 rosuvastatin (CRESTOR) tablet 10 mg  10 mg Oral QHS  albuterol-ipratropium (DUO-NEB) 2.5 MG-0.5 MG/3 ML  3 mL Nebulization Q6H PRN  
 guaiFENesin ER (MUCINEX) tablet 600 mg  600 mg Oral Q12H  pantoprazole (PROTONIX) tablet 40 mg  40 mg Oral BID  
  sodium chloride (NS) flush 5-40 mL  5-40 mL IntraVENous Q8H  
 sodium chloride (NS) flush 5-40 mL  5-40 mL IntraVENous PRN  
 aspirin chewable tablet 81 mg  81 mg Oral DAILY  nitroglycerin (NITROSTAT) tablet 0.4 mg  0.4 mg SubLINGual Q5MIN PRN  
 ondansetron (ZOFRAN) injection 4 mg  4 mg IntraVENous Q4H PRN  
 morphine injection 2 mg  2 mg IntraVENous Q4H PRN  
 
______________________________________________________________________ EXPECTED LENGTH OF STAY: 4d 4h 
ACTUAL LENGTH OF STAY:          9 [id number]

## 2019-02-03 NOTE — PROGRESS NOTES
2/3/2019     [facility] [address] [place] [address] 
 
. Frutoso Golder Frutoso Golder Sharatote Hogan is a 70 y.o. female HPI: Earlier in month seen with afib in setting of acute pancreatitis. Cardiology following for elevated troponins, PAF and new systolic CHF/cardiomyopathy. 1/30/19: marked anemia and hypotension/hypovolemic shock with left pelvic and rectus sheath hematoma/bleed s/p IR embolization. 1/31/19: required PRBCs x 4 total yesterday. ( Hgb improved this a.m. but renal function and urine output has diminished overnight . Started on low dose dobutamine per renal to increase renal perfusion. MAP goal >65. 
2/1/19: hgb 6.2 overnight- additional 2 units PRBCs 
2/2/19 Hgb 8.1 today Subjective:  Pt weak, c/o some SOB but no c/o chest pain. Daughter states pt slept most of yesterday. Assessment/Plan/Discussion:Cardiology Attending:  
Cont dobutamine Awake, alert did not sleep last night. Tired, no chest pain, no dyspnea. Plan for any further cardiac testing deferred to tomorrow. Still full cod, may also want Palliative to help with goals of care, etc.   
 
 
 
Discussion/Plans 1. Cardiomyopathy, acute systolic CHF: 
-NYHA IV (complicated by anemia, critical illness) -EF 19-63%% with WMA  
-Uncertain etiology. Suspect d/t acute illness.  
-Not candidate for invasive cardiac testing.  
-Agree with continuing Dobutamine 2.5 mcg/kg/min  
-CXR 2/1. Mild interstitial edema. Frutoso Golder -CVP 5.net neg 271 mls/24 hrs. Bumex 1 mg IV BID today 
-No ACE-I/coreg d/t renal dysfunction/need for increased renal perfusion. 2. Shock/Hypotension: BP normalized -s/p PRBCs,   
-Dobutamine low dose . -Off neosynephrine 3. NQMI: Troponin elevation and fall. Peak of 3.26 
-Troponins trend down. 0.11 1/31. 
-No ASA d/t pelvic/rectus sheath bleeds   
-Continue high potency statin. 
-Off BB, ace-I d/t hypotension, need for increased MAP for renal 
 
4. PAF:  
-SR on tele review, no PAF. -Off coreg -[id number]=4 (age, CHF, Female). No anticoags or ASA d/t bleeding. 5. TOÑA: creatinine 1.77 
-Renal following. TOÑA d/t hypoperfusion , contrast, CRS 
-Continue Dobutamine, IV bumex but hold diuretic if contrast needed again 6. Pelvic/rectus sheath hematomas/bleed: 
-s/p IR embolization 1/30/19 
-Repeat CT abdomen today w/o contrast. 
 
7. Anemia, acute blood loss:  hgb 8.2 s/p 6 units PRBCs  
-Off ASA and lovenox. 8.  Pancreatitis with cystic lesion-  
-Cystic lesion likely reflects intraductal papillary mucinous tumor per GI 9. Leukocytosis:  WBC trending down 10. Influenza/superimposed pneumonia [facility] [place] Cardiac Studies/Hx: 
No specialty comments available. Patient Vitals for the past 12 hrs: 
 Temp Pulse Resp BP SpO2  
02/03/19 0800 98.9 °F (37.2 °C) 75 21 167/62 96 % 02/03/19 0754     97 % 02/03/19 0301 98 °F (36.7 °C) 86 28 129/75 95 % 02/02/19 2322 98.2 °F (36.8 °C) 84 20 157/65 94 % 02/02/19 2145  82  158/60  Past Medical History:  
Diagnosis Date  Other ill-defined conditions(799.89) IBS  Pancreatitis ROS-pertinents  negative except as above  The pertinent portions of the medical history,physician and nursing notes, meds,vitals , labs and Ins/Outs,are reviewed in the electronic record. Results for orders placed or performed during the hospital encounter of 01/25/19 EKG, 12 LEAD, INITIAL Result Value Ref Range Ventricular Rate 155 BPM  
 Atrial Rate 150 BPM  
 QRS Duration 72 ms Q-T Interval 296 ms QTC Calculation (Bezet) 475 ms Calculated R Axis 46 degrees Calculated T Axis -143 degrees Diagnosis Atrial fibrillation with rapid ventricular response T wave abnormality, consider anterolateral ischemia or digitalis effect When compared with ECG of 30-JAN-2019 03:14, Atrial fibrillation has replaced Sinus rhythm Vent. rate has increased BY  76 BPM 
T wave inversion less evident in Anterior leads Vitals: 02/02/19 2322 02/03/19 0301 02/03/19 0754 02/03/19 0800 BP: 157/65 129/75  167/62 BP 1 Location: Right arm Right arm  Right arm BP Patient Position: At rest At rest  At rest  
Pulse: 84 86  75 Resp: 20 28  21 Temp: 98.2 °F (36.8 °C) 98 °F (36.7 °C)  98.9 °F (37.2 °C) SpO2: 94% 95% 97% 96% Weight:  148 lb 13 oz (67.5 kg) Height:      
 
 
Objective:  
 Physical Exam:  
Patient Vitals for the past 12 hrs: 
 Temp Pulse Resp BP SpO2  
02/03/19 0800 98.9 °F (37.2 °C) 75 21 167/62 96 % 02/03/19 0754     97 % 02/03/19 0301 98 °F (36.7 °C) 86 28 129/75 95 % 02/02/19 2322 98.2 °F (36.8 °C) 84 20 157/65 94 % 02/02/19 2145  82  158/60  General:  Weak appearing. Mild SOB  
ENT, Neck:  no jvd Chest Wall: inspection normal - no chest wall deformities or tenderness,  
Lung:  scattered course breath sounds. , mild tachypnea Heart:  normal rate, regular rhythm, normal S1, S2, no murmurs, rubs, clicks or gallops Abdomen: Distended, soft, +ttp Extremities: extremities normal, atraumatic, no cyanosis or edema :  Urine light cranberry color Last 24hr Input/Output: 
 
Intake/Output Summary (Last 24 hours) at 2/3/2019 6772 Last data filed at 2/3/2019 4051 Gross per 24 hour Intake 1094 ml Output 1005 ml Net 89 ml Data Review:  
Recent Results (from the past 24 hour(s)) RENAL FUNCTION PANEL Collection Time: 02/02/19  3:57 PM  
Result Value Ref Range Sodium 145 136 - 145 mmol/L Potassium 3.5 3.5 - 5.1 mmol/L Chloride 103 97 - 108 mmol/L  
 CO2 33 (H) 21 - 32 mmol/L Anion gap 9 5 - 15 mmol/L Glucose 118 (H) 65 - 100 mg/dL BUN 22 (H) 6 - 20 MG/DL Creatinine 1.18 (H) 0.55 - 1.02 MG/DL  
 BUN/Creatinine ratio 19 12 - 20 GFR est AA 54 (L) >60 ml/min/1.73m2 GFR est non-AA 45 (L) >60 ml/min/1.73m2 Calcium 8.4 (L) 8.5 - 10.1 MG/DL Phosphorus 2.0 (L) 2.6 - 4.7 MG/DL Albumin 4.1 3.5 - 5.0 g/dL EKG, 12 LEAD, INITIAL Collection Time: 02/02/19  5:22 PM  
Result Value Ref Range Ventricular Rate 155 BPM  
 Atrial Rate 150 BPM  
 QRS Duration 72 ms Q-T Interval 296 ms QTC Calculation (Bezet) 475 ms Calculated R Axis 46 degrees Calculated T Axis -143 degrees Diagnosis Atrial fibrillation with rapid ventricular response T wave abnormality, consider anterolateral ischemia or digitalis effect When compared with ECG of 30-JAN-2019 03:14, Atrial fibrillation has replaced Sinus rhythm Vent. rate has increased BY  76 BPM 
T wave inversion less evident in Anterior leads RENAL FUNCTION PANEL Collection Time: 02/03/19  1:12 AM  
Result Value Ref Range Sodium 144 136 - 145 mmol/L Potassium 3.9 3.5 - 5.1 mmol/L Chloride 103 97 - 108 mmol/L  
 CO2 33 (H) 21 - 32 mmol/L Anion gap 8 5 - 15 mmol/L Glucose 127 (H) 65 - 100 mg/dL BUN 21 (H) 6 - 20 MG/DL Creatinine 1.16 (H) 0.55 - 1.02 MG/DL  
 BUN/Creatinine ratio 18 12 - 20 GFR est AA 55 (L) >60 ml/min/1.73m2 GFR est non-AA 45 (L) >60 ml/min/1.73m2 Calcium 8.0 (L) 8.5 - 10.1 MG/DL Phosphorus 4.3 2.6 - 4.7 MG/DL Albumin 3.8 3.5 - 5.0 g/dL CBC W/O DIFF Collection Time: 02/03/19  1:12 AM  
Result Value Ref Range WBC 9.0 3.6 - 11.0 K/uL  
 RBC 2.85 (L) 3.80 - 5.20 M/uL HGB 8.4 (L) 11.5 - 16.0 g/dL HCT 26.6 (L) 35.0 - 47.0 % MCV 93.3 80.0 - 99.0 FL  
 MCH 29.5 26.0 - 34.0 PG  
 MCHC 31.6 30.0 - 36.5 g/dL  
 RDW 16.3 (H) 11.5 - 14.5 % PLATELET 784 964 - 666 K/uL MPV 10.6 8.9 - 12.9 FL  
 NRBC 0.0 0  WBC ABSOLUTE NRBC 0.00 0.00 - 0.01 K/uL Ajit Shanks MD 2/3/2019

## 2019-02-03 NOTE — PROGRESS NOTES
0800 
Bedside and Verbal shift change report given to Hernán Watson RN (oncoming nurse) by Vernell Mary RN (offgoing nurse). Report included the following information SBAR, Kardex, ED Summary, Procedure Summary, Intake/Output, MAR, Recent Results, Med Rec Status and Cardiac Rhythm NSR. Hourly rounding performed. Patient Vitals for the past 12 hrs: 
 Temp Pulse Resp BP SpO2  
02/03/19 0800 98.9 °F (37.2 °C) 75 21 167/62 96 % 02/03/19 0754     97 % 02/03/19 0301 98 °F (36.7 °C) 86 28 129/75 95 % 02/02/19 2322 98.2 °F (36.8 °C) 84 20 157/65 94 % 02/02/19 2145  82  158/60   
02/02/19 2128     99 % Last 3 Recorded Weights in this Encounter 02/01/19 1158 02/02/19 0400 02/03/19 0301 Weight: 67.3 kg (148 lb 5.9 oz) 65.9 kg (145 lb 4.5 oz) 67.5 kg (148 lb 13 oz) Pt weighed on bed scale with HOB flat, 1 blanket, 1 pillow, 1 sheet.

## 2019-02-04 NOTE — PROGRESS NOTES
2/4/2019     [facility] [address] [place] [address] 
 
. Vincent Wakefield is a 70 y.o. female Subjective:  Pt improved over weakend. Hgb stabilized and she was transferred to [facility]. Pt states she feels better. Denies chest pain, SOB, palpitations. States she feels weak. Taking po. Poor appetite. Still c/o abdominal tightness. Has not been out of bed yet. Assessment/Plan/Discussion:Cardiology Attending:  
 
Patient seen on the day of progress note and examined  and agree with Advance Practice Provider (SISI, NP,PA)  assessment and plans. Katey Wakefield is a 70 y.o. female Looks lot better Less sob No cp No rales or edema Mild cough EF was low Repeat echo shows improvement to 45 Before echo we had turned off dobutamine and amiodarone Had Afib over weekend may have been dobutamine And now can po load amio slowly Likely not to continue long term Treat cardiomyopathy 
 
Mary Sevilla MD   
 
 
 
 
Discussion/Plans 1. Cardiomyopathy, acute systolic CHF: 
-NYHA III  
-EF 45-88%% with WMA  
-Uncertain etiology. Suspect d/t acute illness.  
-Not candidate for invasive cardiac testing.  
-Stop dobutamine 
-Limited echo today to re-evaluate EF 
-No ACE-I/coreg until renal dysfunction resolved per nephrology. -bumex 1 mg po BID 2. Shock/Hypotension: BP normalized  
-Stop dobutamine 3. NQMI: Troponin elevation and fall. Peak of 3.26 
-Troponins trend down. 0.11 1/31. 
-No ASA d/t pelvic/rectus sheath bleeds.   
-Continue high potency statin. 
-Off BB, ace-I d/t hypotension, need for increased MAP for renal. Consider restarting BB in a.m. 
 
4. PAF: had PAF over weekend- none on tele review overnight/today 
-SR on tele review. -Off coreg 
-Change amiodarone to po. 400 mg BID 
-[id number]=3 (age, CHF, Female). No anticoags or ASA d/t bleeding. 5. TOÑA: creatinine significantly improved 1.16 
-Stop dobutamine 6. Pelvic/rectus sheath hematomas/bleed: -s/p IR embolization 1/30/19 
-Repeat CT abdomen today w/o contrast. 
 
7. Anemia, acute blood loss:  hgb stable at 8.4 
-Off ASA and lovenox. -Received total of 6 units PRBCs last week. 8.  Pancreatitis with cystic lesion-  
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 
 
9. Influenza/superimposed pneumonia Pt improving. Will stop dobutamine and IV amiodarone. Start amiodarone po and recheck limited echo today. . 
Cardiac Studies/Hx: 
No specialty comments available. Patient Vitals for the past 12 hrs: 
 Temp Pulse Resp BP SpO2  
02/04/19 1630     96 % 02/04/19 1600 98.2 °F (36.8 °C) 78 24 106/47 97 % 02/04/19 1110 98.2 °F (36.8 °C) 79 22 128/49 95 % 02/04/19 0741 98.3 °F (36.8 °C) 79 23 132/49 95 % Past Medical History:  
Diagnosis Date  Other ill-defined conditions(799.89) IBS  Pancreatitis ROS-pertinents  negative except as above  The pertinent portions of the medical history,physician and nursing notes, meds,vitals , labs and Ins/Outs,are reviewed in the electronic record. Results for orders placed or performed during the hospital encounter of 01/25/19 EKG, 12 LEAD, INITIAL Result Value Ref Range Ventricular Rate 155 BPM  
 Atrial Rate 150 BPM  
 QRS Duration 72 ms Q-T Interval 296 ms QTC Calculation (Bezet) 475 ms Calculated R Axis 46 degrees Calculated T Axis -143 degrees Diagnosis Atrial fibrillation with rapid ventricular response T wave abnormality, consider anterolateral ischemia or digitalis effect When compared with ECG of 30-JAN-2019 03:14, Atrial fibrillation has replaced Sinus rhythm Vent. rate has increased BY  76 BPM 
T wave inversion less evident in Anterior leads Confirmed by Laurita King M.D., Grady Fabry ([id number]) on 2/3/2019 11:56:13 AM 
  
  
Vitals:  
 02/04/19 0741 02/04/19 1110 02/04/19 1600 02/04/19 1630 BP: 132/49 128/49 106/47 BP 1 Location: Right arm Right arm Right arm BP Patient Position: At rest;Supine At rest At rest;Supine Pulse: 79 79 78 Resp: 23 22 24 Temp: 98.3 °F (36.8 °C) 98.2 °F (36.8 °C) 98.2 °F (36.8 °C) SpO2: 95% 95% 97% 96% Weight:      
Height:      
 
 
Objective:  
 Physical Exam:  
Patient Vitals for the past 12 hrs: 
 Temp Pulse Resp BP SpO2  
02/04/19 1630     96 % 02/04/19 1600 98.2 °F (36.8 °C) 78 24 106/47 97 % 02/04/19 1110 98.2 °F (36.8 °C) 79 22 128/49 95 % 02/04/19 0741 98.3 °F (36.8 °C) 79 23 132/49 95 % General:  Weak appearing. pale  
ENT, Neck:  no jvd Chest Wall: inspection normal - no chest wall deformities or tenderness,  
Lung:  Few crackles left base  No SOB at rest  
Heart:  normal rate, regular rhythm, normal S1, S2, no murmurs, rubs, clicks or gallops Abdomen: Distended, soft, +ttp Extremities: extremities normal, atraumatic, no cyanosis or edema Last 24hr Input/Output: 
 
Intake/Output Summary (Last 24 hours) at 2/4/2019 1638 Last data filed at 2/4/2019 1600 Gross per 24 hour Intake 1013.75 ml Output 1400 ml Net -386.25 ml Data Review: No results found for this or any previous visit (from the past 24 hour(s)). Virginia Magdaleno. ZEYAD Kurtz 2/4/2019

## 2019-02-04 NOTE — PROGRESS NOTES
Chart reviewed and patient seen at the bedside. Transfer from CCU noted. Continues to be followed by Cards and Nephro. Recent admission for pancreatic cyst. Anticipate discharge to home with family when medically stable. Leopoldo Hubbard RN CRM Ext [phone]

## 2019-02-04 NOTE — PROGRESS NOTES
Problem: Heart Failure: Day 5 Goal: Activity/Safety Outcome: Not Progressing Towards Goal 
Per report pt refused to work with PT yesterday. Pt remains weak and in the bed. Goal: Treatments/Interventions/Procedures Outcome: Progressing Towards Goal 
Patient Vitals for the past 12 hrs: 
 Temp Pulse Resp BP SpO2  
02/04/19 0308 98 °F (36.7 °C) 80 22 145/51 96 % 02/04/19 0000 98.5 °F (36.9 °C) 80 20 132/46 96 % 02/03/19 2224  81  116/48   
02/03/19 2109     98 % 02/03/19 1931 98.1 °F (36.7 °C) 78 23 118/45 98 % Last 3 Recorded Weights in this Encounter 02/02/19 0400 02/03/19 0301 02/04/19 1710 Weight: 65.9 kg (145 lb 4.5 oz) 67.5 kg (148 lb 13 oz) 65.4 kg (144 lb 2.9 oz) PT weighed on bed scale.

## 2019-02-04 NOTE — PROGRESS NOTES
2230 
Pt hs still not voided since being straight cathed at 5pm.  
Bladder scanner showed >610. RN to perform straight cath per protocol. 2501 [facility] RN at bedside to perform straight cath. Pt voided in brief. Placed on purewick in order to monitor accurate I/Os. 0870 Pt has not voided. Bladder scanner shows 635mL. Straight cath performed- 700mL urine drained from bladder. 0800 Bedside and Verbal shift change report given to Herb Merchant RN (oncoming nurse) by Jerry Borges RN (offgoing nurse). Report included the following information SBAR, Kardex, ED Summary, Procedure Summary, Intake/Output, MAR, Recent Results, Med Rec Status and Cardiac Rhythm NSR. Hourly rounding performed Patient Vitals for the past 12 hrs: 
 Temp Pulse Resp BP SpO2  
02/04/19 0308 98 °F (36.7 °C) 80 22 145/51 96 % 02/04/19 0000 98.5 °F (36.9 °C) 80 20 132/46 96 % 02/03/19 2224  81  116/48   
02/03/19 2109     98 % 02/03/19 1931 98.1 °F (36.7 °C) 78 23 118/45 98 % Last 3 Recorded Weights in this Encounter 02/02/19 0400 02/03/19 0301 02/04/19 2277 Weight: 65.9 kg (145 lb 4.5 oz) 67.5 kg (148 lb 13 oz) 65.4 kg (144 lb 2.9 oz)

## 2019-02-04 NOTE — PROGRESS NOTES
[facility] 
 [address] [facility], [address] [facility], [facility] Phone: [phone]   [phone]   
  
Nephrology Progress Note Amrit Samano     1940     [id number] Date of Admission : 1/25/2019 02/04/19 CC:  Follow up for  TOÑA Assessment and Plan TOÑA · 2/2 Blood loss, Shock, IV contrast (260cc), CRS 
· Non oliguric now and Cr stable · Changed diuretics to p.o · Can stop Dobutamine and see how she does · Remove morales and place purewick Multifocal Hemorrhage : 
· Hematuria, +ve hemoptysis , Extra and Intra peritoneal bleed · Exclude Vasculitis - ANCA pending · No aneurysms or large vessel vasculitis on Arteriogram  
 
Hypokalemia · Replete PRN 
  
Rectus Abdominus bleed/ Hematoma · Presumed spontaneous. No AC / trauma / aneurysms · S/p IR embolization 1/30  
· hgb stable 
  
Severe CMP w/ EF 20% · Per cardiology · Would avoid ACE (I) until ARF resolves Interval History: 
Seen and examined . Morales leaking. Looks a lot better. deneis any N/V/CP/SOB Review of Systems: Pertinent items are noted in HPI. Current Medications:  
Current Facility-Administered Medications Medication Dose Route Frequency  bumetanide (BUMEX) tablet 1 mg  1 mg Oral BID  
 bisacodyl (DULCOLAX) suppository 10 mg  10 mg Rectal DAILY PRN  
 DOBUTamine (DOBUTREX) 500 mg/250 mL (2,000 mcg/mL) infusion  2.5 mcg/kg/min IntraVENous CONTINUOUS  
 docusate sodium (COLACE) capsule 100 mg  100 mg Oral BID  polyethylene glycol (MIRALAX) packet 17 g  17 g Oral DAILY  amiodarone (CORDARONE) 375 mg/250 mL D5W infusion  0.5-1 mg/min IntraVENous TITRATE  
 0.9% sodium chloride infusion 250 mL  250 mL IntraVENous PRN  
 acetylcysteine (MUCOMYST) 200 mg/mL (20 %) solution 200 mg  200 mg Nebulization BID RT  
 0.9% sodium chloride infusion 250 mL  250 mL IntraVENous PRN  prochlorperazine (COMPAZINE) with saline injection 10 mg  10 mg IntraVENous Q6H PRN  
  rosuvastatin (CRESTOR) tablet 10 mg  10 mg Oral QHS  albuterol-ipratropium (DUO-NEB) 2.5 MG-0.5 MG/3 ML  3 mL Nebulization Q6H PRN  
 guaiFENesin ER (MUCINEX) tablet 600 mg  600 mg Oral Q12H  pantoprazole (PROTONIX) tablet 40 mg  40 mg Oral BID  sodium chloride (NS) flush 5-40 mL  5-40 mL IntraVENous Q8H  
 sodium chloride (NS) flush 5-40 mL  5-40 mL IntraVENous PRN  
 aspirin chewable tablet 81 mg  81 mg Oral DAILY  nitroglycerin (NITROSTAT) tablet 0.4 mg  0.4 mg SubLINGual Q5MIN PRN  
 ondansetron (ZOFRAN) injection 4 mg  4 mg IntraVENous Q4H PRN  
 morphine injection 2 mg  2 mg IntraVENous Q4H PRN Allergies Allergen Reactions  Penicillins Hives Objective: 
Vitals:   
Vitals:  
 02/04/19 0000 02/04/19 0308 02/04/19 8026 02/04/19 8628 BP: 132/46 145/51  132/49 Pulse: 80 80  79 Resp: 20 22  23 Temp: 98.5 °F (36.9 °C) 98 °F (36.7 °C)  98.3 °F (36.8 °C) SpO2: 96% 96%  95% Weight:   65.4 kg (144 lb 2.9 oz) Height:      
 
Intake and Output: 
No intake/output data recorded. 02/02 1901 - 02/04 0700 In: 2010.3 [P.O.:780; I.V.:1230.3] Out: 2350 [Urine:2350] Physical Examination:General:  Lethargic HEENT: PERRL,  Pale Neck: lines + Lungs : rales + CVS: RRR, S1 S2 normal, No murmur Abdomen: large hematoma in lower abdomen Extremities: no Edema MS: No joint swelling, erythema, warmth Neurologic: non focal 
 
 
 
[]    High complexity decision making was performed 
[]    Patient is at high-risk of decompensation with multiple organ involvement Lab Data Personally Reviewed: I have reviewed all the pertinent labs, microbiology data and radiology studies during assessment. Recent Labs 02/03/19 
0112 02/02/19 
1557 02/02/19 
0354 02/01/19 
1357  145 147* 148* K 3.9 3.5 3.4* 3.9  103 107 110* CO2 33* 33* 32 30 * 118* 113* 112* BUN 21* 22* 20 25* CREA 1.16* 1.18* 1.19* 1.44* CA 8.0* 8.4* 8.2* 8.3* PHOS 4.3 2.0* 2.6 1.9*  
 ALB 3.8 4.1 4.1 4.3 Recent Labs 02/03/19 
0112 02/02/19 
0354 WBC 9.0 10.1 HGB 8.4* 8.1* HCT 26.6* 25.4*  
 171 No results found for: SDES Lab Results Component Value Date/Time Culture result: MRSA NOT PRESENT 01/29/2019 11:21 AM  
 Culture result:  01/29/2019 11:21 AM  
      Screening of patient nares for MRSA is for surveillance purposes and, if positive, to facilitate isolation considerations in high risk settings. It is not intended for automatic decolonization interventions per se as regimens are not sufficiently effective to warrant routine use. Culture result: NO GROWTH 5 DAYS 01/25/2019 03:12 PM  
 Culture result: NO GROWTH 5 DAYS 01/21/2019 02:25 PM  
 Culture result: NO GROWTH 5 DAYS 01/18/2019 12:29 PM  
 
No results found for this or any previous visit (from the past 24 hour(s)). I have reviewed the flowsheets. Chart and Pertinent Notes have been reviewed. No change in PMH ,family and social history from Consult note.  
 
 
Marko Willis MD

## 2019-02-04 NOTE — PROGRESS NOTES
Hospitalist Progress Note [address] [facility],  Answering service: [phone] OR [phone] from in house phone Date of Service:  2019 NAME:  Lemond Boeck :  1940 MRN:  [id number] Admission Summary:  
Lemond Boeck is a 70 y.o. female who presents with dyspnea. Was recently just discharged from hospital 5 days ago for pancreatitis and large pancreatic cyst. 
Two to three days after discharge, pt began having intermittent cough and dyspnea. Associated with generalized weakness and fatigue. Denies chest pain, significant sputum, calf pain or erythema, fever, chills. Does have some persistent nausea. Interval history / Subjective:  
Patient seen and examined. Continues to improve. Joyce removed 2/3 with urinary retention, replaced. Dobutamine and amiodarone drips discontinued. Creatinine stable. PT consulted. .  
Assessment & Plan:  
 
Acute systolic heart failure, EF 20-25%, NYHA Class IV: 
-Echo with severely reduced EF, diffuse hypokinesis 
-cardiology consulted- acute illness vs CAD not excluded. Considering stress test 
-dobutamine discontinued 
-will need to start BB, ACE-I once medically appropriate and blood pressure stable Atrial fibrillation with RVR: resolved 
-cardiology following, s/p amiodarone drip, continue oral amiodarone 
-hold anticoagulation due to recent bleed Shock, hypotensive vs cardiogenic: replaced  
-secondary to acute blood loss -CVL placement Acute blood loss anemia secondary to left rectus and pelvic hemorrhage:  
-acute blood loss , CT with left rectus hematoma and pelvic hemorrhage 
-Underwent IR angio  with prophylactic coil to left inferior epigastric artery 
-Repeat CT abd/pel w/o contrast  with slightly decreased size hematomas 
-s/p 6 units pRBCs, CBC q 8 hours, transfuse hgb <7 
-no further evidence of bleeding Acute renal failure: improving -nephrology following, no acute dialysis needs, improving 
-bumex 1 mg BID oral 
 
Acute hypoxic respiratory failure, POA: (hypoxic, tachypneic, respiratory distress) -multifactorial due to acute influenza, suspected superimposed pneumonia, volume overload due to acute systolic heart failure, EF 25-25%, and effusion possibly secondary to pancreatitis  
-continue supplemental oxygen, wean as tolerated, no further Bipap needs Influenza A with possible superimposed pneumonia:  
-s/p tamiflu, levaquin Elevated troponin: suspect demand ischemia 
  
Pancreatitis: 
-recent discharge for pancreatitis and redemonstration on CT 1/31 
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 Will need repeat MRI in 6 months, no acute EUS needed 
-Lipase remains elevated -IVF held secondary to volume overload and worsening respiratory status  
-prn pain meds, anti-emetics Hypokalemia: replace as needed Hypernatremia: continue to monitor Urinary retention: morales removed 2/3 with subsequent replacement Right IJ CVL, possible remove 2/5/2019 Code status: Full DVT prophylaxis: held secondary to bleed Care Plan discussed with: Patient/Family and Nurse Disposition: TBD Hospital Problems  Date Reviewed: 1/15/2019 Codes Class Noted POA Dyspnea ICD-10-CM: R06.00 
ICD-9-CM: 786.09  1/25/2019 Unknown Review of Systems:  
Negative unless stated above Vital Signs:  
 Last 24hrs VS reviewed since prior progress note. Most recent are: 
Visit Vitals /49 (BP 1 Location: Right arm, BP Patient Position: At rest) Pulse 79 Temp 98.2 °F (36.8 °C) Resp 22 Ht 5' 4\" (1.626 m) Wt 65.4 kg (144 lb 2.9 oz) SpO2 95% Breastfeeding? No  
BMI 24.75 kg/m² Intake/Output Summary (Last 24 hours) at 2/4/2019 1502 Last data filed at 2/4/2019 0800 Gross per 24 hour Intake 1013.75 ml Output 1400 ml Net -386.25 ml Physical Examination: Constitutional:   Alert, no acute distress ENT:  Oral mucous moist, Resp:  Coarse breathe sounds, no increased work of breathing CV:  Regular rhythm, normal rate GI:  distended, tender to mild palpation, superficial bruising Musculoskeletal:  No edema, warm, 2+ pulses throughout Neurologic:  Moves all extremities. Data Review:  
 I personally reviewed  Image and labs Cta Chest W Or W Wo Cont Result Date: 1/25/2019 IMPRESSION: Small right pleural effusion with underlying atelectasis. No evidence of pulmonary embolism. Ill-defined mass lesion either arising from or secondarily involving the left hepatic lobe likely corresponding to the pancreas mass seen on the recent MR but likely increased in the interval. 
 
Ct Abd Pelv Wo Cont Result Date: 2/1/2019 IMPRESSION: 1. Slightly diminished size of rectus abdominis muscular hematoma. Stable size of pelvic collection Mass effect on the surrounding structures. Post coil embolization of the deep inferior epigastric artery without evidence of active extravasation. The patient was not administered IV contrast for this examination which limits sensitivity for active extravasation. . 2. Imaging findings related to pancreatitis with stable pancreatic cyst.. 3. Moderate left and small right effusion not significantly changed. . 4. Interval mild bilateral hydroureteronephrosis when compared to the prior study. Ct Abd Pelv W Cont Result Date: 1/30/2019 IMPRESSION: 1. 7.2 x 5.0 x 8.4 cm left rectus abdominis intramuscular hematoma is acute on subacute and new since 15 days ago. This communicates with a 15.8 x 11.8 x 18.3 cm anterior pelvic extraperitoneal subacute hematoma. Mass effect on the surrounding structures. There may be active bleeding from the left inferior epigastric artery. 2. Unchanged pancreatitis.  Increased size of the presumed developing pseudocyst in the pancreatic head. Reevaluate on follow-up imaging in a couple of months. 3. New moderate right and small left pleural simple effusions with adjacent compressive and dependent atelectasis. 4. New small volume of ascites is nonhemorrhagic. I discussed this impression with Dr. Radha Parry at  hours on 1/30/2019. Xr Chest [facility] Result Date: 2/1/2019 IMPRESSION: Mild interstitial edema. No interval change. Xr Chest [facility] Result Date: 1/31/2019 IMPRESSION: Stable mild interstitial opacities, bibasilar opacities, and small pleural effusions. Xr Chest [facility] Result Date: 1/30/2019 IMPRESSION: 1. Right IJ catheter overlies the SVC. There is no pneumothorax Xr Chest [facility] Result Date: 1/30/2019 IMPRESSION: Bibasilar densities consistent bibasilar airspace disease and/or small bilateral pleural effusions. Xr Chest [facility] Result Date: 1/29/2019 IMPRESSION: 1. Similar to slightly worsening bilateral perihilar predominant opacities, most suspicious for edema. 2.  Slightly increased small left effusion and a increasing left lung base consolidation, possibly atelectasis. Xr Chest [facility] Result Date: 1/28/2019 Impression: Stable trace bilateral effusions with underlying atelectasis. Xr Chest [facility] Result Date: 1/28/2019 IMPRESSION: Small bibasilar atelectasis/effusions are again noted slightly improved on the right and mildly progressed on the left Ir Occl [facility] W Si 
 
Result Date: 1/30/2019 IMPRESSION: Lower abdominal aortic and pelvic arteriogram without evidence for active hemorrhage or pseudoaneurysm. Prophylactic coil embolization of the proximal deep left inferior epigastric artery was performed as described above. Patient tolerated the procedure well without immediate complication. Recent MRI abd 1/15/2019 
 
1. 1.3 x 1.7 x 3.0 cm multilocular cystic lesion of the pancreas at the body 
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  Follow-up, preferably by MRI, in 6-12 months is recommended. 2. Hepatic cysts and probable flash fill inferior right lobe hepatic hemangioma. 3. Mild extra hepatic biliary dilation which may be sequela of prior obstruction 
or inflammation. Correlate for evidence of ongoing biliary obstruction with 
clinical and laboratory data. 4. Small sliding hiatal hernia. Labs:  
 
Recent Labs 02/03/19 
0112 02/02/19 
0354 WBC 9.0 10.1 HGB 8.4* 8.1* HCT 26.6* 25.4*  
 171 Recent Labs 02/03/19 
0112 02/02/19 
1557 02/02/19 
0354  145 147* K 3.9 3.5 3.4*  
 103 107 CO2 33* 33* 32 BUN 21* 22* 20  
CREA 1.16* 1.18* 1.19* * 118* 113* CA 8.0* 8.4* 8.2* PHOS 4.3 2.0* 2.6 Recent Labs 02/03/19 
0112 02/02/19 
1557 02/02/19 
0354 ALB 3.8 4.1 4.1 No results for input(s): INR, PTP, APTT in the last 72 hours. No lab exists for component: INREXT, INREXT No results for input(s): FE, TIBC, PSAT, FERR in the last 72 hours. No results found for: FOL, RBCF No results for input(s): PH, PCO2, PO2 in the last 72 hours. No results for input(s): CPK, CKNDX, TROIQ in the last 72 hours. No lab exists for component: CPKMB Lab Results Component Value Date/Time Cholesterol, total 185 01/15/2019 12:29 PM  
 HDL Cholesterol 52 01/15/2019 12:29 PM  
 LDL, calculated 109.6 (H) 01/15/2019 12:29 PM  
 Triglyceride 117 01/15/2019 12:29 PM  
 CHOL/HDL Ratio 3.6 01/15/2019 12:29 PM  
 
Lab Results Component Value Date/Time Glucose (POC) 102 (H) 01/18/2019 06:23 AM  
 Glucose (POC) 139 (H) 01/17/2019 09:05 PM  
 Glucose (POC) 114 (H) 01/17/2019 04:31 PM  
 Glucose (POC) 106 (H) 01/17/2019 11:41 AM  
 Glucose (POC) 132 (H) 01/16/2019 09:13 PM  
 
Lab Results Component Value Date/Time  Color DARK YELLOW 01/15/2019 10:16 PM  
 Appearance CLOUDY (A) 01/15/2019 10:16 PM  
 Specific gravity 1.030 01/15/2019 10:16 PM  
 pH (UA) 6.0 01/15/2019 10:16 PM  
 Protein 100 (A) 01/15/2019 10:16 PM  
 Glucose NEGATIVE  01/15/2019 10:16 PM  
 Ketone 40 (A) 01/15/2019 10:16 PM  
 Urobilinogen 1.0 01/15/2019 10:16 PM  
 Nitrites NEGATIVE  01/15/2019 10:16 PM  
 Leukocyte Esterase SMALL (A) 01/15/2019 10:16 PM  
 Epithelial cells MODERATE (A) 01/15/2019 10:16 PM  
 Bacteria 1+ (A) 01/15/2019 10:16 PM  
 WBC 10-20 01/15/2019 10:16 PM  
 RBC 0-5 01/15/2019 10:16 PM  
 
 
 
Medications Reviewed:  
 
Current Facility-Administered Medications Medication Dose Route Frequency  bumetanide (BUMEX) tablet 1 mg  1 mg Oral BID  
 amiodarone (CORDARONE) tablet 400 mg  400 mg Oral BID  
 bisacodyl (DULCOLAX) suppository 10 mg  10 mg Rectal DAILY PRN  
 docusate sodium (COLACE) capsule 100 mg  100 mg Oral BID  polyethylene glycol (MIRALAX) packet 17 g  17 g Oral DAILY  0.9% sodium chloride infusion 250 mL  250 mL IntraVENous PRN  
 0.9% sodium chloride infusion 250 mL  250 mL IntraVENous PRN  prochlorperazine (COMPAZINE) with saline injection 10 mg  10 mg IntraVENous Q6H PRN  
 rosuvastatin (CRESTOR) tablet 10 mg  10 mg Oral QHS  albuterol-ipratropium (DUO-NEB) 2.5 MG-0.5 MG/3 ML  3 mL Nebulization Q6H PRN  
 guaiFENesin ER (MUCINEX) tablet 600 mg  600 mg Oral Q12H  pantoprazole (PROTONIX) tablet 40 mg  40 mg Oral BID  sodium chloride (NS) flush 5-40 mL  5-40 mL IntraVENous Q8H  
 sodium chloride (NS) flush 5-40 mL  5-40 mL IntraVENous PRN  
 aspirin chewable tablet 81 mg  81 mg Oral DAILY  nitroglycerin (NITROSTAT) tablet 0.4 mg  0.4 mg SubLINGual Q5MIN PRN  
 ondansetron (ZOFRAN) injection 4 mg  4 mg IntraVENous Q4H PRN  
 morphine injection 2 mg  2 mg IntraVENous Q4H PRN  
 
______________________________________________________________________ EXPECTED LENGTH OF STAY: 4d 4h 
ACTUAL LENGTH OF STAY:          10 [address] [facility],

## 2019-02-05 NOTE — PROGRESS NOTES
[facility] 
 [address] [facility], [facility] [address] [facility] Phone: [phone]   [phone]   
  
Nephrology Progress Note Fariba Gandhi     1940     [id number] Date of Admission : 1/25/2019 02/05/19 CC:  Follow up for  TOÑA Assessment and Plan TOÑA  
- Resolving  
- continue Bumex 1 mg daily  
- started low dose ACE (I) Urinary Retention : 
- she has a large intra-abdominal hematoma compressing bladder  
- suggest discharging her w/ Morales  
- repeat CT in 3-4 weeks and attempt morales removal again Multifocal Hemorrhage : 
· Unknown etiology · ANCA negative Hypokalemia · Replete PRN 
  
Rectus Abdominus bleed/ Hematoma · Presumed spontaneous. No AC / trauma / aneurysms · S/p IR embolization 1/30  
· hgb stable 
  
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 
Seen and examined . She reports feeling better 
 deneis any N/V/CP/SOB Review of Systems: Pertinent items are noted in HPI. Current Medications:  
Current Facility-Administered Medications Medication Dose Route Frequency  lisinopril (PRINIVIL, ZESTRIL) tablet 2.5 mg  2.5 mg Oral DAILY  bumetanide (BUMEX) tablet 1 mg  1 mg Oral BID  
 amiodarone (CORDARONE) tablet 400 mg  400 mg Oral BID  
 bisacodyl (DULCOLAX) suppository 10 mg  10 mg Rectal DAILY PRN  
 docusate sodium (COLACE) capsule 100 mg  100 mg Oral BID  polyethylene glycol (MIRALAX) packet 17 g  17 g Oral DAILY  0.9% sodium chloride infusion 250 mL  250 mL IntraVENous PRN  
 0.9% sodium chloride infusion 250 mL  250 mL IntraVENous PRN  prochlorperazine (COMPAZINE) with saline injection 10 mg  10 mg IntraVENous Q6H PRN  
 rosuvastatin (CRESTOR) tablet 10 mg  10 mg Oral QHS  albuterol-ipratropium (DUO-NEB) 2.5 MG-0.5 MG/3 ML  3 mL Nebulization Q6H PRN  
 guaiFENesin ER (MUCINEX) tablet 600 mg  600 mg Oral Q12H  pantoprazole (PROTONIX) tablet 40 mg  40 mg Oral BID  sodium chloride (NS) flush 5-40 mL  5-40 mL IntraVENous Q8H  
 sodium chloride (NS) flush 5-40 mL  5-40 mL IntraVENous PRN  
 aspirin chewable tablet 81 mg  81 mg Oral DAILY  nitroglycerin (NITROSTAT) tablet 0.4 mg  0.4 mg SubLINGual Q5MIN PRN  
 ondansetron (ZOFRAN) injection 4 mg  4 mg IntraVENous Q4H PRN  
 morphine injection 2 mg  2 mg IntraVENous Q4H PRN Allergies Allergen Reactions  Penicillins Hives Objective: 
Vitals:   
Vitals:  
 02/04/19 2255 02/05/19 7114 02/05/19 2322 02/05/19 4793 BP: 125/51 131/61 139/69 141/78 Pulse: 84 84 78 78 Resp: 25 28 22 Temp: 98.3 °F (36.8 °C) 98.3 °F (36.8 °C) 98 °F (36.7 °C) SpO2: 96% 96% 95% Weight:  66.9 kg (147 lb 7.8 oz) Height:      
 
Intake and Output: 
No intake/output data recorded. 02/03 1901 - 02/05 0700 In: 773.8 [I.V.:773.8] Out: Shy Goyal [[id number]] Physical Examination:General:  Lethargic HEENT: PERRL,  Pale Neck: lines + Lungs : rales + CVS: RRR, S1 S2 normal, No murmur Abdomen: large hematoma in lower abdomen Extremities: no Edema MS: No joint swelling, erythema, warmth Neurologic: non focal 
 
 
 
[]    High complexity decision making was performed 
[]    Patient is at high-risk of decompensation with multiple organ involvement Lab Data Personally Reviewed: I have reviewed all the pertinent labs, microbiology data and radiology studies during assessment. Recent Labs 02/05/19 
2464 02/03/19 
0112 02/02/19 
1557  144 145  
K 3.4* 3.9 3.5 CL 99 103 103 CO2 32 33* 33* * 127* 118* BUN 22* 21* 22* CREA 1.00 1.16* 1.18* CA 8.8 8.0* 8.4* PHOS  --  4.3 2.0* ALB  --  3.8 4.1 Recent Labs 02/05/19 
1674 02/03/19 
0112 WBC 8.5 9.0 HGB 9.2* 8.4* HCT 30.5* 26.6*  
 171 No results found for: SDES Lab Results Component Value Date/Time  Culture result: MRSA NOT PRESENT 01/29/2019 11:21 AM  
 Culture result:  01/29/2019 11:21 AM  
      Screening of patient nares for MRSA is for surveillance purposes and, if positive, to facilitate isolation considerations in high risk settings. It is not intended for automatic decolonization interventions per se as regimens are not sufficiently effective to warrant routine use. Culture result: NO GROWTH 5 DAYS 01/25/2019 03:12 PM  
 Culture result: NO GROWTH 5 DAYS 01/21/2019 02:25 PM  
 Culture result: NO GROWTH 5 DAYS 01/18/2019 12:29 PM  
 
Recent Results (from the past 24 hour(s)) ECHO ADULT FOLLOW-UP OR LIMITED Collection Time: 02/04/19  5:09 PM  
Result Value Ref Range LV E' Lateral Velocity 11.30 cm/s LV E' Septal Velocity 6.75 cm/s Tapse 2.00 1.5 - 2.0 cm Ao Root D 2.66 cm Aortic Valve Systolic Peak Velocity 313.02 cm/s AoV PG 8.9 mmHg LVIDd 4.05 3.9 - 5.3 cm  
 LVPWd 0.93 (A) 0.6 - 0.9 cm LVIDs 2.98 cm IVSd 0.99 (A) 0.6 - 0.9 cm  
 LVOT Peak Velocity 85.96 cm/s LVOT Peak Gradient 3.0 mmHg MVA (PHT) 3.2 cm2  
 MV A Melo 86.51 cm/s  
 MV E Melo 0.91 cm/s  
 MV E/A 0.01 Left Atrium to Aortic Root Ratio 1.55   
 LV Mass .2 67 - 162 g  
 LV Mass AL Index 81.2 43 - 95 g/m2 E/E' lateral 0.08   
 E/E' septal 0.13   
 E/E' ratio (averaged) 0.11 Mitral Valve E Wave Deceleration Time 235.0 ms  
 Mitral Valve Pressure Half-time 68.2 ms Left Atrium Major Axis 4.11 cm Triscuspid Valve Regurgitation Peak Gradient 22.4 mmHg Pulmonic Valve Max Velocity 107.20 cm/s  
 TR Max Velocity 236.67 cm/s PV peak gradient 4.6 mmHg METABOLIC PANEL, BASIC Collection Time: 02/05/19  3:56 AM  
Result Value Ref Range Sodium 141 136 - 145 mmol/L Potassium 3.4 (L) 3.5 - 5.1 mmol/L Chloride 99 97 - 108 mmol/L  
 CO2 32 21 - 32 mmol/L Anion gap 10 5 - 15 mmol/L Glucose 113 (H) 65 - 100 mg/dL BUN 22 (H) 6 - 20 MG/DL  Creatinine 1.00 0.55 - 1.02 MG/DL  
 BUN/Creatinine ratio 22 (H) 12 - 20    
 GFR est AA >60 >60 ml/min/1.73m2 GFR est non-AA 54 (L) >60 ml/min/1.73m2 Calcium 8.8 8.5 - 10.1 MG/DL  
CBC W/O DIFF Collection Time: 02/05/19  3:56 AM  
Result Value Ref Range WBC 8.5 3.6 - 11.0 K/uL  
 RBC 3.19 (L) 3.80 - 5.20 M/uL HGB 9.2 (L) 11.5 - 16.0 g/dL HCT 30.5 (L) 35.0 - 47.0 % MCV 95.6 80.0 - 99.0 FL  
 MCH 28.8 26.0 - 34.0 PG  
 MCHC 30.2 30.0 - 36.5 g/dL  
 RDW 15.3 (H) 11.5 - 14.5 % PLATELET 174 793 - 874 K/uL MPV 10.6 8.9 - 12.9 FL  
 NRBC 0.0 0  WBC ABSOLUTE NRBC 0.00 0.00 - 0.01 K/uL I have reviewed the flowsheets. Chart and Pertinent Notes have been reviewed. No change in PMH ,family and social history from Consult note.  
 
 
Lexie Banegas MD

## 2019-02-05 NOTE — PROGRESS NOTES
2/5/2019     [facility] [address] [place] [address] 
 
. Artist Nena Artist Nena Artist Hazy Genesis Fuller is a 70 y.o. female Subjective: Feeling better. Denies SOB, chest pain. Reports productive cough. No further Afib on telemetry monitor Has a little appetite. Denies N&V and feels that her abdomen is less tender. Asking about increasing mobility. Assessment/Plan/Discussion:Cardiology Attending:  
 
Patient seen on the day of progress note and examined  and agree with Advance Practice Provider (SISI, NP,PA)  assessment and plans. Genesis Fuller is a 70 y.o. female Looks so much better, now EF is almost normal also Is off DBT and amio gtt Continue with BB and ACE for cardiomyopathy 
 adding stepwise Fer Quezada MD   
 
 
 
 
 
Discussion/Plans 1. Cardiomyopathy, acute systolic CHF: 
-NYHA III  
-Limited Echo 2/4/19: EF improved to 46-50% (off dobutamine) From 14-23%% 
-Uncertain etiology. Suspect may be d/t acute illness.  
-Not candidate for invasive cardiac testing currently 
-Bumex 1 mg po daily 
-Start lisinopril 2.5 mg daily. Consider restarting BB tomorrow. (will d/w Dr. Dimitri Alfaro) 2. NQMI: Troponin elevation and fall. Peak of 3.26 
-Troponins trend down. 0.11 1/31. 
-Not on  ASA d/t pelvic/rectus sheath bleeds. -   
-Continue high potency statin. 
-Lisinopril 2.5 mg po daily 
-Off BB. Consider restarting low dose in a.m. 
 
4. PAF: had PAF over weekend- none on tele review overnight/today 
-SR on tele review. -Off coreg-  
-amiodarone to po. 400 mg BID 
-[id number] (age, CHF, Female). No anticoags or ASA d/t bleeding. 5. TOÑA: resolving. Creatinine normalized. -nephrology following. 6. Pelvic/rectus sheath hematomas/bleed: 
-s/p IR embolization 1/30/19. 
 
7. Anemia, acute blood loss:  hgb improving at 9.2 
-Off ASA, and anticoagulation 
-Received total of 6 units PRBCs last week.  
 
8.  Pancreatitis with cystic lesion-  
 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 
 
9. Influenza/superimposed pneumonia 10. Hypokalemia: k=3.4. Repleted. Pt improving. ACE-I restarted today. Remains in NSR- no afib. Continue amiodarone. [id number] Lynne Rivera for PT, mobilize today with PT. Will add IS. Further plan per Dr. Favian Adams.    
. 
Cardiac Studies/Hx: 
No specialty comments available. Patient Vitals for the past 12 hrs: 
 Temp Pulse Resp BP SpO2  
02/05/19 1124 98.3 °F (36.8 °C) 78 22 134/65 95 % 02/05/19 0827  78  141/78   
02/05/19 0709 98 °F (36.7 °C) 78 22 139/69 95 % 02/05/19 0321 98.3 °F (36.8 °C) 84 28 131/61 96 % Past Medical History:  
Diagnosis Date  Other ill-defined conditions(799.89) IBS  Pancreatitis ROS-pertinents  negative except as above  The pertinent portions of the medical history,physician and nursing notes, meds,vitals , labs and Ins/Outs,are reviewed in the electronic record. Results for orders placed or performed during the hospital encounter of 01/25/19 EKG, 12 LEAD, INITIAL Result Value Ref Range Ventricular Rate 155 BPM  
 Atrial Rate 150 BPM  
 QRS Duration 72 ms Q-T Interval 296 ms QTC Calculation (Bezet) 475 ms Calculated R Axis 46 degrees Calculated T Axis -143 degrees Diagnosis Atrial fibrillation with rapid ventricular response T wave abnormality, consider anterolateral ischemia or digitalis effect When compared with ECG of 30-JAN-2019 03:14, Atrial fibrillation has replaced Sinus rhythm Vent. rate has increased BY  76 BPM 
T wave inversion less evident in Anterior leads Confirmed by Tia Ramírez M.D., [address] [place] [address] ([id number]) on 2/3/2019 11:56:13 AM 
  
  
Vitals:  
 02/05/19 9152 02/05/19 6242 02/05/19 0827 02/05/19 1124 BP: 131/61 139/69 141/78 134/65 BP 1 Location: Right arm Right arm  Right arm BP Patient Position: At rest At rest  At rest  
Pulse: 84 78 78 78 Resp: 28 22  22 Temp: 98.3 °F (36.8 °C) 98 °F (36.7 °C)  98.3 °F (36.8 °C) SpO2: 96% 95%  95% Weight: 147 lb 7.8 oz (66.9 kg) Height:      
 
 
Objective:  
 Physical Exam:  
Patient Vitals for the past 12 hrs: 
 Temp Pulse Resp BP SpO2  
02/05/19 1124 98.3 °F (36.8 °C) 78 22 134/65 95 % 02/05/19 0827  78  141/78   
02/05/19 0709 98 °F (36.7 °C) 78 22 139/69 95 % 02/05/19 0321 98.3 °F (36.8 °C) 84 28 131/61 96 % General:  Weak appearing. pale  
ENT, Neck:  no jvd Chest Wall: inspection normal - no chest wall deformities or tenderness,  
Lung:  Few crackles left base  No SOB at rest  
Heart:  normal rate, regular rhythm, normal S1, S2, no murmurs, rubs, clicks or gallops Abdomen: Less Distended, soft, mild +ttp Extremities: extremities normal, atraumatic, no cyanosis or edema Last 24hr Input/Output: 
 
Intake/Output Summary (Last 24 hours) at 2/5/2019 1253 Last data filed at 2/5/2019 0400 Gross per 24 hour Intake 0 ml Output 1225 ml Net -1225 ml Data Review:  
Recent Results (from the past 24 hour(s)) ECHO ADULT FOLLOW-UP OR LIMITED Collection Time: 02/04/19  5:09 PM  
Result Value Ref Range LV E' Lateral Velocity 11.30 cm/s LV E' Septal Velocity 6.75 cm/s Tapse 2.00 1.5 - 2.0 cm Ao Root D 2.66 cm Aortic Valve Systolic Peak Velocity 553.25 cm/s AoV PG 8.9 mmHg LVIDd 4.05 3.9 - 5.3 cm  
 LVPWd 0.93 (A) 0.6 - 0.9 cm LVIDs 2.98 cm IVSd 0.99 (A) 0.6 - 0.9 cm  
 LVOT Peak Velocity 85.96 cm/s LVOT Peak Gradient 3.0 mmHg MVA (PHT) 3.2 cm2  
 MV A Melo 86.51 cm/s  
 MV E Melo 0.91 cm/s  
 MV E/A 0.01 Left Atrium to Aortic Root Ratio 1.55   
 LV Mass .2 67 - 162 g  
 LV Mass AL Index 81.2 43 - 95 g/m2 E/E' lateral 0.08   
 E/E' septal 0.13   
 E/E' ratio (averaged) 0.11 Mitral Valve E Wave Deceleration Time 235.0 ms  
 Mitral Valve Pressure Half-time 68.2 ms  Left Atrium Major Axis 4.11 cm  
 Triscuspid Valve Regurgitation Peak Gradient 22.4 mmHg Pulmonic Valve Max Velocity 107.20 cm/s  
 TR Max Velocity 236.67 cm/s PV peak gradient 4.6 mmHg METABOLIC PANEL, BASIC Collection Time: 02/05/19  3:56 AM  
Result Value Ref Range Sodium 141 136 - 145 mmol/L Potassium 3.4 (L) 3.5 - 5.1 mmol/L Chloride 99 97 - 108 mmol/L  
 CO2 32 21 - 32 mmol/L Anion gap 10 5 - 15 mmol/L Glucose 113 (H) 65 - 100 mg/dL BUN 22 (H) 6 - 20 MG/DL Creatinine 1.00 0.55 - 1.02 MG/DL  
 BUN/Creatinine ratio 22 (H) 12 - 20 GFR est AA >60 >60 ml/min/1.73m2 GFR est non-AA 54 (L) >60 ml/min/1.73m2 Calcium 8.8 8.5 - 10.1 MG/DL  
CBC W/O DIFF Collection Time: 02/05/19  3:56 AM  
Result Value Ref Range WBC 8.5 3.6 - 11.0 K/uL  
 RBC 3.19 (L) 3.80 - 5.20 M/uL HGB 9.2 (L) 11.5 - 16.0 g/dL HCT 30.5 (L) 35.0 - 47.0 % MCV 95.6 80.0 - 99.0 FL  
 MCH 28.8 26.0 - 34.0 PG  
 MCHC 30.2 30.0 - 36.5 g/dL  
 RDW 15.3 (H) 11.5 - 14.5 % PLATELET 086 279 - 638 K/uL MPV 10.6 8.9 - 12.9 FL  
 NRBC 0.0 0  WBC ABSOLUTE NRBC 0.00 0.00 - 0.01 K/uL Leoncio Koroma. Jerardo, ZEYAD 2/5/2019

## 2019-02-05 NOTE — PROGRESS NOTES
[address] [place] [address] met with patient today to provide an introduction to self, the [id number] at [facility]. Bundled Payment Care Program: 
 
Patient was provided a copy of the [facility] letter. Patient states that they will get a digital scale a functioning scale at home. Reinforced the importance of checking their weight at the same time daily and calling the cardiologist if their weight is up 3 lbs. in a day or 5 lbs. in a week (or per MD guidelines). Patient  has received a \"Living with Heart Failure\" patient education book. Hug At Home Program: 
 
Provided patient demonstration of Healthy Labs program on training iPad. Patient was not interested in the Healthy Labs program.  
 
All questions answered at this time. Provided patient contact number for the [address] [place] [address] for further questions or concerns. Patient verbalized understanding of all information discussed. Appointments:  
 
Patient stated her son Yokasta Harper will transport her to her follow up appointments and will need the check with for best day of the week Will follow up with patient tomorrow.

## 2019-02-05 NOTE — PROGRESS NOTES
Bedside shift change report given to Radha Aguilera (oncoming nurse) by Anali French (offgoing nurse). Report included the following information SBAR, Kardex, ED Summary, Accordion and Recent Results. Problem: Falls - Risk of 
Goal: *Absence of Falls Document Hossein Dye Fall Risk and appropriate interventions in the flowsheet. Outcome: Progressing Towards Goal 
Fall Risk Interventions: 
Mobility Interventions: Communicate number of staff needed for ambulation/transfer, Patient to call before getting OOB, PT Consult for mobility concerns, PT Consult for assist device competence Mentation Interventions: Adequate sleep, hydration, pain control, Door open when patient unattended, Evaluate medications/consider consulting pharmacy, More frequent rounding, Reorient patient, Room close to nurse's station Medication Interventions: Evaluate medications/consider consulting pharmacy, Patient to call before getting OOB, Teach patient to arise slowly Elimination Interventions: Call light in reach, Patient to call for help with toileting needs, Toilet paper/wipes in reach, Toileting schedule/hourly rounds Problem: Pressure Injury - Risk of 
Goal: *Prevention of pressure injury Document Travis Scale and appropriate interventions in the flowsheet. Outcome: Progressing Towards Goal 
Pressure Injury Interventions: 
Sensory Interventions: Assess changes in LOC, Keep linens dry and wrinkle-free, Maintain/enhance activity level, Minimize linen layers, Sit a 90-degree angle/use footstool if needed Moisture Interventions: Internal/External urinary devices, Maintain skin hydration (lotion/cream), Absorbent underpads, Apply protective barrier, creams and emollients, Minimize layers Activity Interventions: Increase time out of bed, Pressure redistribution bed/mattress(bed type), PT/OT evaluation Mobility Interventions: HOB 30 degrees or less, Pressure redistribution bed/mattress (bed type), PT/OT evaluation Nutrition Interventions: Document food/fluid/supplement intake, Offer support with meals,snacks and hydration Friction and Shear Interventions: Apply protective barrier, creams and emollients, HOB 30 degrees or less, Lift sheet, Lift team/patient mobility team, Minimize layers, Transferring/repositioning devices Problem: Afib Pathway: Day 3 Goal: Treatments/Interventions/Procedures Outcome: Progressing Towards Goal 
Patient not in  A fib  
NS, now on PO amiodarone, RA maintained 02 above 90%, will continue to monitor

## 2019-02-05 NOTE — PROGRESS NOTES
Hospitalist Progress Note Bayron Blanca MD 
Answering service: [phone] OR [phone] from in house phone Date of Service:  2019 NAME:  Cathy Farnsworth :  1940 MRN:  [id number] Admission Summary:  
Cathy Farnsworth is a 70 y.o. female who presents with dyspnea. Was recently just discharged from hospital 5 days ago for pancreatitis and large pancreatic cyst. 
Two to three days after discharge, pt began having intermittent cough and dyspnea. Associated with generalized weakness and fatigue. Denies chest pain, significant sputum, calf pain or erythema, fever, chills. Does have some persistent nausea. Interval history / Subjective:  
: Patient seen and examined. Case discussed with Card, seems cont on amiodarone for time being is best and can be followed by card as an out pt. Continues to improve. Joyce removed 2/3 with urinary retention, replaced. Dobutamine and amiodarone drips discontinued. Creatinine stable. PT consulted. .  
Assessment & Plan:  
 
Acute systolic heart failure, EF 20-25%, NYHA Class IV: 
-Echo with severely reduced EF, diffuse hypokinesis 
-cardiology consulted- acute illness vs CAD not excluded. Considering stress test 
-dobutamine discontinued 
-will need to start BB, ACE-I once medically appropriate and blood pressure stable Atrial fibrillation with RVR: resolved 
-cardiology following, s/p amiodarone drip, continue oral amiodarone 
-hold anticoagulation due to recent bleed Shock, hypotensive vs cardiogenic: replaced  
-secondary to acute blood loss -CVL placement Acute blood loss anemia secondary to left rectus and pelvic hemorrhage:  
-acute blood loss , CT with left rectus hematoma and pelvic hemorrhage 
-Underwent IR angio  with prophylactic coil to left inferior epigastric artery 
-Repeat CT abd/pel w/o contrast  with slightly decreased size hematomas -s/p 6 units pRBCs, CBC q 8 hours, transfuse hgb <7 
-no further evidence of bleeding Acute renal failure: improving 
-nephrology following, no acute dialysis needs, improving 
-bumex 1 mg BID oral 
 
Acute hypoxic respiratory failure, POA: (hypoxic, tachypneic, respiratory distress) -multifactorial due to acute influenza, suspected superimposed pneumonia, volume overload due to acute systolic heart failure, EF 25-25%, and effusion possibly secondary to pancreatitis  
-continue supplemental oxygen, wean as tolerated, no further Bipap needs Influenza A with possible superimposed pneumonia:  
-s/p tamiflu, levaquin Elevated troponin: suspect demand ischemia 
  
Pancreatitis: 
-recent discharge for pancreatitis and redemonstration on CT 1/31 
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 Will need repeat MRI in 6 months, no acute EUS needed 
-Lipase remains elevated -IVF held secondary to volume overload and worsening respiratory status  
-prn pain meds, anti-emetics Hypokalemia: replace as needed Hypernatremia: continue to monitor Urinary retention: morales removed 2/3 with subsequent replacement Right IJ CVL, possible remove 2/5/2019 Code status: Full DVT prophylaxis: held secondary to bleed Care Plan discussed with: Patient/Family and Nurse Disposition: TBD Hospital Problems  Date Reviewed: 1/15/2019 Codes Class Noted POA Dyspnea ICD-10-CM: R06.00 
ICD-9-CM: 786.09  1/25/2019 Unknown Review of Systems:  
Negative unless stated above Vital Signs:  
 Last 24hrs VS reviewed since prior progress note. Most recent are: 
Visit Vitals /65 (BP 1 Location: Right arm, BP Patient Position: At rest) Pulse 78 Temp 98.3 °F (36.8 °C) Resp 22 Ht 5' 4\" (1.626 m) Wt 66.9 kg (147 lb 7.8 oz) SpO2 95% Breastfeeding? No  
BMI 25.32 kg/m² Intake/Output Summary (Last 24 hours) at 2/5/2019 1326 Last data filed at 2/5/2019 0400 Gross per 24 hour Intake 0 ml Output 1225 ml Net -1225 ml Physical Examination:  
 
 
     
Constitutional:   Alert, no acute distress ENT:  Oral mucous moist, Resp:  Coarse breathe sounds, no increased work of breathing CV:  Regular rhythm, normal rate GI:  distended, tender to mild palpation, superficial bruising Musculoskeletal:  No edema, warm, 2+ pulses throughout Neurologic:  Moves all extremities. Data Review:  
 I personally reviewed  Image and labs Cta Chest W Or W Wo Cont Result Date: 1/25/2019 IMPRESSION: Small right pleural effusion with underlying atelectasis. No evidence of pulmonary embolism. Ill-defined mass lesion either arising from or secondarily involving the left hepatic lobe likely corresponding to the pancreas mass seen on the recent MR but likely increased in the interval. 
 
Ct Abd Pelv Wo Cont Result Date: 2/1/2019 IMPRESSION: 1. Slightly diminished size of rectus abdominis muscular hematoma. Stable size of pelvic collection Mass effect on the surrounding structures. Post coil embolization of the deep inferior epigastric artery without evidence of active extravasation. The patient was not administered IV contrast for this examination which limits sensitivity for active extravasation. . 2. Imaging findings related to pancreatitis with stable pancreatic cyst.. 3. Moderate left and small right effusion not significantly changed. . 4. Interval mild bilateral hydroureteronephrosis when compared to the prior study. Ct Abd Pelv W Cont Result Date: 1/30/2019 IMPRESSION: 1. 7.2 x 5.0 x 8.4 cm left rectus abdominis intramuscular hematoma is acute on subacute and new since 15 days ago. This communicates with a 15.8 x 11.8 x 18.3 cm anterior pelvic extraperitoneal subacute hematoma. Mass effect on the surrounding structures. There may be active bleeding from the left inferior epigastric artery.  2. Unchanged pancreatitis. Increased size of the presumed developing pseudocyst in the pancreatic head. Reevaluate on follow-up imaging in a couple of months. 3. New moderate right and small left pleural simple effusions with adjacent compressive and dependent atelectasis. 4. New small volume of ascites is nonhemorrhagic. I discussed this impression with Dr. Christina Belle at  hours on 1/30/2019. Xr Chest [facility] Result Date: 2/1/2019 IMPRESSION: Mild interstitial edema. No interval change. Xr Chest [facility] Result Date: 1/31/2019 IMPRESSION: Stable mild interstitial opacities, bibasilar opacities, and small pleural effusions. Xr Chest [facility] Result Date: 1/30/2019 IMPRESSION: 1. Right IJ catheter overlies the SVC. There is no pneumothorax Xr [facility] Result Date: 1/30/2019 IMPRESSION: Bibasilar densities consistent bibasilar airspace disease and/or small bilateral pleural effusions. Xr [facility] Result Date: 1/29/2019 IMPRESSION: 1. Similar to slightly worsening bilateral perihilar predominant opacities, most suspicious for edema. 2.  Slightly increased small left effusion and a increasing left lung base consolidation, possibly atelectasis. Xr Chest [facility] Result Date: 1/28/2019 Impression: Stable trace bilateral effusions with underlying atelectasis. Xr Chest [facility] Result Date: 1/28/2019 IMPRESSION: Small bibasilar atelectasis/effusions are again noted slightly improved on the right and mildly progressed on the left Ir O[facility] 
 
Result Date: 1/30/2019 IMPRESSION: Lower abdominal aortic and pelvic arteriogram without evidence for active hemorrhage or pseudoaneurysm. Prophylactic coil embolization of the proximal deep left inferior epigastric artery was performed as described above. Patient tolerated the procedure well without immediate complication. Recent MRI abd 1/15/2019 
 
1. 1.3 x 1.7 x 3.0 cm multilocular cystic lesion of the pancreas at the body most likely reflects intraductal papillary mucinous tumor. Follow-up, preferably by MRI, in 6-12 months is recommended. 2. Hepatic cysts and probable flash fill inferior right lobe hepatic hemangioma. 3. Mild extra hepatic biliary dilation which may be sequela of prior obstruction 
or inflammation. Correlate for evidence of ongoing biliary obstruction with 
clinical and laboratory data. 4. Small sliding hiatal hernia. Labs:  
 
Recent Labs 02/05/19 
2419 02/03/19 
0112 WBC 8.5 9.0 HGB 9.2* 8.4* HCT 30.5* 26.6*  
 171 Recent Labs 02/05/19 
3630 02/03/19 
0112 02/02/19 
1557  144 145  
K 3.4* 3.9 3.5 CL 99 103 103 CO2 32 33* 33*  
BUN 22* 21* 22* CREA 1.00 1.16* 1.18* * 127* 118* CA 8.8 8.0* 8.4* PHOS  --  4.3 2.0* Recent Labs 02/03/19 
0112 02/02/19 
1557 ALB 3.8 4.1 No results for input(s): INR, PTP, APTT in the last 72 hours. No lab exists for component: INREXT, INREXT No results for input(s): FE, TIBC, PSAT, FERR in the last 72 hours. No results found for: FOL, RBCF No results for input(s): PH, PCO2, PO2 in the last 72 hours. No results for input(s): CPK, CKNDX, TROIQ in the last 72 hours. No lab exists for component: CPKMB Lab Results Component Value Date/Time Cholesterol, total 185 01/15/2019 12:29 PM  
 HDL Cholesterol 52 01/15/2019 12:29 PM  
 LDL, calculated 109.6 (H) 01/15/2019 12:29 PM  
 Triglyceride 117 01/15/2019 12:29 PM  
 CHOL/HDL Ratio 3.6 01/15/2019 12:29 PM  
 
Lab Results Component Value Date/Time Glucose (POC) 102 (H) 01/18/2019 06:23 AM  
 Glucose (POC) 139 (H) 01/17/2019 09:05 PM  
 Glucose (POC) 114 (H) 01/17/2019 04:31 PM  
 Glucose (POC) 106 (H) 01/17/2019 11:41 AM  
 Glucose (POC) 132 (H) 01/16/2019 09:13 PM  
 
Lab Results Component Value Date/Time  Color DARK YELLOW 01/15/2019 10:16 PM  
 Appearance CLOUDY (A) 01/15/2019 10:16 PM  
 Specific gravity 1.030 01/15/2019 10:16 PM  
 pH (UA) 6.0 01/15/2019 10:16 PM  
 Protein 100 (A) 01/15/2019 10:16 PM  
 Glucose NEGATIVE  01/15/2019 10:16 PM  
 Ketone 40 (A) 01/15/2019 10:16 PM  
 Urobilinogen 1.0 01/15/2019 10:16 PM  
 Nitrites NEGATIVE  01/15/2019 10:16 PM  
 Leukocyte Esterase SMALL (A) 01/15/2019 10:16 PM  
 Epithelial cells MODERATE (A) 01/15/2019 10:16 PM  
 Bacteria 1+ (A) 01/15/2019 10:16 PM  
 WBC 10-20 01/15/2019 10:16 PM  
 RBC 0-5 01/15/2019 10:16 PM  
 
 
 
Medications Reviewed:  
 
Current Facility-Administered Medications Medication Dose Route Frequency  lisinopril (PRINIVIL, ZESTRIL) tablet 2.5 mg  2.5 mg Oral DAILY  bumetanide (BUMEX) tablet 1 mg  1 mg Oral BID  
 amiodarone (CORDARONE) tablet 400 mg  400 mg Oral BID  
 bisacodyl (DULCOLAX) suppository 10 mg  10 mg Rectal DAILY PRN  
 docusate sodium (COLACE) capsule 100 mg  100 mg Oral BID  polyethylene glycol (MIRALAX) packet 17 g  17 g Oral DAILY  0.9% sodium chloride infusion 250 mL  250 mL IntraVENous PRN  
 0.9% sodium chloride infusion 250 mL  250 mL IntraVENous PRN  prochlorperazine (COMPAZINE) with saline injection 10 mg  10 mg IntraVENous Q6H PRN  
 rosuvastatin (CRESTOR) tablet 10 mg  10 mg Oral QHS  albuterol-ipratropium (DUO-NEB) 2.5 MG-0.5 MG/3 ML  3 mL Nebulization Q6H PRN  
 guaiFENesin ER (MUCINEX) tablet 600 mg  600 mg Oral Q12H  pantoprazole (PROTONIX) tablet 40 mg  40 mg Oral BID  sodium chloride (NS) flush 5-40 mL  5-40 mL IntraVENous Q8H  
 sodium chloride (NS) flush 5-40 mL  5-40 mL IntraVENous PRN  
 aspirin chewable tablet 81 mg  81 mg Oral DAILY  nitroglycerin (NITROSTAT) tablet 0.4 mg  0.4 mg SubLINGual Q5MIN PRN  
 ondansetron (ZOFRAN) injection 4 mg  4 mg IntraVENous Q4H PRN  
 morphine injection 2 mg  2 mg IntraVENous Q4H PRN  
 
______________________________________________________________________ EXPECTED LENGTH OF STAY: 4d 4h 
ACTUAL LENGTH OF STAY:          Lottie James MD

## 2019-02-05 NOTE — PROGRESS NOTES
0800 
Bedside and Verbal shift change report given to  SUKHWINDER Hernandez (oncoming nurse) by Jerry Borges RN (offgoing nurse). Report included the following information SBAR, Kardex, ED Summary, Procedure Summary, Intake/Output, MAR, Recent Results, Med Rec Status and Cardiac Rhythm NSR. Hourly rounding performed. Patient Vitals for the past 12 hrs: 
 Temp Pulse Resp BP SpO2  
02/05/19 0321 98.3 °F (36.8 °C) 84 28 131/61 96 % 02/04/19 2255 98.3 °F (36.8 °C) 84 25 125/51 96 % 02/04/19 1941 98.4 °F (36.9 °C) 87 23 120/44 97 % 02/04/19 1837  80  140/55  Last 3 Recorded Weights in this Encounter 02/04/19 0986 02/04/19 1708 02/05/19 0321 Weight: 65.4 kg (144 lb 2.9 oz) 65.3 kg (144 lb) 66.9 kg (147 lb 7.8 oz) Pt weighed on bed weight. Unable to stand at this time. Will obtain standing weight when pt able to be more active. Problem: Falls - Risk of 
Goal: *Absence of Falls Document Dinh Thrasher Fall Risk and appropriate interventions in the flowsheet. Outcome: Progressing Towards Goal 
Fall Risk Interventions: 
Mobility Interventions: Assess mobility with egress test, Communicate number of staff needed for ambulation/transfer, PT Consult for mobility concerns, Patient to call before getting OOB, Strengthening exercises (ROM-active/passive), Utilize walker, cane, or other assistive device Mentation Interventions: Adequate sleep, hydration, pain control Medication Interventions: Evaluate medications/consider consulting pharmacy, Teach patient to arise slowly, Patient to call before getting OOB Elimination Interventions: Call light in reach, Patient to call for help with toileting needs, Toileting schedule/hourly rounds Pt educated on fall prevention. Pt verbalizes understanding. Bedside micaela tool used with pt. Call bell and frequently used items within reach. Pt utilizes call bell appropriately for assistance. Problem: Heart Failure: Day 5 Goal: Respiratory Outcome: Progressing Towards Goal 
PT weaned to RA. O2 sats WDL

## 2019-02-05 NOTE — PROGRESS NOTES
Bedside shift change report given to Zafar Perez (oncoming nurse) by Susy Lee (offgoing nurse). Report included the following information SBAR, Kardex, ED Summary, Intake/Output, Accordion and Recent Results. Problem: Falls - Risk of 
Goal: *Absence of Falls Document Leopoldo Dust Fall Risk and appropriate interventions in the flowsheet. Outcome: Progressing Towards Goal 
Fall Risk Interventions: 
Mobility Interventions: Communicate number of staff needed for ambulation/transfer, Patient to call before getting OOB, PT Consult for mobility concerns, PT Consult for assist device competence Mentation Interventions: Door open when patient unattended, Evaluate medications/consider consulting pharmacy, Increase mobility, More frequent rounding, Reorient patient, Family/sitter at bedside, Room close to nurse's station, Toileting rounds, Update white board Medication Interventions: Evaluate medications/consider consulting pharmacy, Patient to call before getting OOB, Teach patient to arise slowly Elimination Interventions: Call light in reach, Patient to call for help with toileting needs, Toileting schedule/hourly rounds, Toilet paper/wipes in reach Problem: Heart Failure: Day 5 Goal: Medications Outcome: Progressing Towards Goal 
On PO bumex now, educated on meds and monitoring intake/ output Goal: Respiratory Outcome: Progressing Towards Goal 
Patient maintained 02  sats WDL on 1 L NC, weaned off RA patient 02 sats in high 80's

## 2019-02-06 NOTE — PROGRESS NOTES
Problem: Falls - Risk of 
Goal: *Absence of Falls Document Zahida Fearing Fall Risk and appropriate interventions in the flowsheet. Outcome: Progressing Towards Goal 
Fall Risk Interventions: 
Mobility Interventions: Communicate number of staff needed for ambulation/transfer, OT consult for ADLs, Patient to call before getting OOB, PT Consult for mobility concerns, PT Consult for assist device competence Mentation Interventions: Adequate sleep, hydration, pain control, Evaluate medications/consider consulting pharmacy, Increase mobility, More frequent rounding, Reorient patient, Toileting rounds Medication Interventions: Evaluate medications/consider consulting pharmacy, Patient to call before getting OOB, Teach patient to arise slowly Elimination Interventions: Call light in reach, Patient to call for help with toileting needs, Toilet paper/wipes in reach, Toileting schedule/hourly rounds Problem: Pressure Injury - Risk of 
Goal: *Prevention of pressure injury Document Travis Scale and appropriate interventions in the flowsheet. Outcome: Progressing Towards Goal 
Pressure Injury Interventions: 
Sensory Interventions: Assess changes in LOC, Float heels, Keep linens dry and wrinkle-free, Maintain/enhance activity level, Pressure redistribution bed/mattress (bed type) Moisture Interventions: Apply protective barrier, creams and emollients, Internal/External urinary devices, Maintain skin hydration (lotion/cream), Minimize layers, Moisture barrier Activity Interventions: Increase time out of bed, Pressure redistribution bed/mattress(bed type), PT/OT evaluation Mobility Interventions: HOB 30 degrees or less, Pressure redistribution bed/mattress (bed type), PT/OT evaluation Nutrition Interventions: Document food/fluid/supplement intake, Offer support with meals,snacks and hydration Friction and Shear Interventions: Apply protective barrier, creams and emollients, HOB 30 degrees or less, Lift team/patient mobility team, Lift sheet, Minimize layers, Transferring/repositioning devices Problem: Heart Failure: Day 5 Goal: Medications Outcome: Progressing Towards Goal 
Patient on Bumex, educated about medication, daily I&O's and weights Goal: Respiratory Outcome: Progressing Towards Goal 
maintained 02 WDL on 1 L Goal: Treatments/Interventions/Procedures Outcome: Progressing Towards Goal 
Stable vital signs, will continue to monitor

## 2019-02-06 NOTE — ROUTINE PROCESS
TRANSFER - IN REPORT: 
 
Verbal report received from [facility] RN(name) on Christiano Sharma  being received from [facility](unit) for routine progression of care Report consisted of patients Situation, Background, Assessment and  
Recommendations(SBAR). Information from the following report(s) SBAR and Kardex was reviewed with the receiving nurse. Opportunity for questions and clarification was provided. Assessment completed upon patients arrival to unit and care assumed.

## 2019-02-06 NOTE — PROGRESS NOTES
[facility] 
 [address] [address], [address] [facility] Phone: [phone]   [phone]   
  
Nephrology Progress Note Betsy Crum     1940     [id number] Date of Admission : 1/25/2019 02/06/19 CC:  Follow up for  TOÑA Assessment and Plan TOÑA  
- Resolving  
- continue Bumex 1 mg daily  And Lisinopril  
- signing off. Please call us back if needed Urinary Retention : 
- she has a large intra-abdominal hematoma compressing bladder  
- suggest discharging her w/ Morales  
- repeat CT in 3-4 weeks and attempt morales removal again Multifocal Hemorrhage : 
· Unknown etiology · ANCA negative Hypokalemia · Replete PRN 
  
Rectus Abdominus bleed/ Hematoma · Presumed spontaneous. No AC / trauma / aneurysms · S/p IR embolization 1/30  
· hgb stable 
  
Severe CMP w/ EF 20% · EF improved and off dobutamine · On B blockers and ACE (I) Interval History: 
Seen and examined . She reports feeling better 
 deneis any N/V/CP/SOB Review of Systems: Pertinent items are noted in HPI. Current Medications:  
Current Facility-Administered Medications Medication Dose Route Frequency  lisinopril (PRINIVIL, ZESTRIL) tablet 5 mg  5 mg Oral DAILY  carvedilol (COREG) tablet 3.125 mg  3.125 mg Oral BID WITH MEALS  potassium chloride SR (KLOR-CON 10) tablet 20 mEq  20 mEq Oral ONCE  
 bumetanide (BUMEX) tablet 1 mg  1 mg Oral BID  
 bisacodyl (DULCOLAX) suppository 10 mg  10 mg Rectal DAILY PRN  
 docusate sodium (COLACE) capsule 100 mg  100 mg Oral BID  polyethylene glycol (MIRALAX) packet 17 g  17 g Oral DAILY  0.9% sodium chloride infusion 250 mL  250 mL IntraVENous PRN  
 0.9% sodium chloride infusion 250 mL  250 mL IntraVENous PRN  prochlorperazine (COMPAZINE) with saline injection 10 mg  10 mg IntraVENous Q6H PRN  
 rosuvastatin (CRESTOR) tablet 10 mg  10 mg Oral QHS  albuterol-ipratropium (DUO-NEB) 2.5 MG-0.5 MG/3 ML  3 mL Nebulization Q6H PRN  
 guaiFENesin ER (MUCINEX) tablet 600 mg  600 mg Oral Q12H  pantoprazole (PROTONIX) tablet 40 mg  40 mg Oral BID  sodium chloride (NS) flush 5-40 mL  5-40 mL IntraVENous Q8H  
 sodium chloride (NS) flush 5-40 mL  5-40 mL IntraVENous PRN  
 aspirin chewable tablet 81 mg  81 mg Oral DAILY  nitroglycerin (NITROSTAT) tablet 0.4 mg  0.4 mg SubLINGual Q5MIN PRN  
 ondansetron (ZOFRAN) injection 4 mg  4 mg IntraVENous Q4H PRN  
 morphine injection 2 mg  2 mg IntraVENous Q4H PRN Allergies Allergen Reactions  Penicillins Hives Objective: 
Vitals:   
Vitals:  
 02/06/19 0332 02/06/19 0747 02/06/19 0900 02/06/19 1141 BP: 131/54 115/55 140/47 113/46 Pulse: 74 78 92 89 Resp: 19 17 Temp: 97.8 °F (36.6 °C) 98.4 °F (36.9 °C)  98.2 °F (36.8 °C) SpO2: 96% 97% 96% 97% Weight: 62.7 kg (138 lb 3.7 oz) Height:      
 
Intake and Output: 
No intake/output data recorded. 02/04 1901 - 02/06 0700 In: 120 [P.O.:120] Out: 2075 [Urine:2075] Physical Examination:General:  Lethargic HEENT: PERRL,  Pale Neck: lines + Lungs : rales + CVS: RRR, S1 S2 normal, No murmur Abdomen: large hematoma in lower abdomen Extremities: no Edema MS: No joint swelling, erythema, warmth Neurologic: non focal 
 
 
 
[]    High complexity decision making was performed 
[]    Patient is at high-risk of decompensation with multiple organ involvement Lab Data Personally Reviewed: I have reviewed all the pertinent labs, microbiology data and radiology studies during assessment. Recent Labs 02/06/19 
6719 02/05/19 
5469  141  
K 3.7 3.4*  
 99 CO2 32 32 * 113* BUN 27* 22* CREA 0.95 1.00  
CA 9.1 8.8 Recent Labs 02/06/19 
9099 02/05/19 
7565 WBC 8.6 8.5 HGB 9.6* 9.2* HCT 30.6* 30.5*  175 No results found for: SDES Lab Results Component Value Date/Time Culture result: MRSA NOT PRESENT 01/29/2019 11:21 AM  
 Culture result:  01/29/2019 11:21 AM  
      Screening of patient nares for MRSA is for surveillance purposes and, if positive, to facilitate isolation considerations in high risk settings. It is not intended for automatic decolonization interventions per se as regimens are not sufficiently effective to warrant routine use. Culture result: NO GROWTH 5 DAYS 01/25/2019 03:12 PM  
 Culture result: NO GROWTH 5 DAYS 01/21/2019 02:25 PM  
 Culture result: NO GROWTH 5 DAYS 01/18/2019 12:29 PM  
 
Recent Results (from the past 24 hour(s)) METABOLIC PANEL, BASIC Collection Time: 02/06/19  6:15 AM  
Result Value Ref Range Sodium 141 136 - 145 mmol/L Potassium 3.7 3.5 - 5.1 mmol/L Chloride 102 97 - 108 mmol/L  
 CO2 32 21 - 32 mmol/L Anion gap 7 5 - 15 mmol/L Glucose 115 (H) 65 - 100 mg/dL BUN 27 (H) 6 - 20 MG/DL Creatinine 0.95 0.55 - 1.02 MG/DL  
 BUN/Creatinine ratio 28 (H) 12 - 20 GFR est AA >60 >60 ml/min/1.73m2 GFR est non-AA 57 (L) >60 ml/min/1.73m2 Calcium 9.1 8.5 - 10.1 MG/DL  
CBC WITH AUTOMATED DIFF Collection Time: 02/06/19  6:15 AM  
Result Value Ref Range WBC 8.6 3.6 - 11.0 K/uL  
 RBC 3.20 (L) 3.80 - 5.20 M/uL HGB 9.6 (L) 11.5 - 16.0 g/dL HCT 30.6 (L) 35.0 - 47.0 % MCV 95.6 80.0 - 99.0 FL  
 MCH 30.0 26.0 - 34.0 PG  
 MCHC 31.4 30.0 - 36.5 g/dL  
 RDW 15.0 (H) 11.5 - 14.5 % PLATELET 272 237 - 759 K/uL MPV 11.0 8.9 - 12.9 FL  
 NRBC 0.0 0  WBC ABSOLUTE NRBC 0.00 0.00 - 0.01 K/uL NEUTROPHILS 73 32 - 75 % LYMPHOCYTES 13 12 - 49 % MONOCYTES 10 5 - 13 % EOSINOPHILS 4 0 - 7 % BASOPHILS 1 0 - 1 % IMMATURE GRANULOCYTES 1 (H) 0.0 - 0.5 % ABS. NEUTROPHILS 6.3 1.8 - 8.0 K/UL  
 ABS. LYMPHOCYTES 1.1 0.8 - 3.5 K/UL  
 ABS. MONOCYTES 0.9 0.0 - 1.0 K/UL  
 ABS. EOSINOPHILS 0.3 0.0 - 0.4 K/UL  
 ABS. BASOPHILS 0.1 0.0 - 0.1 K/UL  
 ABS. IMM. GRANS. 0.1 (H) 0.00 - 0.04 K/UL DF AUTOMATED I have reviewed the flowsheets. Chart and Pertinent Notes have been reviewed. No change in PMH ,family and social history from Consult note.  
 
 
Brannon Martínez MD

## 2019-02-06 NOTE — PROGRESS NOTES
Problem: Pressure Injury - Risk of 
Goal: *Prevention of pressure injury Document Travis Scale and appropriate interventions in the flowsheet. Outcome: Progressing Towards Goal 
Pressure Injury Interventions: 
Sensory Interventions: Assess changes in LOC, Keep linens dry and wrinkle-free, Maintain/enhance activity level, Minimize linen layers, Sit a 90-degree angle/use footstool if needed Moisture Interventions: Internal/External urinary devices, Apply protective barrier, creams and emollients, Minimize layers Activity Interventions: Increase time out of bed, PT/OT evaluation, Pressure redistribution bed/mattress(bed type) Mobility Interventions: PT/OT evaluation, HOB 30 degrees or less, Pressure redistribution bed/mattress (bed type) Nutrition Interventions: Document food/fluid/supplement intake, Offer support with meals,snacks and hydration Friction and Shear Interventions: Apply protective barrier, creams and emollients, HOB 30 degrees or less, Lift sheet, Minimize layers

## 2019-02-06 NOTE — PROGRESS NOTES
2/6/2019     [facility] [address] [place] [address] 
 
. Neal Lewis is a 70 y.o. female Subjective: Feeling better. Denies SOB, chest pain, dizziness. Has not been OOB yet. Awaiting PT. States she is feeling better. Still with some cough and abdominal tenderness which she says is improved. Assessment/Plan/Discussion:Cardiology Attending:  
 
Patient seen on the day of progress note and examined  and agree with Advance Practice Provider (SISI, NP,PA)  assessment and plans. Gareth Lewis is a 70 y.o. female CHF with EF improved now Toxic metabolic cardiomyopathy 
 continue CHF meds as bp tolerates Since no more afib, will stop amiodarone Caryl Gibbs MD   
 
01/25/19 ECHO ADULT FOLLOW-UP OR LIMITED 02/04/2019 2/4/2019 Narrative · Left atrial cavity size is mildly dilated. · Mild to moderate mitral valve regurgitation. · Mild tricuspid valve regurgitation is present. · Estimated left ventricular ejection fraction is 46 - 50%. Visually  
measured ejection fraction. Left ventricular global hypokinesis. Signed by: Rosalia Galindo MD  
  
 
Discussion/Plans 1. Cardiomyopathy, acute systolic CHF: 
-Estimated NYHA II-III (has not been OOB yet) -Limited Echo 2/4/19: EF improved to 46-50% (off dobutamine) From 20-25%% 
-Etiology likely acute illness.  
-Not candidate for invasive cardiac testing currently 
-On Bumex 1 mg po BID 
-Lisinopril to 5 mg daily. 
-Start Coreg 3.125 mg BID 2. NQMI: Troponin elevation and fall. Peak of 3.26 
-Not on  ASA d/t pelvic/rectus sheath bleeds. -   
-Continue high potency statin. 
-Continue ACE-I, start BB today. 4. PAF: had PAF over weekend- none on tele review after weekend. 
-SR. -Coreg as above.  
-Stop amiodarone (no further PAF and suspect afib d/t acute illness, bleeding events) -[id number]=2 (age, CHF, Female). No anticoags or ASA d/t bleeding. 5. TOÑA: resolving. Creatinine normalized. -nephrology following. 6. Pelvic/rectus sheath hematomas/bleed: 
-s/p IR embolization 1/30/19. 
 
7. Anemia, acute blood loss:  hgb stable 9.6 
-off ASA, and anticoagulation 
-Received total of 6 units PRBCs last week. 8.  Pancreatitis with cystic lesion-  
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 
 
9. Influenza/superimposed pneumonia: now off isolation 10. Hypokalemia: k=3.4. Repleted. Pt continues to improve. Increase ace-I today and start low dose coreg. No further PAF- likely was driven by acute illness/bleeding. Stop amiodarone. PT/OT today. . 
Cardiac Studies/Hx: 
No specialty comments available. Patient Vitals for the past 12 hrs: 
 Temp Pulse Resp BP SpO2  
02/06/19 1141 98.2 °F (36.8 °C) 89  113/46 97 % 02/06/19 0900  92  140/47 96 % 02/06/19 0747 98.4 °F (36.9 °C) 78 17 115/55 97 % 02/06/19 0332 97.8 °F (36.6 °C) 74 19 131/54 96 % Past Medical History:  
Diagnosis Date  Other ill-defined conditions(799.89) IBS  Pancreatitis ROS-pertinents  negative except as above  The pertinent portions of the medical history,physician and nursing notes, meds,vitals , labs and Ins/Outs,are reviewed in the electronic record. Results for orders placed or performed during the hospital encounter of 01/25/19 EKG, 12 LEAD, INITIAL Result Value Ref Range Ventricular Rate 155 BPM  
 Atrial Rate 150 BPM  
 QRS Duration 72 ms Q-T Interval 296 ms QTC Calculation (Bezet) 475 ms Calculated R Axis 46 degrees Calculated T Axis -143 degrees Diagnosis Atrial fibrillation with rapid ventricular response T wave abnormality, consider anterolateral ischemia or digitalis effect When compared with ECG of 30-JAN-2019 03:14, Atrial fibrillation has replaced Sinus rhythm Vent. rate has increased BY  76 BPM 
T wave inversion less evident in Anterior leads Confirmed by Tono Blancas M.D., Phong Hastings ([id number]) on 2/3/2019 11:56:13 AM 
  
  
Vitals: 02/06/19 0332 02/06/19 0747 02/06/19 0900 02/06/19 1141 BP: 131/54 115/55 140/47 113/46 BP 1 Location: Right arm Right arm Right arm Right arm BP Patient Position: At rest At rest At rest;Supine At rest;Sitting Pulse: 74 78 92 89 Resp: 19 17 Temp: 97.8 °F (36.6 °C) 98.4 °F (36.9 °C)  98.2 °F (36.8 °C) SpO2: 96% 97% 96% 97% Weight: 138 lb 3.7 oz (62.7 kg) Height:      
 
 
Objective:  
 Physical Exam:  
Patient Vitals for the past 12 hrs: 
 Temp Pulse Resp BP SpO2  
02/06/19 1141 98.2 °F (36.8 °C) 89  113/46 97 % 02/06/19 0900  92  140/47 96 % 02/06/19 0747 98.4 °F (36.9 °C) 78 17 115/55 97 % 02/06/19 0332 97.8 °F (36.6 °C) 74 19 131/54 96 % General:  Weak appearing. pale  
ENT, Neck:  no jvd Chest Wall: inspection normal - no chest wall deformities or tenderness,  
Lung:  Few crackles right base  No SOB at rest  
Heart:  normal rate, regular rhythm, normal S1, S2, no murmurs, rubs, clicks or gallops Abdomen: Less Distended, soft, mild +ttp Extremities: extremities normal, atraumatic, no cyanosis or edema Last 24hr Input/Output: 
 
Intake/Output Summary (Last 24 hours) at 2/6/2019 1309 Last data filed at 2/6/2019 0600 Gross per 24 hour Intake 120 ml Output 850 ml Net -730 ml Data Review:  
Recent Results (from the past 24 hour(s)) METABOLIC PANEL, BASIC Collection Time: 02/06/19  6:15 AM  
Result Value Ref Range Sodium 141 136 - 145 mmol/L Potassium 3.7 3.5 - 5.1 mmol/L Chloride 102 97 - 108 mmol/L  
 CO2 32 21 - 32 mmol/L Anion gap 7 5 - 15 mmol/L Glucose 115 (H) 65 - 100 mg/dL BUN 27 (H) 6 - 20 MG/DL Creatinine 0.95 0.55 - 1.02 MG/DL  
 BUN/Creatinine ratio 28 (H) 12 - 20 GFR est AA >60 >60 ml/min/1.73m2 GFR est non-AA 57 (L) >60 ml/min/1.73m2 Calcium 9.1 8.5 - 10.1 MG/DL  
CBC WITH AUTOMATED DIFF Collection Time: 02/06/19  6:15 AM  
Result Value Ref Range WBC 8.6 3.6 - 11.0 K/uL RBC 3.20 (L) 3.80 - 5.20 M/uL HGB 9.6 (L) 11.5 - 16.0 g/dL HCT 30.6 (L) 35.0 - 47.0 % MCV 95.6 80.0 - 99.0 FL  
 MCH 30.0 26.0 - 34.0 PG  
 MCHC 31.4 30.0 - 36.5 g/dL  
 RDW 15.0 (H) 11.5 - 14.5 % PLATELET 358 078 - 094 K/uL MPV 11.0 8.9 - 12.9 FL  
 NRBC 0.0 0  WBC ABSOLUTE NRBC 0.00 0.00 - 0.01 K/uL NEUTROPHILS 73 32 - 75 % LYMPHOCYTES 13 12 - 49 % MONOCYTES 10 5 - 13 % EOSINOPHILS 4 0 - 7 % BASOPHILS 1 0 - 1 % IMMATURE GRANULOCYTES 1 (H) 0.0 - 0.5 % ABS. NEUTROPHILS 6.3 1.8 - 8.0 K/UL  
 ABS. LYMPHOCYTES 1.1 0.8 - 3.5 K/UL  
 ABS. MONOCYTES 0.9 0.0 - 1.0 K/UL  
 ABS. EOSINOPHILS 0.3 0.0 - 0.4 K/UL  
 ABS. BASOPHILS 0.1 0.0 - 0.1 K/UL  
 ABS. IMM. GRANS. 0.1 (H) 0.00 - 0.04 K/UL  
 DF AUTOMATED [place] [employer]. Jerardo, ZEYAD 2/6/2019

## 2019-02-06 NOTE — PROGRESS NOTES
Problem: Heart Failure: Day 5 Goal: Treatments/Interventions/Procedures Outcome: Progressing Towards Goal 
Pt with 4.2kg weight loss. 62.7kg  (66.9kg Yest) Pt educated on importance of daily weights Pt educated on diet modification. Pt educated on S/S Pt verbalizes understanding and is able to utilize \"teach-back \" method on ways to monitor weight to he;p  prevent CHF Bedside shift change report given to Tanna Cisneros RN (oncoming nurse) by Frank Farias RN (offgoing nurse).  Report included the following information SBAR, Intake/Output, MAR and Cardiac Rhythm SR.

## 2019-02-06 NOTE — PROGRESS NOTES
NUTRITION COMPLETE ASSESSMENT 
 
RECOMMENDATIONS:  
1. Continue Regular diet and add IVY secondary to CHF 
-- Encourage oral nutritional supplements 2. Consider an appetite stimulant? Interventions/Plan:  
Food/Nutrient Delivery: Ensure Compact, Ensure Clear and [employer] Assessment:  
Reason for Assessment:  
[x]Reassessment Diet: Regular + Ensure Compact q day; Magic Cup q day; Ensure Clear q day Nutritionally Significant Medications: [x] Reviewed & Includes: dulcolax daily prn; bumex twice/day; colace twice/day; zofran q4 hours prn; protonix twice/day; miralax daily; compazine q 6 hours prn  
  
Meal Intake:  
Patient Vitals for the past 100 hrs: 
 % Diet Eaten 02/03/19 1855 20 % 02/03/19 0900 15 % 02/02/19 1854 15 % Subjective: Pt pleasant. States she thinks her appetite is picking up and she's forcing the supplements because she knows she needs to drink them. She declines additional snacks or supplements at this time. She has not been receiving menu selections, so suspect she would be eating better if she could make choices. Objective: 
Chart reviewed. Pt's diet advanced to Regular on 2/3. She notes improvement in po intake, but no documentation in I/O's to evaluate since 2/3. She is accepting supplements and these are providing 750 kcal, 26 g protein per day-- meeting 50% and 39% of estimated kcal and protein needs, respectively. Current weight is \"down\" 9# x 1 day. Will need to monitor trends. Also noted bumex is ordered twice/day. Last 3 Recorded Weights in this Encounter 02/04/19 1708 02/05/19 0321 02/06/19 4696 Weight: 65.3 kg (144 lb) 66.9 kg (147 lb 7.8 oz) 62.7 kg (138 lb 3.7 oz) Estimated Nutrition Needs:  
Kcals/day: 1500 Kcals/day(MSJ x 1.3) Protein: 67 g(67-80 (1.0-1.2g/kg) Fluid: (1 ml/kcal) Based On: Yue 1896 Weight Used: Actual wt(67 kg) Pt expected to meet estimated nutrient needs:  []   Yes     [x]  No [] Unable to predict at this time Nutrition Diagnosis:  
1. Inadequate oral intake related to N/V, decreased appetite as evidenced by ~50% of most meals consumed x 4 days Goals: Increased oral intake to at least 50% of meals and 1-2 supplements per day x 5-7 days. Monitoring & Evaluation: - Total energy intake - Weight/weight change, Electrolyte and renal profile -   
 
Previous Nutrition Goals Met:   Yes Previous Recommendations:    Yes 
 
Education & Discharge Needs: 
 [] None Identified 
 [x] Identified and addressed [x] Participated in care plan, discharge planning, and/or interdisciplinary rounds Cultural, Latter-day and ethnic food preferences identified: None Skin Integrity: [x]Intact  []Other Edema: [x]None []Other Last BM: 2/5/19 Food Allergies: [x]None []Other Diet Restrictions: Cultural/Denominational Preference(s): None Anthropometrics:   
Weight Loss Metrics 2/6/2019 1/21/2019 1/19/2019 11/1/2017 6/17/2013 Today's Wt 138 lb 3.7 oz 149 lb 139 lb 15.9 oz 139 lb 139 lb BMI 23.73 kg/m2 25.58 kg/m2 23.3 kg/m2 23.86 kg/m2 23.5 kg/m2 Weight Source: Bed Height: 5' 4\" (162.6 cm), Body mass index is 23.73 kg/m². IBW : 54.4 kg (120 lb), % IBW (Calculated): 123.64 % 
 ,   
 
Labs:   
Lab Results Component Value Date/Time Sodium 141 02/06/2019 06:15 AM  
 Potassium 3.7 02/06/2019 06:15 AM  
 Chloride 102 02/06/2019 06:15 AM  
 CO2 32 02/06/2019 06:15 AM  
 Glucose 115 (H) 02/06/2019 06:15 AM  
 BUN 27 (H) 02/06/2019 06:15 AM  
 Creatinine 0.95 02/06/2019 06:15 AM  
 Calcium 9.1 02/06/2019 06:15 AM  
 Magnesium 2.1 02/01/2019 02:42 AM  
 Phosphorus 4.3 02/03/2019 01:12 AM  
 Albumin 3.8 02/03/2019 01:12 AM  
 
Alban Cantor, [address]

## 2019-02-06 NOTE — PROGRESS NOTES
Problem: Mobility Impaired (Adult and Pediatric) Goal: *Acute Goals and Plan of Care (Insert Text) Physical Therapy Goals Initiated 2/6/2019 1. Patient will move from supine to sit and sit to supine  in bed with supervision/set-up within 7 day(s). 2.  Patient will transfer from bed to chair and chair to bed with supervision/set-up using the least restrictive device within 7 day(s). 3.  Patient will perform sit to stand with supervision/set-up within 7 day(s). 4.  Patient will ambulate with supervision/set-up for at least 50 feet with the least restrictive device within 7 day(s). physical Therapy EVALUATION Patient: Christiano Sharma (30 y.o. female) Date: 2/6/2019 Primary Diagnosis: Dyspnea Precautions: fall ASSESSMENT : 
Based on the objective data described below, the patient presents with decreased tolerance to activity, impaired balance, decreased function with mobility due to acute systolic HF, flu A, PNA. Of note, pt s/p coil embolization of L inf epigastric artery due to abdominal hematoma on 1/30/19. Pt completed bed mob with min A, transfers sit<->stand CGA, amb in room with RW CGA. Pt tolerated static stand x several min for pericare and change of brief. Noted supplemental O2 @ 1.5 L; SpO2 mid 90s, HR 90s throughout session, BP stable. Pt reports baseline LOF of indep without restriction, drives. Now presents below baseline. Pt motivated to participate in therapy and return to PLOF. Recommend follow up IPR at d/c. Patient will benefit from skilled intervention to address the above impairments. Patients rehabilitation potential is considered to be Good Factors which may influence rehabilitation potential include:  
[x]         None noted 
[]         Mental ability/status []         Medical condition 
[]         Home/family situation and support systems 
[]         Safety awareness 
[]         Pain tolerance/management 
[]         Other: PLAN : 
 Recommendations and Planned Interventions: 
[x]           Bed Mobility Training             []    Neuromuscular Re-Education 
[x]           Transfer Training                   []    Orthotic/Prosthetic Training 
[x]           Gait Training                         []    Modalities [x]           Therapeutic Exercises           []    Edema Management/Control 
[x]           Therapeutic Activities            [x]    Patient and Family Training/Education 
[]           Other (comment): Frequency/Duration: Patient will be followed by physical therapy  5 times a week to address goals. Discharge Recommendations: Inpatient Rehab Further Equipment Recommendations for Discharge: TBD SUBJECTIVE:  
Patient stated I haven't been out of bed; it feels good to be up.  OBJECTIVE DATA SUMMARY:  
HISTORY:   
Past Medical History:  
Diagnosis Date  Other ill-defined conditions(799.89) IBS  Pancreatitis Past Surgical History:  
Procedure Laterality Date  COLONOSCOPY Left 11/1/2017 COLONOSCOPY performed by Lisy Pinedo MD at [facility] 391  HX CHOLECYSTECTOMY  HX GI    
 surgery for hiatal hernia  IR OCCL Sergey Gums W SI  1/30/2019 Prior Level of Function/Home Situation: pt lives alone in two level [facility], no JENNA. Indep PTA, no use of AD, no restrictions. Personal factors and/or comorbidities impacting plan of care: none Home Situation Home Environment: Private residence([facility]) # Steps to Enter: 0 One/Two Story Residence: Two story # of Interior Steps: 6 Interior Rails: Left Lift Chair Available: No 
Living Alone: Yes Support Systems: Child(wilfred)(son [facility], daughter in [place]) Patient Expects to be Discharged to[de-identified] Rehabilitation facility Current DME Used/Available at Home: None EXAMINATION/PRESENTATION/DECISION MAKING: Critical Behavior: 
Neurologic State: Alert Orientation Level: Oriented X4 Cognition: Follows commands Hearing: Auditory Auditory Impairment: NoneSkin:   
Edema:  
Range Of Motion: 
AROM: Within functional limits Strength:   
Strength: Within functional limits Tone & Sensation:  
Tone: Normal 
  
  
  
  
Sensation: Intact Coordination: 
Coordination: Within functional limits Vision:  
  
Functional Mobility: 
Bed Mobility: 
  
Supine to Sit: Minimum assistance(min A at trunk) Transfers: 
Sit to Stand: Contact guard assistance(EOB to RW, cues for hand placement) Stand to Sit: Contact guard assistance Balance:  
Sitting: Intact Standing: Impaired Standing - Static: Good;Constant support Standing - Dynamic : FairAmbulation/Gait Training:Distance (ft): 15 Feet (ft) Assistive Device: Gait belt;Walker, rolling Ambulation - Level of Assistance: Contact guard assistance; Adaptive equipment Gait Abnormalities: Decreased step clearance Speed/Maritza: Pace decreased (<100 feet/min) Step Length: Left shortened;Right shortened Stairs: Therapeutic Exercises:  
 
 
Functional Measure: 
Tinetti test: 
 
Sitting Balance: 1 Arises: 1 Attempts to Rise: 2 Immediate Standing Balance: 1 Standing Balance: 1 Nudged: 2 Eyes Closed: 1 Turn 360 Degrees - Continuous/Discontinuous: 1 Turn 360 Degrees - Steady/Unsteady: 1 Sitting Down: 1 Balance Score: 12 Indication of Gait: 1 
R Step Length/Height: 1 L Step Length/Height: 1 
R Foot Clearance: 1 L Foot Clearance: 1 Step Symmetry: 1 Step Continuity: 1 Path: 1 Trunk: 0 Walking Time: 0 Gait Score: 8 Total Score: 20 Tinetti Tool Score Risk of Falls 
<19 = High Fall Risk 19-24 = Moderate Fall Risk 25-28 = Low Fall Risk Tinetti ME. Performance-Oriented Assessment of Mobility Problems in Elderly Patients. Lawrence 66; [id number]. (Scoring Description: PT Bulletin Feb. 10, 1993) Older adults: Yanna Beltran et al, 2009; n = [address] elderly evaluated with ABC, XIOMY, ADL, and IADL) · Mean XIOMY score for males aged 69-68 years = 26.21(3.40) · Mean XIOMY score for females age 69-68 years = 25.16(4.30) · Mean XIOMY score for males over 80 years = 23.29(6.02) · Mean XIOMY score for females over 80 years = 17.20(8.32) Physical Therapy Evaluation Charge Determination History Examination Presentation Decision-Making MEDIUM  Complexity : 1-2 comorbidities / personal factors will impact the outcome/ POC  LOW Complexity : 1-2 Standardized tests and measures addressing body structure, function, activity limitation and / or participation in recreation  LOW Complexity : Stable, uncomplicated  LOW Complexity : FOTO score of  Based on the above components, the patient evaluation is determined to be of the following complexity level: LOW Pain: 
  
 Pt denies c/o pain. Activity Tolerance:  
Pt tolerated standing trial followed by amb in room with RW, also up to chair at end of session. Vitals stable. Please refer to the flowsheet for vital signs taken during this treatment. After treatment:  
[x]         Patient left in no apparent distress sitting up in chair 
[]         Patient left in no apparent distress in bed 
[x]         Call bell left within reach [x]         Nursing notified 
[]         Caregiver present 
[]         Bed alarm activated COMMUNICATION/EDUCATION:  
The patients plan of care was discussed with: Registered Nurse. [x]         Fall prevention education was provided and the patient/caregiver indicated understanding. [x]         Patient/family have participated as able in goal setting and plan of care. [x]         Patient/family agree to work toward stated goals and plan of care. []         Patient understands intent and goals of therapy, but is neutral about his/her participation. []         Patient is unable to participate in goal setting and plan of care. Thank you for this referral. 
Britt Santos, PT Time Calculation: 40 mins

## 2019-02-07 NOTE — PROGRESS NOTES
Bedside shift change report given to Ling Sanchez (oncoming nurse) by Severiano Benites RN (offgoing nurse). Report included the following information SBAR and Kardex.

## 2019-02-07 NOTE — PROGRESS NOTES
Problem: Falls - Risk of 
Goal: *Absence of Falls Document Kylee Rays Fall Risk and appropriate interventions in the flowsheet. Outcome: Progressing Towards Goal 
Fall Risk Interventions: 
Mobility Interventions: Bed/chair exit alarm, Patient to call before getting OOB Mentation Interventions: Adequate sleep, hydration, pain control, Evaluate medications/consider consulting pharmacy, Increase mobility, More frequent rounding, Reorient patient, Toileting rounds Medication Interventions: Evaluate medications/consider consulting pharmacy, Patient to call before getting OOB, Teach patient to arise slowly Elimination Interventions: Bed/chair exit alarm, Call light in reach

## 2019-02-07 NOTE — PROGRESS NOTES
[facility]  [address] [address] [address]  Phone: [phone]  Fax: [phone]                  Ritesh Fitzpatrick   3/22/2017 10:40 AM   Office Visit    Description:  Male : 1994   Provider:  Durga Pierre MD   Department:  [facility]           Reason for Visit     Testicle Pain     Back Pain           Diagnoses this Visit        Comments    Left varicocele    -  Primary     Peptic ulcer disease         Chronic midline low back pain without sciatica         Esophagitis         Hiatal hernia         Gastritis, presence of bleeding unspecified, unspecified chronicity, unspecified gastritis type                To Do List           Future Appointments        Provider Department Dept Phone    2017 10:40 AM Durga Pierre MD [facility] [phone]      Goals (5 Years of Data)     None      Follow-Up and Disposition     Return in about 3 months (around 2017).    Follow-up and Disposition History       These Medications        Disp Refills Start End    pantoprazole (PROTONIX) 40 MG tablet 30 tablet 3 3/22/2017 2017    Take 1 tablet (40 mg total) by mouth once daily. - Oral    Pharmacy: [facility]/pharmacy #[id number] - [place], [place] - [address] AT [facility] Ph #: [phone]         [facility] On Call     [facility] On Call Nurse Care Line - 24/7 Assistance  Registered nurses in the [facility] On Call Center provide clinical advisement, health education, appointment booking, and other advisory services.  Call for this free service at [phone].             Medications           Message regarding Medications     Verify the changes and/or additions to your medication regime listed below are the same as discussed with your clinician today.  If any of these changes or additions are incorrect, please notify your healthcare provider.             Verify that the below list of medications is an accurate  As a member of the [facility] transitional care team, I saw Mrs. Lima Reyna today. PT recommended SNF and Mrs. Lima Reyna is agreeable to this. Her daughter lives in [place] and she believes that going to a SNF will put her daughter as ease. Care manager spoke with daughter; looking at [facility]. Brief synopsis of current medical problems:  1. Heart Failure: Plan to start Coreg and Lisinopril tomorrow. 2. Afib: Amiodarone discontinued by cardiology on 2/6/19.      3. Anemia: Acute loss on 1/30/19 secondary to rectus and pelvic hemorrhage. Hgb stable today at 9.6    4. Pancreatitis with cystic lesion: GI following, suspect intraductal papillary mucinous tumor and advised repeating MRI. Complicated admission with multiple medical problems. Our team will follow up with patient regarding code status tomorrow. "representation of the medications you are currently taking.  If none reported, the list may be blank. If incorrect, please contact your healthcare provider. Carry this list with you in case of emergency.           Current Medications     pantoprazole (PROTONIX) 40 MG tablet Take 1 tablet (40 mg total) by mouth once daily.           Clinical Reference Information           Your Vitals Were     BP Pulse Temp Resp Height Weight    132/88 (BP Location: Right arm, Patient Position: Sitting, BP Method: Manual) 104 98.5 °F (36.9 °C) (Oral) 16 5' 8" (1.727 m) 101.9 kg (224 lb 10.4 oz)    SpO2 BMI             98% 34.16 kg/m2         Blood Pressure          Most Recent Value    BP  132/88      Allergies as of 3/22/2017     No Known Allergies      Immunizations Administered on Date of Encounter - 3/22/2017     None      Orders Placed During Today's Visit     Future Labs/Procedures Expected by Expires    H.Pylori Antibody IgG  3/22/2017 6/20/2017      Instructions    Use a smart temp cold pack if you back pain.      Go to an ER if the pain in the scrotum is sudden and severe.  If you have torsion only have 6 hours to save your testicle .    If your H. pylori test is positive we will call you in antibiotics.         Language Assistance Services     ATTENTION: Language assistance services are available, free of charge. Please call [phone].      ATENCIÓN: Si habla español, tiene a daniels disposición servicios gratuitos de asistencia lingüística. Llame al [phone].     LINDA Ý: N?u b?n nói Ti?ng Vi?t, có các d?ch v? h? tr? ngôn ng? mi?n phí dành cho b?n. G?i s? [phone].         [facility] complies with applicable Federal civil rights laws and does not discriminate on the basis of race, color, national origin, age, disability, or sex.        "

## 2019-02-07 NOTE — PROGRESS NOTES
Problem: Mobility Impaired (Adult and Pediatric) Goal: *Acute Goals and Plan of Care (Insert Text) Physical Therapy Goals Initiated 2/6/2019 1. Patient will move from supine to sit and sit to supine  in bed with supervision/set-up within 7 day(s). 2.  Patient will transfer from bed to chair and chair to bed with supervision/set-up using the least restrictive device within 7 day(s). 3.  Patient will perform sit to stand with supervision/set-up within 7 day(s). 4.  Patient will ambulate with supervision/set-up for at least 50 feet with the least restrictive device within 7 day(s). physical Therapy TREATMENT Patient: Cassy Gentile (67 y.o. female) Date: 2/7/2019 Diagnosis: Dyspnea <principal problem not specified> Precautions: Fall Chart, physical therapy assessment, plan of care and goals were reviewed. ASSESSMENT: 
Patient making steady progress toward goals. Patient needing Cassandra for supine to sit this session and moving slowly. Sit to stand with CGA and cues for technique. Amb approx 120 feet with RW and CGA with slow joe and decreased step clearance with no overt LOB but decline in activity tolerance. Patient is normally independent and active and does not use a RW or O2 at home. Given decline in functional mobility and safety recommend SNF rehab vs inpt rehab to progress to more independent level of function. She does not have any assistance at home and is at risk for falling. Documentation for home O2: 
  
ROOM AIR  
 AT REST 
 O2 SATS 
91 HR 
82 ROOM AIR WITH ACTIVITY 02 SATS 
87 HR 
92  
(2    ) LITERS OF O2 WITH ACTIVITY O2 SATS 
97 HR 
94  
(1    )LITERS OF 02 PATIENT LEFT COMFORTABLY SITTING/SUPINE 02 SATS 94 HR 
91 Progression toward goals: 
[x]    Improving appropriately and progressing toward goals 
[]    Improving slowly and progressing toward goals 
[]    Not making progress toward goals and plan of care will be adjusted PLAN: 
 Patient continues to benefit from skilled intervention to address the above impairments. Continue treatment per established plan of care. Discharge Recommendations:  Dane Mcgraw Further Equipment Recommendations for Discharge:  TBD SUBJECTIVE:  
Patient stated I think I was going to go home but I think my children have other ideas.  OBJECTIVE DATA SUMMARY:  
Critical Behavior: 
Neurologic State: Alert Orientation Level: Oriented X4 Cognition: Appropriate decision making, Appropriate for age attention/concentration, Follows commands Functional Mobility Training: 
Bed Mobility: 
  
Supine to Sit: Minimum assistance;Assist x1 Transfers: 
Sit to Stand: Contact guard assistance; Additional time;Assist x1 Stand to Sit: Contact guard assistance; Additional time;Assist x1 Balance: 
Sitting: Intact Standing: Impaired Standing - Static: Constant support;Good Standing - Dynamic : FairAmbulation/Gait Training: 
Distance (ft): 120 Feet (ft) Assistive Device: Gait belt;Walker, rolling Ambulation - Level of Assistance: Contact guard assistance;Assist x1 Gait Abnormalities: Decreased step clearance;Shuffling gait Speed/Maritza: Pace decreased (<100 feet/min); Shuffled; Slow Step Length: Left shortened;Right shortened Pain: 
Pain Scale 1: Numeric (0 - 10) Pain Intensity 1: 0 Activity Tolerance:  
See above Please refer to the flowsheet for vital signs taken during this treatment. After treatment:  
[x]    Patient left in no apparent distress sitting up in chair 
[]    Patient left in no apparent distress in bed 
[x]    Call bell left within reach [x]    Nursing notified 
[]    Caregiver present 
[]    Bed alarm activated COMMUNICATION/COLLABORATION:  
The patients plan of care was discussed with: Physical Therapist, Occupational Therapist and Registered Nurse Pierre Juárez, PT, DPT 
 Time Calculation: 26 mins

## 2019-02-07 NOTE — PROGRESS NOTES
Problem: Falls - Risk of 
Goal: *Absence of Falls Document Elsa Jones Fall Risk and appropriate interventions in the flowsheet. Outcome: Progressing Towards Goal 
Fall Risk Interventions: 
Mobility Interventions: Bed/chair exit alarm, Patient to call before getting OOB Mentation Interventions: Adequate sleep, hydration, pain control, Evaluate medications/consider consulting pharmacy, Increase mobility, More frequent rounding, Reorient patient, Toileting rounds Medication Interventions: Evaluate medications/consider consulting pharmacy, Patient to call before getting OOB, Teach patient to arise slowly Elimination Interventions: Bed/chair exit alarm, Call light in reach

## 2019-02-07 NOTE — PROGRESS NOTES
2/7/2019     [facility] [address] [place] [address] 
 
. Heddie Derby Heddie Derby Heddie Derby Anastasia Mohan is a 70 y.o. female Subjective: Feeling better. Worked with PT today. Currently sitting  Up in chair. No SOB,. States she is feeling better. Assessment/Plan/Discussion:Cardiology Attending:  
 
Patient seen on the day of progress note and examined  and agree with Advance Practice Provider (SISI, NP,PA)  assessment and plans. Anastasia Mohan is a 70 y.o. female Looks fair in chair mid day with son Still weak, not eating well and will need SNF No chest pain EF improved and continue ACE, BB, diuretic Fu with us in 2-3 weeks Will see back PRN Keerthi Medrano MD   
 
 
 
Discussion/Plans 1. Cardiomyopathy, acute systolic CHF: 
-Estimated NYHA II-III (has not been OOB yet) -Limited Echo 2/4/19: EF improved to 46-50% (off dobutamine) From 20-25%% 
-On Bumex 1 mg daily 
-Lisinopril reduced to 2.5 mg daily by primary team suspect d/t SBP of 96 . 
-Continue Coreg 3.125 mg BID 2. NQMI: Troponin elevation and fall. Peak of 3.26 
-Not on  ASA d/t pelvic/rectus sheath bleeds. -   
-Continue high potency statin. 
-Continue ACE-I, start BB today. 4. PAF: had PAF over weekend- none on tele review after weekend. 
-SR. -Coreg as above.  
-Stop amiodarone (no further PAF and suspect afib d/t acute illness, bleeding events) -[id number]=6 (age, CHF, Female). No anticoags or ASA d/t bleeding. 5. TOÑA: resolving. Creatinine normalized. -nephrology following. 6. Pelvic/rectus sheath hematomas/bleed: 
-s/p IR embolization 1/30/19. 
-Renal recommends keeping morales at discharge due to hematoma compressing on bladder 7. Anemia, acute blood loss:  hgb stable 9.6 yesterday 
-off ASA, and anticoagulation 8. Pancreatitis with cystic lesion-  
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 
 
9. Influenza/superimposed pneumonia: now off isolation Continues to improve clinically. No additional changes recommended. . 
 
Cardiac Studies/Hx: 
01/25/19 ECHO ADULT FOLLOW-UP OR LIMITED 02/04/2019 2/4/2019 Narrative · Left atrial cavity size is mildly dilated. · Mild to moderate mitral valve regurgitation. · Mild tricuspid valve regurgitation is present. · Estimated left ventricular ejection fraction is 46 - 50%. Visually  
measured ejection fraction. Left ventricular global hypokinesis. Signed by: Marley Javed MD  
 
No specialty comments available. Patient Vitals for the past 12 hrs: 
 Temp Pulse Resp BP SpO2  
02/07/19 1156    111/65 94 % 02/07/19 0849 98.3 °F (36.8 °C) 82 17 96/58 91 % 02/07/19 0737  76  130/74   
02/07/19 0402 97.9 °F (36.6 °C) 76 18 109/67 98 % Past Medical History:  
Diagnosis Date  Other ill-defined conditions(799.89) IBS  Pancreatitis ROS-pertinents  negative except as above  The pertinent portions of the medical history,physician and nursing notes, meds,vitals , labs and Ins/Outs,are reviewed in the electronic record. Results for orders placed or performed during the hospital encounter of 01/25/19 EKG, 12 LEAD, INITIAL Result Value Ref Range Ventricular Rate 155 BPM  
 Atrial Rate 150 BPM  
 QRS Duration 72 ms Q-T Interval 296 ms QTC Calculation (Bezet) 475 ms Calculated R Axis 46 degrees Calculated T Axis -143 degrees Diagnosis Atrial fibrillation with rapid ventricular response T wave abnormality, consider anterolateral ischemia or digitalis effect When compared with ECG of 30-JAN-2019 03:14, Atrial fibrillation has replaced Sinus rhythm Vent. rate has increased BY  76 BPM 
T wave inversion less evident in Anterior leads Confirmed by Sean Pino M.D., [facility] ([id number]) on 2/3/2019 11:56:13 AM 
  
  
Vitals:  
 02/07/19 4832 02/07/19 4010 02/07/19 8070 02/07/19 1156 BP:  130/74 96/58 111/65 BP 1 Location:   Left arm BP Patient Position:   At rest   
Pulse:  76 82 Resp:   17 Temp:   98.3 °F (36.8 °C) SpO2:   91% 94% Comment: 1L  
Weight: 143 lb 15.4 oz (65.3 kg) Height:      
 
 
Objective:  
 Physical Exam:  
Patient Vitals for the past 12 hrs: 
 Temp Pulse Resp BP SpO2  
02/07/19 1156    111/65 94 % 02/07/19 0849 98.3 °F (36.8 °C) 82 17 96/58 91 % 02/07/19 0737  76  130/74   
02/07/19 0402 97.9 °F (36.6 °C) 76 18 109/67 98 % General:  Alert, cooperative, no acute distress ENT, Neck:  no jvd Chest Wall: inspection normal - no chest wall deformities or tenderness,  
Lung: Clear to auscutation bilaterally  No SOB at rest  
Heart:  normal rate, regular rhythm, normal S1, S2, no murmurs, rubs, clicks or gallops Abdomen: Less Distended, soft, mild +ttp Extremities: extremities normal, atraumatic, no cyanosis or edema Last 24hr Input/Output: 
 
Intake/Output Summary (Last 24 hours) at 2/7/2019 1445 Last data filed at 2/7/2019 2484 Gross per 24 hour Intake  Output 950 ml Net -950 ml Data Review: No results found for this or any previous visit (from the past 24 hour(s)). Joyce Washburn. ZEYAD Kurtz 2/7/2019

## 2019-02-07 NOTE — PROGRESS NOTES
CM reviewed chart and noted transfer to [facility]. Per previous CM, plan was for pt to return home with family support? Pt lives alone in a multi-level home, and her daughter lives in [facility]. Therapy is recommending rehab vs home health. Spoke with pt and she said that her daughter thinks she should do rehab because she is worried about her going home alone. CM called and spoke with pt's daughter, she confirmed that she would like a referral sent to [facility] for short term SNF placement. CM sent referral through [employer] [facility] to [facility]. Pt is also still requiring 1L NC O2, and does not have O2 at home, so pt will likely require ambulance transport for O2 if she still requires O2 at time of discharge. VIOLET Mane, [facility] has confirmed that they will accept pt when pt is medically cleared for discharge.  
VIOLET Mane, CHRISTI

## 2019-02-07 NOTE — PROGRESS NOTES
Problem: Pressure Injury - Risk of 
Goal: *Prevention of pressure injury Document Travis Scale and appropriate interventions in the flowsheet. Outcome: Progressing Towards Goal 
Pressure Injury Interventions: 
Sensory Interventions: Assess changes in LOC, Float heels, Keep linens dry and wrinkle-free, Maintain/enhance activity level, Pressure redistribution bed/mattress (bed type) Moisture Interventions: Absorbent underpads, Apply protective barrier, creams and emollients Activity Interventions: Increase time out of bed, Pressure redistribution bed/mattress(bed type) Mobility Interventions: Pressure redistribution bed/mattress (bed type) Nutrition Interventions: Document food/fluid/supplement intake Friction and Shear Interventions: Apply protective barrier, creams and emollients, Lift sheet

## 2019-02-07 NOTE — PROGRESS NOTES
Hospitalist Progress Note Nghia Smith MD 
Answering service: [phone] OR [phone] from in house phone Date of Service:  2019 NAME:  Toribio Hogan :  1940 MRN:  [id number] Admission Summary:  
Toribio Hogan is a 70 y.o. female who presents with dyspnea. Was recently just discharged from hospital 5 days ago for pancreatitis and large pancreatic cyst. 
Two to three days after discharge, pt began having intermittent cough and dyspnea. Associated with generalized weakness and fatigue. Denies chest pain, significant sputum, calf pain or erythema, fever, chills. Does have some persistent nausea. Interval history / Subjective:  
:  
Patient seen and examined. Case discussed with Card, seems not to cont on amiodarone for time being is best and can be followed by card as an out pt. Continues to improve. Joyce removed 2/3 with urinary retention, replaced. Dobutamine and amiodarone drips discontinued. Creatinine stable. PT consulted. : 
No distress, pt cont on , has not worked with PT as of yet. .  
Assessment & Plan:  
 
Acute systolic heart failure, EF 20-25%, NYHA Class IV: 
-Echo with severely reduced EF, diffuse hypokinesis 
-cardiology consulted- acute illness vs CAD not excluded. Considering stress test 
-dobutamine discontinued 
-will need to start BB, ACE-I once medically appropriate and blood pressure stable- now on bb/ace Atrial fibrillation with RVR: resolved 
-cardiology following, s/p amiodarone drip, continue oral amiodarone 
-hold anticoagulation due to recent bleed 
- not on amiodarone Shock, hypotensive vs cardiogenic: replaced  
-secondary to acute blood loss -CVL placement Acute blood loss anemia secondary to left rectus and pelvic hemorrhage:  
-acute blood loss , CT with left rectus hematoma and pelvic hemorrhage -Underwent IR angio 1/30 with prophylactic coil to left inferior epigastric artery 
-Repeat CT abd/pel w/o contrast 2/1 with slightly decreased size hematomas 
-s/p 6 units pRBCs, CBC q 8 hours, transfuse hgb <7 
-no further evidence of bleeding Acute renal failure: improving 
-nephrology following, no acute dialysis needs, improving 
-bumex 1 mg BID oral; cont to monitor Acute hypoxic respiratory failure, POA: (hypoxic, tachypneic, respiratory distress) -multifactorial due to acute influenza, suspected superimposed pneumonia, volume overload due to acute systolic heart failure, EF 25-25%, and effusion possibly secondary to pancreatitis  
-continue supplemental oxygen, wean as tolerated, no further Bipap needs Influenza A with possible superimposed pneumonia:  
-s/p tamiflu, levaquin Elevated troponin: suspect demand ischemia 
  
Pancreatitis: 
-recent discharge for pancreatitis and redemonstration on CT 1/31 
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 Will need repeat MRI in 6 months, no acute EUS needed 
-Lipase remains elevated -IVF held secondary to volume overload and worsening respiratory status  
-prn pain meds, anti-emetics Hypokalemia: replace as needed Hypernatremia: continue to monitor Urinary retention: morales removed 2/3 with subsequent replacement Right IJ CVL, possible remove 2/5/2019 Code status: Full DVT prophylaxis: held secondary to bleed Care Plan discussed with: Patient/Family and Nurse Disposition: TBD Hospital Problems  Date Reviewed: 1/15/2019 Codes Class Noted POA Dyspnea ICD-10-CM: R06.00 
ICD-9-CM: 786.09  1/25/2019 Unknown Review of Systems:  
Negative unless stated above no cp no sob no n/v/d/f/chills, feels better, not working with pt/ot  Yet. Cont on 02 nc, Vital Signs:  
 Last 24hrs VS reviewed since prior progress note. Most recent are: 
Visit Vitals /74 Pulse 76 Temp 97.9 °F (36.6 °C) Resp 18 Ht 5' 4\" (1.626 m) Wt 65.3 kg (143 lb 15.4 oz) SpO2 98% Breastfeeding? No  
BMI 24.71 kg/m² Intake/Output Summary (Last 24 hours) at 2/7/2019 8436 Last data filed at 2/7/2019 6652 Gross per 24 hour Intake 0 ml Output 950 ml Net -950 ml Physical Examination:  
 
 
     
Constitutional:   Alert, no acute distress ENT:  Oral mucous moist, Resp:  clear breathe sounds, no increased work of breathing CV:  Regular rhythm, normal rate GI:  distended, tender to mild palpation, superficial bruising Musculoskeletal:  No edema, warm, 2+ pulses throughout Neurologic:  Moves all extremities. Data Review:  
 I personally reviewed  Image and labs Cta Chest W Or W Wo Cont Result Date: 1/25/2019 IMPRESSION: Small right pleural effusion with underlying atelectasis. No evidence of pulmonary embolism. Ill-defined mass lesion either arising from or secondarily involving the left hepatic lobe likely corresponding to the pancreas mass seen on the recent MR but likely increased in the interval. 
 
Ct Abd Pelv Wo Cont Result Date: 2/1/2019 IMPRESSION: 1. Slightly diminished size of rectus abdominis muscular hematoma. Stable size of pelvic collection Mass effect on the surrounding structures. Post coil embolization of the deep inferior epigastric artery without evidence of active extravasation. The patient was not administered IV contrast for this examination which limits sensitivity for active extravasation. . 2. Imaging findings related to pancreatitis with stable pancreatic cyst.. 3. Moderate left and small right effusion not significantly changed. . 4. Interval mild bilateral hydroureteronephrosis when compared to the prior study. Ct Abd Pelv W Cont Result Date: 1/30/2019 IMPRESSION: 1. 7.2 x 5.0 x 8.4 cm left rectus abdominis intramuscular hematoma is acute on subacute and new since 15 days ago.  This communicates with a 15.8 x 11.8 x 18.3 cm anterior pelvic extraperitoneal subacute hematoma. Mass effect on the surrounding structures. There may be active bleeding from the left inferior epigastric artery. 2. Unchanged pancreatitis. Increased size of the presumed developing pseudocyst in the pancreatic head. Reevaluate on follow-up imaging in a couple of months. 3. New moderate right and small left pleural simple effusions with adjacent compressive and dependent atelectasis. 4. New small volume of ascites is nonhemorrhagic. I discussed this impression with Dr. Mari De Luna at  hours on 1/30/2019. Xr Chest [place] Result Date: 2/1/2019 IMPRESSION: Mild interstitial edema. No interval change. Xr Chest [place] Result Date: 1/31/2019 IMPRESSION: Stable mild interstitial opacities, bibasilar opacities, and small pleural effusions. Xr Chest [place] Result Date: 1/30/2019 IMPRESSION: 1. Right IJ catheter overlies the SVC. There is no pneumothorax Xr Chest [place] Result Date: 1/30/2019 IMPRESSION: Bibasilar densities consistent bibasilar airspace disease and/or small bilateral pleural effusions. Xr Chest [place] Result Date: 1/29/2019 IMPRESSION: 1. Similar to slightly worsening bilateral perihilar predominant opacities, most suspicious for edema. 2.  Slightly increased small left effusion and a increasing left lung base consolidation, possibly atelectasis. Xr Chest [place] Result Date: 1/28/2019 Impression: Stable trace bilateral effusions with underlying atelectasis. Xr Chest [place] Result Date: 1/28/2019 IMPRESSION: Small bibasilar atelectasis/effusions are again noted slightly improved on the right and mildly progressed on the left Ir Occl General Sellar Hemmorage W Si 
 
Result Date: 1/30/2019 IMPRESSION: Lower abdominal aortic and pelvic arteriogram without evidence for active hemorrhage or pseudoaneurysm.  Prophylactic coil embolization of the proximal deep left inferior epigastric artery was performed as described above. Patient tolerated the procedure well without immediate complication. Recent MRI abd 1/15/2019 
 
1. 1.3 x 1.7 x 3.0 cm multilocular cystic lesion of the pancreas at the body 
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 Follow-up, preferably by MRI, in 6-12 months is recommended. 2. Hepatic cysts and probable flash fill inferior right lobe hepatic hemangioma. 3. Mild extra hepatic biliary dilation which may be sequela of prior obstruction 
or inflammation. Correlate for evidence of ongoing biliary obstruction with 
clinical and laboratory data. 4. Small sliding hiatal hernia. Labs:  
 
Recent Labs 02/06/19 
6173 02/05/19 
4405 WBC 8.6 8.5 HGB 9.6* 9.2* HCT 30.6* 30.5*  175 Recent Labs 02/06/19 
8777 02/05/19 
7473  141  
K 3.7 3.4*  
 99 CO2 32 32 BUN 27* 22* CREA 0.95 1.00 * 113* CA 9.1 8.8 No results for input(s): SGOT, GPT, ALT, AP, TBIL, TBILI, TP, ALB, GLOB, GGT, AML, LPSE in the last 72 hours. No lab exists for component: AMYP, HLPSE No results for input(s): INR, PTP, APTT in the last 72 hours. No lab exists for component: INREXT, INREXT No results for input(s): FE, TIBC, PSAT, FERR in the last 72 hours. No results found for: FOL, RBCF No results for input(s): PH, PCO2, PO2 in the last 72 hours. No results for input(s): CPK, CKNDX, TROIQ in the last 72 hours. No lab exists for component: CPKMB Lab Results Component Value Date/Time Cholesterol, total 185 01/15/2019 12:29 PM  
 HDL Cholesterol 52 01/15/2019 12:29 PM  
 LDL, calculated 109.6 (H) 01/15/2019 12:29 PM  
 Triglyceride 117 01/15/2019 12:29 PM  
 CHOL/HDL Ratio 3.6 01/15/2019 12:29 PM  
 
Lab Results Component Value Date/Time  Glucose (POC) 102 (H) 01/18/2019 06:23 AM  
 Glucose (POC) 139 (H) 01/17/2019 09:05 PM  
 Glucose (POC) 114 (H) 01/17/2019 04:31 PM  
 Glucose (POC) 106 (H) 01/17/2019 11:41 AM  
 Glucose (POC) 132 (H) 01/16/2019 09:13 PM  
 
Lab Results Component Value Date/Time Color DARK YELLOW 01/15/2019 10:16 PM  
 Appearance CLOUDY (A) 01/15/2019 10:16 PM  
 Specific gravity 1.030 01/15/2019 10:16 PM  
 pH (UA) 6.0 01/15/2019 10:16 PM  
 Protein 100 (A) 01/15/2019 10:16 PM  
 Glucose NEGATIVE  01/15/2019 10:16 PM  
 Ketone 40 (A) 01/15/2019 10:16 PM  
 Urobilinogen 1.0 01/15/2019 10:16 PM  
 Nitrites NEGATIVE  01/15/2019 10:16 PM  
 Leukocyte Esterase SMALL (A) 01/15/2019 10:16 PM  
 Epithelial cells MODERATE (A) 01/15/2019 10:16 PM  
 Bacteria 1+ (A) 01/15/2019 10:16 PM  
 WBC 10-20 01/15/2019 10:16 PM  
 RBC 0-5 01/15/2019 10:16 PM  
 
 
 
Medications Reviewed:  
 
Current Facility-Administered Medications Medication Dose Route Frequency  lisinopril (PRINIVIL, ZESTRIL) tablet 5 mg  5 mg Oral DAILY  carvedilol (COREG) tablet 3.125 mg  3.125 mg Oral BID WITH MEALS  
 bumetanide (BUMEX) tablet 1 mg  1 mg Oral BID  
 bisacodyl (DULCOLAX) suppository 10 mg  10 mg Rectal DAILY PRN  
 docusate sodium (COLACE) capsule 100 mg  100 mg Oral BID  polyethylene glycol (MIRALAX) packet 17 g  17 g Oral DAILY  0.9% sodium chloride infusion 250 mL  250 mL IntraVENous PRN  
 0.9% sodium chloride infusion 250 mL  250 mL IntraVENous PRN  prochlorperazine (COMPAZINE) with saline injection 10 mg  10 mg IntraVENous Q6H PRN  
 rosuvastatin (CRESTOR) tablet 10 mg  10 mg Oral QHS  albuterol-ipratropium (DUO-NEB) 2.5 MG-0.5 MG/3 ML  3 mL Nebulization Q6H PRN  
 guaiFENesin ER (MUCINEX) tablet 600 mg  600 mg Oral Q12H  pantoprazole (PROTONIX) tablet 40 mg  40 mg Oral BID  sodium chloride (NS) flush 5-40 mL  5-40 mL IntraVENous Q8H  
 sodium chloride (NS) flush 5-40 mL  5-40 mL IntraVENous PRN  
 aspirin chewable tablet 81 mg  81 mg Oral DAILY  nitroglycerin (NITROSTAT) tablet 0.4 mg  0.4 mg SubLINGual Q5MIN PRN  
  ondansetron (ZOFRAN) injection 4 mg  4 mg IntraVENous Q4H PRN  
 morphine injection 2 mg  2 mg IntraVENous Q4H PRN  
 
______________________________________________________________________ EXPECTED LENGTH OF STAY: 4d 4h 
ACTUAL LENGTH OF STAY:          Tammi Membreno MD

## 2019-02-08 NOTE — PROGRESS NOTES
Pharmacist Discharge Medication Reconciliation Discharging Provider: Ana Rosa Mustafa Significant PMH:  
Past Medical History:  
Diagnosis Date  Other ill-defined conditions(799.89) IBS  Pancreatitis Chief Complaint for this Admission: Chief Complaint Patient presents with  Shortness of Breath  Fatigue Allergies: Penicillins Discharge Medications:  
Current Discharge Medication List  
  
 
START taking these medications Details  
rosuvastatin (CRESTOR) 10 mg tablet Take 1 Tab by mouth nightly. Qty: 30 Tab, Refills: 0  
  
polyethylene glycol (MIRALAX) 17 gram packet Take 1 Packet by mouth daily. Qty: 30 Each, Refills: 0  
  
nitroglycerin (NITROSTAT) 0.4 mg SL tablet 1 Tab by SubLINGual route every five (5) minutes as needed for Chest Pain. Up to 3 doses. Qty: 1 Bottle, Refills: 0  
  
lisinopril (PRINIVIL, ZESTRIL) 2.5 mg tablet Take 1 Tab by mouth daily. Qty: 30 Tab, Refills: 0  
  
guaiFENesin ER (MUCINEX) 600 mg ER tablet Take 1 Tab by mouth every twelve (12) hours. Qty: 30 Tab, Refills: 0  
  
docusate sodium (COLACE) 100 mg capsule Take 1 Cap by mouth two (2) times a day for 90 days. Qty: 60 Cap, Refills: 2  
  
carvedilol (COREG) 3.125 mg tablet Take 1 Tab by mouth two (2) times daily (with meals). Qty: 60 Tab, Refills: 0  
  
bumetanide (BUMEX) 1 mg tablet Take 1 Tab by mouth two (2) times a day. Qty: 60 Tab, Refills: 0  
  
aspirin 81 mg chewable tablet Take 1 Tab by mouth daily. Qty: 30 Tab, Refills: 0  
  
albuterol-ipratropium (DUO-NEB) 2.5 mg-0.5 mg/3 ml nebu 3 mL by Nebulization route every six (6) hours as needed. Qty: 30 Nebule, Refills: 0 CONTINUE these medications which have NOT CHANGED Details  
pantoprazole (PROTONIX) 40 mg tablet Take 1 Tab by mouth two (2) times a day. Qty: 60 Tab, Refills: 0 STOP taking these medications  
  
 levoFLOXacin (LEVAQUIN) 750 mg tablet Comments:  
Reason for Stopping: The patient's chart, MAR and AVS were reviewed by Tasha Nagel PHARMD.

## 2019-02-08 NOTE — ACP (ADVANCE CARE PLANNING)
Non-Provider Advance Care Planning (ACP) Note Date of ACP Conversation: 2/8/2019 Persons included in Conversation: patient and family Length of ACP Conversation in minutes: <16 minutes (Non-Billable) General ACP for ALL Patients with Decision Making Capacity: 
 
Review of Existing Advance Directive: (Select questions covered) Reviewed current AMD that daughter Luis E Curry sent to me by email. Everything is still correct and reflects her wishes. We discussed full code status and what this means. I then called her son and we had a conversation with him on speaker phone. I wanted to do this because it sounded to me as if Mrs. Jose R Diane wanted to have a DNR order in place. While speaking to her son, she indicated she would like all measures at this time if found in full arrest.  I advised that we would continue her wish to be a full code in this case. I stated to her son that she had also expressed this morning that she would not want to linger on a ventilator in an ICU bed or be a burden to others at such time that she did experience a full arrest and her resuscitation resulted in this scenario. He verbalized understanding of his mother's wishes in the above situation. I also called her daughter Luis E Curry, who is actually listed on the AMD as first health agent and explained all of this to her. She has my phone # for any follow-up questions about her mother and her care. Interventions Provided: 
Copy of AMD placed in package for discharge to [facility].

## 2019-02-08 NOTE — PROGRESS NOTES
Bedside shift change report given to Roberto Gruber (oncoming nurse) by Idris Todd (offgoing nurse). Report included the following information SBAR and Kardex.

## 2019-02-08 NOTE — PROGRESS NOTES
NUTRITION brief MD consult for poor appetite received. Pt discharging today to facility. Discussed with RN at rounds. Pt doing well with fruits (grapes and bananas). Did drink 50% Ensure Clear at breakfast today but prefers regular Ensure. Supplement orders adjusted incase pt remains as inpatient. Recommend continuing supplements at facility (Ensure at least 2x/day) - added to discharge orders. Since discharging to facility nutrition status can be continued to be monitored there. Will follow for PO intake if remains as inpatient next week. See previous notes for additional details. Angela Luque, [address] [place] [address] Pager #[phone] or [phone]

## 2019-02-08 NOTE — DISCHARGE INSTRUCTIONS
See dc note   Jude Shen MD 2/8/2019     Nutrition Recommendations for Discharge:    Continue Oral Nutrition Supplements at discharge:   Ensure Enlive, Ensure Plus or similar product  Twice daily for 30 days unless otherwise directed by your Primary Care Physician. This product can be purchased at your local grocery store, pharmacy and/or online.      Aysha Dai RD

## 2019-02-08 NOTE — PROGRESS NOTES
Problem: Falls - Risk of 
Goal: *Absence of Falls Document Kayley Borden Fall Risk and appropriate interventions in the flowsheet. Outcome: Progressing Towards Goal 
Fall Risk Interventions: 
Mobility Interventions: Bed/chair exit alarm, Patient to call before getting OOB Mentation Interventions: Adequate sleep, hydration, pain control, Evaluate medications/consider consulting pharmacy, Increase mobility, More frequent rounding, Reorient patient, Toileting rounds Medication Interventions: Evaluate medications/consider consulting pharmacy, Patient to call before getting OOB, Teach patient to arise slowly Elimination Interventions: Bed/chair exit alarm, Call light in reach

## 2019-02-08 NOTE — PROGRESS NOTES
Bedside and Verbal shift change report given to Alfred Aguilera RN (oncoming nurse) by ST YODIT RN (offgoing nurse). Report included the following information SBAR, Kardex, MAR and Recent Results.

## 2019-02-08 NOTE — PROGRESS NOTES
Care Management - Received update from nursing that patient has a discharger order. Per chart review, patient has been accepted by SNF. Called [facility] and spoke with [facility] MAY[facility] in admissions. Patient can come at anytime. Spoke with patient's nurse. She attempted to wean oxygen from patient. She was not able to wean he. She still requires 1 liter oxygen. She requested the time of 12:00 PM for  as patient must lay flat for 1 hour after her line is removed. Nurse will call patient's son. Arranged ambulance via [facility] for 12:00 PM via [facility]. Transportation accepted for the 12:00 PM time. Placed completed PCS form, facesheet, and H&P on chart for [facility]. Placed envelope for facility on chart. Nurse will need to add Kardex, AVS, MAR, and any hard scripts to envelope and will need to call report to [facility] at [facility] ([phone]).  
 
MCKAY Doe

## 2019-02-08 NOTE — DISCHARGE SUMMARY
Discharge Summary PATIENT ID: Christiano Sharma MRN: [id number] YOB: 1940 DATE OF ADMISSION: 1/25/2019  3:13 PM   
DATE OF DISCHARGE: 2/8/2019 PRIMARY CARE PROVIDER: Tony Shetty MD  
 
ATTENDING PHYSICIAN: Aristeo Alas MD  
DISCHARGING PROVIDER: Aristeo Alas MD   
To contact this individual call [phone] and ask the  to page. If unavailable ask to be transferred the Adult Hospitalist Department. CONSULTATIONS: IP CONSULT TO CARDIOLOGY 
IP CONSULT TO CARDIOLOGY 
IP CONSULT TO GASTROENTEROLOGY 
IP CONSULT TO NEPHROLOGY 
IP CONSULT TO PULMONOLOGY PROCEDURES/SURGERIES: * No surgery found * ADMITTING DIAGNOSES & HOSPITAL COURSE:  
 
 
Holding asa indefinitely   2n2   sheath bleeds Per card:Dr. Favian Adams: 
 
 
  
Discussion/Plans 1. Cardiomyopathy, acute systolic CHF: 
-Estimated NYHA II-III (has not been OOB yet) -Limited Echo 2/4/19: EF improved to 46-50% (off dobutamine) From 20-25%% 
-On Bumex 1 mg daily 
-Lisinopril reduced to 2.5 mg daily by primary team suspect d/t SBP of 96 . 
-Continue Coreg 3.125 mg BID 
  
2. NQMI: Troponin elevation and fall. Peak of 3.26 
-Not on  ASA d/t pelvic/rectus sheath bleeds. -   
-Continue high potency statin. 
-Continue ACE-I, start BB today. 
  
4. PAF: had PAF over weekend- none on tele review after weekend. 
-SR. -Coreg as above.  
-Stop amiodarone (no further PAF and suspect afib d/t acute illness, bleeding events) -[id number]=2 (age, CHF, Female). No anticoags or ASA d/t bleeding. 
  
5. TOÑA: resolving. Creatinine normalized. -nephrology following. 
  
6. Pelvic/rectus sheath hematomas/bleed: 
-s/p IR embolization 1/30/19. 
-Renal recommends keeping morales at discharge due to hematoma compressing on bladder 
  
7. Anemia, acute blood loss:  hgb stable 9.6 yesterday 
-off ASA, and anticoagulation 
  
8.   Pancreatitis with cystic lesion-  
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 
  
 9. Influenza/superimposed pneumonia: now off isolation 
  
   
 Continues to improve clinically. No additional changes recommended. . 
 
Subjective: Feeling better. Worked with PT today. Currently sitting  Up in chair. No SOB,. States she is feeling better. Assessment/Plan/Discussion:Cardiology Attending:  
  
Patient seen on the day of progress note and examined  and agree with Advance Practice Provider (SISI, NP,PA)  assessment and plans. Mayra Crum is a 70 y.o. female Looks fair in chair mid day with son Still weak, not eating well and will need SNF No chest pain EF improved and continue ACE, BB, diuretic Fu with us in 2-3 weeks Will see back PRN Lindsey Quiles MD   
  
 
 
No meaningful additions to above. Has hematoma likely form anticoag, stable lt flank/lat abd areal 
 
 
 
 
DISCHARGE DIAGNOSES / PLAN:   
 
1.  as above ADDITIONAL CARE RECOMMENDATIONS: please f/u BMP in a few days, adjust diuretic/k accordingly. PENDING TEST RESULTS:  
At the time of discharge the following test results are still pending: na 
 
FOLLOW UP APPOINTMENTS:   
Follow-up Information Follow up With Specialties Details Why Contact Info Lexi Garber [address] [place] [address] [facility] 
[phone] Sheela De La Fuente MD [facility] [address] [facility] 
[phone] Herminio Nuno MD Orthopedic Surgery In 3 weeks  93 [facility] 
[address] [facility] [place] [address] 
[phone] DIET: Cardiac Diet ACTIVITY: Activity as tolerated WOUND CARE: na 
 
EQUIPMENT needed: na 
 
 
DISCHARGE MEDICATIONS: 
Current Discharge Medication List  
  
START taking these medications Details  
rosuvastatin (CRESTOR) 10 mg tablet Take 1 Tab by mouth nightly. Qty: 30 Tab, Refills: 0  
  
polyethylene glycol (MIRALAX) 17 gram packet Take 1 Packet by mouth daily. Qty: 30 Each, Refills: 0 nitroglycerin (NITROSTAT) 0.4 mg SL tablet 1 Tab by SubLINGual route every five (5) minutes as needed for Chest Pain. Up to 3 doses. Qty: 1 Bottle, Refills: 0  
  
lisinopril (PRINIVIL, ZESTRIL) 2.5 mg tablet Take 1 Tab by mouth daily. Qty: 30 Tab, Refills: 0  
  
guaiFENesin ER (MUCINEX) 600 mg ER tablet Take 1 Tab by mouth every twelve (12) hours. Qty: 30 Tab, Refills: 0  
  
docusate sodium (COLACE) 100 mg capsule Take 1 Cap by mouth two (2) times a day for 90 days. Qty: 60 Cap, Refills: 2  
  
carvedilol (COREG) 3.125 mg tablet Take 1 Tab by mouth two (2) times daily (with meals). Qty: 60 Tab, Refills: 0  
  
bumetanide (BUMEX) 1 mg tablet Take 1 Tab by mouth two (2) times a day. Qty: 60 Tab, Refills: 0  
  
albuterol-ipratropium (DUO-NEB) 2.5 mg-0.5 mg/3 ml nebu 3 mL by Nebulization route every six (6) hours as needed. Qty: 30 Nebule, Refills: 0 CONTINUE these medications which have NOT CHANGED Details  
pantoprazole (PROTONIX) 40 mg tablet Take 1 Tab by mouth two (2) times a day. Qty: 60 Tab, Refills: 0 STOP taking these medications  
  
 levoFLOXacin (LEVAQUIN) 750 mg tablet Comments:  
Reason for Stopping:   
   
  
 
 
 
NOTIFY YOUR PHYSICIAN FOR ANY OF THE FOLLOWING:  
Fever over 101 degrees for 24 hours. Chest pain, shortness of breath, fever, chills, nausea, vomiting, diarrhea, change in mentation, falling, weakness, bleeding. Severe pain or pain not relieved by medications. Or, any other signs or symptoms that you may have questions about. DISPOSITION: 
  Home With: 
 OT  PT  HH  RN  
  
x Long term SNF/Inpatient Rehab Independent/assisted living Hospice Other:  
 
 
PATIENT CONDITION AT DISCHARGE:  
 
Functional status  
x Poor   
x Deconditioned Independent Cognition  
 x Lucid Forgetful Dementia Catheters/lines (plus indication) Joyce PICC   
 PEG   
x None Code status  
x  Full code  DNR   
 
 PHYSICAL EXAMINATION AT DISCHARGE: 
 Refer to Progress Note Skin: bruise stable lt lower flank/abd Pul: clear CV: rr no gallop Ext: supple no edema of significance CHRONIC MEDICAL DIAGNOSES: 
Problem List as of 2/8/2019 Date Reviewed: 1/15/2019 Codes Class Noted - Resolved Dyspnea ICD-10-CM: R06.00 
ICD-9-CM: 786.09  1/25/2019 - Present Pancreatitis ICD-10-CM: K85.90 ICD-9-CM: 687.9  1/15/2019 - Present Greater than 30  minutes were spent with the patient on counseling and coordination of care Signed:  
Tiffanie Augustin MD 
2/8/2019 
8:13 AM

## 2019-02-11 NOTE — PROGRESS NOTES
Transition of Care Coordination/Hospital to Post Acute Facility: 
  
Date/Time:  2019 11:08 AM 
 
Patient was admitted to [facility] on 19 for treatment of CHF. Patient was discharged 19 to [facility] for continuation of care. Inpatient RRAT score: 19 Top Challenges reviewed Daily weights Method of communication with care team :phone Nurse Navigator(NN) spoke with Viktoriya Owusu to provide introduction to self and explanation of the Nurse Navigator Role. Verified name and  as patient identifiers. Discussed and reviewed  anticipated length of stay, daily weights, diet restrictions, discharge summary, medication reconciliation ACP:  
Does the patient have a current ACP (including DDNR):  yes Does the post acute facility have a copy of the patients ACP:  yes Medication(s):  
New Medications at Discharge: Crestor, Miralax, nitrostat, lisinopril, mucinex, colace, bumex, duo-neb Changed Medications at Discharge: none Discontinued Medications at Discharge: levofloxcin PCP/Specialist follow up:  
Future Appointments Date Time Provider Jarred Cowan 2019 10:20 AM Helena Gilliland  [address] Opportunity to ask questions was provided. Contact information was provided for future reference or further questions. Will continue to monitor. 19 med rec completed by Patito Gama.

## 2019-02-14 NOTE — PROGRESS NOTES
Community Care Team documentation for patient in [facility] Initial Follow Up Patient was discharged to [facility], [facility]. Information included in this progress note has been provided to SNF. Hospital Admission and Diagnosis:  [facility] 1/25-2/8 Cardiomyopathy, acute systolic CHF 
 
 RRAT Score: 19 Advance Care Planning:  POA  on file PCP : Kyle Tariq MD. 
 
Spoke with SNF team. Ensured patient arrived to SNF safely with admission packet in order. Provided upcoming follow up with Cardiology Gato Vines MD  On 2/21 at 10:20 a.m. CT of abdomen and pelvis ordered yesterday and scheduled for 3/4. Pt is receiving skilled PT/OT services. Currently ambulating 300ft with RW and stand by assist. Stand by assist with transfers. Mod assist with upper and lower bathing and dressing. 17 day LOS anticipated pending progress. Pt needs to work on stairs. Confirmed daily weights. Current weight not provided to CCT. Plan to return home alone. AILEEN TBD. Community Care Team will follow up weekly with [facility] until discharge. Medications were not reconciled and general patient assessment was not completed during this [facility] outreach.

## 2019-02-21 NOTE — PROGRESS NOTES
Melony Moore     1940       Kwadwo Cook MD, [facility]  Date of Visit-2/21/2019   PCP is Wendy Navarro MD   [facility] [place]  [facility]  HPI:  Melony Moore is a 70 y.o. female   Pt hospitalized 1/25/19 - 2/8/19 with acute systolic cardiomyopathy. Her EF was 20 - 25%. She was getting CHF meds. EF improved to 40 - 50%. She's had trial to dobutamine prior to repeat echo. She has PAF and troponin of 3.26. She developed pelvic and rectus sheath hematoma and ASA was held. She was admitted with flu and pancreatitis with anemia. Date of last echo was 2/4/19.      01/25/19   ECHO ADULT FOLLOW-UP OR LIMITED 02/04/2019 2/4/2019    Narrative · Left atrial cavity size is mildly dilated. · Mild to moderate mitral valve regurgitation. · Mild tricuspid valve regurgitation is present. · Estimated left ventricular ejection fraction is 46 - 50%. Visually   measured ejection fraction. Left ventricular global hypokinesis. Signed by: Solo Evans MD     Since discharge, she has been seen the [facility] transition team and she went to [facility]. Pt is present with her daughter today. Overall the pt states she is doing well. Pt's daughter adds that pt has been having some coughing issues that cause her to have some breathing problems which lasted about 5 minutes and ceased afterward. Pt is still having lack of appetite. Denies chest pain, edema, syncope or shortness of breath at rest, has no tachycardia, palpitations or sense of arrhythmia. Assessment/Plan:     1. Systolic HF due to acute illness improved by echo now clinically seems to doing well. Continue lisinopril and Bumex at current doses. Will increase Carvedilol to 6.25 mg BID. 2. Cough intermittent. Probably just recovering from hospitalization if she has dry cough that persist we may switch to lisinopril but I don't think we need to make the change now.      3. Needs an EUS with Dr. Eduard Shah I have no objection to this she's stable in cardiovascular view and this can be done anytime. 4. CAD risk has no angina but did have elevated troponin she may have CAD and is probably now the point where we can do a stress test.  I'll review hospital chart but anticipate doing that sometimes the next month. I don't think this needs to hold up her EUS with GI.     5. Dyslipidemia. On statin for cholesterol. 6. Pancreatitis flu have improved and resolved. 7. Rectus sheath hematoma. She has no longer tender in her abdomen. Plan increase Coreg f/u in 6 weeks okay for GI and get a lexiscan nuclear next available. Future Appointments   Date Time Provider Jarred Cowan   3/4/2019 10:00 AM [facility] CT ER 3 [facility]   3/11/2019  8:00 AM ROB, LAURA GARIBAY   3/28/2019  2:40 PM Kwadwo Tapia  [address]      Patient Instructions   Increase coreg to 6.25 mg twice a day. You will be scheduled for nuclear stress testing for 3 weeks after your appointment today. Follow up with Dr. Pooja Malone in 6 weeks. Key CAD CHF Meds             carvedilol (COREG) 6.25 mg tablet  (Taking) Take 1 Tab by mouth two (2) times daily (with meals). rosuvastatin (CRESTOR) 10 mg tablet  (Taking) Take 1 Tab by mouth nightly. nitroglycerin (NITROSTAT) 0.4 mg SL tablet  (Taking) 1 Tab by SubLINGual route every five (5) minutes as needed for Chest Pain. Up to 3 doses. lisinopril (PRINIVIL, ZESTRIL) 2.5 mg tablet  (Taking) Take 1 Tab by mouth daily. bumetanide (BUMEX) 1 mg tablet  (Taking) Take 1 Tab by mouth daily. Impression:   1. Systolic CHF, chronic ([address]    2. NSTEMI (non-ST elevated myocardial infarction) ([address]    3. Cough    4. Coronary artery disease of native artery of native heart with stable angina pectoris ([address]    5. Pure hypercholesterolemia    6.  Hematoma of rectus sheath, subsequent encounter       Cardiac History:   No specialty comments available. ROS-except as noted above. . A complete cardiac and respiratory are reviewed and negative except as above ; Resp-denies wheezing  or productive cough,. Const- No unusual weight loss or fever; Neuro-no recent seizure or CVA ; GI- No BRBPR, abdom pain, bloating ; - no  hematuria   see supplement sheet, initialed and to be scanned by staff  Past Medical History:   Diagnosis Date    Coronary artery disease of native artery of native heart with stable angina pectoris [facility] 2/28/2019    Hematoma of rectus sheath 2/28/2019    NSTEMI (non-ST elevated myocardial infarction) ([facility] 2/28/2019    Other ill-defined conditions(799.89)     IBS    Pancreatitis     Pure hypercholesterolemia 5/47/7349    Systolic [facility], chronic ([facility] 2/28/2019      Social Hx= reports that she has quit smoking. she has never used smokeless tobacco. She reports that she drinks alcohol. She reports that she does not use drugs. Exam and Labs:  /60 (BP 1 Location: Left arm, BP Patient Position: Sitting)   Pulse 91   Resp 16   Ht 5' 4\" (1.626 m)   Wt 136 lb (61.7 kg)   SpO2 96%   BMI 23.34 kg/m² Constitutional:  NAD, comfortable  Head: NC,AT. Eyes: No scleral icterus. Neck:  Neck supple. No JVD present. Throat: moist mucous membranes. Chest: Effort normal & normal respiratory excursion . Neurological: alert, conversant and oriented . Skin: Skin is not cold. No obvious systemic rash noted. Not diaphoretic. No erythema. Psychiatric:  Grossly normal mood and affect. Behavior appears normal. Extremities:  no clubbing or cyanosis. Abdomen: non distended    Lungs:breath sounds normal. No stridor. distress, wheezes or  Rales. Heart: normal rate, regular rhythm, normal S1, S2, no murmurs, rubs, clicks or gallops , PMI non displaced. Edema: Edema is none.   Lab Results   Component Value Date/Time    Cholesterol, total 185 01/15/2019 12:29 PM    HDL Cholesterol 52 01/15/2019 12:29 PM    LDL, calculated 109.6 (H) 01/15/2019 12:29 PM    Triglyceride 117 01/15/2019 12:29 PM    CHOL/HDL Ratio 3.6 01/15/2019 12:29 PM     Lab Results   Component Value Date/Time    Sodium 141 02/08/2019 04:45 AM    Potassium 3.6 02/08/2019 04:45 AM    Chloride 103 02/08/2019 04:45 AM    CO2 30 02/08/2019 04:45 AM    Anion gap 8 02/08/2019 04:45 AM    Glucose 100 02/08/2019 04:45 AM    BUN 30 (H) 02/08/2019 04:45 AM    Creatinine 0.92 02/08/2019 04:45 AM    BUN/Creatinine ratio 33 (H) 02/08/2019 04:45 AM    GFR est AA >60 02/08/2019 04:45 AM    GFR est non-AA 59 (L) 02/08/2019 04:45 AM    Calcium 8.7 02/08/2019 04:45 AM      Wt Readings from Last 3 Encounters:   02/21/19 136 lb (61.7 kg)   02/08/19 144 lb 6.4 oz (65.5 kg)   01/21/19 149 lb (67.6 kg)      BP Readings from Last 3 Encounters:   02/21/19 120/60   02/08/19 133/61   01/21/19 151/57      Current Outpatient Medications   Medication Sig    senna (SENNA) 8.6 mg tablet Take 1 Tab by mouth daily.  rosuvastatin (CRESTOR) 10 mg tablet Take 1 Tab by mouth nightly.  polyethylene glycol (MIRALAX) 17 gram packet Take 1 Packet by mouth daily.  nitroglycerin (NITROSTAT) 0.4 mg SL tablet 1 Tab by SubLINGual route every five (5) minutes as needed for Chest Pain. Up to 3 doses.  lisinopril (PRINIVIL, ZESTRIL) 2.5 mg tablet Take 1 Tab by mouth daily.  guaiFENesin ER (MUCINEX) 600 mg ER tablet Take 1 Tab by mouth every twelve (12) hours.  docusate sodium (COLACE) 100 mg capsule Take 1 Cap by mouth two (2) times a day for 90 days.  carvedilol (COREG) 3.125 mg tablet Take 1 Tab by mouth two (2) times daily (with meals).  bumetanide (BUMEX) 1 mg tablet Take 1 Tab by mouth daily.  pantoprazole (PROTONIX) 40 mg tablet Take 1 Tab by mouth two (2) times a day.  albuterol-ipratropium (DUO-NEB) 2.5 mg-0.5 mg/3 ml nebu 3 mL by Nebulization route every six (6) hours as needed. No current facility-administered medications for this visit. Impression see above.       Written by Kole Vinson, as dictated by Cathy Adam MD.

## 2019-02-21 NOTE — PROGRESS NOTES
Visit Vitals /60 (BP 1 Location: Left arm, BP Patient Position: Sitting) Pulse 91 Resp 16 Ht 5' 4\" (1.626 m) Wt 136 lb (61.7 kg) SpO2 96% BMI 23.34 kg/m²

## 2019-02-21 NOTE — PATIENT INSTRUCTIONS
Increase coreg to 6.25 mg twice a day. You will be scheduled for nuclear stress testing for 3 weeks after your appointment today. Wear comfortable clothing (shorts or pants with a shirt or blouse- no underwire bras) and walking or athletic shoes. Do not eat or drink anything, except water, for at least 2 hours prior to your appointment. Avoid tobacco products for at least 6 hours prior to your test.    Do not eat or drink anything containing caffeine, including but not limited to the following: chocolate, regular and decaffeinated coffee, soft drinks, or tea for at least 24 hours prior to your test.    Do not  hold any of your scheduled medications prior to your test.     If you are a diabetic, please ask your physician how to adjust your food and insulin prior to your test.     Please bring all medications you are currently taking. Your test will be performed on a 1  day protocol. This is determined by your height, weight, and other risk factors. For a 1 day test, please allow for 4 hours in the office that day. The radioactive isotope used for your testing is different from any of the dyes that are commonly used in x-ray procedures, and is ordered specially for your test. Please call to cancel or reschedule your appointment at least 24 hours prior to your scheduled appointment to avoid being billed for the expensive isotope. Follow up with Dr. Jessica Petit in 6 weeks.

## 2019-02-28 PROBLEM — E78.00 PURE HYPERCHOLESTEROLEMIA: Status: ACTIVE | Noted: 2019-01-01

## 2019-02-28 PROBLEM — I50.22 SYSTOLIC CHF, CHRONIC (HCC): Status: ACTIVE | Noted: 2019-01-01

## 2019-02-28 PROBLEM — S30.1XXA HEMATOMA OF RECTUS SHEATH: Status: ACTIVE | Noted: 2019-01-01

## 2019-02-28 PROBLEM — R05.9 COUGH: Status: ACTIVE | Noted: 2019-01-01

## 2019-02-28 PROBLEM — I21.4 NSTEMI (NON-ST ELEVATED MYOCARDIAL INFARCTION) (HCC): Status: ACTIVE | Noted: 2019-01-01

## 2019-02-28 PROBLEM — I25.118 CORONARY ARTERY DISEASE OF NATIVE ARTERY OF NATIVE HEART WITH STABLE ANGINA PECTORIS (HCC): Status: ACTIVE | Noted: 2019-01-01

## 2019-02-28 NOTE — PROGRESS NOTES
Community Care Team Documentation for Patient in [facility] Discharge Note Patient has been discharged from [facility] ([facility]). See previous [facility] Team notes. PCP : Samir Mishra MD 
 
Spoke with SNF team.  Confirmed patient was discharged 2/28 to home alone with family support and  [address]. Discharge weight 135.4 lbs. Community Care Team will sign off at this time. Medications were not reconciled and general patient assessment was not completed during this skilled nursing facility outreach. Zeeshan Manriquez, MSN, RN, ACNS-BC, [facility] Nurse Navigator, [address] [place] [address] [phone]

## 2019-03-04 NOTE — TELEPHONE ENCOUNTER
Pt's daughter Stephanie Dias called stating that the pt has a cough and was advised at her last visit that if she develops a dry cough she may need to make a change of her medication.   Phone #[phone]  Thanks

## 2019-03-05 NOTE — TELEPHONE ENCOUNTER
Verified patient with two types of identifiers. Spoke to Ms. Sun, patient's daughter (on HIPAA), and gave her Dr. Harper Echavarria message about the lisinopril possibly causing her mother's persistent dry cough. Patient's daughter will pick prescription at preferred pharmacy. Patient verbalized understanding and will call with any other questions.

## 2019-03-05 NOTE — TELEPHONE ENCOUNTER
Verified patient with two types of identifiers. Spoke to patient's daughter (on HIPAA form) and let her know I will check with Dr. Tonie Mohamud about changing lisinopril. Patient's daughter verbalized understanding and will call with any other questions.

## 2019-03-06 NOTE — TELEPHONE ENCOUNTER
3/6/2019 Cardiac Rehab: Called Ms. Abel Mercedes  to discuss participation in the Cardiac Rehab Program following a recent admission. Patient is scheduled for a stress test on 3/11/19 and a follow-up with Dr. Santiago Acosta on 3/28/19.  We agreed that cardiac rehab will follow up with her, by phone, after her appointment with Dr. Santiago Acosta. Will recall the week of April 1st.. Javier Cunningham RN

## 2019-03-11 NOTE — TELEPHONE ENCOUNTER
Request for bumes 1mg daily and crestor 10mg QHS. Last office visit 2-21-19, next office visit 3-28-19.  Refills per verbal order from Dr. Alissa Armas.

## 2019-03-21 NOTE — TELEPHONE ENCOUNTER
Pt's daughter called asking if the pt should continue taking her medications, if she will need a f/u appointment with the doctor, etc.  Phone #[phone]  Thanks

## 2019-03-22 NOTE — TELEPHONE ENCOUNTER
Verified patient with two types of identifiers. Spoke to patient's daughter Nasima Goyal (on HIPAA form) and went over patient's medications as well as follow up appointment. Patient's daughter verbalized understanding and will call with any other questions.

## 2019-04-09 NOTE — TELEPHONE ENCOUNTER
4/9/2019 Cardiac Rehab: Called Ms. Karolina Duvall to discuss participation in the Cardiac Rehab Program following a recent admission. Discussed cardiac rehab program and benefits and pt declined stating she had too much going on right now. She took our number should she change her mind. Slade Barron RN       3/6/2019 Cardiac Rehab: Called Ms. Karolina Duvall  to discuss participation in the Cardiac Rehab Program following a recent admission. Patient is scheduled for a stress test on 3/11/19 and a follow-up with Dr. Lonnie Morales on 3/28/19.  We agreed that cardiac rehab will follow up with her, by phone, after her appointment with Dr. Lonnie Morales. Will recall the week of April 1st.. Marcella Allen RN

## 2019-04-14 NOTE — ANESTHESIA PREPROCEDURE EVALUATION
Relevant Problems No relevant active problems Anesthetic History No history of anesthetic complications Review of Systems / Medical History Patient summary reviewed, nursing notes reviewed and pertinent labs reviewed Pulmonary Within defined limits Neuro/Psych Within defined limits Cardiovascular CHF Past MI, CAD and hyperlipidemia Comments: EF 20-25% GI/Hepatic/Renal 
Within defined limits Endo/Other Within defined limits Other Findings Physical Exam 
 
Airway Mallampati: II 
TM Distance: > 6 cm Neck ROM: normal range of motion Mouth opening: Normal 
 
 Cardiovascular Regular rate and rhythm,  S1 and S2 normal,  no murmur, click, rub, or gallop Dental 
 
Dentition: Upper partial plate and Lower partial plate Pulmonary Breath sounds clear to auscultation Abdominal 
GI exam deferred Other Findings Anesthetic Plan ASA: 4 Anesthesia type: MAC Anesthetic plan and risks discussed with: Patient

## 2019-04-15 NOTE — PROCEDURES
[address] [place] [address] [facility]:  Flavio Best :   1940 MRN:   [id number] Procedure Type: Endoscopic Ultrasound Indications: cyst of pancreas Pre-operative Diagnosis: see indication above Post-operative Diagnosis:  See findings below : Lisa Valdez. Ok Daniel MD 
 
Referring Provider: --Marii Germain MD 
 
Anethesia/Sedation:  MAC anesthesia Propofol Procedure Details After infomed consent was obtained for the procedure, with all risks and benefits of procedure explained the patient was taken to the endoscopy suite and placed in the left lateral decubitus position. Following sequential administration of sedation as per above, an EGD was performed. Findings are listed below. Next, the linear echoendoscope was inserted into the mouth and advanced under direct vision to the second portion of the duodenum. Findings:  
 
Endoscopic: 
 Esophagus:normal 
 Stomach: small hiatal hernia Duodenum: normal to second portion, ampulla could not be visualized and was likely hidden beneath a fold Ultrasound: 
 Esophagus: normal 
 Stomach: normal 
 Pancreas:  
  Areas examined: the entire gland Parenchyma: The pancreatic parenchyma was atrophic and diffusely heterogeneous. There was a septated cystic lesion in the pancreatic body, which was 1.7 cm in diameter. There were multiple smaller anechoic cystic lesions adjacent to this ranging from 4-7 mm. Additionally, the main pancreatic duct was not well visualized in this region. Separately, the main duct was 1.5-1.7 mm in the body/tail region. The duct was 2.5 mm in the head of the pancreas. Given that the cystic lesion was measurably smaller in comparison to recent imaging and that the patient has had pancreatitis in the recent past based on imaging appearance, FNA was deferred. Pancreatic Duct: as above Liver: Parenchyma: visualized portions were normal appearing Gallbladder: absent Bile Duct: 5.1 mm, no wall thickening, no sludge or stones Lymph Node: no pathologically enlarged lymph nodes Specimen Removed: none Complications: None. EBL:  None. Interventions: see above Impression: 1. Septated cystic lesion of pancreatic body as described above. FNA deferred given its relative decrease in size and history of pancreatitis. 2. Pancreatic parenchyma with diffuse heterogeneity and atrophy 3. Non-dilated main pancreatic duct, although this was not well visualized in the region of the cyst 
 
Recommendations: 1. Repeat CT abdomen with pancreatic protocol in 3 months Signed By: Merlinda Och. Bartholomew Hasten, MD   
 4/15/2019  3:53 PM

## 2019-04-15 NOTE — DISCHARGE INSTRUCTIONS
[address]  [address]    Endoscopic ultrasound DISCHARGE INSTRUCTIONS    Becca Rodas  [id number]  1940    Discomfort:  Sore throat- throat lozenges or warm salt water gargle  redness at IV site- apply warm compress to area; if redness or soreness persist- contact your physician  Gaseous discomfort- walking, belching will help relieve any discomfort  You may not operate a vehicle for 12 hours  You may not engage in an occupation involving machinery or appliances for rest of today  You may not drink alcoholic beverages for at least 12 hours  Avoid making any critical decisions for at least 24 hour  DIET  You may eat and drink now and after you leave. You may resume your regular diet - however -  remember your colon is empty and a heavy meal will produce gas. Avoid these foods:  vegetables, fried / greasy foods, carbonated drinks    ACTIVITY  You may resume your normal daily activities   Spend the remainder of the day resting -  avoid any strenuous activity. CALL M.D. ANY SIGN OF   Increasing pain, nausea, vomiting  Abdominal distension (swelling)  New increased bleeding (oral or rectal)  Fever (chills)  Pain in chest area  Bloody discharge from nose or mouth  Shortness of breath    Follow-up Instructions:   Call Dr. Etienne Deng for any questions or problems. Telephone # [phone]    ENDOSCOPY FINDINGS:   Your endoscopic ultrasound showed that the previously appreciated pancreatic cysts have decreased in size. I would advise repeat CT scan in the next 3 months to monitor these. My office can arrange for these to be done. Signed By: Maryam Avilez MD     4/15/2019  3:50 PM

## 2019-04-15 NOTE — ROUTINE PROCESS
[facility] 1940 
[id number] Situation: 
Verbal report received from: ELIZA Pena Procedure: Procedure(s): ENDOSCOPIC ULTRASOUND (EUS) WITH FNA 
ESOPHAGOGASTRODUODENOSCOPY (EGD) Background: 
 
Preoperative diagnosis: PANCREATIC CYST Postoperative diagnosis: Hiatial Hernia PANCREATIC CYSTS :  Dr. Zoltan Moy Assistant(s): Endoscopy Technician-1: Fred Blankenship Endoscopy RN-1: Manuel Leone RN Specimens: * No specimens in log * H. Pylori  no Assessment: 
Intra-procedure medications Anesthesia gave intra-procedure sedation and medications, see anesthesia flow sheet yes Intravenous fluids: NS@ [place] Vital signs stable Abdominal assessment: round and soft Recommendation: 
Discharge patient per MD order. Family or Friend Permission to share finding with family or friend yes

## 2019-04-15 NOTE — ANESTHESIA POSTPROCEDURE EVALUATION
Post-Anesthesia Evaluation and Assessment Patient: Nanda Yancey MRN: [id number]  SSN: [id number] YOB: 1940  Age: 70 y.o. Sex: female I have evaluated the patient and they are stable and ready for discharge from the PACU. Cardiovascular Function/Vital Signs Visit Vitals /73 Pulse 63 Temp 36.7 °C (98 °F) Resp 18 Ht 5' 4\" (1.626 m) Wt 61.7 kg (136 lb) SpO2 99% Breastfeeding? No  
BMI 23.34 kg/m² Patient is status post MAC anesthesia for Procedure(s): ENDOSCOPIC ULTRASOUND (EUS) WITH FNA 
ESOPHAGOGASTRODUODENOSCOPY (EGD). Nausea/Vomiting: None Postoperative hydration reviewed and adequate. Pain: 
Pain Scale 1: Numeric (0 - 10) (04/15/19 1602) Pain Intensity 1: 0 (04/15/19 1602) Managed Neurological Status: At baseline Mental Status, Level of Consciousness: Alert and  oriented to person, place, and time Pulmonary Status:  
O2 Device: Room air (04/15/19 1602) Adequate oxygenation and airway patent Complications related to anesthesia: None Post-anesthesia assessment completed. No concerns Signed By: Kendall Enamorado MD   
 April 15, 2019 Procedure(s): ENDOSCOPIC ULTRASOUND (EUS) WITH FNA 
ESOPHAGOGASTRODUODENOSCOPY (EGD). MAC 
 
<BSHSIANPOST> Vitals Value Taken Time  
BP 0/0 4/15/2019  4:24 PM  
Temp 36.7 °C (98 °F) 4/15/2019  3:51 PM  
Pulse 0 4/15/2019  4:24 PM  
Resp 0 4/15/2019  4:28 PM  
SpO2 98 % 4/15/2019  4:10 PM  
Vitals shown include unvalidated device data.

## 2019-04-15 NOTE — PERIOP NOTES

## 2019-04-15 NOTE — H&P
[address] [facility], [address] History and Physical    
 
NAME:  Lidia Trammell :   1940 MRN:   [id number] History of Present Illness:  Patient is a 70 y.o. who is seen for cystic lesion of the pancreatic body. PMH: 
Past Medical History:  
Diagnosis Date  Coronary artery disease of native artery of native heart with stable angina pectoris ([facility] [address] 2019  Hematoma of rectus sheath 2019  
 NSTEMI (non-ST elevated myocardial infarction) ([facility] [address] 2019  Other ill-defined conditions(799.89) IBS  Pancreatitis  Pure hypercholesterolemia 2019  Systolic CHF, chronic ([facility] [address] 2019 PSH: 
Past Surgical History:  
Procedure Laterality Date  COLONOSCOPY Left 2017 COLONOSCOPY performed by Radha Monteiro MD at [address]  HX CHOLECYSTECTOMY  HX GI    
 surgery for hiatal hernia  IR OCCL Coralyn Lints W SI  2019 Allergies: Allergies Allergen Reactions  Penicillins Hives Home Medications: 
Prior to Admission Medications Prescriptions Last Dose Informant Patient Reported? Taking?  
carvedilol (COREG) 6.25 mg tablet 2019 at Unknown time  Yes Yes Sig: Take  by mouth two (2) times daily (with meals). guaiFENesin ER (MUCINEX) 600 mg ER tablet Unknown at Unknown time  No No  
Sig: Take 1 Tab by mouth every twelve (12) hours. nitroglycerin (NITROSTAT) 0.4 mg SL tablet Unknown at Unknown time  No No  
Si Tab by SubLINGual route every five (5) minutes as needed for Chest Pain. Up to 3 doses. pantoprazole (PROTONIX) 40 mg tablet Not Taking at Unknown time  No No  
Sig: Take 1 Tab by mouth two (2) times a day. polyethylene glycol (MIRALAX) 17 gram packet Not Taking at Unknown time  No No  
Sig: Take 1 Packet by mouth daily. rosuvastatin (CRESTOR) 10 mg tablet 2019 at Unknown time  Yes Yes Sig: Take 10 mg by mouth nightly. Facility-Administered Medications: None Hospital Medications: No current facility-administered medications for this encounter. Social History: 
Social History Tobacco Use  Smoking status: Former Smoker  Smokeless tobacco: Never Used  Tobacco comment: quit smoking cigarettes 6 yrs ago Substance Use Topics  Alcohol use: Yes Comment: very occasional  
 
 
Family History: 
Family History Problem Relation Age of Onset  Dementia Mother   
     alzheimers  Cancer Sister   
     gallbladder  Cancer Brother   
     pancreatic  Breast Cancer Paternal Grandmother  Dementia Sister   
     alzheimers  Heart Surgery Brother   
     bypass Review of Systems: 
 
 
Constitutional: negative fever, negative chills, negative weight loss Eyes:   negative visual changes ENT:   negative sore throat, tongue or lip swelling Respiratory:  negative cough, negative dyspnea Cards:  negative for chest pain, palpitations, lower extremity edema GI:   See HPI 
:  negative for frequency, dysuria Integument:  negative for rash and pruritus Heme:  negative for easy bruising and gum/nose bleeding Musculoskel: negative for myalgias,  back pain and muscle weakness Neuro: negative for headaches, dizziness, vertigo Psych:  negative for feelings of anxiety, depression Objective:  
 
Patient Vitals for the past 8 hrs: 
 BP Pulse Resp SpO2 Height Weight 04/15/19 1424 130/71 72 20 96 % 5' 4\" (1.626 m) 61.7 kg (136 lb) No intake/output data recorded. No intake/output data recorded. EXAM:   
 NEURO-a&o HEENT-wnl LUNGS-clear COR-regular rate and rhythym ABD-soft , no tenderness, no rebound, good bs EXT-no edema Data Review No results for input(s): WBC, HGB, HCT, PLT, HGBEXT, HCTEXT, PLTEXT in the last 72 hours. No results for input(s): NA, K, CL, CO2, BUN, CREA, GLU, PHOS, CA in the last 72 hours. No results for input(s): SGOT, GPT, AP, TBIL, TP, ALB, GLOB, GGT, AML, LPSE in the last 72 hours. No lab exists for component: AMYP, HLPSE No results for input(s): INR, PTP, APTT in the last 72 hours. No lab exists for component: INREXT Assessment:  
· Pancreatic cyst  
 
Patient Active Problem List  
Diagnosis Code  Pancreatitis K85.90  Dyspnea R06.00  Systolic CHF, chronic (HCC) I50.22  
 NSTEMI (non-ST elevated myocardial infarction) (HCC) I21.4  Cough R05  Coronary artery disease of native artery of native heart with stable angina pectoris (HCC) I25.118  
 Pure hypercholesterolemia E78.00  
 Hematoma of rectus sheath S30. Pedro Pablo Austin Plan:  
· Endoscopic procedure with MAC Signed By: Ander Trotter.  Tammie Hernandez MD   
 4/15/2019  3:04 PM

## 2019-04-17 NOTE — Clinical Note
5/12/19 Patient: Quintin Rivas YOB: 1940 Date of Visit: 4/17/2019 Jacinta Litten, MD 
[place] [id number] [facility] [address] [id number] VIA Facsimile: [phone] Dear Jacinta Litten, MD, Thank you for referring Ms. Yamileth Hatfield to [facility] for evaluation. My notes for this consultation are attached. If you have questions, please do not hesitate to call me. I look forward to following your patient along with you.  
 
 
Sincerely, 
 
Evon Lizarraga MD

## 2019-04-17 NOTE — PROGRESS NOTES
Quintin Rivas     1940       Kwadwo Monge MD, [facility]  Date of Visit-4/17/2019   PCP is Jayla Leslie MD   [facility] [place]  [facility]  HPI:  Quintin Rivas is a 70 y.o. female   [facility] of [facility] hospitalized in January with acute systolic HF. EF 20 - 25% with need for inotropes with dobutamine use. Had troponin of 3.2. She had hematoma to the Pelvis and Pancreatitis. F/u echo in 2/4/19 showed EF of 45 - 50%. She had some cough, she had an EUS done by Dr. Kathleen Jaime. Nuclear done 3/11/19 showed EF improved to 59%  and normal perfusion. Overall the pt states she is doing well. Pt has been breathing well. Pt is no longer coughing and her lisinopril was stopped. Pt is present with her son. Pt's son adds that pt has been eating better. Denies chest pain, edema, syncope or shortness of breath at rest, has no tachycardia, palpitations or sense of arrhythmia.    01/25/19   ECHO ADULT FOLLOW-UP OR LIMITED 02/04/2019 2/4/2019    Narrative · Left atrial cavity size is mildly dilated. · Mild to moderate mitral valve regurgitation. · Mild tricuspid valve regurgitation is present. · Estimated left ventricular ejection fraction is 46 - 50%. Visually   measured ejection fraction. Left ventricular global hypokinesis. Signed by: Brenna Belle MD      03/11/19   NUCLEAR CARDIAC STRESS TEST 03/20/2019 3/22/2019    Narrative · Baseline ECG: Normal sinus rhythm. · Negative stress test.  · Gated SPECT: Left ventricular function post-stress was normal.   Calculated ejection fraction is 59%. · Left ventricular perfusion is normal.  · Negative myocardial perfusion imaging. Myocardial perfusion imaging   supports a low risk stress test.  · A pharmacological stress test was performed using regadenoson        Signed by: Brenna Belle MD        Assessment/Plan:     1. Chronic systolic congestive heart failure NYHA Class 1.      Has improved EF all the way back to normal, looks well. Will continue coreg. ACE will stop for cough. At this point will not restart to plan on ARB unless EF drops or BP changes. Continue Coreg  Avoid ACE, use hydralazine-isordil if EF drops again    2. Hx NSTEMI, possible CAD  Elevated troponin with previous Mi suspected CAD with NSTEMI. Whit Howell shows no ischemia she has no angina. Will manage conservatively with BB statin. No ASA due to GI issues though may reconsider in the future. 3. Lipids on high potency statin as appropriate for secondary prevention. 4. Cough due to ACE inhibitor. F/u in 6 months. Can reduce Bumex as needed. Future Appointments   Date Time Provider Jarred Cowan   10/14/2019  8:40 AM Kashif Negrete  [address]      Patient Instructions   Take your Bumex as needed. Weigh yourself daily. Key CAD CHF Meds             rosuvastatin (CRESTOR) 10 mg tablet (Taking) Take 1 Tab by mouth nightly. carvedilol (COREG) 6.25 mg tablet (Taking) Take 1 Tab by mouth two (2) times daily (with meals). bumetanide (BUMEX) 1 mg tablet (Taking) Take  by mouth daily. nitroglycerin (NITROSTAT) 0.4 mg SL tablet (Taking) 1 Tab by SubLINGual route every five (5) minutes as needed for Chest Pain. Up to 3 doses. ROS-except as noted above. . A complete cardiac and respiratory are reviewed and negative except as above ; Resp-denies wheezing  or productive cough,.  Const- No unusual weight loss or fever; Neuro-no recent seizure or CVA ; GI- No BRBPR, abdom pain, bloating ; - no  hematuria   see supplement sheet, initialed and to be scanned by staff  Past Medical History:   Diagnosis Date    Coronary artery disease of native artery of native heart with stable angina pectoris ([facility] [address] 2/28/2019    Cough due to ACE inhibitor     Hematoma of rectus sheath 2/28/2019    IBS (irritable bowel syndrome)     IBS    NSTEMI (non-ST elevated myocardial infarction) ([facility] [address] 2/28/2019    Pancreatitis     Pure hypercholesterolemia 1/48/1321    Systolic CHF, chronic ([address] 2/28/2019      Social Hx= reports that she has quit smoking. She has never used smokeless tobacco. She reports that she drinks alcohol. She reports that she does not use drugs. Exam and Labs:  /60 (BP 1 Location: Left arm, BP Patient Position: Sitting)   Pulse 64   Resp 16   Ht 5' 4\" (1.626 m)   Wt 125 lb (56.7 kg)   SpO2 99%   BMI 21.46 kg/m² Constitutional:  NAD, comfortable  Head: NC,AT. Eyes: No scleral icterus. Neck:  Neck supple. No JVD present. Throat: moist mucous membranes. Chest: Effort normal & normal respiratory excursion . Neurological: alert, conversant and oriented . Skin: Skin is not cold. No obvious systemic rash noted. Not diaphoretic. No erythema. Psychiatric:  Grossly normal mood and affect. Behavior appears normal. Extremities:  no clubbing or cyanosis. Abdomen: non distended    Lungs:breath sounds normal. No stridor. distress, wheezes or  Rales. Heart: normal rate, regular rhythm, normal S1, S2, no murmurs, rubs, clicks or gallops , PMI non displaced. Edema: Edema is none.   Lab Results   Component Value Date/Time    Cholesterol, total 185 01/15/2019 12:29 PM    HDL Cholesterol 52 01/15/2019 12:29 PM    LDL, calculated 109.6 (H) 01/15/2019 12:29 PM    Triglyceride 117 01/15/2019 12:29 PM    CHOL/HDL Ratio 3.6 01/15/2019 12:29 PM     Lab Results   Component Value Date/Time    Sodium 141 02/08/2019 04:45 AM    Potassium 3.6 02/08/2019 04:45 AM    Chloride 103 02/08/2019 04:45 AM    CO2 30 02/08/2019 04:45 AM    Anion gap 8 02/08/2019 04:45 AM    Glucose 100 02/08/2019 04:45 AM    BUN 30 (H) 02/08/2019 04:45 AM    Creatinine 0.92 02/08/2019 04:45 AM    BUN/Creatinine ratio 33 (H) 02/08/2019 04:45 AM    GFR est AA >60 02/08/2019 04:45 AM    GFR est non-AA 59 (L) 02/08/2019 04:45 AM    Calcium 8.7 02/08/2019 04:45 AM      Wt Readings from Last 3 Encounters:   04/17/19 125 lb (56.7 kg)   04/15/19 136 lb (61.7 kg)   03/11/19 136 lb (61.7 kg)      BP Readings from Last 3 Encounters:   04/17/19 110/60   04/15/19 132/73   02/21/19 120/60      Current Outpatient Medications   Medication Sig    rosuvastatin (CRESTOR) 10 mg tablet Take 10 mg by mouth nightly.  carvedilol (COREG) 6.25 mg tablet Take  by mouth two (2) times daily (with meals).  bumetanide (BUMEX) 1 mg tablet Take  by mouth daily.  nitroglycerin (NITROSTAT) 0.4 mg SL tablet 1 Tab by SubLINGual route every five (5) minutes as needed for Chest Pain. Up to 3 doses.  polyethylene glycol (MIRALAX) 17 gram packet Take 1 Packet by mouth daily.  guaiFENesin ER (MUCINEX) 600 mg ER tablet Take 1 Tab by mouth every twelve (12) hours.  pantoprazole (PROTONIX) 40 mg tablet Take 1 Tab by mouth two (2) times a day. No current facility-administered medications for this visit. Impression see above.       Written by Danika Delvalle, as dictated by Jaden Pandey MD.

## 2019-04-17 NOTE — PROGRESS NOTES
Visit Vitals /60 (BP 1 Location: Left arm, BP Patient Position: Sitting) Pulse 64 Resp 16 Ht 5' 4\" (1.626 m) Wt 125 lb (56.7 kg) SpO2 99% BMI 21.46 kg/m²

## 2019-04-17 NOTE — PROGRESS NOTES
Requested Prescriptions     Signed Prescriptions Disp Refills    rosuvastatin (CRESTOR) 10 mg tablet 90 Tab 1     Sig: Take 1 Tab by mouth nightly.  carvedilol (COREG) 6.25 mg tablet 180 Tab 1     Sig: Take 1 Tab by mouth two (2) times daily (with meals).        Last office visit 4/17.19    Per Dr. Eliseo Brower   Date Time Provider [facility] [place]   10/14/2019  8:40 AM Sacha Garza  [address]

## 2019-05-12 PROBLEM — K85.90 PANCREATITIS: Status: RESOLVED | Noted: 2019-01-01 | Resolved: 2019-01-01

## 2019-05-12 PROBLEM — R05.8 COUGH DUE TO ACE INHIBITOR: Status: ACTIVE | Noted: 2019-01-01

## 2019-05-12 PROBLEM — T46.4X5A COUGH DUE TO ACE INHIBITOR: Status: ACTIVE | Noted: 2019-01-01

## 2019-09-04 NOTE — TELEPHONE ENCOUNTER
Request for carvedilol 6.25mg BID. Last office visit 4-17-19, next office visit 10-14-19.  Refills per verbal order from Dr. Gilbert Borja.

## 2019-09-14 PROBLEM — K85.90 ACUTE PANCREATITIS: Status: ACTIVE | Noted: 2019-01-01

## 2019-09-14 PROBLEM — K92.2 GI BLEED: Status: ACTIVE | Noted: 2019-01-01

## 2019-09-14 PROBLEM — N17.9 AKI (ACUTE KIDNEY INJURY) (HCC): Status: ACTIVE | Noted: 2019-01-01

## 2019-09-14 PROBLEM — K92.0 COFFEE GROUND EMESIS: Status: ACTIVE | Noted: 2019-01-01

## 2019-09-14 PROBLEM — E87.6 HYPOKALEMIA: Status: ACTIVE | Noted: 2019-01-01

## 2019-09-14 NOTE — PROGRESS NOTES
Admission Medication Reconciliation:    Information obtained from:  Patient  RxQuery data available¹:  YES    Comments    1)  Patient was a good historian and able to recognize meds by name. 2)  Medication changes (since last review): Added  - none    Adjusted  - Miralax and Mucinex (patient takes both when needed)  -Bumex (takes 1mg daily)    Removed  - none    3) last home doses were successfully taken yesterday     4) patient currently does NOT have Nitrostat SL tablet; encouraged patient to talk to PCP for a new prescription. ¹RxQuery pharmacy benefit data reflects medications filled and processed through the patient's insurance, however   this data does NOT capture whether the medication was picked up or is currently being taken by the patient. Allergies:  Penicillins    Significant PMH/Disease States:   Past Medical History:   Diagnosis Date    Coronary artery disease of native artery of native heart with stable angina pectoris ([facility] [address] 2019    Cough due to ACE inhibitor     Hematoma of rectus sheath 2019    IBS (irritable bowel syndrome)     IBS    NSTEMI (non-ST elevated myocardial infarction) ([facility] [address] 2019    Pancreatitis     Pure hypercholesterolemia     Systolic CHF, chronic ([facility] [address] 2019     Chief Complaint for this Admission:    Chief Complaint   Patient presents with    Vomiting    Shortness of Breath     Prior to Admission Medications:   Prior to Admission Medications   Prescriptions Last Dose Informant Patient Reported? Taking? bumetanide (BUMEX) 1 mg tablet   Yes Yes   Sig: Take 1 mg by mouth daily. carvedilol (COREG) 6.25 mg tablet   No Yes   Sig: Take 1 Tab by mouth two (2) times daily (with meals). guaiFENesin ER (MUCINEX) 600 mg ER tablet   Yes Yes   Sig: Take 600 mg by mouth two (2) times daily as needed for Congestion.    nitroglycerin (NITROSTAT) 0.4 mg SL tablet Not Taking at Unknown time  No No   Si Tab by SubLINGual route every five (5) minutes as needed for Chest Pain. Up to 3 doses. pantoprazole (PROTONIX) 40 mg tablet 9/13/2019 at Unknown time  No Yes   Sig: Take 1 Tab by mouth two (2) times a day. polyethylene glycol (MIRALAX) 17 gram packet   Yes Yes   Sig: Take 17 g by mouth daily as needed. rosuvastatin (CRESTOR) 10 mg tablet   No Yes   Sig: Take 1 Tab by mouth nightly. Facility-Administered Medications: None       Please contact the main inpatient pharmacy with any questions or concerns at [phone] and we will direct you to the clinical pharmacist covering this patient's care while in-house.    Hardeep Lopez, PHARMD

## 2019-09-14 NOTE — ACP (ADVANCE CARE PLANNING)
Advance Care Planning Note    Name: Dory March  YOB: 1940  MRN: [id number]  Admission Date: 9/14/2019  3:00 PM    Date of discussion: 9/14/2019    Active Diagnoses:    Hospital Problems  Date Reviewed: 9/14/2019          Codes Class Noted POA    Acute pancreatitis ICD-10-CM: K85.90  ICD-9-CM: 252.6  9/14/2019 Yes        TOÑA (acute kidney injury) [facility]) ICD-10-CM: N17.9  ICD-9-CM: 584.9  9/14/2019 Yes        Hypokalemia ICD-10-CM: E87.6  ICD-9-CM: 276.8  9/14/2019 Yes        GI bleed ICD-10-CM: K92.2  ICD-9-CM: 578.9  9/14/2019 Yes        Coffee ground emesis ICD-10-CM: K92.0  ICD-9-CM: 578.0  9/14/2019 Yes               These active diagnoses are of sufficient risk that focused discussion on advance care planning is indicated in order to allow the patient to thoughtfully consider personal goals of care, and if situations arise that prevent the ability to personally give input, to ensure appropriate representation of their personal desires for different levels and aggressiveness of care. Discussion:     Persons present and participating in discussion: Dory March, patient's sister Leanna Mobley and Ifeoma Werner MD.    Discussion: Addressed decompensated medical problems, Co-morbidities, Advance Directives, Prognosis and confirmation of a surrogate decision Maker. Time Spent:     Total time spent face-to-face in education and discussion: 17 minutes.      Ifeoma Werner MD  9/14/2019  6:04 PM

## 2019-09-14 NOTE — ROUTINE PROCESS
TRANSFER - OUT REPORT:    Verbal report given to jani RN(name) on Stormy Hughes  being transferred to (unit) for routine progression of care       Report consisted of patients Situation, Background, Assessment and   Recommendations(SBAR). Information from the following report(s) SBAR, Kardex, ED Summary, [facility] and Recent Results was reviewed with the receiving nurse. Lines:   Peripheral IV 09/14/19 Left Antecubital (Active)        Opportunity for questions and clarification was provided.       Patient transported with:   [employer]

## 2019-09-14 NOTE — ED PROVIDER NOTES
70 y.o. female with past medical history significant for pancreatitis, NSTEMI, CAD, hypercholesterolemia, CHF, and IBS who presents from home via private vehicle accompanied by sister with chief complaint of vomiting. Pt began with nausea and black vomiting 3 days ago. She also c/o chest pressure and back pain. She has not had BM for about 1 week despite taking Miralax. Pt specifically denies fever, dysuria, changes in urinary frequency, and any other pain or symptoms. There are no other acute medical concerns at this time. Social hx: Former tobacco smoker; Occasional EtOH use; Denies illicit drug use  PCP: Xin Stahl MD    Note written by Zahira Ruth, as dictated by Cirilo Cisneros MD 3:22 PM    The history is provided by the patient, a relative and medical records. No  was used.         Past Medical History:   Diagnosis Date    Coronary artery disease of native artery of native heart with stable angina pectoris ([facility] [address] 2/28/2019    Cough due to ACE inhibitor     Hematoma of rectus sheath 2/28/2019    IBS (irritable bowel syndrome)     IBS    NSTEMI (non-ST elevated myocardial infarction) ([address] 2/28/2019    Pancreatitis     Pure hypercholesterolemia 3/14/5590    Systolic CHF, chronic ([address] 2/28/2019       Past Surgical History:   Procedure Laterality Date    COLONOSCOPY Left 11/1/2017    COLONOSCOPY performed by Daniel Claros MD at [facility] ENDOSCOPY    HX APPENDECTOMY      HX CHOLECYSTECTOMY      HX GI      surgery for hiatal hernia    HX ORTHOPAEDIC      DJD, doing PT weekly through [id number] [facility]  1/30/2019         Family History:   Problem Relation Age of Onset    Dementia Mother         alzheimers    Cancer Sister         gallbladder    Cancer Brother         pancreatic    Breast Cancer Paternal Grandmother     Dementia Sister         alzheimers    Heart Surgery Brother         bypass       Social History     Socioeconomic History    Marital status:      Spouse name: Not on file    Number of children: Not on file    Years of education: Not on file    Highest education level: Not on file   Occupational History    Not on file   Social Needs    Financial resource strain: Not on file    Food insecurity:     Worry: Not on file     Inability: Not on file    Transportation needs:     Medical: Not on file     Non-medical: Not on file   Tobacco Use    Smoking status: Former Smoker    Smokeless tobacco: Never Used    Tobacco comment: quit smoking cigarettes 6 yrs ago   Substance and Sexual Activity    Alcohol use: Yes     Comment: very occasional    Drug use: No    Sexual activity: Not on file   Lifestyle    Physical activity:     Days per week: Not on file     Minutes per session: Not on file    Stress: Not on file   Relationships    Social connections:     Talks on phone: Not on file     Gets together: Not on file     Attends Denominational service: Not on file     Active member of club or organization: Not on file     Attends meetings of clubs or organizations: Not on file     Relationship status: Not on file    Intimate partner violence:     Fear of current or ex partner: Not on file     Emotionally abused: Not on file     Physically abused: Not on file     Forced sexual activity: Not on file   Other Topics Concern    Not on file   Social History Narrative    Not on file         ALLERGIES: Penicillins    Review of Systems   Constitutional: Negative for activity change, appetite change, fatigue and fever. HENT: Negative for ear pain, facial swelling, sore throat and trouble swallowing. Eyes: Negative for pain, discharge and visual disturbance. Respiratory: Negative for chest tightness, shortness of breath and wheezing. Cardiovascular: Positive for chest pain. Negative for palpitations. Gastrointestinal: Positive for constipation, nausea and vomiting. Negative for abdominal pain and blood in stool. Genitourinary: Negative for difficulty urinating, dysuria, flank pain, frequency and hematuria. Musculoskeletal: Positive for back pain. Negative for arthralgias, joint swelling, myalgias and neck pain. Skin: Negative for color change and rash. Neurological: Negative for dizziness, weakness, numbness and headaches. Hematological: Negative for adenopathy. Does not bruise/bleed easily. Psychiatric/Behavioral: Negative for behavioral problems, confusion and sleep disturbance. All other systems reviewed and are negative. Vitals:    09/14/19 1349   BP: 100/49   Pulse: (!) 115   Resp: 28   Temp: 97.9 °F (36.6 °C)   SpO2: 97%            Physical Exam   Constitutional: She is oriented to person, place, and time. She appears well-developed and well-nourished. No distress. NAD   HENT:   Head: Normocephalic and atraumatic. Nose: Nose normal.   Mouth/Throat: Oropharynx is clear and moist and mucous membranes are normal.   MMM   Eyes: Pupils are equal, round, and reactive to light. Conjunctivae and EOM are normal. No scleral icterus. Neck: Normal range of motion. Neck supple. No JVD present. No tracheal deviation present. No thyromegaly present. No carotid bruits noted. Cardiovascular: Normal rate, regular rhythm, normal heart sounds and intact distal pulses. Exam reveals no gallop and no friction rub. No murmur heard. RRR   Pulmonary/Chest: Effort normal and breath sounds normal. No respiratory distress. She has no wheezes. She has no rales. She exhibits no tenderness. Lungs clear. Abdominal: Soft. Bowel sounds are normal. She exhibits no distension and no mass. There is no tenderness. There is no rebound and no guarding. Abdomen is benign. Genitourinary:   Genitourinary Comments: No blood in stool on rectal exam.    Musculoskeletal: Normal range of motion. She exhibits no edema or tenderness. Lymphadenopathy:     She has no cervical adenopathy.    Neurological: She is alert and oriented to person, place, and time. She has normal reflexes. No cranial nerve deficit. Coordination normal.   Skin: Skin is warm and dry. No rash noted. No erythema. Psychiatric: She has a normal mood and affect. Her behavior is normal. Judgment and thought content normal.   Nursing note and vitals reviewed. Note written by Zahira Nunn, as dictated by Shreya Gordon MD 3:22 PM    MDM  Number of Diagnoses or Management Options  Acute pancreatitis, unspecified complication status, unspecified pancreatitis type: new and requires workup  Blood in stool: new and requires workup  Non-intractable cyclical vomiting with nausea: new and requires workup     Amount and/or Complexity of Data Reviewed  Clinical lab tests: ordered and reviewed  Tests in the radiology section of CPT®: ordered and reviewed  Decide to obtain previous medical records or to obtain history from someone other than the patient: yes  Review and summarize past medical records: yes  Discuss the patient with other providers: yes  Independent visualization of images, tracings, or specimens: yes    Risk of Complications, Morbidity, and/or Mortality  Presenting problems: high  Diagnostic procedures: high  Management options: high    Patient Progress  Patient progress: stable         Procedures      Hospitalist Kimi for Admission  5:01 PM    ED Room Number: ER15/15  Patient Name and age:  Michelle Wilder 70 y.o.  female  Working Diagnosis:   1. Blood in stool    2. Non-intractable cyclical vomiting with nausea    3.  Acute pancreatitis, unspecified complication status, unspecified pancreatitis type      Readmission: no  Isolation Requirements:  no  Recommended Level of Care:  med/surg  Code Status:  Full Code  Department:[facility] Adult ED - 21   Other:

## 2019-09-14 NOTE — ED TRIAGE NOTES
Patient complains of shortness of breath for 2 days. She also has been vomiting for 2 week. She reports black vomit. No bowel movement in 2 weeks. History of pancreatitis and CHF. ADDENDUM: Pt states it's been a week or more since she has had a bowel movement; pt states she drank Miralax but that hasn't done anything.

## 2019-09-14 NOTE — H&P
[address] [place]   HISTORY AND PHYSICAL    Name:  Carola Matthews  MR#:  [id number]  :  1940  ACCOUNT #:  [de-identified]  ADMIT DATE:  2019      PRIMARY CARE PHYSICIAN:  Imtiaz Daniel MD    PRIMARY GASTROENTEROLOGIST:  Kiara Fitzpatrick MD    PRIMARY CARDIOLOGIST:  Dafne Yang MD    CHIEF COMPLAINT: Nausea, coffee-grounds vomitus, and abdominal pain. HISTORY OF PRESENT ILLNESS:  A 62-year-old female with a past medical history significant for coronary artery disease, non-ST-elevation myocardial infarction, dyslipidemia, chronic systolic congestive heart failure, irritable bowel syndrome and pancreatitis, was brought to the emergency room from home via private vehicle accompanied by her sister for complaints of an approximately 3-day history of progressively worsening nausea and coffee-grounds vomitus. The patient also complained of some intermittent mild abdominal pain with some radiation to the upper back. The patient described the pain as being dull achy in nature, at times about 6-7 on 10 in intensity. The patient denied associated headaches, dizziness, visual deficits, chest pains, palpitations, shortness of breath, cough, fevers, chills, hematuria, bladder or bowel irregularities. PAST MEDICAL HISTORY:  1. Coronary artery disease. 2.  Non-ST-elevation myocardial infarction. 3.  Dyslipidemia. 4.  Systolic congestive heart failure. 5.  Pancreatitis. 6.  Irritable bowel syndrome. PAST SURGICAL HISTORY:  1. Colonoscopy. 2.  Appendectomy. 3.  Cholecystectomy. 4.  Hiatal hernia repair. HOME MEDICATIONS:  1. Bumex 1 mg p.o. daily. 2.  Coreg 6.25 mg p.o. twice a day. 3.  Mucinex 600 mg p.o. twice a day as needed for congestion. 4.  Nitroglycerin 0.4 mg sublingual every 5 minutes as needed for chest pain. 5.  Protonix 40 mg 1 tablet p.o. twice a day. 6.  MiraLax 17 g p.o. daily as needed for constipation. 7.  Rosuvastatin 10 mg p.o. nightly.     ALLERGIES: PENICILLIN, THE PATIENT DEVELOPS RASHES AND HIVES. FAMILY HISTORY:  Significant for Alzheimer's disease on the maternal side of the family, arteriosclerosis on the paternal side of the family. Maternal sister who  from complications of gallbladder cancer and a maternal brother who  from complications of prostate cancer. REVIEW OF SYSTEMS:  A complete system review conducted essentially negative, otherwise as outlined in the history of the presenting illness. PHYSICAL EXAMINATION:  GENERAL:  The patient was awake, alert, not in any distress. VITAL SIGNS:  Blood pressure of 100/49, heart rate of 115, respirations of 28, afebrile, saturating 97% on room air. HEENT:  Head exam, normocephalic. Pupils equal and reactive to light without any nystagmus, scleral icterus or conjunctival pallor. ENT exam:  Dry buccal mucosa. NECK:  No jugular venous distention or carotid bruits. CARDIOVASCULAR:  S1, S2 present without any detectable murmurs. RESPIRATORY:  Lungs with decreased air entry at both bases without any crepitations or rhonchi. GI:  Abdomen is soft with minimal tenderness on deep palpation, more so in the epigastric region. No rebound, no guarding, no organomegaly. Bowel sounds present. GENITOURINARY:  No suprapubic or flank tenderness. MUSCULOSKELETAL:  No asymmetry of the extremities. About 1+ bipedal edema. CNS:  The patient is awake, alert, oriented to time, date, place, person. LABORATORY/RADIOGRAPHIC FINDINGS:  Metabolic panel with a sodium of 142, potassium 3.4, chloride 95, bicarbonate of 36, a BUN of 35, creatinine 2.41, calcium of 10, albumin of 4, total bilirubin 0.7, AST of 22, ALT of 73, amylase 149, lipase 1012, alkaline phosphatase 144. ProBNP of 241. CBC with a WBC of 10.6, 77% polys, hemoglobin 13, hematocrit 41.3, a platelet count of 590. Urinalysis pending. A 12-lead EKG personally reviewed with sinus tachycardia at 101 per minute.   No acute ST or T-wave abnormalities. A 2-D echocardiogram reviewed from 02/04/2019 that showed an ejection fraction of 46-50%, normal cavity size and wall thickness. Occult blood stool positive. ASSESSMENT AND PLAN:  1. Gastrointestinal:  The patient will be admitted to the inpatient level for management of acute on chronic recurrent pancreatitis of unclear etiology. History of cholecystectomy. Will need to evaluate for possible gastrointestinal bleed. In view of the moderately elevated lipase level, nausea, vomiting and abdominal pain, we will obtain further evaluation with an abdominal ultrasound, check a lipid profile, initiate lactated Ringer's IV fluids, antiemetics, analgesics and consult Gastroenterology service for further recommendations. 2.  Renal:  Acute kidney injury probably due to some acute tubular necrosis, dehydration and medication side effect. We will hold all nephrotoxic medications including diuretics, check a urine for sodium and creatinine, obtain a renal ultrasound, IV fluids, trend BUN and creatinine. As needed Nephrology consult. 3.  Electrolytes:  Mild hypokalemia. We will replete and follow up level. 4.  Cardiovascular:  Coronary artery disease. History of non-ST-elevation myocardial infarction. Dyslipidemia. Chronic systolic congestive heart failure without any acute exacerbation. No evidence of acute ischemia by EKG. We will continue current cardiac medications and consult Cardiology as needed. 5.  Prophylaxis for gastrointestinal with Protonix and for deep venous thrombosis with sequential compression devices in view of the occult positive blood with probable gastrointestinal bleed. 6.  Directives:  Do not resuscitate, do not intubate status with a guarded prognosis. Discussed with the patient.     In summary, a 82-year-old female with multiple medical problems including chronic pancreatitis, irritable bowel syndrome, coronary artery disease, non-ST-elevation myocardial infarction, and dyslipidemia, is being admitted to the inpatient level for further management of acute on chronic recurrent pancreatitis, probable GI bleed with positive for occult blood in stool, acute kidney injury, and hypokalemia. This patient is at increased risk for further decompensation and will benefit from inpatient management including with bowel rest, IV hydration with lactated Ringer's, repletion of electrolytes, antiemetics, analgesics, Gastroenterology and probable Nephrology consult. The entire admission plans discussed in detail with the patient and the patient's sister Marla West who was at bedside and can be reached at cell [phone] as well as the patient's daughter Laure Case who can be reached at cell [phone]. All questions and concerns were addressed. The patient's functional status prior to admission significant for the patient being able to ambulate unassisted. Total time for admission 45 minutes, of which at least 50% of the time was spent in plan of care and counseling of the patient.       Ava Rivera MD      [id number]  D:  09/14/2019 18:01  T:  09/14/2019 18:12  JOB #:  [id number]

## 2019-09-15 NOTE — ROUTINE PROCESS
Bedside shift change report given to Rajeev (oncoming nurse) by Calvin Arndt (offgoing nurse). Report included the following information SBAR, Kardex, MAR and Recent Results.

## 2019-09-15 NOTE — PROGRESS NOTES
Problem: Falls - Risk of  Goal: *Absence of Falls  Description  Document Tania Stubbs Fall Risk and appropriate interventions in the flowsheet.   Outcome: Progressing Towards Goal  Note:   Fall Risk Interventions: sit at edge of bed prior to ambulation

## 2019-09-15 NOTE — PROGRESS NOTES
Hospitalist Progress Note                               Elliott Holt MD                                     Answering service: [phone]                               OR 36 from in house phone                                         Date of Service:  9/15/2019  NAME:  Michelle Wilder  :  1940  MRN:  [id number]      Admission Summary:     70-year-old female with a past medical history significant for coronary artery disease, non-ST-elevation myocardial infarction, dyslipidemia, chronic systolic congestive heart failure, irritable bowel syndrome and pancreatitis, was brought to the emergency room from home via private vehicle accompanied by her sister for complaints of an approximately 3-day history of progressively worsening nausea and coffee-grounds vomitus. The patient also complained of some intermittent mild abdominal pain with some radiation to the upper back. The patient described the pain as being dull achy in nature, at times about 6-7 on 10 in intensity. The patient denied associated headaches, dizziness, visual deficits, chest pains, palpitations, shortness of breath, cough, fevers, chills, hematuria, bladder or bowel irregularities. Reason for follow up:       CC:Nausea, coffee-grounds vomitus. Patient appeared comfortable lying in bed on early am rounds. Denied any worsening of the presenting symptoms. Denied any fevers or chills. Assessment & Plan:     1) GI: Acute on chronic recurrent Pancreatitis of unclear etiology. H/O cholecystectomy. Normal triglyceride level. R/O GI bleed with coffee-grounds vomitus and positive occult stool. Continue NPO status, lactated Ringer's IVFs, anti-emetics, analgesics. Abdominal US and GI consult. 2) Hematology: Probable normochromic normocytic anemia. 2) Renal: TOÑA probably due to some acute tubular necrosis, dehydration and medication side effect ( Bumex).  Hold Nephrotoxic medications, Renal US, trend BUN/Cr. As needed Nephrology consult. 3) Electrolytes: Resolved Hypokalemia. 4) CVS: CAD. H/O NSTEMI. Dyslipidemia. Chronic systolic CHF without any acute exarcerbation. No evidence of acute ischemia. Continue cardiac meds. 5) ID: Symptomatic UTI without any sepsis. Urine culture and empiric Ceftriaxone. 6) Prophylaxis: GI and DVT (SCDs due to probable GI bleed). 7) Directives: DNR/DNI with a guarded prognosis. D/W patient and family. 8) Plan: Anticipate D/C to home in 3-5 days. D/W patient, patient's sister Nisa Friend ([phone]) and patient's daughter Rolando Bernal ([phone]). Hospital Problems  Date Reviewed: 9/14/2019          Codes Class Noted POA    Acute pancreatitis ICD-10-CM: K85.90  ICD-9-CM: 665.1  9/14/2019 Yes        TOÑA (acute kidney injury) ([address] ICD-10-CM: N17.9  ICD-9-CM: 584.9  9/14/2019 Yes        Hypokalemia ICD-10-CM: E87.6  ICD-9-CM: 276.8  9/14/2019 Yes        GI bleed ICD-10-CM: K92.2  ICD-9-CM: 578.9  9/14/2019 Yes        Coffee ground emesis ICD-10-CM: K92.0  ICD-9-CM: 578.0  9/14/2019 Yes                Physical Examination:      Last 24hrs VS reviewed since prior progress note. Most recent are:  Visit Vitals  /87 (BP 1 Location: Right arm, BP Patient Position: At rest)   Pulse 80   Temp 98.3 °F (36.8 °C)   Resp 16   Wt 58.2 kg (128 lb 3.2 oz)   SpO2 92%   BMI 22.01 kg/m²           Constitutional:  No acute distress, cooperative, pleasant    HEENT: Head is a traumatic,  Un icteric sclera. Pink conjunctiva,no erythema or discharge. Oral mucous moist, oropharynx benign. Neck supple,    Resp:  decreased air entry bibasilarly. CV:  S1, S2 present. GI:  Soft, non distended, non tender. normoactive bowel sounds, no hepatosplenomegaly    :  No CVA or suprapubic tenderness   Skin  :  No erythema,rash,bullae,dipigmentation     Musculoskeletal:  Bipedal edema. Neurologic:  Awake ,alert and oriented. No intake or output data in the 24 hours ending 09/15/19 1002           Labs:     Recent Labs     09/15/19  0437 09/14/19  1357   WBC 9.5 10.6   HGB 11.1* 13.0   HCT 35.1 41.3    251     Recent Labs     09/15/19  0437 09/14/19  1357    142   K 3.6 3.4*    95*   CO2 32 36*   BUN 40* 35*   CREA 2.09* 2.41*   * 167*   CA 8.9 10.0     Recent Labs     09/15/19  0437 09/14/19  1357   SGOT 15 22   ALT 51 73    144*   TBILI 0.7 0.7   TP 6.3* 7.8   ALB 3.2* 4.0   GLOB 3.1 3.8   AML  --  149*   LPSE  --  1,012*     No results for input(s): INR, PTP, APTT in the last 72 hours. No lab exists for component: INREXT   Recent Labs     09/14/19  1357   TIBC 344   PSAT 18*      No results found for: FOL, RBCF   No results for input(s): PH, PCO2, PO2 in the last 72 hours. No results for input(s): CPK, CKNDX, TROIQ in the last 72 hours.     No lab exists for component: CPKMB  Lab Results   Component Value Date/Time    Cholesterol, total 85 09/15/2019 04:37 AM    HDL Cholesterol 43 09/15/2019 04:37 AM    LDL, calculated 29 09/15/2019 04:37 AM    Triglyceride 65 09/15/2019 04:37 AM    CHOL/HDL Ratio 2.0 09/15/2019 04:37 AM     Lab Results   Component Value Date/Time    Glucose (POC) 102 (H) 01/18/2019 06:23 AM    Glucose (POC) 139 (H) 01/17/2019 09:05 PM    Glucose (POC) 114 (H) 01/17/2019 04:31 PM    Glucose (POC) 106 (H) 01/17/2019 11:41 AM    Glucose (POC) 132 (H) 01/16/2019 09:13 PM     Lab Results   Component Value Date/Time    Color YELLOW/STRAW 09/15/2019 12:33 AM    Appearance CLOUDY (A) 09/15/2019 12:33 AM    Specific gravity 1.019 09/15/2019 12:33 AM    pH (UA) 6.0 09/15/2019 12:33 AM    Protein 100 (A) 09/15/2019 12:33 AM    Glucose NEGATIVE  09/15/2019 12:33 AM    Ketone NEGATIVE  09/15/2019 12:33 AM    Bilirubin NEGATIVE  09/15/2019 12:33 AM    Urobilinogen 0.2 09/15/2019 12:33 AM    Nitrites NEGATIVE  09/15/2019 12:33 AM    Leukocyte Esterase SMALL (A) 09/15/2019 12:33 AM    Epithelial cells MODERATE (A) 09/15/2019 12:33 AM    Bacteria 2+ (A) 09/15/2019 12:33 AM    WBC 10-20 09/15/2019 12:33 AM    RBC 0-5 09/15/2019 12:33 AM         Medications Reviewed:     Current Facility-Administered Medications   Medication Dose Route Frequency    cefTRIAXone (ROCEPHIN) 1 g in 0.9% sodium chloride (MBP/ADV) 50 mL  1 g IntraVENous Q24H    sodium chloride (NS) flush 5-40 mL  5-40 mL IntraVENous Q8H    sodium chloride (NS) flush 5-40 mL  5-40 mL IntraVENous PRN    acetaminophen (TYLENOL) tablet 650 mg  650 mg Oral Q4H PRN    ondansetron (ZOFRAN) injection 4 mg  4 mg IntraVENous Q4H PRN    lactated Ringers infusion  100 mL/hr IntraVENous CONTINUOUS    carvedilol (COREG) tablet 6.25 mg  6.25 mg Oral BID WITH MEALS    pantoprazole (PROTONIX) 40 mg in sodium chloride 0.9% 10 mL injection  40 mg IntraVENous Q12H     ______________________________________________________________________  EXPECTED LENGTH OF STAY: - - -  ACTUAL LENGTH OF STAY:          1                 Sterling Wright MD

## 2019-09-15 NOTE — PROGRESS NOTES
Problem: Falls - Risk of  Goal: *Absence of Falls  Description  Document Valeria Arreaga Fall Risk and appropriate interventions in the flowsheet.   Outcome: Progressing Towards Goal  Note:   Fall Risk Interventions:            Medication Interventions: Evaluate medications/consider consulting pharmacy, Patient to call before getting OOB                   Problem: Patient Education: Go to Patient Education Activity  Goal: Patient/Family Education  Outcome: Progressing Towards Goal

## 2019-09-16 NOTE — PROGRESS NOTES
Problem: Falls - Risk of  Goal: *Absence of Falls  Description  Document [facility] Fall Risk and appropriate interventions in the flowsheet.   Outcome: Progressing Towards Goal  Note:   Fall Risk Interventions:            Medication Interventions: Patient to call before getting OOB, Teach patient to arise slowly         Problem: Patient Education: Go to Patient Education Activity  Goal: Patient/Family Education  Outcome: Progressing Towards Goal     Problem: Pain  Goal: *Control of Pain  Outcome: Progressing Towards Goal     Problem: Patient Education: Go to Patient Education Activity  Goal: Patient/Family Education  Outcome: Progressing Towards Goal

## 2019-09-16 NOTE — PROGRESS NOTES
Bedside shift change report given to Fadumo Batista (oncoming nurse) by Varun Mason RN (offgoing nurse). Report included the following information SBAR and Kardex.

## 2019-09-16 NOTE — PROGRESS NOTES

## 2019-09-16 NOTE — PROGRESS NOTES
TRANSFER - OUT REPORT:    Verbal report given to cristhian(name) on Solomon Becerra  being transferred to ENDO(unit) for routine progression of care       Report consisted of patients Situation, Background, Assessment and   Recommendations(SBAR). Information from the following report(s) SBAR, Kardex and MAR was reviewed with the receiving nurse. Lines:   Peripheral IV 09/14/19 Left Antecubital (Active)   Site Assessment Clean, dry, & intact 9/16/2019 12:29 PM   Phlebitis Assessment 0 9/16/2019 12:29 PM   Infiltration Assessment 0 9/16/2019 12:29 PM   Dressing Status Clean, dry, & intact 9/16/2019 12:29 PM   Dressing Type Transparent 9/16/2019 12:29 PM   Hub Color/Line Status Infusing;Pink 9/16/2019 12:29 PM   Action Taken Open ports on tubing capped 9/15/2019 11:24 PM   Alcohol Cap Used Yes 9/16/2019 12:29 PM       Peripheral IV 09/16/19 Anterior; Left Forearm (Active)   Site Assessment Clean, dry, & intact 9/16/2019 12:29 PM   Phlebitis Assessment 0 9/16/2019 12:29 PM   Infiltration Assessment 0 9/16/2019 12:29 PM   Dressing Status Clean, dry, & intact 9/16/2019 12:29 PM   Dressing Type Transparent 9/16/2019 12:29 PM   Hub Color/Line Status Pink 9/16/2019 12:29 PM   Alcohol Cap Used Yes 9/16/2019 12:29 PM        Opportunity for questions and clarification was provided.       Patient transported with:

## 2019-09-16 NOTE — PROGRESS NOTES
1615 TRANSFER - IN REPORT:    Verbal report received from Livier(name) on Stormy Hughes  being received from [facility](unit) for routine progression of care      Report consisted of patients Situation, Background, Assessment and   Recommendations(SBAR). Information from the following report(s) SBAR, Kardex and MAR was reviewed with the receiving nurse. Opportunity for questions and clarification was provided. Assessment completed upon patients arrival to unit and care assumed.

## 2019-09-16 NOTE — PROGRESS NOTES
Problem: Falls - Risk of  Goal: *Absence of Falls  Description  Document Syd Valenzuela Fall Risk and appropriate interventions in the flowsheet.   Outcome: Progressing Towards Goal  Note:   Fall Risk Interventions:            Medication Interventions: Patient to call before getting OOB

## 2019-09-16 NOTE — H&P
[address], [address] [place] [address]          Pre-procedure History and Physical       NAME:  Rosiland Primrose   :   1940   MRN:   [id number]     CHIEF COMPLAINT/HPI: See procedure note    PMH:  Past Medical History:   Diagnosis Date    Coronary artery disease of native artery of native heart with stable angina pectoris ([facility] 2019    Cough due to ACE inhibitor     Hematoma of rectus sheath 2019    IBS (irritable bowel syndrome)     IBS    NSTEMI (non-ST elevated myocardial infarction) ([facility] [facility] 2019    Pancreatitis     Pure hypercholesterolemia     Systolic CHF, chronic ([facility] [facility] 2019       PSH:  Past Surgical History:   Procedure Laterality Date    COLONOSCOPY Left 2017    COLONOSCOPY performed by Antonia Lechuga MD at [facility] ENDOSCOPY    HX APPENDECTOMY      HX CHOLECYSTECTOMY      HX GI      surgery for hiatal hernia    HX ORTHOPAEDIC      DJD, doing PT weekly through [address]  2019       Allergies: Allergies   Allergen Reactions    Penicillins Hives       Home Medications:  Prior to Admission Medications   Prescriptions Last Dose Informant Patient Reported? Taking? bumetanide (BUMEX) 1 mg tablet   Yes Yes   Sig: Take 1 mg by mouth daily. carvedilol (COREG) 6.25 mg tablet   No Yes   Sig: Take 1 Tab by mouth two (2) times daily (with meals). guaiFENesin ER (MUCINEX) 600 mg ER tablet   Yes Yes   Sig: Take 600 mg by mouth two (2) times daily as needed for Congestion. nitroglycerin (NITROSTAT) 0.4 mg SL tablet Not Taking at Unknown time  No No   Si Tab by SubLINGual route every five (5) minutes as needed for Chest Pain. Up to 3 doses. pantoprazole (PROTONIX) 40 mg tablet 2019 at Unknown time  No Yes   Sig: Take 1 Tab by mouth two (2) times a day. polyethylene glycol (MIRALAX) 17 gram packet   Yes Yes   Sig: Take 17 g by mouth daily as needed.    rosuvastatin (CRESTOR) 10 mg tablet   No Yes   Sig: Take 1 Tab by mouth nightly.       Facility-Administered Medications: None       Hospital Medications:  Current Facility-Administered Medications   Medication Dose Route Frequency    guaiFENesin ER (MUCINEX) tablet 600 mg  600 mg Oral BID PRN    rosuvastatin (CRESTOR) tablet 10 mg  10 mg Oral QHS    bumetanide (BUMEX) tablet 0.5 mg  0.5 mg Oral DAILY    potassium chloride (KLOR-CON) packet for solution 20 mEq  20 mEq Oral BID WITH MEALS    polyethylene glycol (MIRALAX) packet 17 g  17 g Oral DAILY    0.9% sodium chloride infusion  150 mL/hr IntraVENous CONTINUOUS    sodium chloride (NS) flush 5-40 mL  5-40 mL IntraVENous Q8H    sodium chloride (NS) flush 5-40 mL  5-40 mL IntraVENous PRN    midazolam (VERSED) injection 0.25-5 mg  0.25-5 mg IntraVENous Multiple    fentaNYL citrate (PF) injection 200 mcg  200 mcg IntraVENous Multiple    simethicone (MYLICON) 84EU/0.3OX oral drops 80 mg  1.2 mL Oral Multiple    atropine injection 0.5 mg  0.5 mg IntraVENous ONCE PRN    EPINEPHrine (ADRENALIN) 0.1 mg/mL syringe 1 mg  1 mg Endoscopically ONCE PRN    cefTRIAXone (ROCEPHIN) 1 g in 0.9% sodium chloride (MBP/ADV) 50 mL  1 g IntraVENous Q24H    sodium chloride (NS) flush 5-40 mL  5-40 mL IntraVENous Q8H    sodium chloride (NS) flush 5-40 mL  5-40 mL IntraVENous PRN    acetaminophen (TYLENOL) tablet 650 mg  650 mg Oral Q4H PRN    ondansetron (ZOFRAN) injection 4 mg  4 mg IntraVENous Q4H PRN    lactated Ringers infusion  50 mL/hr IntraVENous CONTINUOUS    carvedilol (COREG) tablet 6.25 mg  6.25 mg Oral BID WITH MEALS    pantoprazole (PROTONIX) 40 mg in sodium chloride 0.9% 10 mL injection  40 mg IntraVENous Q12H       Family History:  Family History   Problem Relation Age of Onset    Dementia Mother         alzheimers    Cancer Sister         gallbladder    Cancer Brother         pancreatic    Breast Cancer Paternal Grandmother     Dementia Sister         alzheimers    Heart Surgery Brother         bypass       Social History:  Social History     Tobacco Use    Smoking status: Former Smoker    Smokeless tobacco: Never Used    Tobacco comment: quit smoking cigarettes 6 yrs ago   Substance Use Topics    Alcohol use: Yes     Comment: very occasional         PHYSICAL EXAM PRIOR TO SEDATION:  General: Alert, in no acute distress    Lungs:            CTA bilaterally  Heart:  Normal S1, S2    Abdomen: Soft, Non distended, Non tender. Normoactive bowel sounds. Assessment:   Stable for sedation administration.     Plan:   · Endoscopic procedure with sedation     Signed By: Azalia Spaulding MD     9/16/2019  4:05 PM

## 2019-09-16 NOTE — PROGRESS NOTES
Hospitalist Progress Note                               Jerry Kim MD                                     Answering service: [phone]                               OR [phone] from in house phone                                         Date of Service:  2019  NAME:  Eda Johnson  :  1940  MRN:  [id number]      Admission Summary:     22-year-old female with a past medical history significant for coronary artery disease, non-ST-elevation myocardial infarction, dyslipidemia, chronic systolic congestive heart failure, irritable bowel syndrome and pancreatitis, was brought to the emergency room from home via private vehicle accompanied by her sister for complaints of an approximately 3-day history of progressively worsening nausea and coffee-grounds vomitus. The patient also complained of some intermittent mild abdominal pain with some radiation to the upper back. The patient described the pain as being dull achy in nature, at times about 6-7 on 10 in intensity. The patient denied associated headaches, dizziness, visual deficits, chest pains, palpitations, shortness of breath, cough, fevers, chills, hematuria, bladder or bowel irregularities. Reason for follow up:       CC:Nausea, coffee-grounds vomitus. Reporting persistent nausea and vomiting. Assessment & Plan:     1) GI: Acute on chronic recurrent Pancreatitis of unclear etiology. H/O cholecystectomy. Normal triglyceride level. EGD -  Done aborted due to inability to pass scope down to Duodenum  Continue NPO status, lactated Ringer's IVFs, anti-emetics, analgesics. Abdominal US and GI consult. CT abd/Pelvis   GOO and possible stricture along duodenum which is causing obstruction of   Rt Hydronephrosis  No s/o necrosis   Ct scan was done before EGD- suctioned out fluid.    NGtube if she starts to vomit  NPO now       2) Hematology: H/h stable - normochromic normocytic anemia. C/W PPI    2) Renal: TOÑA probably due to eleanor Hydronephrosis noted on Ct scan today- D/w radiology   Improved with Fluids. Resume bumex as pt reporting congestion now     3) Electrolytes: Daily Potassium while on diuretics    4) CVS: CAD. H/O NSTEMI. Dyslipidemia. Chronic systolic heart failure - resume bumex and uptitrate as needed for volume status    5) ID: Symptomatic UTI without any sepsis. Urine culture and empiric Ceftriaxone. 6) Prophylaxis: GI and DVT (SCDs due to probable GI bleed). 7) Directives: DNR/DNI with a guarded prognosis. D/W patient and family. 8) D/W patient, patient's daughter Stacie Fothergill ([phone]) at bedside      Hospital Problems  Date Reviewed: 9/14/2019          Codes Class Noted POA    Acute pancreatitis ICD-10-CM: K85.90  ICD-9-CM: 206.8  9/14/2019 Yes        TOÑA (acute kidney injury) ([facility] [address] ICD-10-CM: N17.9  ICD-9-CM: 584.9  9/14/2019 Yes        Hypokalemia ICD-10-CM: E87.6  ICD-9-CM: 276.8  9/14/2019 Yes        GI bleed ICD-10-CM: K92.2  ICD-9-CM: 578.9  9/14/2019 Yes        Coffee ground emesis ICD-10-CM: K92.0  ICD-9-CM: 578.0  9/14/2019 Yes                Physical Examination:      Last 24hrs VS reviewed since prior progress note. Most recent are:  Visit Vitals  /66 (BP 1 Location: Right arm, BP Patient Position: At rest)   Pulse 83   Temp 98.3 °F (36.8 °C)   Resp 16   Wt 59.3 kg (130 lb 12.8 oz)   SpO2 90%   BMI 22.45 kg/m²           Constitutional:  No acute distress, cooperative, pleasant    HEENT: Head is a traumatic,  Un icteric sclera. Pink conjunctiva,no erythema or discharge. Oral mucous moist, oropharynx benign. Neck supple,    Resp:  Decreased at bases. CV:  S1, S2 present. GI:  Soft, non distended, non tender. normoactive bowel sounds, no hepatosplenomegaly    :  No CVA or suprapubic tenderness   Skin  :  No erythema,rash,bullae,dipigmentation     Musculoskeletal:  Bipedal edema.     Neurologic:  Awake ,alert and oriented. No intake or output data in the 24 hours ending 09/16/19 1024           Labs:     Recent Labs     09/16/19  0604 09/15/19  0437   WBC 10.0 9.5   HGB 10.5* 11.1*   HCT 32.8* 35.1   * 170     Recent Labs     09/16/19  0604 09/15/19  0437 09/14/19  1357    143 142   K 3.6 3.6 3.4*    104 95*   CO2 27 32 36*   BUN 39* 40* 35*   CREA 1.59* 2.09* 2.41*   * 126* 167*   CA 8.1* 8.9 10.0     Recent Labs     09/16/19  0604 09/15/19  0437 09/14/19  1357   SGOT  --  15 22   ALT  --  51 73   AP  --  110 144*   TBILI  --  0.7 0.7   TP  --  6.3* 7.8   ALB  --  3.2* 4.0   GLOB  --  3.1 3.8   AML  --   --  149*   LPSE 1,139*  --  1,012*     No results for input(s): INR, PTP, APTT in the last 72 hours. No lab exists for component: INREXT, INREXT   Recent Labs     09/14/19  1357   TIBC 344   PSAT 18*      No results found for: FOL, RBCF   No results for input(s): PH, PCO2, PO2 in the last 72 hours. No results for input(s): CPK, CKNDX, TROIQ in the last 72 hours.     No lab exists for component: CPKMB  Lab Results   Component Value Date/Time    Cholesterol, total 85 09/15/2019 04:37 AM    HDL Cholesterol 43 09/15/2019 04:37 AM    LDL, calculated 29 09/15/2019 04:37 AM    Triglyceride 65 09/15/2019 04:37 AM    CHOL/HDL Ratio 2.0 09/15/2019 04:37 AM     Lab Results   Component Value Date/Time    Glucose (POC) 102 (H) 01/18/2019 06:23 AM    Glucose (POC) 139 (H) 01/17/2019 09:05 PM    Glucose (POC) 114 (H) 01/17/2019 04:31 PM    Glucose (POC) 106 (H) 01/17/2019 11:41 AM    Glucose (POC) 132 (H) 01/16/2019 09:13 PM     Lab Results   Component Value Date/Time    Color YELLOW/STRAW 09/15/2019 12:33 AM    Appearance CLOUDY (A) 09/15/2019 12:33 AM    Specific gravity 1.019 09/15/2019 12:33 AM    pH (UA) 6.0 09/15/2019 12:33 AM    Protein 100 (A) 09/15/2019 12:33 AM    Glucose NEGATIVE  09/15/2019 12:33 AM    Ketone NEGATIVE  09/15/2019 12:33 AM    Bilirubin NEGATIVE  09/15/2019 12:33 AM Urobilinogen 0.2 09/15/2019 12:33 AM    Nitrites NEGATIVE  09/15/2019 12:33 AM    Leukocyte Esterase SMALL (A) 09/15/2019 12:33 AM    Epithelial cells MODERATE (A) 09/15/2019 12:33 AM    Bacteria 2+ (A) 09/15/2019 12:33 AM    WBC 10-20 09/15/2019 12:33 AM    RBC 0-5 09/15/2019 12:33 AM         Medications Reviewed:     Current Facility-Administered Medications   Medication Dose Route Frequency    bumetanide (BUMEX) tablet 1 mg  1 mg Oral DAILY    guaiFENesin ER (MUCINEX) tablet 600 mg  600 mg Oral BID PRN    rosuvastatin (CRESTOR) tablet 10 mg  10 mg Oral QHS    cefTRIAXone (ROCEPHIN) 1 g in 0.9% sodium chloride (MBP/ADV) 50 mL  1 g IntraVENous Q24H    sodium chloride (NS) flush 5-40 mL  5-40 mL IntraVENous Q8H    sodium chloride (NS) flush 5-40 mL  5-40 mL IntraVENous PRN    acetaminophen (TYLENOL) tablet 650 mg  650 mg Oral Q4H PRN    ondansetron (ZOFRAN) injection 4 mg  4 mg IntraVENous Q4H PRN    lactated Ringers infusion  50 mL/hr IntraVENous CONTINUOUS    carvedilol (COREG) tablet 6.25 mg  6.25 mg Oral BID WITH MEALS    pantoprazole (PROTONIX) 40 mg in sodium chloride 0.9% 10 mL injection  40 mg IntraVENous Q12H     ______________________________________________________________________  EXPECTED LENGTH OF STAY: - - -  ACTUAL LENGTH OF STAY:          2                 Susan Gallo MD

## 2019-09-16 NOTE — PROGRESS NOTES
Bedside shift change report given to Maria M Riddle (oncoming nurse) by Jeanie Damon RN (offgoing nurse). Report included the following information SBAR, Kardex and MAR.

## 2019-09-16 NOTE — ANESTHESIA PREPROCEDURE EVALUATION
Relevant Problems   No relevant active problems       Anesthetic History     PONV          Review of Systems / Medical History  Patient summary reviewed, nursing notes reviewed and pertinent labs reviewed    Pulmonary  Within defined limits                 Neuro/Psych         Dementia     Cardiovascular              CAD         GI/Hepatic/Renal  Within defined limits              Endo/Other  Within defined limits           Other Findings              Physical Exam    Airway  Mallampati: II  TM Distance: > 6 cm  Neck ROM: normal range of motion   Mouth opening: Normal     Cardiovascular  Regular rate and rhythm,  S1 and S2 normal,  no murmur, click, rub, or gallop             Dental  No notable dental hx       Pulmonary  Breath sounds clear to auscultation               Abdominal  GI exam deferred       Other Findings            Anesthetic Plan    ASA: 3  Anesthesia type: MAC            Anesthetic plan and risks discussed with: Patient

## 2019-09-16 NOTE — PROGRESS NOTES
Jodi Rodriguez  1940  [id number]    Situation:    Scheduled Procedure: Procedure(s):  ESOPHAGOGASTRODUODENOSCOPY (EGD)  Verbal report received from: Alen Osborn RN  Preoperative diagnosis: vomiting    Background:    Procedure: Procedure(s):  ESOPHAGOGASTRODUODENOSCOPY (EGD)  Physician performing procedure; Dr. Yarelis Elliott RN    NPO Status/Last PO Intake: NPO  Is the patient taking Blood Thinners:no  Is the patient diabetic: no  Does the patient have a Pacemaker/Defibrillator in place?: no  Does the patient need antibiotics before/during/after procedure: no  Does the patient have SCD in place: no  Is patient on CONTACT precautions: no    Assessment:  Are the vital signs stable prior to patient coming to ENDO? yes  Is the patient alert/oriented and able to sign consent for the procedures: yes  How does the patient's abdomen feel prior to coming to ENDO? round and soft  Does the patient have a patient IV in place?  yes    Recommendation:  Family or Friend present: yes  Permission to share finding with Family or Friend: yes

## 2019-09-16 NOTE — PROGRESS NOTES
D/W Dr Karolina Paez NG tube to low intermittent suction. D/w daughter and updated.  Check Ca 19-9 levels, Cea levels

## 2019-09-16 NOTE — PROGRESS NOTES
Spiritual Care Partner Volunteer visited patient in room 210 on 9.16.19. Documented by: : Rev. Cesar Tapia.  Markos Yeh; [facility], to contact [phone] [address] call: [phone]

## 2019-09-16 NOTE — PROGRESS NOTES
Dr Ferdinand Monroe, radiology called with crit result and asked for Dr Chepe Moreno to call her at 26. He has been informed.

## 2019-09-16 NOTE — PROGRESS NOTES
Crystal Castanedageovani  1940  [id number]    Situation:  Verbal report received from: cristhian parrish rn  Procedure: Procedure(s):  ESOPHAGOGASTRODUODENOSCOPY (EGD)    Background:    Preoperative diagnosis: vomiting  Postoperative diagnosis: Esophagitis      :  Dr. Facundo Foster  Assistant(s): Endoscopy Technician-1: Raj Regalado  Endoscopy RN-1: Ayad Turner RN    Specimens: * No specimens in log *  H. Pylori  no    Assessment:  Intra-procedure medications     Anesthesia gave intra-procedure sedation and medications, see anesthesia flow sheet yes    Intravenous fluids: NS@ KVO     Vital signs stable  yes    Abdominal assessment: round and soft  yes    Recommendation:  Discharge patient per MD order yes.   Family or Friend  yes  Permission to share finding with family or friend yes

## 2019-09-16 NOTE — PROGRESS NOTES
TRANSFER - OUT REPORT:    Verbal report given to Scarlet Staton RN(name) on Estella Merino  being transferred to (unit) for routine post - op       Report consisted of patients Situation, Background, Assessment and   Recommendations(SBAR). Information from the following report(s) SBAR, Kardex, Procedure Summary, Intake/Output, MAR, Recent Results and Cardiac Rhythm SR was reviewed with the receiving nurse. Lines:   Peripheral IV 09/14/19 Left Antecubital (Active)   Site Assessment Clean, dry, & intact 9/16/2019 12:29 PM   Phlebitis Assessment 0 9/16/2019 12:29 PM   Infiltration Assessment 0 9/16/2019 12:29 PM   Dressing Status Clean, dry, & intact 9/16/2019 12:29 PM   Dressing Type Transparent 9/16/2019 12:29 PM   Hub Color/Line Status Infusing;Pink 9/16/2019 12:29 PM   Action Taken Open ports on tubing capped 9/15/2019 11:24 PM   Alcohol Cap Used Yes 9/16/2019 12:29 PM       Peripheral IV 09/16/19 Anterior; Left Forearm (Active)   Site Assessment Clean, dry, & intact 9/16/2019 12:29 PM   Phlebitis Assessment 0 9/16/2019 12:29 PM   Infiltration Assessment 0 9/16/2019 12:29 PM   Dressing Status Clean, dry, & intact 9/16/2019 12:29 PM   Dressing Type Transparent 9/16/2019 12:29 PM   Hub Color/Line Status Pink 9/16/2019 12:29 PM   Alcohol Cap Used Yes 9/16/2019 12:29 PM        Opportunity for questions and clarification was provided.

## 2019-09-16 NOTE — PROGRESS NOTES
Transition of Care Plan    1. Disposition TBD: Home once medically stable  2. Transport: family   3. Continue Medical Care   4. Follow up with PCP/Specialist     Reason for Admission:   Nausea,coffee-grounds vomitus, and abdominal pain               RRAT Score:     24-high              Resources/supports as identified by patient/family:   Patient has medicare part A & B, [employer]                 Top Challenges facing patient (as identified by patient/family and CM): Finances/Medication cost?      No concerns               Transportation? Family               Support system or lack thereof? Patient has a strong family suport; daughter lives in [place] and son lives in [address] [place] [address] [facility]? Pt lives alone in a bi-level house with 11 steps to enter            Self-care/ADLs/Cognition? Alert and oriented; independent           Current Advanced Directive/Advance Care Plan:  DNR and ACP on file                           Plan for utilizing home health:    Not indicated at this time                  Transition of Care Plan:                  Patient is a 82-year-old female who was brought to the ED from via private vehcile for complaints of an approximately 3-day history of progressively worsening nausea and coffee-grounds vomitus. Patient lives in a bi-level house with 11 steps to enter. Pt is independent with ADLs and doesn't use any DMEs. Pt stated that she was in [address] around 8 months ago for rehab. Pt states that her son and daughter will hel her as needed. Pt may benefit from follow up care with PCP. Care Management Interventions  PCP Verified by CM:  Yes  Mode of Transport at Discharge: ALS(family)  MyChart Signup: No  Discharge Durable Medical Equipment: No  Physical Therapy Consult: No  Occupational Therapy Consult: No  Speech Therapy Consult: No  Current Support Network: Own Home, Lives Alone  Confirm Follow Up Transport: Family  Plan discussed with Pt/Family/Caregiver: Yes  Discharge Location  Discharge Placement: [address] [place] [address]  Clinical     [phone]

## 2019-09-16 NOTE — PROGRESS NOTES
0800 Bedside shift change report given to tejinder (oncoming nurse) by Adia Jimenez (offgoing nurse). Report included the following information SBAR, Kardex and MAR.

## 2019-09-16 NOTE — CONSULTS
[address]    [address] [address]        GASTROENTEROLOGY CONSULTATION NOTE  Will Elie Iqbal  [phone] office  [phone] NP/PA in-hospital cell phone M-F until 4:30PM  After 5PM or on weekends, please call  for physician on call        NAME:  Dory March   :   1940   MRN:   [id number]       Referring Physician: Dr. Jona Collier Date: 2019 9:00 AM    Chief Complaint: nausea and vomiting     History of Present Illness:  Patient is a 70 y.o. who is seen in consultation at the request of Dr. Sukhwinder Laureano for acute pancreatitis, blood in stools, intractable emesis. Patient has a past medical history significant for coronary artery disease, dyslipidemia, and chronic systolic congestive heart failure. She presented to the ED with complaints of nausea, coffee-ground emesis, and abdominal pain. Patient was admitted to the hospital on 19. Patient complains of nausea and vomiting with coffee-ground emesis for at least the last 4 days. She reports approximately 3-4 episodes of vomiting each day. No reflux, dysphagia, odynophagia, or abdominal pain. She reports a history of recent constipation with her last bowel movement approximately 2 weeks ago. No melena or hematochezia. She reports that was having a bowel movement daily and then twice a week until the last 2 weeks. No fevers. Patient completed a course of steroids and 2 weeks of diclofenac approximately 1 month ago for back pain. No anticoagulation. No alcohol or tobacco use. History of cholecystectomy and hiatal hernia repair.   EGD/EUS (4/15/19) by Dr. Kris Chopra for cyst of pancreas showed a septated cystic lesion of pancreatic body, FNA deferred given its relative decrease in size and history of pancreatitis; pancreatic parenchyma with diffuse heterogeneity and atrophy; non-dilated main pancreatid duct, although not well visualized in the region of the cyst. A repeat CT abdomen with pancreatic protocol was recommended in 3 months. EGD showed a normal esophagus; small hiatal hernia; normal duodenum to second portion, ampulla could not be visualized and likely hidden beneath a fold. Colonoscopy (11/1/17) by Dr. Familia Naranjo for lower GI bleeding benign appearing colon polyps; moderate internal hemorrhoids as the likely cause of the bleeding. Pathology showed tubular adenomas. A repeat colonoscopy was recommended in 5 years. I have reviewed the emergency room note, hospital admission note, notes by all other clinicians who have seen the patient during this hospitalization to date. I have reviewed the problem list and the reason for this hospitalization. I have reviewed the allergies and the medications the patient was taking at home prior to this hospitalization. PMH:  Past Medical History:   Diagnosis Date    Coronary artery disease of native artery of native heart with stable angina pectoris ([address] 2/28/2019    Cough due to ACE inhibitor     Hematoma of rectus sheath 2/28/2019    IBS (irritable bowel syndrome)     IBS    NSTEMI (non-ST elevated myocardial infarction) ([address] 2/28/2019    Pancreatitis     Pure hypercholesterolemia 9/50/0834    Systolic CHF, chronic ([address] 2/28/2019       PSH:  Past Surgical History:   Procedure Laterality Date    COLONOSCOPY Left 11/1/2017    COLONOS[facility] performed by Sharee Dancer, [facility] at [facility] ENDOSCOPY    HX APPENDECTOMY      HX CHOLECYSTECTOMY      HX GI      surgery for hiatal hernia    HX ORTHOPAEDIC      DJD, doing PT weekly through [address]  1/30/2019       Allergies: Allergies   Allergen Reactions    Penicillins Hives       Home Medications:  Prior to Admission Medications   Prescriptions Last Dose Informant Patient Reported? Taking? bumetanide (BUMEX) 1 mg tablet   Yes Yes   Sig: Take 1 mg by mouth daily.    carvedilol (COREG) 6.25 mg tablet   No Yes   Sig: Take 1 Tab by mouth two (2) times daily (with meals). guaiFENesin ER (MUCINEX) 600 mg ER tablet   Yes Yes   Sig: Take 600 mg by mouth two (2) times daily as needed for Congestion. nitroglycerin (NITROSTAT) 0.4 mg SL tablet Not Taking at Unknown time  No No   Si Tab by SubLINGual route every five (5) minutes as needed for Chest Pain. Up to 3 doses. pantoprazole (PROTONIX) 40 mg tablet 2019 at Unknown time  No Yes   Sig: Take 1 Tab by mouth two (2) times a day. polyethylene glycol (MIRALAX) 17 gram packet   Yes Yes   Sig: Take 17 g by mouth daily as needed. rosuvastatin (CRESTOR) 10 mg tablet   No Yes   Sig: Take 1 Tab by mouth nightly.       Facility-Administered Medications: None       Hospital Medications:  Current Facility-Administered Medications   Medication Dose Route Frequency    cefTRIAXone (ROCEPHIN) 1 g in 0.9% sodium chloride (MBP/ADV) 50 mL  1 g IntraVENous Q24H    sodium chloride (NS) flush 5-40 mL  5-40 mL IntraVENous Q8H    sodium chloride (NS) flush 5-40 mL  5-40 mL IntraVENous PRN    acetaminophen (TYLENOL) tablet 650 mg  650 mg Oral Q4H PRN    ondansetron (ZOFRAN) injection 4 mg  4 mg IntraVENous Q4H PRN    lactated Ringers infusion  100 mL/hr IntraVENous CONTINUOUS    carvedilol (COREG) tablet 6.25 mg  6.25 mg Oral BID WITH MEALS    pantoprazole (PROTONIX) 40 mg in sodium chloride 0.9% 10 mL injection  40 mg IntraVENous Q12H       Social History:  Social History     Tobacco Use    Smoking status: Former Smoker    Smokeless tobacco: Never Used    Tobacco comment: quit smoking cigarettes 6 yrs ago   Substance Use Topics    Alcohol use: Yes     Comment: very occasional       Family History:  Family History   Problem Relation Age of Onset    Dementia Mother         alzheimers    Cancer Sister         gallbladder    Cancer Brother         pancreatic    Breast Cancer Paternal Grandmother     Dementia Sister         alzheimers    Heart Surgery Brother         bypass       Review of Systems:  Constitutional: negative fever, negative chills, + weight loss  Eyes:   negative visual changes  ENT:   negative sore throat, tongue or lip swelling  Respiratory:  negative cough, negative dyspnea  Cards:  negative for chest pain, palpitations, lower extremity edema  GI:   See HPI  :  negative for frequency, dysuria  Integument:  negative for rash and pruritus  Heme:  + for easy bruising, negative gum/nose bleeding  Musculoskeletal:negative for myalgias, back pain and muscle weakness  Neuro:  negative for headaches, dizziness  Psych: negative for feelings of anxiety, depression     Objective:     Patient Vitals for the past 8 hrs:   BP Temp Pulse Resp SpO2 Weight   09/16/19 0834 122/66 98.3 °F (36.8 °C) 83 16 90 %    09/16/19 0705 144/80  83      09/16/19 0225 126/74 98.6 °F (37 °C) 76 16 90 % 59.3 kg (130 lb 12.8 oz)     No intake/output data recorded. No intake/output data recorded. EXAM:     CONST:  Pleasant female lying in bed, no acute distress, daughter is at the bedside   NEURO:  Alert and oriented x 3   HEENT: EOMI, no scleral icterus   LUNGS: CTA bilaterally anteriorly   CARD:  S1 S2   ABD:  Soft, mildly distended, no tenderness, no rebound, no guarding. Hypoactive bowel sounds. EXT:  Warm   PSYCH: Not anxious or agitated     Data Review     Recent Labs     09/16/19  0604 09/15/19  0437   WBC 10.0 9.5   HGB 10.5* 11.1*   HCT 32.8* 35.1   * 170     Recent Labs     09/16/19  0604 09/15/19  0437    143   K 3.6 3.6    104   CO2 27 32   BUN 39* 40*   CREA 1.59* 2.09*   * 126*   CA 8.1* 8.9     Recent Labs     09/16/19  0604 09/15/19  0437 09/14/19  1357   SGOT  --  15 22   AP  --  110 144*   TP  --  6.3* 7.8   ALB  --  3.2* 4.0   GLOB  --  3.1 3.8   AML  --   --  149*   LPSE 1,139*  --  1,012*     No results for input(s): INR, PTP, APTT in the last 72 hours.     No lab exists for component: INREXT       Assessment:   · GI bleed: nausea and vomiting with coffee-ground emesis. Hgb 10.5 (13.0 on presentation), platelets 210. Hemoccult positive stool. · Acute on chronic pancreatitis: lipase: 1,139, LFTs normal. Abdominal ultrasound (9/15/19): multiple focal lesions in the liver, some suggestive of cysts; other lesions are hyperechoic, nonspecific, possibly representing hemangiomas; trace amount of perihepatic fluid; no intrahepatic ductal dilatation; common bile duct measures 1.2 cm. EGD/EUS in 4/2019. · Constipation  · Acute kidney injury     Patient Active Problem List   Diagnosis Code    Systolic CHF, chronic (HCC) I50.22    NSTEMI (non-ST elevated myocardial infarction) ([facility] I21.4    Coronary artery disease of native artery of native heart with stable angina pectoris ([facility] I25.118    Pure hypercholesterolemia E78.00    Hematoma of rectus sheath S30. 1XXA    Cough due to ACE inhibitor R05, T46.4X5A    Acute pancreatitis K85.90    TOÑA (acute kidney injury) ([facility] N17.9    Hypokalemia E87.6    GI bleed K92.2    Coffee ground emesis K92.0     Plan:   · NPO  · PPI BID  · Trend CBC and transfuse as necessary  · Continue supportive measures: antiemetics PRN  · LR  · Follow lipase  · CT abdomen/pelvis without contrast (due to renal dysfunction)  · Add MiraLax  · EGD today at 4:00PM with Dr. Jaye Shone. Risks of the procedure to include (but not limited to) anesthesia, bleeding, infection, and perforation were discussed. Patient and her daughter understand and are in agreement with the plan. Please verify consent has been obtained. · Patient was discussed with and will be seen by Dr. Jaye Shone  · Thank you for allowing me to participate in care of Adilson Aldridge     Signed By: Debra Edmond     9/16/2019  9:00 AM       Gastroenterology Attending Physician attestation statement and comments. This patient was seen and examined by me in a face-to-face visit today. I reviewed the medical record including lab work, imaging and other provider notes.  I confirmed the history as described above. I spoke to the patient, reviewed the medical record including lab work, imaging and other provider notes. I discussed this case in detail with Trisha LANE. I formulated an  assessment of this patient and developed a treatment plan. I agree with the above consultation note. I agree with the history, exam and assessment and plan as outlined in the note. I would like to add the following:     Abd: normoactive BS, nt, nd, no rebound/guarding. EGD today to evaluate for esophagitis, erosions, and PUD.     Dr. Delgado Cano

## 2019-09-17 NOTE — PROGRESS NOTES
TRANSFER - IN REPORT:    Verbal report received from Sandra pro rn on Roise Reason  being received from [facility] recovery for routine progression of care      Report consisted of patients Situation, Background, Assessment and   Recommendations(SBAR). Information from the following report(s) Procedure Summary, Intake/Output and MAR was reviewed with the receiving nurse. Opportunity for questions and clarification was provided. Assessment completed upon patients arrival to unit and care assumed.

## 2019-09-17 NOTE — PROGRESS NOTES
Problem: Falls - Risk of  Goal: *Absence of Falls  Description  Document Oni Delcid Fall Risk and appropriate interventions in the flowsheet.   Outcome: Progressing Towards Goal  Note:   Fall Risk Interventions:            Medication Interventions: Patient to call before getting OOB                   Problem: Patient Education: Go to Patient Education Activity  Goal: Patient/Family Education  Outcome: Progressing Towards Goal     Problem: Pain  Goal: *Control of Pain  Outcome: Progressing Towards Goal     Problem: Patient Education: Go to Patient Education Activity  Goal: Patient/Family Education  Outcome: Progressing Towards Goal

## 2019-09-17 NOTE — PROGRESS NOTES
[facility]  1940  [id number]    Situation:    Scheduled Procedure: Procedure(s):  ESOPHAGOGASTRODUODENOSCOPY (EGD)  Verbal report received from: Moncho Ferreira RN  Preoperative diagnosis: Vomiting  Acute pancreatitis NGT LIS  Background:    Procedure: Procedure(s):  ESOPHAGOGASTRODUODENOSCOPY (EGD)  Physician performing procedure; Dr. Blake Stroud RN    NPO Status/Last PO Intake: NPO since midnight    Pregnancy Test:Not applicable If yes, result: none    Is the patient taking Blood Thinners: NO Is the patient diabetic:no   Does the patient have a Pacemaker/Defibrillator in place?: no   Does the patient need antibiotics before/during/after procedure: no   Does the patient have SCD in place:no   Is patient on CONTACT precautions:no         Assessment:  Are the vital signs stable prior to patient coming to ENDO?  yes  Is the patient alert/oriented and able to sign consent for the procedures:yes  Does the patient have a patient IV in place?  yes     Recommendation:  Family or Friend present yes     Permission to share finding with Family or Friend yes

## 2019-09-17 NOTE — PROGRESS NOTES
Hospitalist Progress Note                               Venita Mc MD                                     Answering service: [phone]                               OR [phone] from in house phone                                         Date of Service:  2019  NAME:  Michel Heath  :  1940  MRN:  [id number]      Admission Summary:     44-year-old female with a past medical history significant for coronary artery disease, non-ST-elevation myocardial infarction, dyslipidemia, chronic systolic congestive heart failure, irritable bowel syndrome and pancreatitis, was brought to the emergency room from home via private vehicle accompanied by her sister for complaints of an approximately 3-day history of progressively worsening nausea and coffee-grounds vomitus. The patient also complained of some intermittent mild abdominal pain with some radiation to the upper back. The patient described the pain as being dull achy in nature, at times about 6-7 on 10 in intensity. The patient denied associated headaches, dizziness, visual deficits, chest pains, palpitations, shortness of breath, cough, fevers, chills, hematuria, bladder or bowel irregularities. Reason for follow up:       CC:Nausea, coffee-grounds vomitus. NG tube in place  She denies Distress      Assessment & Plan:     1) GI: Acute on chronic recurrent Pancreatitis of unclear etiology - could be a pancreatic mass   H/O cholecystectomy. Normal triglyceride level. EGD -  Done aborted due to inability to pass scope down to Duodenum  Continue NPO status,   Abdominal US and GI consult. CT abd/Pelvis   GOO and possible stricture along duodenum which is causing obstruction of   Rt Hydronephrosis  No s/o necrosis   Ct scan was done before EGD- suctioned out fluid.    NGtube for decompression  Check CEA- normal  Ca19-9  -   NPO now    - Re Endoscopy  Surgery consult       2) Hematology: H/h stable - normochromic normocytic anemia. C/W PPI    2) Renal: TOÑA probably due to eleanor Hydronephrosis noted on Ct scan today- D/w radiology   Improving with Decompression of Stomach  Needs  interval Recheck of kidneys    3) Electrolytes: Replete potassium PRN    4) CVS: CAD. H/O NSTEMI. Dyslipidemia. Monitor Fluid Status     5) ID: UTI ruled out    Urine culture negative  D/C Ceftriaxone    6) Prophylaxis: GI and DVT (SCDs due to probable GI bleed). 7) Directives: DNR/DNI with a guarded prognosis. D/W patient and family. 8) D/W patient, patient's daughter John Richard ([phone]) at bedside      Hospital Problems  Date Reviewed: 9/14/2019          Codes Class Noted POA    Acute pancreatitis ICD-10-CM: K85.90  ICD-9-CM: 072.1  9/14/2019 Yes        TOÑA (acute kidney injury) ([facility] [address] ICD-10-CM: N17.9  ICD-9-CM: 584.9  9/14/2019 Yes        Hypokalemia ICD-10-CM: E87.6  ICD-9-CM: 276.8  9/14/2019 Yes        GI bleed ICD-10-CM: K92.2  ICD-9-CM: 578.9  9/14/2019 Yes        Coffee ground emesis ICD-10-CM: K92.0  ICD-9-CM: 578.0  9/14/2019 Yes                Physical Examination:      Last 24hrs VS reviewed since prior progress note. Most recent are:  Visit Vitals  /83 (BP 1 Location: Right arm, BP Patient Position: At rest)   Pulse 93   Temp 97.7 °F (36.5 °C)   Resp 12   Wt 59.1 kg (130 lb 4.7 oz)   SpO2 90%   BMI 22.36 kg/m²           Constitutional:  No acute distress, cooperative, pleasant    HEENT: Head is a traumatic,  Un icteric sclera. Pink conjunctiva,no erythema or discharge. Oral mucous moist, oropharynx benign. Neck supple, NG in place   Resp:  Decreased at bases. CV:  S1, S2 present. GI:  Soft, non distended, non tender. hypoactive bowel sounds, no hepatosplenomegaly    :  No CVA or suprapubic tenderness   Skin  :  No erythema,rash,bullae,dipigmentation     Musculoskeletal:  Bipedal edema. Neurologic:  Awake ,alert and oriented.               Intake/Output Summary (Last 24 hours) at 9/17/2019 0909  Last data filed at 9/16/2019 1613  Gross per 24 hour   Intake 25 ml   Output    Net 25 ml              Labs:     Recent Labs     09/17/19 0446 09/16/19  0604   WBC 10.9 10.0   HGB 10.3* 10.5*   HCT 32.2* 32.8*   * 117*     Recent Labs     09/17/19 0446 09/16/19 0604 09/15/19  0437    144 143   K 3.8 3.6 3.6    107 104   CO2 25 27 32   BUN 33* 39* 40*   CREA 1.33* 1.59* 2.09*   * 101* 126*   CA 8.0* 8.1* 8.9     Recent Labs     09/17/19  0446 09/16/19  0604 09/15/19  0437 09/14/19  1357   SGOT 19  --  15 22   ALT 39  --  51 73     --  110 144*   TBILI 0.7  --  0.7 0.7   TP 5.8*  --  6.3* 7.8   ALB 2.7*  --  3.2* 4.0   GLOB 3.1  --  3.1 3.8   AML  --   --   --  149*   LPSE  --  1,139*  --  1,012*     No results for input(s): INR, PTP, APTT in the last 72 hours. No lab exists for component: INREXT, INREXT   Recent Labs     09/14/19  1357   TIBC 344   PSAT 18*      No results found for: FOL, RBCF   No results for input(s): PH, PCO2, PO2 in the last 72 hours. No results for input(s): CPK, CKNDX, TROIQ in the last 72 hours.     No lab exists for component: CPKMB  Lab Results   Component Value Date/Time    Cholesterol, total 85 09/15/2019 04:37 AM    HDL Cholesterol 43 09/15/2019 04:37 AM    LDL, calculated 29 09/15/2019 04:37 AM    Triglyceride 65 09/15/2019 04:37 AM    CHOL/HDL Ratio 2.0 09/15/2019 04:37 AM     Lab Results   Component Value Date/Time    Glucose (POC) 102 (H) 01/18/2019 06:23 AM    Glucose (POC) 139 (H) 01/17/2019 09:05 PM    Glucose (POC) 114 (H) 01/17/2019 04:31 PM    Glucose (POC) 106 (H) 01/17/2019 11:41 AM    Glucose (POC) 132 (H) 01/16/2019 09:13 PM     Lab Results   Component Value Date/Time    Color YELLOW/STRAW 09/15/2019 12:33 AM    Appearance CLOUDY (A) 09/15/2019 12:33 AM    Specific gravity 1.019 09/15/2019 12:33 AM    pH (UA) 6.0 09/15/2019 12:33 AM    Protein 100 (A) 09/15/2019 12:33 AM    Glucose NEGATIVE  09/15/2019 12:33 AM    Ketone NEGATIVE  09/15/2019 12:33 AM    Bilirubin NEGATIVE  09/15/2019 12:33 AM    Urobilinogen 0.2 09/15/2019 12:33 AM    Nitrites NEGATIVE  09/15/2019 12:33 AM    Leukocyte Esterase SMALL (A) 09/15/2019 12:33 AM    Epithelial cells MODERATE (A) 09/15/2019 12:33 AM    Bacteria 2+ (A) 09/15/2019 12:33 AM    WBC 10-20 09/15/2019 12:33 AM    RBC 0-5 09/15/2019 12:33 AM         Medications Reviewed:     Current Facility-Administered Medications   Medication Dose Route Frequency    guaiFENesin ER (MUCINEX) tablet 600 mg  600 mg Oral BID PRN    [Held by provider] rosuvastatin (CRESTOR) tablet 10 mg  10 mg Oral QHS    [Held by provider] bumetanide (BUMEX) tablet 0.5 mg  0.5 mg Oral DAILY    [Held by provider] potassium chloride (KLOR-CON) packet for solution 20 mEq  20 mEq Oral BID WITH MEALS    polyethylene glycol (MIRALAX) packet 17 g  17 g Oral DAILY    sodium chloride (NS) flush 5-40 mL  5-40 mL IntraVENous Q8H    sodium chloride (NS) flush 5-40 mL  5-40 mL IntraVENous PRN    benzocaine-menthol (CEPACOL) lozenge 1 Lozenge  1 Lozenge Mucous Membrane PRN    hydrALAZINE (APRESOLINE) 20 mg/mL injection 10 mg  10 mg IntraVENous Q6H PRN    cefTRIAXone (ROCEPHIN) 1 g in 0.9% sodium chloride (MBP/ADV) 50 mL  1 g IntraVENous Q24H    sodium chloride (NS) flush 5-40 mL  5-40 mL IntraVENous Q8H    sodium chloride (NS) flush 5-40 mL  5-40 mL IntraVENous PRN    acetaminophen (TYLENOL) tablet 650 mg  650 mg Oral Q4H PRN    ondansetron (ZOFRAN) injection 4 mg  4 mg IntraVENous Q4H PRN    lactated Ringers infusion  50 mL/hr IntraVENous CONTINUOUS    [Held by provider] carvedilol (COREG) tablet 6.25 mg  6.25 mg Oral BID WITH MEALS    pantoprazole (PROTONIX) 40 mg in sodium chloride 0.9% 10 mL injection  40 mg IntraVENous Q12H     ______________________________________________________________________  EXPECTED LENGTH OF STAY: - - -  ACTUAL LENGTH OF STAY:          3                 Lesia Pickens MD

## 2019-09-17 NOTE — ANESTHESIA POSTPROCEDURE EVALUATION
Procedure(s):  ESOPHAGOGASTRODUODENOSCOPY (EGD). MAC    Anesthesia Post Evaluation      Multimodal analgesia: multimodal analgesia used between 6 hours prior to anesthesia start to PACU discharge  Patient location during evaluation: bedside  Patient participation: waiting for patient participation  Level of consciousness: awake  Pain management: adequate  Airway patency: patent  Anesthetic complications: no  Cardiovascular status: acceptable  Respiratory status: unassisted  Hydration status: acceptable  Comments: Post-Anesthesia Evaluation and Assessment    I have evaluated the patient and they are ready for PACU discharge. Patient: [place] MRN: [id number]  SSN: [id number]   YOB: 1940  Age: 70 y.o. Sex: female      Cardiovascular Function/Vital Signs  /72 (BP 1 Location: Right arm, BP Patient Position: At rest)   Pulse 96   Temp 37.2 °C (99 °F)   Resp 16   Wt 59.1 kg (130 lb 4.7 oz)   SpO2 92%   BMI 22.36 kg/m²     Patient is status post MAC anesthesia for Procedure(s):  ESOPHAGOGASTRODUODENOSCOPY (EGD). Nausea/Vomiting: None    Postoperative hydration reviewed and adequate. Pain:  Pain Scale 1: Numeric (0 - 10) (09/16/19 2101)  Pain Intensity 1: 0 (09/16/19 2101)   Managed    Neurological Status:   Neuro  Neurologic State: Alert (09/16/19 2101)  Orientation Level: Oriented X4;Other (Comment)(forgetful) (09/16/19 2101)  Cognition: Follows commands (09/16/19 2101)  Speech: Clear (09/16/19 2101)   At baseline    Mental Status, Level of Consciousness: Alert and  oriented to person, place, and time    Pulmonary Status:   O2 Device: Room air (09/16/19 2101)   Adequate oxygenation and airway patent    Complications related to anesthesia: None    Post-anesthesia assessment completed.  No concerns    Signed By: Tatianna Daily MD    September 17, 2019                   Vitals Value Taken Time   /72 9/17/2019  3:46 AM   Temp 37.2 °C (99 °F) 9/17/2019  3:46 AM   Pulse 96 9/17/2019  3:46 AM   Resp 16 9/17/2019  3:46 AM   SpO2 92 % 9/17/2019  3:46 AM

## 2019-09-17 NOTE — CONSULTS
[facility]  In-patient Consultation        Admit Date: 9/14/2019  Reason for Consultation: duodenal stricture    HPI:  Edy Melgar is a 70 y.o. female w/ PMHx as outlined below whom we are asked to see in consultation by Dr. Homa Denny for the above complaint. Pt presented to the ED on 9/14 w/ 3-4 day c/o nausea and multiple bouts of coffee-ground emesis. She also c/o constipation - no BM for almost 2 weeks a/w some abd distention. She has had some epigastric pain w/ radiation through to her back. No melena, no obvious weight loss, limited appetite. She currently has an NGT and denies abd pain. EGD done today shows duodenal obstruction - extrinsic compression causing duodenal obstruction. She also has a hiatal hernia and esophagitis. EUS in 4/19 - done d/t cyst on pancreas  1. Septated cystic lesion of pancreatic body as described above. FNA deferred given its relative decrease in size and history of pancreatitis. 2. Pancreatic parenchyma with diffuse heterogeneity and atrophy  3. Non-dilated main pancreatic duct, although this was not well visualized in the region of the cyst     Recommendations:   1.  Repeat CT abdomen with pancreatic protocol in 3 months       Patient Active Problem List    Diagnosis Date Noted    Acute pancreatitis 09/14/2019    TOÑA (acute kidney injury) ([address] 09/14/2019    Hypokalemia 09/14/2019    GI bleed 09/14/2019    Coffee ground emesis 09/14/2019    Cough due to ACE inhibitor 33/22/7619    Systolic CHF, chronic ([address] 02/28/2019    NSTEMI (non-ST elevated myocardial infarction) ([address] 02/28/2019    Coronary artery disease of native artery of native heart with stable angina pectoris ([address] 02/28/2019    Pure hypercholesterolemia 02/28/2019    Hematoma of rectus sheath 02/28/2019     Past Medical History:   Diagnosis Date    Coronary artery disease of native artery of native heart with stable angina pectoris ([address] 2/28/2019    Cough due to ACE inhibitor     Hematoma of rectus sheath 2/28/2019    IBS (irritable bowel syndrome)     IBS    NSTEMI (non-ST elevated myocardial infarction) ([facility] 2/28/2019    Pancreatitis     Pure hypercholesterolemia 2/02/1101    Systolic CHF, chronic ([facility] 2/28/2019      Past Surgical History:   Procedure Laterality Date    COLONOSCOPY Left 11/1/2017    COL[facility] performed by Garcia Henry [facility] at [facility] ENDOSCOPY    HX APPENDECTOMY      HX CHOLECYSTECTOMY      HX GI      surgery for hiatal hernia    HX ORTHOPAEDIC      DJD[facility] [address]  1/30/2019      Social History     Tobacco Use    Smoking status: Former Smoker    Smokeless tobacco: Never Used    Tobacco comment: quit smoking cigarettes 6 yrs ago   Substance Use Topics    Alcohol use: Yes     Comment: very occasional      Family History   Problem Relation Age of Onset    Dementia Mother         alzheimers    Cancer Sister         gallbladder    Cancer Brother         pancreatic    Breast Cancer Paternal Grandmother     Dementia Sister         alzheimers    Heart Surgery Brother         bypass      Prior to Admission medications    Medication Sig Start Date End Date Taking? Authorizing Provider   guaiFENesin ER (MUCINEX) 600 mg ER tablet Take 600 mg by mouth two (2) times daily as needed for Congestion. Yes Provider, Historical   polyethylene glycol (MIRALAX) 17 gram packet Take 17 g by mouth daily as needed. Yes Provider, Historical   carvedilol (COREG) 6.25 mg tablet Take 1 Tab by mouth two (2) times daily (with meals). 9/4/19  Yes Jori De La Vega MD   rosuvastatin (CRESTOR) 10 mg tablet Take 1 Tab by mouth nightly. 4/17/19  Yes Jori De La Vega MD   bumetanide (BUMEX) 1 mg tablet Take 1 mg by mouth daily. Yes Provider, Historical   pantoprazole (PROTONIX) 40 mg tablet Take 1 Tab by mouth two (2) times a day.  1/19/19  Yes Oralia Shirley MD   nitroglycerin (NITROSTAT) 0.4 mg SL tablet 1 Tab by SubLINGual route every five (5) minutes as needed for Chest Pain. Up to 3 doses. 2/8/19   Stephanie Caldwell MD     Current Facility-Administered Medications   Medication Dose Route Frequency    0.9% sodium chloride infusion  150 mL/hr IntraVENous CONTINUOUS    sodium chloride (NS) flush 5-40 mL  5-40 mL IntraVENous Q8H    sodium chloride (NS) flush 5-40 mL  5-40 mL IntraVENous PRN    guaiFENesin ER (MUCINEX) tablet 600 mg  600 mg Oral BID PRN    [Held by provider] rosuvastatin (CRESTOR) tablet 10 mg  10 mg Oral QHS    [Held by provider] bumetanide (BUMEX) tablet 0.5 mg  0.5 mg Oral DAILY    [Held by provider] potassium chloride (KLOR-CON) packet for solution 20 mEq  20 mEq Oral BID WITH MEALS    polyethylene glycol (MIRALAX) packet 17 g  17 g Oral DAILY    sodium chloride (NS) flush 5-40 mL  5-40 mL IntraVENous Q8H    sodium chloride (NS) flush 5-40 mL  5-40 mL IntraVENous PRN    benzocaine-menthol (CEPACOL) lozenge 1 Lozenge  1 Lozenge Mucous Membrane PRN    hydrALAZINE (APRESOLINE) 20 mg/mL injection 10 mg  10 mg IntraVENous Q6H PRN    cefTRIAXone (ROCEPHIN) 1 g in 0.9% sodium chloride (MBP/ADV) 50 mL  1 g IntraVENous Q24H    sodium chloride (NS) flush 5-40 mL  5-40 mL IntraVENous Q8H    sodium chloride (NS) flush 5-40 mL  5-40 mL IntraVENous PRN    acetaminophen (TYLENOL) tablet 650 mg  650 mg Oral Q4H PRN    ondansetron (ZOFRAN) injection 4 mg  4 mg IntraVENous Q4H PRN    lactated Ringers infusion  50 mL/hr IntraVENous CONTINUOUS    [Held by provider] carvedilol (COREG) tablet 6.25 mg  6.25 mg Oral BID WITH MEALS    pantoprazole (PROTONIX) 40 mg in sodium chloride 0.9% 10 mL injection  40 mg IntraVENous Q12H     Allergies   Allergen Reactions    Penicillins Hives          Subjective:     Review of Systems:    A comprehensive review of systems was negative except for that written in the History of Present Illness.        Objective:     Blood pressure 157/79, pulse 97, temperature 98.6 °F (37 °C), resp. rate 20, weight 130 lb 4.7 oz (59.1 kg), SpO2 (!) 89 %. Temp (24hrs), Av.3 °F (36.8 °C), Min:97.6 °F (36.4 °C), Max:99 °F (37.2 °C)      Recent Labs     19  0446 19  0604 09/15/19  0437   WBC 10.9 10.0 9.5   HGB 10.3* 10.5* 11.1*   HCT 32.2* 32.8* 35.1   * 117* 170     Recent Labs     19  04419  0604 09/15/19  0437    144 143   K 3.8 3.6 3.6    107 104   CO2 25 27 32   * 101* 126*   BUN 33* 39* 40*   CREA 1.33* 1.59* 2.09*   CA 8.0* 8.1* 8.9   ALB 2.7*  --  3.2*   TBILI 0.7  --  0.7   SGOT 19  --  15   ALT 39  --  51     Recent Labs     19  0604   LPSE 1,139*         Intake/Output Summary (Last 24 hours) at 2019 1415  Last data filed at 2019 1154  Gross per 24 hour   Intake 525 ml   Output    Net 525 ml     Date 19 0700 - 19 0659   Shift 9538-5838 7012-5422 8779-2591 24 Hour Total   INTAKE   I.V.(mL/kg/hr) 500   500   Shift Total(mL/kg) 500(8.5)   500(8.5)   OUTPUT   Shift Total(mL/kg)       Weight (kg) 59.1 59.1 59.1 59.1     _____________________  Physical Exam:     General:  Alert, cooperative, no distress, appears stated age, says she is \"forgetful\"   Eyes:   Sclera clear. Throat: Lips, mucosa, and tongue normal.   Neck: Supple, symmetrical, trachea midline. Lungs:   Clear to auscultation bilaterally. Heart:  Regular rate and rhythm. Abdomen:    BS heard, some distention but soft, non-tender. No masses,  No organomegaly. Extremities: Extremities normal, atraumatic, no cyanosis or edema. Skin: Skin color, texture, turgor normal. No rashes or lesions.              Assessment:   Active Problems:    Acute pancreatitis (2019)      TOÑA (acute kidney injury) ([facility] (2019)      Hypokalemia (2019)      GI bleed (2019)      Coffee ground emesis (2019)            Plan:     Dr Kari Duenas to see  Await path report from today's EGD    Thank you for allowing us to participate in the care of this patient. Total time spent with patient: 24 minutes. Signed By: Dayron Hadley NP     September 17, 2019    ATTENDING ADDENDUM  I supervised the APC and reviewed the note. We discussed the plan of care  I independently reviewed her imaging studies and agree with the fairly recent onset of duodenal obstruction. I think it is due to her ongoing pancreatitis. Even though she has had a cholecystectomy could her pancreatitis be due to gallstones? MRI several months ago reortedly showed a normal gallbladder, but that must be in error. While things are sorted out I think she needs TPN as well.

## 2019-09-17 NOTE — ROUTINE PROCESS
Bedside shift change report given to Rajeev (oncoming nurse) by Tonie Scales (offgoing nurse). Report included the following information SBAR, Kardex, MAR and Recent Results.

## 2019-09-17 NOTE — PROGRESS NOTES
TRANSFER - OUT REPORT:    Verbal report given to Rajeev(name) on Azra Wiggins  being transferred to (unit) for routine progression of care       Report consisted of patients Situation, Background, Assessment and   Recommendations(SBAR). Information from the following report(s) SBAR, Procedure Summary, Intake/Output and MAR was reviewed with the receiving nurse. Lines:   Peripheral IV 09/14/19 Left Antecubital (Active)   Site Assessment Clean, dry, & intact 9/16/2019  9:01 PM   Phlebitis Assessment 0 9/16/2019  9:01 PM   Infiltration Assessment 0 9/16/2019  9:01 PM   Dressing Status Clean, dry, & intact 9/16/2019  9:01 PM   Dressing Type Transparent 9/16/2019  9:01 PM   Hub Color/Line Status Capped 9/16/2019  9:01 PM   Action Taken Open ports on tubing capped 9/16/2019  9:01 PM   Alcohol Cap Used Yes 9/16/2019  9:01 PM       Peripheral IV 09/16/19 Anterior; Left Forearm (Active)   Site Assessment Clean, dry, & intact 9/16/2019  9:01 PM   Phlebitis Assessment 0 9/16/2019  9:01 PM   Infiltration Assessment 0 9/16/2019  9:01 PM   Dressing Status Clean, dry, & intact 9/16/2019  9:01 PM   Dressing Type Transparent 9/16/2019  9:01 PM   Hub Color/Line Status Pink 9/16/2019  9:01 PM   Alcohol Cap Used Yes 9/16/2019  9:01 PM        Opportunity for questions and clarification was provided.

## 2019-09-17 NOTE — PROCEDURES
Zaira Hyde [facility] [address] M.D.  [place], [address] [facility]  [phone]               Esophagogastroduodenoscopy (EGD) Procedure Note    NAME: Timothy Ramírez  :    MRN:  [id number]    Indications:  Duodenal obstruction     : Britt Montejo MD    Referring Provider:  Estuardo Katz MD    Medicine:  General anesthesia      Procedure Details:  After informed consent was obtained with all risks and benefits of the procedure explained and preprocedure exam completed, the patient was placed in the left lateral decubitus position. Universal protocol for patient identification was performed and documented in the nursing notes. Throughout the procedure, the patient's blood pressure was monitored at least every five minutes; pulse, and oxygen saturations were monitored continuously. All vital signs were documented in the nursing notes. The endoscope was inserted into the mouth and advanced under direct vision to second portion of the duodenum. A careful inspection was made as the gastroscope was withdrawn, including a retroflexed view of the proximal stomach; findings and interventions are described below. Findings:   Esophagus: LA Grade C reflux esophagitis s/p biopsies  Stomach: small hiatal hernia, limited visualization of the fundus due to solid food  Duodenum: bilious liquid suctioned from the bulb, stricture in the second portion of the duodenum with normal appearing mucosa s/p biopsies-unable to pass with endoscope, pediatric colonoscope, or ultraslim colonoscope due to looping.  Able to see just distal to this area which appeared normal    Interventions:    biopsy of esophagus and duodenum    Specimens:   ID Type Source Tests Collected by Time Destination   1 : Duodenal Stricture Biopsies Rule Out Malignancy Preservative   Lynette Whittington MD 2019 1127 Pathology   2 : Esophagus Biopsies Preservative   Lynette Whittington MD 2019 1144 Pathology        EBL: None          Complications:     No immediate complications        Impression:  -See post-procedure diagnoses. Possible extrinsic compression causing duodenal obstruction. Recommendations:  -Await pathology  -PPI daily  -Replace NGT to ILWS  -KUB in recovery  -Will consider repeat CT scan after she is decompressed with the NGT  -General Surgery consultation    Signed by:  Vijaya Benavidez MD         9/17/2019 11:55 AM

## 2019-09-17 NOTE — PROGRESS NOTES
Transition of Care Plan     1. Disposition TBD: Home once medically stable  2. Transport: family   3. Continue Medical Care   4.  Follow up with PCP/Specialist      Rosette Yoon [facility]     [phone]

## 2019-09-17 NOTE — ANESTHESIA POSTPROCEDURE EVALUATION
Post-Anesthesia Evaluation and Assessment    Patient: Rusty Left MRN: [id number]  SSN: [id number]    YOB: 1940  Age: 70 y.o. Sex: female      I have evaluated the patient and they are stable and ready for discharge from the PACU. Cardiovascular Function/Vital Signs  Visit Vitals  /49   Pulse 94   Temp 36.5 °C (97.7 °F)   Resp 22   Wt 59.1 kg (130 lb 4.7 oz)   SpO2 97%   BMI 22.36 kg/m²       Patient is status post General anesthesia for Procedure(s):  ESOPHAGOGASTRODUODENOSCOPY (EGD)  ESOPHAGOGASTRODUODENAL (EGD) BIOPSY. Nausea/Vomiting: None    Postoperative hydration reviewed and adequate. Pain:  Pain Scale 1: Numeric (0 - 10) (09/17/19 0808)  Pain Intensity 1: 0 (09/17/19 0808)   Managed    Neurological Status:   Neuro  Neurologic State: Alert (09/16/19 2101)  Orientation Level: Oriented X4;Other (Comment)(forgetful) (09/16/19 2101)  Cognition: Follows commands (09/16/19 2101)  Speech: Clear (09/16/19 2101)   At baseline    Mental Status, Level of Consciousness: Alert and  oriented to person, place, and time    Pulmonary Status:   O2 Device: Room air (09/17/19 1203)   Adequate oxygenation and airway patent    Complications related to anesthesia: None    Post-anesthesia assessment completed. No concerns    Signed By: Hannah Head MD     September 17, 2019              Procedure(s):  ESOPHAGOGASTRODUODENOSCOPY (EGD)  ESOPHAGOGASTRODUODENAL (EGD) BIOPSY. general    <BSHSIANPOST>    Vitals Value Taken Time   /92 9/17/2019 12:15 PM   Temp     Pulse 90 9/17/2019 12:17 PM   Resp 19 9/17/2019 12:17 PM   SpO2     Vitals shown include unvalidated device data.

## 2019-09-17 NOTE — ROUTINE PROCESS
Faculty or Preceptor Review of Student Work    9/17/2019  - Shift times - 0700 to 1300    The student documentation of patient care for Yomi Brandt has been reviewed and approved.       Creig Barthel, RN

## 2019-09-17 NOTE — PROGRESS NOTES
Bedside and Verbal shift change report given to Jose Antonio (oncoming nurse) by Astrid Faustin (offgoing nurse). Report included the following information SBAR, Kardex and MAR.

## 2019-09-18 NOTE — PROGRESS NOTES
[address]  [address] [facility], [address]       GI PROGRESS NOTE  [facility]  [phone] NP in-hospital cell phone M-F until 4:30  After 5pm or on weekends, please call  for physician on call      NAME: Desean Cox   :  1940   MRN:  [id number]       Subjective:   No acute events. 500mL bilious/green output from NG tube. She reports some reflux/belching. Objective:     VITALS:   Last 24hrs VS reviewed since prior progress note. Most recent are:  Visit Vitals  /76 (BP 1 Location: Right arm, BP Patient Position: At rest)   Pulse 98   Temp 98.9 °F (37.2 °C)   Resp 18   Wt 57 kg (125 lb 9.6 oz)   SpO2 93%   BMI 21.56 kg/m²       PHYSICAL EXAM:  General: Cooperative, no acute distress    Neurologic:  Alert and oriented X 3. HEENT: EOMI, no scleral icterus   Lungs:  CTA bilaterally. No wheezing  Heart:  S1 S2  Abdomen: Soft, non-distended, no tenderness. +Bowel sounds  Extremities: No edema  Psych:   Good insight. Not anxious or agitated. Lab Data Reviewed:     No results found for this or any previous visit (from the past 24 hour(s)). Assessment:   · Duodenal obstruction: EGD 19: LA grade C reflux esophagitis, small hiatal hernia, limited visualization of the fundus due to solid food, duodenum bilious liquid stricture in the 2nd portion unable to pass scope (biopsy)  · Nausea and vomiting with coffee-ground emesis. Hgb 10.3   · Acute on chronic pancreatitis: lipase: 1,139 (), LFTs normal. Abdominal ultrasound (9/15/19): multiple focal lesions in the liver, some suggestive of cysts; other lesions are hyperechoic, nonspecific, possibly representing hemangiomas; trace amount of perihepatic fluid; no intrahepatic ductal dilatation; common bile duct measures 1.2 cm. EGD/EUS in 2019.  CEA 16.8 CA 19-9 3,596  · Constipation  · Acute kidney injury     Patient Active Problem List   Diagnosis Code    Systolic CHF, chronic (HCC) I50.22    NSTEMI (non-ST elevated myocardial infarction) (HCC) I21.4    Coronary artery disease of native artery of native heart with stable angina pectoris (HCC) I25.118    Pure hypercholesterolemia E78.00    Hematoma of rectus sheath S30. 1XXA    Cough due to ACE inhibitor R05, T46.4X5A    Acute pancreatitis K85.90    TOÑA (acute kidney injury) ([facility] N17.9    Hypokalemia E87.6    GI bleed K92.2    Coffee ground emesis K92.0     Plan:   · Pathology pending - requested rush read  · NGT ILWS  · PPI  · Consider CT with contrast, maybe tomorrow if renal function continues to improve  · Appreciate surgery following - TPN     Signed By: Sue Portillo NP     9/18/2019  8:55 AM        This patient was seen and examined by me in a face-to-face visit today. I reviewed the medical record including lab work, imaging and other provider notes. I confirmed the interval history as described above. I spoke to the patient, discussing our findings and plans. I discussed this case in detail with Juvenal JEFFERS. I formulated an updated  assessment of this patient and guided our treatment plan. I agree with the above progress note. I agree with the history, exam and assessment and plan as outlined in the note. I would like to add the following:     Abd: normoactive BS, nt, mild distended, no rebound/guarding. Discussed pathology with patient. Duodenal bx c/w adenocarcinoma, markedly elevated  levels. Await Surgery attending recs.     Dr. Temi Cleary

## 2019-09-18 NOTE — PROGRESS NOTES
Bedside shift change report given to Leonardo Turpin RN (oncoming nurse) by Shanna Wray RN (offgoing nurse). Report included the following information SBAR and Kardex.

## 2019-09-18 NOTE — PROGRESS NOTES
Problem: Falls - Risk of  Goal: *Absence of Falls  Description  Document Rebbecca Persons Fall Risk and appropriate interventions in the flowsheet.   Outcome: Progressing Towards Goal  Note:   Fall Risk Interventions:            Medication Interventions: Patient to call before getting OOB, Teach patient to arise slowly                   Problem: Patient Education: Go to Patient Education Activity  Goal: Patient/Family Education  Outcome: Progressing Towards Goal     Problem: Pain  Goal: *Control of Pain  Outcome: Progressing Towards Goal

## 2019-09-18 NOTE — PROGRESS NOTES
General Surgery Daily Progress Note Admit Date: 2019 Post-Operative Day: 1 Day Post-Op from Procedure(s): ESOPHAGOGASTRODUODENOSCOPY (EGD) ESOPHAGOGASTRODUODENAL (EGD) BIOPSY Subjective:  
 
Last 24 hrs: Pt is resting; no complaints of abd pain or nausea; NGT appears to be putting out a good amt of drainage Path shows adenocarcinoma Objective:  
 
Blood pressure 158/77, pulse 83, temperature 98.3 °F (36.8 °C), resp. rate 17, height 5' 4\" (1.626 m), weight 125 lb 9.6 oz (57 kg), SpO2 93 %. Temp (24hrs), Av.6 °F (37 °C), Min:98 °F (36.7 °C), Max:99 °F (37.2 °C) 
 
 
_____________________ Physical Exam:    
Alert and Oriented, x3, in no acute distress. Cardiovascular: RRR, no peripheral edema Abdomen: soft, few BS heard, NT Assessment:  
Active Problems: 
  Acute pancreatitis (2019) TOÑA (acute kidney injury) ([facility] (2019) Hypokalemia (2019) GI bleed (2019) Coffee ground emesis (2019) Plan:  
 
Surgical plan per Dr José Miguel Cooper Cont NGT Data Review: 
 
Recent Labs  
  19 
0446 19 
0604 WBC 10.9 10.0 HGB 10.3* 10.5* HCT 32.2* 32.8*  
* 117* Recent Labs  
  19 
1305 19 
0446 19 
4655  144 144  
K 3.2* 3.8 3.6 * 108 107 CO2 27 25 27 * 105* 101* BUN 25* 33* 39* CREA 1.04* 1.33* 1.59* CA 8.2* 8.0* 8.1* ALB  --  2.7*  --   
SGOT  --  19  --   
ALT  --  39  --   
 
Recent Labs  
  19 
0604 LPSE 1,139*  
 
 
 
 
______________________ Medications: 
 
Current Facility-Administered Medications Medication Dose Route Frequency  TPN ADULT - PERIPHERAL   IntraVENous CONTINUOUS  
 bisacodyl (DULCOLAX) suppository 10 mg  10 mg Rectal ONCE  
 sodium chloride (NS) flush 5-40 mL  5-40 mL IntraVENous Q8H  
 sodium chloride (NS) flush 5-40 mL  5-40 mL IntraVENous PRN  
 guaiFENesin ER (MUCINEX) tablet 600 mg  600 mg Oral BID PRN  
  [Held by provider] rosuvastatin (CRESTOR) tablet 10 mg  10 mg Oral QHS  [Held by provider] bumetanide (BUMEX) tablet 0.5 mg  0.5 mg Oral DAILY  sodium chloride (NS) flush 5-40 mL  5-40 mL IntraVENous Q8H  
 sodium chloride (NS) flush 5-40 mL  5-40 mL IntraVENous PRN  
 benzocaine-menthol (CEPACOL) lozenge 1 Lozenge  1 Lozenge Mucous Membrane PRN  
 hydrALAZINE (APRESOLINE) 20 mg/mL injection 10 mg  10 mg IntraVENous Q6H PRN  
 sodium chloride (NS) flush 5-40 mL  5-40 mL IntraVENous Q8H  
 sodium chloride (NS) flush 5-40 mL  5-40 mL IntraVENous PRN  
 acetaminophen (TYLENOL) tablet 650 mg  650 mg Oral Q4H PRN  
 ondansetron (ZOFRAN) injection 4 mg  4 mg IntraVENous Q4H PRN  
 lactated Ringers infusion  50 mL/hr IntraVENous CONTINUOUS  
 [Held by provider] carvedilol (COREG) tablet 6.25 mg  6.25 mg Oral BID WITH MEALS  pantoprazole (PROTONIX) 40 mg in sodium chloride 0.9% 10 mL injection  40 mg IntraVENous Q12H Lilliana Spangler NP 
9/18/2019

## 2019-09-18 NOTE — PROGRESS NOTES
The documentation for this period is being entered following the guidelines as defined in the [facility] downtime policy by Rylee Chavez RN. Downtime from 0100 to 0400.

## 2019-09-18 NOTE — PROGRESS NOTES
NUTRITION COMPLETE ASSESSMENT 
 
RECOMMENDATIONS:  
1. Increase PPN tomorrow to:  
 - 4.25%AA, D10 @ 70ml/hr + 500ml, 20% lipids 3x/week ** If central access obtained switch to TPN: 5%AA, D15 @ 63ml/hr + 500ml, 20% lipids 2x/week 2. Check Mg and Phos tomorrow. Follow K+ and replete PRN. - Add 100mg thiamine via PN x 7 days 3. Daily weights while on PN 
4. Diet advancement when appropriate per surgery/GI Interventions/Plan:  
Food/Nutrient Delivery:          Initiate parenteral nutrition Nutrition Education:(PN)   
 
Assessment:  
Reason for Assessment: [x]NPO/Clear Liquid /[x]At Nutrition Risk Diet: NPO 
PPN: 4.25%AA, D10 @ 42ml/hr Nutritionally Significant Medications: [x] Reviewed & Includes: dulcolax, protonix, LR @ 50ml/hr Subjective: \"For example, if I had chicken/vegetable for breakfast I would have a corn dog for lunch and then chicken again for dinner. .. I would alternate. \" 
 
Objective: 
Pt admitted for acute pancreatitis. PMHx:  CHF, CAD, pancreatitis. N/V and constipation prior to admit noted. Duodenal obstruction ? 2/2 pancreatitis per surgery. Hiatal hernia and esophagitis seen during EGD. Path pending. GI and surgery following. NGT remains in place to suction with high output. Pt has been NPO x 4 days. Agree with start of PN today. Currently only with PIV so plans for PPN tonight. If access lost consider PICC placement for full TPN. Recommend: PPN: 4.25%AA, D10 @ 70ml/hr + 500ml, 20% lipids 3x/week [1285kcal, 71g protein, 1680ml fluid] TPN: 5%AA, D15 @ 63ml/hr + 500ml, 20% lipids 2x/week [1358kcal, 76g protein, 1500ml fluid] Both meet 100% energy and protein needs. At moderate risk for refeeding syndrome but K+ low today. Continue to follow electrolytes with repletion PRN. Add 100mg thiamine x 7 days. Pt visited today. She reports limited food variety d/t decrease interest in food for past 2 months.  Eating 3 meals/day but limited to chicken/vegetable or a corn dog. Discussed plans for PN to start, no questions at this time. No wt loss reported by pt but wt hx showing wt loss of at least 5+lbs over past 7 months. Follow trends. Wt Readings from Last 10 Encounters:  
09/18/19 57 kg (125 lb 9.6 oz) 04/17/19 56.7 kg (125 lb) 04/15/19 61.7 kg (136 lb)  
03/11/19 61.7 kg (136 lb)  
02/21/19 61.7 kg (136 lb) 02/08/19 65.5 kg (144 lb 6.4 oz) 01/21/19 67.6 kg (149 lb)  
01/19/19 63.5 kg (139 lb 15.9 oz) 01/15/19 59 kg (130 lb) 11/01/17 63 kg (139 lb) Will continue to follow for PPN/TPN vs diet advancement, wt trends. Estimated Nutrition Needs:  
Kcals/day: 3402 Kcals/day(1238-1342kcal) Protein: 68 g(68-74g (1.2-1.4g/kg (pancreatitis))) Fluid: 1425 ml(25ml/kg) Based On: [place] Jeor(x 1.2-1.3) Weight Used: Actual wt(57kg) Pt expected to meet estimated nutrient needs:  []   Yes     []  No [] Unable to predict at this time Nutrition Diagnosis:  
1. Inadequate oral intake related to altered GI fx as evidenced by duodenal stricture/pancreatitis; NPO with plans for PPN Goals:   
 PN meeting at least 90% needs in 1-2 days Monitoring & Evaluation: - Total energy intake, Enteral/parenteral nutrition intake - Weight/weight change, GI 
 
Previous Nutrition Goals Met:   N/A Previous Recommendations:    N/A Education & Discharge Needs: 
 [] None Identified 
 [x] Identified and addressed [x] Participated in care plan, discharge planning, and/or interdisciplinary rounds Cultural, Mandaen and ethnic food preferences identified: None Skin Integrity: [x]Intact  []Other Edema: [x]None []Other Last BM: PTA Food Allergies: [x]None []Other Diet Restrictions: Cultural/Taoism Preference(s): None Anthropometrics:   
Weight Loss Metrics 9/18/2019 4/17/2019 4/15/2019 3/11/2019 2/21/2019 2/8/2019 1/21/2019 Today's Wt 125 lb 9.6 oz 125 lb 136 lb 136 lb 136 lb 144 lb 6.4 oz 149 lb BMI 21.56 kg/m2 21.46 kg/m2 23.34 kg/m2 23.34 kg/m2 23.34 kg/m2 24.79 kg/m2 25.58 kg/m2 Weight Source: Standing scale (comment) Height: 5' 4\" (162.6 cm), Height Source: (drivers license) Body mass index is 21.56 kg/m². IBW : 54.4 kg (120 lb), % IBW (Calculated): 104.67 % Usual Body Weight: 59 kg (130 lb),   
 
Labs:   
Lab Results Component Value Date/Time Sodium 145 09/18/2019 01:05 PM  
 Potassium 3.2 (L) 09/18/2019 01:05 PM  
 Chloride 109 (H) 09/18/2019 01:05 PM  
 CO2 27 09/18/2019 01:05 PM  
 Glucose 105 (H) 09/18/2019 01:05 PM  
 BUN 25 (H) 09/18/2019 01:05 PM  
 Creatinine 1.04 (H) 09/18/2019 01:05 PM  
 Calcium 8.2 (L) 09/18/2019 01:05 PM  
 Magnesium 2.1 02/01/2019 02:42 AM  
 Phosphorus 4.3 02/03/2019 01:12 AM  
 Albumin 2.7 (L) 09/17/2019 04:46 AM  
 
Reggie Rendon, RD [address] , Pager #[phone] or [phone]

## 2019-09-18 NOTE — PROGRESS NOTES
Bedside and Verbal shift change report given to Jose Antonio (oncoming nurse) by Manish Taylor (offgoing nurse). Report included the following information SBAR, Kardex and MAR.

## 2019-09-18 NOTE — PROGRESS NOTES
Hospitalist Progress Note                               Solomon Woodard MD                                     Answering service: [phone] or [phone] from in house phone                                         Date of Service:  2019  NAME:  Loli Alva  :  1940  MRN:  [id number]      Admission Summary:     51-year-old female with a past medical history significant for coronary artery disease, non-ST-elevation myocardial infarction, dyslipidemia, chronic systolic congestive heart failure, irritable bowel syndrome and pancreatitis, was brought to the emergency room from home via private vehicle accompanied by her sister for complaints of an approximately 3-day history of progressively worsening nausea and coffee-grounds vomitus. The patient also complained of some intermittent mild abdominal pain with some radiation to the upper back. The patient described the pain as being dull achy in nature, at times about 6-7 on 10 in intensity. The patient denied associated headaches, dizziness, visual deficits, chest pains, palpitations, shortness of breath, cough, fevers, chills, hematuria, bladder or bowel irregularities. Reason for follow up:       CC:Nausea, coffee-grounds vomitus. NG tube in place  She denies having a bowel movement since admission  Not passing gas      Assessment & Plan:     1) GI: Acute on chronic recurrent Pancreatitis of unclear etiology   - could be a pancreatic mass vs Inflammation of Pancreas   H/O cholecystectomy. Normal triglyceride level. EGD initially -  Done aborted due to inability to pass scope down to Duodenum  CT abd/Pelvis   GOO and possible stricture along duodenum which is causing obstruction of   Rt Hydronephrosis  No s/o necrosis   Ct scan was done before EGD- suctioned out fluid.    NGtube for decompression  Check CEA- normal  Ca19-9  -   NPO now    - Re Endoscopy  Surgery consulted  Continue NPO status  Ordered PPN per surgery recs    2) Hematology: H/h stable - normochromic normocytic anemia. C/W PPI    2) Renal: TOÑA probably due to matthieuley Hydronephrosis noted on Ct scan today- D/w radiology   Improving with Decompression of Stomach  Needs Short interval Recheck of kidneys via imaging    3) Electrolytes: Replete potassium PRN    4) CVS: CAD. H/O NSTEMI. Dyslipidemia. Monitor Fluid Status     5) ID: UTI ruled out    Urine cultures negative  D/C Ceftriaxone    6) Prophylaxis: GI and DVT (SCDs due to probable GI bleed). 7) Directives: DNR/DNI with a guarded prognosis. D/W patient and family. 8) D/W patient, patient's Son and daughter Sita Kwong ([phone]) at bedside      Hospital Problems  Date Reviewed: 9/14/2019          Codes Class Noted POA    Acute pancreatitis ICD-10-CM: K85.90  ICD-9-CM: 084.3  9/14/2019 Yes        TOÑA (acute kidney injury) ([facility] [address] ICD-10-CM: N17.9  ICD-9-CM: 584.9  9/14/2019 Yes        Hypokalemia ICD-10-CM: E87.6  ICD-9-CM: 276.8  9/14/2019 Yes        GI bleed ICD-10-CM: K92.2  ICD-9-CM: 578.9  9/14/2019 Yes        Coffee ground emesis ICD-10-CM: K92.0  ICD-9-CM: 578.0  9/14/2019 Yes                Physical Examination:      Last 24hrs VS reviewed since prior progress note. Most recent are:  Visit Vitals  /76 (BP 1 Location: Right arm, BP Patient Position: At rest)   Pulse 98   Temp 98.9 °F (37.2 °C)   Resp 18   Wt 57 kg (125 lb 9.6 oz)   SpO2 93%   BMI 21.56 kg/m²           Constitutional:  No acute distress, cooperative, pleasant    HEENT: Head is a traumatic,  Un icteric sclera. Pink conjunctiva,no erythema or discharge. Oral mucous moist, oropharynx benign. Neck supple, NG in place   Resp:  Decreased at bases. CV:  S1, S2 present. GI:  Soft, non distended, non tender.  hypoactive bowel sounds, no hepatosplenomegaly    :  No CVA or suprapubic tenderness   Skin  :  No erythema,rash,bullae,dipigmentation Musculoskeletal:  Bipedal edema. Neurologic:  Awake ,alert and oriented. Intake/Output Summary (Last 24 hours) at 9/18/2019 0740  Last data filed at 9/18/2019 0530  Gross per 24 hour   Intake 500 ml   Output 500 ml   Net 0 ml              Labs:     Recent Labs     09/17/19 0446 09/16/19 0604   WBC 10.9 10.0   HGB 10.3* 10.5*   HCT 32.2* 32.8*   * 117*     Recent Labs     09/17/19 0446 09/16/19 0604    144   K 3.8 3.6    107   CO2 25 27   BUN 33* 39*   CREA 1.33* 1.59*   * 101*   CA 8.0* 8.1*     Recent Labs     09/17/19 0446 09/16/19 0604   SGOT 19  --    ALT 39  --      --    TBILI 0.7  --    TP 5.8*  --    ALB 2.7*  --    GLOB 3.1  --    LPSE  --  1,139*     No results for input(s): INR, PTP, APTT in the last 72 hours. No lab exists for component: INREXT, INREXT   No results for input(s): FE, TIBC, PSAT, FERR in the last 72 hours. No results found for: FOL, RBCF   No results for input(s): PH, PCO2, PO2 in the last 72 hours. No results for input(s): CPK, CKNDX, TROIQ in the last 72 hours.     No lab exists for component: CPKMB  Lab Results   Component Value Date/Time    Cholesterol, total 85 09/15/2019 04:37 AM    HDL Cholesterol 43 09/15/2019 04:37 AM    LDL, calculated 29 09/15/2019 04:37 AM    Triglyceride 65 09/15/2019 04:37 AM    CHOL/HDL Ratio 2.0 09/15/2019 04:37 AM     Lab Results   Component Value Date/Time    Glucose (POC) 102 (H) 01/18/2019 06:23 AM    Glucose (POC) 139 (H) 01/17/2019 09:05 PM    Glucose (POC) 114 (H) 01/17/2019 04:31 PM    Glucose (POC) 106 (H) 01/17/2019 11:41 AM    Glucose (POC) 132 (H) 01/16/2019 09:13 PM     Lab Results   Component Value Date/Time    Color YELLOW/STRAW 09/15/2019 12:33 AM    Appearance CLOUDY (A) 09/15/2019 12:33 AM    Specific gravity 1.019 09/15/2019 12:33 AM    pH (UA) 6.0 09/15/2019 12:33 AM    Protein 100 (A) 09/15/2019 12:33 AM    Glucose NEGATIVE  09/15/2019 12:33 AM    Ketone NEGATIVE  09/15/2019 12:33 AM    Bilirubin NEGATIVE  09/15/2019 12:33 AM    Urobilinogen 0.2 09/15/2019 12:33 AM    Nitrites NEGATIVE  09/15/2019 12:33 AM    Leukocyte Esterase SMALL (A) 09/15/2019 12:33 AM    Epithelial cells MODERATE (A) 09/15/2019 12:33 AM    Bacteria 2+ (A) 09/15/2019 12:33 AM    WBC 10-20 09/15/2019 12:33 AM    RBC 0-5 09/15/2019 12:33 AM         Medications Reviewed:     Current Facility-Administered Medications   Medication Dose Route Frequency    sodium chloride (NS) flush 5-40 mL  5-40 mL IntraVENous Q8H    sodium chloride (NS) flush 5-40 mL  5-40 mL IntraVENous PRN    guaiFENesin ER (MUCINEX) tablet 600 mg  600 mg Oral BID PRN    [Held by provider] rosuvastatin (CRESTOR) tablet 10 mg  10 mg Oral QHS    [Held by provider] bumetanide (BUMEX) tablet 0.5 mg  0.5 mg Oral DAILY    [Held by provider] potassium chloride (KLOR-CON) packet for solution 20 mEq  20 mEq Oral BID WITH MEALS    polyethylene glycol (MIRALAX) packet 17 g  17 g Oral DAILY    sodium chloride (NS) flush 5-40 mL  5-40 mL IntraVENous Q8H    sodium chloride (NS) flush 5-40 mL  5-40 mL IntraVENous PRN    benzocaine-menthol (CEPACOL) lozenge 1 Lozenge  1 Lozenge Mucous Membrane PRN    hydrALAZINE (APRESOLINE) 20 mg/mL injection 10 mg  10 mg IntraVENous Q6H PRN    sodium chloride (NS) flush 5-40 mL  5-40 mL IntraVENous Q8H    sodium chloride (NS) flush 5-40 mL  5-40 mL IntraVENous PRN    acetaminophen (TYLENOL) tablet 650 mg  650 mg Oral Q4H PRN    ondansetron (ZOFRAN) injection 4 mg  4 mg IntraVENous Q4H PRN    lactated Ringers infusion  50 mL/hr IntraVENous CONTINUOUS    [Held by provider] carvedilol (COREG) tablet 6.25 mg  6.25 mg Oral BID WITH MEALS    pantoprazole (PROTONIX) 40 mg in sodium chloride 0.9% 10 mL injection  40 mg IntraVENous Q12H     ______________________________________________________________________  EXPECTED LENGTH OF STAY: 3d 4h  ACTUAL LENGTH OF STAY:          4                 Radha Beasley MD

## 2019-09-19 NOTE — PROGRESS NOTES
Bedside and Written shift change report given to [phone] (oncoming nurse) by Tennille Doshi (offgoing nurse). Report included the following information SBAR, Kardex and MAR.

## 2019-09-19 NOTE — PROGRESS NOTES
[facility] 
Daily Progress Note Admit Date: 2019 Post-Operative Day: 2 from Procedure(s): ESOPHAGOGASTRODUODENOSCOPY (EGD) ESOPHAGOGASTRODUODENAL (EGD) BIOPSY Subjective:  
 
Last 24 hrs: No complaints at present. Pain is controlled. NG working well. On TPN. No flatus or BM. Pathology noted. Objective:  
 
Blood pressure 145/80, pulse 94, temperature 98.3 °F (36.8 °C), resp. rate 16, height 5' 4\" (1.626 m), weight 53.5 kg (118 lb), SpO2 93 %. Temp (24hrs), Av.7 °F (37.1 °C), Min:98.3 °F (36.8 °C), Max:98.8 °F (37.1 °C) 
 
 
_____________________ Physical Exam:    
Alert and Oriented, sitting up in bed, no acute distress. Cardiovascular: RRR, no peripheral edema Lungs:CTAB Abdomen: soft, NT. NG in place with light green bilious fluid. Assessment:  
Active Problems: 
  Acute pancreatitis (2019) TOÑA (acute kidney injury) ([facility] [address] (2019) Hypokalemia (2019) GI bleed (2019) Coffee ground emesis (2019) 
 
duodenal obstruction due to stricture. 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 Jamie Caceres: 
Needs further studies to determine if she is a surgical candidate - MRI vs CT with contrast.  Will defer to GI/primary team to proceed with imaging given her medical issues. If resection is not an option, may benefit from duodenal stent vs palliative bypass TPN per primary team 
 
 
 
Carlton Crandall [address] [facility] at [address] , [address] [place], [place] 
[phone] Data Review: 
 
Recent Labs  
  19 
0446 WBC 10.9 HGB 10.3* HCT 32.2*  
* Recent Labs  
  19 
0407 19 
1305 19 
0446  145 144  
K 3.2* 3.2* 3.8  109* 108 CO2 29 27 25 * 105* 105* BUN 21* 25* 33* CREA 1.07* 1.04* 1.33* CA 8.8 8.2* 8.0* ALB  --   --  2.7* TBILI  --   --  0.7 SGOT  --   --  19 ALT  --   --  39  
 
 No results for input(s): AML, LPSE in the last 72 hours. ______________________ Medications: 
 
Current Facility-Administered Medications Medication Dose Route Frequency  TPN ADULT - PERIPHERAL   IntraVENous CONTINUOUS  
 sodium chloride (NS) flush 5-40 mL  5-40 mL IntraVENous Q8H  
 sodium chloride (NS) flush 5-40 mL  5-40 mL IntraVENous PRN  
 guaiFENesin ER (MUCINEX) tablet 600 mg  600 mg Oral BID PRN  
 [Held by provider] rosuvastatin (CRESTOR) tablet 10 mg  10 mg Oral QHS  [Held by provider] bumetanide (BUMEX) tablet 0.5 mg  0.5 mg Oral DAILY  sodium chloride (NS) flush 5-40 mL  5-40 mL IntraVENous Q8H  
 sodium chloride (NS) flush 5-40 mL  5-40 mL IntraVENous PRN  
 benzocaine-menthol (CEPACOL) lozenge 1 Lozenge  1 Lozenge Mucous Membrane PRN  
 hydrALAZINE (APRESOLINE) 20 mg/mL injection 10 mg  10 mg IntraVENous Q6H PRN  
 sodium chloride (NS) flush 5-40 mL  5-40 mL IntraVENous Q8H  
 sodium chloride (NS) flush 5-40 mL  5-40 mL IntraVENous PRN  
 acetaminophen (TYLENOL) tablet 650 mg  650 mg Oral Q4H PRN  
 ondansetron (ZOFRAN) injection 4 mg  4 mg IntraVENous Q4H PRN  
 lactated Ringers infusion  50 mL/hr IntraVENous CONTINUOUS  
 [Held by provider] carvedilol (COREG) tablet 6.25 mg  6.25 mg Oral BID WITH MEALS  pantoprazole (PROTONIX) 40 mg in sodium chloride 0.9% 10 mL injection  40 mg IntraVENous Q12H

## 2019-09-19 NOTE — PROGRESS NOTES
Transition of Care Plan 
  
1. Disposition TBD: Home once medically stable 2. Transport: family  
3. Continue Medical Care 4. Follow up with PCP/Specialist  
  
Rosette Jacome Clinical  [phone]

## 2019-09-19 NOTE — PROGRESS NOTES
[address], [address] GI PROGRESS NOTE 
 
 
NAME: Carlotta Stanton :  1940 MRN:  [id number] Subjective: No acute events. Objective: VITALS:  
Last 24hrs VS reviewed since prior progress note. Most recent are: 
Visit Vitals /80 (BP 1 Location: Right arm, BP Patient Position: At rest) Pulse 94 Temp 98.3 °F (36.8 °C) Resp 16 Ht 5' 4\" (1.626 m) Wt 53.5 kg (118 lb) SpO2 93% BMI 20.25 kg/m² PHYSICAL EXAM: 
General: Cooperative, no acute distress   
HEENT: EOMI, no scleral icterus Lungs:  CTA bilaterally. No wheezing Heart:  S1 S2 Abdomen: Soft, non-distended, no tenderness. +Bowel sounds Extremities: No edema Psych:   Good insight. Not anxious or agitated. Lab Data Reviewed:  
 
Recent Results (from the past 24 hour(s)) METABOLIC PANEL, BASIC Collection Time: 19  4:07 AM  
Result Value Ref Range Sodium 142 136 - 145 mmol/L Potassium 3.2 (L) 3.5 - 5.1 mmol/L Chloride 105 97 - 108 mmol/L  
 CO2 29 21 - 32 mmol/L Anion gap 8 5 - 15 mmol/L Glucose 114 (H) 65 - 100 mg/dL BUN 21 (H) 6 - 20 MG/DL Creatinine 1.07 (H) 0.55 - 1.02 MG/DL  
 BUN/Creatinine ratio 20 12 - 20 GFR est AA >60 >60 ml/min/1.73m2 GFR est non-AA 50 (L) >60 ml/min/1.73m2 Calcium 8.8 8.5 - 10.1 MG/DL Assessment:  
· Duodenal obstruction-adenocarcinoma: EGD 19: LA grade C reflux esophagitis, small hiatal hernia, limited visualization of the fundus due to solid food, duodenum bilious liquid stricture in the 2nd portion unable to pass scope (biopsy) · Nausea and vomiting with coffee-ground emesis. Hgb 10.3 · Acute on chronic pancreatitis: lipase: 1,139 (), LFTs normal. Abdominal ultrasound (9/15/19): multiple focal lesions in the liver, some suggestive of cysts; other lesions are hyperechoic, nonspecific, possibly representing hemangiomas; trace amount of perihepatic fluid; no intrahepatic ductal dilatation; common bile duct measures 1.2 cm. EGD/EUS in 4/2019. CEA 16.8 CA 19-9 3,596 · Constipation · Acute kidney injury Patient Active Problem List  
Diagnosis Code  Systolic CHF, chronic (HCC) I50.22  
 NSTEMI (non-ST elevated myocardial infarction) (HCC) I21.4  Coronary artery disease of native artery of native heart with stable angina pectoris (HCC) I25.118  
 Pure hypercholesterolemia E78.00  
 Hematoma of rectus sheath S30. Amy Skillern  Cough due to ACE inhibitor R05, T46.4X5A  Acute pancreatitis K85.90  
 TOÑA (acute kidney injury) ([facility] [address] N17.9  Hypokalemia E87.6  GI bleed K92.2  Coffee ground emesis K92.0 Plan:  
-     NGT ILWS 
· PPI 
· Ordered for MRI-renal function improved. · Appreciate surgery following - TPN Signed By: Linda Marti MD   
 9/19/2019  8:55 AM

## 2019-09-20 NOTE — PROGRESS NOTES
ptt is 85.0, decreased by 2 units/kg/hr per protocol. Will repeat PTT in 6 hours, and continue to monitor.

## 2019-09-20 NOTE — PROGRESS NOTES
[facility] [address] [place] [address] [address], [address] GI PROGRESS NOTE Colin Larkin, [address] office [phone] NP in-hospital cell phone M-F until 4:30 After 5pm or on weekends, please call  for physician on call NAME: Timothy Ramírez :  1940 MRN:  [id number] Subjective: No acute events. NG tube continues with bilious output Objective: VITALS:  
Last 24hrs VS reviewed since prior progress note. Most recent are: 
Visit Vitals /76 (BP 1 Location: Right arm, BP Patient Position: At rest) Pulse 83 Temp 98.1 °F (36.7 °C) Resp 17 Ht 5' 4\" (1.626 m) Wt 53.5 kg (118 lb) SpO2 94% BMI 20.25 kg/m² PHYSICAL EXAM: 
General: Cooperative, no acute distress   
Neurologic:  Alert and oriented X 3. HEENT: EOMI, no scleral icterus Lungs:  CTA bilaterally. No wheezing Heart:  S1 S2 Abdomen: Soft, non-distended, no tenderness. +Bowel sounds, NG tube bilious output Extremities: No edema Psych:   Good insight. Not anxious or agitated. Lab Data Reviewed:  
 
Recent Results (from the past 24 hour(s)) PTT Collection Time: 19  6:50 PM  
Result Value Ref Range aPTT 22.8 22.1 - 32.0 sec  
 aPTT, therapeutic range     58.0 - 77.0 SECS  
CBC WITH AUTOMATED DIFF Collection Time: 19  6:50 PM  
Result Value Ref Range WBC 8.6 3.6 - 11.0 K/uL  
 RBC 4.54 3.80 - 5.20 M/uL  
 HGB 13.7 11.5 - 16.0 g/dL HCT 41.8 35.0 - 47.0 % MCV 92.1 80.0 - 99.0 FL  
 MCH 30.2 26.0 - 34.0 PG  
 MCHC 32.8 30.0 - 36.5 g/dL  
 RDW 12.9 11.5 - 14.5 % PLATELET 013 016 - 912 K/uL MPV 11.2 8.9 - 12.9 FL  
 NRBC 0.0 0  WBC ABSOLUTE NRBC 0.00 0.00 - 0.01 K/uL NEUTROPHILS 76 (H) 32 - 75 % LYMPHOCYTES 15 12 - 49 % MONOCYTES 7 5 - 13 % EOSINOPHILS 1 0 - 7 % BASOPHILS 1 0 - 1 % IMMATURE GRANULOCYTES 1 (H) 0.0 - 0.5 % ABS. NEUTROPHILS 6.5 1.8 - 8.0 K/UL  
 ABS. LYMPHOCYTES 1.3 0.8 - 3.5 K/UL ABS. MONOCYTES 0.6 0.0 - 1.0 K/UL  
 ABS. EOSINOPHILS 0.1 0.0 - 0.4 K/UL  
 ABS. BASOPHILS 0.0 0.0 - 0.1 K/UL  
 ABS. IMM. GRANS. 0.0 0.00 - 0.04 K/UL  
 DF AUTOMATED METABOLIC PANEL, COMPREHENSIVE Collection Time: 09/20/19  1:16 AM  
Result Value Ref Range Sodium 139 136 - 145 mmol/L Potassium 2.7 (LL) 3.5 - 5.1 mmol/L Chloride 104 97 - 108 mmol/L  
 CO2 29 21 - 32 mmol/L Anion gap 6 5 - 15 mmol/L Glucose 136 (H) 65 - 100 mg/dL BUN 17 6 - 20 MG/DL Creatinine 0.90 0.55 - 1.02 MG/DL  
 BUN/Creatinine ratio 19 12 - 20 GFR est AA >60 >60 ml/min/1.73m2 GFR est non-AA >60 >60 ml/min/1.73m2 Calcium 8.5 8.5 - 10.1 MG/DL Bilirubin, total 0.6 0.2 - 1.0 MG/DL  
 ALT (SGPT) 29 12 - 78 U/L  
 AST (SGOT) 17 15 - 37 U/L Alk. phosphatase 121 (H) 45 - 117 U/L Protein, total 6.3 (L) 6.4 - 8.2 g/dL Albumin 2.9 (L) 3.5 - 5.0 g/dL Globulin 3.4 2.0 - 4.0 g/dL A-G Ratio 0.9 (L) 1.1 - 2.2 LIPASE Collection Time: 09/20/19  1:16 AM  
Result Value Ref Range Lipase 411 (H) 73 - 393 U/L  
CBC WITH AUTOMATED DIFF Collection Time: 09/20/19  1:16 AM  
Result Value Ref Range WBC 10.2 3.6 - 11.0 K/uL  
 RBC 3.80 3.80 - 5.20 M/uL  
 HGB 11.5 11.5 - 16.0 g/dL HCT 34.9 (L) 35.0 - 47.0 % MCV 91.8 80.0 - 99.0 FL  
 MCH 30.3 26.0 - 34.0 PG  
 MCHC 33.0 30.0 - 36.5 g/dL  
 RDW 12.9 11.5 - 14.5 % PLATELET 422 264 - 323 K/uL MPV 11.4 8.9 - 12.9 FL  
 NRBC 0.0 0  WBC ABSOLUTE NRBC 0.00 0.00 - 0.01 K/uL NEUTROPHILS 68 32 - 75 % LYMPHOCYTES 18 12 - 49 % MONOCYTES 9 5 - 13 % EOSINOPHILS 3 0 - 7 % BASOPHILS 1 0 - 1 % IMMATURE GRANULOCYTES 1 (H) 0.0 - 0.5 % ABS. NEUTROPHILS 7.1 1.8 - 8.0 K/UL  
 ABS. LYMPHOCYTES 1.9 0.8 - 3.5 K/UL  
 ABS. MONOCYTES 0.9 0.0 - 1.0 K/UL  
 ABS. EOSINOPHILS 0.3 0.0 - 0.4 K/UL  
 ABS. BASOPHILS 0.1 0.0 - 0.1 K/UL  
 ABS. IMM. GRANS. 0.1 (H) 0.00 - 0.04 K/UL  
 DF AUTOMATED    
PTT  Collection Time: 09/20/19  1:16 AM  
 Result Value Ref Range aPTT 43.5 (H) 22.1 - 32.0 sec  
 aPTT, therapeutic range     58.0 - 77.0 SECS  
MAGNESIUM Collection Time: 09/20/19  1:16 AM  
Result Value Ref Range Magnesium 1.8 1.6 - 2.4 mg/dL PTT Collection Time: 09/20/19  7:51 AM  
Result Value Ref Range aPTT 85.0 (H) 22.1 - 32.0 sec  
 aPTT, therapeutic range     58.0 - 77.0 SECS IMPRESSION: 
1. Interval development of diffuse liver metastatic disease. 2.  Large mass centered on the pancreatic head and proximal horizontal portion 
of the duodenum. There is narrowing of the duodenal lumen. Possibilities 
include a pancreatic or duodenal primary. The mass appears to be centered on the 
pancreatic head and there is common bile duct dilatation however the pancreatic 
duct is normal in caliber. 3.  Left lower lobe airspace disease may represent atelectasis or pneumonia. 4.  Possible nonocclusive thrombus in the intrahepatic IVC. 5.  The SMV is occluded. Assessment:  
· Duodenal obstruction/adenocarcinoma: EGD 9/18/19: LA grade C reflux esophagitis, small hiatal hernia, limited visualization of the fundus due to solid food, duodenum bilious liquid stricture in the 2nd portion unable to pass scope (biopsy +adenocarcinoma) · Nausea and vomiting with coffee-ground emesis. Hgb 11.5 · IVC thrombus: on heparin · Acute on chronic pancreatitis: lipase: 1,139 (9/16), LFTs normal. Abdominal ultrasound (9/15/19): multiple focal lesions in the liver, some suggestive of cysts; other lesions are hyperechoic, nonspecific, possibly representing hemangiomas; trace amount of perihepatic fluid; no intrahepatic ductal dilatation; common bile duct measures 1.2 cm. EGD/EUS in 4/2019. CEA 16.8 CA 19-9 3,596 · Constipation · Acute kidney injury Patient Active Problem List  
Diagnosis Code  Systolic CHF, chronic (HCC) I50.22  
 NSTEMI (non-ST elevated myocardial infarction) (HCC) I21.4  Coronary artery disease of native artery of native heart with stable angina pectoris (HCC) I25.118  
 Pure hypercholesterolemia E78.00  
 Hematoma of rectus sheath S30. Claudia Atwood  Cough due to ACE inhibitor R05, T46.4X5A  Acute pancreatitis K85.90  
 TOÑA (acute kidney injury) ([facility] N17.9  Hypokalemia E87.6  GI bleed K92.2  Coffee ground emesis K92.0 Plan:  
· NGT ILWS 
· PPI · Appreciate surgery following - TPN, for palliative bypass vs duodenal stent · Medical oncology consulted Signed By: Nikki Alan NP   
 9/20/2019  8:55 AM  
 
  This patient was seen and examined by me in a face-to-face visit today. I reviewed the medical record including lab work, imaging and other provider notes. I confirmed the interval history as described above. I spoke to the patient, discussing our findings and plans. I discussed this case in detail with Juvenal JEFFERS. I formulated an updated  assessment of this patient and guided our treatment plan. I agree with the above progress note. I agree with the history, exam and assessment and plan as outlined in the note. I would like to add the following:  
 
Abd: normoactive BS, nt, mild distention, no rebound/guarding. TPN. Surgery following and await their recs. Hgb stable on Heparin gtt. Dr. Donna Barraza

## 2019-09-20 NOTE — PROCEDURES
PICC Placement Note PRE-PROCEDURE VERIFICATION Correct Procedure: yes Correct Site:  yes Temperature: Temp: 98.1 °F (36.7 °C), Temperature Source: Temp Source: Oral 
Recent Labs  
  09/20/19 
0116 BUN 17 CREA 0.90  WBC 10.2 Allergies: Penicillins Education materials, including PICC Booklet and written information regarding central catheter related bloodstream infection and prevention given to patient. See Patient Education activity for further details. PROCEDURE DETAIL A double lumen power injectable PICC line was started for TPN. The following documentation is in addition to the PICC properties in the lines/airways flowsheet : 
Lot #: [id number] Xylocaine 1% used intradermally:  yes Total Catheter Length:  34 (cm) External Catheter Length: 0 (cm) Circumference: 27 (cm) Vein Selection for PICC: right basilic Central Line Bundle followed: yes Complication Related to Insertion: none The placement was verified by ECG technology. The PICC is on the right side and the tip overlies the superior vena cava. ECG verification documentation is on the patient's paper chart. Line is okay to use. Report to [facility]. Zoya Murphy, CL[place]ORDN, RN, VA-BC  Vascular Access Team

## 2019-09-20 NOTE — PROGRESS NOTES
NUTRITION brief Recommendations: 1. Reduce TPN tomorrow to avoid overfeeding: - 5%AA, D15 @ 63ml/hr + 500ml, 20% lipids 2x/week 2. Continue to monitor K+ after repletion. Continue 40mEq/day in TPN until WNL, than reduce to 20mEq/day 3. Daily weights while on TPN Diet: NPO 
TPN: 5%AA, D20 @ 75ml/hr Meds: protonix, KCl Chart reviewed for TPN check. Biopsy positive for adenocarcinoma with imaging showing numerous liver lesions. GI and surgery following for possible surgery vs stent vs palliative bypass. NGT remains to suction at this time with hypokalemia noted. Likely d/t GI losses. K+ increase in TPN for tonight with and KCl piggy back this afternoon. PICC placed today with full TPN to start. Order to start tonight to provide: 1478kcal, 90g protein, 1800ml fluid. Reduce TPN tomorrow: 5%AA, D15 @ 63ml/hr + 500ml, 20% lipids 2x/week [provides: 1358kcal, 76g protein, 1500ml fluid]. Meets 100% energy and protein needs. Will continue to follow for plan of care, TPN, K+, wt trends. Estimated Nutrition Needs:  
Kcals/day: 9662 Kcals/day(1238-1342kcal) Protein: 68 g(68-74g (1.2-1.4g/kg (pancreatitis))) Fluid: 1425 ml(25ml/kg) Based On: Yael [address] Kem(x 1.2-1.3) Weight Used: Actual wt(57kg) [facility], [address] , Pager #[phone] or [phone]

## 2019-09-20 NOTE — CONSULTS
[facility], [address] [place] [address] Road, [address] [place] [address] W: [phone]  F: [phone] Reason for Visit:  
Solomon Becerra is a 70 y.o. female who is seen in consultation at the request of Dr. Jasen Stapleton for evaluation of stage IV pancreatic cancer. Treatment History: · Biopsy with adenocarcinoma of the pancreas metastatic to liver History of Present Illness: The patient is a very pleasant 72-year-old be younger than her stated age she is been doing fairly well she started having some GI issues in January of this year. But over the last month or so she is been having more trouble and read more recently has been having trouble with nausea and vomiting. Because of the persistent or GI issues with the nausea and vomiting she is now admitted and was found to have a pancreatic cancer metastatic to her liver. The patient has been able to maintain her weight. The patient has lost a couple pounds since her last admission. According to the family the patient initially had lost weight when she was in the hospital then a little bit afterwards and stabilized her weight pattern and it is continued in a stable pattern The patient denied trouble with urination or with breathing she denied any ENT symptomatology. Has had a little bit of trouble with constipation Past Medical History:  
Diagnosis Date  Coronary artery disease of native artery of native heart with stable angina pectoris ([address] 2/28/2019  Cough due to ACE inhibitor  Hematoma of rectus sheath 2/28/2019  IBS (irritable bowel syndrome) IBS  NSTEMI (non-ST elevated myocardial infarction) ([address] 2/28/2019  Pancreatitis  Pure hypercholesterolemia 2/28/2019  Systolic CHF, chronic ([address] 2/28/2019 Past Surgical History:  
Procedure Laterality Date  COLONOSCOPY Left 11/1/2017 COLONOSCOPY performed by Toribio Nix MD at [address] [place] [address]  HX CHOLECYSTECTOMY  HX GI    
 surgery for hiatal hernia  HX ORTHOPAEDIC    
 DJD, doing PT weekly through [facility]  IR OCCL [facility]  1/30/2019 Social History Tobacco Use  Smoking status: Former Smoker  Smokeless tobacco: Never Used  Tobacco comment: quit smoking cigarettes 6 yrs ago Substance Use Topics  Alcohol use: Yes Comment: very occasional  
  
Family History Problem Relation Age of Onset  Dementia Mother   
     alzheimers  Cancer Sister   
     gallbladder  Cancer Brother   
     pancreatic  Breast Cancer Paternal Grandmother  Dementia Sister   
     alzheimers  Heart Surgery Brother   
     bypass Current Facility-Administered Medications Medication Dose Route Frequency  TPN ADULT - CENTRAL   IntraVENous CONTINUOUS  
 TPN ADULT - PERIPHERAL   IntraVENous CONTINUOUS  
 heparin 25,000 units in D5W 250 ml infusion  18-36 Units/kg/hr IntraVENous TITRATE  guaiFENesin ER (MUCINEX) tablet 600 mg  600 mg Oral BID PRN  
 [Held by provider] rosuvastatin (CRESTOR) tablet 10 mg  10 mg Oral QHS  [Held by provider] bumetanide (BUMEX) tablet 0.5 mg  0.5 mg Oral DAILY  sodium chloride (NS) flush 5-40 mL  5-40 mL IntraVENous Q8H  
 benzocaine-menthol (CEPACOL) lozenge 1 Lozenge  1 Lozenge Mucous Membrane PRN  
 hydrALAZINE (APRESOLINE) 20 mg/mL injection 10 mg  10 mg IntraVENous Q6H PRN  
 sodium chloride (NS) flush 5-40 mL  5-40 mL IntraVENous PRN  
 acetaminophen (TYLENOL) tablet 650 mg  650 mg Oral Q4H PRN  
 ondansetron (ZOFRAN) injection 4 mg  4 mg IntraVENous Q4H PRN  
 [Held by provider] carvedilol (COREG) tablet 6.25 mg  6.25 mg Oral BID WITH MEALS  pantoprazole (PROTONIX) 40 mg in sodium chloride 0.9% 10 mL injection  40 mg IntraVENous Q12H Allergies Allergen Reactions  Penicillins Hives Review of Systems: A complete review of systems was obtained, negative except as described above. Physical Exam:  
 
Visit Vitals /76 (BP 1 Location: Right arm, BP Patient Position: At rest) Pulse 83 Temp 98.1 °F (36.7 °C) Resp 17 Ht 5' 4\" (1.626 m) Wt 118 lb (53.5 kg) SpO2 94% BMI 20.25 kg/m² ECOG PS: 1 General: No distress Eyes: PERRLA, anicteric sclerae HENT: Atraumatic, OP clear Neck: Supple Lymphatic: No cervical, supraclavicular, or inguinal adenopathy Respiratory: CTAB, normal respiratory effort CV: Normal rate, regular rhythm, no murmurs, no peripheral edema GI: Soft, nontender, nondistended, no masses, no hepatomegaly, no splenomegaly MS: Normal gait and station. Digits without clubbing or cyanosis. Skin: No rashes, ecchymoses, or petechiae. Normal temperature, turgor, and texture. Psych: Alert, oriented, appropriate affect, normal judgment/insight Results:  
 
Lab Results Component Value Date/Time WBC 10.2 09/20/2019 01:16 AM  
 HGB 11.5 09/20/2019 01:16 AM  
 HCT 34.9 (L) 09/20/2019 01:16 AM  
 PLATELET 111 26/21/8700 01:16 AM  
 MCV 91.8 09/20/2019 01:16 AM  
 ABS. NEUTROPHILS 7.1 09/20/2019 01:16 AM  
 
Lab Results Component Value Date/Time Sodium 139 09/20/2019 01:16 AM  
 Potassium 2.7 (LL) 09/20/2019 01:16 AM  
 Chloride 104 09/20/2019 01:16 AM  
 CO2 29 09/20/2019 01:16 AM  
 Glucose 136 (H) 09/20/2019 01:16 AM  
 BUN 17 09/20/2019 01:16 AM  
 Creatinine 0.90 09/20/2019 01:16 AM  
 GFR est AA >60 09/20/2019 01:16 AM  
 GFR est non-AA >60 09/20/2019 01:16 AM  
 Calcium 8.5 09/20/2019 01:16 AM  
 Glucose (POC) 102 (H) 01/18/2019 06:23 AM  
 
Lab Results Component Value Date/Time Bilirubin, total 0.6 09/20/2019 01:16 AM  
 ALT (SGPT) 29 09/20/2019 01:16 AM  
 AST (SGOT) 17 09/20/2019 01:16 AM  
 Alk. phosphatase 121 (H) 09/20/2019 01:16 AM  
 Protein, total 6.3 (L) 09/20/2019 01:16 AM  
 Albumin 2.9 (L) 09/20/2019 01:16 AM  
 Globulin 3.4 09/20/2019 01:16 AM  
 
 
Records reviewed and summarized above. Pathology report(s) reviewed 9/17/19 FINAL PATHOLOGIC DIAGNOSIS 1. Small bowel, duodenal stricture, biopsy:  
Adenocarcinoma Radiology report(s) reviewed above. 9/16/19 CT abdomen pelvis IMPRESSION: 
  
1. Marked gastric and duodenal distention by water attenuation fluid, with 
abrupt transition at the junction of second and third duodenum where a 
duodenal/pancreatic mass or stricture/scar is suggested, which is confluent with 
the pancreatic head and uncinate process. . This is consistent with endoscopic 
findings earlier same day. NG tube placement is advised. 2. Right hydronephrosis, likely due to extrinsic compression by the markedly 
distended stomach and duodenum. 3. Prominence of extrahepatic bile ducts, likely due to extrinsic impression on 
the ampulla of water by the markedly distended stomach and duodenum. 4. Pancreas extrinsically compressed by markedly distended stomach, making 
visualization of the previously described cystic mass is difficult. The 
pancreatic head lies at the site of duodenal obstruction, but is difficult to 
further assess under the circumstances. 5. Other incidental and postoperative findings MRI abdomen and 9/19/19 IMPRESSION: 
  
1. Interval development of diffuse liver metastatic disease. 
  
2.  Large mass centered on the pancreatic head and proximal horizontal portion 
of the duodenum. . There is narrowing of the duodenal lumen. Possibilities 
include a pancreatic or duodenal primary. The mass appears to be centered on the 
pancreatic head and there is common bile duct dilatation however the pancreatic 
duct is normal in caliber. 
  
3.  Left lower lobe airspace disease may represent atelectasis or pneumonia. 
  
4.  Possible nonocclusive thrombus in the intrahepatic IVC. 
  
5. The SMV is occluded. Assessment:  
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  Patient will need a venous access device for therapy. ·  
· 2. Patient will need a stent to allow her to get nutrition in. ·  
· 3. We will go ahead and treat her with heparin and low molecular weight heparin as an outpatient for her blood clot. ·  
· 4. We will plan on doing genetic testing due to her family history of pancreatic cancer. ·  
· 5. We will plan on getting genome wide sequencing of her tumor for therapeutic options. ·  
· 6. We will plan on treating her with gemcitabine and nab paclitaxel I appreciate the opportunity to participate in Ms. [facility].  
 
Signed By: Chikis Saba MD

## 2019-09-20 NOTE — PROGRESS NOTES
Hospitalist Progress Note Maria M Alejo MD 
     
                             Answering service: [phone] OR [phone] from in house phone Date of Service:  2019 NAME:  Bee Hernandez :  1940 MRN:  [id number] Admission Summary:  
 
77-year-old female with a past medical history significant for coronary artery disease, non-ST-elevation myocardial infarction, dyslipidemia, chronic systolic congestive heart failure, irritable bowel syndrome and pancreatitis, was brought to the emergency room from home via private vehicle accompanied by her sister for complaints of an approximately 3-day history of progressively worsening nausea and coffee-grounds vomitus. The patient also complained of some intermittent mild abdominal pain with some radiation to the upper back. The patient described the pain as being dull achy in nature, at times about 6-7 on 10 in intensity. The patient denied associated headaches, dizziness, visual deficits, chest pains, palpitations, shortness of breath, cough, fevers, chills, hematuria, bladder or bowel irregularities. Reason for follow up:  
   
CC:Nausea, coffee-grounds vomitus. Has NG TUBE and draining fluid Her K is low Will get picc and start central TPN  
 
MRI results discussed with patient Assessment & Plan:  
 
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 Normal triglyceride level. EGD initially -  Done aborted due to inability to pass scope down to Duodenum CT abd/Pelvis  GOO and possible stricture along duodenum which is causing obstruction of  
NGtube for decompression CEA- normal 
Ca19-9  -  
NPO now  - Re Endoscopy shows that the duodenum stricture is adenocarcinoma Surgery consulted Continue NPO status Started TPN  
MRI shows pancreatic mass with liver mets Oncology consulted and also GI may try stenting her Dued IVC clot Started on heparin gtt Change to lovenox for outpatient / per Dr Sd Polo H/h stable -  
normochromic normocytic anemia. C/W PPI Watch for bleeding on heparin gtt Hypokalemia Increase K in TPN , So changed to central TPN and added extra kcl 40 in bag / please note that  
picc line  
 
 
: TOÑA probably due to eleanor Hydronephrosis noted on Ct scan Previous hospitalist D/w radiology Improving with Decompression of Stomach I will check hydronephrosis again next week If persistent please call urology  
 
 
 
: CAD. H/O NSTEMI. Dyslipidemia. Monitor Fluid Status UTI ruled out Urine cultures negative D/C Ceftriaxone Nutrition  
picc line Central TPN  
 
 
GI and DVT (SCDs due to probable GI bleed). : DNR/DNI with a guarded prognosis. D/W patient and family. patient's Son and daughter Eduardo Cooper ([phone]) at bedside Hospital Problems  Date Reviewed: 9/14/2019 Codes Class Noted POA Acute pancreatitis ICD-10-CM: K85.90 ICD-9-CM: 955.5  9/14/2019 Yes TOÑA (acute kidney injury) ([facility] [address] ICD-10-CM: N17.9 ICD-9-CM: 584.9  9/14/2019 Yes Hypokalemia ICD-10-CM: E87.6 ICD-9-CM: 276.8  9/14/2019 Yes GI bleed ICD-10-CM: K92.2 ICD-9-CM: 578.9  9/14/2019 Yes Coffee ground emesis ICD-10-CM: K92.0 ICD-9-CM: 578.0  9/14/2019 Yes Physical Examination:  
 
 Last 24hrs VS reviewed since prior progress note. Most recent are: 
Visit Vitals /76 (BP 1 Location: Right arm, BP Patient Position: At rest) Pulse 83 Temp 98.1 °F (36.7 °C) Resp 17 Ht 5' 4\" (1.626 m) Wt 53.5 kg (118 lb) SpO2 94% BMI 20.25 kg/m² Constitutional:  No acute distress, cooperative, pleasant   
HEENT: Head is a traumatic, Un icteric sclera. Pink conjunctiva,no erythema or discharge. Oral mucous moist, oropharynx benign. Neck supple, NG in place Resp:  Decreased at bases. CV:  S1, S2 present. GI:  Soft, non distended, non tender. hypoactive bowel sounds, no hepatosplenomegaly :  No CVA or suprapubic tenderness Skin  :  No erythema,rash,bullae,dipigmentation Musculoskeletal:  Bipedal edema. Neurologic:  Awake ,alert and oriented. No intake or output data in the 24 hours ending 09/20/19 1204 Labs:  
 
Recent Labs  
  09/20/19 
0116 09/19/19 
1850 WBC 10.2 8.6 HGB 11.5 13.7 HCT 34.9* 41.8  181 Recent Labs  
  09/20/19 
0116 09/19/19 
0407 09/18/19 
1305  142 145  
K 2.7* 3.2* 3.2*  
 105 109* CO2 29 29 27 BUN 17 21* 25* CREA 0.90 1.07* 1.04* * 114* 105* CA 8.5 8.8 8.2* MG 1.8  --   --   
 
Recent Labs  
  09/20/19 
0116 SGOT 17 ALT 29 * TBILI 0.6 TP 6.3* ALB 2.9*  
GLOB 3.4 LPSE 411* Recent Labs  
  09/20/19 
0751 09/20/19 
0116 09/19/19 
1850 APTT 85.0* 43.5* 22.8 No results for input(s): FE, TIBC, PSAT, FERR in the last 72 hours. No results found for: FOL, RBCF No results for input(s): PH, PCO2, PO2 in the last 72 hours. No results for input(s): CPK, CKNDX, TROIQ in the last 72 hours. No lab exists for component: CPKMB Lab Results Component Value Date/Time Cholesterol, total 85 09/15/2019 04:37 AM  
 HDL Cholesterol 43 09/15/2019 04:37 AM  
 LDL, calculated 29 09/15/2019 04:37 AM  
 Triglyceride 65 09/15/2019 04:37 AM  
 CHOL/HDL Ratio 2.0 09/15/2019 04:37 AM  
 
Lab Results Component Value Date/Time Glucose (POC) 102 (H) 01/18/2019 06:23 AM  
 Glucose (POC) 139 (H) 01/17/2019 09:05 PM  
 Glucose (POC) 114 (H) 01/17/2019 04:31 PM  
 Glucose (POC) 106 (H) 01/17/2019 11:41 AM  
 Glucose (POC) 132 (H) 01/16/2019 09:13 PM  
 
Lab Results Component Value Date/Time Color YELLOW/STRAW 09/15/2019 12:33 AM  
 Appearance CLOUDY (A) 09/15/2019 12:33 AM  
 Specific gravity 1.019 09/15/2019 12:33 AM  
 pH (UA) 6.0 09/15/2019 12:33 AM  
 Protein 100 (A) 09/15/2019 12:33 AM  
 Glucose NEGATIVE  09/15/2019 12:33 AM  
 Ketone NEGATIVE  09/15/2019 12:33 AM  
 Bilirubin NEGATIVE  09/15/2019 12:33 AM  
 Urobilinogen 0.2 09/15/2019 12:33 AM  
 Nitrites NEGATIVE  09/15/2019 12:33 AM  
 Leukocyte Esterase SMALL (A) 09/15/2019 12:33 AM  
 Epithelial cells MODERATE (A) 09/15/2019 12:33 AM  
 Bacteria 2+ (A) 09/15/2019 12:33 AM  
 WBC 10-20 09/15/2019 12:33 AM  
 RBC 0-5 09/15/2019 12:33 AM  
 
 
 
Medications Reviewed:  
 
Current Facility-Administered Medications Medication Dose Route Frequency  TPN ADULT - CENTRAL   IntraVENous CONTINUOUS  
 TPN ADULT - PERIPHERAL   IntraVENous CONTINUOUS  
 heparin 25,000 units in D5W 250 ml infusion  18-36 Units/kg/hr IntraVENous TITRATE  guaiFENesin ER (MUCINEX) tablet 600 mg  600 mg Oral BID PRN  
 [Held by provider] rosuvastatin (CRESTOR) tablet 10 mg  10 mg Oral QHS  [Held by provider] bumetanide (BUMEX) tablet 0.5 mg  0.5 mg Oral DAILY  sodium chloride (NS) flush 5-40 mL  5-40 mL IntraVENous Q8H  
 benzocaine-menthol (CEPACOL) lozenge 1 Lozenge  1 Lozenge Mucous Membrane PRN  
 hydrALAZINE (APRESOLINE) 20 mg/mL injection 10 mg  10 mg IntraVENous Q6H PRN  
 sodium chloride (NS) flush 5-40 mL  5-40 mL IntraVENous PRN  
 acetaminophen (TYLENOL) tablet 650 mg  650 mg Oral Q4H PRN  
 ondansetron (ZOFRAN) injection 4 mg  4 mg IntraVENous Q4H PRN  
 [Held by provider] carvedilol (COREG) tablet 6.25 mg  6.25 mg Oral BID WITH MEALS  pantoprazole (PROTONIX) 40 mg in sodium chloride 0.9% 10 mL injection  40 mg IntraVENous Q12H  
 
______________________________________________________________________ EXPECTED LENGTH OF STAY: 4d 19h ACTUAL LENGTH OF STAY:          6 Chayo Morataya MD

## 2019-09-20 NOTE — PROGRESS NOTES
Bedside and Verbal shift change report given to Jose Antonio (oncoming nurse) by Driss Chauhan (offgoing nurse). Report included the following information SBAR, Kardex and ED Summary.

## 2019-09-20 NOTE — ACP (ADVANCE CARE PLANNING)
Advance Care Planning Note Name: Loli Alva YOB: 1940 MRN: [id number] Admission Date: 9/14/2019  3:00 PM 
 
Date of discussion: 9/20/2019 Active Diagnoses: 
 
Hospital Problems  Date Reviewed: 9/14/2019 Codes Class Noted POA Acute pancreatitis ICD-10-CM: K85.90 ICD-9-CM: 562.4  9/14/2019 Yes TOÑA (acute kidney injury) ([facility] [address] ICD-10-CM: N17.9 ICD-9-CM: 584.9  9/14/2019 Yes Hypokalemia ICD-10-CM: E87.6 ICD-9-CM: 276.8  9/14/2019 Yes GI bleed ICD-10-CM: K92.2 ICD-9-CM: 578.9  9/14/2019 Yes Coffee ground emesis ICD-10-CM: K92.0 ICD-9-CM: 578.0  9/14/2019 Yes These active diagnoses are of sufficient risk that focused discussion on advance care planning is indicated in order to allow the patient to thoughtfully consider personal goals of care, and if situations arise that prevent the ability to personally give input, to ensure appropriate representation of their personal desires for different levels and aggressiveness of care. Discussion:  
 
Persons present and participating in discussion: Vandana rGaff MD, [facility] and son Discussion:  
Discussed with patient and family that the MRI shows that she has pancreatic cancer that has spread to liver Options were get GOO resolved and then get chemo vs no treatment They opted to try chemo and also she will be full code for now But they understand her life expectancy and that she may have some quality of life with chemo Time Spent:  
 
Total time spent face-to-face in education and discussion: 20 minutes.   
 
John Dai MD 
9/20/2019 
12:02 PM

## 2019-09-21 NOTE — PROGRESS NOTES
PTT 64.1 =  NO CHANGE at this time due to therapeutic range. Repeat PTT in 6 hours and continue to monitor

## 2019-09-21 NOTE — PROGRESS NOTES
PTT 57.3, rate increased by 1 unit/kg/hr per protocol. Will repeat PTT in 6 hours and continue to monitor

## 2019-09-21 NOTE — PROGRESS NOTES
Hospitalist Progress Note Veto Ocampo MD 
Answering service: [phone] OR [phone] from in house phone Date of Service:  2019 NAME:  Vince Salgado :  1940 MRN:  [id number] Admission Summary:  
 
57-year-old female with a past medical history significant for coronary artery disease, non-ST-elevation myocardial infarction, dyslipidemia, chronic systolic congestive heart failure, irritable bowel syndrome and pancreatitis, was brought to the emergency room from home via private vehicle accompanied by her sister for complaints of an approximately 3-day history of progressively worsening nausea and coffee-grounds vomitus.  The patient also complained of some intermittent mild abdominal pain with some radiation to the upper back.  The patient described the pain as being dull achy in nature, at times about 6-7 on 10 in intensity.  The patient denied associated headaches, dizziness, visual deficits, chest pains, palpitations, shortness of breath, cough, fevers, chills, hematuria, bladder or bowel irregularities.  
  
  
 
 
Interval history / Subjective:  
AWAITING PROCEDURE NEXT WEEK Assessment & Plan:  
 
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 Normal triglyceride level. EGD initially -  Done aborted due to inability to pass scope down to Duodenum CT abd/Pelvis  GOO and possible stricture along duodenum which is causing obstruction of  
NG tube for decompression CEA- normal 
Ca19-9  -  
NPO now  - Re Endoscopy shows that the duodenum stricture is adenocarcinoma Surgery consulted Continue NPO status Started TPN  
MRI shows pancreatic mass with liver mets Oncology consulted and also GI may try stenting her Dued IVC clot Started on heparin gtt Change to lovenox for outpatient / per Dr Justin Mccall  
  
 H/h stable -  
 normochromic normocytic anemia. C/W PPI Watch for bleeding on heparin gtt  
 
  
Hypokalemia Increase K in TPN , So changed to central TPN and added extra kcl 40 in bag / please note that  
picc line  
  
  
TOÑA probably due to likley Hydronephrosis noted on Ct scan Previous hospitalist D/w radiology Improving with Decompression of Stomach I will check hydronephrosis again next week If persistent please call urology  
 
  
: CAD. H/O NSTEMI.  
  
Dyslipidemia. Monitor Fluid Status  
  
UTI ruled out Urine cultures negative D/C Ceftriaxone 
  
  
Nutrition  
picc line Central TPN  
  
  
GI and DVT (SCDs due to probable GI bleed). 
  
DNR/DNI with a guarded prognosis. D/W patient and family. 
  
patient's Son and daughter Jp Benites ([phone]) at bedside Code status: dnr 
DVT prophylaxis: heparin Care Plan discussed with: Patient/Family and Nurse Disposition: TBD Hospital Problems  Date Reviewed: 9/14/2019 Codes Class Noted POA Acute pancreatitis ICD-10-CM: K85.90 ICD-9-CM: 757.3  9/14/2019 Yes TOÑA (acute kidney injury) ([facility] [address] ICD-10-CM: N17.9 ICD-9-CM: 584.9  9/14/2019 Yes Hypokalemia ICD-10-CM: E87.6 ICD-9-CM: 276.8  9/14/2019 Yes GI bleed ICD-10-CM: K92.2 ICD-9-CM: 578.9  9/14/2019 Yes Coffee ground emesis ICD-10-CM: K92.0 ICD-9-CM: 578.0  9/14/2019 Yes Review of Systems: A comprehensive review of systems was negative. Vital Signs:  
 Last 24hrs VS reviewed since prior progress note. Most recent are: 
Visit Vitals /82 (BP 1 Location: Left arm, BP Patient Position: At rest) Pulse 96 Temp 98.5 °F (36.9 °C) Resp 16 Ht 5' 4\" (1.626 m) Wt 51.4 kg (113 lb 5.1 oz) SpO2 94% BMI 19.45 kg/m² Intake/Output Summary (Last 24 hours) at 9/21/2019 5899 Last data filed at 9/20/2019 2007 Gross per 24 hour Intake  Output 225 ml Net -225 ml Physical Examination: Constitutional:  No acute distress, cooperative, pleasant ENT:  Oral mucous moist, oropharynx benign. Resp:  CTA bilaterally. No wheezing/rhonchi/rales. No accessory muscle use CV:  Regular rhythm, normal rate, no murmurs, gallops, rubs GI:  Soft, non distended, non tender. normoactive bowel sounds, no hepatosplenomegaly Musculoskeletal:  No edema, warm, 2+ pulses throughout Neurologic:  Moves all extremities. AAOx3, CN II-XII reviewed Psych:  Good insight, Not anxious nor agitated. Data Review:  
 I personally reviewed  Image and labs Labs:  
 
Recent Labs  
  09/21/19 
0529 09/20/19 
1621 09/20/19 
0116 WBC 13.1*  --  10.2 HGB 12.5 12.0 11.5 HCT 38.0  --  34.9*  
  --  196 Recent Labs  
  09/21/19 
0529 09/20/19 
0116 09/19/19 
0407  139 142  
K 3.7 2.7* 3.2*  
 104 105 CO2 25 29 29 BUN 20 17 21* CREA 1.01 0.90 1.07*  136* 114* CA 8.9 8.5 8.8 MG  --  1.8  --   
 
Recent Labs  
  09/21/19 
0529 09/20/19 
0116 SGOT 23 17 ALT 45 29 * 121* TBILI 0.6 0.6 TP 6.8 6.3* ALB 3.2* 2.9*  
GLOB 3.6 3.4 LPSE  --  411* Recent Labs  
  09/21/19 
0529 09/20/19 
2248 09/20/19 
1621 APTT 64.1* 57.3* 76.8* No results for input(s): FE, TIBC, PSAT, FERR in the last 72 hours. No results found for: FOL, RBCF No results for input(s): PH, PCO2, PO2 in the last 72 hours. No results for input(s): CPK, CKNDX, TROIQ in the last 72 hours. No lab exists for component: CPKMB Lab Results Component Value Date/Time Cholesterol, total 85 09/15/2019 04:37 AM  
 HDL Cholesterol 43 09/15/2019 04:37 AM  
 LDL, calculated 29 09/15/2019 04:37 AM  
 Triglyceride 65 09/15/2019 04:37 AM  
 CHOL/HDL Ratio 2.0 09/15/2019 04:37 AM  
 
Lab Results Component Value Date/Time  Glucose (POC) 102 (H) 01/18/2019 06:23 AM  
 Glucose (POC) 139 (H) 01/17/2019 09:05 PM  
 Glucose (POC) 114 (H) 01/17/2019 04:31 PM  
 Glucose (POC) 106 (H) 01/17/2019 11:41 AM  
 Glucose (POC) 132 (H) 01/16/2019 09:13 PM  
 
Lab Results Component Value Date/Time Color YELLOW/STRAW 09/15/2019 12:33 AM  
 Appearance CLOUDY (A) 09/15/2019 12:33 AM  
 Specific gravity 1.019 09/15/2019 12:33 AM  
 pH (UA) 6.0 09/15/2019 12:33 AM  
 Protein 100 (A) 09/15/2019 12:33 AM  
 Glucose NEGATIVE  09/15/2019 12:33 AM  
 Ketone NEGATIVE  09/15/2019 12:33 AM  
 Bilirubin NEGATIVE  09/15/2019 12:33 AM  
 Urobilinogen 0.2 09/15/2019 12:33 AM  
 Nitrites NEGATIVE  09/15/2019 12:33 AM  
 Leukocyte Esterase SMALL (A) 09/15/2019 12:33 AM  
 Epithelial cells MODERATE (A) 09/15/2019 12:33 AM  
 Bacteria 2+ (A) 09/15/2019 12:33 AM  
 WBC 10-20 09/15/2019 12:33 AM  
 RBC 0-5 09/15/2019 12:33 AM  
 
 
 
Medications Reviewed:  
 
Current Facility-Administered Medications Medication Dose Route Frequency  TPN ADULT - CENTRAL   IntraVENous CONTINUOUS  
 heparin 25,000 units in D5W 250 ml infusion  18-36 Units/kg/hr IntraVENous TITRATE  guaiFENesin ER (MUCINEX) tablet 600 mg  600 mg Oral BID PRN  
 [Held by provider] rosuvastatin (CRESTOR) tablet 10 mg  10 mg Oral QHS  [Held by provider] bumetanide (BUMEX) tablet 0.5 mg  0.5 mg Oral DAILY  sodium chloride (NS) flush 5-40 mL  5-40 mL IntraVENous Q8H  
 benzocaine-menthol (CEPACOL) lozenge 1 Lozenge  1 Lozenge Mucous Membrane PRN  
 hydrALAZINE (APRESOLINE) 20 mg/mL injection 10 mg  10 mg IntraVENous Q6H PRN  
 sodium chloride (NS) flush 5-40 mL  5-40 mL IntraVENous PRN  
 acetaminophen (TYLENOL) tablet 650 mg  650 mg Oral Q4H PRN  
 ondansetron (ZOFRAN) injection 4 mg  4 mg IntraVENous Q4H PRN  
 [Held by provider] carvedilol (COREG) tablet 6.25 mg  6.25 mg Oral BID WITH MEALS  pantoprazole (PROTONIX) 40 mg in sodium chloride 0.9% 10 mL injection  40 mg IntraVENous Q12H  
 
______________________________________________________________________ EXPECTED LENGTH OF STAY: 4d 19h ACTUAL LENGTH OF STAY:          7 Kennedy Alvarado MD

## 2019-09-21 NOTE — PROGRESS NOTES
Bedside shift change report given to Maria M Riddle (oncoming nurse) by Neeraj Cedillo (offgoing nurse). Report included the following information SBAR, Kardex and MAR.

## 2019-09-22 NOTE — PROGRESS NOTES
Progress Note Patient: Solomon Becerra MRN: [id number]  SSN: [id number] YOB: 1940  Age: 70 y.o. Sex: female Admit Date: 2019 
 
5 Days Post-Op Gastric outlet obstruction Procedure:  Procedure(s): ESOPHAGOGASTRODUODENOSCOPY (EGD) ESOPHAGOGASTRODUODENAL (EGD) BIOPSY Subjective: No acute surgical issues. Pt otherwise doing well. NG tube with bilious drainage. Pain is under control Objective:  
 
Visit Vitals /72 (BP 1 Location: Left arm, BP Patient Position: At rest) Pulse 92 Temp 98.5 °F (36.9 °C) Resp 16 Ht 5' 4\" (1.626 m) Wt 115 lb 8.3 oz (52.4 kg) SpO2 94% BMI 19.83 kg/m² Temp (24hrs), Av.2 °F (36.8 °C), Min:97.6 °F (36.4 °C), Max:98.9 °F (37.2 °C) Physical Exam:   
Gen:  NAD Pulm:  Unlabored Abd:  S/mildly distended/Non-TTP Recent Results (from the past 24 hour(s)) METABOLIC PANEL, BASIC Collection Time: 19  5:20 AM  
Result Value Ref Range Sodium 141 136 - 145 mmol/L Potassium 4.3 3.5 - 5.1 mmol/L Chloride 111 (H) 97 - 108 mmol/L  
 CO2 24 21 - 32 mmol/L Anion gap 6 5 - 15 mmol/L Glucose 127 (H) 65 - 100 mg/dL BUN 26 (H) 6 - 20 MG/DL Creatinine 0.90 0.55 - 1.02 MG/DL  
 BUN/Creatinine ratio 29 (H) 12 - 20 GFR est AA >60 >60 ml/min/1.73m2 GFR est non-AA >60 >60 ml/min/1.73m2 Calcium 8.7 8.5 - 10.1 MG/DL  
CBC WITH AUTOMATED DIFF Collection Time: 19  5:20 AM  
Result Value Ref Range WBC 12.7 (H) 3.6 - 11.0 K/uL  
 RBC 3.77 (L) 3.80 - 5.20 M/uL  
 HGB 11.3 (L) 11.5 - 16.0 g/dL HCT 36.1 35.0 - 47.0 % MCV 95.8 80.0 - 99.0 FL  
 MCH 30.0 26.0 - 34.0 PG  
 MCHC 31.3 30.0 - 36.5 g/dL  
 RDW 13.4 11.5 - 14.5 % PLATELET 298 339 - 351 K/uL MPV 11.2 8.9 - 12.9 FL  
 NRBC 0.0 0  WBC ABSOLUTE NRBC 0.00 0.00 - 0.01 K/uL NEUTROPHILS 71 32 - 75 % LYMPHOCYTES 16 12 - 49 % MONOCYTES 8 5 - 13 % EOSINOPHILS 2 0 - 7 % BASOPHILS 1 0 - 1 % IMMATURE GRANULOCYTES 2 (H) 0.0 - 0.5 % ABS. NEUTROPHILS 9.1 (H) 1.8 - 8.0 K/UL  
 ABS. LYMPHOCYTES 2.0 0.8 - 3.5 K/UL  
 ABS. MONOCYTES 1.1 (H) 0.0 - 1.0 K/UL  
 ABS. EOSINOPHILS 0.3 0.0 - 0.4 K/UL  
 ABS. BASOPHILS 0.1 0.0 - 0.1 K/UL  
 ABS. IMM. GRANS. 0.2 (H) 0.00 - 0.04 K/UL  
 DF AUTOMATED    
PTT Collection Time: 09/22/19  5:20 AM  
Result Value Ref Range aPTT 100.9 (HH) 22.1 - 32.0 sec  
 aPTT, therapeutic range     58.0 - 77.0 SECS Assessment:  
 
Hospital Problems  Date Reviewed: 9/14/2019 Codes Class Noted POA Acute pancreatitis ICD-10-CM: K85.90 ICD-9-CM: 790.9  9/14/2019 Yes TOÑA (acute kidney injury) ([facility] ICD-10-CM: N17.9 ICD-9-CM: 584.9  9/14/2019 Yes Hypokalemia ICD-10-CM: E87.6 ICD-9-CM: 276.8  9/14/2019 Yes GI bleed ICD-10-CM: K92.2 ICD-9-CM: 578.9  9/14/2019 Yes Coffee ground emesis ICD-10-CM: K92.0 ICD-9-CM: 578.0  9/14/2019 Yes Plan/Recommendations/Medical Decision Making:  
 
- Gastric outlet obstruction:  No acute indication for operation. Obstruction at level of duodenum from pancreatic head mass/duodenal stricture. Pt has metastatic pancreatic cancer so Whipple not likely an option given no biliary obstruction. She will benefit from palliative gastrojejunostomy bypass. Will defer timing of operation to Dr. Sarah Beth Bradley.   
- Continue NG tube decompression for now - Continue TPN

## 2019-09-22 NOTE — PROGRESS NOTES
[address] GI PROGRESS NOTE 
 
NAME: Crystal Parham :  1940 MRN:  [id number] Subjective:  
 
Doing well on TPN and NGT suction Review of Systems Constitutional: negative fever, negative chills, negative weight loss Eyes:   negative visual changes ENT:   negative sore throat, tongue or lip swelling Respiratory:  negative cough, negative dyspnea Cards:  negative for chest pain, palpitations, lower extremity edema GI:   See HPI 
:  negative for frequency, dysuria Integument:  negative for rash and pruritus Heme:  negative for easy bruising and gum/nose bleeding Musculoskel: negative for myalgias,  back pain and muscle weakness Neuro: negative for headaches, dizziness, vertigo Psych:  negative for feelings of anxiety, depression Objective: VITALS:  
Last 24hrs VS reviewed since prior progress note. Most recent are: 
Visit Vitals /72 (BP 1 Location: Left arm, BP Patient Position: At rest) Pulse 92 Temp 98.5 °F (36.9 °C) Resp 16 Ht 5' 4\" (1.626 m) Wt 52.4 kg (115 lb 8.3 oz) SpO2 94% BMI 19.83 kg/m² No intake or output data in the 24 hours ending 19 1532 PHYSICAL EXAM: 
General: WD, WN. Alert, cooperative, no acute distress   
HEENT: NC, Atraumatic. PERRLA, EOMI. Anicteric sclerae. Lungs:  CTA Bilaterally. No Wheezing/Rhonchi/Rales. Heart:  Regular  rhythm,  No murmur (), No Rubs, No Gallops Abdomen: Soft, Non distended, Non tender.  +Bowel sounds, no HSM Extremities: No c/c/e Neurologic:  CN 2-12 gi, Alert and oriented X 3. No acute neurological distress Psych:   Good insight. Not anxious nor agitated. Lab Data Reviewed:  
Recent Labs  
  19 
0520 19 
4276 WBC 12.7* 13.1* HGB 11.3* 12.5 HCT 36.1 38.0  
 200 Recent Labs  
  19 
0520 19 
7373  138  
K 4.3 3.7 * 107 CO2 24 25 BUN 26* 20  
CREA 0.90 1.01  
 * 100 CA 8.7 8.9 Recent Labs  
  09/21/19 
0529 09/20/19 
0116 SGOT 23 17 * 121* TP 6.8 6.3* ALB 3.2* 2.9*  
GLOB 3.6 3.4 LPSE  --  411*  
 
 
________________________________________________________________________ Assessment: · Malignant DU obstruction Patient Active Problem List  
Diagnosis Code  Systolic CHF, chronic (HCC) I50.22  
 NSTEMI (non-ST elevated myocardial infarction) (HCC) I21.4  Coronary artery disease of native artery of native heart with stable angina pectoris (HCC) I25.118  
 Pure hypercholesterolemia E78.00  
 Hematoma of rectus sheath S30. Ghassan Fausto  Cough due to ACE inhibitor R05, T46.4X5A  Acute pancreatitis K85.90  
 TOÑA (acute kidney injury) ([facility] [address] N17.9  Hypokalemia E87.6  GI bleed K92.2  Coffee ground emesis K92.0 Plan: · Awaiting Dr. Manish Swan recommendations about surgical bypass · Supportive care · Dr. Pia Connell will follow Signed By: Lon Clay MD   
 9/22/2019  3:32 PM

## 2019-09-22 NOTE — PROGRESS NOTES
Hospitalist Progress Note Dena Loredo MD 
Answering service: [phone] OR [phone] from in house phone Date of Service:  2019 NAME:  Hernando Garcia :  1940 MRN:  [id number] Admission Summary:  
 
68-year-old female with a past medical history significant for coronary artery disease, non-ST-elevation myocardial infarction, dyslipidemia, chronic systolic congestive heart failure, irritable bowel syndrome and pancreatitis, was brought to the emergency room from home via private vehicle accompanied by her sister for complaints of an approximately 3-day history of progressively worsening nausea and coffee-grounds vomitus.  The patient also complained of some intermittent mild abdominal pain with some radiation to the upper back.  The patient described the pain as being dull achy in nature, at times about 6-7 on 10 in intensity.  The patient denied associated headaches, dizziness, visual deficits, chest pains, palpitations, shortness of breath, cough, fevers, chills, hematuria, bladder or bowel irregularities. Interval history / Subjective:  
 Palliative bypass and duodenal stent planned for next week TPN renewed has no acute issues Assessment & Plan:  
 
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 Normal triglyceride level. EGD initially -  Done aborted due to inability to pass scope down to Duodenum CT abd/Pelvis  GOO and possible stricture along duodenum which is causing obstruction of  
NG tube for decompression CEA- normal 
Ca19-9  - 3596 NPO now  - Re Endoscopy shows that the duodenum stricture is adenocarcinoma Surgery consulted Continue NPO status Started TPN - renewed MRI shows pancreatic mass with liver mets Oncology consulted and also GI may try stenting her Duedenum IVC clot Started on heparin gtt Change to lovenox for outpatient / per Dr Philipp Scott  
  
 H/h stable -  
normochromic normocytic anemia. C/W PPI Watch for bleeding on heparin gtt  
  
Hypokalemia  
resolved So changed to central TPN and added extra kcl 40 in bag / please note that  
picc line  
  
TOÑA probably due to matthieuley Hydronephrosis noted on Ct scan Previous hospitalist D/w radiology Improving with Decompression of Stomach I will check hydronephrosis again next week If persistent please call urology CAD. H/O NSTEMI.  
  
Dyslipidemia. Monitor Fluid Status  
  
UTI ruled out Urine cultures negative D/C Ceftriaxone 
  
Nutrition  
picc line Central TPN  
  
GI and DVT (SCDs due to probable GI bleed). 
  
DNR/DNI with a guarded prognosis. D/W patient and family. 
  
patient's Son and daughter Victoriano Samson ([phone]) Code status: DNR 
DVT prophylaxis: heparin Care Plan discussed with: Patient/Family and Nurse Disposition: TBD Hospital Problems  Date Reviewed: 9/14/2019 Codes Class Noted POA Acute pancreatitis ICD-10-CM: K85.90 ICD-9-CM: 836.0  9/14/2019 Yes TOÑA (acute kidney injury) ([facility] [address] ICD-10-CM: N17.9 ICD-9-CM: 584.9  9/14/2019 Yes Hypokalemia ICD-10-CM: E87.6 ICD-9-CM: 276.8  9/14/2019 Yes GI bleed ICD-10-CM: K92.2 ICD-9-CM: 578.9  9/14/2019 Yes Coffee ground emesis ICD-10-CM: K92.0 ICD-9-CM: 578.0  9/14/2019 Yes Review of Systems: A comprehensive review of systems was negative. Vital Signs:  
 Last 24hrs VS reviewed since prior progress note. Most recent are: 
Visit Vitals /79 (BP 1 Location: Left arm, BP Patient Position: At rest) Pulse 89 Temp 97.6 °F (36.4 °C) Resp 16 Ht 5' 4\" (1.626 m) Wt 52.4 kg (115 lb 8.3 oz) SpO2 96% BMI 19.83 kg/m² No intake or output data in the 24 hours ending 09/22/19 0858 Physical Examination:  
 
 
     
Constitutional:  No acute distress, , pleasant ENT:  Oral mucous moist, Resp:  CTA bilaterally. No wheezing/rhonchi/rales. CV:  Regular rhythm, normal rate, no murmurs, gallops, rubs GI:  Soft, non distended, non tender. normoactive bowel sounds, no hepatosplenomegaly Musculoskeletal:  No edema, warm, 2+ pulses throughout Neurologic:  Moves all extremities. AAOx3, CN II-XII reviewed Psych:  Good insight, Not anxious nor agitated. Data Review:  
 I personally reviewed  Image and labs Labs:  
 
Recent Labs  
  09/22/19 
0520 09/21/19 
6846 WBC 12.7* 13.1* HGB 11.3* 12.5 HCT 36.1 38.0  
 200 Recent Labs  
  09/22/19 
0520 09/21/19 
0529 09/20/19 
0116  138 139  
K 4.3 3.7 2.7*  
* 107 104 CO2 24 25 29 BUN 26* 20 17 CREA 0.90 1.01 0.90 * 100 136* CA 8.7 8.9 8.5 MG  --   --  1.8 Recent Labs  
  09/21/19 
0529 09/20/19 
0116 SGOT 23 17 ALT 45 29 * 121* TBILI 0.6 0.6 TP 6.8 6.3* ALB 3.2* 2.9*  
GLOB 3.6 3.4 LPSE  --  411* Recent Labs  
  09/22/19 
0520 09/21/19 
1153 09/21/19 
0529 APTT 100.9* 61.8* 64.1* No results for input(s): FE, TIBC, PSAT, FERR in the last 72 hours. No results found for: FOL, RBCF No results for input(s): PH, PCO2, PO2 in the last 72 hours. No results for input(s): CPK, CKNDX, TROIQ in the last 72 hours. No lab exists for component: CPKMB Lab Results Component Value Date/Time Cholesterol, total 85 09/15/2019 04:37 AM  
 HDL Cholesterol 43 09/15/2019 04:37 AM  
 LDL, calculated 29 09/15/2019 04:37 AM  
 Triglyceride 65 09/15/2019 04:37 AM  
 CHOL/HDL Ratio 2.0 09/15/2019 04:37 AM  
 
Lab Results Component Value Date/Time Glucose (POC) 102 (H) 01/18/2019 06:23 AM  
 Glucose (POC) 139 (H) 01/17/2019 09:05 PM  
 Glucose (POC) 114 (H) 01/17/2019 04:31 PM  
 Glucose (POC) 106 (H) 01/17/2019 11:41 AM  
 Glucose (POC) 132 (H) 01/16/2019 09:13 PM  
 
Lab Results Component Value Date/Time Color YELLOW/STRAW 09/15/2019 12:33 AM  
 Appearance CLOUDY (A) 09/15/2019 12:33 AM  
 Specific gravity 1.019 09/15/2019 12:33 AM  
 pH (UA) 6.0 09/15/2019 12:33 AM  
 Protein 100 (A) 09/15/2019 12:33 AM  
 Glucose NEGATIVE  09/15/2019 12:33 AM  
 Ketone NEGATIVE  09/15/2019 12:33 AM  
 Bilirubin NEGATIVE  09/15/2019 12:33 AM  
 Urobilinogen 0.2 09/15/2019 12:33 AM  
 Nitrites NEGATIVE  09/15/2019 12:33 AM  
 Leukocyte Esterase SMALL (A) 09/15/2019 12:33 AM  
 Epithelial cells MODERATE (A) 09/15/2019 12:33 AM  
 Bacteria 2+ (A) 09/15/2019 12:33 AM  
 WBC 10-20 09/15/2019 12:33 AM  
 RBC 0-5 09/15/2019 12:33 AM  
 
 
 
Medications Reviewed:  
 
Current Facility-Administered Medications Medication Dose Route Frequency  TPN ADULT - CENTRAL   IntraVENous CONTINUOUS  
 TPN ADULT - CENTRAL   IntraVENous CONTINUOUS  
 heparin 25,000 units in D5W 250 ml infusion  18-36 Units/kg/hr IntraVENous TITRATE  guaiFENesin ER (MUCINEX) tablet 600 mg  600 mg Oral BID PRN  
 [Held by provider] rosuvastatin (CRESTOR) tablet 10 mg  10 mg Oral QHS  bumetanide (BUMEX) tablet 0.5 mg  0.5 mg Oral DAILY  sodium chloride (NS) flush 5-40 mL  5-40 mL IntraVENous Q8H  
 benzocaine-menthol (CEPACOL) lozenge 1 Lozenge  1 Lozenge Mucous Membrane PRN  
 hydrALAZINE (APRESOLINE) 20 mg/mL injection 10 mg  10 mg IntraVENous Q6H PRN  
 sodium chloride (NS) flush 5-40 mL  5-40 mL IntraVENous PRN  
 acetaminophen (TYLENOL) tablet 650 mg  650 mg Oral Q4H PRN  
 ondansetron (ZOFRAN) injection 4 mg  4 mg IntraVENous Q4H PRN  
 carvedilol (COREG) tablet 6.25 mg  6.25 mg Oral BID WITH MEALS  pantoprazole (PROTONIX) 40 mg in sodium chloride 0.9% 10 mL injection  40 mg IntraVENous Q12H  
 
______________________________________________________________________ EXPECTED LENGTH OF STAY: 4d 19h ACTUAL LENGTH OF STAY:          8 Errol Morales MD

## 2019-09-23 NOTE — PROGRESS NOTES
Transition of Care Plan 
  
1. Disposition TBD: Home once medically stable 2. Transport: family  
3. Continue Medical Care 4. Follow up with PCP/Specialist  
 
Rosette Montejo Clinical  [phone]

## 2019-09-23 NOTE — PROGRESS NOTES
Bedside and Verbal shift change report given to Jose Antonio (oncoming nurse) by Nitesh Stallworth (offgoing nurse). Report included the following information SBAR, Kardex and MAR.

## 2019-09-23 NOTE — PROGRESS NOTES
[facility] 
[address] [facility], [address] GI PROGRESS NOTE Jie Funk, [address] office [phone] NP in-hospital cell phone M-F until 4:30 After 5pm or on weekends, please call  for physician on call NAME: Favian Alexis :  1940 MRN:  [id number] Subjective: She is feeling fair, no pain or nausea. NG to LWS with bilious output. Objective: VITALS:  
Last 24hrs VS reviewed since prior progress note. Most recent are: 
Visit Vitals /77 (BP 1 Location: Left arm, BP Patient Position: Sitting) Pulse (!) 101 Temp 97.9 °F (36.6 °C) Resp 21 Ht 5' 4\" (1.626 m) Wt 55 kg (121 lb 4.8 oz) SpO2 94% BMI 20.82 kg/m² PHYSICAL EXAM: 
General: Cooperative, no acute distress   
Neurologic:  Alert and oriented X 3. HEENT: EOMI, no scleral icterus Lungs:  CTA bilaterally. No wheezing Heart:  S1 S2 Abdomen: Soft, non-distended, no tenderness. +Bowel sounds, NG tube bilious output Extremities: No edema Psych:   Good insight. Not anxious or agitated. Lab Data Reviewed:  
 
Recent Results (from the past 24 hour(s)) PTT Collection Time: 19  2:04 PM  
Result Value Ref Range aPTT 65.0 (H) 22.1 - 32.0 sec  
 aPTT, therapeutic range     58.0 - 77.0 SECS  
PTT Collection Time: 19  8:07 PM  
Result Value Ref Range aPTT 65.0 (H) 22.1 - 32.0 sec  
 aPTT, therapeutic range     58.0 - 77.0 SECS IMPRESSION: 
1. Interval development of diffuse liver metastatic disease. 2.  Large mass centered on the pancreatic head and proximal horizontal portion 
of the duodenum. There is narrowing of the duodenal lumen. Possibilities 
include a pancreatic or duodenal primary. The mass appears to be centered on the 
pancreatic head and there is common bile duct dilatation however the pancreatic 
duct is normal in caliber. 3.  Left lower lobe airspace disease may represent atelectasis or pneumonia. 4.  Possible nonocclusive thrombus in the intrahepatic IVC. 5.  The SMV is occluded. Assessment:  
· Duodenal obstruction/adenocarcinoma: EGD 9/18/19: LA grade C reflux esophagitis, small hiatal hernia, limited visualization of the fundus due to solid food, duodenum bilious liquid stricture in the 2nd portion unable to pass scope (biopsy +adenocarcinoma) CEA 16.8 CA 19-9 3,596 · Nausea and vomiting with coffee-ground emesis. Hgb 11.3 
· IVC thrombus: on heparin · Acute on chronic pancreatitis: EGD/EUS in 4/2019. · Constipation · Acute kidney injury Patient Active Problem List  
Diagnosis Code  Systolic CHF, chronic (HCC) I50.22  
 NSTEMI (non-ST elevated myocardial infarction) ([facility]) I21.4  Coronary artery disease of native artery of native heart with stable angina pectoris ([facility]) I25.118  
 Pure hypercholesterolemia E78.00  
 Hematoma of rectus sheath S30. Vianneyzanna Rai  Cough due to ACE inhibitor R05, T46.4X5A  Acute pancreatitis K85.90  
 TOÑA (acute kidney injury) ([facility] [address] N17.9  Hypokalemia E87.6  GI bleed K92.2  Coffee ground emesis K92.0 Plan:  
· NGT ILWS 
· PPI · TPN · Appreciate surgery following - plans for palliative bypass per Dr. Scottie Rutledge Signed By: Nikki Alan NP   
 9/23/2019  8:55 AM  
 
GI note: 
 
Plan for Surgery. Will sign off. Please call with any questions. Dr. Donna Barraza

## 2019-09-23 NOTE — PROGRESS NOTES
Hospitalist Progress Note Anibal Hernandez MD 
Answering service: [phone] OR [phone] from in house phone Date of Service:  2019 NAME:  Carlotta Stanton :  1940 MRN:  [id number] Admission Summary:  
 
82-year-old female with a past medical history significant for coronary artery disease, non-ST-elevation myocardial infarction, dyslipidemia, chronic systolic congestive heart failure, irritable bowel syndrome and pancreatitis, was brought to the emergency room from home via private vehicle accompanied by her sister for complaints of an approximately 3-day history of progressively worsening nausea and coffee-grounds vomitus.  The patient also complained of some intermittent mild abdominal pain with some radiation to the upper back.  The patient described the pain as being dull achy in nature, at times about 6-7 on 10 in intensity.  The patient denied associated headaches, dizziness, visual deficits, chest pains, palpitations, shortness of breath, cough, fevers, chills, hematuria, bladder or bowel irregularities. Interval history / Subjective:  
 Palliative bypass and duodenal stent planned sometime this week TPN renewed has no acute issues NG tube still has drainage She is complaining of back pain from lying in the bed Assessment & Plan:  
 
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 Normal triglyceride level. EGD initially -  Done aborted due to inability to pass scope down to Duodenum CT abd/Pelvis  GOO and possible stricture along duodenum which is causing obstruction of  
NG tube for decompression CEA- normal 
Ca19-9  - 3596 NPO now  - Re Endoscopy shows that the duodenum stricture is adenocarcinoma Surgery consulted Continue NPO status Started TPN - renewed MRI shows pancreatic mass with liver mets /oncology has seen patient and they offered chemotherapy to patient Surgeons may do a surgical  bypass IVC clot Started on heparin gtt Change to lovenox for outpatient / per Dr Erica Farmer  
  
 H/h stable -  
normochromic normocytic anemia. C/W PPI Watch for bleeding on heparin gtt  
  
Hypokalemia  
resolved So changed to central TPN and added extra kcl 40 in bag / please note that !!  
picc line in  
  
TOÑA probably due to likley Hydronephrosis noted on Ct scan Previous hospitalist D/w radiology Improving with Decompression of Stomach I will check hydronephrosis, well it is better to do post op imaging If persistent please call urology CAD. H/O NSTEMI.  
  
Dyslipidemia. Monitor Fluid Status  
  
UTI ruled out Urine cultures negative D/C Ceftriaxone 
  
Nutrition  
picc line Central TPN , renewed  
  
GI and DVT (SCDs due to probable GI bleed). 
  
DNR/DNI with a guarded prognosis. D/W patient and family. 
  
patient's Son and daughter Dirk Minors ([phone]) Code status: DNR 
DVT prophylaxis: heparin Care Plan discussed with: Patient/Family and Nurse Disposition: TBD Hospital Problems  Date Reviewed: 9/14/2019 Codes Class Noted POA Acute pancreatitis ICD-10-CM: K85.90 ICD-9-CM: 836.3  9/14/2019 Yes TOÑA (acute kidney injury) ([facility] [address] ICD-10-CM: N17.9 ICD-9-CM: 584.9  9/14/2019 Yes Hypokalemia ICD-10-CM: E87.6 ICD-9-CM: 276.8  9/14/2019 Yes GI bleed ICD-10-CM: K92.2 ICD-9-CM: 578.9  9/14/2019 Yes Coffee ground emesis ICD-10-CM: K92.0 ICD-9-CM: 578.0  9/14/2019 Yes Review of Systems: A comprehensive review of systems was negative. Vital Signs:  
 Last 24hrs VS reviewed since prior progress note. Most recent are: 
Visit Vitals /77 (BP 1 Location: Left arm, BP Patient Position: Sitting) Pulse (!) 101 Temp 97.9 °F (36.6 °C) Resp 21  
 Ht 5' 4\" (1.626 m) Wt 55 kg (121 lb 4.8 oz) SpO2 94% BMI 20.82 kg/m² Intake/Output Summary (Last 24 hours) at 9/23/2019 8641 Last data filed at 9/22/2019 5008 Gross per 24 hour Intake  Output 550 ml Net -550 ml Physical Examination:  
 
 
     
Constitutional:  No acute distress, , pleasant ENT:  Oral mucous moist, Resp:  CTA bilaterally. No wheezing/rhonchi/rales. CV:  Regular rhythm, normal rate, no murmurs, gallops, rubs GI:  Soft, non distended, non tender. normoactive bowel sounds, no hepatosplenomegaly Musculoskeletal:  No edema, warm, 2+ pulses throughout Neurologic:  Moves all extremities. AAOx3, CN II-XII reviewed Psych:  Good insight, Not anxious nor agitated. Data Review:  
 I personally reviewed  Image and labs Labs:  
 
Recent Labs  
  09/22/19 
0520 09/21/19 
8679 WBC 12.7* 13.1* HGB 11.3* 12.5 HCT 36.1 38.0  
 200 Recent Labs  
  09/22/19 
0520 09/21/19 
7425  138  
K 4.3 3.7 * 107 CO2 24 25 BUN 26* 20  
CREA 0.90 1.01  
* 100 CA 8.7 8.9 Recent Labs  
  09/21/19 
2100 SGOT 23 ALT 45  
* TBILI 0.6 TP 6.8 ALB 3.2*  
GLOB 3.6 Recent Labs  
  09/22/19 2007 09/22/19 
1404 09/22/19 
0520 APTT 65.0* 65.0* 100.9* No results for input(s): FE, TIBC, PSAT, FERR in the last 72 hours. No results found for: FOL, RBCF No results for input(s): PH, PCO2, PO2 in the last 72 hours. No results for input(s): CPK, CKNDX, TROIQ in the last 72 hours. No lab exists for component: CPKMB Lab Results Component Value Date/Time Cholesterol, total 85 09/15/2019 04:37 AM  
 HDL Cholesterol 43 09/15/2019 04:37 AM  
 LDL, calculated 29 09/15/2019 04:37 AM  
 Triglyceride 65 09/15/2019 04:37 AM  
 CHOL/HDL Ratio 2.0 09/15/2019 04:37 AM  
 
Lab Results Component Value Date/Time  Glucose (POC) 102 (H) 01/18/2019 06:23 AM  
 Glucose (POC) 139 (H) 01/17/2019 09:05 PM  
 Glucose (POC) 114 (H) 01/17/2019 04:31 PM  
 Glucose (POC) 106 (H) 01/17/2019 11:41 AM  
 Glucose (POC) 132 (H) 01/16/2019 09:13 PM  
 
Lab Results Component Value Date/Time Color YELLOW/STRAW 09/15/2019 12:33 AM  
 Appearance CLOUDY (A) 09/15/2019 12:33 AM  
 Specific gravity 1.019 09/15/2019 12:33 AM  
 pH (UA) 6.0 09/15/2019 12:33 AM  
 Protein 100 (A) 09/15/2019 12:33 AM  
 Glucose NEGATIVE  09/15/2019 12:33 AM  
 Ketone NEGATIVE  09/15/2019 12:33 AM  
 Bilirubin NEGATIVE  09/15/2019 12:33 AM  
 Urobilinogen 0.2 09/15/2019 12:33 AM  
 Nitrites NEGATIVE  09/15/2019 12:33 AM  
 Leukocyte Esterase SMALL (A) 09/15/2019 12:33 AM  
 Epithelial cells MODERATE (A) 09/15/2019 12:33 AM  
 Bacteria 2+ (A) 09/15/2019 12:33 AM  
 WBC 10-20 09/15/2019 12:33 AM  
 RBC 0-5 09/15/2019 12:33 AM  
 
 
 
Medications Reviewed:  
 
Current Facility-Administered Medications Medication Dose Route Frequency  lidocaine (LIDODERM) 5 % patch 1 Patch  1 Patch TransDERmal Q24H  TPN ADULT - CENTRAL   IntraVENous CONTINUOUS  
 TPN ADULT - CENTRAL   IntraVENous CONTINUOUS  
 heparin 25,000 units in D5W 250 ml infusion  18-36 Units/kg/hr IntraVENous TITRATE  guaiFENesin ER (MUCINEX) tablet 600 mg  600 mg Oral BID PRN  
 [Held by provider] rosuvastatin (CRESTOR) tablet 10 mg  10 mg Oral QHS  bumetanide (BUMEX) tablet 0.5 mg  0.5 mg Oral DAILY  sodium chloride (NS) flush 5-40 mL  5-40 mL IntraVENous Q8H  
 benzocaine-menthol (CEPACOL) lozenge 1 Lozenge  1 Lozenge Mucous Membrane PRN  
 hydrALAZINE (APRESOLINE) 20 mg/mL injection 10 mg  10 mg IntraVENous Q6H PRN  
 sodium chloride (NS) flush 5-40 mL  5-40 mL IntraVENous PRN  
 acetaminophen (TYLENOL) tablet 650 mg  650 mg Oral Q4H PRN  
 ondansetron (ZOFRAN) injection 4 mg  4 mg IntraVENous Q4H PRN  
 carvedilol (COREG) tablet 6.25 mg  6.25 mg Oral BID WITH MEALS  pantoprazole (PROTONIX) 40 mg in sodium chloride 0.9% 10 mL injection  40 mg IntraVENous Q12H ______________________________________________________________________ EXPECTED LENGTH OF STAY: 4d 19h ACTUAL LENGTH OF STAY:          9 Maria M Alejo MD

## 2019-09-23 NOTE — PROGRESS NOTES
[facility] 
Daily Progress Note Admit Date: 2019 Post-Operative Day: 6 from Procedure(s): ESOPHAGOGASTRODUODENOSCOPY (EGD) ESOPHAGOGASTRODUODENAL (EGD) BIOPSY Subjective:  
 
Last 24 hrs: Biggest complaint is pain from the NG tube. MRI results reviewed. Continues with NG decompression and TPN in setting of gastric outlet obstruction due to duodenal stricture secondary to adenocarcinoma. Objective:  
 
Blood pressure 126/77, pulse 100, temperature 97.9 °F (36.6 °C), resp. rate 18, height 5' 4\" (1.626 m), weight 55 kg (121 lb 4.8 oz), SpO2 94 %. Temp (24hrs), Av.4 °F (36.9 °C), Min:97.9 °F (36.6 °C), Max:98.6 °F (37 °C) 
 
 
_____________________ Physical Exam:    
Alert and Oriented, sitting up in bed, no acute distress. Cardiovascular: RRR, no peripheral edema Lungs:CTAB Abdomen: soft, NT. Assessment:  
Active Problems: 
  Acute pancreatitis (2019) TOÑA (acute kidney injury) ([facility] [address] (2019) Hypokalemia (2019) GI bleed (2019) Coffee ground emesis (2019) 
 
gastric outlet obstruction due to duodenal stricture. Biopsy = adenocarcinoma. Plan:  
 
Plan for palliative gastrojejunal bypass and probable biliary bypass as well. Dr. Narciso Leach working on timing of surgery. Continue TPN and NG decompression Added viscous lidocaine and chloraseptic spray for throat discomfort ROSEANNA Qureshi - [address] Surgery at [place] [address] [address] [facility] [address], [address], [address] [facility] 
[phone] Data Review: 
 
Recent Labs  
  19 
0520 19 
0529 19 
1621 WBC 12.7* 13.1*  --   
HGB 11.3* 12.5 12.0 HCT 36.1 38.0  --   
 200  --   
 
Recent Labs  
  19 
0520 19 
0529  138  
K 4.3 3.7 * 107 CO2 24 25 * 100 BUN 26* 20  
CREA 0.90 1.01  
CA 8.7 8.9 ALB  --  3.2* TBILI  --  0.6 SGOT  --  23 ALT  --  45  
 
 No results for input(s): AML, LPSE in the last 72 hours. ______________________ Medications: 
 
Current Facility-Administered Medications Medication Dose Route Frequency  lidocaine (LIDODERM) 5 % patch 1 Patch  1 Patch TransDERmal Q24H  TPN ADULT - CENTRAL   IntraVENous CONTINUOUS  
 fat emulsion 20% (LIPOSYN, INTRAlipid) infusion 500 mL  500 mL IntraVENous EVERY MON & TH  
 TPN ADULT - CENTRAL   IntraVENous CONTINUOUS  
 heparin 25,000 units in D5W 250 ml infusion  18-36 Units/kg/hr IntraVENous TITRATE  guaiFENesin ER (MUCINEX) tablet 600 mg  600 mg Oral BID PRN  
 [Held by provider] rosuvastatin (CRESTOR) tablet 10 mg  10 mg Oral QHS  bumetanide (BUMEX) tablet 0.5 mg  0.5 mg Oral DAILY  sodium chloride (NS) flush 5-40 mL  5-40 mL IntraVENous Q8H  
 benzocaine-menthol (CEPACOL) lozenge 1 Lozenge  1 Lozenge Mucous Membrane PRN  
 hydrALAZINE (APRESOLINE) 20 mg/mL injection 10 mg  10 mg IntraVENous Q6H PRN  
 sodium chloride (NS) flush 5-40 mL  5-40 mL IntraVENous PRN  
 acetaminophen (TYLENOL) tablet 650 mg  650 mg Oral Q4H PRN  
 ondansetron (ZOFRAN) injection 4 mg  4 mg IntraVENous Q4H PRN  
 carvedilol (COREG) tablet 6.25 mg  6.25 mg Oral BID WITH MEALS  pantoprazole (PROTONIX) 40 mg in sodium chloride 0.9% 10 mL injection  40 mg IntraVENous Q12H

## 2019-09-23 NOTE — PROGRESS NOTES
[facility] [place] [address] [address], [id number] [place] [address] Road, [address] [place] [address] Madalyn W: [phone]  F: [phone] Reason for Visit:  
Kenyatta Quintana is a 70 y.o. female who is seen in consultation at the request of Dr. Mindy Pickett for evaluation of stage IV pancreatic cancer. Treatment History: · Biopsy with adenocarcinoma of the pancreas metastatic to liver History of Present Illness: The patient is a very pleasant 82-year-old be younger than her stated age she is been doing fairly well she started having some GI issues in January of this year. But over the last month or so she is been having more trouble and read more recently has been having trouble with nausea and vomiting. Because of the persistent or GI issues with the nausea and vomiting she is now admitted and was found to have a pancreatic cancer metastatic to her liver. The patient has been able to maintain her weight. The patient has lost a couple pounds since her last admission. According to the family the patient initially had lost weight when she was in the hospital then a little bit afterwards and stabilized her weight pattern and it is continued in a stable pattern The patient denied trouble with urination or with breathing she denied any ENT symptomatology. Has had a little bit of trouble with constipation 9/23/19 The patient seems to be doing fairly well this morning. She still has her NG tube in. Hopefully she will be getting a stent placed soon so she can start back on oral nutrition. Then the plan will be to get her port inserted and then to start her on gemcitabine and Abraxane. I anticipate that she will handle those drugs quite well and hopefully we can get her some quality time. Past Medical History:  
Diagnosis Date  Coronary artery disease of native artery of native heart with stable angina pectoris ([facility] [address] 2/28/2019  Cough due to ACE inhibitor  Hematoma of rectus sheath 2/28/2019  IBS (irritable bowel syndrome) IBS  NSTEMI (non-ST elevated myocardial infarction) ([facility] [address] 2/28/2019  Pancreatitis  Pure hypercholesterolemia 2/28/2019  Systolic CHF, chronic ([facility] [address] 2/28/2019 Past Surgical History:  
Procedure Laterality Date  COLONOSCOPY Left 11/1/2017 COLONOSCOPY performed by Keily Soria MD at [address]  HX CHOLECYSTECTOMY  HX GI    
 surgery for hiatal hernia  HX ORTHOPAEDIC    
 DJD, doing PT weekly through [address]  IR OCCL [facility] W SI  1/30/2019 Social History Tobacco Use  Smoking status: Former Smoker  Smokeless tobacco: Never Used  Tobacco comment: quit smoking cigarettes 6 yrs ago Substance Use Topics  Alcohol use: Yes Comment: very occasional  
  
Family History Problem Relation Age of Onset  Dementia Mother   
     alzheimers  Cancer Sister   
     gallbladder  Cancer Brother   
     pancreatic  Breast Cancer Paternal Grandmother  Dementia Sister   
     alzheimers  Heart Surgery Brother   
     bypass Current Facility-Administered Medications Medication Dose Route Frequency  lidocaine (LIDODERM) 5 % patch 1 Patch  1 Patch TransDERmal Q24H  TPN ADULT - CENTRAL   IntraVENous CONTINUOUS  
 TPN ADULT - CENTRAL   IntraVENous CONTINUOUS  
 heparin 25,000 units in D5W 250 ml infusion  18-36 Units/kg/hr IntraVENous TITRATE  guaiFENesin ER (MUCINEX) tablet 600 mg  600 mg Oral BID PRN  
 [Held by provider] rosuvastatin (CRESTOR) tablet 10 mg  10 mg Oral QHS  bumetanide (BUMEX) tablet 0.5 mg  0.5 mg Oral DAILY  sodium chloride (NS) flush 5-40 mL  5-40 mL IntraVENous Q8H  
 benzocaine-menthol (CEPACOL) lozenge 1 Lozenge  1 Lozenge Mucous Membrane PRN  
 hydrALAZINE (APRESOLINE) 20 mg/mL injection 10 mg  10 mg IntraVENous Q6H PRN  
 sodium chloride (NS) flush 5-40 mL  5-40 mL IntraVENous PRN  
  acetaminophen (TYLENOL) tablet 650 mg  650 mg Oral Q4H PRN  
 ondansetron (ZOFRAN) injection 4 mg  4 mg IntraVENous Q4H PRN  
 carvedilol (COREG) tablet 6.25 mg  6.25 mg Oral BID WITH MEALS  pantoprazole (PROTONIX) 40 mg in sodium chloride 0.9% 10 mL injection  40 mg IntraVENous Q12H Allergies Allergen Reactions  Penicillins Hives Review of Systems: A complete review of systems was obtained, negative except as described above. Physical Exam:  
 
Visit Vitals /77 (BP 1 Location: Left arm, BP Patient Position: Sitting) Pulse 100 Temp 97.9 °F (36.6 °C) Resp 18 Ht 5' 4\" (1.626 m) Wt 121 lb 4.8 oz (55 kg) SpO2 94% BMI 20.82 kg/m² ECOG PS: 1 General: No distress Eyes: PERRLA, anicteric sclerae HENT: Atraumatic, OP clear Neck: Supple Lymphatic: No cervical, supraclavicular, or inguinal adenopathy Respiratory: CTAB, normal respiratory effort CV: Normal rate, regular rhythm, no murmurs, no peripheral edema GI: Soft, nontender, nondistended, no masses, no hepatomegaly, no splenomegaly MS: Normal gait and station. Digits without clubbing or cyanosis. Skin: No rashes, ecchymoses, or petechiae. Normal temperature, turgor, and texture. Psych: Alert, oriented, appropriate affect, normal judgment/insight Results:  
 
Lab Results Component Value Date/Time WBC 12.7 (H) 09/22/2019 05:20 AM  
 HGB 11.3 (L) 09/22/2019 05:20 AM  
 HCT 36.1 09/22/2019 05:20 AM  
 PLATELET 800 08/33/8478 05:20 AM  
 MCV 95.8 09/22/2019 05:20 AM  
 ABS. NEUTROPHILS 9.1 (H) 09/22/2019 05:20 AM  
 
Lab Results Component Value Date/Time  Sodium 141 09/22/2019 05:20 AM  
 Potassium 4.3 09/22/2019 05:20 AM  
 Chloride 111 (H) 09/22/2019 05:20 AM  
 CO2 24 09/22/2019 05:20 AM  
 Glucose 127 (H) 09/22/2019 05:20 AM  
 BUN 26 (H) 09/22/2019 05:20 AM  
 Creatinine 0.90 09/22/2019 05:20 AM  
 GFR est AA >60 09/22/2019 05:20 AM  
 GFR est non-AA >60 09/22/2019 05:20 AM  
 Calcium 8.7 09/22/2019 05:20 AM  
 Glucose (POC) 102 (H) 01/18/2019 06:23 AM  
 
Lab Results Component Value Date/Time Bilirubin, total 0.6 09/21/2019 05:29 AM  
 ALT (SGPT) 45 09/21/2019 05:29 AM  
 AST (SGOT) 23 09/21/2019 05:29 AM  
 Alk. phosphatase 131 (H) 09/21/2019 05:29 AM  
 Protein, total 6.8 09/21/2019 05:29 AM  
 Albumin 3.2 (L) 09/21/2019 05:29 AM  
 Globulin 3.6 09/21/2019 05:29 AM  
 
 
Records reviewed and summarized above. Pathology report(s) reviewed 9/17/19 FINAL PATHOLOGIC DIAGNOSIS 1. Small bowel, duodenal stricture, biopsy:  
Adenocarcinoma Radiology report(s) reviewed above. 9/16/19 CT abdomen pelvis IMPRESSION: 
  
1. Marked gastric and duodenal distention by water attenuation fluid, with 
abrupt transition at the junction of second and third duodenum where a 
duodenal/pancreatic mass or stricture/scar is suggested, which is confluent with 
the pancreatic head and uncinate process. . This is consistent with endoscopic 
findings earlier same day. NG tube placement is advised. 2. Right hydronephrosis, likely due to extrinsic compression by the markedly 
distended stomach and duodenum. 3. Prominence of extrahepatic bile ducts, likely due to extrinsic impression on 
the ampulla of water by the markedly distended stomach and duodenum. 4. Pancreas extrinsically compressed by markedly distended stomach, making 
visualization of the previously described cystic mass is difficult. The 
pancreatic head lies at the site of duodenal obstruction, but is difficult to 
further assess under the circumstances. 5. Other incidental and postoperative findings MRI abdomen and 9/19/19 IMPRESSION: 
  
1. Interval development of diffuse liver metastatic disease. 
  
2.  Large mass centered on the pancreatic head and proximal horizontal portion 
of the duodenum. . There is narrowing of the duodenal lumen. Possibilities include a pancreatic or duodenal primary. The mass appears to be centered on the 
pancreatic head and there is common bile duct dilatation however the pancreatic 
duct is normal in caliber. 
  
3.  Left lower lobe airspace disease may represent atelectasis or pneumonia. 
  
4.  Possible nonocclusive thrombus in the intrahepatic IVC. 
  
5. The SMV is occluded. Assessment:  
1) adenocarcinoma of the pancreas metastatic to liver 2) thrombus intrahepatic IVC 3) gastric outlet obstruction from pancreatic mass Plan:  
 
· 1. Patient will need a venous access device for therapy. ·  
· 2. Patient will need a stent to allow her to get nutrition in. ·  
· 3. We will go ahead and treat her with heparin and low molecular weight heparin as an outpatient for her blood clot. ·  
· 4. We will plan on doing genetic testing due to her family history of pancreatic cancer. ·  
· 5. We will plan on getting genome wide sequencing of her tumor for therapeutic options. ·  
· 6. We will plan on treating her with gemcitabine and nab paclitaxel and hopefully start treatment within the next week I appreciate the opportunity to participate in [address] [facility].  
 
Signed By: Hernán Sandhu MD

## 2019-09-23 NOTE — PROGRESS NOTES
Faculty or Preceptor Review of Student Work 9/23/2019  - Shift times - 0700 to 1300 The student documentation of patient care for Mathieu Gimenez has been reviewed and approved.    
 
Easton Castro RN

## 2019-09-24 NOTE — PROGRESS NOTES
Problem: Falls - Risk of 
Goal: *Absence of Falls Description Document Narciso Mancera Fall Risk and appropriate interventions in the flowsheet. Outcome: Progressing Towards Goal 
Note:  
Fall Risk Interventions: 
Mobility Interventions: Patient to call before getting OOB, Strengthening exercises (ROM-active/passive) Medication Interventions: Patient to call before getting OOB Elimination Interventions: Patient to call for help with toileting needs Problem: Patient Education: Go to Patient Education Activity Goal: Patient/Family Education Outcome: Progressing Towards Goal 
  
Problem: Pain Goal: *Control of Pain Outcome: Progressing Towards Goal 
  
Problem: Patient Education: Go to Patient Education Activity Goal: Patient/Family Education Outcome: Progressing Towards Goal 
  
Problem: Nutrition Deficit Goal: *Optimize nutritional status Outcome: Progressing Towards Goal

## 2019-09-24 NOTE — PROGRESS NOTES
[facility] [place] [address] [address] Madalyn W: [phone]  F: [phone] Reason for Visit:  
Estella Merino is a 70 y.o. female who is seen in consultation at the request of Dr. Zenaida Ansari for evaluation of stage IV pancreatic cancer. Treatment History: · Biopsy with adenocarcinoma of the pancreas metastatic to liver History of Present Illness: The patient is a very pleasant 28-year-old be younger than her stated age she is been doing fairly well she started having some GI issues in January of this year. But over the last month or so she is been having more trouble and read more recently has been having trouble with nausea and vomiting. Because of the persistent or GI issues with the nausea and vomiting she is now admitted and was found to have a pancreatic cancer metastatic to her liver. The patient has been able to maintain her weight. The patient has lost a couple pounds since her last admission. According to the family the patient initially had lost weight when she was in the hospital then a little bit afterwards and stabilized her weight pattern and it is continued in a stable pattern The patient denied trouble with urination or with breathing she denied any ENT symptomatology. Has had a little bit of trouble with constipation 9/23/19 The patient seems to be doing fairly well this morning. She still has her NG tube in. Hopefully she will be getting a stent placed soon so she can start back on oral nutrition. Then the plan will be to get her port inserted and then to start her on gemcitabine and Abraxane. I anticipate that she will handle those drugs quite well and hopefully we can get her some quality time. 9/24/2019 Patient still has NG tube in place and the plan is for a stent to be placed.   She is getting hyperalimentation so she is getting the nutrition she would needs. Patient did vomit and does not like to vomit so she is hoping that the stent will be placed soon. Past Medical History:  
Diagnosis Date  Coronary artery disease of native artery of native heart with stable angina pectoris ([facility] [address] 2/28/2019  Cough due to ACE inhibitor  Hematoma of rectus sheath 2/28/2019  IBS (irritable bowel syndrome) IBS  NSTEMI (non-ST elevated myocardial infarction) ([facility] [address] 2/28/2019  Pancreatitis  Pure hypercholesterolemia 2/28/2019  Systolic CHF, chronic ([facility] [address] 2/28/2019 Past Surgical History:  
Procedure Laterality Date  COLONOSCOPY Left 11/1/2017 COLONOSCOPY performed by Wu Roach MD at [address]  HX CHOLECYSTECTOMY  HX GI    
 surgery for hiatal hernia  HX ORTHOPAEDIC    
 DJD, doing PT weekly through [facility]  IR OCCL [facility] W SI  1/30/2019 Social History Tobacco Use  Smoking status: Former Smoker  Smokeless tobacco: Never Used  Tobacco comment: quit smoking cigarettes 6 yrs ago Substance Use Topics  Alcohol use: Yes Comment: very occasional  
  
Family History Problem Relation Age of Onset  Dementia Mother   
     alzheimers  Cancer Sister   
     gallbladder  Cancer Brother   
     pancreatic  Breast Cancer Paternal Grandmother  Dementia Sister   
     alzheimers  Heart Surgery Brother   
     bypass Current Facility-Administered Medications Medication Dose Route Frequency  TPN ADULT - CENTRAL   IntraVENous CONTINUOUS  
 lidocaine (LIDODERM) 5 % patch 1 Patch  1 Patch TransDERmal Q24H  TPN ADULT - CENTRAL   IntraVENous CONTINUOUS  
 fat emulsion 20% (LIPOSYN, INTRAlipid) infusion 500 mL  500 mL IntraVENous EVERY MON & TH  
 lidocaine (XYLOCAINE) 2 % viscous solution 15 mL  15 mL Mouth/Throat PRN  phenol throat spray (CHLORASEPTIC) 1 Spray  1 Spray Oral PRN  
  heparin 25,000 units in D5W 250 ml infusion  18-36 Units/kg/hr IntraVENous TITRATE  guaiFENesin ER (MUCINEX) tablet 600 mg  600 mg Oral BID PRN  
 [Held by provider] rosuvastatin (CRESTOR) tablet 10 mg  10 mg Oral QHS  sodium chloride (NS) flush 5-40 mL  5-40 mL IntraVENous Q8H  
 benzocaine-menthol (CEPACOL) lozenge 1 Lozenge  1 Lozenge Mucous Membrane PRN  
 hydrALAZINE (APRESOLINE) 20 mg/mL injection 10 mg  10 mg IntraVENous Q6H PRN  
 sodium chloride (NS) flush 5-40 mL  5-40 mL IntraVENous PRN  
 acetaminophen (TYLENOL) tablet 650 mg  650 mg Oral Q4H PRN  
 ondansetron (ZOFRAN) injection 4 mg  4 mg IntraVENous Q4H PRN  
 carvedilol (COREG) tablet 6.25 mg  6.25 mg Oral BID WITH MEALS  pantoprazole (PROTONIX) 40 mg in sodium chloride 0.9% 10 mL injection  40 mg IntraVENous Q12H Allergies Allergen Reactions  Penicillins Hives Review of Systems: A complete review of systems was obtained, negative except as described above. Physical Exam:  
 
Visit Vitals /79 (BP 1 Location: Left arm, BP Patient Position: At rest) Pulse (!) 113 Comment: nurse notified of HR Temp 98.8 °F (37.1 °C) Resp 22 Ht 5' 4\" (1.626 m) Wt 122 lb 6.4 oz (55.5 kg) SpO2 95% BMI 21.01 kg/m² ECOG PS: 1 General: No distress Eyes: PERRLA, anicteric sclerae HENT: Atraumatic, OP clear, NG tube in place Neck: Supple Lymphatic: No cervical, supraclavicular, or inguinal adenopathy Respiratory: CTAB, normal respiratory effort CV: Normal rate, regular rhythm, no murmurs, no peripheral edema GI: Soft, nontender, nondistended, no masses, no hepatomegaly, no splenomegaly MS: Normal gait and station. Digits without clubbing or cyanosis. Skin: No rashes, ecchymoses, or petechiae. Normal temperature, turgor, and texture. Psych: Alert, oriented, appropriate affect, normal judgment/insight Results:  
 
Lab Results Component Value Date/Time  WBC 12.7 (H) 09/22/2019 05:20 AM  
 HGB 11.3 (L) 09/22/2019 05:20 AM  
 HCT 36.1 09/22/2019 05:20 AM  
 PLATELET 899 18/45/7528 05:20 AM  
 MCV 95.8 09/22/2019 05:20 AM  
 ABS. NEUTROPHILS 9.1 (H) 09/22/2019 05:20 AM  
 
Lab Results Component Value Date/Time Sodium 139 09/24/2019 03:06 AM  
 Potassium 5.0 09/24/2019 03:06 AM  
 Chloride 114 (H) 09/24/2019 03:06 AM  
 CO2 17 (L) 09/24/2019 03:06 AM  
 Glucose 150 (H) 09/24/2019 03:06 AM  
 BUN 33 (H) 09/24/2019 03:06 AM  
 Creatinine 1.14 (H) 09/24/2019 03:06 AM  
 GFR est AA 56 (L) 09/24/2019 03:06 AM  
 GFR est non-AA 46 (L) 09/24/2019 03:06 AM  
 Calcium 9.0 09/24/2019 03:06 AM  
 Glucose (POC) 102 (H) 01/18/2019 06:23 AM  
 
Lab Results Component Value Date/Time Bilirubin, total 0.4 09/24/2019 03:06 AM  
 ALT (SGPT) 44 09/24/2019 03:06 AM  
 AST (SGOT) 23 09/24/2019 03:06 AM  
 Alk. phosphatase 135 (H) 09/24/2019 03:06 AM  
 Protein, total 6.8 09/24/2019 03:06 AM  
 Albumin 3.0 (L) 09/24/2019 03:06 AM  
 Globulin 3.8 09/24/2019 03:06 AM  
 
 
Records reviewed and summarized above. Pathology report(s) reviewed 9/17/19 FINAL PATHOLOGIC DIAGNOSIS 1. Small bowel, duodenal stricture, biopsy:  
Adenocarcinoma Radiology report(s) reviewed above. 9/16/19 CT abdomen pelvis IMPRESSION: 
  
1. Marked gastric and duodenal distention by water attenuation fluid, with 
abrupt transition at the junction of second and third duodenum where a 
duodenal/pancreatic mass or stricture/scar is suggested, which is confluent with 
the pancreatic head and uncinate process. . This is consistent with endoscopic 
findings earlier same day. NG tube placement is advised. 2. Right hydronephrosis, likely due to extrinsic compression by the markedly 
distended stomach and duodenum. 3. Prominence of extrahepatic bile ducts, likely due to extrinsic impression on 
the ampulla of water by the markedly distended stomach and duodenum. 4. Pancreas extrinsically compressed by markedly distended stomach, making visualization of the previously described cystic mass is difficult. The 
pancreatic head lies at the site of duodenal obstruction, but is difficult to 
further assess under the circumstances. 5. Other incidental and postoperative findings MRI abdomen and 9/19/19 IMPRESSION: 
  
1. Interval development of diffuse liver metastatic disease. 
  
2.  Large mass centered on the pancreatic head and proximal horizontal portion 
of the duodenum. . There is narrowing of the duodenal lumen. Possibilities 
include a pancreatic or duodenal primary. The mass appears to be centered on the 
pancreatic head and there is common bile duct dilatation however the pancreatic 
duct is normal in caliber. 
  
3.  Left lower lobe airspace disease may represent atelectasis or pneumonia. 
  
4.  Possible nonocclusive thrombus in the intrahepatic IVC. 
  
5. The SMV is occluded. Assessment:  
1) adenocarcinoma of the pancreas metastatic to liver with the plan being for chemotherapy once she is able to tolerate nutrition 2) thrombus intrahepatic IVC 3) gastric outlet obstruction from pancreatic mass with stent placement pending Plan:  
 
· 1. Patient will need a venous access device for therapy. ·  
· 2. Patient will need a stent to allow her to get nutrition in. ·  
· 3. We will go ahead and treat her with heparin and low molecular weight heparin as an outpatient for her blood clot. ·  
· 4. We will plan on doing genetic testing due to her family history of pancreatic cancer. ·  
· 5. We will plan on getting genome wide sequencing of her tumor for therapeutic options. ·  
· 6. We will plan on treating her with gemcitabine and nab paclitaxel and hopefully start treatment within the next week I appreciate the opportunity to participate in [facility].  
 
Signed By: Shay Rojas MD

## 2019-09-24 NOTE — CDMP QUERY
Dr Keerthi Leo: 
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 Please further specify if this condition is malnutrition and if so please specify degree. Tresea Kervin ? Malnutrition (mild, moderate, severe) ? Protein calorie malnutrition (mild, moderate, severe) ? Other (please specify) ? Unable to determine The medical record reflects the following: 
  Risk Factors: RD eval 5 # loss past 7 mos poor appetitie last 2 mos /decrease interest in food for past 2 months · NPO x 9 D since admission,  New dx of adenocarcinoma of the pancreas metastatic to liver Clinical Indicators:  70 yoF w/ inadequate oral intake related to altered GI fx as evidenced by duodenal stricture/pancreatitis; 5# wt loss past 7 mos, poor appetite, BMI 21 Treatment: daily weight, TPN initiation, continued RD eval & monitoring for electrolyte Thank you,  
Carmine Deal, Critical access hospital[facility], [address] [place] [address] [place] 
[phone]

## 2019-09-24 NOTE — PROGRESS NOTES
Faculty or Preceptor Review of Student Work 9/24/2019  - Shift times - 0700 to 1300 The student documentation of patient care for Jameel Wang has been reviewed and approved.    
 
Kenan Hansen RN

## 2019-09-24 NOTE — PROGRESS NOTES
For a MEWS of 3 MD notified. New orders for compazine, Benadryl, Nd morphine. Patient vomiting over night. Zofran Compazine given with positive result.

## 2019-09-24 NOTE — PROGRESS NOTES
Bedside and Verbal shift change report given to Jose Antonio (oncoming nurse) by Bethel Brennan (offgoing nurse). Report included the following information SBAR, Kardex and MAR.

## 2019-09-25 NOTE — PROGRESS NOTES
Hospitalist Progress Note Brionna Walton MD 
Answering service: [phone] OR [phone] from in house phone Date of Service:  2019 NAME:  Lois Zepeda :  1940 MRN:  [id number] Admission Summary:  
 
74-year-old female with a past medical history significant for coronary artery disease, non-ST-elevation myocardial infarction, dyslipidemia, chronic systolic congestive heart failure, irritable bowel syndrome and pancreatitis, was brought to the emergency room from home via private vehicle accompanied by her sister for complaints of an approximately 3-day history of progressively worsening nausea and coffee-grounds vomitus.  The patient also complained of some intermittent mild abdominal pain with some radiation to the upper back.  The patient described the pain as being dull achy in nature, at times about 6-7 on 10 in intensity.  The patient denied associated headaches, dizziness, visual deficits, chest pains, palpitations, shortness of breath, cough, fevers, chills, hematuria, bladder or bowel irregularities. Interval history / Subjective:  
  
 
Palliative bypass and duodenal stent planned sometime this week Patient seen and examined by me No new complaints Kidney function is creeping up slowly I reneewed TPN Assessment & Plan:  
 
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 Normal triglyceride level. EGD initially -  Done aborted due to inability to pass scope down to Duodenum CT abd/Pelvis  GOO and possible stricture along duodenum which is causing obstruction of  
NG tube for decompression CEA- normal 
Ca19-9  - 3596 NPO now  - Re Endoscopy shows that the duodenum stricture is adenocarcinoma Surgery consulted Continue NPO status Started TPN - renewed MRI shows pancreatic mass with liver mets /oncology has seen patient and they offered chemotherapy to patient Surgeons may do a surgical  Bypass , waiting for time and date IVC clot Started on heparin gtt Change to lovenox for outpatient / per Dr Marguerite Sacks  
  
 H/h stable -  
normochromic normocytic anemia. C/W PPI Watch for bleeding on heparin gtt  
  
Hypokalemia  
resolved Change to TPN bag with no KCL  
  
TOÑA probably due to eleanor Hydronephrosis noted on Ct scan Previous hospitalist D/w radiology Improving with Decompression of Stomach I will check hydronephrosis, well it is better to do post op imaging If persistent please call urology CAD. H/O NSTEMI.  
  
Dyslipidemia. Monitor Fluid Status  
  
UTI ruled out Urine cultures negative D/C Ceftriaxone 
  
MalNutrition Body mass index is 19.56 kg/m². Due to cancer and GOO 
picc line Central TPN , renewed  
  
GI and DVT (SCDs due to probable GI bleed). 
  
DNR/DNI with a guarded prognosis. D/W patient and family. 
  
patient's Son and daughter Lupillo Landin ([phone]) Code status: DNR 
DVT prophylaxis: heparin Care Plan discussed with: Patient/Family and Nurse Disposition: TBD Hospital Problems  Date Reviewed: 9/14/2019 Codes Class Noted POA Acute pancreatitis ICD-10-CM: K85.90 ICD-9-CM: 083.4  9/14/2019 Yes TOÑA (acute kidney injury) ([facility] [address] ICD-10-CM: N17.9 ICD-9-CM: 584.9  9/14/2019 Yes Hypokalemia ICD-10-CM: E87.6 ICD-9-CM: 276.8  9/14/2019 Yes GI bleed ICD-10-CM: K92.2 ICD-9-CM: 578.9  9/14/2019 Yes Coffee ground emesis ICD-10-CM: K92.0 ICD-9-CM: 578.0  9/14/2019 Yes Review of Systems: A comprehensive review of systems was negative. Vital Signs:  
 Last 24hrs VS reviewed since prior progress note. Most recent are: 
Visit Vitals /76 (BP 1 Location: Left arm, BP Patient Position: At rest) Pulse (!) 105 Temp 98.2 °F (36.8 °C) Resp 16 Ht 5' 4\" (1.626 m) Wt 51.7 kg (113 lb 15.7 oz) SpO2 96% BMI 19.56 kg/m² No intake or output data in the 24 hours ending 09/25/19 1550 Physical Examination:  
 
 
     
Constitutional:  No acute distress, , pleasant ENT:  Oral mucous moist, Resp:  CTA bilaterally. No wheezing/rhonchi/rales. CV:  Regular rhythm, normal rate, no murmurs, gallops, rubs GI:  Soft, non distended, non tender. normoactive bowel sounds, no hepatosplenomegaly Musculoskeletal:  No edema, warm, 2+ pulses throughout Neurologic:  Moves all extremities. AAOx3, CN II-XII reviewed Psych:  Good insight, Not anxious nor agitated. Data Review:  
 I personally reviewed  Image and labs Labs:  
 
No results for input(s): WBC, HGB, HCT, PLT, HGBEXT, HCTEXT, PLTEXT, HGBEXT, HCTEXT, PLTEXT in the last 72 hours. Recent Labs  
  09/25/19 
0243 09/24/19 
5789  139  
K 4.9 5.0  
* 114* CO2 20* 17* BUN 38* 33* CREA 1.26* 1.14* * 150* CA 9.0 9.0 Recent Labs  
  09/24/19 
7255 SGOT 23 ALT 44 * TBILI 0.4 TP 6.8 ALB 3.0*  
GLOB 3.8 Recent Labs  
  09/25/19 
0825 09/25/19 
0243 09/24/19 
1938 APTT 62.8* 70.0* 80.5* No results for input(s): FE, TIBC, PSAT, FERR in the last 72 hours. No results found for: FOL, RBCF No results for input(s): PH, PCO2, PO2 in the last 72 hours. No results for input(s): CPK, CKNDX, TROIQ in the last 72 hours. No lab exists for component: CPKMB Lab Results Component Value Date/Time Cholesterol, total 85 09/15/2019 04:37 AM  
 HDL Cholesterol 43 09/15/2019 04:37 AM  
 LDL, calculated 29 09/15/2019 04:37 AM  
 Triglyceride 65 09/15/2019 04:37 AM  
 CHOL/HDL Ratio 2.0 09/15/2019 04:37 AM  
 
Lab Results Component Value Date/Time  Glucose (POC) 102 (H) 01/18/2019 06:23 AM  
 Glucose (POC) 139 (H) 01/17/2019 09:05 PM  
 Glucose (POC) 114 (H) 01/17/2019 04:31 PM  
 Glucose (POC) 106 (H) 01/17/2019 11:41 AM  
 Glucose (POC) 132 (H) 01/16/2019 09:13 PM  
 
Lab Results Component Value Date/Time Color YELLOW/STRAW 09/15/2019 12:33 AM  
 Appearance CLOUDY (A) 09/15/2019 12:33 AM  
 Specific gravity 1.019 09/15/2019 12:33 AM  
 pH (UA) 6.0 09/15/2019 12:33 AM  
 Protein 100 (A) 09/15/2019 12:33 AM  
 Glucose NEGATIVE  09/15/2019 12:33 AM  
 Ketone NEGATIVE  09/15/2019 12:33 AM  
 Bilirubin NEGATIVE  09/15/2019 12:33 AM  
 Urobilinogen 0.2 09/15/2019 12:33 AM  
 Nitrites NEGATIVE  09/15/2019 12:33 AM  
 Leukocyte Esterase SMALL (A) 09/15/2019 12:33 AM  
 Epithelial cells MODERATE (A) 09/15/2019 12:33 AM  
 Bacteria 2+ (A) 09/15/2019 12:33 AM  
 WBC 10-20 09/15/2019 12:33 AM  
 RBC 0-5 09/15/2019 12:33 AM  
 
 
 
Medications Reviewed:  
 
Current Facility-Administered Medications Medication Dose Route Frequency  TPN ADULT - CENTRAL   IntraVENous CONTINUOUS  
 TPN ADULT - CENTRAL   IntraVENous CONTINUOUS  
 lidocaine (LIDODERM) 5 % patch 1 Patch  1 Patch TransDERmal Q24H  
 fat emulsion 20% (LIPOSYN, INTRAlipid) infusion 500 mL  500 mL IntraVENous EVERY MON & TH  
 lidocaine (XYLOCAINE) 2 % viscous solution 15 mL  15 mL Mouth/Throat PRN  phenol throat spray (CHLORASEPTIC) 1 Spray  1 Spray Oral PRN  
 heparin 25,000 units in D5W 250 ml infusion  18-36 Units/kg/hr IntraVENous TITRATE  guaiFENesin ER (MUCINEX) tablet 600 mg  600 mg Oral BID PRN  
 [Held by provider] rosuvastatin (CRESTOR) tablet 10 mg  10 mg Oral QHS  sodium chloride (NS) flush 5-40 mL  5-40 mL IntraVENous Q8H  
 benzocaine-menthol (CEPACOL) lozenge 1 Lozenge  1 Lozenge Mucous Membrane PRN  
 hydrALAZINE (APRESOLINE) 20 mg/mL injection 10 mg  10 mg IntraVENous Q6H PRN  
 sodium chloride (NS) flush 5-40 mL  5-40 mL IntraVENous PRN  
 acetaminophen (TYLENOL) tablet 650 mg  650 mg Oral Q4H PRN  
 ondansetron (ZOFRAN) injection 4 mg  4 mg IntraVENous Q4H PRN  
  carvedilol (COREG) tablet 6.25 mg  6.25 mg Oral BID WITH MEALS  pantoprazole (PROTONIX) 40 mg in sodium chloride 0.9% 10 mL injection  40 mg IntraVENous Q12H  
 
______________________________________________________________________ EXPECTED LENGTH OF STAY: 4d 19h ACTUAL LENGTH OF STAY:          Ovi Bunch MD

## 2019-09-25 NOTE — PROGRESS NOTES
NUTRITION COMPLETE ASSESSMENT 
 
RECOMMENDATIONS:  
1. Decrease TPN to avoid overfeeding and switch to cyclic regimen since surgery date TBD: 
 - 5%AA, D15 @ 75ml/hr x 1 hr 
 - 5%AA, D15 @ 135ml/hr x 10hrs 
 - 5%AA, D15 @ 75ml/hr x 1 hr 
 - Add 100mg thiamine and continue lipids as ordered 2. Adjust K+ via TPN as needed. Since elevated agree with removing added K+. If WNL tomorrow resume standard additives and monitor. 3. Daily weights while on PN Interventions/Plan:  
Food/Nutrient Delivery:          Modify rate, concentration, composition, and schedule Nutrition Education:(-) Assessment:  
Reason for Assessment: [x]Reassessment Diet: NPO 
PPN: 5%AA, D20 @ 75ml/hr + 500ml, 20% lipids 2x/week Nutritionally Significant Medications: [x] Reviewed & Includes: coreg, protonix Subjective: Flat affect, no questions. Objective: 
Pt admitted for acute pancreatitis. PMHx:  CHF, CAD, pancreatitis. Duodenal obstruction. Biopsy positive for adenocarcinoma with imaging showing numerous liver lesions. GI and surgery following. NGT remains in place to suction. Date for palliative bypass vs stent still to be determined. K+ now high after repletion for hypokalemia. Added K+ in TPN reduced yesterday. Continue to monitor. Likely can resume standard additives but with NGT output still may need higher dose? TPN infusing to exceed energy needs. Recommend reduction in volume and switch to cyclic regimen of: - 5%AA, D15 @ 75ml/hr x 1 hr 
 - 5%AA, D15 @ 135ml/hr x 10hrs 
 - 5%AA, D15 @ 75ml/hr x 1 hr 
+ 500ml, 20% lipids 2x/week Provides: 1358kcal, 76g protein, 1500ml fluid. Meets 100% energy and protein needs. No wt loss reported by pt but wt hx showing wt loss of at least 5+lbs over past 7 months. -140#. Follow trends. Will continue to follow for TPN vs diet advancement, wt trends. Estimated Nutrition Needs:  
Kcals/day: 4545 Kcals/day(1238-1342kcal) Protein: 68 g(68-74g (1.2-1.4g/kg (pancreatitis))) Fluid: 1425 ml(25ml/kg) Based On: Yael Gay(x 1.2-1.3) Weight Used: Actual wt(57kg) Pt expected to meet estimated nutrient needs:  [x]   Yes     []  No [] Unable to predict at this time Nutrition Diagnosis:  
1. Inadequate oral intake related to altered GI fx as evidenced by duodenal stricture/pancreatitis; NPO with TPN Goals:   
 PN meeting at least % needs in 5-7 days Monitoring & Evaluation: - Total energy intake - Weight/weight change Previous Nutrition Goals Met:   Progressing Previous Recommendations:    No 
 
Education & Discharge Needs: 
 [x] None Identified 
 [] Identified and addressed [x] Participated in care plan, discharge planning, and/or interdisciplinary rounds Cultural, Yazdanism and ethnic food preferences identified: None Skin Integrity: [x]Intact  []Other Edema: [x]None []Other Last BM: 9/18 Food Allergies: [x]None []Other Diet Restrictions: Cultural/Baptism Preference(s): None Anthropometrics:   
Weight Loss Metrics 9/25/2019 4/17/2019 4/15/2019 3/11/2019 2/21/2019 2/8/2019 1/21/2019 Today's Wt 113 lb 15.7 oz 125 lb 136 lb 136 lb 136 lb 144 lb 6.4 oz 149 lb BMI 19.56 kg/m2 21.46 kg/m2 23.34 kg/m2 23.34 kg/m2 23.34 kg/m2 24.79 kg/m2 25.58 kg/m2 Weight Source: Bed Height: 5' 4\" (162.6 cm), Height Source: (drivers license) Body mass index is 19.56 kg/m². IBW : 54.4 kg (120 lb), % IBW (Calculated): 104.67 % Usual Body Weight: 59 kg (130 lb),   
 
Labs:   
Lab Results Component Value Date/Time  Sodium 138 09/25/2019 02:43 AM  
 Potassium 4.9 09/25/2019 02:43 AM  
 Chloride 109 (H) 09/25/2019 02:43 AM  
 CO2 20 (L) 09/25/2019 02:43 AM  
 Glucose 129 (H) 09/25/2019 02:43 AM  
 BUN 38 (H) 09/25/2019 02:43 AM  
 Creatinine 1.26 (H) 09/25/2019 02:43 AM  
 Calcium 9.0 09/25/2019 02:43 AM  
 Magnesium 1.8 09/20/2019 01:16 AM  
 Phosphorus 4.3 02/03/2019 01:12 AM  
 Albumin 3.0 (L) 09/24/2019 03:06 AM  
 
Yosef Rutherford, GILDA [address] [place] , Pager #[phone] or [phone]

## 2019-09-25 NOTE — PROGRESS NOTES
Bedside shift change report given to Maria M Riddle (oncoming nurse) by Marinus Canavan RN (offgoing nurse). Report included the following information SBAR, Kardex and MAR.

## 2019-09-25 NOTE — PROGRESS NOTES
PTT 70.0 = NO CHANGE at this time due to therapeutic range. Repeat PTT in 6 hours per protocol and continue to monitor

## 2019-09-25 NOTE — PROGRESS NOTES
Bedside and Verbal shift change report given to Jose Antonio (oncoming nurse) by Geovani Bagley (offgoing nurse). Report included the following information SBAR, Kardex and MAR.

## 2019-09-25 NOTE — PROGRESS NOTES
[facility] [place] [facility] [place] [address] [phone], [phone] W: [phone]  F: [phone] Reason for Visit:  
Favian Alexis is a 70 y.o. female who is seen in consultation at the request of Dr. Anibal Hanna for evaluation of stage IV pancreatic cancer. Treatment History: · Biopsy with adenocarcinoma of the pancreas metastatic to liver History of Present Illness: The patient is a very pleasant 68-year-old be younger than her stated age she is been doing fairly well she started having some GI issues in January of this year. But over the last month or so she is been having more trouble and read more recently has been having trouble with nausea and vomiting. Because of the persistent or GI issues with the nausea and vomiting she is now admitted and was found to have a pancreatic cancer metastatic to her liver. The patient has been able to maintain her weight. The patient has lost a couple pounds since her last admission. According to the family the patient initially had lost weight when she was in the hospital then a little bit afterwards and stabilized her weight pattern and it is continued in a stable pattern The patient denied trouble with urination or with breathing she denied any ENT symptomatology. Has had a little bit of trouble with constipation 9/23/19 The patient seems to be doing fairly well this morning. She still has her NG tube in. Hopefully she will be getting a stent placed soon so she can start back on oral nutrition. Then the plan will be to get her port inserted and then to start her on gemcitabine and Abraxane. I anticipate that she will handle those drugs quite well and hopefully we can get her some quality time. 9/24/2019 Patient still has NG tube in place and the plan is for a stent to be placed.   She is getting hyperalimentation so she is getting the nutrition she would needs. Patient did vomit and does not like to vomit so she is hoping that the stent will be placed soon. 9/25/2019 Patient is doing well this morning still has her NG tube in place. She has had a little bit of nausea. She continues to get her hyperalimentation. Hopefully will get her bypass or stent placed soon so that we can get her treatment started. She will be getting a venous access device. She remains afebrile. Her vital signs are stable. Continue to encourage her to be up and in a chair. Plan on starting chemotherapy as soon as she is taking nutrition and has her Port-A-Cath placed. Past Medical History:  
Diagnosis Date  Coronary artery disease of native artery of native heart with stable angina pectoris ([address] 2/28/2019  Cough due to ACE inhibitor  Hematoma of rectus sheath 2/28/2019  IBS (irritable bowel syndrome) IBS  NSTEMI (non-ST elevated myocardial infarction) ([address] 2/28/2019  Pancreatitis  Pure hypercholesterolemia 2/28/2019  Systolic CHF, chronic ([address] 2/28/2019 Past Surgical History:  
Procedure Laterality Date  COLONOSCOPY Left 11/1/2017 COLONOSCOPY performed by Pietro Seip, MD at [address]  HX CHOLECYSTECTOMY  HX GI    
 surgery for hiatal hernia  HX ORTHOPAEDIC    
 DJD, doing PT weekly through [address]  IR OCCL Matilde Gottron W SI  1/30/2019 Social History Tobacco Use  Smoking status: Former Smoker  Smokeless tobacco: Never Used  Tobacco comment: quit smoking cigarettes 6 yrs ago Substance Use Topics  Alcohol use: Yes Comment: very occasional  
  
Family History Problem Relation Age of Onset  Dementia Mother   
     alzheimers  Cancer Sister   
     gallbladder  Cancer Brother   
     pancreatic  Breast Cancer Paternal Grandmother  Dementia Sister   
     alzheimers  Heart Surgery Brother   
     bypass Current Facility-Administered Medications Medication Dose Route Frequency  TPN ADULT - CENTRAL   IntraVENous CONTINUOUS  
 lidocaine (LIDODERM) 5 % patch 1 Patch  1 Patch TransDERmal Q24H  
 fat emulsion 20% (LIPOSYN, INTRAlipid) infusion 500 mL  500 mL IntraVENous EVERY MON & TH  
 lidocaine (XYLOCAINE) 2 % viscous solution 15 mL  15 mL Mouth/Throat PRN  phenol throat spray (CHLORASEPTIC) 1 Spray  1 Spray Oral PRN  
 heparin 25,000 units in D5W 250 ml infusion  18-36 Units/kg/hr IntraVENous TITRATE  guaiFENesin ER (MUCINEX) tablet 600 mg  600 mg Oral BID PRN  
 [Held by provider] rosuvastatin (CRESTOR) tablet 10 mg  10 mg Oral QHS  sodium chloride (NS) flush 5-40 mL  5-40 mL IntraVENous Q8H  
 benzocaine-menthol (CEPACOL) lozenge 1 Lozenge  1 Lozenge Mucous Membrane PRN  
 hydrALAZINE (APRESOLINE) 20 mg/mL injection 10 mg  10 mg IntraVENous Q6H PRN  
 sodium chloride (NS) flush 5-40 mL  5-40 mL IntraVENous PRN  
 acetaminophen (TYLENOL) tablet 650 mg  650 mg Oral Q4H PRN  
 ondansetron (ZOFRAN) injection 4 mg  4 mg IntraVENous Q4H PRN  
 carvedilol (COREG) tablet 6.25 mg  6.25 mg Oral BID WITH MEALS  pantoprazole (PROTONIX) 40 mg in sodium chloride 0.9% 10 mL injection  40 mg IntraVENous Q12H Allergies Allergen Reactions  Penicillins Hives Review of Systems: A complete review of systems was obtained, negative except as described above. Physical Exam:  
 
Visit Vitals /80 (BP 1 Location: Left arm, BP Patient Position: Sitting) Pulse (!) 123 Temp 97 °F (36.1 °C) Resp 20 Ht 5' 4\" (1.626 m) Wt 113 lb 15.7 oz (51.7 kg) SpO2 96% BMI 19.56 kg/m² ECOG PS: 1 General: No distress Eyes: PERRLA, anicteric sclerae HENT: Atraumatic, OP clear, NG tube in place Neck: Supple Lymphatic: No cervical, supraclavicular, or inguinal adenopathy Respiratory: CTAB, normal respiratory effort CV: Normal rate, regular rhythm, no murmurs, no peripheral edema GI: Soft, nontender, nondistended, no masses, no hepatomegaly, no splenomegaly MS: Normal gait and station. Digits without clubbing or cyanosis. Skin: No rashes, ecchymoses, or petechiae. Normal temperature, turgor, and texture. Psych: Alert, oriented, appropriate affect, normal judgment/insight Results:  
 
Lab Results Component Value Date/Time WBC 12.7 (H) 09/22/2019 05:20 AM  
 HGB 11.3 (L) 09/22/2019 05:20 AM  
 HCT 36.1 09/22/2019 05:20 AM  
 PLATELET 848 42/47/8089 05:20 AM  
 MCV 95.8 09/22/2019 05:20 AM  
 ABS. NEUTROPHILS 9.1 (H) 09/22/2019 05:20 AM  
 
Lab Results Component Value Date/Time Sodium 138 09/25/2019 02:43 AM  
 Potassium 4.9 09/25/2019 02:43 AM  
 Chloride 109 (H) 09/25/2019 02:43 AM  
 CO2 20 (L) 09/25/2019 02:43 AM  
 Glucose 129 (H) 09/25/2019 02:43 AM  
 BUN 38 (H) 09/25/2019 02:43 AM  
 Creatinine 1.26 (H) 09/25/2019 02:43 AM  
 GFR est AA 50 (L) 09/25/2019 02:43 AM  
 GFR est non-AA 41 (L) 09/25/2019 02:43 AM  
 Calcium 9.0 09/25/2019 02:43 AM  
 Glucose (POC) 102 (H) 01/18/2019 06:23 AM  
 
Lab Results Component Value Date/Time Bilirubin, total 0.4 09/24/2019 03:06 AM  
 ALT (SGPT) 44 09/24/2019 03:06 AM  
 AST (SGOT) 23 09/24/2019 03:06 AM  
 Alk. phosphatase 135 (H) 09/24/2019 03:06 AM  
 Protein, total 6.8 09/24/2019 03:06 AM  
 Albumin 3.0 (L) 09/24/2019 03:06 AM  
 Globulin 3.8 09/24/2019 03:06 AM  
 
 
Records reviewed and summarized above. Pathology report(s) reviewed 9/17/19 FINAL PATHOLOGIC DIAGNOSIS 1. Small bowel, duodenal stricture, biopsy:  
Adenocarcinoma Radiology report(s) reviewed above. 9/16/19 CT abdomen pelvis IMPRESSION: 
  
1. Marked gastric and duodenal distention by water attenuation fluid, with 
abrupt transition at the junction of second and third duodenum where a 
duodenal/pancreatic mass or stricture/scar is suggested, which is confluent with 
the pancreatic head and uncinate process. . This is consistent with endoscopic findings earlier same day. NG tube placement is advised. 2. Right hydronephrosis, likely due to extrinsic compression by the markedly 
distended stomach and duodenum. 3. Prominence of extrahepatic bile ducts, likely due to extrinsic impression on 
the ampulla of water by the markedly distended stomach and duodenum. 4. Pancreas extrinsically compressed by markedly distended stomach, making 
visualization of the previously described cystic mass is difficult. The 
pancreatic head lies at the site of duodenal obstruction, but is difficult to 
further assess under the circumstances. 5. Other incidental and postoperative findings MRI abdomen and 9/19/19 IMPRESSION: 
  
1. Interval development of diffuse liver metastatic disease. 
  
2.  Large mass centered on the pancreatic head and proximal horizontal portion 
of the duodenum. . There is narrowing of the duodenal lumen. Possibilities 
include a pancreatic or duodenal primary. The mass appears to be centered on the 
pancreatic head and there is common bile duct dilatation however the pancreatic 
duct is normal in caliber. 
  
3.  Left lower lobe airspace disease may represent atelectasis or pneumonia. 
  
4.  Possible nonocclusive thrombus in the intrahepatic IVC. 
  
5. The SMV is occluded. Assessment:  
1) adenocarcinoma of the pancreas metastatic to liver with the plan being for chemotherapy once she is able to tolerate nutrition 2) thrombus intrahepatic IVC 3) gastric outlet obstruction from pancreatic mass with stent placement pending Plan:  
 
· 1. Patient will need a venous access device for therapy. ·  
· 2. Patient will need a stent to allow her to get nutrition in. ·  
· 3. We will go ahead and treat her with heparin and low molecular weight heparin as an outpatient for her blood clot. ·  
· 4. We will plan on doing genetic testing due to her family history of pancreatic cancer.  
·  
 · 5.  We will plan on getting genome wide sequencing of her tumor for therapeutic options. ·  
· 6. We will plan on treating her with gemcitabine and nab paclitaxel and hopefully start treatment within the next week I appreciate the opportunity to participate in Ms. [address] [facility].  
 
Signed By: Amelie Tate MD

## 2019-09-25 NOTE — PROGRESS NOTES
It seems like a good idea to do a gastrojejunostomy to relieve her duodenal obstruction. Discussed this with her and her daughter and they are in agreement. I will be out of town the next several days. Dr Janet Burgess is available and can do this on Friday, which was also OK with them. I think we can leave her on the heparin and stop it 6 hours before surgery, resuming at some appropriate time after the operation.

## 2019-09-26 NOTE — PROGRESS NOTES
Bedside shift change report given to Maria M Riddle (oncoming nurse) by Zeeshan Mckeon (offgoing nurse). Report included the following information SBAR, Kardex and MAR.

## 2019-09-26 NOTE — PROGRESS NOTES
[facility] 
Daily Progress Note Admit Date: 2019 Subjective:  
 
Last 24 hrs: No new complaints. Anticipating surgery tomorrow am with Dr. Jacob Lares. Continues on TPN with NG decompression. Objective:  
 
Blood pressure 120/71, pulse (!) 105, temperature 97.6 °F (36.4 °C), resp. rate 16, height 5' 4\" (1.626 m), weight 51.9 kg (114 lb 6.7 oz), SpO2 96 %. Temp (24hrs), Av.2 °F (36.8 °C), Min:97.6 °F (36.4 °C), Max:98.4 °F (36.9 °C) 
 
 
_____________________ Physical Exam:    
Alert and Oriented, lying in bed, no acute distress. Cardiovascular: RR 
Lungs:CTAB Abdomen: soft Assessment:  
Active Problems: 
  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 Plan:  
 
NPO at midnight OR tomorrow with Dr. Jacob Lares, tentatively scheduled for 12:45p Stop heparin 6 hours prior to surgery. TPN per hospitalist 
 
 
 
 
ROSEANNA King - [address] Surgery at [address], [place] [address], [address] [place] [address] 
[phone] Data Review: 
 
Recent Labs  
  19 
0031 WBC 15.9* HGB 11.4* HCT 36.1  Recent Labs  
  19 
0031 19 
0243 19 
8225  138 139  
K 4.4 4.9 5.0  
 109* 114* CO2 20* 20* 17* * 129* 150* BUN 38* 38* 33* CREA 1.15* 1.26* 1.14* CA 8.9 9.0 9.0 ALB  --   --  3.0* TBILI  --   --  0.4 SGOT  --   --  23 ALT  --   --  44 No results for input(s): AML, LPSE in the last 72 hours. ______________________ Medications: 
 
Current Facility-Administered Medications Medication Dose Route Frequency  TPN ADULT - CENTRAL   IntraVENous CONTINUOUS  
 lidocaine (LIDODERM) 5 % patch 1 Patch  1 Patch TransDERmal Q24H  fat emulsion 20% (LIPOSYN, INTRAlipid) infusion 500 mL  500 mL IntraVENous EVERY MON & TH  
 lidocaine (XYLOCAINE) 2 % viscous solution 15 mL  15 mL Mouth/Throat PRN  phenol throat spray (CHLORASEPTIC) 1 Spray  1 Spray Oral PRN  
 heparin 25,000 units in D5W 250 ml infusion  18-36 Units/kg/hr IntraVENous TITRATE  guaiFENesin ER (MUCINEX) tablet 600 mg  600 mg Oral BID PRN  
 [Held by provider] rosuvastatin (CRESTOR) tablet 10 mg  10 mg Oral QHS  sodium chloride (NS) flush 5-40 mL  5-40 mL IntraVENous Q8H  
 benzocaine-menthol (CEPACOL) lozenge 1 Lozenge  1 Lozenge Mucous Membrane PRN  
 hydrALAZINE (APRESOLINE) 20 mg/mL injection 10 mg  10 mg IntraVENous Q6H PRN  
 sodium chloride (NS) flush 5-40 mL  5-40 mL IntraVENous PRN  
 acetaminophen (TYLENOL) tablet 650 mg  650 mg Oral Q4H PRN  
 ondansetron (ZOFRAN) injection 4 mg  4 mg IntraVENous Q4H PRN  
 carvedilol (COREG) tablet 6.25 mg  6.25 mg Oral BID WITH MEALS  pantoprazole (PROTONIX) 40 mg in sodium chloride 0.9% 10 mL injection  40 mg IntraVENous Q12H

## 2019-09-26 NOTE — PROGRESS NOTES
Bedside shift change report given to Geovani Fuller RN (oncoming nurse) by Hari May RN (offgoing nurse). Report included the following information SBAR, MAR and Recent Results.

## 2019-09-26 NOTE — PROGRESS NOTES
Hospitalist Progress Note Tino Romero MD 
Answering service: [phone] or [phone] from in house phone Date of Service:  2019 NAME:  Dory March :  1940 MRN:  [id number] Admission Summary:  
 
72-year-old female with a past medical history significant for coronary artery disease, non-ST-elevation myocardial infarction, dyslipidemia, chronic systolic congestive heart failure, irritable bowel syndrome and pancreatitis, was brought to the emergency room from home via private vehicle accompanied by her sister for complaints of an approximately 3-day history of progressively worsening nausea and coffee-grounds vomitus.  The patient also complained of some intermittent mild abdominal pain with some radiation to the upper back.  The patient described the pain as being dull achy in nature, at times about 6-7 on 10 in intensity.  The patient denied associated headaches, dizziness, visual deficits, chest pains, palpitations, shortness of breath, cough, fevers, chills, hematuria, bladder or bowel irregularities. Interval history / Subjective:  
  
 
Palliative bypass and duodenal stent planned for tomorrow Assessment & Plan:  
 
 
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 Normal triglyceride level. EGD initially -  Done aborted due to inability to pass scope down to Duodenum CT abd/Pelvis  GOO and possible stricture along duodenum which is causing obstruction of  
NG tube for decompression CEA- normal 
Ca19-9  - 3596 NPO now  - Re Endoscopy shows that the duodenum stricture is adenocarcinoma MRI shows pancreatic mass with liver mets /oncology has seen patient and they offered chemotherapy to patient Surgery consulted Dr Lord Holley \" good idea to do a gastrojejunostomy to relieve her duodenal obstruction\" Dr Wright Kayser will see the patient today. Likely Sx tomorrow Friday. Will need to stop heparin 6 hrs before sx (waiting for OR time) I  Put a note in nursing miscellaneous and communicated with the nurse as well Continue NPO status Continue TPN  
  
2) IVC clot  
on heparin gtt Change to lovenox for outpatient / per Dr Erica Farmer   
3) H/h stable -  Hb 11.4 today 
normochromic normocytic anemia. C/W PPI Watch for bleeding on heparin gtt  
  
4) Hypokalemia Resolved K today 4.4 Am labs   
5) TOÑA probably due to eleanor Hydronephrosis noted on Ct scan Cr today 1.15 
 
6) CAD. H/O NSTEMI. No issues at this time   
7) Dyslipidemia. Monitor Fluid Status   
8) UTI ruled out Urine cultures negative D/C Ceftriaxone 
  
MalNutrition Body mass index is 19.64 kg/m². Due to cancer and GOO 
picc line Central TPN , renewed  
  
GI and DVT (SCDs due to probable GI bleed). 
  
DNR/DNI with a guarded prognosis. D/W patient and family. 
  
patient's Son and daughter Dirk Minors ([phone]) Code status: DNR 
DVT prophylaxis: heparin Care Plan discussed with: Patient/Family and Nurse Disposition: TBD Hospital Problems  Date Reviewed: 9/14/2019 Codes Class Noted POA Acute pancreatitis ICD-10-CM: K85.90 ICD-9-CM: 288.2  9/14/2019 Yes TOÑA (acute kidney injury) ([facility] [address] ICD-10-CM: N17.9 ICD-9-CM: 584.9  9/14/2019 Yes Hypokalemia ICD-10-CM: E87.6 ICD-9-CM: 276.8  9/14/2019 Yes GI bleed ICD-10-CM: K92.2 ICD-9-CM: 578.9  9/14/2019 Yes Coffee ground emesis ICD-10-CM: K92.0 ICD-9-CM: 578.0  9/14/2019 Yes Review of Systems: A comprehensive review of systems was negative. Vital Signs:  
 Last 24hrs VS reviewed since prior progress note. Most recent are: 
Visit Vitals /71 (BP 1 Location: Left arm) Pulse (!) 105 Temp 97.6 °F (36.4 °C) Resp 16 Ht 5' 4\" (1.626 m) Wt 51.9 kg (114 lb 6.7 oz) SpO2 96% BMI 19.64 kg/m² No intake or output data in the 24 hours ending 09/26/19 0840 Physical Examination:  
 
 
     
Constitutional:  No acute distress, , pleasant ENT:  Oral mucous moist, Resp:  CTA bilaterally. No wheezing/rhonchi/rales. CV:  Regular rhythm, normal rate, no murmurs, gallops, rubs GI:  Soft, non distended, non tender. normoactive bowel sounds, no hepatosplenomegaly Musculoskeletal:  No edema, warm, 2+ pulses throughout Neurologic:  Moves all extremities. AAOx3, CN II-XII reviewed Psych:  Good insight, Not anxious nor agitated. Data Review:  
 I personally reviewed  Image and labs Labs:  
 
Recent Labs  
  09/26/19 
0031 WBC 15.9* HGB 11.4* HCT 36.1  Recent Labs  
  09/26/19 
0031 09/25/19 
0243 09/24/19 
6715  138 139  
K 4.4 4.9 5.0  
 109* 114* CO2 20* 20* 17* BUN 38* 38* 33* CREA 1.15* 1.26* 1.14* * 129* 150* CA 8.9 9.0 9.0 Recent Labs  
  09/24/19 
2392 SGOT 23 ALT 44 * TBILI 0.4 TP 6.8 ALB 3.0*  
GLOB 3.8 Recent Labs  
  09/25/19 
0825 09/25/19 
0243 09/24/19 
1938 APTT 62.8* 70.0* 80.5* No results for input(s): FE, TIBC, PSAT, FERR in the last 72 hours. No results found for: FOL, RBCF No results for input(s): PH, PCO2, PO2 in the last 72 hours. No results for input(s): CPK, CKNDX, TROIQ in the last 72 hours. No lab exists for component: CPKMB Lab Results Component Value Date/Time Cholesterol, total 85 09/15/2019 04:37 AM  
 HDL Cholesterol 43 09/15/2019 04:37 AM  
 LDL, calculated 29 09/15/2019 04:37 AM  
 Triglyceride 65 09/15/2019 04:37 AM  
 CHOL/HDL Ratio 2.0 09/15/2019 04:37 AM  
 
Lab Results Component Value Date/Time  Glucose (POC) 102 (H) 01/18/2019 06:23 AM  
 Glucose (POC) 139 (H) 01/17/2019 09:05 PM  
 Glucose (POC) 114 (H) 01/17/2019 04:31 PM  
 Glucose (POC) 106 (H) 01/17/2019 11:41 AM  
 Glucose (POC) 132 (H) 01/16/2019 09:13 PM  
 Lab Results Component Value Date/Time Color YELLOW/STRAW 09/15/2019 12:33 AM  
 Appearance CLOUDY (A) 09/15/2019 12:33 AM  
 Specific gravity 1.019 09/15/2019 12:33 AM  
 pH (UA) 6.0 09/15/2019 12:33 AM  
 Protein 100 (A) 09/15/2019 12:33 AM  
 Glucose NEGATIVE  09/15/2019 12:33 AM  
 Ketone NEGATIVE  09/15/2019 12:33 AM  
 Bilirubin NEGATIVE  09/15/2019 12:33 AM  
 Urobilinogen 0.2 09/15/2019 12:33 AM  
 Nitrites NEGATIVE  09/15/2019 12:33 AM  
 Leukocyte Esterase SMALL (A) 09/15/2019 12:33 AM  
 Epithelial cells MODERATE (A) 09/15/2019 12:33 AM  
 Bacteria 2+ (A) 09/15/2019 12:33 AM  
 WBC 10-20 09/15/2019 12:33 AM  
 RBC 0-5 09/15/2019 12:33 AM  
 
 
 
Medications Reviewed:  
 
Current Facility-Administered Medications Medication Dose Route Frequency  TPN ADULT - CENTRAL   IntraVENous CONTINUOUS  
 lidocaine (LIDODERM) 5 % patch 1 Patch  1 Patch TransDERmal Q24H  
 fat emulsion 20% (LIPOSYN, INTRAlipid) infusion 500 mL  500 mL IntraVENous EVERY MON & TH  
 lidocaine (XYLOCAINE) 2 % viscous solution 15 mL  15 mL Mouth/Throat PRN  phenol throat spray (CHLORASEPTIC) 1 Spray  1 Spray Oral PRN  
 heparin 25,000 units in D5W 250 ml infusion  18-36 Units/kg/hr IntraVENous TITRATE  guaiFENesin ER (MUCINEX) tablet 600 mg  600 mg Oral BID PRN  
 [Held by provider] rosuvastatin (CRESTOR) tablet 10 mg  10 mg Oral QHS  sodium chloride (NS) flush 5-40 mL  5-40 mL IntraVENous Q8H  
 benzocaine-menthol (CEPACOL) lozenge 1 Lozenge  1 Lozenge Mucous Membrane PRN  
 hydrALAZINE (APRESOLINE) 20 mg/mL injection 10 mg  10 mg IntraVENous Q6H PRN  
 sodium chloride (NS) flush 5-40 mL  5-40 mL IntraVENous PRN  
 acetaminophen (TYLENOL) tablet 650 mg  650 mg Oral Q4H PRN  
 ondansetron (ZOFRAN) injection 4 mg  4 mg IntraVENous Q4H PRN  
 carvedilol (COREG) tablet 6.25 mg  6.25 mg Oral BID WITH MEALS  pantoprazole (PROTONIX) 40 mg in sodium chloride 0.9% 10 mL injection  40 mg IntraVENous Q12H ______________________________________________________________________ EXPECTED LENGTH OF STAY: 4d 19h ACTUAL LENGTH OF STAY:          Sohan Estrada MD

## 2019-09-26 NOTE — PROGRESS NOTES
Transition of Care Plan 
  
1. Disposition TBD: to be determined based on MD/Care team's recommendation 2. Transport: TBD; family as applicable 3. Continue Medical Care 4. Follow up with PCP/Specialist  
 
Rosette Nuno Clinical  [phone]

## 2019-09-26 NOTE — PROGRESS NOTES
Spiritual Care Partner Volunteer visited patient in room 216/01 on 9.26.19. Documented by: : Rev. Ninfa Gillespie. [facility]; [facility], to contact [phone] [address] call: [phone]

## 2019-09-27 NOTE — ANESTHESIA PROCEDURE NOTES
Arterial Line Placement Start time: 9/27/2019 12:43 PM 
End time: 9/27/2019 12:53 PM 
Performed by: Tamar Rodriguez MD 
Authorized by: Tamar Rodriguez MD  
 
Pre-Procedure Indications:  Arterial pressure monitoring and blood sampling Preanesthetic Checklist: patient identified, risks and benefits discussed, anesthesia consent, site marked, patient being monitored, timeout performed and patient being monitored Procedure:  
Prep:  Chlorhexidine Seldinger Technique?: Yes Orientation:  Right Location:  Radial artery Catheter size:  20 G Number of attempts:  1 Assessment:  
Post-procedure:  Line secured and sterile dressing applied Patient Tolerance:  Patient tolerated the procedure well with no immediate complications

## 2019-09-27 NOTE — PROGRESS NOTES
Ms. Cheryl Celaya reports nausea this morning. Tm 98.6 HR: 105 BP: 172/80 Resp Rate: 16 96% sat on room air. Intake/Output Summary (Last 24 hours) at 9/27/2019 1167 Last data filed at 9/26/2019 1912 Gross per 24 hour Intake  Output 750 ml Net -750 ml Exam: Cor: RRR. Lungs: Bilateral breath sounds. Clear to auscultation. Abd: Soft. Non distended. Non tender. Labs:  
Recent Results (from the past 12 hour(s)) CBC W/O DIFF Collection Time: 09/27/19  1:26 AM  
Result Value Ref Range WBC 15.9 (H) 3.6 - 11.0 K/uL  
 RBC 3.64 (L) 3.80 - 5.20 M/uL  
 HGB 11.2 (L) 11.5 - 16.0 g/dL HCT 35.0 35.0 - 47.0 % MCV 96.2 80.0 - 99.0 FL  
 MCH 30.8 26.0 - 34.0 PG  
 MCHC 32.0 30.0 - 36.5 g/dL  
 RDW 14.0 11.5 - 14.5 % PLATELET 062 832 - 332 K/uL MPV 12.6 8.9 - 12.9 FL  
 NRBC 0.0 0  WBC ABSOLUTE NRBC 0.00 0.00 - 0.01 K/uL METABOLIC PANEL, BASIC Collection Time: 09/27/19  1:26 AM  
Result Value Ref Range Sodium 133 (L) 136 - 145 mmol/L Potassium 4.3 3.5 - 5.1 mmol/L Chloride 106 97 - 108 mmol/L  
 CO2 20 (L) 21 - 32 mmol/L Anion gap 7 5 - 15 mmol/L Glucose 122 (H) 65 - 100 mg/dL BUN 39 (H) 6 - 20 MG/DL Creatinine 1.09 (H) 0.55 - 1.02 MG/DL  
 BUN/Creatinine ratio 36 (H) 12 - 20 GFR est AA 59 (L) >60 ml/min/1.73m2 GFR est non-AA 49 (L) >60 ml/min/1.73m2 Calcium 8.9 8.5 - 10.1 MG/DL To OR today for gastrojejunostomy. Discussed procedure with Ms. Cheryl Celaya including risks of bleeding, infection, anastomotic leak. She understands and wishes to proceed. Needs consent. NPO for now. Continue TPN - will ask for recommendations for cyclic TPN. Heparin being held. Dr. Santo Eli input - noted. Anti-emetics and pain medication as needed. Plans per Dr. Eugene Frost.

## 2019-09-27 NOTE — PROGRESS NOTES
Hospitalist Progress Note Chacorta Sykes MD 
Answering service: [phone] OR [phone] from in house phone Date of Service:  2019 NAME:  Edy Melgar :  1940 MRN:  [id number] Admission Summary:  
 
68-year-old female with a past medical history significant for coronary artery disease, non-ST-elevation myocardial infarction, dyslipidemia, chronic systolic congestive heart failure, irritable bowel syndrome and pancreatitis, was brought to the emergency room from home via private vehicle accompanied by her sister for complaints of an approximately 3-day history of progressively worsening nausea and coffee-grounds vomitus.  The patient also complained of some intermittent mild abdominal pain with some radiation to the upper back.  The patient described the pain as being dull achy in nature, at times about 6-7 on 10 in intensity.  The patient denied associated headaches, dizziness, visual deficits, chest pains, palpitations, shortness of breath, cough, fevers, chills, hematuria, bladder or bowel irregularities. Interval history / Subjective:  
  
 
Palliative bypass and duodenal stent planned for tomorrow Assessment & Plan:  
 
 
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 Normal triglyceride level. EGD initially -  Done aborted due to inability to pass scope down to Duodenum CT abd/Pelvis  GOO and possible stricture along duodenum which is causing obstruction of  
NG tube for decompression CEA- normal 
Ca19-9  - 3596 NPO now  - Re Endoscopy shows that the duodenum stricture is adenocarcinoma MRI shows pancreatic mass with liver mets /oncology has seen patient and they offered chemotherapy to patient Surgery consulted Dr Sara Holguin \" good idea to do a gastrojejunostomy to relieve her duodenal obstruction\" Patient scheduled for surgery today. We stopped heparin 6 hrs before sx (OR time noon) resume after surgery Continue NPO status Continue TPN  
  
2) IVC clot  
on heparin gtt Change to lovenox for outpatient / per Dr Marguerite Sacks   
3) H/h stable -  Hb 11.2 today 
normochromic normocytic anemia. C/W PPI Watch for bleeding on heparin gtt  
  
4) Hypokalemia Resolved K today 4.3 Am labs   
5) TOÑA probably due to matthieuley Hydronephrosis noted on Ct scan Cr today 1.09 
 
6) CAD. H/O NSTEMI. No issues at this time   
7) Dyslipidemia. Monitor Fluid Status   
8) UTI ruled out Urine cultures negative D/C Ceftriaxone 
  
MalNutrition Body mass index is 19.68 kg/m². Due to cancer and GOO 
picc line Central TPN , renewed  
  
GI and DVT (SCDs due to probable GI bleed). 
  
DNR/DNI with a guarded prognosis. D/W patient and family. 
  
patient's Son and daughter Lupillo Landin ([phone]) Code status: DNR 
DVT prophylaxis: heparin Care Plan discussed with: Patient/Family and Nurse Disposition: TBD Hospital Problems  Date Reviewed: 9/14/2019 Codes Class Noted POA Acute pancreatitis ICD-10-CM: K85.90 ICD-9-CM: 282.1  9/14/2019 Yes TOÑA (acute kidney injury) ([facility] [address] ICD-10-CM: N17.9 ICD-9-CM: 584.9  9/14/2019 Yes Hypokalemia ICD-10-CM: E87.6 ICD-9-CM: 276.8  9/14/2019 Yes GI bleed ICD-10-CM: K92.2 ICD-9-CM: 578.9  9/14/2019 Yes Coffee ground emesis ICD-10-CM: K92.0 ICD-9-CM: 578.0  9/14/2019 Yes Review of Systems: A comprehensive review of systems was negative. Vital Signs:  
 Last 24hrs VS reviewed since prior progress note. Most recent are: 
Visit Vitals /80 (BP 1 Location: Right arm, BP Patient Position: At rest) Pulse (!) 105 Temp 98.3 °F (36.8 °C) Resp 16 Ht 5' 4\" (1.626 m) Wt 52 kg (114 lb 10.2 oz) SpO2 96% BMI 19.68 kg/m² Intake/Output Summary (Last 24 hours) at 9/27/2019 4799 Last data filed at 9/26/2019 1912 Gross per 24 hour Intake  Output 750 ml Net -750 ml Physical Examination:  
 
 
     
Constitutional:  No acute distress, , pleasant , no complaints ENT:  Oral mucous moist, NGT Resp:  CTA bilaterally. No wheezing/rhonchi/rales. CV:  Regular rhythm, normal rate, no murmurs, gallops, rubs GI:  Soft, non distended, non tender. normoactive bowel sounds, no hepatosplenomegaly Musculoskeletal:  No edema, warm, 2+ pulses throughout Neurologic:  Moves all extremities. AAOx3, CN II-XII reviewed Psych:  Good insight, Not anxious nor agitated. Data Review:  
 I personally reviewed  Image and labs Labs:  
 
Recent Labs  
  09/27/19 
0126 09/26/19 
0031 WBC 15.9* 15.9* HGB 11.2* 11.4* HCT 35.0 36.1  170 Recent Labs  
  09/27/19 
0126 09/26/19 
0031 09/25/19 
0243 * 136 138  
K 4.3 4.4 4.9  108 109* CO2 20* 20* 20* BUN 39* 38* 38* CREA 1.09* 1.15* 1.26* * 132* 129* CA 8.9 8.9 9.0 No results for input(s): SGOT, GPT, ALT, AP, TBIL, TBILI, TP, ALB, GLOB, GGT, AML, LPSE in the last 72 hours. No lab exists for component: AMYP, HLPSE Recent Labs  
  09/26/19 
0934 09/25/19 
0825 09/25/19 
0243 APTT 61.1* 62.8* 70.0* No results for input(s): FE, TIBC, PSAT, FERR in the last 72 hours. No results found for: FOL, RBCF No results for input(s): PH, PCO2, PO2 in the last 72 hours. No results for input(s): CPK, CKNDX, TROIQ in the last 72 hours. No lab exists for component: CPKMB Lab Results Component Value Date/Time Cholesterol, total 85 09/15/2019 04:37 AM  
 HDL Cholesterol 43 09/15/2019 04:37 AM  
 LDL, calculated 29 09/15/2019 04:37 AM  
 Triglyceride 65 09/15/2019 04:37 AM  
 CHOL/HDL Ratio 2.0 09/15/2019 04:37 AM  
 
Lab Results Component Value Date/Time  Glucose (POC) 102 (H) 01/18/2019 06:23 AM  
 Glucose (POC) 139 (H) 01/17/2019 09:05 PM  
 Glucose (POC) 114 (H) 01/17/2019 04:31 PM  
 Glucose (POC) 106 (H) 01/17/2019 11:41 AM  
 Glucose (POC) 132 (H) 01/16/2019 09:13 PM  
 
Lab Results Component Value Date/Time Color YELLOW/STRAW 09/15/2019 12:33 AM  
 Appearance CLOUDY (A) 09/15/2019 12:33 AM  
 Specific gravity 1.019 09/15/2019 12:33 AM  
 pH (UA) 6.0 09/15/2019 12:33 AM  
 Protein 100 (A) 09/15/2019 12:33 AM  
 Glucose NEGATIVE  09/15/2019 12:33 AM  
 Ketone NEGATIVE  09/15/2019 12:33 AM  
 Bilirubin NEGATIVE  09/15/2019 12:33 AM  
 Urobilinogen 0.2 09/15/2019 12:33 AM  
 Nitrites NEGATIVE  09/15/2019 12:33 AM  
 Leukocyte Esterase SMALL (A) 09/15/2019 12:33 AM  
 Epithelial cells MODERATE (A) 09/15/2019 12:33 AM  
 Bacteria 2+ (A) 09/15/2019 12:33 AM  
 WBC 10-20 09/15/2019 12:33 AM  
 RBC 0-5 09/15/2019 12:33 AM  
 
 
 
Medications Reviewed:  
 
Current Facility-Administered Medications Medication Dose Route Frequency  TPN ADULT - CENTRAL   IntraVENous CONTINUOUS  
 morphine injection 2 mg  2 mg IntraVENous Q4H PRN  
 lidocaine (LIDODERM) 5 % patch 1 Patch  1 Patch TransDERmal Q24H  
 fat emulsion 20% (LIPOSYN, INTRAlipid) infusion 500 mL  500 mL IntraVENous EVERY MON & TH  
 lidocaine (XYLOCAINE) 2 % viscous solution 15 mL  15 mL Mouth/Throat PRN  phenol throat spray (CHLORASEPTIC) 1 Spray  1 Spray Oral PRN  
 heparin 25,000 units in D5W 250 ml infusion  18-36 Units/kg/hr IntraVENous TITRATE  guaiFENesin ER (MUCINEX) tablet 600 mg  600 mg Oral BID PRN  
 [Held by provider] rosuvastatin (CRESTOR) tablet 10 mg  10 mg Oral QHS  sodium chloride (NS) flush 5-40 mL  5-40 mL IntraVENous Q8H  
 benzocaine-menthol (CEPACOL) lozenge 1 Lozenge  1 Lozenge Mucous Membrane PRN  
 hydrALAZINE (APRESOLINE) 20 mg/mL injection 10 mg  10 mg IntraVENous Q6H PRN  
 sodium chloride (NS) flush 5-40 mL  5-40 mL IntraVENous PRN  
 acetaminophen (TYLENOL) tablet 650 mg  650 mg Oral Q4H PRN  
  ondansetron (ZOFRAN) injection 4 mg  4 mg IntraVENous Q4H PRN  
 carvedilol (COREG) tablet 6.25 mg  6.25 mg Oral BID WITH MEALS  pantoprazole (PROTONIX) 40 mg in sodium chloride 0.9% 10 mL injection  40 mg IntraVENous Q12H  
 
______________________________________________________________________ EXPECTED LENGTH OF STAY: 4d 19h ACTUAL LENGTH OF STAY:          Chin Ham MD

## 2019-09-27 NOTE — PROGRESS NOTES
Spoke with . Heparin IV is to be held until tomorrow, when labs will be drawn and restarting Heparin will be revaluated. Notified .

## 2019-09-27 NOTE — PROGRESS NOTES
Bedside shift change report given to Emiliano Hodges RN (oncoming nurse) by Jessica Angel rn (offgoing nurse). Report included the following information SBAR, MAR and Recent Results.

## 2019-09-27 NOTE — ANESTHESIA POSTPROCEDURE EVALUATION
Procedure(s): 
GASTROJEJUNOSTOMY. general 
 
Anesthesia Post Evaluation Multimodal analgesia: multimodal analgesia used between 6 hours prior to anesthesia start to PACU discharge Patient location during evaluation: bedside Patient participation: waiting for patient participation Level of consciousness: awake Pain management: adequate Airway patency: patent Anesthetic complications: no 
Cardiovascular status: acceptable Respiratory status: unassisted Hydration status: acceptable Comments: Post-Anesthesia Evaluation and Assessment I have evaluated the patient and they are ready for PACU discharge. Patient: Yusuf Neves MRN: [id number]  SSN: [id number] YOB: 1940  Age: 70 y.o. Sex: female Cardiovascular Function/Vital Signs /55   Pulse (!) 107   Temp 36.6 °C (97.8 °F)   Resp 14   Ht 5' 4\" (1.626 m)   Wt 52 kg (114 lb 10.2 oz)   SpO2 97%   BMI 19.68 kg/m² Patient is status post General anesthesia for Procedure(s): 
GASTROJEJUNOSTOMY. Nausea/Vomiting: None Postoperative hydration reviewed and adequate. Pain: 
Pain Scale 1: Numeric (0 - 10) (09/27/19 1645) Pain Intensity 1: 0 (09/27/19 1645) Managed Neurological Status:  
Neuro (WDL): Exceptions to WDL (09/27/19 1645) Neuro Neurologic State: Drowsy; Confused (09/27/19 1645) Orientation Level: Oriented to person;Oriented to place; Disoriented to situation (09/27/19 1645) Cognition: Follows commands (09/27/19 1645) Speech: Delayed responses;Clear (09/27/19 1645) LUE Motor Response: Purposeful (09/27/19 1645) LLE Motor Response: Purposeful (09/27/19 1645) RUE Motor Response: Purposeful (09/27/19 1645) RLE Motor Response: Purposeful (09/27/19 1645) At baseline Mental Status, Level of Consciousness: Alert and  oriented to person, place, and time Pulmonary Status:  
O2 Device: Room air (09/27/19 1645) Adequate oxygenation and airway patent Complications related to anesthesia: None Post-anesthesia assessment completed. No concerns Signed By: Cain Joyner MD  
 September 27, 2019 Vitals Value Taken Time /46 9/27/2019  5:00 PM  
Temp 36.6 °C (97.8 °F) 9/27/2019  4:45 PM  
Pulse 98 9/27/2019  5:08 PM  
Resp 11 9/27/2019  5:08 PM  
SpO2 96 % 9/27/2019  5:08 PM  
Vitals shown include unvalidated device data.

## 2019-09-27 NOTE — BRIEF OP NOTE
BRIEF OPERATIVE NOTE Date of Procedure: 9/27/2019 Preoperative Diagnosis: Duodenal Obstruction. Postoperative Diagnosis: Same. Procedure(s): 
 Gastrojejunostomy. Surgeon(s) and Role: 
 Helio Rodriguez MD - Primary Surgical Staff: 
Circ-1: Mary Jo Harris Circ-Relief: Zenia Pringle Circ-Intern: Facundo Oro Scrub RN-1: Katty Cade RN Scrub RN-Relief: Monica Hickman RN Surg Asst-1: Esau Hunter Surg Asst-Relief: Carlyle Skiff Event Time In Time Out Incision Start 09/27/2019 1354 Incision Close Anesthesia: General  
Estimated Blood Loss: Approx. 10ml. Specimens: * No specimens in log * Findings: 2 layer hand sewn gastrojejunostomy on posterior wall of stomach. Complications: None. Implants: * No implants in log *

## 2019-09-27 NOTE — PROGRESS NOTES
NUTRITION 
RECOMMENDATIONS:  
1. Decrease TPN to avoid overfeeding and switch to cyclic regimen: - 5%AA, D15 @ 75ml/hr x 1 hr 
            - 5%AA, D15 @ 135ml/hr x 10hrs 
            - 5%AA, D15 @ 75ml/hr x 1 hr 
            - Continue lipids as ordered 
  
2. Daily weights while on PN Diet: NPO 
TPN: 5%AA, D20 @ 75ml/hr + 40mg famotidine, 100mg thiamine + 500ml, 20% lipids 2x/week Chart reviewed for cyclic TPN recommendations. Plans for gastrojejunostomy today. Remains on TPN (on PN since 9/18) so agree with switch to cyclic regimen. Famotidine and thiamine added to TPN today with sodium increased with hyponatremia. TPN infusing to exceed energy needs. Recommend reduction in volume and switch to cyclic regimen of: - 5%AA, D15 @ 75ml/hr x 1 hr 
            - 5%AA, D15 @ 135ml/hr x 10hrs 
            - 5%AA, D15 @ 75ml/hr x 1 hr 
+ 500ml, 20% lipids 2x/week Provides: 1358kcal, 76g protein, 1500ml fluid. Meets 100% energy and protein needs. See previous RD notes for additional recommendations, goals and monitoring/evalution. Estimated Nutrition Needs:  
Kcals/day: 6041 Kcals/day(1238-1342kcal) Protein: 68 g(68-74g (1.2-1.4g/kg (pancreatitis))) Fluid: 1425 ml(25ml/kg) Based On: Yael Gay(x 1.2-1.3) Weight Used: Actual wt(57kg) Armida Mathews, RD [address] , Pager #[phone] or [phone]

## 2019-09-27 NOTE — PROGRESS NOTES
Spiritual Care Assessment/Progress Note [address] 
 
 
NAME: Bee Hernandez      MRN: [id number] AGE: 70 y.o. SEX: female Jain Affiliation: Uatsdin  
Language: English  
 
9/27/2019     Total Time (in minutes): 5 Spiritual Assessment begun in [facility] [place] [facility] through conversation with: 
  
    []Patient        [] Family    [] Friend(s) Reason for Consult: Initial/Spiritual assessment, patient floor Spiritual beliefs: (Please include comment if needed) 
   [] Identifies with a joo tradition:     
   [] Supported by a joo community:        
   [] Claims no spiritual orientation:       
   [] Seeking spiritual identity:            
   [] Adheres to an individual form of spirituality:       
   [x] Not able to assess:                   
 
    
Identified resources for coping:  
   [] Prayer                           
   [] Music                  [] Guided Imagery 
   [] Family/friends                 [] Pet visits [] Devotional reading                         [x] Unknown 
   [] Other:                                         
 
 
Interventions offered during this visit: (See comments for more details) Patient Interventions: Spiritual care volunteer support Plan of Care: 
 
 [] Support spiritual and/or cultural needs  
 [] Support AMD and/or advance care planning process    
 [] Support grieving process 
 [] Coordinate Rites and/or Rituals  
 [] Coordination with community clergy [] No spiritual needs identified at this time 
 [] Detailed Plan of Care below (See Comments)  [] Make referral to Music Therapy 
[] Make referral to Pet Therapy    
[] Make referral to Addiction services 
[] Make referral to [facility] 
[] Make referral to Spiritual Care Partner 
[] No future visits requested       
[x] Follow up visits as needed Comments:  visit for initial spiritual assessment.   Patient out of room for procedure. Will continue to follow up as needed and upon request as able. Visited by Rev. Winston Sheridan MDiv, [facility], [address] [facility] paging service: [phone])

## 2019-09-27 NOTE — PROGRESS NOTES
[facility] [place] at [place] [address], [address] W: [phone]  F: [phone] Reason for Visit:  
Marisel Lowry is a 70 y.o. female who is seen in consultation at the request of Dr. Katty Oliveira for evaluation of stage IV pancreatic cancer. Treatment History: · Biopsy with adenocarcinoma of the pancreas metastatic to liver History of Present Illness: The patient is a very pleasant 68-year-old be younger than her stated age she is been doing fairly well she started having some GI issues in January of this year. But over the last month or so she is been having more trouble and read more recently has been having trouble with nausea and vomiting. Because of the persistent or GI issues with the nausea and vomiting she is now admitted and was found to have a pancreatic cancer metastatic to her liver. The patient has been able to maintain her weight. The patient has lost a couple pounds since her last admission. According to the family the patient initially had lost weight when she was in the hospital then a little bit afterwards and stabilized her weight pattern and it is continued in a stable pattern The patient denied trouble with urination or with breathing she denied any ENT symptomatology. Has had a little bit of trouble with constipation 9/23/19 The patient seems to be doing fairly well this morning. She still has her NG tube in. Hopefully she will be getting a stent placed soon so she can start back on oral nutrition. Then the plan will be to get her port inserted and then to start her on gemcitabine and Abraxane. I anticipate that she will handle those drugs quite well and hopefully we can get her some quality time. 9/24/2019 Patient still has NG tube in place and the plan is for a stent to be placed.   She is getting hyperalimentation so she is getting the nutrition she would needs. Patient did vomit and does not like to vomit so she is hoping that the stent will be placed soon. 9/25/2019 Patient is doing well this morning still has her NG tube in place. She has had a little bit of nausea. She continues to get her hyperalimentation. Hopefully will get her bypass or stent placed soon so that we can get her treatment started. She will be getting a venous access device. She remains afebrile. Her vital signs are stable. Continue to encourage her to be up and in a chair. Plan on starting chemotherapy as soon as she is taking nutrition and has her Port-A-Cath placed. 9/27/2019 Patient seems to be doing well this morning NG tube is in place she is planning on having her surgery today. She still getting hyperalimentation. Patient is in good spirits. Hopefully she will have her surgery today and be able to get rid of her NG tube in a couple days. Then she will be able to start on nutrition. Then will be able to get her chemotherapy started. Past Medical History:  
Diagnosis Date  Coronary artery disease of native artery of native heart with stable angina pectoris ([address] 2/28/2019  Cough due to ACE inhibitor  Hematoma of rectus sheath 2/28/2019  IBS (irritable bowel syndrome) IBS  NSTEMI (non-ST elevated myocardial infarction) ([address] 2/28/2019  Pancreatitis  Pure hypercholesterolemia 2/28/2019  Systolic CHF, chronic ([address] 2/28/2019 Past Surgical History:  
Procedure Laterality Date  COLONOSCOPY Left 11/1/2017 COLONOSCOPY performed by Brody Rodriguez MD at [address]  HX CHOLECYSTECTOMY  HX GI    
 surgery for hiatal hernia  HX ORTHOPAEDIC    
 DJD, doing PT weekly through [facility]  IR OCCL [facility]  1/30/2019 Social History Tobacco Use  Smoking status: Former Smoker  Smokeless tobacco: Never Used  Tobacco comment: quit smoking cigarettes 6 yrs ago Substance Use Topics  Alcohol use: Yes Comment: very occasional  
  
Family History Problem Relation Age of Onset  Dementia Mother   
     alzheimers  Cancer Sister   
     gallbladder  Cancer Brother   
     pancreatic  Breast Cancer Paternal Grandmother  Dementia Sister   
     alzheimers  Heart Surgery Brother   
     bypass Current Facility-Administered Medications Medication Dose Route Frequency  TPN ADULT - CENTRAL   IntraVENous CONTINUOUS  
 morphine injection 2 mg  2 mg IntraVENous Q4H PRN  
 lidocaine (LIDODERM) 5 % patch 1 Patch  1 Patch TransDERmal Q24H  
 fat emulsion 20% (LIPOSYN, INTRAlipid) infusion 500 mL  500 mL IntraVENous EVERY MON & TH  
 lidocaine (XYLOCAINE) 2 % viscous solution 15 mL  15 mL Mouth/Throat PRN  phenol throat spray (CHLORASEPTIC) 1 Spray  1 Spray Oral PRN  
 heparin 25,000 units in D5W 250 ml infusion  18-36 Units/kg/hr IntraVENous TITRATE  guaiFENesin ER (MUCINEX) tablet 600 mg  600 mg Oral BID PRN  
 [Held by provider] rosuvastatin (CRESTOR) tablet 10 mg  10 mg Oral QHS  sodium chloride (NS) flush 5-40 mL  5-40 mL IntraVENous Q8H  
 benzocaine-menthol (CEPACOL) lozenge 1 Lozenge  1 Lozenge Mucous Membrane PRN  
 hydrALAZINE (APRESOLINE) 20 mg/mL injection 10 mg  10 mg IntraVENous Q6H PRN  
 sodium chloride (NS) flush 5-40 mL  5-40 mL IntraVENous PRN  
 acetaminophen (TYLENOL) tablet 650 mg  650 mg Oral Q4H PRN  
 ondansetron (ZOFRAN) injection 4 mg  4 mg IntraVENous Q4H PRN  
 carvedilol (COREG) tablet 6.25 mg  6.25 mg Oral BID WITH MEALS  pantoprazole (PROTONIX) 40 mg in sodium chloride 0.9% 10 mL injection  40 mg IntraVENous Q12H Allergies Allergen Reactions  Penicillins Hives Review of Systems: A complete review of systems was obtained, negative except as described above. Physical Exam:  
 
Visit Vitals /80 (BP 1 Location: Right arm, BP Patient Position: At rest) Pulse (!) 105 Temp 98.3 °F (36.8 °C) Resp 16 Ht 5' 4\" (1.626 m) Wt 114 lb 10.2 oz (52 kg) SpO2 96% BMI 19.68 kg/m² ECOG PS: 1 General: No distress Eyes: PERRLA, anicteric sclerae HENT: Atraumatic, OP clear, NG tube in place Neck: Supple Lymphatic: No cervical, supraclavicular, or inguinal adenopathy Respiratory: CTAB, normal respiratory effort CV: Normal rate, regular rhythm, no murmurs, no peripheral edema GI: Soft, nontender, nondistended, no masses, no hepatomegaly, no splenomegaly MS: Normal gait and station. Digits without clubbing or cyanosis. Skin: No rashes, ecchymoses, or petechiae. Normal temperature, turgor, and texture. Psych: Alert, oriented, appropriate affect, normal judgment/insight Results:  
 
Lab Results Component Value Date/Time WBC 15.9 (H) 09/27/2019 01:26 AM  
 HGB 11.2 (L) 09/27/2019 01:26 AM  
 HCT 35.0 09/27/2019 01:26 AM  
 PLATELET 923 49/03/7134 01:26 AM  
 MCV 96.2 09/27/2019 01:26 AM  
 ABS. NEUTROPHILS 11.7 (H) 09/26/2019 12:31 AM  
 
Lab Results Component Value Date/Time Sodium 133 (L) 09/27/2019 01:26 AM  
 Potassium 4.3 09/27/2019 01:26 AM  
 Chloride 106 09/27/2019 01:26 AM  
 CO2 20 (L) 09/27/2019 01:26 AM  
 Glucose 122 (H) 09/27/2019 01:26 AM  
 BUN 39 (H) 09/27/2019 01:26 AM  
 Creatinine 1.09 (H) 09/27/2019 01:26 AM  
 GFR est AA 59 (L) 09/27/2019 01:26 AM  
 GFR est non-AA 49 (L) 09/27/2019 01:26 AM  
 Calcium 8.9 09/27/2019 01:26 AM  
 Glucose (POC) 102 (H) 01/18/2019 06:23 AM  
 
Lab Results Component Value Date/Time Bilirubin, total 0.4 09/24/2019 03:06 AM  
 ALT (SGPT) 44 09/24/2019 03:06 AM  
 AST (SGOT) 23 09/24/2019 03:06 AM  
 Alk. phosphatase 135 (H) 09/24/2019 03:06 AM  
 Protein, total 6.8 09/24/2019 03:06 AM  
 Albumin 3.0 (L) 09/24/2019 03:06 AM  
 Globulin 3.8 09/24/2019 03:06 AM  
 
 
Records reviewed and summarized above. Pathology report(s) reviewed 9/17/19  FINAL PATHOLOGIC DIAGNOSIS  
 1. Small bowel, duodenal stricture, biopsy:  
Adenocarcinoma Radiology report(s) reviewed above. 9/16/19 CT abdomen pelvis IMPRESSION: 
  
1. Marked gastric and duodenal distention by water attenuation fluid, with 
abrupt transition at the junction of second and third duodenum where a 
duodenal/pancreatic mass or stricture/scar is suggested, which is confluent with 
the pancreatic head and uncinate process. . This is consistent with endoscopic 
findings earlier same day. NG tube placement is advised. 2. Right hydronephrosis, likely due to extrinsic compression by the markedly 
distended stomach and duodenum. 3. Prominence of extrahepatic bile ducts, likely due to extrinsic impression on 
the ampulla of water by the markedly distended stomach and duodenum. 4. Pancreas extrinsically compressed by markedly distended stomach, making 
visualization of the previously described cystic mass is difficult. The 
pancreatic head lies at the site of duodenal obstruction, but is difficult to 
further assess under the circumstances. 5. Other incidental and postoperative findings MRI abdomen and 9/19/19 IMPRESSION: 
  
1. Interval development of diffuse liver metastatic disease. 
  
2.  Large mass centered on the pancreatic head and proximal horizontal portion 
of the duodenum. . There is narrowing of the duodenal lumen. Possibilities 
include a pancreatic or duodenal primary. The mass appears to be centered on the 
pancreatic head and there is common bile duct dilatation however the pancreatic 
duct is normal in caliber. 
  
3.  Left lower lobe airspace disease may represent atelectasis or pneumonia. 
  
4.  Possible nonocclusive thrombus in the intrahepatic IVC. 
  
5. The SMV is occluded. Assessment:  
1) adenocarcinoma of the pancreas metastatic to liver with the plan being for chemotherapy once she is able to tolerate nutrition  3596 9/17/19 CEA 16.8 9/17/19 
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  
 
· 1. Patient will need a venous access device for therapy. ·  
· 2. Patient will need a bypass or stent to allow her to get nutrition in. ·  
· 3. We will go ahead and treat her with heparin and low molecular weight heparin as an outpatient for her blood clot. ·  
· 4. We will plan on doing genetic testing due to her family history of pancreatic cancer. ·  
· 5. We will plan on getting genome wide sequencing of her tumor for therapeutic options. ·  
· 6. We will plan on treating her with gemcitabine and nab paclitaxel and hopefully start treatment within the next week I appreciate the opportunity to participate in [facility]. [facility].  
 
Signed By: Vinh Medeiros MD

## 2019-09-27 NOTE — ANESTHESIA PREPROCEDURE EVALUATION
Relevant Problems No relevant active problems Anesthetic History No history of anesthetic complications PONV Review of Systems / Medical History Patient summary reviewed, nursing notes reviewed and pertinent labs reviewed Pulmonary Within defined limits Neuro/Psych Within defined limits Cardiovascular Within defined limits Angina: with exertion CHF 
 
CAD Exercise tolerance: <4 METS Comments: The ventricle was mildly dilated. Systolic function was 
severely reduced. Ejection fraction was estimated in the range of 20 % to 
25 %. There was severe diffuse hypokinesis with distinct regional wall 
motion abnormalities. GI/Hepatic/Renal 
Within defined limits Endo/Other Within defined limits Other Findings Physical Exam 
 
Airway Mallampati: II 
TM Distance: > 6 cm Neck ROM: normal range of motion Mouth opening: Normal 
 
 Cardiovascular Regular rate and rhythm,  S1 and S2 normal,  no murmur, click, rub, or gallop Dental 
No notable dental hx Pulmonary Breath sounds clear to auscultation Abdominal 
GI exam deferred Other Findings Anesthetic Plan ASA: 3 Anesthesia type: general 
 
Monitoring Plan: CVP and Arterial line Induction: Intravenous Anesthetic plan and risks discussed with: Patient

## 2019-09-27 NOTE — PERIOP NOTES
TRANSFER - OUT REPORT: 
 
Verbal report given to [facility] (name) on Theresa Rodriguez  being transferred to [facility] (unit) for routine post - op Report consisted of patients Situation, Background, Assessment and  
Recommendations(SBAR). Time Pre op antibiotic given:1315 Anesthesia Stop time: 1642 Joyce Present on Transfer to floor:no Order for Joyce on Chart:no Discharge Prescriptions with Chart:no Information from the following report(s) SBAR, Kardex, OR Summary, Intake/Output, MAR, Med Rec Status and Cardiac Rhythm SR was reviewed with the receiving nurse. Opportunity for questions and clarification was provided. Is the patient on 02? NO Is the patient on a monitor? NO Is the nurse transporting with the patient? NO Surgical Waiting Area notified of patient's transfer from PACU? YES The following personal items collected during your admission accompanied patient upon transfer:  
Dental Appliance: Dental Appliances: None Vision: Visual Aid: Glasses Hearing Aid:   
Jewelry: Jewelry: None Clothing: Clothing: None Other Valuables: Other Valuables: Cell Phone Valuables sent to safe:

## 2019-09-27 NOTE — PERIOP NOTES
Dr Juventino Lopez aware of patient confusion after anesthesia. Pt resting comfortably in bed. VS stable. Pt may transfer to floor

## 2019-09-27 NOTE — PROGRESS NOTES
Bedside shift change report given to Maria M Riddle (oncoming nurse) by Chacho Greer (offgoing nurse). Report included the following information SBAR, Kardex and MAR.

## 2019-09-28 NOTE — OP NOTES
[address]  
OPERATIVE REPORT Name:  Lee Domingo 
MR#:  [id number] :  1940 ACCOUNT #:  [de-identified] DATE OF SERVICE:  2019 PREOPERATIVE DIAGNOSIS:  Duodenal obstruction. POSTOPERATIVE DIAGNOSIS:  Duodenal obstruction. PROCEDURE PERFORMED:  Gastrojejunostomy. SURGEON:  Julio Peñaloza MD 
 
ASSISTANTS:  Lindajo Eisenmenger, SA, and Jona Murray 
 
ANESTHESIA:  General endotracheal. 
 
COMPLICATIONS:  None. SPECIMENS REMOVED:  None. IMPLANTS:  None. ESTIMATED BLOOD LOSS:  Approximately 10 mL 
 
IV FLUIDS:  Crystalloid 1000 mL URINE OUTPUT:  350 mL DRAINS:  Nasogastric tube and peripherally inserted central venous catheter. INDICATIONS FOR SURGERY:  The patient is a 53-year-old female with a malignant duodenal obstruction. Ms. Deb Owusu is brought in the operating room at this time for gastrojejunostomy. The risks of the procedure, including but not limited to, infection, bleeding and anastomotic leak were discussed in detail with the patient. Ms. Deb Owusu understood and wished to proceed. PROCEDURE:  After consent was obtained, the patient had a radial artery catheter placed. A PICC line and nasogastric tube were already in place at the time of surgery. She was then brought to the operating room where she was placed in the supine position on the operating room table. Following the induction of an adequate level of general anesthesia via the endotracheal tube, compression devices were placed on both lower extremities. A Joyce catheter was inserted using aseptic technique and the abdomen was prepped with ChloraPrep and draped as a sterile field. Local anesthetic was infiltrated and the patient's previous midline incision was reopened sharply down to the fascia. Subcutaneous bleeders were carefully cauterized. The fascia was divided in the midline and the peritoneal cavity was carefully entered.   Upon doing so, adhesions between the omentum and anterior abdominal wall were encountered. These were taken down sharply. The incision in the fascia was extended superiorly. The stomach was readily identified and appeared distended. The gastrocolic omentum was divided with the Harmonic scalpel and the lesser sac was entered. The ligament of Treitz was then identified and the small-bowel run distally. The small-bowel appeared decompressed but grossly normal.  A loop of small bowel was brought up to the posterior wall of the stomach under no tension. The gastrojejunostomy was then completed as follows. The posterior layer of the anastomosis was completed with interrupted 2-0 silk seromuscular sutures. An enterotomy and gastrotomy were opened with the cautery and the inner layer of the anastomosis was completed with running, locked 2-0 PDS suture. The outer layer of the anastomosis was completed with interrupted 2-0 silk seromuscular sutures. The anastomosis was felt to be intact, widely patent and under no tension. The peritoneal cavity was then irrigated copiously with warm saline, inspected, and found to be hemostatic. The position of the nasogastric tube in the stomach was confirmed. The fascia was then closed with running #1 Vicryl suture. The wound was irrigated again with saline and the incision closed with 2 layers of running 2-0 Vicryl suture, followed by 4-0 Monocryl subcuticular suture to the skin. Additional local anesthetic was infiltrated, and an Aquacel dressing was applied. The patient was awakened from her general anesthetic and the Joyce catheter removed. She was extubated in the operating room and transferred to her bed. The patient was brought to the recovery room in stable condition, having tolerated the procedure well. At the conclusion of the procedure, all sponge counts and instrument counts were reported as correct x2. The needle count was incorrect.   An abdominal x-ray was obtained and no retained foreign bodies were identified. Glenn Davidson MD 
 
 
[id number] 
D:  09/27/2019 16:00 
T:  09/28/2019 0:29 
JOB #:  [id number] CC:   MD Rich Rajput MD Ceola Ke, MD Merlene Jersey, MD Marcene Buttner, MD

## 2019-09-28 NOTE — PROGRESS NOTES
Progress Note Patient: Crystal Parham MRN: [id number]  SSN: [id number] YOB: 1940  Age: 70 y.o. Sex: female Admit Date: 2019 
 
1 Day Post-Op Procedure:  Procedure(s): 
GASTROJEJUNOSTOMY Subjective:  
 
Patient overall feeling well. She is not having any nausea. No gas yet. Some urination. Objective:  
 
Visit Vitals /64 (BP 1 Location: Left arm, BP Patient Position: At rest) Pulse 97 Temp 98.3 °F (36.8 °C) Resp 16 Ht 5' 4\" (1.626 m) Wt 114 lb 10.2 oz (52 kg) SpO2 97% BMI 19.68 kg/m² Temp (24hrs), Av °F (36.7 °C), Min:97.6 °F (36.4 °C), Max:98.4 °F (36.9 °C) Physical Exam:   
gen- Alert in NAD Lungs- CTA H-RRR Abd- s/appropriately tender Dressing intact Minimal bowel sounds. Data Review: images and reports reviewed Lab Review: All lab results for the last 24 hours reviewed. Recent Results (from the past 24 hour(s)) PTT Collection Time: 19  6:45 AM  
Result Value Ref Range aPTT 24.1 22.1 - 32.0 sec  
 aPTT, therapeutic range     58.0 - 77.0 SECS  
CBC W/O DIFF Collection Time: 19  6:45 AM  
Result Value Ref Range WBC 20.1 (H) 3.6 - 11.0 K/uL  
 RBC 3.28 (L) 3.80 - 5.20 M/uL  
 HGB 10.0 (L) 11.5 - 16.0 g/dL HCT 31.1 (L) 35.0 - 47.0 % MCV 94.8 80.0 - 99.0 FL  
 MCH 30.5 26.0 - 34.0 PG  
 MCHC 32.2 30.0 - 36.5 g/dL  
 RDW 14.2 11.5 - 14.5 % PLATELET 882 (L) 213 - 400 K/uL MPV 12.5 8.9 - 12.9 FL  
 NRBC 0.0 0  WBC ABSOLUTE NRBC 0.00 0.00 - 0.01 K/uL METABOLIC PANEL, BASIC Collection Time: 19  6:45 AM  
Result Value Ref Range Sodium 135 (L) 136 - 145 mmol/L Potassium 4.5 3.5 - 5.1 mmol/L Chloride 104 97 - 108 mmol/L  
 CO2 23 21 - 32 mmol/L Anion gap 8 5 - 15 mmol/L Glucose 137 (H) 65 - 100 mg/dL BUN 37 (H) 6 - 20 MG/DL Creatinine 1.02 0.55 - 1.02 MG/DL  
 BUN/Creatinine ratio 36 (H) 12 - 20 GFR est AA >60 >60 ml/min/1.73m2 GFR est non-AA 53 (L) >60 ml/min/1.73m2 Calcium 8.5 8.5 - 10.1 MG/DL Assessment:  
 
Hospital Problems  Date Reviewed: 9/14/2019 Codes Class Noted POA Acute pancreatitis ICD-10-CM: K85.90 ICD-9-CM: 698.7  9/14/2019 Yes TOÑA (acute kidney injury) ([facility] [address] ICD-10-CM: N17.9 ICD-9-CM: 584.9  9/14/2019 Yes Hypokalemia ICD-10-CM: E87.6 ICD-9-CM: 276.8  9/14/2019 Yes GI bleed ICD-10-CM: K92.2 ICD-9-CM: 578.9  9/14/2019 Yes Coffee ground emesis ICD-10-CM: K92.0 ICD-9-CM: 578.0  9/14/2019 Yes Plan/Recommendations/Medical Decision Making:  
Continue NPO/ NGT for now until bowel function returns OOB and ambulate Change to TPN to Cyclic May restart heparin drip/

## 2019-09-28 NOTE — PROGRESS NOTES
[facility] [place] [address] [address], [address] W: [phone]  F: [phone] Reason for Visit:  
Rosiland Primrose is a 70 y.o. female who is seen in consultation at the request of Dr. Ana Rosa Andre for evaluation of stage IV pancreatic cancer. Treatment History: · Biopsy with adenocarcinoma of the pancreas metastatic to liver History of Present Illness: The patient is a very pleasant 53-year-old be younger than her stated age she is been doing fairly well she started having some GI issues in January of this year. But over the last month or so she is been having more trouble and read more recently has been having trouble with nausea and vomiting. Because of the persistent or GI issues with the nausea and vomiting she is now admitted and was found to have a pancreatic cancer metastatic to her liver. The patient has been able to maintain her weight. The patient has lost a couple pounds since her last admission. According to the family the patient initially had lost weight when she was in the hospital then a little bit afterwards and stabilized her weight pattern and it is continued in a stable pattern The patient denied trouble with urination or with breathing she denied any ENT symptomatology. Has had a little bit of trouble with constipation 9/23/19 The patient seems to be doing fairly well this morning. She still has her NG tube in. Hopefully she will be getting a stent placed soon so she can start back on oral nutrition. Then the plan will be to get her port inserted and then to start her on gemcitabine and Abraxane. I anticipate that she will handle those drugs quite well and hopefully we can get her some quality time. 9/24/2019 Patient still has NG tube in place and the plan is for a stent to be placed.   She is getting hyperalimentation so she is getting the nutrition she would needs. Patient did vomit and does not like to vomit so she is hoping that the stent will be placed soon. 9/25/2019 Patient is doing well this morning still has her NG tube in place. She has had a little bit of nausea. She continues to get her hyperalimentation. Hopefully will get her bypass or stent placed soon so that we can get her treatment started. She will be getting a venous access device. She remains afebrile. Her vital signs are stable. Continue to encourage her to be up and in a chair. Plan on starting chemotherapy as soon as she is taking nutrition and has her Port-A-Cath placed. 9/27/2019 Patient seems to be doing well this morning NG tube is in place she is planning on having her surgery today. She still getting hyperalimentation. Patient is in good spirits. Hopefully she will have her surgery today and be able to get rid of her NG tube in a couple days. Then she will be able to start on nutrition. Then will be able to get her chemotherapy started. 9/28/2019 Patient is doing well postop she remains afebrile her vital signs look good. According to the patient surgery went well. She still has her NG tube in place. Her bowel sounds are quiet. Hopefully her bowels will start working fairly soon. She is in good spirits and her pain is under good control. Past Medical History:  
Diagnosis Date  Coronary artery disease of native artery of native heart with stable angina pectoris ([address] 2/28/2019  Cough due to ACE inhibitor  Hematoma of rectus sheath 2/28/2019  IBS (irritable bowel syndrome) IBS  NSTEMI (non-ST elevated myocardial infarction) ([address] 2/28/2019  Pancreatitis  Pure hypercholesterolemia 2/28/2019  Systolic CHF, chronic ([address] 2/28/2019 Past Surgical History:  
Procedure Laterality Date  COLONOSCOPY Left 11/1/2017 COLONOSCOPY performed by Isa Maurice MD at [facility]  HX CHOLECYSTECTOMY  HX GI    
 surgery for hiatal hernia  HX ORTHOPAEDIC    
 DJD, doing PT weekly through [facility]  IR OCCL [facility]  1/30/2019 Social History Tobacco Use  Smoking status: Former Smoker  Smokeless tobacco: Never Used  Tobacco comment: quit smoking cigarettes 6 yrs ago Substance Use Topics  Alcohol use: Yes Comment: very occasional  
  
Family History Problem Relation Age of Onset  Dementia Mother   
     alzheimers  Cancer Sister   
     gallbladder  Cancer Brother   
     pancreatic  Breast Cancer Paternal Grandmother  Dementia Sister   
     alzheimers  Heart Surgery Brother   
     bypass Current Facility-Administered Medications Medication Dose Route Frequency  TPN ADULT - CENTRAL   IntraVENous TITRATE  TPN ADULT - CENTRAL   IntraVENous CONTINUOUS  
 0.9% sodium chloride infusion 250 mL  250 mL IntraVENous PRN  
 morphine injection 2 mg  2 mg IntraVENous Q4H PRN  
 lidocaine (LIDODERM) 5 % patch 1 Patch  1 Patch TransDERmal Q24H  
 fat emulsion 20% (LIPOSYN, INTRAlipid) infusion 500 mL  500 mL IntraVENous EVERY MON & TH  
 lidocaine (XYLOCAINE) 2 % viscous solution 15 mL  15 mL Mouth/Throat PRN  phenol throat spray (CHLORASEPTIC) 1 Spray  1 Spray Oral PRN  
 heparin 25,000 units in D5W 250 ml infusion  18-36 Units/kg/hr IntraVENous TITRATE  guaiFENesin ER (MUCINEX) tablet 600 mg  600 mg Oral BID PRN  
 [Held by provider] rosuvastatin (CRESTOR) tablet 10 mg  10 mg Oral QHS  benzocaine-menthol (CEPACOL) lozenge 1 Lozenge  1 Lozenge Mucous Membrane PRN  
 hydrALAZINE (APRESOLINE) 20 mg/mL injection 10 mg  10 mg IntraVENous Q6H PRN  
 sodium chloride (NS) flush 5-40 mL  5-40 mL IntraVENous PRN  
 acetaminophen (TYLENOL) tablet 650 mg  650 mg Oral Q4H PRN  
 ondansetron (ZOFRAN) injection 4 mg  4 mg IntraVENous Q4H PRN  
 carvedilol (COREG) tablet 6.25 mg  6.25 mg Oral BID WITH MEALS Allergies Allergen Reactions  Penicillins Hives Review of Systems: A complete review of systems was obtained, negative except as described above. Physical Exam:  
 
Visit Vitals /64 (BP 1 Location: Left arm, BP Patient Position: At rest) Pulse 96 Temp 98.1 °F (36.7 °C) Resp 16 Ht 5' 4\" (1.626 m) Wt 114 lb 10.2 oz (52 kg) SpO2 98% BMI 19.68 kg/m² ECOG PS: 1 General: No distress Eyes: PERRLA, anicteric sclerae HENT: Atraumatic, OP clear, NG tube in place Neck: Supple Lymphatic: No cervical, or supraclavicular adenopathy Respiratory: CTAB, normal respiratory effort CV: Normal rate, regular rhythm, no murmurs, no peripheral edema GI: Status post recent surgery MS: Digits without clubbing or cyanosis. Psych: Alert, oriented, appropriate affect, normal judgment/insight Results:  
 
Lab Results Component Value Date/Time WBC 20.1 (H) 09/28/2019 06:45 AM  
 HGB 10.0 (L) 09/28/2019 06:45 AM  
 HCT 31.1 (L) 09/28/2019 06:45 AM  
 PLATELET 191 (L) 30/98/4021 06:45 AM  
 MCV 94.8 09/28/2019 06:45 AM  
 ABS. NEUTROPHILS 11.7 (H) 09/26/2019 12:31 AM  
 
Lab Results Component Value Date/Time Sodium 135 (L) 09/28/2019 06:45 AM  
 Potassium 4.5 09/28/2019 06:45 AM  
 Chloride 104 09/28/2019 06:45 AM  
 CO2 23 09/28/2019 06:45 AM  
 Glucose 137 (H) 09/28/2019 06:45 AM  
 BUN 37 (H) 09/28/2019 06:45 AM  
 Creatinine 1.02 09/28/2019 06:45 AM  
 GFR est AA >60 09/28/2019 06:45 AM  
 GFR est non-AA 53 (L) 09/28/2019 06:45 AM  
 Calcium 8.5 09/28/2019 06:45 AM  
 Glucose (POC) 102 (H) 01/18/2019 06:23 AM  
 
Lab Results Component Value Date/Time Bilirubin, total 0.4 09/24/2019 03:06 AM  
 ALT (SGPT) 44 09/24/2019 03:06 AM  
 AST (SGOT) 23 09/24/2019 03:06 AM  
 Alk. phosphatase 135 (H) 09/24/2019 03:06 AM  
 Protein, total 6.8 09/24/2019 03:06 AM  
 Albumin 3.0 (L) 09/24/2019 03:06 AM  
 Globulin 3.8 09/24/2019 03:06 AM  
 
 
Records reviewed and summarized above. Pathology report(s) reviewed 9/17/19 FINAL PATHOLOGIC DIAGNOSIS 1. Small bowel, duodenal stricture, biopsy:  
Adenocarcinoma Radiology report(s) reviewed above. 9/16/19 CT abdomen pelvis IMPRESSION: 
  
1. Marked gastric and duodenal distention by water attenuation fluid, with 
abrupt transition at the junction of second and third duodenum where a 
duodenal/pancreatic mass or stricture/scar is suggested, which is confluent with 
the pancreatic head and uncinate process. . This is consistent with endoscopic 
findings earlier same day. NG tube placement is advised. 2. Right hydronephrosis, likely due to extrinsic compression by the markedly 
distended stomach and duodenum. 3. Prominence of extrahepatic bile ducts, likely due to extrinsic impression on 
the ampulla of water by the markedly distended stomach and duodenum. 4. Pancreas extrinsically compressed by markedly distended stomach, making 
visualization of the previously described cystic mass is difficult. The 
pancreatic head lies at the site of duodenal obstruction, but is difficult to 
further assess under the circumstances. 5. Other incidental and postoperative findings MRI abdomen and 9/19/19 IMPRESSION: 
  
1. Interval development of diffuse liver metastatic disease. 
  
2.  Large mass centered on the pancreatic head and proximal horizontal portion 
of the duodenum. . There is narrowing of the duodenal lumen. Possibilities 
include a pancreatic or duodenal primary. The mass appears to be centered on the 
pancreatic head and there is common bile duct dilatation however the pancreatic 
duct is normal in caliber. 
  
3.  Left lower lobe airspace disease may represent atelectasis or pneumonia. 
  
4.  Possible nonocclusive thrombus in the intrahepatic IVC. 
  
5. The SMV is occluded. Assessment:  
1) adenocarcinoma of the pancreas metastatic to liver with the plan being for chemotherapy once she is able to tolerate nutrition  3596 9/17/19 CEA 16.8 9/17/19 
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  
 
· 1. Patient will need a venous access device for therapy. ·  
· 2. We will go ahead and treat her with heparin and low molecular weight heparin as an outpatient for her blood clot. ·  
· 3. We will plan on doing genetic testing due to her family history of pancreatic cancer. ·  
· 4. We will plan on getting genome wide sequencing of her tumor for therapeutic options. ·  
· 5. We will plan on treating her with gemcitabine and nab paclitaxel and hopefully start treatment within the next week I appreciate the opportunity to participate in [facility].  
 
Signed By: Michael Vargas MD

## 2019-09-28 NOTE — PROGRESS NOTES
Hospitalist Progress Note Abdullahi Doran MD 
Answering service: [phone] from in house phone Date of Service:  2019 NAME:  Hernando Garcia :  1940 MRN:  [id number] Admission Summary:  
 
73-year-old female with a past medical history significant for coronary artery disease, non-ST-elevation myocardial infarction, dyslipidemia, chronic systolic congestive heart failure, irritable bowel syndrome and pancreatitis, was brought to the emergency room from home via private vehicle accompanied by her sister for complaints of an approximately 3-day history of progressively worsening nausea and coffee-grounds vomitus.  The patient also complained of some intermittent mild abdominal pain with some radiation to the upper back.  The patient described the pain as being dull achy in nature, at times about 6-7 on 10 in intensity.  The patient denied associated headaches, dizziness, visual deficits, chest pains, palpitations, shortness of breath, cough, fevers, chills, hematuria, bladder or bowel irregularities. Interval history / Subjective:  
  
 
 
: 
Resting in bed has NG tube. She has  Gastrojejunostomy done yesterday. If OK with Surgery can resume Heparin drip. Chemo to be started soon once NG tube is out. Currently on TPN. Leukocytosis with out fever ? Due to surgery will monitor if febrile to add antibiotics. Assessment & Plan:  
 
 
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 Normal triglyceride level. EGD initially -  Done aborted due to inability to pass scope down to Duodenum CT abd/Pelvis  GOO and possible stricture along duodenum which is causing obstruction of  
NG tube for decompression CEA- normal 
Ca19-9  - 3596 NPO now  - Re Endoscopy shows that the duodenum stricture is adenocarcinoma MRI shows pancreatic mass with liver mets /oncology has seen patient and they offered chemotherapy to patient Surgery consulted Dr Andreia Taveras \" good idea to do a gastrojejunostomy to relieve her duodenal obstruction\" Patient scheduled for surgery today. We stopped heparin 6 hrs before sx (OR time noon) resume after surgery Continue NPO status Continue TPN  
  
2) IVC clot  
on heparin gtt Change to lovenox for outpatient / per Dr Randal Chauhan   
3) H/h stable -  Hb 11.2 today 
normochromic normocytic anemia. C/W PPI Watch for bleeding on heparin gtt  
  
4) Hypokalemia Resolved K today 4.3 Am labs   
5) TOÑA probably due to matthieuley Hydronephrosis noted on Ct scan Cr today 1.09 
 
6) CAD. H/O NSTEMI. No issues at this time   
7) Dyslipidemia. Monitor Fluid Status   
8) UTI ruled out Urine cultures negative D/C Ceftriaxone 
  
MalNutrition Body mass index is 19.68 kg/m². Due to cancer and GOO 
picc line Central TPN , renewed  
  
GI and DVT (SCDs due to probable GI bleed). 
  
DNR/DNI with a guarded prognosis. D/W patient and family. 
  
patient's Son and daughter Marty Franco ([phone]) Code status: DNR 
DVT prophylaxis: heparin Care Plan discussed with: Patient/Family and Nurse Disposition: TBD Hospital Problems  Date Reviewed: 9/14/2019 Codes Class Noted POA Acute pancreatitis ICD-10-CM: K85.90 ICD-9-CM: 112.3  9/14/2019 Yes TOÑA (acute kidney injury) ([facility] [address] ICD-10-CM: N17.9 ICD-9-CM: 584.9  9/14/2019 Yes Hypokalemia ICD-10-CM: E87.6 ICD-9-CM: 276.8  9/14/2019 Yes GI bleed ICD-10-CM: K92.2 ICD-9-CM: 578.9  9/14/2019 Yes Coffee ground emesis ICD-10-CM: K92.0 ICD-9-CM: 578.0  9/14/2019 Yes Review of Systems: A comprehensive review of systems was negative. Vital Signs:  
 Last 24hrs VS reviewed since prior progress note. Most recent are: 
Visit Vitals /56 (BP 1 Location: Right arm, BP Patient Position: At rest) Pulse (!) 103 Temp 98.4 °F (36.9 °C) Resp 16 Ht 5' 4\" (1.626 m) Wt 52 kg (114 lb 10.2 oz) SpO2 95% BMI 19.68 kg/m² Intake/Output Summary (Last 24 hours) at 9/28/2019 7632 Last data filed at 9/28/2019 5102 Gross per 24 hour Intake 1000 ml Output 2075 ml Net -1075 ml Physical Examination:  
 
 
     
Constitutional:  No acute distress, , pleasant , no complaints ENT:  Oral mucous moist, NGT Resp:  CTA bilaterally. No wheezing/rhonchi/rales. CV:  Regular rhythm, normal rate, no murmurs, gallops, rubs GI:  Soft, non distended, non tender. normoactive bowel sounds, no hepatosplenomegaly Musculoskeletal:  No edema, warm, 2+ pulses throughout Neurologic:  Moves all extremities. AAOx3, CN II-XII reviewed Psych:  Good insight, Not anxious nor agitated. Data Review:  
 I personally reviewed  Image and labs Labs:  
 
Recent Labs  
  09/28/19 
0645 09/27/19 
0126 WBC 20.1* 15.9* HGB 10.0* 11.2* HCT 31.1* 35.0  
* 162 Recent Labs  
  09/28/19 
0645 09/27/19 
0126 09/26/19 
0031 * 133* 136  
K 4.5 4.3 4.4  
 106 108 CO2 23 20* 20* BUN 37* 39* 38* CREA 1.02 1.09* 1.15* * 122* 132* CA 8.5 8.9 8.9 No results for input(s): SGOT, GPT, ALT, AP, TBIL, TBILI, TP, ALB, GLOB, GGT, AML, LPSE in the last 72 hours. No lab exists for component: AMYP, HLPSE Recent Labs  
  09/28/19 
0645 09/27/19 
1250 09/26/19 
2777 APTT 24.1 26.8 61.1* No results for input(s): FE, TIBC, PSAT, FERR in the last 72 hours. No results found for: FOL, RBCF No results for input(s): PH, PCO2, PO2 in the last 72 hours. No results for input(s): CPK, CKNDX, TROIQ in the last 72 hours. No lab exists for component: CPKMB Lab Results Component Value Date/Time  Cholesterol, total 85 09/15/2019 04:37 AM  
 HDL Cholesterol 43 09/15/2019 04:37 AM  
 LDL, calculated 29 09/15/2019 04:37 AM  
 Triglyceride 65 09/15/2019 04:37 AM  
 CHOL/HDL Ratio 2.0 09/15/2019 04:37 AM  
 
Lab Results Component Value Date/Time Glucose (POC) 102 (H) 01/18/2019 06:23 AM  
 Glucose (POC) 139 (H) 01/17/2019 09:05 PM  
 Glucose (POC) 114 (H) 01/17/2019 04:31 PM  
 Glucose (POC) 106 (H) 01/17/2019 11:41 AM  
 Glucose (POC) 132 (H) 01/16/2019 09:13 PM  
 
Lab Results Component Value Date/Time Color YELLOW/STRAW 09/15/2019 12:33 AM  
 Appearance CLOUDY (A) 09/15/2019 12:33 AM  
 Specific gravity 1.019 09/15/2019 12:33 AM  
 pH (UA) 6.0 09/15/2019 12:33 AM  
 Protein 100 (A) 09/15/2019 12:33 AM  
 Glucose NEGATIVE  09/15/2019 12:33 AM  
 Ketone NEGATIVE  09/15/2019 12:33 AM  
 Bilirubin NEGATIVE  09/15/2019 12:33 AM  
 Urobilinogen 0.2 09/15/2019 12:33 AM  
 Nitrites NEGATIVE  09/15/2019 12:33 AM  
 Leukocyte Esterase SMALL (A) 09/15/2019 12:33 AM  
 Epithelial cells MODERATE (A) 09/15/2019 12:33 AM  
 Bacteria 2+ (A) 09/15/2019 12:33 AM  
 WBC 10-20 09/15/2019 12:33 AM  
 RBC 0-5 09/15/2019 12:33 AM  
 
 
 
Medications Reviewed:  
 
Current Facility-Administered Medications Medication Dose Route Frequency  TPN ADULT - CENTRAL   IntraVENous CONTINUOUS  
 0.9% sodium chloride infusion 250 mL  250 mL IntraVENous PRN  
 morphine injection 2 mg  2 mg IntraVENous Q4H PRN  
 lidocaine (LIDODERM) 5 % patch 1 Patch  1 Patch TransDERmal Q24H  
 fat emulsion 20% (LIPOSYN, INTRAlipid) infusion 500 mL  500 mL IntraVENous EVERY MON & TH  
 lidocaine (XYLOCAINE) 2 % viscous solution 15 mL  15 mL Mouth/Throat PRN  phenol throat spray (CHLORASEPTIC) 1 Spray  1 Spray Oral PRN  
 heparin 25,000 units in D5W 250 ml infusion  18-36 Units/kg/hr IntraVENous TITRATE  guaiFENesin ER (MUCINEX) tablet 600 mg  600 mg Oral BID PRN  
 [Held by provider] rosuvastatin (CRESTOR) tablet 10 mg  10 mg Oral QHS  benzocaine-menthol (CEPACOL) lozenge 1 Lozenge  1 Lozenge Mucous Membrane PRN  
  hydrALAZINE (APRESOLINE) 20 mg/mL injection 10 mg  10 mg IntraVENous Q6H PRN  
 sodium chloride (NS) flush 5-40 mL  5-40 mL IntraVENous PRN  
 acetaminophen (TYLENOL) tablet 650 mg  650 mg Oral Q4H PRN  
 ondansetron (ZOFRAN) injection 4 mg  4 mg IntraVENous Q4H PRN  
 carvedilol (COREG) tablet 6.25 mg  6.25 mg Oral BID WITH MEALS  
 
______________________________________________________________________ EXPECTED LENGTH OF STAY: 4d 19h ACTUAL LENGTH OF STAY:          [address] Bruce MD Rodrigo

## 2019-09-28 NOTE — ADDENDUM NOTE
Addendum  created 09/28/19 0947 by Ghazal Oro CRNA Intraprocedure LDAs edited, LDA properties accepted

## 2019-09-28 NOTE — PROGRESS NOTES
Bedside shift change report given to Sarah Hopson (oncoming nurse) by Magui Knight (offgoing nurse). Report included the following information SBAR, Kardex, MAR and Recent Results.

## 2019-09-29 NOTE — PROGRESS NOTES
Hospitalist Progress Note Kyle Wang MD 
Answering service: [phone] from in house phone Date of Service:  2019 NAME:  Lois Zepeda :  1940 MRN:  [id number] Admission Summary:  
 
68-year-old female with a past medical history significant for coronary artery disease, non-ST-elevation myocardial infarction, dyslipidemia, chronic systolic congestive heart failure, irritable bowel syndrome and pancreatitis, was brought to the emergency room from home via private vehicle accompanied by her sister for complaints of an approximately 3-day history of progressively worsening nausea and coffee-grounds vomitus.  The patient also complained of some intermittent mild abdominal pain with some radiation to the upper back.  The patient described the pain as being dull achy in nature, at times about 6-7 on 10 in intensity.  The patient denied associated headaches, dizziness, visual deficits, chest pains, palpitations, shortness of breath, cough, fevers, chills, hematuria, bladder or bowel irregularities. Interval history / Subjective:  
 
: 
Resting in bed has NG tube. She has  Gastrojejunostomy done yesterday. If OK with Surgery can resume Heparin drip. Chemo to be started soon once NG tube is out. Currently on TPN. Leukocytosis with out fever ? Due to surgery will monitor if febrile to add antibiotics. : 
Patient has NG tube has biliary drainage. She has Bowel sounds today. Now back on Heparin drip for IVC clot. Hemoglobin is stable. Assessment & Plan:  
 
 
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 Normal triglyceride level. EGD initially -  Done aborted due to inability to pass scope down to Duodenum CT abd/Pelvis  GOO and possible stricture along duodenum which is causing obstruction of  
NG tube for decompression CEA- normal 
Ca19-9  - 3596 NPO now 9/18 - Re Endoscopy shows that the duodenum stricture is adenocarcinoma MRI shows pancreatic mass with liver mets /oncology has seen patient and they offered chemotherapy to patient Surgery consulted Dr Tanner Espinal \" good idea to do a gastrojejunostomy to relieve her duodenal obstruction\" Continue NPO status Continue TPN  
  
2) IVC clot  
on heparin gtt Change to lovenox for outpatient / per Dr Mayra Villarreal   
3) H/h stable -  
normochromic normocytic anemia. C/W PPI Watch for bleeding on heparin gtt  
  
4) Hypokalemia Resolved K today 4.3 Am labs   
5) TOÑA probably due to matthieuley Hydronephrosis noted on Ct scan Cr today 1.09 
 
6) CAD. H/O NSTEMI. No issues at this time   
7) Dyslipidemia. Monitor Fluid Status   
8) UTI ruled out Urine cultures negative D/C Ceftriaxone 
  
MalNutrition Body mass index is 20.4 kg/m². Due to cancer and GOO 
picc line Central TPN , renewed  
  
GI and DVT (SCDs due to probable GI bleed). 
  
DNR/DNI with a guarded prognosis. D/W patient and family. 
  
patient's Son and daughter Dominick Mayer ([phone]) Code status: DNR 
DVT prophylaxis: heparin Care Plan discussed with: Patient/Family and Nurse Disposition: TBD Hospital Problems  Date Reviewed: 9/14/2019 Codes Class Noted POA Acute pancreatitis ICD-10-CM: K85.90 ICD-9-CM: 853.2  9/14/2019 Yes TOÑA (acute kidney injury) ([facility] [address] ICD-10-CM: N17.9 ICD-9-CM: 584.9  9/14/2019 Yes Hypokalemia ICD-10-CM: E87.6 ICD-9-CM: 276.8  9/14/2019 Yes GI bleed ICD-10-CM: K92.2 ICD-9-CM: 578.9  9/14/2019 Yes Coffee ground emesis ICD-10-CM: K92.0 ICD-9-CM: 578.0  9/14/2019 Yes Review of Systems: A comprehensive review of systems was negative. Vital Signs:  
 Last 24hrs VS reviewed since prior progress note. Most recent are: 
Visit Vitals /64 (BP 1 Location: Left arm, BP Patient Position: At rest) Pulse (!) 103 Temp 98.1 °F (36.7 °C) Resp 16 Ht 5' 4\" (1.626 m) Wt 53.9 kg (118 lb 13.3 oz) SpO2 94% BMI 20.40 kg/m² Intake/Output Summary (Last 24 hours) at 9/29/2019 1051 Last data filed at 9/29/2019 8573 Gross per 24 hour Intake  Output 2200 ml Net -2200 ml Physical Examination:  
 
 
     
Constitutional:  No acute distress, , pleasant , no complaints ENT:  Oral mucous moist, NGT Resp:  CTA bilaterally. No wheezing/rhonchi/rales. CV:  Regular rhythm, normal rate, no murmurs, gallops, rubs GI:  Soft, non distended, non tender. normoactive bowel sounds, no hepatosplenomegaly Musculoskeletal:  No edema, warm, 2+ pulses throughout Neurologic:  Moves all extremities. AAOx3, CN II-XII reviewed Psych:  Good insight, Not anxious nor agitated. Data Review:  
 I personally reviewed  Image and labs Labs:  
 
Recent Labs  
  09/29/19 0136 09/28/19 
6581 WBC 15.8* 20.1* HGB 9.8* 10.0* HCT 30.8* 31.1*  
* 139* Recent Labs  
  09/29/19 
0136 09/28/19 
0645 09/27/19 
0126 * 135* 133* K 4.3 4.5 4.3  104 106 CO2 22 23 20* BUN 40* 37* 39* CREA 1.01 1.02 1.09* * 137* 122* CA 8.5 8.5 8.9 No results for input(s): SGOT, GPT, ALT, AP, TBIL, TBILI, TP, ALB, GLOB, GGT, AML, LPSE in the last 72 hours. No lab exists for component: AMYP, HLPSE Recent Labs  
  09/29/19 0136 09/28/19 
1836 09/28/19 
0645 APTT 48.0* 21.0* 24.1 No results for input(s): FE, TIBC, PSAT, FERR in the last 72 hours. No results found for: FOL, RBCF No results for input(s): PH, PCO2, PO2 in the last 72 hours. No results for input(s): CPK, CKNDX, TROIQ in the last 72 hours. No lab exists for component: CPKMB Lab Results Component Value Date/Time  Cholesterol, total 85 09/15/2019 04:37 AM  
 HDL Cholesterol 43 09/15/2019 04:37 AM  
 LDL, calculated 29 09/15/2019 04:37 AM  
 Triglyceride 65 09/15/2019 04:37 AM  
 CHOL/HDL Ratio 2.0 09/15/2019 04:37 AM  
 
Lab Results Component Value Date/Time Glucose (POC) 102 (H) 01/18/2019 06:23 AM  
 Glucose (POC) 139 (H) 01/17/2019 09:05 PM  
 Glucose (POC) 114 (H) 01/17/2019 04:31 PM  
 Glucose (POC) 106 (H) 01/17/2019 11:41 AM  
 Glucose (POC) 132 (H) 01/16/2019 09:13 PM  
 
Lab Results Component Value Date/Time Color YELLOW/STRAW 09/15/2019 12:33 AM  
 Appearance CLOUDY (A) 09/15/2019 12:33 AM  
 Specific gravity 1.019 09/15/2019 12:33 AM  
 pH (UA) 6.0 09/15/2019 12:33 AM  
 Protein 100 (A) 09/15/2019 12:33 AM  
 Glucose NEGATIVE  09/15/2019 12:33 AM  
 Ketone NEGATIVE  09/15/2019 12:33 AM  
 Bilirubin NEGATIVE  09/15/2019 12:33 AM  
 Urobilinogen 0.2 09/15/2019 12:33 AM  
 Nitrites NEGATIVE  09/15/2019 12:33 AM  
 Leukocyte Esterase SMALL (A) 09/15/2019 12:33 AM  
 Epithelial cells MODERATE (A) 09/15/2019 12:33 AM  
 Bacteria 2+ (A) 09/15/2019 12:33 AM  
 WBC 10-20 09/15/2019 12:33 AM  
 RBC 0-5 09/15/2019 12:33 AM  
 
 
 
Medications Reviewed:  
 
Current Facility-Administered Medications Medication Dose Route Frequency  TPN ADULT - CENTRAL   IntraVENous TITRATE  acetaminophen (TYLENOL) suppository 650 mg  650 mg Rectal Q4H PRN  
 0.9% sodium chloride infusion 250 mL  250 mL IntraVENous PRN  
 morphine injection 2 mg  2 mg IntraVENous Q4H PRN  
 lidocaine (LIDODERM) 5 % patch 1 Patch  1 Patch TransDERmal Q24H  
 fat emulsion 20% (LIPOSYN, INTRAlipid) infusion 500 mL  500 mL IntraVENous EVERY MON & TH  
 lidocaine (XYLOCAINE) 2 % viscous solution 15 mL  15 mL Mouth/Throat PRN  phenol throat spray (CHLORASEPTIC) 1 Spray  1 Spray Oral PRN  
 heparin 25,000 units in D5W 250 ml infusion  18-36 Units/kg/hr IntraVENous TITRATE  guaiFENesin ER (MUCINEX) tablet 600 mg  600 mg Oral BID PRN  
 [Held by provider] rosuvastatin (CRESTOR) tablet 10 mg  10 mg Oral QHS  benzocaine-menthol (CEPACOL) lozenge 1 Lozenge  1 Lozenge Mucous Membrane PRN  
  hydrALAZINE (APRESOLINE) 20 mg/mL injection 10 mg  10 mg IntraVENous Q6H PRN  
 sodium chloride (NS) flush 5-40 mL  5-40 mL IntraVENous PRN  
 acetaminophen (TYLENOL) tablet 650 mg  650 mg Oral Q4H PRN  
 ondansetron (ZOFRAN) injection 4 mg  4 mg IntraVENous Q4H PRN  
 carvedilol (COREG) tablet 6.25 mg  6.25 mg Oral BID WITH MEALS  
 
______________________________________________________________________ EXPECTED LENGTH OF STAY: 4d 19h ACTUAL LENGTH OF STAY:          15 Amos Mtz MD

## 2019-09-29 NOTE — PROGRESS NOTES
Problem: Falls - Risk of 
Goal: *Absence of Falls Description Document Donata Carrel Fall Risk and appropriate interventions in the flowsheet. Outcome: Progressing Towards Goal 
Note:  
Fall Risk Interventions: 
Mobility Interventions: Strengthening exercises (ROM-active/passive) Medication Interventions: Patient to call before getting OOB Elimination Interventions: Call light in reach History of Falls Interventions: Door open when patient unattended Problem: Pain Goal: *Control of Pain Outcome: Progressing Towards Goal 
  
Problem: Patient Education: Go to Patient Education Activity Goal: Patient/Family Education Outcome: Progressing Towards Goal

## 2019-09-29 NOTE — PROGRESS NOTES
NP notified of straight caths x2  Patient voided 250 at 2030 and post void residual bladder scan was 632 ml order given for morales catheter. Morales catheter placed output 750 immediately.

## 2019-09-29 NOTE — PROGRESS NOTES
Bedside shift change report given to Mark Sexton (oncoming nurse) by Inder Perrin (offgoing nurse). Report included the following information SBAR, Kardex, MAR and Recent Results.

## 2019-09-29 NOTE — PROGRESS NOTES
Results for Richelle Arroyo (MRN [id number]) as of 9/29/2019 12:19 Ref. Range 9/29/2019 11:05  
aPTT Latest Ref Range: 22.1 - 32.0 sec 109.4 ()  
MD aware. Heparin dripped stopped at 10:45a. Heparin protocol initiated.

## 2019-09-29 NOTE — PROGRESS NOTES
[facility] POD #2 Subjective  
 
NPO, NGT, no BM or flatus, no N/V, pain seems controlled Objective Patient Vitals for the past 24 hrs: 
 Temp Pulse Resp BP SpO2  
09/29/19 0811 98.1 °F (36.7 °C) (!) 103 16 116/64 94 % 09/29/19 0149 98.2 °F (36.8 °C) (!) 101 16 120/68 95 % 09/28/19 2026 99 °F (37.2 °C) (!) 105 16 113/69 98 %  
09/28/19 1243 98.3 °F (36.8 °C) 97 16 114/64 97 % Date 09/28/19 0700 - 09/29/19 9157 09/29/19 0700 - 09/30/19 3337 Shift 8194-0571 5804-0243 24 Hour Total 6493-6655 3832-6132 24 Hour Total  
INTAKE  
NG/GT 0  0 Intake (ml) (Nasogastric Tube 09/17/19) 0  0 Shift Total(mL/kg) 0(0)  0(0) OUTPUT Urine(mL/kg/hr) 700(1.1) 1000(1.5) 1700(1.3) 750  750 Urine 600  600 Urine Voided 100 250 350 Urine Occurrence(s) 1 x  1 x Urine Output (mL) (Urinary Catheter 09/28/19 Joyce)  750 750 750  750 Emesis/NG output 100 100 200 250  250 Output (ml) (Nasogastric Tube 09/17/19) 100 100 200 250  250 Shift Total(mL/kg) 800(15.4) 1100(20.4) 1900(35.3) 1000(18.6)  1000(18.6) NET -800 -1100 -1900 -1000  -1000 Weight (kg) 52 53.9 53.9 53.9 53.9 53.9 PE 
Pulm - CTAB 
CV - RRR Abd - soft, ND, BS present, incision c/d/i Labs Recent Results (from the past 12 hour(s)) PTT Collection Time: 09/29/19  1:36 AM  
Result Value Ref Range aPTT 48.0 (H) 22.1 - 32.0 sec  
 aPTT, therapeutic range     58.0 - 37.2 SECS  
METABOLIC PANEL, BASIC Collection Time: 09/29/19  1:36 AM  
Result Value Ref Range Sodium 134 (L) 136 - 145 mmol/L Potassium 4.3 3.5 - 5.1 mmol/L Chloride 104 97 - 108 mmol/L  
 CO2 22 21 - 32 mmol/L Anion gap 8 5 - 15 mmol/L Glucose 114 (H) 65 - 100 mg/dL BUN 40 (H) 6 - 20 MG/DL Creatinine 1.01 0.55 - 1.02 MG/DL  
 BUN/Creatinine ratio 40 (H) 12 - 20 GFR est AA >60 >60 ml/min/1.73m2 GFR est non-AA 53 (L) >60 ml/min/1.73m2 Calcium 8.5 8.5 - 10.1 MG/DL  
CBC W/O DIFF Collection Time: 09/29/19  1:36 AM  
Result Value Ref Range WBC 15.8 (H) 3.6 - 11.0 K/uL  
 RBC 3.19 (L) 3.80 - 5.20 M/uL HGB 9.8 (L) 11.5 - 16.0 g/dL HCT 30.8 (L) 35.0 - 47.0 % MCV 96.6 80.0 - 99.0 FL  
 MCH 30.7 26.0 - 34.0 PG  
 MCHC 31.8 30.0 - 36.5 g/dL  
 RDW 14.2 11.5 - 14.5 % PLATELET 819 (L) 441 - 400 K/uL MPV 12.1 8.9 - 12.9 FL  
 NRBC 0.0 0  WBC ABSOLUTE NRBC 0.00 0.00 - 0.01 K/uL Assessment Lilliana Humphreys is a 70 y. o.yr old female s/p gastrojejunostomy for gastric outlet obstruction Plan Keeping NGT today, NGt output is still bilious, no bowel function We will order a UGI tomorrow to make sure the [address] [place] [address] Avenue looks good prior to removing the NGT, she looks fine post op just tired and deconditioned TPN renewed Continue heparin drip for IVC clot She may have ice chips and small sips of water Flaquito Cisneros MD

## 2019-09-29 NOTE — PROGRESS NOTES
Problem: Falls - Risk of 
Goal: *Absence of Falls Description Document Jhonny Self Fall Risk and appropriate interventions in the flowsheet. Outcome: Progressing Towards Goal 
Note:  
Fall Risk Interventions: 
Mobility Interventions: Strengthening exercises (ROM-active/passive) Gripper socks when out of bed. Medication Interventions: Patient to call before getting OOB Elimination Interventions: Call light in reach History of Falls Interventions: Door open when patient unattended Problem: Pain Goal: *Control of Pain Outcome: Progressing Towards Goal 
  
Problem: Nutrition Deficit Goal: *Optimize nutritional status Outcome: Progressing Towards Goal 
  
Problem: Constipation - Risk of 
Goal: *Prevention of constipation Outcome: Not Progressing Towards Goal 
  
Problem: Patient Education: Go to Patient Education Activity Goal: Patient/Family Education Outcome: Progressing Towards Goal

## 2019-09-30 NOTE — PROGRESS NOTES
Hospitalist Progress Note Yuli Tucker MD 
Answering service: [phone] OR [phone] from in house phone Date of Service:  2019 NAME:  Desean Cox :  1940 MRN:  [id number] Admission Summary:  
 
68-year-old female with a past medical history significant for coronary artery disease, non-ST-elevation myocardial infarction, dyslipidemia, chronic systolic congestive heart failure, irritable bowel syndrome and pancreatitis, was brought to the emergency room from home via private vehicle accompanied by her sister for complaints of an approximately 3-day history of progressively worsening nausea and coffee-grounds vomitus.  The patient also complained of some intermittent mild abdominal pain with some radiation to the upper back.  The patient described the pain as being dull achy in nature, at times about 6-7 on 10 in intensity.  The patient denied associated headaches, dizziness, visual deficits, chest pains, palpitations, shortness of breath, cough, fevers, chills, hematuria, bladder or bowel irregularities. Interval history / Subjective:  
 
: 
Resting in bed has NG tube. She has  Gastrojejunostomy done yesterday. If OK with Surgery can resume Heparin drip. Chemo to be started soon once NG tube is out. Currently on TPN. Leukocytosis with out fever ? Due to surgery will monitor if febrile to add antibiotics. : 
Patient has NG tube has biliary drainage. She has Bowel sounds today. Now back on Heparin drip for IVC clot. Hemoglobin is stable. : 
 
Patient has NG tube still draining but drainage has decreased. Supposed to have UGI series to look at [address] [facility] junction. Keep on ANA. Passing flatus. Bowel sounds are still hypoactive. On Heparin drip for IVC clot. Assessment & Plan:  
 
 
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 Normal triglyceride level. EGD initially -  Done aborted due to inability to pass scope down to Duodenum CT abd/Pelvis 9/16 GOO and possible stricture along duodenum which is causing obstruction of  
NG tube for decompression CEA- normal 
Ca19-9  - 3596 NPO now 9/18 - Re Endoscopy shows that the duodenum stricture is adenocarcinoma MRI shows pancreatic mass with liver mets /oncology has seen patient and they offered chemotherapy to patient Surgery consulted Dr Kuldeep hSore \" good idea to do a gastrojejunostomy to relieve her duodenal obstruction\" Continue NPO status Continue TPN  
  
2) IVC clot  
on heparin gtt Change to lovenox for outpatient / per Dr Mikey York   
3) H/h stable -  
normochromic normocytic anemia. C/W PPI Watch for bleeding on heparin gtt  
  
4) Hypokalemia Resolved K today 4.3 Am labs   
5) TOÑA probably due to matthieuley Hydronephrosis noted on Ct scan Cr today 1.09 
 
6) CAD. H/O NSTEMI. No issues at this time   
7) Dyslipidemia. Monitor Fluid Status   
8) UTI ruled out Urine cultures negative D/C Ceftriaxone 
  
MalNutrition Body mass index is 20.28 kg/m². Due to cancer and GOO 
picc line Central TPN , renewed  
  
GI and DVT (SCDs due to probable GI bleed). 
  
DNR/DNI with a guarded prognosis. D/W patient and family. 
  
patient's Son and daughter Luz Elena Lizarraga ([phone]) Code status: DNR 
DVT prophylaxis: heparin Care Plan discussed with: Patient/Family and Nurse Disposition: TBD Hospital Problems  Date Reviewed: 9/14/2019 Codes Class Noted POA Acute pancreatitis ICD-10-CM: K85.90 ICD-9-CM: 012.7  9/14/2019 Yes TOÑA (acute kidney injury) ([facility] [address] ICD-10-CM: N17.9 ICD-9-CM: 584.9  9/14/2019 Yes Hypokalemia ICD-10-CM: E87.6 ICD-9-CM: 276.8  9/14/2019 Yes GI bleed ICD-10-CM: K92.2 ICD-9-CM: 578.9  9/14/2019 Yes Coffee ground emesis ICD-10-CM: K92.0 ICD-9-CM: 578.0  9/14/2019 Yes Review of Systems: A comprehensive review of systems was negative. Vital Signs:  
 Last 24hrs VS reviewed since prior progress note. Most recent are: 
Visit Vitals /67 (BP 1 Location: Left arm, BP Patient Position: At rest) Pulse 100 Temp 98.4 °F (36.9 °C) Resp 19 Ht 5' 4\" (1.626 m) Wt 53.6 kg (118 lb 2.7 oz) SpO2 96% BMI 20.28 kg/m² Intake/Output Summary (Last 24 hours) at 9/30/2019 1356 Last data filed at 9/30/2019 0053 Gross per 24 hour Intake 1500 ml Output 1225 ml Net 275 ml Physical Examination:  
 
 
     
Constitutional:  No acute distress, , pleasant , no complaints ENT:  Oral mucous moist, NGT Resp:  CTA bilaterally. No wheezing/rhonchi/rales. CV:  Regular rhythm, normal rate, no murmurs, gallops, rubs GI:  Soft, non distended, non tender. normoactive bowel sounds, no hepatosplenomegaly Musculoskeletal:  No edema, warm, 2+ pulses throughout Neurologic:  Moves all extremities. AAOx3, CN II-XII reviewed Psych:  Good insight, Not anxious nor agitated. Data Review:  
 I personally reviewed  Image and labs Labs:  
 
Recent Labs  
  09/30/19 0149 09/29/19 0136 WBC 10.7 15.8* HGB 9.3* 9.8* HCT 29.8* 30.8* * 145* Recent Labs  
  09/30/19 0149 09/29/19 0136 09/28/19 
7627 * 134* 135* K 4.3 4.3 4.5  
 104 104 CO2 24 22 23 BUN 43* 40* 37* CREA 1.04* 1.01 1.02  
* 114* 137* CA 8.5 8.5 8.5 No results for input(s): SGOT, GPT, ALT, AP, TBIL, TBILI, TP, ALB, GLOB, GGT, AML, LPSE in the last 72 hours. No lab exists for component: AMYP, HLPSE Recent Labs  
  09/30/19 0149 09/29/19 1954 09/29/19 
1354 INR  --  1.0  --   
PTP  --  10.6  --   
APTT 72.7* 68.0* 46.0* No results for input(s): FE, TIBC, PSAT, FERR in the last 72 hours. No results found for: FOL, RBCF No results for input(s): PH, PCO2, PO2 in the last 72 hours. No results for input(s): CPK, CKNDX, TROIQ in the last 72 hours. No lab exists for component: CPKMB Lab Results Component Value Date/Time Cholesterol, total 85 09/15/2019 04:37 AM  
 HDL Cholesterol 43 09/15/2019 04:37 AM  
 LDL, calculated 29 09/15/2019 04:37 AM  
 Triglyceride 65 09/15/2019 04:37 AM  
 CHOL/HDL Ratio 2.0 09/15/2019 04:37 AM  
 
Lab Results Component Value Date/Time Glucose (POC) 143 (H) 09/30/2019 06:14 AM  
 Glucose (POC) 145 (H) 09/30/2019 12:05 AM  
 Glucose (POC) 105 (H) 09/29/2019 12:12 PM  
 Glucose (POC) 102 (H) 01/18/2019 06:23 AM  
 Glucose (POC) 139 (H) 01/17/2019 09:05 PM  
 
Lab Results Component Value Date/Time Color YELLOW/STRAW 09/15/2019 12:33 AM  
 Appearance CLOUDY (A) 09/15/2019 12:33 AM  
 Specific gravity 1.019 09/15/2019 12:33 AM  
 pH (UA) 6.0 09/15/2019 12:33 AM  
 Protein 100 (A) 09/15/2019 12:33 AM  
 Glucose NEGATIVE  09/15/2019 12:33 AM  
 Ketone NEGATIVE  09/15/2019 12:33 AM  
 Bilirubin NEGATIVE  09/15/2019 12:33 AM  
 Urobilinogen 0.2 09/15/2019 12:33 AM  
 Nitrites NEGATIVE  09/15/2019 12:33 AM  
 Leukocyte Esterase SMALL (A) 09/15/2019 12:33 AM  
 Epithelial cells MODERATE (A) 09/15/2019 12:33 AM  
 Bacteria 2+ (A) 09/15/2019 12:33 AM  
 WBC 10-20 09/15/2019 12:33 AM  
 RBC 0-5 09/15/2019 12:33 AM  
 
 
 
Medications Reviewed:  
 
Current Facility-Administered Medications Medication Dose Route Frequency  iohexol (OMNIPAQUE) 350 mg iodine/mL contrast injection 100 mL  100 mL Oral RAD ONCE  
 acetaminophen (TYLENOL) suppository 650 mg  650 mg Rectal Q4H PRN  
 0.9% sodium chloride infusion 250 mL  250 mL IntraVENous PRN  
 morphine injection 2 mg  2 mg IntraVENous Q4H PRN  
 lidocaine (LIDODERM) 5 % patch 1 Patch  1 Patch TransDERmal Q24H  
 fat emulsion 20% (LIPOSYN, INTRAlipid) infusion 500 mL  500 mL IntraVENous EVERY MON & TH  
 lidocaine (XYLOCAINE) 2 % viscous solution 15 mL  15 mL Mouth/Throat PRN  
  phenol throat spray (CHLORASEPTIC) 1 Spray  1 Spray Oral PRN  
 heparin 25,000 units in D5W 250 ml infusion  18-36 Units/kg/hr IntraVENous TITRATE  guaiFENesin ER (MUCINEX) tablet 600 mg  600 mg Oral BID PRN  
 [Held by provider] rosuvastatin (CRESTOR) tablet 10 mg  10 mg Oral QHS  benzocaine-menthol (CEPACOL) lozenge 1 Lozenge  1 Lozenge Mucous Membrane PRN  
 hydrALAZINE (APRESOLINE) 20 mg/mL injection 10 mg  10 mg IntraVENous Q6H PRN  
 sodium chloride (NS) flush 5-40 mL  5-40 mL IntraVENous PRN  
 acetaminophen (TYLENOL) tablet 650 mg  650 mg Oral Q4H PRN  
 ondansetron (ZOFRAN) injection 4 mg  4 mg IntraVENous Q4H PRN  
 carvedilol (COREG) tablet 6.25 mg  6.25 mg Oral BID WITH MEALS  
 
______________________________________________________________________ EXPECTED LENGTH OF STAY: 4d 19h ACTUAL LENGTH OF STAY:          16 Blanca Alford MD

## 2019-09-30 NOTE — PROGRESS NOTES
[facility] [place] [address] [address], [address] W: [phone]  F: [phone] Reason for Visit:  
Lois Zepeda is a 70 y.o. female who is seen in consultation at the request of Dr. Pavithra Zaldivar for evaluation of stage IV pancreatic cancer. Treatment History: · Biopsy with adenocarcinoma of the pancreas metastatic to liver History of Present Illness: The patient is a very pleasant 73-year-old be younger than her stated age she is been doing fairly well she started having some GI issues in January of this year. But over the last month or so she is been having more trouble and read more recently has been having trouble with nausea and vomiting. Because of the persistent or GI issues with the nausea and vomiting she is now admitted and was found to have a pancreatic cancer metastatic to her liver. The patient has been able to maintain her weight. The patient has lost a couple pounds since her last admission. According to the family the patient initially had lost weight when she was in the hospital then a little bit afterwards and stabilized her weight pattern and it is continued in a stable pattern The patient denied trouble with urination or with breathing she denied any ENT symptomatology. Has had a little bit of trouble with constipation 9/23/19 The patient seems to be doing fairly well this morning. She still has her NG tube in. Hopefully she will be getting a stent placed soon so she can start back on oral nutrition. Then the plan will be to get her port inserted and then to start her on gemcitabine and Abraxane. I anticipate that she will handle those drugs quite well and hopefully we can get her some quality time. 9/24/2019 Patient still has NG tube in place and the plan is for a stent to be placed.   She is getting hyperalimentation so she is getting the nutrition she would needs. Patient did vomit and does not like to vomit so she is hoping that the stent will be placed soon. 9/25/2019 Patient is doing well this morning still has her NG tube in place. She has had a little bit of nausea. She continues to get her hyperalimentation. Hopefully will get her bypass or stent placed soon so that we can get her treatment started. She will be getting a venous access device. She remains afebrile. Her vital signs are stable. Continue to encourage her to be up and in a chair. Plan on starting chemotherapy as soon as she is taking nutrition and has her Port-A-Cath placed. 9/27/2019 Patient seems to be doing well this morning NG tube is in place she is planning on having her surgery today. She still getting hyperalimentation. Patient is in good spirits. Hopefully she will have her surgery today and be able to get rid of her NG tube in a couple days. Then she will be able to start on nutrition. Then will be able to get her chemotherapy started. 9/28/2019 Patient is doing well postop she remains afebrile her vital signs look good. According to the patient surgery went well. She still has her NG tube in place. Her bowel sounds are quiet. Hopefully her bowels will start working fairly soon. She is in good spirits and her pain is under good control. 9/30/2019 The patient continues to improve every day. She looks much brighter. She still has her NG tube in but apparently that is coming out in the next hour or so hopefully that can get rid of her Joyce catheter as well. The patient is going to try to posterior about the move is much she can. Hopefully should be able be discharged home in a couple days. She is passing gas at this point which is showing her got is working again we will plan on seeing her in the office in about 1 week as we get raised to start her on chemotherapy she will need a venous access device. Past Medical History:  
Diagnosis Date  Coronary artery disease of native artery of native heart with stable angina pectoris ([facility] [address] 2/28/2019  Cough due to ACE inhibitor  Hematoma of rectus sheath 2/28/2019  IBS (irritable bowel syndrome) IBS  NSTEMI (non-ST elevated myocardial infarction) ([facility] [address] 2/28/2019  Pancreatitis  Pure hypercholesterolemia 2/28/2019  Systolic CHF, chronic ([facility] [address] 2/28/2019 Past Surgical History:  
Procedure Laterality Date  COLONOSCOPY Left 11/1/2017 COLONOSCOPY performed by Huang Ayala MD at [address]  HX CHOLECYSTECTOMY  HX GI    
 surgery for hiatal hernia  HX ORTHOPAEDIC    
 DJD, doing PT weekly through [facility]  IR OCCL Roetta Picking W SI  1/30/2019 Social History Tobacco Use  Smoking status: Former Smoker  Smokeless tobacco: Never Used  Tobacco comment: quit smoking cigarettes 6 yrs ago Substance Use Topics  Alcohol use: Yes Comment: very occasional  
  
Family History Problem Relation Age of Onset  Dementia Mother   
     alzheimers  Cancer Sister   
     gallbladder  Cancer Brother   
     pancreatic  Breast Cancer Paternal Grandmother  Dementia Sister   
     alzheimers  Heart Surgery Brother   
     bypass Current Facility-Administered Medications Medication Dose Route Frequency  TPN ADULT - CENTRAL   IntraVENous TITRATE  acetaminophen (TYLENOL) suppository 650 mg  650 mg Rectal Q4H PRN  
 0.9% sodium chloride infusion 250 mL  250 mL IntraVENous PRN  
 morphine injection 2 mg  2 mg IntraVENous Q4H PRN  
 lidocaine (LIDODERM) 5 % patch 1 Patch  1 Patch TransDERmal Q24H  
 fat emulsion 20% (LIPOSYN, INTRAlipid) infusion 500 mL  500 mL IntraVENous EVERY MON & TH  
 lidocaine (XYLOCAINE) 2 % viscous solution 15 mL  15 mL Mouth/Throat PRN  phenol throat spray (CHLORASEPTIC) 1 Spray  1 Spray Oral PRN  
 heparin 25,000 units in D5W 250 ml infusion  18-36 Units/kg/hr IntraVENous TITRATE  guaiFENesin ER (MUCINEX) tablet 600 mg  600 mg Oral BID PRN  
 [Held by provider] rosuvastatin (CRESTOR) tablet 10 mg  10 mg Oral QHS  benzocaine-menthol (CEPACOL) lozenge 1 Lozenge  1 Lozenge Mucous Membrane PRN  
 hydrALAZINE (APRESOLINE) 20 mg/mL injection 10 mg  10 mg IntraVENous Q6H PRN  
 sodium chloride (NS) flush 5-40 mL  5-40 mL IntraVENous PRN  
 acetaminophen (TYLENOL) tablet 650 mg  650 mg Oral Q4H PRN  
 ondansetron (ZOFRAN) injection 4 mg  4 mg IntraVENous Q4H PRN  
 carvedilol (COREG) tablet 6.25 mg  6.25 mg Oral BID WITH MEALS Allergies Allergen Reactions  Penicillins Hives Review of Systems: A complete review of systems was obtained, negative except as described above. Physical Exam:  
 
Visit Vitals /57 (BP 1 Location: Left arm, BP Patient Position: At rest) Pulse 99 Temp 98.2 °F (36.8 °C) Resp 19 Ht 5' 4\" (1.626 m) Wt 118 lb 2.7 oz (53.6 kg) SpO2 96% BMI 20.28 kg/m² ECOG PS: 1 General: No distress Eyes: PERRLA, anicteric sclerae HENT: Atraumatic, OP clear, NG tube in place Neck: Supple Lymphatic: No cervical, or supraclavicular adenopathy Respiratory: CTAB, normal respiratory effort CV: Normal rate, regular rhythm, no murmurs, no peripheral edema GI: Status post recent surgery MS: Digits without clubbing or cyanosis. Psych: Alert, oriented, appropriate affect, normal judgment/insight Results:  
 
Lab Results Component Value Date/Time WBC 10.7 09/30/2019 01:49 AM  
 HGB 9.3 (L) 09/30/2019 01:49 AM  
 HCT 29.8 (L) 09/30/2019 01:49 AM  
 PLATELET 310 (L) 74/39/4434 01:49 AM  
 MCV 96.4 09/30/2019 01:49 AM  
 ABS. NEUTROPHILS 11.7 (H) 09/26/2019 12:31 AM  
 
Lab Results Component Value Date/Time  Sodium 134 (L) 09/30/2019 01:49 AM  
 Potassium 4.3 09/30/2019 01:49 AM  
 Chloride 106 09/30/2019 01:49 AM  
 CO2 24 09/30/2019 01:49 AM  
 Glucose 113 (H) 09/30/2019 01:49 AM  
 BUN 43 (H) 09/30/2019 01:49 AM  
 Creatinine 1.04 (H) 09/30/2019 01:49 AM  
 GFR est AA >60 09/30/2019 01:49 AM  
 GFR est non-AA 51 (L) 09/30/2019 01:49 AM  
 Calcium 8.5 09/30/2019 01:49 AM  
 Glucose (POC) 117 (H) 09/30/2019 12:03 PM  
 
Lab Results Component Value Date/Time Bilirubin, total 0.4 09/24/2019 03:06 AM  
 ALT (SGPT) 44 09/24/2019 03:06 AM  
 AST (SGOT) 23 09/24/2019 03:06 AM  
 Alk. phosphatase 135 (H) 09/24/2019 03:06 AM  
 Protein, total 6.8 09/24/2019 03:06 AM  
 Albumin 3.0 (L) 09/24/2019 03:06 AM  
 Globulin 3.8 09/24/2019 03:06 AM  
 
 
Records reviewed and summarized above. Pathology report(s) reviewed 9/17/19 FINAL PATHOLOGIC DIAGNOSIS 1. Small bowel, duodenal stricture, biopsy:  
Adenocarcinoma Radiology report(s) reviewed above. 9/16/19 CT abdomen pelvis IMPRESSION: 
  
1. Marked gastric and duodenal distention by water attenuation fluid, with 
abrupt transition at the junction of second and third duodenum where a 
duodenal/pancreatic mass or stricture/scar is suggested, which is confluent with 
the pancreatic head and uncinate process. . This is consistent with endoscopic 
findings earlier same day. NG tube placement is advised. 2. Right hydronephrosis, likely due to extrinsic compression by the markedly 
distended stomach and duodenum. 3. Prominence of extrahepatic bile ducts, likely due to extrinsic impression on 
the ampulla of water by the markedly distended stomach and duodenum. 4. Pancreas extrinsically compressed by markedly distended stomach, making 
visualization of the previously described cystic mass is difficult. The 
pancreatic head lies at the site of duodenal obstruction, but is difficult to 
further assess under the circumstances. 5. Other incidental and postoperative findings MRI abdomen and 9/19/19 IMPRESSION: 
  
1.   Interval development of diffuse liver metastatic disease. 
  
 2.  Large mass centered on the pancreatic head and proximal horizontal portion 
of the duodenum. . There is narrowing of the duodenal lumen. Possibilities 
include a pancreatic or duodenal primary. The mass appears to be centered on the 
pancreatic head and there is common bile duct dilatation however the pancreatic 
duct is normal in caliber.  
3.  Left lower lobe airspace disease may represent atelectasis or pneumonia. 
  
4.  Possible nonocclusive thrombus in the intrahepatic IVC. 
  
5. The SMV is occluded. Assessment:  
1) adenocarcinoma of the pancreas metastatic to liver with the plan being for chemotherapy once she is able to tolerate nutrition  3596 9/17/19 CEA 16.8 9/17/19 
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  
 
· 1. Patient will need a venous access device for therapy. ·  
· 2. We will go ahead and treat her with heparin and low molecular weight heparin as an outpatient for her blood clot. ·  
· 3. We will plan on doing genetic testing due to her family history of pancreatic cancer. ·  
· 4. We will plan on getting genome wide sequencing of her tumor for therapeutic options. ·  
· 5. We will plan on treating her with gemcitabine and nab paclitaxel and hopefully start treatment within the next week I appreciate the opportunity to participate in [address] [facility].  
 
Signed By: Deepika Hull MD

## 2019-09-30 NOTE — PROGRESS NOTES
[facility] 
Daily Progress Note Admit Date: 2019 Post-Operative Day: 3 from Procedure(s): 
GASTROJEJUNOSTOMY Subjective:  
 
Last 24 hrs: Denies pain.  + flatus. Kamila Jones to have NG removed and start clears. WBC 10.7 (improved), T max 100.3. Not on abx. Continues on TPN and heparin gtt. UGI this morning: The esophagus is normal in caliber and contour with no mass, constricting lesion 
or mucosal abnormality. The esophageal motility is normal.  A small sliding 
hiatal hernia noted. The stomach is normally distended and there is contrast progression into the 
proximal duodenum in the beginning of the exam which did not traverse the 
horizontal duodenum over the course of exam. There is an expected and patent 
appearance to gastrojejunal anastomosis at the antrum with free flow of barium 
into the jejunal loop. No leak is demonstrated. IMPRESSION: Obstruction of the duodenum at the level of the horizontal duodenum 
aspect. Patent gastrojejunal anastomosis with no evident leak. 
  
  
Objective:  
 
Blood pressure 112/57, pulse 99, temperature 98.2 °F (36.8 °C), resp. rate 19, height 5' 4\" (1.626 m), weight 53.6 kg (118 lb 2.7 oz), SpO2 96 %. Temp (24hrs), Av.8 °F (37.1 °C), Min:98.2 °F (36.8 °C), Max:100.3 °F (37.9 °C) 
 
 
_____________________ Physical Exam:    
Alert and Oriented, lying in bed, no acute distress. Cardiovascular: RRR, no peripheral edema Lungs:CTAB Abdomen: + BS, soft, NT.  Midline surgical dressing in place, scant amount of strike through drainage seen at mid-portion of dressing. NG with 600 mL out yesterday. 50 mL out thus far today. Assessment:  
Active Problems: 
  Acute pancreatitis (2019) TOÑA (acute kidney injury) ([facility] [address] (2019) Hypokalemia (2019) GI bleed (2019) Coffee ground emesis (2019) 
 
gastric outlet obstruction due to duodenal stricture. S/p palliative gastrojejunostomy Plan:  
 
DC NG today Sips of clears Advance in am if doing well Renew TPN Labs in am 
Continue heparin gtt Julito Doherty [facility] [address] [facility] [place] [address] [address] [facility] [address] [place], [place] 
[phone] Data Review: 
 
Recent Labs  
  09/30/19 0149 09/29/19 0136 09/28/19 
4002 WBC 10.7 15.8* 20.1* HGB 9.3* 9.8* 10.0* HCT 29.8* 30.8* 31.1*  
* 145* 139* Recent Labs  
  09/30/19 0149 09/29/19 1954 09/29/19 0136 09/28/19 
1611 *  --  134* 135* K 4.3  --  4.3 4.5  
  --  104 104 CO2 24  --  22 23 *  --  114* 137* BUN 43*  --  40* 37* CREA 1.04*  --  1.01 1.02  
CA 8.5  --  8.5 8.5 INR  --  1.0  --   -- No results for input(s): AML, LPSE in the last 72 hours. ______________________ Medications: 
 
Current Facility-Administered Medications Medication Dose Route Frequency  TPN ADULT - CENTRAL   IntraVENous TITRATE  acetaminophen (TYLENOL) suppository 650 mg  650 mg Rectal Q4H PRN  
 0.9% sodium chloride infusion 250 mL  250 mL IntraVENous PRN  
 morphine injection 2 mg  2 mg IntraVENous Q4H PRN  
 lidocaine (LIDODERM) 5 % patch 1 Patch  1 Patch TransDERmal Q24H  
 fat emulsion 20% (LIPOSYN, INTRAlipid) infusion 500 mL  500 mL IntraVENous EVERY MON & TH  
 lidocaine (XYLOCAINE) 2 % viscous solution 15 mL  15 mL Mouth/Throat PRN  phenol throat spray (CHLORASEPTIC) 1 Spray  1 Spray Oral PRN  
 heparin 25,000 units in D5W 250 ml infusion  18-36 Units/kg/hr IntraVENous TITRATE  guaiFENesin ER (MUCINEX) tablet 600 mg  600 mg Oral BID PRN  
 [Held by provider] rosuvastatin (CRESTOR) tablet 10 mg  10 mg Oral QHS  benzocaine-menthol (CEPACOL) lozenge 1 Lozenge  1 Lozenge Mucous Membrane PRN  
 hydrALAZINE (APRESOLINE) 20 mg/mL injection 10 mg  10 mg IntraVENous Q6H PRN  
 sodium chloride (NS) flush 5-40 mL  5-40 mL IntraVENous PRN  
  acetaminophen (TYLENOL) tablet 650 mg  650 mg Oral Q4H PRN  
 ondansetron (ZOFRAN) injection 4 mg  4 mg IntraVENous Q4H PRN  
 carvedilol (COREG) tablet 6.25 mg  6.25 mg Oral BID WITH MEALS I have independently examined the patient and have reviewed the chart. I agree with the above plan. UGI looks good, there is no leak and the stomach empties. She can start on clears today and continue TPN for now. Maybe we will slowly advance her diet.  
 
Jane Ivy MD

## 2019-09-30 NOTE — PROGRESS NOTES
Bedside shift change report given to Cristhian Buckley (oncoming nurse) by Jennifer Stone (offgoing nurse). Report included the following information SBAR, Kardex, Intake/Output and Recent Results.

## 2019-09-30 NOTE — ROUTINE PROCESS
Bedside shift change report given to Rajeev (oncoming nurse) by Coco Alvarado (offgoing nurse). Report included the following information SBAR, Kardex, MAR and Recent Results.

## 2019-09-30 NOTE — PROGRESS NOTES
Problem: Nutrition Deficit Goal: *Optimize nutritional status Outcome: Progressing Towards Goal

## 2019-09-30 NOTE — PROGRESS NOTES
Transition of Care Plan 
  
1. Disposition TBD: to be determined based on MD/Care team's recommendation 2. Transport: TBD; family as applicable 3. Continue Medical Care 4. Follow up with PCP/Specialist  
 
Rosette Alvarado Clinical  [phone]

## 2019-10-01 NOTE — PROGRESS NOTES
Lab called at this time, PTT value given. Per the heparin infusion chart, PTT of 93.5, hold for one hour then resume at 14 units/kg/hr. Repeat PTT at around 1130am today.

## 2019-10-01 NOTE — PROGRESS NOTES
Orders received, chart reviewed and patient evaluated by physical therapy. Pending progression with skilled acute physical therapy, recommend: 
Therapy up to 5 days/week in SNF setting Recommend with nursing patient to complete as able in order to maintain strength, endurance and independence: OOB to chair daily. Thank you for your assistance. Full evaluation to follow.

## 2019-10-01 NOTE — PROGRESS NOTES
Ms. Childress Minors feels tired today. She has also been experiencing nausea/vomitting. Tm 98.5 HR: 109 BP: 136/79 Resp Rate: 20 95% sat on room air. Intake/Output Summary (Last 24 hours) at 10/1/2019 6114 Last data filed at 9/30/2019 1414 Gross per 24 hour Intake  Output 225 ml Net -225 ml Exam: Cor: RRR. Lungs: Bilateral breath sounds. Clear to auscultation. Abd: Soft. Non distended. Tender. No guarding or rebound. Dressing is dry. Labs:  
Recent Results (from the past 12 hour(s)) GLUCOSE, POC Collection Time: 09/30/19 11:51 PM  
Result Value Ref Range Glucose (POC) 179 (H) 65 - 100 mg/dL Performed by Eileen Bolivar   
PTT Collection Time: 10/01/19  1:51 AM  
Result Value Ref Range aPTT 105.5 (HH) 22.1 - 32.0 sec  
 aPTT, therapeutic range     58.0 - 60.7 SECS  
METABOLIC PANEL, BASIC Collection Time: 10/01/19  1:51 AM  
Result Value Ref Range Sodium 135 (L) 136 - 145 mmol/L Potassium 4.3 3.5 - 5.1 mmol/L Chloride 106 97 - 108 mmol/L  
 CO2 21 21 - 32 mmol/L Anion gap 8 5 - 15 mmol/L Glucose 150 (H) 65 - 100 mg/dL BUN 48 (H) 6 - 20 MG/DL Creatinine 1.13 (H) 0.55 - 1.02 MG/DL  
 BUN/Creatinine ratio 42 (H) 12 - 20 GFR est AA 57 (L) >60 ml/min/1.73m2 GFR est non-AA 47 (L) >60 ml/min/1.73m2 Calcium 8.3 (L) 8.5 - 10.1 MG/DL  
CBC W/O DIFF Collection Time: 10/01/19  1:51 AM  
Result Value Ref Range WBC 11.1 (H) 3.6 - 11.0 K/uL  
 RBC 3.06 (L) 3.80 - 5.20 M/uL HGB 9.4 (L) 11.5 - 16.0 g/dL HCT 29.7 (L) 35.0 - 47.0 % MCV 97.1 80.0 - 99.0 FL  
 MCH 30.7 26.0 - 34.0 PG  
 MCHC 31.6 30.0 - 36.5 g/dL  
 RDW 14.1 11.5 - 14.5 % PLATELET 668 454 - 287 K/uL MPV 12.0 8.9 - 12.9 FL  
 NRBC 0.0 0  WBC ABSOLUTE NRBC 0.00 0.00 - 0.01 K/uL PTT Collection Time: 10/01/19  3:33 AM  
Result Value Ref Range  aPTT 93.5 (HH) 22.1 - 32.0 sec  
 aPTT, therapeutic range     58.0 - 77.0 SECS  
 GLUCOSE, POC Collection Time: 10/01/19  6:03 AM  
Result Value Ref Range Glucose (POC) 183 (H) 65 - 100 mg/dL Performed by Claudio Perales PTT Collection Time: 10/01/19  6:34 AM  
Result Value Ref Range aPTT 44.7 (H) 22.1 - 32.0 sec  
 aPTT, therapeutic range     58.0 - 77.0 SECS Reviewed operative findings with Ms. Chaconon Course today. Will leave NG out for now. NPO except ice chips. Continue cyclic TPN. Needs to be OOB - Will ask PT to see. Anti-emetics and pain medication as needed. Plans per Dr. Maral Spaulding. Following.

## 2019-10-01 NOTE — ROUTINE PROCESS
Bedside shift change report given to Rajeev (oncoming nurse) by Annika Bhardwaj (offgoing nurse). Report included the following information SBAR, Kardex, MAR and Recent Results.

## 2019-10-01 NOTE — PROGRESS NOTES
Patient not tolerating clear liquid diet. Patient and evening RN reported 2 counts of emesis overnight.

## 2019-10-01 NOTE — PROGRESS NOTES
Problem: Mobility Impaired (Adult and Pediatric) Goal: *Acute Goals and Plan of Care (Insert Text) Description FUNCTIONAL STATUS PRIOR TO ADMISSION: Patient was independent and active without use of DME, driving. HOME SUPPORT PRIOR TO ADMISSION: The patient lived alone, required no local support. Physical Therapy Goals Initiated 10/1/2019 1. Patient will move from supine to sit and sit to supine , scoot up and down and roll side to side in bed with modified independence within 7 day(s). 2.  Patient will transfer from bed to chair and chair to bed with supervision/set-up using the least restrictive device within 7 day(s). 3.  Patient will perform sit to stand with supervision/set-up within 7 day(s). 4.  Patient will ambulate with supervision/set-up for 50 feet with the least restrictive device within 7 day(s). Outcome: Not Met PHYSICAL THERAPY EVALUATION Patient: Lorri Manriquez (70 y.o. female) Date: 10/1/2019 Primary Diagnosis: Acute pancreatitis [K85.90] Procedure(s) (LRB): 
GASTROJEJUNOSTOMY (N/A) 4 Days Post-Op Precautions: fall ASSESSMENT Based on the objective data described below, the patient presents with generalized weakness, decreased activity tolerance, and increased sob with min activity limiting functional mobility. Gait distance was limited to 15 ft with a rolling walker. Plan was for patient to remain sitting in a chair after therapy intervention. After 10 mins sitting upright, patient continued to be SOB, heart rate elevated, and weak. Assisted patient back to bed. Patient able to recover to baseline supine. Current Level of Function Impacting Discharge (mobility/balance): Assistance needed for transfers and ambulation; poor tolerance to activity Functional Outcome Measure: The patient scored 18/28 on the Tinetti outcome measure which is indicative of high fall risk. Other factors to consider for discharge: Lives alone;  Will need to negotiate a full flight of steps to access her bedroom and full bathroom. There is a half bath on the first floor. Patient will benefit from skilled therapy intervention to address the above noted impairments. PLAN : 
Recommendations and Planned Interventions: bed mobility training, transfer training, gait training, therapeutic exercises, neuromuscular re-education, patient and family training/education and therapeutic activities Frequency/Duration: Patient will be followed by physical therapy:  5 times a week to address goals. Recommendation for discharge: (in order for the patient to meet his/her long term goals) Therapy up to 5 days/week in SNF setting This discharge recommendation: 
Has not yet been discussed the attending provider and/or case management Equipment recommendations for successful discharge (if) home: to be determined by rehab facility SUBJECTIVE:  
Patient stated I'm tired.  OBJECTIVE DATA SUMMARY:  
HISTORY:   
Past Medical History:  
Diagnosis Date  Coronary artery disease of native artery of native heart with stable angina pectoris ([facility] [address] 2/28/2019  Cough due to ACE inhibitor  Hematoma of rectus sheath 2/28/2019  IBS (irritable bowel syndrome) IBS  NSTEMI (non-ST elevated myocardial infarction) ([facility] [address] 2/28/2019  Pancreatitis  Pure hypercholesterolemia 2/28/2019  Systolic CHF, chronic ([facility] [address] 2/28/2019 Past Surgical History:  
Procedure Laterality Date  COLONOSCOPY Left 11/1/2017 COLONOSCOPY performed by Kwasi Ardon MD at [address]  HX CHOLECYSTECTOMY  HX GI    
 surgery for hiatal hernia  HX ORTHOPAEDIC    
 DJD, doing PT weekly through [address]  IR OCCL Sadia Romero W SI  1/30/2019 Home Situation Home Environment: Private residence(town house) # Steps to Enter: 1 Rails to Enter: Yes One/Two Story Residence: Two story # of Interior Steps: 9 Living Alone:  Yes 
 Support Systems: Family member(s), Friends \ neighbors (daughter lives in [place]; son lives in the area) Patient Expects to be Discharged to[de-identified] Unknown Current DME Used/Available at Home: Junious Locket, rolling(shower bench) Tub or Shower Type: Shower EXAMINATION/PRESENTATION/DECISION MAKING:  
Critical Behavior: 
Neurologic State: Alert Orientation Level: Disoriented X4 Cognition: Follows commands Hearing: Auditory Auditory Impairment: None Range Of Motion: 
AROM: Within functional limits Strength:   
Strength: Generally decreased, functional 
  
  
  
  
  
  
Tone & Sensation:  
Tone: Normal 
  
  
  
  
Sensation: Intact(light touch BLE) Coordination: 
Coordination: Within functional limits Vision:  
Diplopia: No 
Acuity: Able to read clock/calendar on wall without difficulty Functional Mobility: 
Bed Mobility: 
Rolling: Supervision Supine to Sit: Minimum assistance;Assist x1 Sit to Supine: Minimum assistance;Assist x1 Transfers: 
Sit to Stand: Minimum assistance;Assist x1;Adaptive equipment Stand to Sit: Contact guard assistance;Assist x1;Adaptive equipment Balance:  
Sitting: Intact; Without support Standing: Intact; With support(walker) Ambulation/Gait Training: 
  
  
 Ambulation/Gait Training: 
Distance (ft): 15 Feet (ft) (distance limited by fatigue, sob, elevated heart rate) Assistive Device: Walker, rolling Ambulation - Level of Assistance: Contact guard assistance;Assist x1 Gait Description (WDL): Exceptions to [facility] Gait Abnormalities: Decreased step clearance Speed/Maritza: Slow;Pace decreased (<100 feet/min) Step Length: Left shortened;Right shortened Gait with the walker was steady Interventions: Verbal cues; Tactile cues Functional Measure: 
Tinetti test: 
 
Sitting Balance: 1 Arises: 0 Attempts to Rise: 1 Immediate Standing Balance: 2 Standing Balance: 1 Nudged: 2 Eyes Closed: 0(inferred) Turn 360 Degrees - Continuous/Discontinuous: 1 Turn 360 Degrees - Steady/Unsteady: 1(with walker) Sitting Down: 1 Balance Score: 10 Balance total score Indication of Gait: 1 
R Step Length/Height: 1 L Step Length/Height: 1 
R Foot Clearance: 1 L Foot Clearance: 1 Step Symmetry: 1 Step Continuity: 1 Path: 1 Trunk: 0 Walking Time: 0 Gait Score: 8 Gait total score Total Score: 18/28 Overall total score Tinetti Tool Score Risk of Falls 
<19 = High Fall Risk 19-24 = Moderate Fall Risk 25-28 = Low Fall Risk Tinetti ME. Performance-Oriented Assessment of Mobility Problems in Elderly Patients. Lawrence 66; [id number]. (Scoring Description: PT Bulletin Feb. 10, 1993) Older adults: Duy Medina et al, 2009; n = [address] [employer] elderly evaluated with ABC, XIOMY, ADL, and IADL) · Mean XIOMY score for males aged 69-68 years = 26.21(3.40) · Mean XIOMY score for females age 69-68 years = 25.16(4.30) · Mean XIOMY score for males over 80 years = 23.29(6.02) · Mean XIOMY score for females over 80 years = 17.20(8.32) Physical Therapy Evaluation Charge Determination History Examination Presentation Decision-Making MEDIUM  Complexity : 1-2 comorbidities / personal factors will impact the outcome/ POC  LOW Complexity : 1-2 Standardized tests and measures addressing body structure, function, activity limitation and / or participation in recreation  MEDIUM Complexity : Evolving with changing characteristics  LOW Complexity : FOTO score of  Based on the above components, the patient evaluation is determined to be of the following complexity level: LOW Pain Rating: 
Voiced no pain Activity Tolerance:  
Poor Please refer to the flowsheet for vital signs taken during this treatment. After treatment patient left in no apparent distress:  
Supine in bed, Call bell within reach, Side rails x 3 and Nsg student in the room COMMUNICATION/EDUCATION:  
 The patients plan of care was discussed with: Registered Nurse. Fall prevention education was provided and the patient/caregiver indicated understanding., Patient/family have participated as able in goal setting and plan of care. and Patient/family agree to work toward stated goals and plan of care. Thank you for this referral. 
Chantelle Franco, PT Time Calculation: 38 mins

## 2019-10-01 NOTE — PROGRESS NOTES
Faculty or Preceptor Review of Student Work 
 
10/1/2019  - Shift times -0700 to 1300 The student documentation of patient care for Bee Hernandez has been reviewed and approved.   
 
Daron Xiao, RN

## 2019-10-01 NOTE — PROGRESS NOTES
General Surgery Daily Progress Note Admit Date: 2019 Post-Operative Day: 4 Days Post-Op from Procedure(s): 
GASTROJEJUNOSTOMY Subjective:  
 
Last 24 hrs: Pt is having n/v - multiple episodes since last night. Not sure about flatus today but did have some yesterday. Objective:  
 
Blood pressure 136/79, pulse (!) 109, temperature 98.4 °F (36.9 °C), resp. rate 20, height 5' 4\" (1.626 m), weight 117 lb 15.1 oz (53.5 kg), SpO2 95 %. Temp (24hrs), Av.1 °F (36.7 °C), Min:97.9 °F (36.6 °C), Max:98.4 °F (36.9 °C) 
 
 
_____________________ Physical Exam:    
Alert and Oriented, x3, looks ill Cardiovascular: tachycardic around 104; no peripheral edema Abdomen: soft, +BS veronique LUQ, quieter in lower quads, surgical drsg intact Assessment:  
Active Problems: 
  Acute pancreatitis (2019) TOÑA (acute kidney injury) ([facility] [address] (2019) Hypokalemia (2019) GI bleed (2019) Coffee ground emesis (2019) Plan:  
 
KUB this am 
Likely NG will have to be re-inserted NPO for now Nausea mgmt Data Review: 
 
Recent Labs 10/01/19 
0151 09/30/19 
0149 09/29/19 
0136 WBC 11.1* 10.7 15.8* HGB 9.4* 9.3* 9.8* HCT 29.7* 29.8* 30.8*  137* 145* Recent Labs 10/01/19 
0151 09/30/19 
0149 09/29/19 
1954 09/29/19 
0136 * 134*  --  134* K 4.3 4.3  --  4.3  106  --  104 CO2 21 24  --  22 * 113*  --  114* BUN 48* 43*  --  40* CREA 1.13* 1.04*  --  1.01  
CA 8.3* 8.5  --  8.5 INR  --   --  1.0  -- No results for input(s): AML, LPSE in the last 72 hours. ______________________ Medications: 
 
Current Facility-Administered Medications Medication Dose Route Frequency  acetaminophen (TYLENOL) suppository 650 mg  650 mg Rectal Q4H PRN  
 0.9% sodium chloride infusion 250 mL  250 mL IntraVENous PRN  
 morphine injection 2 mg  2 mg IntraVENous Q4H PRN  
  lidocaine (LIDODERM) 5 % patch 1 Patch  1 Patch TransDERmal Q24H  
 fat emulsion 20% (LIPOSYN, INTRAlipid) infusion 500 mL  500 mL IntraVENous EVERY MON & TH  
 lidocaine (XYLOCAINE) 2 % viscous solution 15 mL  15 mL Mouth/Throat PRN  phenol throat spray (CHLORASEPTIC) 1 Spray  1 Spray Oral PRN  
 heparin 25,000 units in D5W 250 ml infusion  18-36 Units/kg/hr IntraVENous TITRATE  guaiFENesin ER (MUCINEX) tablet 600 mg  600 mg Oral BID PRN  
 [Held by provider] rosuvastatin (CRESTOR) tablet 10 mg  10 mg Oral QHS  benzocaine-menthol (CEPACOL) lozenge 1 Lozenge  1 Lozenge Mucous Membrane PRN  
 hydrALAZINE (APRESOLINE) 20 mg/mL injection 10 mg  10 mg IntraVENous Q6H PRN  
 sodium chloride (NS) flush 5-40 mL  5-40 mL IntraVENous PRN  
 acetaminophen (TYLENOL) tablet 650 mg  650 mg Oral Q4H PRN  
 ondansetron (ZOFRAN) injection 4 mg  4 mg IntraVENous Q4H PRN  
 carvedilol (COREG) tablet 6.25 mg  6.25 mg Oral BID WITH MEALS Na Alcaraz NP 
10/1/2019

## 2019-10-01 NOTE — PROGRESS NOTES
Problem: Pain Goal: *Control of Pain 
10/1/2019 1844 by Ruben Carlson Outcome: Progressing Towards Goal 
10/1/2019 1835 by Ruben Carlson Outcome: Progressing Towards Goal 
  
Problem: Patient Education: Go to Patient Education Activity Goal: Patient/Family Education 10/1/2019 1844 by Ruben Carlson Outcome: Progressing Towards Goal 
10/1/2019 1835 by Ruben Carlson Outcome: Progressing Towards Goal

## 2019-10-01 NOTE — PROGRESS NOTES
Hospitalist Progress Note Jailene Floyd MD 
Answering service: [phone] OR [phone] from in house phone Date of Service:  10/1/2019 NAME:  Gudelia Espinosa :  1940 MRN:  [id number] Admission Summary:  
 
44-year-old female with a past medical history significant for coronary artery disease, non-ST-elevation myocardial infarction, dyslipidemia, chronic systolic congestive heart failure, irritable bowel syndrome and pancreatitis, was brought to the emergency room from home via private vehicle accompanied by her sister for complaints of an approximately 3-day history of progressively worsening nausea and coffee-grounds vomitus.  The patient also complained of some intermittent mild abdominal pain with some radiation to the upper back.  The patient described the pain as being dull achy in nature, at times about 6-7 on 10 in intensity.  The patient denied associated headaches, dizziness, visual deficits, chest pains, palpitations, shortness of breath, cough, fevers, chills, hematuria, bladder or bowel irregularities. Interval history / Subjective:  
 c/o nausea and vomiting,  
 
Assessment & Plan:  
 
 
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 Normal triglyceride level. EGD initially -  Done aborted due to inability to pass scope down to Duodenum CT abd/Pelvis  GOO and possible stricture along duodenum which is causing obstruction of  
NG tube for decompression CEA- normal 
Ca19-9  - 3596 NPO  
 - Re Endoscopy shows that the duodenum stricture is adenocarcinoma MRI shows pancreatic mass with liver mets /oncology has seen patient and they offered chemotherapy to patient Surgery consulted Dr Keating Necessary ~gastrojejunostomy to relieve her duodenal obstruction\" Continue NPO status Continue TPN Nausea,vomiting 
-KUB with non specific gas pattern -NG tube for now per surgery -c/w NPO execpt ice chips 
-prn antiemetics 
-pain control IVC clot  
on heparin gtt Change to lovenox for outpatient / per Dr Philipp Scott  
  
H/h stable -  
normochromic normocytic anemia. C/W PPI Watch for bleeding on heparin gtt  
  
4) Hypokalemia Resolved K today 4.3 Am labs   
5) TOÑA probably due to matthieuley Hydronephrosis noted on Ct scan Cr improving 6) CAD. H/O NSTEMI. No issues at this time   
7) Dyslipidemia. Monitor Fluid Status   
8) UTI ruled out Urine cultures negative Off antibiotics  
  
MalNutrition Body mass index is 20.25 kg/m². Due to cancer and GOO 
picc line Central TPN , renewed  
  
GI and DVT (SCDs due to probable GI bleed). 
  
DNR/DNI with a guarded prognosis. D/W patient and family. 
  
patient's Son and daughter Victoriano Samson ([phone]) Code status: DNR 
DVT prophylaxis: heparin Care Plan discussed with: Patient/Family and Nurse Disposition: TBD Hospital Problems  Date Reviewed: 9/14/2019 Codes Class Noted POA Acute pancreatitis ICD-10-CM: K85.90 ICD-9-CM: 422.7  9/14/2019 Yes TOÑA (acute kidney injury) ([facility] [address] ICD-10-CM: N17.9 ICD-9-CM: 584.9  9/14/2019 Yes Hypokalemia ICD-10-CM: E87.6 ICD-9-CM: 276.8  9/14/2019 Yes GI bleed ICD-10-CM: K92.2 ICD-9-CM: 578.9  9/14/2019 Yes Coffee ground emesis ICD-10-CM: K92.0 ICD-9-CM: 578.0  9/14/2019 Yes Review of Systems: A comprehensive review of systems was negative. Vital Signs:  
 Last 24hrs VS reviewed since prior progress note. Most recent are: 
Visit Vitals /65 (BP 1 Location: Left arm, BP Patient Position: At rest) Pulse 97 Temp 97.8 °F (36.6 °C) Resp 17 Ht 5' 4\" (1.626 m) Wt 53.5 kg (117 lb 15.1 oz) SpO2 97% BMI 20.25 kg/m² No intake or output data in the 24 hours ending 10/01/19 1157 Physical Examination: Constitutional:  No acute distress, , pleasant , no complaints ENT:  Oral mucous moist, NGT Resp:  CTA bilaterally. No wheezing/rhonchi/rales. CV:  Regular rhythm, normal rate, no murmurs, gallops, rubs GI:  Soft, non distended, mildly tender. normoactive bowel sounds,dressing dry Musculoskeletal:  No edema, warm, 2+ pulses throughout Neurologic:  Moves all extremities. AAOx3, CN II-XII reviewed Psych:  Good insight, Not anxious nor agitated. Data Review:  
 I personally reviewed  Image and labs Labs:  
 
Recent Labs 10/01/19 
0151 09/30/19 
0149 WBC 11.1* 10.7 HGB 9.4* 9.3* HCT 29.7* 29.8*  
 137* Recent Labs 10/01/19 
0151 09/30/19 
0149 09/29/19 
0136 * 134* 134* K 4.3 4.3 4.3  106 104 CO2 21 24 22 BUN 48* 43* 40* CREA 1.13* 1.04* 1.01  
* 113* 114* CA 8.3* 8.5 8.5 No results for input(s): SGOT, GPT, ALT, AP, TBIL, TBILI, TP, ALB, GLOB, GGT, AML, LPSE in the last 72 hours. No lab exists for component: AMYP, HLPSE Recent Labs 10/01/19 
1232 10/01/19 
9088 10/01/19 
0333  09/29/19 
1954 INR  --   --   --   --  1.0 PTP  --   --   --   --  10.6 APTT 54.1* 44.7* 93.5*   < > 68.0*  
 < > = values in this interval not displayed. No results for input(s): FE, TIBC, PSAT, FERR in the last 72 hours. No results found for: FOL, RBCF No results for input(s): PH, PCO2, PO2 in the last 72 hours. No results for input(s): CPK, CKNDX, TROIQ in the last 72 hours. No lab exists for component: CPKMB Lab Results Component Value Date/Time Cholesterol, total 85 09/15/2019 04:37 AM  
 HDL Cholesterol 43 09/15/2019 04:37 AM  
 LDL, calculated 29 09/15/2019 04:37 AM  
 Triglyceride 65 09/15/2019 04:37 AM  
 CHOL/HDL Ratio 2.0 09/15/2019 04:37 AM  
 
Lab Results Component Value Date/Time  Glucose (POC) 139 (H) 10/01/2019 11:48 AM  
 Glucose (POC) 183 (H) 10/01/2019 06:03 AM  
 Glucose (POC) 179 (H) 09/30/2019 11:51 PM  
 Glucose (POC) 117 (H) 09/30/2019 12:03 PM  
 Glucose (POC) 143 (H) 09/30/2019 06:14 AM  
 
Lab Results Component Value Date/Time Color YELLOW/STRAW 09/15/2019 12:33 AM  
 Appearance CLOUDY (A) 09/15/2019 12:33 AM  
 Specific gravity 1.019 09/15/2019 12:33 AM  
 pH (UA) 6.0 09/15/2019 12:33 AM  
 Protein 100 (A) 09/15/2019 12:33 AM  
 Glucose NEGATIVE  09/15/2019 12:33 AM  
 Ketone NEGATIVE  09/15/2019 12:33 AM  
 Bilirubin NEGATIVE  09/15/2019 12:33 AM  
 Urobilinogen 0.2 09/15/2019 12:33 AM  
 Nitrites NEGATIVE  09/15/2019 12:33 AM  
 Leukocyte Esterase SMALL (A) 09/15/2019 12:33 AM  
 Epithelial cells MODERATE (A) 09/15/2019 12:33 AM  
 Bacteria 2+ (A) 09/15/2019 12:33 AM  
 WBC 10-20 09/15/2019 12:33 AM  
 RBC 0-5 09/15/2019 12:33 AM  
 
 
 
Medications Reviewed:  
 
Current Facility-Administered Medications Medication Dose Route Frequency  metoprolol (LOPRESSOR) injection 5 mg  5 mg IntraVENous Q6H  
 TPN ADULT - CENTRAL   IntraVENous TITRATE  acetaminophen (TYLENOL) suppository 650 mg  650 mg Rectal Q4H PRN  
 0.9% sodium chloride infusion 250 mL  250 mL IntraVENous PRN  
 morphine injection 2 mg  2 mg IntraVENous Q4H PRN  
 lidocaine (LIDODERM) 5 % patch 1 Patch  1 Patch TransDERmal Q24H  
 fat emulsion 20% (LIPOSYN, INTRAlipid) infusion 500 mL  500 mL IntraVENous EVERY MON & TH  
 lidocaine (XYLOCAINE) 2 % viscous solution 15 mL  15 mL Mouth/Throat PRN  phenol throat spray (CHLORASEPTIC) 1 Spray  1 Spray Oral PRN  
 heparin 25,000 units in D5W 250 ml infusion  18-36 Units/kg/hr IntraVENous TITRATE  guaiFENesin ER (MUCINEX) tablet 600 mg  600 mg Oral BID PRN  
 [Held by provider] rosuvastatin (CRESTOR) tablet 10 mg  10 mg Oral QHS  benzocaine-menthol (CEPACOL) lozenge 1 Lozenge  1 Lozenge Mucous Membrane PRN  
 hydrALAZINE (APRESOLINE) 20 mg/mL injection 10 mg  10 mg IntraVENous Q6H PRN  
  sodium chloride (NS) flush 5-40 mL  5-40 mL IntraVENous PRN  
 acetaminophen (TYLENOL) tablet 650 mg  650 mg Oral Q4H PRN  
 ondansetron (ZOFRAN) injection 4 mg  4 mg IntraVENous Q4H PRN  
 [Held by provider] carvedilol (COREG) tablet 6.25 mg  6.25 mg Oral BID WITH MEALS  
 
______________________________________________________________________ EXPECTED LENGTH OF STAY: 4d 19h ACTUAL LENGTH OF STAY:          17 
 
            
Valeria Whitman MD

## 2019-10-02 NOTE — PROGRESS NOTES
[facility] at [place] [address] [address], [id number] W: [phone]  F: [phone] Reason for Visit:  
Michelle Wilder is a 70 y.o. female who is seen in consultation at the request of Dr. Manuel Tafoya for evaluation of stage IV pancreatic cancer. Treatment History: · Biopsy with adenocarcinoma of the pancreas metastatic to liver History of Present Illness: The patient is a very pleasant 74-year-old be younger than her stated age she is been doing fairly well she started having some GI issues in January of this year. But over the last month or so she is been having more trouble and read more recently has been having trouble with nausea and vomiting. Because of the persistent or GI issues with the nausea and vomiting she is now admitted and was found to have a pancreatic cancer metastatic to her liver. The patient has been able to maintain her weight. The patient has lost a couple pounds since her last admission. According to the family the patient initially had lost weight when she was in the hospital then a little bit afterwards and stabilized her weight pattern and it is continued in a stable pattern The patient denied trouble with urination or with breathing she denied any ENT symptomatology. Has had a little bit of trouble with constipation 9/23/19 The patient seems to be doing fairly well this morning. She still has her NG tube in. Hopefully she will be getting a stent placed soon so she can start back on oral nutrition. Then the plan will be to get her port inserted and then to start her on gemcitabine and Abraxane. I anticipate that she will handle those drugs quite well and hopefully we can get her some quality time. 9/24/2019 Patient still has NG tube in place and the plan is for a stent to be placed.   She is getting hyperalimentation so she is getting the nutrition she would needs. Patient did vomit and does not like to vomit so she is hoping that the stent will be placed soon. 9/25/2019 Patient is doing well this morning still has her NG tube in place. She has had a little bit of nausea. She continues to get her hyperalimentation. Hopefully will get her bypass or stent placed soon so that we can get her treatment started. She will be getting a venous access device. She remains afebrile. Her vital signs are stable. Continue to encourage her to be up and in a chair. Plan on starting chemotherapy as soon as she is taking nutrition and has her Port-A-Cath placed. 9/27/2019 Patient seems to be doing well this morning NG tube is in place she is planning on having her surgery today. She still getting hyperalimentation. Patient is in good spirits. Hopefully she will have her surgery today and be able to get rid of her NG tube in a couple days. Then she will be able to start on nutrition. Then will be able to get her chemotherapy started. 9/28/2019 Patient is doing well postop she remains afebrile her vital signs look good. According to the patient surgery went well. She still has her NG tube in place. Her bowel sounds are quiet. Hopefully her bowels will start working fairly soon. She is in good spirits and her pain is under good control. 9/30/2019 The patient continues to improve every day. She looks much brighter. She still has her NG tube in but apparently that is coming out in the next hour or so hopefully that can get rid of her Jyoce catheter as well. The patient is going to try to posterior about the move is much she can. Hopefully should be able be discharged home in a couple days. She is passing gas at this point which is showing her got is working again we will plan on seeing her in the office in about 1 week as we get raised to start her on chemotherapy she will need a venous access device. 10/2/2019 The patient is still having some issues with nausea she is not able to keep things down her NG tube is out she is just weak and is not able to get the nutrition and she does not understand why she is still having trouble with nausea and vomiting. She will be asking the gastroenterologist and surgeons about that this morning. The patient's quality of life right now is not very good and she understands that she wants to have a better quality of life. She is frustrated with not being able to eat. She is not having any other problems. Just weakness. Past Medical History:  
Diagnosis Date  Coronary artery disease of native artery of native heart with stable angina pectoris ([facility] [address] 2/28/2019  Cough due to ACE inhibitor  Hematoma of rectus sheath 2/28/2019  IBS (irritable bowel syndrome) IBS  NSTEMI (non-ST elevated myocardial infarction) ([facility] [address] 2/28/2019  Pancreatitis  Pure hypercholesterolemia 2/28/2019  Systolic CHF, chronic ([facility] [address] 2/28/2019 Past Surgical History:  
Procedure Laterality Date  COLONOSCOPY Left 11/1/2017 COLONOSCOPY performed by Uday Boyle MD at [address]  HX CHOLECYSTECTOMY  HX GI    
 surgery for hiatal hernia  HX ORTHOPAEDIC    
 DJD, doing PT weekly through [facility]  IR OCCL Sunil Has [facility]  1/30/2019 Social History Tobacco Use  Smoking status: Former Smoker  Smokeless tobacco: Never Used  Tobacco comment: quit smoking cigarettes 6 yrs ago Substance Use Topics  Alcohol use: Yes Comment: very occasional  
  
Family History Problem Relation Age of Onset  Dementia Mother   
     alzheimers  Cancer Sister   
     gallbladder  Cancer Brother   
     pancreatic  Breast Cancer Paternal Grandmother  Dementia Sister   
     alzheimers  Heart Surgery Brother   
     bypass Current Facility-Administered Medications Medication Dose Route Frequency  TPN ADULT - CENTRAL   IntraVENous TITRATE  lactated Ringers infusion  50 mL/hr IntraVENous CONTINUOUS  
 ondansetron (ZOFRAN) injection 4 mg  4 mg IntraVENous Q4H  
 metoprolol (LOPRESSOR) injection 5 mg  5 mg IntraVENous Q6H  
 acetaminophen (TYLENOL) suppository 650 mg  650 mg Rectal Q4H PRN  
 0.9% sodium chloride infusion 250 mL  250 mL IntraVENous PRN  
 morphine injection 2 mg  2 mg IntraVENous Q4H PRN  
 lidocaine (LIDODERM) 5 % patch 1 Patch  1 Patch TransDERmal Q24H  
 fat emulsion 20% (LIPOSYN, INTRAlipid) infusion 500 mL  500 mL IntraVENous EVERY MON & TH  
 lidocaine (XYLOCAINE) 2 % viscous solution 15 mL  15 mL Mouth/Throat PRN  phenol throat spray (CHLORASEPTIC) 1 Spray  1 Spray Oral PRN  
 heparin 25,000 units in D5W 250 ml infusion  18-36 Units/kg/hr IntraVENous TITRATE  guaiFENesin ER (MUCINEX) tablet 600 mg  600 mg Oral BID PRN  
 [Held by provider] rosuvastatin (CRESTOR) tablet 10 mg  10 mg Oral QHS  benzocaine-menthol (CEPACOL) lozenge 1 Lozenge  1 Lozenge Mucous Membrane PRN  
 hydrALAZINE (APRESOLINE) 20 mg/mL injection 10 mg  10 mg IntraVENous Q6H PRN  
 sodium chloride (NS) flush 5-40 mL  5-40 mL IntraVENous PRN  
 acetaminophen (TYLENOL) tablet 650 mg  650 mg Oral Q4H PRN  
 [Held by provider] carvedilol (COREG) tablet 6.25 mg  6.25 mg Oral BID WITH MEALS Allergies Allergen Reactions  Penicillins Hives Review of Systems: A complete review of systems was obtained, negative except as described above. Physical Exam:  
 
Visit Vitals /68 (BP 1 Location: Left arm, BP Patient Position: At rest) Pulse (!) 105 Temp 98 °F (36.7 °C) Resp 16 Ht 5' 4\" (1.626 m) Wt 115 lb 11.9 oz (52.5 kg) SpO2 98% BMI 19.87 kg/m² ECOG PS: 1 General: No distress Eyes: PERRLA, anicteric sclerae HENT: Atraumatic, OP clear Neck: Supple Lymphatic: No cervical, or supraclavicular adenopathy Respiratory: CTAB, normal respiratory effort CV: Heart rate is been elevated at times with minimal activity GI: Status post recent surgery MS: Digits without clubbing or cyanosis. Psych: Alert, oriented, appropriate affect, normal judgment/insight Results:  
 
Lab Results Component Value Date/Time WBC 11.1 (H) 10/01/2019 01:51 AM  
 HGB 9.4 (L) 10/01/2019 01:51 AM  
 HCT 29.7 (L) 10/01/2019 01:51 AM  
 PLATELET 391 20/92/6408 01:51 AM  
 MCV 97.1 10/01/2019 01:51 AM  
 ABS. NEUTROPHILS 11.7 (H) 09/26/2019 12:31 AM  
 
Lab Results Component Value Date/Time Sodium 138 10/02/2019 12:50 AM  
 Potassium 4.5 10/02/2019 12:50 AM  
 Chloride 108 10/02/2019 12:50 AM  
 CO2 21 10/02/2019 12:50 AM  
 Glucose 101 (H) 10/02/2019 12:50 AM  
 BUN 66 (H) 10/02/2019 12:50 AM  
 Creatinine 1.28 (H) 10/02/2019 12:50 AM  
 GFR est AA 49 (L) 10/02/2019 12:50 AM  
 GFR est non-AA 40 (L) 10/02/2019 12:50 AM  
 Calcium 8.9 10/02/2019 12:50 AM  
 Glucose (POC) 185 (H) 10/02/2019 06:38 AM  
 
Lab Results Component Value Date/Time Bilirubin, total 0.4 09/24/2019 03:06 AM  
 ALT (SGPT) 44 09/24/2019 03:06 AM  
 AST (SGOT) 23 09/24/2019 03:06 AM  
 Alk. phosphatase 135 (H) 09/24/2019 03:06 AM  
 Protein, total 6.8 09/24/2019 03:06 AM  
 Albumin 3.0 (L) 09/24/2019 03:06 AM  
 Globulin 3.8 09/24/2019 03:06 AM  
 
 
Records reviewed and summarized above. Pathology report(s) reviewed 9/17/19 FINAL PATHOLOGIC DIAGNOSIS 1. Small bowel, duodenal stricture, biopsy:  
Adenocarcinoma Radiology report(s) reviewed above. 9/16/19 CT abdomen pelvis IMPRESSION: 
  
1. Marked gastric and duodenal distention by water attenuation fluid, with 
abrupt transition at the junction of second and third duodenum where a 
duodenal/pancreatic mass or stricture/scar is suggested, which is confluent with 
the pancreatic head and uncinate process. . This is consistent with endoscopic 
findings earlier same day. NG tube placement is advised. 2. Right hydronephrosis, likely due to extrinsic compression by the markedly 
distended stomach and duodenum. 3. Prominence of extrahepatic bile ducts, likely due to extrinsic impression on 
the ampulla of water by the markedly distended stomach and duodenum. 4. Pancreas extrinsically compressed by markedly distended stomach, making 
visualization of the previously described cystic mass is difficult. The 
pancreatic head lies at the site of duodenal obstruction, but is difficult to 
further assess under the circumstances. 5. Other incidental and postoperative findings MRI abdomen and 9/19/19 IMPRESSION: 
  
1. Interval development of diffuse liver metastatic disease. 
  
2.  Large mass centered on the pancreatic head and proximal horizontal portion 
of the duodenum. . There is narrowing of the duodenal lumen. Possibilities 
include a pancreatic or duodenal primary. The mass appears to be centered on the 
pancreatic head and there is common bile duct dilatation however the pancreatic 
duct is normal in caliber.  
3.  Left lower lobe airspace disease may represent atelectasis or pneumonia. 
  
4.  Possible nonocclusive thrombus in the intrahepatic IVC. 
  
5. The SMV is occluded. Assessment:  
1) adenocarcinoma of the pancreas metastatic to liver with the plan being for chemotherapy once she is able to tolerate nutrition  3596 9/17/19 CEA 16.8 9/17/19 
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  
 
· 1. Patient will need a venous access device for therapy. ·  
· 2. We will go ahead and treat her with heparin and low molecular weight heparin as an outpatient for her blood clot. ·  
· 3. We will plan on doing genetic testing due to her family history of pancreatic cancer. ·  
· 4. We will plan on getting genome wide sequencing of her tumor for therapeutic options.  
·  
 · 5.  We will plan on treating her with gemcitabine and nab paclitaxel and hopefully start treatment within the next week · 6 patient continues to have problems with nausea. I appreciate the opportunity to participate in Ms. [facility].  
 
Signed By: Yaquelin Mai MD

## 2019-10-02 NOTE — PROGRESS NOTES
Hospitalist Progress Note Shweta Shah MD 
Answering service: [phone] OR [phone] from in house phone Date of Service:  10/2/2019 NAME:  Jameel Wang :  1940 MRN:  [id number] Admission Summary:  
 
61-year-old female with PMH CAD,HLD, chronic systolic CHF, irritable bowel syndrome and pancreatitis, s/p cholecystectomy ,presented to ER with c/o  progressively worsening nausea and coffee-grounds vomitus. x 3 days associated with  some intermittent mild abdominal pain with some radiation to the upper back. Interval history / Subjective:  
 vomitted this am x 1 ongoing nausea,denies cough chest pain ,no fever Npo ,no recurrent episode of vomiting Assessment & Plan:  
 
 
Acute on chronic recurrent Pancreatitis Adenocarcinoma of pancreas ,metastasis to Liver  
-S/p palliative gastrojejunostomy 
-EGD initially -  Done aborted due to inability to pass scope down to Duodenum 
-CT abd/Pelvis ,GOO and possible stricture along duodenum  causing obstruction 
-NG tube for decompression -CEA- normal 
-Ca19-9  - 3596 
-NPO  
- -EGD showed duodenal obstruction - extrinsic compression causing duodenal obstruction,hiatal hernia and esophagitis. -MRI shows pancreatic mass with liver mets 
-pt continues to have nausea and vomiting. --Oncology eval, ~plan for chemo once pt is stable, will appreciate oncology input     r     regarding prognosis for long term care  
-surgery following 
-Continue NPO status 
-On  TPN Nausea,vomiting 
-KUB with non specific gas pattern 
-NG tube placed -c/w NPO execpt ice chips 
-risks for aspiration, cxr today with no acute process 
-aspiration precautions 
-prn antiemetics 
-IVF  
-pain control Thrombus ,intrahepatic IVC clot  
-on heparin gtt  
-Change to lovenox for outpatient / per Dr Mikey York  
  
H/h stable -  
-normochromic normocytic anemia.   
-C/W PPI 
 -Watch for bleeding on heparin gtt  
  
4) Hypokalemia  
-Resolved K today 4.3  
-monitor   
5) TOÑA d/t dehydration 
- CT abb/p with Hydronephrosis  
-Cr. rising, 1.2  
-ivf hydration 
-monitor 6) CAD. -h/O NSTEMI. No issues at this time 
 
  
MalNutrition Body mass index is 19.87 kg/m². Due to cancer and GOO 
picc line Central TPN , renewed  
  
  
DNR/DNI with a guarded prognosis. D/W patient and family. Palliative on board  
  
patient's Son and daughter Silva Jack ([phone]) Code status: DNR 
DVT prophylaxis: heparin Care Plan discussed with: Patient/Family and Nurse, son at bedside Disposition: TBD Hospital Problems  Date Reviewed: 9/14/2019 Codes Class Noted POA Acute pancreatitis ICD-10-CM: K85.90 ICD-9-CM: 869.5  9/14/2019 Yes TOÑA (acute kidney injury) ([facility] [address] ICD-10-CM: N17.9 ICD-9-CM: 584.9  9/14/2019 Yes Hypokalemia ICD-10-CM: E87.6 ICD-9-CM: 276.8  9/14/2019 Yes GI bleed ICD-10-CM: K92.2 ICD-9-CM: 578.9  9/14/2019 Yes Coffee ground emesis ICD-10-CM: K92.0 ICD-9-CM: 578.0  9/14/2019 Yes Review of Systems: A comprehensive review of systems was negative. Vital Signs:  
 Last 24hrs VS reviewed since prior progress note. Most recent are: 
Visit Vitals /65 (BP 1 Location: Left arm) Pulse (!) 104 Temp 97.8 °F (36.6 °C) Resp 16 Ht 5' 4\" (1.626 m) Wt 52.5 kg (115 lb 11.9 oz) SpO2 98% BMI 19.87 kg/m² Intake/Output Summary (Last 24 hours) at 10/2/2019 1143 Last data filed at 10/2/2019 2953 Gross per 24 hour Intake  Output 300 ml Net -300 ml Physical Examination:  
 
 
     
Constitutional:  No acute distress, , pleasant , no complaints ENT:  Oral mucous moist, NGT Resp:  CTA bilaterally. No wheezing/rhonchi/rales. CV:  Regular rhythm, normal rate, no murmurs, gallops, rubs GI:  Soft, non distended, mildly tender. dressing dry Musculoskeletal:  No edema, warm, 2+ pulses throughout Neurologic:  Moves all extremities. AAOx3, CN II-XII reviewed Psych:  Good insight, Not anxious nor agitated. Data Review:  
 I personally reviewed  Image and labs Labs:  
 
Recent Labs 10/01/19 
0151 09/30/19 
0149 WBC 11.1* 10.7 HGB 9.4* 9.3* HCT 29.7* 29.8*  
 137* Recent Labs 10/02/19 
0050 10/01/19 
0151 09/30/19 
0149  135* 134* K 4.5 4.3 4.3  106 106 CO2 21 21 24 BUN 66* 48* 43* CREA 1.28* 1.13* 1.04* * 150* 113* CA 8.9 8.3* 8.5 No results for input(s): SGOT, GPT, ALT, AP, TBIL, TBILI, TP, ALB, GLOB, GGT, AML, LPSE in the last 72 hours. No lab exists for component: AMYP, HLPSE Recent Labs 10/02/19 
0050 10/01/19 
1817 10/01/19 
1232  09/29/19 1954 INR  --   --   --   --  1.0 PTP  --   --   --   --  10.6 APTT 61.4* 67.8* 54.1*   < > 68.0*  
 < > = values in this interval not displayed. No results for input(s): FE, TIBC, PSAT, FERR in the last 72 hours. No results found for: FOL, RBCF No results for input(s): PH, PCO2, PO2 in the last 72 hours. No results for input(s): CPK, CKNDX, TROIQ in the last 72 hours. No lab exists for component: CPKMB Lab Results Component Value Date/Time Cholesterol, total 85 09/15/2019 04:37 AM  
 HDL Cholesterol 43 09/15/2019 04:37 AM  
 LDL, calculated 29 09/15/2019 04:37 AM  
 Triglyceride 65 09/15/2019 04:37 AM  
 CHOL/HDL Ratio 2.0 09/15/2019 04:37 AM  
 
Lab Results Component Value Date/Time Glucose (POC) 185 (H) 10/02/2019 06:38 AM  
 Glucose (POC) 139 (H) 10/01/2019 11:48 AM  
 Glucose (POC) 183 (H) 10/01/2019 06:03 AM  
 Glucose (POC) 179 (H) 09/30/2019 11:51 PM  
 Glucose (POC) 117 (H) 09/30/2019 12:03 PM  
 
Lab Results Component Value Date/Time  Color YELLOW/STRAW 09/15/2019 12:33 AM  
 Appearance CLOUDY (A) 09/15/2019 12:33 AM  
 Specific gravity 1.019 09/15/2019 12:33 AM  
 pH (UA) 6.0 09/15/2019 12:33 AM  
 Protein 100 (A) 09/15/2019 12:33 AM  
 Glucose NEGATIVE  09/15/2019 12:33 AM  
 Ketone NEGATIVE  09/15/2019 12:33 AM  
 Bilirubin NEGATIVE  09/15/2019 12:33 AM  
 Urobilinogen 0.2 09/15/2019 12:33 AM  
 Nitrites NEGATIVE  09/15/2019 12:33 AM  
 Leukocyte Esterase SMALL (A) 09/15/2019 12:33 AM  
 Epithelial cells MODERATE (A) 09/15/2019 12:33 AM  
 Bacteria 2+ (A) 09/15/2019 12:33 AM  
 WBC 10-20 09/15/2019 12:33 AM  
 RBC 0-5 09/15/2019 12:33 AM  
 
 
 
Medications Reviewed:  
 
Current Facility-Administered Medications Medication Dose Route Frequency  TPN ADULT - CENTRAL   IntraVENous TITRATE  lactated Ringers infusion  50 mL/hr IntraVENous CONTINUOUS  
 ondansetron (ZOFRAN) injection 4 mg  4 mg IntraVENous Q4H  
 metoprolol (LOPRESSOR) injection 5 mg  5 mg IntraVENous Q6H  
 acetaminophen (TYLENOL) suppository 650 mg  650 mg Rectal Q4H PRN  
 0.9% sodium chloride infusion 250 mL  250 mL IntraVENous PRN  
 morphine injection 2 mg  2 mg IntraVENous Q4H PRN  
 lidocaine (LIDODERM) 5 % patch 1 Patch  1 Patch TransDERmal Q24H  
 fat emulsion 20% (LIPOSYN, INTRAlipid) infusion 500 mL  500 mL IntraVENous EVERY MON & TH  
 lidocaine (XYLOCAINE) 2 % viscous solution 15 mL  15 mL Mouth/Throat PRN  phenol throat spray (CHLORASEPTIC) 1 Spray  1 Spray Oral PRN  
 heparin 25,000 units in D5W 250 ml infusion  18-36 Units/kg/hr IntraVENous TITRATE  guaiFENesin ER (MUCINEX) tablet 600 mg  600 mg Oral BID PRN  
 [Held by provider] rosuvastatin (CRESTOR) tablet 10 mg  10 mg Oral QHS  benzocaine-menthol (CEPACOL) lozenge 1 Lozenge  1 Lozenge Mucous Membrane PRN  
 hydrALAZINE (APRESOLINE) 20 mg/mL injection 10 mg  10 mg IntraVENous Q6H PRN  
 sodium chloride (NS) flush 5-40 mL  5-40 mL IntraVENous PRN  
 acetaminophen (TYLENOL) tablet 650 mg  650 mg Oral Q4H PRN  
 [Held by provider] carvedilol (COREG) tablet 6.25 mg  6.25 mg Oral BID WITH MEALS  
 
 ______________________________________________________________________ EXPECTED LENGTH OF STAY: 4d 19h ACTUAL LENGTH OF STAY:          18 
 
            
Valeria Whitman MD

## 2019-10-02 NOTE — PROGRESS NOTES
CM offered screening for Medicaid Long-Term Services & Supports. Patient declined screening citing resources,however, given the delicate condition of patient this writer reached to pt's son Chele Robles [phone] and discussed on above. Soniya Saenz understands that they will need CM's assistance at some point and will consult his family member, discuss with doctors on recent medical progress and make a decision. Keronjose miguel Akinss will let this writer know if they decided to do [employer] screening in 1-2 days. CM continues to assist as needed. Rosette Martinez,BSW,MSW,[facility] Clinical  [phone]

## 2019-10-02 NOTE — PROGRESS NOTES
Chart reviewed, cleared by nursing, pt deferred PT - secondary to \"too tired\" after multiple visitors today - pt requesting to return on 10/3. Will continue to follow - Daniel Backers, PT

## 2019-10-02 NOTE — PROGRESS NOTES
NUTRITION COMPLETE ASSESSMENT 
RECOMMENDATIONS:  
1. Continue cyclic TPN until pt consuming at least 60% needs orally or once goals of care of revisited 2. Follow BG while cylic TPN infusing - several borderline BG checks around 180mg/dl 3. Daily weights while on PN Interventions/Plan:  
Food/Nutrient Delivery:          (continue cyclic TPN) Assessment:  
Reason for Assessment: [x]Reassessment Diet: NPO 
TPN:  5%AA, D15 @ 75ml/hr x 1 hr;  5%AA, D15 @ 135ml/hr x 10hrs; 5%AA, D15 @ 75ml/hr x 1 hr 
Nutritionally Significant Medications: [x] Reviewed & Includes: coreg, zofran (added today), LR @ 50ml/hr Subjective: Flat affect, no questions. Family with no questions at this time. Objective: 
Pt admitted for acute pancreatitis. PMHx:  CHF, CAD, pancreatitis. Duodenal obstruction. Stage 4 pancreatic CA with numerous liver lesions. Oncology, GI and surgery following. S/p palliative gastrojejunal bypass on 9/27. UGI showing patency but nausea and vomiting over past few days. NGT out but if N/V continues may need to replace. Zofran only added today, may help with symptoms mgmt. If not effective recommend trial of compazine and/or combination. Unsure of plan of care moving forward if unable to tolerate PO. Electrolytes WNL but BUN/Cr elevated today, ? Fluid losses d/t vomiting. LR during the day added. BG borderline while getting cyclic TPN, continue to monitor with insulin if consistently elevated. PN started on 9/18. Cyclic TPN continues to provide: 1358kcal, 76g protein, 1500ml fluid. Meets 100% energy and protein needs. Continue as ordered until able to take sufficient PO. Continued wt loss this admit, may be related to fluid status. Last 3 Recorded Weights in this Encounter 09/30/19 0236 10/01/19 0301 10/02/19 6310 Weight: 53.6 kg (118 lb 2.7 oz) 53.5 kg (117 lb 15.1 oz) 52.5 kg (115 lb 11.9 oz) -140#. Follow trends.   Will continue to follow for TPN vs diet advancement, plan of care, wt trends. Estimated Nutrition Needs:  
Kcals/day: 9937 Kcals/day(1283-1382kcal) Protein: 63 g(63-74g (1.2-1.4g/kg)) Fluid: 1382 ml(1ml/kcal) Based On: Yael Gay(x 1.3-1.4) Weight Used: Actual wt(52.5kg) Pt expected to meet estimated nutrient needs:  [x]   Yes - with TPN    []  No [] Unable to predict at this time Nutrition Diagnosis:  
1. Inadequate oral intake related to altered GI fx as evidenced by duodenal stricture/pancreatitis; NPO with TPN; nausea/vomiting Goals:   
 PN meeting at least % needs in 5-7 days Monitoring & Evaluation: - Total energy intake - Weight/weight change Previous Nutrition Goals Met:   Yes Previous Recommendations:    Yes 
 
Education & Discharge Needs: 
 [x] None Identified 
 [] Identified and addressed [x] Participated in care plan, discharge planning, and/or interdisciplinary rounds Cultural, Christian and ethnic food preferences identified: None Skin Integrity: [x]Intact  []Other Edema: [x]None []Other Last BM: 9/26 Food Allergies: [x]None []Other Diet Restrictions: Cultural/Scientology Preference(s): None Anthropometrics:   
Weight Loss Metrics 10/2/2019 4/17/2019 4/15/2019 3/11/2019 2/21/2019 2/8/2019 1/21/2019 Today's Wt 115 lb 11.9 oz 125 lb 136 lb 136 lb 136 lb 144 lb 6.4 oz 149 lb BMI 19.87 kg/m2 21.46 kg/m2 23.34 kg/m2 23.34 kg/m2 23.34 kg/m2 24.79 kg/m2 25.58 kg/m2 Weight Source: Bed Height: 5' 4\" (162.6 cm), Height Source: (drivers license) Body mass index is 19.87 kg/m². IBW : 54.4 kg (120 lb), % IBW (Calculated): 104.67 % Usual Body Weight: 59 kg (130 lb),   
 
Labs:   
Lab Results Component Value Date/Time  Sodium 138 10/02/2019 12:50 AM  
 Potassium 4.5 10/02/2019 12:50 AM  
 Chloride 108 10/02/2019 12:50 AM  
 CO2 21 10/02/2019 12:50 AM  
 Glucose 101 (H) 10/02/2019 12:50 AM  
 BUN 66 (H) 10/02/2019 12:50 AM  
 Creatinine 1.28 (H) 10/02/2019 12:50 AM  
 Calcium 8.9 10/02/2019 12:50 AM  
 Magnesium 1.8 09/20/2019 01:16 AM  
 Phosphorus 4.3 02/03/2019 01:12 AM  
 Albumin 3.0 (L) 09/24/2019 03:06 AM  
 
Aracelis Fields, RD [address] [place] , Pager #[phone] or [phone]

## 2019-10-02 NOTE — PROGRESS NOTES
Ms. Lucy Bagley feels a little worse today. Still c/o nausea and vomitting. Tm 98.4 HR: 105 BP: 113/68 Resp Rate: 16 98% sat on room air. Intake/Output Summary (Last 24 hours) at 10/2/2019 0857 Last data filed at 10/2/2019 8617 Gross per 24 hour Intake  Output 300 ml Net -300 ml Exam: Cor: RRR. Lungs: Bilateral breath sounds. Clear to auscultation. Abd: Soft. Non distended. Non tender. No associated guarding or rebound. Labs:  
Recent Results (from the past 12 hour(s)) PTT Collection Time: 10/02/19 12:50 AM  
Result Value Ref Range aPTT 61.4 (H) 22.1 - 32.0 sec  
 aPTT, therapeutic range     58.0 - 54.5 SECS  
METABOLIC PANEL, BASIC Collection Time: 10/02/19 12:50 AM  
Result Value Ref Range Sodium 138 136 - 145 mmol/L Potassium 4.5 3.5 - 5.1 mmol/L Chloride 108 97 - 108 mmol/L  
 CO2 21 21 - 32 mmol/L Anion gap 9 5 - 15 mmol/L Glucose 101 (H) 65 - 100 mg/dL BUN 66 (H) 6 - 20 MG/DL Creatinine 1.28 (H) 0.55 - 1.02 MG/DL  
 BUN/Creatinine ratio 52 (H) 12 - 20 GFR est AA 49 (L) >60 ml/min/1.73m2 GFR est non-AA 40 (L) >60 ml/min/1.73m2 Calcium 8.9 8.5 - 10.1 MG/DL  
GLUCOSE, POC Collection Time: 10/02/19  6:38 AM  
Result Value Ref Range Glucose (POC) 185 (H) 65 - 100 mg/dL Performed by [facility]   
NPO except ice chips for now. Will renew cyclic TPN and Lipids. IVF at 50ml/hour when TPN not running. Change Zofran to 4mg q4 hours scheduled. Continue Physical Therapy. Plans per Dr. Geetha Romero.

## 2019-10-03 NOTE — CDMP QUERY
Dr Robert Mayberry:  
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 Pt continues to have nausea & vomiting, on ice chips, continued TPN, unable to advance diet. Please further specify if this condition is malnutrition and if so please specify degree. .  
 
Malnutrition (mild, moderate, severe) Protein calorie malnutrition (mild, moderate, severe) Other (please specify) Unable to determine The medical record reflects the following: 
  Risk Factors: RD eval 5 # loss past 7 mos poor appetitie last 2 mos /decrease interest in food for past 2 months NPO 9 D  since admission relying on TPN & new feeding tube for nutrition ,  poor appetite,Pancreatic CA w/ mets to the liver Clinical Indicators:  70 yoF w/ inadequate oral intake related to altered GI fx as evidenced by duodenal stricture/pancreatitis; 5# wt loss past 7 mos, & continued vomiting, w/ nausea unable to advance diet;  
 Treatment: daily weight while on TPN, RD recs of cyclic TPN until patientt consuming at least 60% needs orally, continued RD eval & monitoring for electrolyte. Thank you,  
Zeny Rivero, [facility], [address] [place] [address] View [place] 
[phone]

## 2019-10-03 NOTE — PROGRESS NOTES
Problem: Mobility Impaired (Adult and Pediatric) Goal: *Acute Goals and Plan of Care (Insert Text) Description FUNCTIONAL STATUS PRIOR TO ADMISSION: Patient was independent and active without use of DME, driving. HOME SUPPORT PRIOR TO ADMISSION: The patient lived alone, required no local support. Physical Therapy Goals Initiated 10/1/2019 1. Patient will move from supine to sit and sit to supine , scoot up and down and roll side to side in bed with modified independence within 7 day(s). 2.  Patient will transfer from bed to chair and chair to bed with supervision/set-up using the least restrictive device within 7 day(s). 3.  Patient will perform sit to stand with supervision/set-up within 7 day(s). 4.  Patient will ambulate with supervision/set-up for 50 feet with the least restrictive device within 7 day(s). Outcome: Progressing Towards Goal 
PHYSICAL THERAPY TREATMENT Patient: Gamaliel Winters (36 y.o. female) Date: 10/3/2019 Diagnosis: Acute pancreatitis [K85.90] <principal problem not specified> Procedure(s) (LRB): 
GASTROJEJUNOSTOMY (N/A) 6 Days Post-Op Precautions:  fall Chart, physical therapy assessment, plan of care and goals were reviewed. ASSESSMENT Patient continues with skilled PT services and is slowly progressing towards goals. Per nursing pt continues OOB to [facility] with assist.  Pt independent with bed mobility today to sit edge of bed. Pt c/o dizziness upon sitting and requested to return to supine. Current Level of Function Impacting Discharge (mobility/balance): Assist x 1 for stand-pviot to [facility] Other factors to consider for discharge: Pt lives alone PLAN : 
Patient continues to benefit from skilled intervention to address the above impairments. Continue treatment per established plan of care. to address goals. Recommendation for discharge: (in order for the patient to meet his/her long term goals) Therapy up to 5 days/week in SNF setting This discharge recommendation: 
Has been made in collaboration with the attending provider and/or case management Equipment recommendations for successful discharge (if) home: to be determined by rehab facility SUBJECTIVE:  
Patient stated can I use the walker - I can try to walk around the bed to the chair.  OBJECTIVE DATA SUMMARY:  
Critical Behavior: 
Neurologic State: Alert Orientation Level: Oriented X4 Cognition: Memory loss Functional Mobility Training: 
Bed Mobility: 
Rolling: Supervision Supine to Sit: Supervision Sit to Supine: Supervision Balance: 
Sitting: Intact; Without support Sat x 5 mins - initially c/o dizziness - did not resolve --- pt requested to return to supine. Pain Rating: 
Pt c/o of body aches all over - unable to rate. Activity Tolerance:  
Fair - OOB 2 - 3 x day - usually once in chair then up to [facility]. Please refer to the flowsheet for vital signs taken during this treatment. After treatment patient left in no apparent distress:  
Supine in bed and Call bell within reach COMMUNICATION/COLLABORATION:  
The patients plan of care was discussed with: Registered Nurse Jeet Combs, PT Time Calculation: 15 mins

## 2019-10-03 NOTE — PROGRESS NOTES
[facility] 
Daily Progress Note Admit Date: 2019 Post-Operative Day: 6 from GASTROJEJUNOSTOMY Subjective:  
 
Last 24 hrs: Feeling very weak. Vomited once overnight, about 25 mL. Had a BM yesterday and another small loose BM again this morning. Still nauseated, taking only ice chips. Does not want to advance diet, but would like to try flavored ice. Cr 1.29 today. Objective:  
 
Blood pressure 94/56, pulse 99, temperature 98 °F (36.7 °C), resp. rate 15, height 5' 4\" (1.626 m), weight 52.5 kg (115 lb 11.9 oz), SpO2 98 %. Temp (24hrs), Av °F (36.7 °C), Min:97.8 °F (36.6 °C), Max:98.2 °F (36.8 °C) 
 
 
_____________________ Physical Exam:    
Alert and Oriented, up on bedside commode. Mild shaking with standing, looks tired. Cardiovascular: RRR, no peripheral edema Lungs:CTAB Abdomen: soft, NT. Surgical dressing removed, incision with some ecchymosis. Otherwise clean and intact. No induration or erythema. Assessment:  
Active Problems: 
  Acute pancreatitis (2019) TOÑA (acute kidney injury) ([facility] [address] (2019) Hypokalemia (2019) GI bleed (2019) Coffee ground emesis (2019) 
 
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  
 
Check mag and phos Will try low dose reglan to help with nausea Increase IVF to 75 mL/hr when TPN not hanging Renew cyclic tpn, increase insulin slightly May have popsicles, flavored ice, etc. 
Diet advancement as tolerated Labs in am 
 
 
 
 
[facility] [place] [id number] [facility] Surgery at [address] [facility] [address] [facility] [address] , [address] [place], [place] 
[phone] Data Review: 
 
Recent Labs 10/01/19 
0151 WBC 11.1* HGB 9.4* HCT 29.7*  
 Recent Labs 10/03/19 
0110 10/02/19 
0050 10/01/19 
0151  138 135* K 4.2 4.5 4.3 * 108 106 CO2 21 21 21 * 101* 150* BUN 74* 66* 48* CREA 1.29* 1.28* 1.13* CA 8.6 8.9 8.3* No results for input(s): AML, LPSE in the last 72 hours. ______________________ Medications: 
 
Current Facility-Administered Medications Medication Dose Route Frequency  metoclopramide HCl (REGLAN) injection 5 mg  5 mg IntraVENous TIDAC  TPN ADULT - CENTRAL   IntraVENous TITRATE  lactated Ringers infusion  50 mL/hr IntraVENous CONTINUOUS  
 ondansetron (ZOFRAN) injection 4 mg  4 mg IntraVENous Q4H  
 metoprolol (LOPRESSOR) injection 5 mg  5 mg IntraVENous Q6H  
 acetaminophen (TYLENOL) suppository 650 mg  650 mg Rectal Q4H PRN  
 0.9% sodium chloride infusion 250 mL  250 mL IntraVENous PRN  
 morphine injection 2 mg  2 mg IntraVENous Q4H PRN  
 lidocaine (LIDODERM) 5 % patch 1 Patch  1 Patch TransDERmal Q24H  
 fat emulsion 20% (LIPOSYN, INTRAlipid) infusion 500 mL  500 mL IntraVENous EVERY MON & TH  
 lidocaine (XYLOCAINE) 2 % viscous solution 15 mL  15 mL Mouth/Throat PRN  phenol throat spray (CHLORASEPTIC) 1 Spray  1 Spray Oral PRN  
 heparin 25,000 units in D5W 250 ml infusion  18-36 Units/kg/hr IntraVENous TITRATE  guaiFENesin ER (MUCINEX) tablet 600 mg  600 mg Oral BID PRN  
 [Held by provider] rosuvastatin (CRESTOR) tablet 10 mg  10 mg Oral QHS  benzocaine-menthol (CEPACOL) lozenge 1 Lozenge  1 Lozenge Mucous Membrane PRN  
 hydrALAZINE (APRESOLINE) 20 mg/mL injection 10 mg  10 mg IntraVENous Q6H PRN  
 sodium chloride (NS) flush 5-40 mL  5-40 mL IntraVENous PRN  
 acetaminophen (TYLENOL) tablet 650 mg  650 mg Oral Q4H PRN  
 [Held by provider] carvedilol (COREG) tablet 6.25 mg  6.25 mg Oral BID WITH MEALS I have independently examined the patient and have reviewed the chart. I agree with the above plan. Patient still with considerable nausea tonight. She has passed some stool and flatus today. Afeb/VSS. Abd soft, ND, appropriately tender.   Incision with a lot of ecchymosis but no erythema or signs of infection. Agree with adding reglan today. May need to increase dose if she has persistent nausea. Recent UGI suggests patent G-J anastomosis. Labs in AM.  TPN for nutrition until able to tolerate more PO.    
 
Chiquita Maurice MD 
10/3/2019 
7:28 PM

## 2019-10-03 NOTE — PROGRESS NOTES
Hospitalist Progress Note Caroline Aleman MD 
Answering service: [phone] OR [phone] from in house phone Date of Service:  10/3/2019 NAME:  Vince Salgado :  1940 MRN:  [id number] Admission Summary:  
 
22-year-old female with PMH CAD,HLD, chronic systolic CHF, irritable bowel syndrome and pancreatitis, s/p cholecystectomy ,presented to ER with c/o  progressively worsening nausea and coffee-grounds vomitus. x 3 days associated with  some intermittent mild abdominal pain with some radiation to the upper back. Interval history / Subjective:  
 
Npo/ice chips, no recurrent episode of vomiting, has some nausea, had 2 small BM  10/2 On BM today,surgery had advanced the diet ,pt is reluctant Son at bedside ,has concerns reg surgery not working Assessment & Plan:  
 
 
Acute on chronic recurrent Pancreatitis Adenocarcinoma of pancreas ,metastasis to Liver  
-S/p palliative gastrojejunostomy 
-EGD initially -  Done aborted due to inability to pass scope down to Duodenum 
-CT abd/Pelvis ,GOO and possible stricture along duodenum  causing obstruction 
-NG tube for decompression -CEA- normal 
-Ca19-9  - 3596 
-NPO  
- -EGD showed duodenal obstruction - extrinsic compression causing duodenal obstruction,hiatal hernia and esophagitis. -MRI shows pancreatic mass with liver mets 
-pt continues to have nausea and vomiting. --Oncology eval, ~plan for chemo once pt is stable 
-surgery following, recommend to advance diet  
-On  TPN Nausea,vomiting,resolved  
-KUB with non specific gas pattern -upper GI Series 10/30- show patent gastrojejunal anastomosis with no evident leak 
-risks for aspiration, cxr  with no acute process 
-aspiration precautions 
-prn antiemetics 
-IVF  
-pain control  
-son has concerns regarding surgery/anastomosis malfunction, will ask surgery to discuss   
-advance diet,  
  
 Thrombus ,intrahepatic IVC clot  
-on heparin gtt  
-Change to lovenox for outpatient / per Dr Erica Farmer  
  
H/h stable -  
-normochromic normocytic anemia. -C/W PPI 
-Watch for bleeding on heparin gtt  
  
4) Hypokalemia  
-Resolved K today 4.3  
-monitor   
5) TOÑA d/t dehydration 
- CT abb/p with Hydronephrosis  
-Cr. rising, 1.2  
-ivf hydration increase 
-monitor 6) CAD. -h/O NSTEMI. No issues at this time 
-hold meds ,due to hypotension 7)hypotension 
-given NS bolus 
-hold meds  
  
MalNutrition Body mass index is 19.87 kg/m². Due to cancer and GOO 
picc line Central TPN , renewed  
  
  
DNR/DNI with a guarded prognosis. D/W patient and family. Palliative on board  
  
patient's Son and daughter Dirk Bird ([phone]) Code status: DNR 
DVT prophylaxis: heparin Care Plan discussed with: Patient/Family and Nurse, son at bedside Disposition: TBD Hospital Problems  Date Reviewed: 9/14/2019 Codes Class Noted POA Acute pancreatitis ICD-10-CM: K85.90 ICD-9-CM: 075.8  9/14/2019 Yes TOÑA (acute kidney injury) ([facility] [address] ICD-10-CM: N17.9 ICD-9-CM: 584.9  9/14/2019 Yes Hypokalemia ICD-10-CM: E87.6 ICD-9-CM: 276.8  9/14/2019 Yes GI bleed ICD-10-CM: K92.2 ICD-9-CM: 578.9  9/14/2019 Yes Coffee ground emesis ICD-10-CM: K92.0 ICD-9-CM: 578.0  9/14/2019 Yes Review of Systems: A comprehensive review of systems was negative. Vital Signs:  
 Last 24hrs VS reviewed since prior progress note. Most recent are: 
Visit Vitals /63 Pulse (!) 101 Temp 97.9 °F (36.6 °C) Resp 15 Ht 5' 4\" (1.626 m) Wt 52.5 kg (115 lb 11.9 oz) SpO2 96% BMI 19.87 kg/m² No intake or output data in the 24 hours ending 10/03/19 2305 Physical Examination:  
 
 
     
Constitutional:  No acute distress, , pleasant , no complaints ENT:  Oral mucous moist, NGT Resp:  CTA bilaterally. No wheezing/rhonchi/rales. CV:  Regular rhythm, normal rate, no murmurs, gallops, rubs GI:  Soft, non distended, mildly tender. dressing dry Musculoskeletal:  No edema, warm, 2+ pulses throughout Neurologic:  Moves all extremities. AAOx3, CN II-XII reviewed Psych:  Good insight, Not anxious nor agitated. Data Review:  
 I personally reviewed  Image and labs Labs:  
 
Recent Labs 10/01/19 
0151 WBC 11.1* HGB 9.4* HCT 29.7*  
 Recent Labs 10/03/19 
0110 10/02/19 
0050 10/01/19 
0151  138 135* K 4.2 4.5 4.3 * 108 106 CO2 21 21 21 BUN 74* 66* 48* CREA 1.29* 1.28* 1.13* * 101* 150* CA 8.6 8.9 8.3*  
MG 2.4  --   --   
PHOS 5.2*  --   -- No results for input(s): SGOT, GPT, ALT, AP, TBIL, TBILI, TP, ALB, GLOB, GGT, AML, LPSE in the last 72 hours. No lab exists for component: AMYP, HLPSE Recent Labs 10/03/19 
1534 10/03/19 
0906 10/03/19 
0110 APTT 62.4* 65.5* 79.5* No results for input(s): FE, TIBC, PSAT, FERR in the last 72 hours. No results found for: FOL, RBCF No results for input(s): PH, PCO2, PO2 in the last 72 hours. No results for input(s): CPK, CKNDX, TROIQ in the last 72 hours. No lab exists for component: CPKMB Lab Results Component Value Date/Time Cholesterol, total 85 09/15/2019 04:37 AM  
 HDL Cholesterol 43 09/15/2019 04:37 AM  
 LDL, calculated 29 09/15/2019 04:37 AM  
 Triglyceride 65 09/15/2019 04:37 AM  
 CHOL/HDL Ratio 2.0 09/15/2019 04:37 AM  
 
Lab Results Component Value Date/Time Glucose (POC) 157 (H) 10/03/2019 06:22 PM  
 Glucose (POC) 153 (H) 10/03/2019 12:09 PM  
 Glucose (POC) 161 (H) 10/03/2019 06:08 AM  
 Glucose (POC) 141 (H) 10/02/2019 06:19 PM  
 Glucose (POC) 145 (H) 10/02/2019 12:04 PM  
 
Lab Results Component Value Date/Time  Color YELLOW/STRAW 09/15/2019 12:33 AM  
 Appearance CLOUDY (A) 09/15/2019 12:33 AM  
 Specific gravity 1.019 09/15/2019 12:33 AM  
 pH (UA) 6.0 09/15/2019 12:33 AM  
 Protein 100 (A) 09/15/2019 12:33 AM  
 Glucose NEGATIVE  09/15/2019 12:33 AM  
 Ketone NEGATIVE  09/15/2019 12:33 AM  
 Bilirubin NEGATIVE  09/15/2019 12:33 AM  
 Urobilinogen 0.2 09/15/2019 12:33 AM  
 Nitrites NEGATIVE  09/15/2019 12:33 AM  
 Leukocyte Esterase SMALL (A) 09/15/2019 12:33 AM  
 Epithelial cells MODERATE (A) 09/15/2019 12:33 AM  
 Bacteria 2+ (A) 09/15/2019 12:33 AM  
 WBC 10-20 09/15/2019 12:33 AM  
 RBC 0-5 09/15/2019 12:33 AM  
 
 
 
Medications Reviewed:  
 
Current Facility-Administered Medications Medication Dose Route Frequency  metoclopramide HCl (REGLAN) injection 5 mg  5 mg IntraVENous TIDAC  TPN ADULT - CENTRAL   IntraVENous TITRATE  lactated Ringers infusion  75 mL/hr IntraVENous CONTINUOUS  
 ondansetron (ZOFRAN) injection 4 mg  4 mg IntraVENous Q4H  
 metoprolol (LOPRESSOR) injection 5 mg  5 mg IntraVENous Q6H  
 acetaminophen (TYLENOL) suppository 650 mg  650 mg Rectal Q4H PRN  
 0.9% sodium chloride infusion 250 mL  250 mL IntraVENous PRN  
 morphine injection 2 mg  2 mg IntraVENous Q4H PRN  
 lidocaine (LIDODERM) 5 % patch 1 Patch  1 Patch TransDERmal Q24H  
 fat emulsion 20% (LIPOSYN, INTRAlipid) infusion 500 mL  500 mL IntraVENous EVERY MON & TH  
 lidocaine (XYLOCAINE) 2 % viscous solution 15 mL  15 mL Mouth/Throat PRN  phenol throat spray (CHLORASEPTIC) 1 Spray  1 Spray Oral PRN  
 heparin 25,000 units in D5W 250 ml infusion  18-36 Units/kg/hr IntraVENous TITRATE  guaiFENesin ER (MUCINEX) tablet 600 mg  600 mg Oral BID PRN  
 [Held by provider] rosuvastatin (CRESTOR) tablet 10 mg  10 mg Oral QHS  benzocaine-menthol (CEPACOL) lozenge 1 Lozenge  1 Lozenge Mucous Membrane PRN  
 hydrALAZINE (APRESOLINE) 20 mg/mL injection 10 mg  10 mg IntraVENous Q6H PRN  
 sodium chloride (NS) flush 5-40 mL  5-40 mL IntraVENous PRN  
 acetaminophen (TYLENOL) tablet 650 mg  650 mg Oral Q4H PRN  
  [Held by provider] carvedilol (COREG) tablet 6.25 mg  6.25 mg Oral BID WITH MEALS  
 
______________________________________________________________________ EXPECTED LENGTH OF STAY: 4d 19h ACTUAL LENGTH OF STAY:          19 Citllai Oliver MD

## 2019-10-03 NOTE — PROGRESS NOTES
[facility] [place] [facility] [place] [address] [facility] [facility] [address], [address] W: [phone]  F: [phone] Reason for Visit:  
Hernando Garcia is a 70 y.o. female who is seen in consultation at the request of Dr. Gianni Archibald for evaluation of stage IV pancreatic cancer. Treatment History: · Biopsy with adenocarcinoma of the pancreas metastatic to liver History of Present Illness: The patient is a very pleasant 44-year-old be younger than her stated age she is been doing fairly well she started having some GI issues in January of this year. But over the last month or so she is been having more trouble and read more recently has been having trouble with nausea and vomiting. Because of the persistent or GI issues with the nausea and vomiting she is now admitted and was found to have a pancreatic cancer metastatic to her liver. The patient has been able to maintain her weight. The patient has lost a couple pounds since her last admission. According to the family the patient initially had lost weight when she was in the hospital then a little bit afterwards and stabilized her weight pattern and it is continued in a stable pattern The patient denied trouble with urination or with breathing she denied any ENT symptomatology. Has had a little bit of trouble with constipation 9/23/19 The patient seems to be doing fairly well this morning. She still has her NG tube in. Hopefully she will be getting a stent placed soon so she can start back on oral nutrition. Then the plan will be to get her port inserted and then to start her on gemcitabine and Abraxane. I anticipate that she will handle those drugs quite well and hopefully we can get her some quality time. 9/24/2019 Patient still has NG tube in place and the plan is for a stent to be placed.   She is getting hyperalimentation so she is getting the nutrition she would needs. Patient did vomit and does not like to vomit so she is hoping that the stent will be placed soon. 9/25/2019 Patient is doing well this morning still has her NG tube in place. She has had a little bit of nausea. She continues to get her hyperalimentation. Hopefully will get her bypass or stent placed soon so that we can get her treatment started. She will be getting a venous access device. She remains afebrile. Her vital signs are stable. Continue to encourage her to be up and in a chair. Plan on starting chemotherapy as soon as she is taking nutrition and has her Port-A-Cath placed. 9/27/2019 Patient seems to be doing well this morning NG tube is in place she is planning on having her surgery today. She still getting hyperalimentation. Patient is in good spirits. Hopefully she will have her surgery today and be able to get rid of her NG tube in a couple days. Then she will be able to start on nutrition. Then will be able to get her chemotherapy started. 9/28/2019 Patient is doing well postop she remains afebrile her vital signs look good. According to the patient surgery went well. She still has her NG tube in place. Her bowel sounds are quiet. Hopefully her bowels will start working fairly soon. She is in good spirits and her pain is under good control. 9/30/2019 The patient continues to improve every day. She looks much brighter. She still has her NG tube in but apparently that is coming out in the next hour or so hopefully that can get rid of her Joyce catheter as well. The patient is going to try to posterior about the move is much she can. Hopefully should be able be discharged home in a couple days. She is passing gas at this point which is showing her got is working again we will plan on seeing her in the office in about 1 week as we get raised to start her on chemotherapy she will need a venous access device. 10/2/2019 The patient is still having some issues with nausea she is not able to keep things down her NG tube is out she is just weak and is not able to get the nutrition and she does not understand why she is still having trouble with nausea and vomiting. She will be asking the gastroenterologist and surgeons about that this morning. The patient's quality of life right now is not very good and she understands that she wants to have a better quality of life. She is frustrated with not being able to eat. She is not having any other problems. Just weakness. 10/3/2019 Patient still having some vomiting but not as much as she has been obviously if we can get her to where she can get nutrition and will chemotherapy is off the table. Hopefully will get it where she can get nutrition in and we can begin considering other options to try to improve the quality of her life. If the patient can have quality she is not interested in quantity Past Medical History:  
Diagnosis Date  Coronary artery disease of native artery of native heart with stable angina pectoris ([address] 2/28/2019  Cough due to ACE inhibitor  Hematoma of rectus sheath 2/28/2019  IBS (irritable bowel syndrome) IBS  NSTEMI (non-ST elevated myocardial infarction) ([address] 2/28/2019  Pancreatitis  Pure hypercholesterolemia 2/28/2019  Systolic CHF, chronic ([address] 2/28/2019 Past Surgical History:  
Procedure Laterality Date  COLONOSCOPY Left 11/1/2017 COLONOSCOPY performed by Pietro Seip, MD at [address]  HX CHOLECYSTECTOMY  HX GI    
 surgery for hiatal hernia  HX ORTHOPAEDIC    
 DJD, doing PT weekly through [facility]  IR O[facility]L [facility]  1/30/2019 Social History Tobacco Use  Smoking status: Former Smoker  Smokeless tobacco: Never Used  Tobacco comment: quit smoking cigarettes 6 yrs ago Substance Use Topics  Alcohol use:  Yes  
 Comment: very occasional  
  
Family History Problem Relation Age of Onset  Dementia Mother   
     alzheimers  Cancer Sister   
     gallbladder  Cancer Brother   
     pancreatic  Breast Cancer Paternal Grandmother  Dementia Sister   
     alzheimers  Heart Surgery Brother   
     bypass Current Facility-Administered Medications Medication Dose Route Frequency  lactated Ringers infusion  50 mL/hr IntraVENous CONTINUOUS  
 ondansetron (ZOFRAN) injection 4 mg  4 mg IntraVENous Q4H  
 metoprolol (LOPRESSOR) injection 5 mg  5 mg IntraVENous Q6H  
 acetaminophen (TYLENOL) suppository 650 mg  650 mg Rectal Q4H PRN  
 0.9% sodium chloride infusion 250 mL  250 mL IntraVENous PRN  
 morphine injection 2 mg  2 mg IntraVENous Q4H PRN  
 lidocaine (LIDODERM) 5 % patch 1 Patch  1 Patch TransDERmal Q24H  
 fat emulsion 20% (LIPOSYN, INTRAlipid) infusion 500 mL  500 mL IntraVENous EVERY MON & TH  
 lidocaine (XYLOCAINE) 2 % viscous solution 15 mL  15 mL Mouth/Throat PRN  phenol throat spray (CHLORASEPTIC) 1 Spray  1 Spray Oral PRN  
 heparin 25,000 units in D5W 250 ml infusion  18-36 Units/kg/hr IntraVENous TITRATE  guaiFENesin ER (MUCINEX) tablet 600 mg  600 mg Oral BID PRN  
 [Held by provider] rosuvastatin (CRESTOR) tablet 10 mg  10 mg Oral QHS  benzocaine-menthol (CEPACOL) lozenge 1 Lozenge  1 Lozenge Mucous Membrane PRN  
 hydrALAZINE (APRESOLINE) 20 mg/mL injection 10 mg  10 mg IntraVENous Q6H PRN  
 sodium chloride (NS) flush 5-40 mL  5-40 mL IntraVENous PRN  
 acetaminophen (TYLENOL) tablet 650 mg  650 mg Oral Q4H PRN  
 [Held by provider] carvedilol (COREG) tablet 6.25 mg  6.25 mg Oral BID WITH MEALS Allergies Allergen Reactions  Penicillins Hives Review of Systems: A complete review of systems was obtained, negative except as described above. Physical Exam:  
 
Visit Vitals BP 94/56 (BP 1 Location: Right arm, BP Patient Position: At rest) Pulse 99 Temp 98 °F (36.7 °C) Resp 15 Ht 5' 4\" (1.626 m) Wt 115 lb 11.9 oz (52.5 kg) SpO2 98% BMI 19.87 kg/m² ECOG PS: 1 General: No distress Eyes: PERRLA, anicteric sclerae HENT: Atraumatic, OP clear Neck: Supple Lymphatic: No cervical, or supraclavicular adenopathy Respiratory: CTAB, normal respiratory effort CV: Heart rate is been elevated at times with minimal activity GI: Status post recent surgery MS: Digits without clubbing or cyanosis. Psych: Alert, oriented, appropriate affect, normal judgment/insight Results:  
 
Lab Results Component Value Date/Time WBC 11.1 (H) 10/01/2019 01:51 AM  
 HGB 9.4 (L) 10/01/2019 01:51 AM  
 HCT 29.7 (L) 10/01/2019 01:51 AM  
 PLATELET 522 31/73/7278 01:51 AM  
 MCV 97.1 10/01/2019 01:51 AM  
 ABS. NEUTROPHILS 11.7 (H) 09/26/2019 12:31 AM  
 
Lab Results Component Value Date/Time Sodium 140 10/03/2019 01:10 AM  
 Potassium 4.2 10/03/2019 01:10 AM  
 Chloride 110 (H) 10/03/2019 01:10 AM  
 CO2 21 10/03/2019 01:10 AM  
 Glucose 138 (H) 10/03/2019 01:10 AM  
 BUN 74 (H) 10/03/2019 01:10 AM  
 Creatinine 1.29 (H) 10/03/2019 01:10 AM  
 GFR est AA 49 (L) 10/03/2019 01:10 AM  
 GFR est non-AA 40 (L) 10/03/2019 01:10 AM  
 Calcium 8.6 10/03/2019 01:10 AM  
 Glucose (POC) 161 (H) 10/03/2019 06:08 AM  
 
Lab Results Component Value Date/Time Bilirubin, total 0.4 09/24/2019 03:06 AM  
 ALT (SGPT) 44 09/24/2019 03:06 AM  
 AST (SGOT) 23 09/24/2019 03:06 AM  
 Alk. phosphatase 135 (H) 09/24/2019 03:06 AM  
 Protein, total 6.8 09/24/2019 03:06 AM  
 Albumin 3.0 (L) 09/24/2019 03:06 AM  
 Globulin 3.8 09/24/2019 03:06 AM  
 
 
Records reviewed and summarized above. Pathology report(s) reviewed 9/17/19 FINAL PATHOLOGIC DIAGNOSIS 1. Small bowel, duodenal stricture, biopsy:  
Adenocarcinoma Radiology report(s) reviewed above. 9/16/19 CT abdomen pelvis IMPRESSION: 
  
 1. Marked gastric and duodenal distention by water attenuation fluid, with 
abrupt transition at the junction of second and third duodenum where a 
duodenal/pancreatic mass or stricture/scar is suggested, which is confluent with 
the pancreatic head and uncinate process. . This is consistent with endoscopic 
findings earlier same day. NG tube placement is advised. 2. Right hydronephrosis, likely due to extrinsic compression by the markedly 
distended stomach and duodenum. 3. Prominence of extrahepatic bile ducts, likely due to extrinsic impression on 
the ampulla of water by the markedly distended stomach and duodenum. 4. Pancreas extrinsically compressed by markedly distended stomach, making 
visualization of the previously described cystic mass is difficult. The 
pancreatic head lies at the site of duodenal obstruction, but is difficult to 
further assess under the circumstances. 5. Other incidental and postoperative findings MRI abdomen and 9/19/19 IMPRESSION: 
  
1. Interval development of diffuse liver metastatic disease. 
  
2.  Large mass centered on the pancreatic head and proximal horizontal portion 
of the duodenum. . There is narrowing of the duodenal lumen. Possibilities 
include a pancreatic or duodenal primary. The mass appears to be centered on the 
pancreatic head and there is common bile duct dilatation however the pancreatic 
duct is normal in caliber.  
3.  Left lower lobe airspace disease may represent atelectasis or pneumonia. 
  
4.  Possible nonocclusive thrombus in the intrahepatic IVC. 
  
5. The SMV is occluded. Assessment:  
1) adenocarcinoma of the pancreas metastatic to liver with the plan being for chemotherapy once she is able to tolerate nutrition  3596 9/17/19 CEA 16.8 9/17/19 
2) thrombus intrahepatic IVC 3) gastric outlet obstruction from pancreatic mass with bypass with persistent nausea and vomiting Plan: · 1.  Patient will need a venous access device for therapy. ·  
· 2. We will go ahead and treat her with heparin and low molecular weight heparin as an outpatient for her blood clot. ·  
· 3. We will plan on doing genetic testing due to her family history of pancreatic cancer. ·  
· 4. We will plan on getting genome wide sequencing of her tumor for therapeutic options. ·  
· 5. We will plan on treating her with gemcitabine and nab paclitaxel and hopefully start treatment within the next week · 6 patient continues to have problems with nausea. I appreciate the opportunity to participate in Ms. [facility].  
 
Signed By: Michael Vargas MD

## 2019-10-04 NOTE — PROGRESS NOTES
10/04/19 1134 Vital Signs Temp 98.2 °F (36.8 °C) Temp Source Oral  
Pulse (Heart Rate) 88 Heart Rate Source Monitor Resp Rate 17  
O2 Sat (%) 95 % Level of Consciousness Alert BP 93/48 MAP (Calculated) (!) 63 MEWS Score 2

## 2019-10-04 NOTE — PROGRESS NOTES
Bedside shift change report given to Emeli Dennison RN (oncoming nurse) by Bridgett Magdaleno RN (offgoing nurse). Report included the following information SBAR, Kardex, MAR and Recent Results.

## 2019-10-04 NOTE — PROGRESS NOTES
Day #1 of Levofloxacin Indication:  Intra-abdominal infection Current regimen:  750 mg q24h Abx regimen: Levofloxacin+Metronidazole Recent Labs 10/04/19 
2056 10/03/19 
0110 10/02/19 
0050 WBC 15.3*  --   --   
CREA 1.18* 1.29* 1.28* BUN 60* 74* 66* Est CrCl: 20-49 ml/min; Temp (24hrs), Av.4 °F (36.9 °C), Min:97.9 °F (36.6 °C), Max:99.1 °F (37.3 °C) Cultures:  
10/4 Blood-pending Plan: Change to 750 mg q48h per protocol Manisha Leone, PattieD

## 2019-10-04 NOTE — PROGRESS NOTES
Opened distal incision about 3/4 of an inch and blood came spurting out; suctioned and softly palpated the abdomen to promote bleeding. Did so until abd soft and bleeding slowed. Covered w/ dry 4x4s. Observed for 5 min. No bleeding.  
Checking H/H at 4pm

## 2019-10-04 NOTE — PROGRESS NOTES
Hospitalist Progress Note Blake Gonzalez MD 
Answering service: [phone] OR [phone] from in house phone Date of Service:  10/4/2019 NAME:  Lorri Manriquez :  1940 MRN:  [id number] Admission Summary:  
 
47-year-old female with PMH CAD,HLD, chronic systolic CHF, irritable bowel syndrome and pancreatitis, s/p cholecystectomy ,presented to ER with c/o  progressively worsening nausea and coffee-grounds vomitus. x 3 days associated with  some intermittent mild abdominal pain with some radiation to the upper back. Interval history / Subjective: On clears,no vomiting,some nausea, had posickle SOB on 2LnC, hgb dropped 5.6<-9 no ovet Bleeding, no fever, wbc 15.3 Assessment & Plan:  
 
 
Acute on chronic recurrent Pancreatitis Adenocarcinoma of pancreas ,metastasis to Liver  
-S/p palliative gastrojejunostomy 
-EGD initially -  Done aborted due to inability to pass scope down to Duodenum 
-CT abd/Pelvis ,GOO and possible stricture along duodenum  causing obstruction 
-NG tube for decompression -CEA- normal 
-Ca19-9  - 3596 
-NPO  
- -EGD showed duodenal obstruction - extrinsic compression causing duodenal obstruction,hiatal hernia and esophagitis. -MRI shows pancreatic mass with liver mets 
-pt continues to have nausea and vomiting. --Oncology eval, ~plan for chemo once pt is stable 
-surgery following, recommend to advance diet  
-On  TPN Nausea,vomiting, 
-KUB with non specific gas pattern -upper GI Series 10/30- show patent gastrojejunal anastomosis with no evident leak 
-risks for aspiration, cxr  with no acute process 
-aspiration precautions 
-prn antiemetics 
-IVF  
-pain control  
-son has concerns regarding surgery/anastomosis malfunction, will ask surgery to discuss   
-advance diet Anemia,acute on chronic 
-Hgb 5.4,5.6<- 9 
-no overt bleeding 
-CT abd/pelvis with no acute change -transfusion started PRBC, s/p 1 unit  
-heparin stopped Possible Leukocytosis 
-wbc 15k from 11, hypotension 
-no fever 
-monitor for sepsis 
- monitor on tele 
-check cultures,cxr 
-broad spectrum antibiotics iv vanc,zosyn TOÑA d/t dehydration 
- CT abb/p with Hydronephrosis  
-Cr. improving 1.1 1.2  
-with ivf  
-monitor Thrombus ,intrahepatic IVC clot  
- heparin gtt  Stopped due to severe anemia  
-Change to lovenox for outpatient / per Dr Qi Gayle  
 
  
Hypokalemia  
-Resolved K today 4.3  
-monitor 
  
CAD. -h/O NSTEMI 
-hold meds ,due to hypotension 
 
hypotension 
-given NS bolus 
-hold meds  
  
MalNutrition Body mass index is 19.83 kg/m². Due to cancer and GOO 
picc line Central TPN , renewed  
  
  
DNR/DNI with a guarded prognosis. D/W patient and family. Palliative on board  
  
patient's Son and daughter Sita Kwong ([phone]) Code status: DNR 
DVT prophylaxis: heparin Care Plan discussed with: Patient/Family and Nurse, son at bedside Disposition: TBD Hospital Problems  Date Reviewed: 9/14/2019 Codes Class Noted POA Acute pancreatitis ICD-10-CM: K85.90 ICD-9-CM: 737.5  9/14/2019 Yes TOÑA (acute kidney injury) [address] [address] ICD-10-CM: N17.9 ICD-9-CM: 584.9  9/14/2019 Yes Hypokalemia ICD-10-CM: E87.6 ICD-9-CM: 276.8  9/14/2019 Yes GI bleed ICD-10-CM: K92.2 ICD-9-CM: 578.9  9/14/2019 Yes Coffee ground emesis ICD-10-CM: K92.0 ICD-9-CM: 578.0  9/14/2019 Yes Review of Systems: A comprehensive review of systems was negative. Vital Signs:  
 Last 24hrs VS reviewed since prior progress note. Most recent are: 
Visit Vitals BP 95/59 (BP 1 Location: Left arm, BP Patient Position: At rest) Pulse 95 Temp 98.2 °F (36.8 °C) Resp 20 Ht 5' 4\" (1.626 m) Wt 52.4 kg (115 lb 8 oz) SpO2 100% BMI 19.83 kg/m² No intake or output data in the 24 hours ending 10/04/19 1340 Physical Examination: Constitutional:  No acute distress, , pleasant , no complaints ENT:  Oral mucous moist, NGT Resp:  CTA bilaterally. No wheezing/rhonchi/rales. CV:  Regular rhythm, normal rate, no murmurs, gallops, rubs GI:  Soft, non distended, mildly tender. dressing dry Musculoskeletal:  No edema, warm, 2+ pulses throughout Neurologic:  Moves all extremities. AAOx3, CN II-XII reviewed Psych:  Good insight, Not anxious nor agitated. Data Review:  
 I personally reviewed  Image and labs Labs:  
 
Recent Labs 10/04/19 
1904 10/04/19 
4939 WBC  --  15.3* HGB 5.4* 5.6* HCT 17.5* 18.2*  
PLT  --  194 Recent Labs 10/04/19 
6407 10/03/19 
0110 10/02/19 
0050  140 138  
K 4.4 4.2 4.5  
* 110* 108 CO2 19* 21 21 BUN 60* 74* 66* CREA 1.18* 1.29* 1.28* GLU 96 138* 101* CA 8.4* 8.6 8.9 MG  --  2.4  --   
PHOS  --  5.2*  -- No results for input(s): SGOT, GPT, ALT, AP, TBIL, TBILI, TP, ALB, GLOB, GGT, AML, LPSE in the last 72 hours. No lab exists for component: AMYP, HLPSE Recent Labs 10/03/19 
1534 10/03/19 
0906 10/03/19 
0110 APTT 62.4* 65.5* 79.5* No results for input(s): FE, TIBC, PSAT, FERR in the last 72 hours. No results found for: FOL, RBCF No results for input(s): PH, PCO2, PO2 in the last 72 hours. No results for input(s): CPK, CKNDX, TROIQ in the last 72 hours. No lab exists for component: CPKMB Lab Results Component Value Date/Time Cholesterol, total 85 09/15/2019 04:37 AM  
 HDL Cholesterol 43 09/15/2019 04:37 AM  
 LDL, calculated 29 09/15/2019 04:37 AM  
 Triglyceride 65 09/15/2019 04:37 AM  
 CHOL/HDL Ratio 2.0 09/15/2019 04:37 AM  
 
Lab Results Component Value Date/Time Glucose (POC) 141 (H) 10/04/2019 12:03 PM  
 Glucose (POC) 133 (H) 10/04/2019 06:28 AM  
 Glucose (POC) 157 (H) 10/03/2019 06:22 PM  
 Glucose (POC) 153 (H) 10/03/2019 12:09 PM  
 Glucose (POC) 161 (H) 10/03/2019 06:08 AM  
 
Lab Results Component Value Date/Time Color YELLOW/STRAW 09/15/2019 12:33 AM  
 Appearance CLOUDY (A) 09/15/2019 12:33 AM  
 Specific gravity 1.019 09/15/2019 12:33 AM  
 pH (UA) 6.0 09/15/2019 12:33 AM  
 Protein 100 (A) 09/15/2019 12:33 AM  
 Glucose NEGATIVE  09/15/2019 12:33 AM  
 Ketone NEGATIVE  09/15/2019 12:33 AM  
 Bilirubin NEGATIVE  09/15/2019 12:33 AM  
 Urobilinogen 0.2 09/15/2019 12:33 AM  
 Nitrites NEGATIVE  09/15/2019 12:33 AM  
 Leukocyte Esterase SMALL (A) 09/15/2019 12:33 AM  
 Epithelial cells MODERATE (A) 09/15/2019 12:33 AM  
 Bacteria 2+ (A) 09/15/2019 12:33 AM  
 WBC 10-20 09/15/2019 12:33 AM  
 RBC 0-5 09/15/2019 12:33 AM  
 
 
 
Medications Reviewed:  
 
Current Facility-Administered Medications Medication Dose Route Frequency  0.9% sodium chloride infusion 250 mL  250 mL IntraVENous PRN  
 0.9% sodium chloride infusion 250 mL  250 mL IntraVENous PRN  
 TPN ADULT - CENTRAL   IntraVENous TITRATE  metoclopramide HCl (REGLAN) injection 5 mg  5 mg IntraVENous TIDAC  lactated Ringers infusion  75 mL/hr IntraVENous CONTINUOUS  
 ondansetron (ZOFRAN) injection 4 mg  4 mg IntraVENous Q4H  
 metoprolol (LOPRESSOR) injection 5 mg  5 mg IntraVENous Q6H  
 acetaminophen (TYLENOL) suppository 650 mg  650 mg Rectal Q4H PRN  
 0.9% sodium chloride infusion 250 mL  250 mL IntraVENous PRN  
 morphine injection 2 mg  2 mg IntraVENous Q4H PRN  
 lidocaine (LIDODERM) 5 % patch 1 Patch  1 Patch TransDERmal Q24H  
 fat emulsion 20% (LIPOSYN, INTRAlipid) infusion 500 mL  500 mL IntraVENous EVERY MON & TH  
 lidocaine (XYLOCAINE) 2 % viscous solution 15 mL  15 mL Mouth/Throat PRN  phenol throat spray (CHLORASEPTIC) 1 Spray  1 Spray Oral PRN  
 guaiFENesin ER (MUCINEX) tablet 600 mg  600 mg Oral BID PRN  
 [Held by provider] rosuvastatin (CRESTOR) tablet 10 mg  10 mg Oral QHS  benzocaine-menthol (CEPACOL) lozenge 1 Lozenge  1 Lozenge Mucous Membrane PRN  
  hydrALAZINE (APRESOLINE) 20 mg/mL injection 10 mg  10 mg IntraVENous Q6H PRN  
 sodium chloride (NS) flush 5-40 mL  5-40 mL IntraVENous PRN  
 acetaminophen (TYLENOL) tablet 650 mg  650 mg Oral Q4H PRN  
 [Held by provider] carvedilol (COREG) tablet 6.25 mg  6.25 mg Oral BID WITH MEALS  
 
______________________________________________________________________ EXPECTED LENGTH OF STAY: 4d 19h ACTUAL LENGTH OF STAY:          20 
 
            
Iman Enrique MD

## 2019-10-04 NOTE — PROGRESS NOTES
[facility] [place] [facility] [place] [address] [address], [address] W: [phone]  F: [phone] Reason for Visit:  
Estella Merino is a 70 y.o. female who is seen in consultation at the request of Dr. Zenaida Ansari for evaluation of stage IV pancreatic cancer. Treatment History: · Biopsy with adenocarcinoma of the pancreas metastatic to liver History of Present Illness: The patient is a very pleasant 68-year-old be younger than her stated age she is been doing fairly well she started having some GI issues in January of this year. But over the last month or so she is been having more trouble and read more recently has been having trouble with nausea and vomiting. Because of the persistent or GI issues with the nausea and vomiting she is now admitted and was found to have a pancreatic cancer metastatic to her liver. The patient has been able to maintain her weight. The patient has lost a couple pounds since her last admission. According to the family the patient initially had lost weight when she was in the hospital then a little bit afterwards and stabilized her weight pattern and it is continued in a stable pattern The patient denied trouble with urination or with breathing she denied any ENT symptomatology. Has had a little bit of trouble with constipation 9/23/19 The patient seems to be doing fairly well this morning. She still has her NG tube in. Hopefully she will be getting a stent placed soon so she can start back on oral nutrition. Then the plan will be to get her port inserted and then to start her on gemcitabine and Abraxane. I anticipate that she will handle those drugs quite well and hopefully we can get her some quality time. 9/24/2019 Patient still has NG tube in place and the plan is for a stent to be placed.   She is getting hyperalimentation so she is getting the nutrition she would needs. Patient did vomit and does not like to vomit so she is hoping that the stent will be placed soon. 9/25/2019 Patient is doing well this morning still has her NG tube in place. She has had a little bit of nausea. She continues to get her hyperalimentation. Hopefully will get her bypass or stent placed soon so that we can get her treatment started. She will be getting a venous access device. She remains afebrile. Her vital signs are stable. Continue to encourage her to be up and in a chair. Plan on starting chemotherapy as soon as she is taking nutrition and has her Port-A-Cath placed. 9/27/2019 Patient seems to be doing well this morning NG tube is in place she is planning on having her surgery today. She still getting hyperalimentation. Patient is in good spirits. Hopefully she will have her surgery today and be able to get rid of her NG tube in a couple days. Then she will be able to start on nutrition. Then will be able to get her chemotherapy started. 9/28/2019 Patient is doing well postop she remains afebrile her vital signs look good. According to the patient surgery went well. She still has her NG tube in place. Her bowel sounds are quiet. Hopefully her bowels will start working fairly soon. She is in good spirits and her pain is under good control. 9/30/2019 The patient continues to improve every day. She looks much brighter. She still has her NG tube in but apparently that is coming out in the next hour or so hopefully that can get rid of her Joyce catheter as well. The patient is going to try to posterior about the move is much she can. Hopefully should be able be discharged home in a couple days. She is passing gas at this point which is showing her got is working again we will plan on seeing her in the office in about 1 week as we get raised to start her on chemotherapy she will need a venous access device. 10/2/2019 The patient is still having some issues with nausea she is not able to keep things down her NG tube is out she is just weak and is not able to get the nutrition and she does not understand why she is still having trouble with nausea and vomiting. She will be asking the gastroenterologist and surgeons about that this morning. The patient's quality of life right now is not very good and she understands that she wants to have a better quality of life. She is frustrated with not being able to eat. She is not having any other problems. Just weakness. 10/3/2019 Patient still having some vomiting but not as much as she has been obviously if we can get her to where she can get nutrition and will chemotherapy is off the table. Hopefully will get it where she can get nutrition in and we can begin considering other options to try to improve the quality of her life. If the patient can have quality she is not interested in quantity 10/4/2019 She continues to have vomiting she actually looks a little better this morning but she is vomiting unless we can get vomiting under control is not going to do well. Up in a chair and to be staring about as much as possible in hopes that that will help as well Past Medical History:  
Diagnosis Date  Coronary artery disease of native artery of native heart with stable angina pectoris ([address] 2/28/2019  Cough due to ACE inhibitor  Hematoma of rectus sheath 2/28/2019  IBS (irritable bowel syndrome) IBS  NSTEMI (non-ST elevated myocardial infarction) ([address] 2/28/2019  Pancreatitis  Pure hypercholesterolemia 2/28/2019  Systolic CHF, chronic ([address] 2/28/2019 Past Surgical History:  
Procedure Laterality Date  COLONOSCOPY Left 11/1/2017 COLONOSCOPY performed by Parveen Diallo MD at [address]  HX CHOLECYSTECTOMY  HX GI    
 surgery for hiatal hernia  HX ORTHOPAEDIC    
 DEYANIRA, doing PT weekly through [facility]  IR OCCL Alvy Hem W SI  1/30/2019 Social History Tobacco Use  Smoking status: Former Smoker  Smokeless tobacco: Never Used  Tobacco comment: quit smoking cigarettes 6 yrs ago Substance Use Topics  Alcohol use: Yes Comment: very occasional  
  
Family History Problem Relation Age of Onset  Dementia Mother   
     alzheimers  Cancer Sister   
     gallbladder  Cancer Brother   
     pancreatic  Breast Cancer Paternal Grandmother  Dementia Sister   
     alzheimers  Heart Surgery Brother   
     bypass Current Facility-Administered Medications Medication Dose Route Frequency  0.9% sodium chloride infusion 250 mL  250 mL IntraVENous PRN  
 0.9% sodium chloride infusion 250 mL  250 mL IntraVENous PRN  
 TPN ADULT - CENTRAL   IntraVENous TITRATE  metoclopramide HCl (REGLAN) injection 5 mg  5 mg IntraVENous TIDAC  lactated Ringers infusion  75 mL/hr IntraVENous CONTINUOUS  
 ondansetron (ZOFRAN) injection 4 mg  4 mg IntraVENous Q4H  
 metoprolol (LOPRESSOR) injection 5 mg  5 mg IntraVENous Q6H  
 acetaminophen (TYLENOL) suppository 650 mg  650 mg Rectal Q4H PRN  
 0.9% sodium chloride infusion 250 mL  250 mL IntraVENous PRN  
 morphine injection 2 mg  2 mg IntraVENous Q4H PRN  
 lidocaine (LIDODERM) 5 % patch 1 Patch  1 Patch TransDERmal Q24H  
 fat emulsion 20% (LIPOSYN, INTRAlipid) infusion 500 mL  500 mL IntraVENous EVERY MON & TH  
 lidocaine (XYLOCAINE) 2 % viscous solution 15 mL  15 mL Mouth/Throat PRN  phenol throat spray (CHLORASEPTIC) 1 Spray  1 Spray Oral PRN  
 guaiFENesin ER (MUCINEX) tablet 600 mg  600 mg Oral BID PRN  
 [Held by provider] rosuvastatin (CRESTOR) tablet 10 mg  10 mg Oral QHS  benzocaine-menthol (CEPACOL) lozenge 1 Lozenge  1 Lozenge Mucous Membrane PRN  
 hydrALAZINE (APRESOLINE) 20 mg/mL injection 10 mg  10 mg IntraVENous Q6H PRN  
  sodium chloride (NS) flush 5-40 mL  5-40 mL IntraVENous PRN  
 acetaminophen (TYLENOL) tablet 650 mg  650 mg Oral Q4H PRN  
 [Held by provider] carvedilol (COREG) tablet 6.25 mg  6.25 mg Oral BID WITH MEALS Allergies Allergen Reactions  Penicillins Hives Review of Systems: A complete review of systems was obtained, negative except as described above. Physical Exam:  
 
Visit Vitals BP 96/56 (BP 1 Location: Left arm, BP Patient Position: At rest) Pulse 91 Temp 98.5 °F (36.9 °C) Resp 18 Ht 5' 4\" (1.626 m) Wt 115 lb 8 oz (52.4 kg) SpO2 99% BMI 19.83 kg/m² ECOG PS: 1 General: No distress Eyes: PERRLA, anicteric sclerae HENT: Atraumatic, OP clear Neck: Supple Lymphatic: No cervical, or supraclavicular adenopathy Respiratory: CTAB, normal respiratory effort CV: Heart rate is been elevated at times with minimal activity GI: Status post recent surgery MS: Digits without clubbing or cyanosis. Psych: Alert, oriented, appropriate affect, normal judgment/insight Results:  
 
Lab Results Component Value Date/Time WBC 15.3 (H) 10/04/2019 04:37 AM  
 HGB 5.4 (LL) 10/04/2019 06:37 AM  
 HCT 17.5 (LL) 10/04/2019 06:37 AM  
 PLATELET 756 43/87/4057 04:37 AM  
 .7 (H) 10/04/2019 04:37 AM  
 ABS. NEUTROPHILS 11.5 (H) 10/04/2019 04:37 AM  
 
Lab Results Component Value Date/Time Sodium 142 10/04/2019 04:37 AM  
 Potassium 4.4 10/04/2019 04:37 AM  
 Chloride 115 (H) 10/04/2019 04:37 AM  
 CO2 19 (L) 10/04/2019 04:37 AM  
 Glucose 96 10/04/2019 04:37 AM  
 BUN 60 (H) 10/04/2019 04:37 AM  
 Creatinine 1.18 (H) 10/04/2019 04:37 AM  
 GFR est AA 54 (L) 10/04/2019 04:37 AM  
 GFR est non-AA 44 (L) 10/04/2019 04:37 AM  
 Calcium 8.4 (L) 10/04/2019 04:37 AM  
 Glucose (POC) 133 (H) 10/04/2019 06:28 AM  
 
Lab Results Component Value Date/Time  Bilirubin, total 0.4 09/24/2019 03:06 AM  
 ALT (SGPT) 44 09/24/2019 03:06 AM  
 AST (SGOT) 23 09/24/2019 03:06 AM  
 Alk. phosphatase 135 (H) 09/24/2019 03:06 AM  
 Protein, total 6.8 09/24/2019 03:06 AM  
 Albumin 3.0 (L) 09/24/2019 03:06 AM  
 Globulin 3.8 09/24/2019 03:06 AM  
 
 
Records reviewed and summarized above. Pathology report(s) reviewed 9/17/19 FINAL PATHOLOGIC DIAGNOSIS 1. Small bowel, duodenal stricture, biopsy:  
Adenocarcinoma Radiology report(s) reviewed above. 9/16/19 CT abdomen pelvis IMPRESSION: 
  
1. Marked gastric and duodenal distention by water attenuation fluid, with 
abrupt transition at the junction of second and third duodenum where a 
duodenal/pancreatic mass or stricture/scar is suggested, which is confluent with 
the pancreatic head and uncinate process. . This is consistent with endoscopic 
findings earlier same day. NG tube placement is advised. 2. Right hydronephrosis, likely due to extrinsic compression by the markedly 
distended stomach and duodenum. 3. Prominence of extrahepatic bile ducts, likely due to extrinsic impression on 
the ampulla of water by the markedly distended stomach and duodenum. 4. Pancreas extrinsically compressed by markedly distended stomach, making 
visualization of the previously described cystic mass is difficult. The 
pancreatic head lies at the site of duodenal obstruction, but is difficult to 
further assess under the circumstances. 5. Other incidental and postoperative findings MRI abdomen and 9/19/19 IMPRESSION: 
  
1. Interval development of diffuse liver metastatic disease. 
  
2.  Large mass centered on the pancreatic head and proximal horizontal portion 
of the duodenum. . There is narrowing of the duodenal lumen. Possibilities 
include a pancreatic or duodenal primary.  The mass appears to be centered on the 
pancreatic head and there is common bile duct dilatation however the pancreatic 
duct is normal in caliber.  
3.  Left lower lobe airspace disease may represent atelectasis or pneumonia. 
  
 4.  Possible nonocclusive thrombus in the intrahepatic IVC. 
  
5. The SMV is occluded. Assessment:  
1) adenocarcinoma of the pancreas metastatic to liver with the plan being for chemotherapy once she is able to tolerate nutrition  3596 9/17/19 CEA 16.8 9/17/19 
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  
 
· 1. Patient will need a venous access device for therapy. ·  
· 2. We will go ahead and treat her with heparin and low molecular weight heparin as an outpatient for her blood clot. ·  
· 3. We will plan on doing genetic testing due to her family history of pancreatic cancer. ·  
· 4. We will plan on getting genome wide sequencing of her tumor for therapeutic options. ·  
· 5. We will plan on treating her with gemcitabine and nab paclitaxel and hopefully start treatment within the next week · 6 patient continues to have problems with nausea. I appreciate the opportunity to participate in Ms. [facility].  
 
Signed By: Gera Middleton MD

## 2019-10-04 NOTE — PROGRESS NOTES
General Surgery Daily Progress Note Admit Date: 2019 Post-Operative Day: 7 Days Post-Op from Procedure(s): 
GASTROJEJUNOSTOMY Subjective:  
 
Last 24 hrs: Pt had a significant drop in H/H. She is SOB on O2. No overt bleeding per mouth or rectum. Objective:  
 
Blood pressure 96/56, pulse 91, temperature 98.5 °F (36.9 °C), resp. rate 18, height 5' 4\" (1.626 m), weight 115 lb 8 oz (52.4 kg), SpO2 99 %. Temp (24hrs), Av.6 °F (37 °C), Min:97.9 °F (36.6 °C), Max:99.1 °F (37.3 °C) 
 
 
_____________________ Physical Exam:    
Alert and Oriented, looks uncomfortable, pale Cardiovascular: RRR - 90s, no peripheral edema Lungs:CTAB anteriorally Abdomen: incision w/ ecchymosis and hard hematoma-appearing mass distally Hypoactive BS Assessment:  
Active Problems: 
  Acute pancreatitis (2019) TOÑA (acute kidney injury) ([facility] [address] (2019) Hypokalemia (2019) GI bleed (2019) Coffee ground emesis (2019) Plan:  
 
Stat CT [facility] Heparin gtt off Increase O2 if necessary for pt comfort 2U packed cells ordered Renew tpn Cbc at 4p Daily labs Data Review: 
 
Recent Labs 10/04/19 
2167 10/04/19 
3127 WBC  --  15.3* HGB 5.4* 5.6* HCT 17.5* 18.2*  
PLT  --  194 Recent Labs 10/04/19 
7114 10/03/19 
0110 10/02/19 
0050  140 138  
K 4.4 4.2 4.5  
* 110* 108 CO2 19* 21 21 GLU 96 138* 101* BUN 60* 74* 66* CREA 1.18* 1.29* 1.28* CA 8.4* 8.6 8.9 MG  --  2.4  --   
PHOS  --  5.2*  -- No results for input(s): AML, LPSE in the last 72 hours. ______________________ Medications: 
 
Current Facility-Administered Medications Medication Dose Route Frequency  0.9% sodium chloride infusion 250 mL  250 mL IntraVENous PRN  
 0.9% sodium chloride infusion 250 mL  250 mL IntraVENous PRN  
 sodium chloride 0.9 % bolus infusion 100 mL  100 mL IntraVENous RAD ONCE  
  iopamidol (ISOVUE-370) 76 % injection 100 mL  100 mL IntraVENous RAD ONCE  
 sodium chloride (NS) flush 10 mL  10 mL IntraVENous RAD ONCE  
 metoclopramide HCl (REGLAN) injection 5 mg  5 mg IntraVENous TIDAC  lactated Ringers infusion  75 mL/hr IntraVENous CONTINUOUS  
 ondansetron (ZOFRAN) injection 4 mg  4 mg IntraVENous Q4H  
 metoprolol (LOPRESSOR) injection 5 mg  5 mg IntraVENous Q6H  
 acetaminophen (TYLENOL) suppository 650 mg  650 mg Rectal Q4H PRN  
 0.9% sodium chloride infusion 250 mL  250 mL IntraVENous PRN  
 morphine injection 2 mg  2 mg IntraVENous Q4H PRN  
 lidocaine (LIDODERM) 5 % patch 1 Patch  1 Patch TransDERmal Q24H  
 fat emulsion 20% (LIPOSYN, INTRAlipid) infusion 500 mL  500 mL IntraVENous EVERY MON & TH  
 lidocaine (XYLOCAINE) 2 % viscous solution 15 mL  15 mL Mouth/Throat PRN  phenol throat spray (CHLORASEPTIC) 1 Spray  1 Spray Oral PRN  
 guaiFENesin ER (MUCINEX) tablet 600 mg  600 mg Oral BID PRN  
 [Held by provider] rosuvastatin (CRESTOR) tablet 10 mg  10 mg Oral QHS  benzocaine-menthol (CEPACOL) lozenge 1 Lozenge  1 Lozenge Mucous Membrane PRN  
 hydrALAZINE (APRESOLINE) 20 mg/mL injection 10 mg  10 mg IntraVENous Q6H PRN  
 sodium chloride (NS) flush 5-40 mL  5-40 mL IntraVENous PRN  
 acetaminophen (TYLENOL) tablet 650 mg  650 mg Oral Q4H PRN  
 [Held by provider] carvedilol (COREG) tablet 6.25 mg  6.25 mg Oral BID WITH MEALS Adriana Magallanes NP 
10/4/2019 I have independently examined the patient and have reviewed the chart. I agree with the above plan. Patient seen earlier in the morning. H/H down significantly with clinical signs of hematoma in subcutaneous tissues. I asked the nurses to stop her heparin, give 2 units RBC's and to get stat CT. CT showed no intra-abdominal bleeding but a large hematoma in the subcutaneous tissue. Will open this at beside to evacuate the hematoma. Follow H/H and transfuse as needed. If continues to bleed off heparin gtt, will need wound explored in OR. Patient still complaining of a lot of nausea. Relgan dose can be increased if this persists.    
 
Naomi Noriega MD

## 2019-10-04 NOTE — PROGRESS NOTES
Spiritual Care Partner Volunteer visited patient in room 216/01 on 10.04.19. Documented by: : Rev. Vashti Russo. Daniel Pi; [facility], to contact [phone] [address] call: [phone]

## 2019-10-04 NOTE — PROGRESS NOTES
Bedside shift change report given to enrique (oncoming nurse) by Rashid Keith (offgoing nurse). Report included the following information SBAR and Kardex.

## 2019-10-04 NOTE — PROGRESS NOTES
Clinical Pharmacy Note: Metronidazole Dosing Please note that the metronidazole dose for Kenyatta Quintana has been changed to 500 mg IV q12h per [facility]-approved protocol. Please contact the pharmacy with any questions. Erich Duncan

## 2019-10-04 NOTE — PROGRESS NOTES
Chart reviewed, discussed with nursing, patient receiving blood - will defer PT this pm - continue to follow.  
Scott Mejia, PT

## 2019-10-05 NOTE — PROGRESS NOTES
Progress Note Patient: Carlotta Stanton MRN: [id number]  SSN: [id number] YOB: 1940  Age: 70 y.o. Sex: female Admit Date: 2019 
 
8 Days Post-Op Procedure:  Procedure(s): 
GASTROJEJUNOSTOMY Subjective: No acute surgical issues. Pt overall is doing well. Tolerating clears. Hemoglobin is stable. No evidence of further bleeding from abdominal wall incision Objective:  
 
Visit Vitals /70 (BP 1 Location: Left arm, BP Patient Position: At rest) Pulse 98 Temp 98.7 °F (37.1 °C) Resp 16 Ht 5' 4\" (1.626 m) Wt 119 lb 0.8 oz (54 kg) SpO2 95% BMI 20.43 kg/m² Temp (24hrs), Av.4 °F (36.9 °C), Min:98.1 °F (36.7 °C), Max:98.7 °F (37.1 °C) Physical Exam:   
Gen:  NAD Pulm:  Unlabored Abd:  S/mildly distended/appropriate TTP Wound:  Ecchymosis with edema periwound site in abdomen. No active bleeding Recent Results (from the past 24 hour(s)) GLUCOSE, POC Collection Time: 10/05/19 12:20 AM  
Result Value Ref Range Glucose (POC) 137 (H) 65 - 100 mg/dL Performed by Shalonda Mckenzie   
CBC WITH AUTOMATED DIFF Collection Time: 10/05/19  1:43 AM  
Result Value Ref Range WBC 9.1 3.6 - 11.0 K/uL  
 RBC 2.76 (L) 3.80 - 5.20 M/uL HGB 8.6 (L) 11.5 - 16.0 g/dL HCT 27.2 (L) 35.0 - 47.0 % MCV 98.6 80.0 - 99.0 FL  
 MCH 31.2 26.0 - 34.0 PG  
 MCHC 31.6 30.0 - 36.5 g/dL  
 RDW 15.9 (H) 11.5 - 14.5 % PLATELET 704 (L) 786 - 400 K/uL MPV 11.8 8.9 - 12.9 FL  
 NRBC 1.0 (H) 0  WBC ABSOLUTE NRBC 0.09 (H) 0.00 - 0.01 K/uL NEUTROPHILS 74 32 - 75 % BAND NEUTROPHILS 2 0 - 6 % LYMPHOCYTES 16 12 - 49 % MONOCYTES 7 5 - 13 % EOSINOPHILS 1 0 - 7 % BASOPHILS 0 0 - 1 % IMMATURE GRANULOCYTES 0 %  
 ABS. NEUTROPHILS 6.9 1.8 - 8.0 K/UL  
 ABS. LYMPHOCYTES 1.5 0.8 - 3.5 K/UL  
 ABS. MONOCYTES 0.6 0.0 - 1.0 K/UL  
 ABS. EOSINOPHILS 0.1 0.0 - 0.4 K/UL  
 ABS. BASOPHILS 0.0 0.0 - 0.1 K/UL  
 ABS. IMM. GRANS. 0.0 K/UL DF MANUAL    
 RBC COMMENTS ANISOCYTOSIS 1+ 
    
 RBC COMMENTS MACROCYTOSIS 1+ 
    
 RBC COMMENTS POLYCHROMASIA 1+ METABOLIC PANEL, BASIC Collection Time: 10/05/19  1:43 AM  
Result Value Ref Range Sodium 140 136 - 145 mmol/L Potassium 3.8 3.5 - 5.1 mmol/L Chloride 114 (H) 97 - 108 mmol/L  
 CO2 20 (L) 21 - 32 mmol/L Anion gap 6 5 - 15 mmol/L Glucose 121 (H) 65 - 100 mg/dL BUN 37 (H) 6 - 20 MG/DL Creatinine 1.00 0.55 - 1.02 MG/DL  
 BUN/Creatinine ratio 37 (H) 12 - 20 GFR est AA >60 >60 ml/min/1.73m2 GFR est non-AA 54 (L) >60 ml/min/1.73m2 Calcium 8.5 8.5 - 10.1 MG/DL  
PTT Collection Time: 10/05/19  1:48 AM  
Result Value Ref Range aPTT 22.5 22.1 - 32.0 sec  
 aPTT, therapeutic range     58.0 - 77.0 SECS Assessment:  
 
Hospital Problems  Date Reviewed: 9/14/2019 Codes Class Noted POA Acute pancreatitis ICD-10-CM: K85.90 ICD-9-CM: 052.4  9/14/2019 Yes TOÑA (acute kidney injury) ([facility] [address] ICD-10-CM: N17.9 ICD-9-CM: 584.9  9/14/2019 Yes Hypokalemia ICD-10-CM: E87.6 ICD-9-CM: 276.8  9/14/2019 Yes GI bleed ICD-10-CM: K92.2 ICD-9-CM: 578.9  9/14/2019 Yes Coffee ground emesis ICD-10-CM: K92.0 ICD-9-CM: 578.0  9/14/2019 Yes Plan/Recommendations/Medical Decision Making: - Acute anemia:  No evidence of active bleeding. Monitor hemoglobin and transfuse as indicated - Clears and advance to full liquids tomorrow - Renew TPN

## 2019-10-05 NOTE — ROUTINE PROCESS
Bedside shift change report given to Rajeev (oncoming nurse) by Pat Murphy (offgoing nurse). Report included the following information SBAR, Kardex, MAR and Recent Results.

## 2019-10-05 NOTE — PROGRESS NOTES
Hospitalist Progress Note Laura Valdes MD 
Answering service: [phone] OR [phone] from in house phone Date of Service:  10/5/2019 NAME:  Mildred Calvert :  1940 MRN:  [id number] Admission Summary:  
68-year-old female with PMH CAD,HLD, chronic systolic CHF, irritable bowel syndrome and pancreatitis, s/p cholecystectomy ,presented to ER with c/o  progressively worsening nausea and coffee-grounds vomitus. x 3 days associated with  some intermittent mild abdominal pain with some radiation to the upper back. Interval history / Subjective:  
Minimal po intake C/o pain in the epigastric area No other new complaints. WBC back to normal 
Hemoglobin is stable  
continues to be fatigued NO PT today yet Assessment & Plan:  
 
Acute on chronic recurrent Pancreatitis Adenocarcinoma of pancreas ,metastasis to Liver  
-S/p palliative gastrojejunostomy 
-EGD initially -  Done aborted due to inability to pass scope down to Duodenum 
-CT abd/Pelvis ,GOO and possible stricture along duodenum  causing obstruction 
-NG tube for decompression -CEA- normal 
-Ca19-9  - 3596 
-NPO  
- -EGD showed duodenal obstruction - extrinsic compression causing duodenal obstruction,hiatal hernia and esophagitis. -MRI shows pancreatic mass with liver mets 
-pt continues to have nausea and vomiting. --Oncology eval, ~plan for chemo once pt is stable 
-surgery following, recommend to advance diet  
-On  TPN  
  
Nausea,vomiting, 
-KUB with non specific gas pattern -upper GI Series 10/30- show patent gastrojejunal anastomosis with no evident leak 
-risks for aspiration, cxr  with no acute process 
-aspiration precautions 
-prn antiemetics 
-IVF  
-pain control  
-advance diet per surgery recs 
  
Anemia,acute on chronic 
-Hgb 5.4,5.6<- 9. Hemoglobin went back up to 8.6 today 
-no overt bleeding 
-CT abd/pelvis with no acute change -transfusion started PRBC, s/p 1 unit  
-heparin stopped 
  
Possible Leukocytosis-resolved 
-no fever 
-monitor for sepsis 
- monitor on tele 
-check cultures,cxr--> NGTD 
-broad spectrum antibiotics iv vanc,zosyn-->changed to levo/flagyl 
  
 TOÑA d/t dehydration 
- CT abb/p with Hydronephrosis  
-Cr. improving 1.1 1.2  
-with ivf  
-monitor Thrombus ,intrahepatic IVC clot  
- heparin gtt  Stopped due to severe anemia  
-Change to lovenox for outpatient / per Dr Tennille Mckeon 
  
  
Hypokalemia  
-Resolved 
-monitor 
  
CAD. -h/O NSTEMI 
-hold meds ,due to hypotension 
  
MalNutrition Body mass index is 19.83 kg/m². Due to cancer and GOO 
picc line Central TPN , renewed  
  
  
DNR/DNI with a guarded prognosis. D/W patient and family. Palliative on board  
  
patient's Son and daughter Dominick Mayer ([phone])  
  
Code status: DNR 
DVT prophylaxis: heparin on hold for anemia. Will order SCD 
  
Care Plan discussed with: Patient Disposition: TBD based on PT/OT eval but likely facility Hospital Problems  Date Reviewed: 9/14/2019 Codes Class Noted POA Acute pancreatitis ICD-10-CM: K85.90 ICD-9-CM: 399.2  9/14/2019 Yes TOÑA (acute kidney injury) ([facility] [address] ICD-10-CM: N17.9 ICD-9-CM: 584.9  9/14/2019 Yes Hypokalemia ICD-10-CM: E87.6 ICD-9-CM: 276.8  9/14/2019 Yes GI bleed ICD-10-CM: K92.2 ICD-9-CM: 578.9  9/14/2019 Yes Coffee ground emesis ICD-10-CM: K92.0 ICD-9-CM: 578.0  9/14/2019 Yes Review of Systems: A comprehensive review of systems was negative except for that written in the HPI. Vital Signs:  
 Last 24hrs VS reviewed since prior progress note. Most recent are: 
Visit Vitals /70 (BP 1 Location: Left arm, BP Patient Position: At rest) Pulse 98 Temp 98.7 °F (37.1 °C) Resp 16 Ht 5' 4\" (1.626 m) Wt 54 kg (119 lb 0.8 oz) SpO2 95% BMI 20.43 kg/m² Intake/Output Summary (Last 24 hours) at 10/5/2019 1600 Last data filed at 10/4/2019 1651 Gross per 24 hour Intake 715 ml Output 650 ml Net 65 ml Physical Examination:  
 
 
     
Constitutional:  No acute distress, cooperative, pleasant ENT:  Oral mucous moist, oropharynx benign. Resp:  CTA bilaterally. No wheezing/rhonchi/rales. No accessory muscle use CV:  Regular rhythm, normal rate, no murmurs, gallops, rubs GI:  Soft, non distended, tender in the epigastric area. normoactive bowel sounds, no hepatosplenomegaly Musculoskeletal:  No edema, warm, 2+ pulses throughout Neurologic:  Moves all extremities. AAOx3, CN II-XII reviewed Skin:  Good turgor, no rashes or ulcers Data Review:  
 Review and/or order of clinical lab test 
 
 
Labs:  
 
Recent Labs 10/05/19 
0143 10/04/19 
1535 WBC 9.1 11.5* HGB 8.6* 8.4* HCT 27.2* 26.3*  
* 161 Recent Labs 10/05/19 
0143 10/04/19 
5971 10/03/19 
0110  142 140  
K 3.8 4.4 4.2 * 115* 110* CO2 20* 19* 21 BUN 37* 60* 74* CREA 1.00 1.18* 1.29* * 96 138* CA 8.5 8.4* 8.6 MG  --   --  2.4 PHOS  --   --  5.2* No results for input(s): SGOT, GPT, ALT, AP, TBIL, TBILI, TP, ALB, GLOB, GGT, AML, LPSE in the last 72 hours. No lab exists for component: AMYP, HLPSE Recent Labs 10/05/19 
0148 10/03/19 
1534 10/03/19 
0256 APTT 22.5 62.4* 65.5* No results for input(s): FE, TIBC, PSAT, FERR in the last 72 hours. No results found for: FOL, RBCF No results for input(s): PH, PCO2, PO2 in the last 72 hours. No results for input(s): CPK, CKNDX, TROIQ in the last 72 hours. No lab exists for component: CPKMB Lab Results Component Value Date/Time Cholesterol, total 85 09/15/2019 04:37 AM  
 HDL Cholesterol 43 09/15/2019 04:37 AM  
 LDL, calculated 29 09/15/2019 04:37 AM  
 Triglyceride 65 09/15/2019 04:37 AM  
 CHOL/HDL Ratio 2.0 09/15/2019 04:37 AM  
 
Lab Results Component Value Date/Time Glucose (POC) 137 (H) 10/05/2019 12:20 AM  
 Glucose (POC) 141 (H) 10/04/2019 12:03 PM  
 Glucose (POC) 133 (H) 10/04/2019 06:28 AM  
 Glucose (POC) 157 (H) 10/03/2019 06:22 PM  
 Glucose (POC) 153 (H) 10/03/2019 12:09 PM  
 
Lab Results Component Value Date/Time Color YELLOW/STRAW 09/15/2019 12:33 AM  
 Appearance CLOUDY (A) 09/15/2019 12:33 AM  
 Specific gravity 1.019 09/15/2019 12:33 AM  
 pH (UA) 6.0 09/15/2019 12:33 AM  
 Protein 100 (A) 09/15/2019 12:33 AM  
 Glucose NEGATIVE  09/15/2019 12:33 AM  
 Ketone NEGATIVE  09/15/2019 12:33 AM  
 Bilirubin NEGATIVE  09/15/2019 12:33 AM  
 Urobilinogen 0.2 09/15/2019 12:33 AM  
 Nitrites NEGATIVE  09/15/2019 12:33 AM  
 Leukocyte Esterase SMALL (A) 09/15/2019 12:33 AM  
 Epithelial cells MODERATE (A) 09/15/2019 12:33 AM  
 Bacteria 2+ (A) 09/15/2019 12:33 AM  
 WBC 10-20 09/15/2019 12:33 AM  
 RBC 0-5 09/15/2019 12:33 AM  
 
 
 
Medications Reviewed:  
 
Current Facility-Administered Medications Medication Dose Route Frequency  TPN ADULT - CENTRAL   IntraVENous TITRATE  
 0.9% sodium chloride infusion 250 mL  250 mL IntraVENous PRN  
 0.9% sodium chloride infusion 250 mL  250 mL IntraVENous PRN  
 metroNIDAZOLE (FLAGYL) IVPB premix 500 mg  500 mg IntraVENous Q12H  levoFLOXacin (LEVAQUIN) 750 mg in D5W IVPB  750 mg IntraVENous Q48H  
 metoclopramide HCl (REGLAN) injection 5 mg  5 mg IntraVENous TIDAC  lactated Ringers infusion  75 mL/hr IntraVENous CONTINUOUS  
 ondansetron (ZOFRAN) injection 4 mg  4 mg IntraVENous Q4H  
 metoprolol (LOPRESSOR) injection 5 mg  5 mg IntraVENous Q6H  
 acetaminophen (TYLENOL) suppository 650 mg  650 mg Rectal Q4H PRN  
 0.9% sodium chloride infusion 250 mL  250 mL IntraVENous PRN  
 morphine injection 2 mg  2 mg IntraVENous Q4H PRN  
 lidocaine (LIDODERM) 5 % patch 1 Patch  1 Patch TransDERmal Q24H  
 fat emulsion 20% (LIPOSYN, INTRAlipid) infusion 500 mL  500 mL IntraVENous EVERY MON & TH  
  lidocaine (XYLOCAINE) 2 % viscous solution 15 mL  15 mL Mouth/Throat PRN  phenol throat spray (CHLORASEPTIC) 1 Spray  1 Spray Oral PRN  
 guaiFENesin ER (MUCINEX) tablet 600 mg  600 mg Oral BID PRN  
 [Held by provider] rosuvastatin (CRESTOR) tablet 10 mg  10 mg Oral QHS  benzocaine-menthol (CEPACOL) lozenge 1 Lozenge  1 Lozenge Mucous Membrane PRN  
 hydrALAZINE (APRESOLINE) 20 mg/mL injection 10 mg  10 mg IntraVENous Q6H PRN  
 sodium chloride (NS) flush 5-40 mL  5-40 mL IntraVENous PRN  
 acetaminophen (TYLENOL) tablet 650 mg  650 mg Oral Q4H PRN  
 [Held by provider] carvedilol (COREG) tablet 6.25 mg  6.25 mg Oral BID WITH MEALS  
 
______________________________________________________________________ EXPECTED LENGTH OF STAY: 4d 19h ACTUAL LENGTH OF STAY:          21 Rakesh Glover MD

## 2019-10-05 NOTE — PROGRESS NOTES
Problem: Patient Education: Go to Patient Education Activity Goal: Patient/Family Education Outcome: Progressing Towards Goal 
  
Problem: Pain Goal: *Control of Pain Outcome: Progressing Towards Goal 
  
Problem: Patient Education: Go to Patient Education Activity Goal: Patient/Family Education Outcome: Progressing Towards Goal

## 2019-10-06 NOTE — PROGRESS NOTES
Pt started with congested cough bringing up small amounts of phlegm. Mucinex po given as ordered. Alexsandra Glover NP notified. Chest xray ordered and IS restarted.

## 2019-10-06 NOTE — PROGRESS NOTES
Hospitalist Progress Note Iggy Caldwell MD 
Answering service: [phone] OR [phone] from in house phone Date of Service:  10/6/2019 NAME:  Lorri Manriquez :  1940 MRN:  [id number] Admission Summary:  
61-year-old female with PMH CAD,HLD, chronic systolic CHF, irritable bowel syndrome and pancreatitis, s/p cholecystectomy ,presented to ER with c/o  progressively worsening nausea and coffee-grounds vomitus. x 3 days associated with  some intermittent mild abdominal pain with some radiation to the upper back. 
  
 
Interval history / Subjective: No new c/o Still with pain in the epigastric area Has not been out of bed yet Tolerating some PO Still on TPN Twin sister at bedside Had a large BM this am   
 
Assessment & Plan:  
 
Acute on chronic recurrent Pancreatitis Adenocarcinoma of pancreas ,metastasis to Liver  
-S/p palliative gastrojejunostomy 
-EGD initially -  Done aborted due to inability to pass scope down to Duodenum 
-CT abd/Pelvis ,GOO and possible stricture along duodenum  causing obstruction 
-NG tube for decompression-removed -CEA- normal 
-Ca19-9  - 3596 
-NPO  
- -EGD showed duodenal obstruction - extrinsic compression causing duodenal obstruction,hiatal hernia and esophagitis. -MRI shows pancreatic mass with liver mets 
-pt continues to have nausea and vomiting. --Oncology eval, ~plan for chemo once pt is stable 
-surgery following, recommend to advance diet  
-On  TPN  
  
Nausea,vomiting-improving  
-KUB with non specific gas pattern -upper GI Series 10/30- show patent gastrojejunal anastomosis with no evident leak 
-risks for aspiration, cxr  with no acute process 
-aspiration precautions 
-prn antiemetics 
-IVF  
-pain control  
-advance diet per surgery recs 
  
Anemia,acute on chronic 
-Hgb 5.4,5.6<- 9. Hemoglobin went back up to 8.6  
-no overt bleeding -CT abd/pelvis with no acute change 
-transfusion started PRBC, s/p 1 unit  
-heparin stopped -CBC is pending  
  
Possible Leukocytosis-resolved 
-no fever 
-monitor for sepsis 
- monitor on tele 
-check cultures,cxr--> NGTD 
-broad spectrum antibiotics iv vanc,zosyn-->changed to levo/flagyl 
  
 TOÑA d/t dehydration 
- CT abb/p with Hydronephrosis  
-Cr. iimproved with IVF   
 
  
Thrombus ,intrahepatic IVC clot  
- heparin gtt  Stopped due to severe anemia  
-Change to lovenox for outpatient / per Dr Jovan Hernández 
 -Will start on low dose of Lovenox since she has not had any overt bleeding or hematemesis recently  
  
Hypokalemia  
-Resolved 
-monitor 
  
CAD. -h/O NSTEMI 
-hold meds ,due to hypotension 
  
MalNutrition Body mass index is 19.83 kg/m².  
Due to cancer and GOO 
picc line Central TPN , renewed  
  
  
DNR/DNI with a guarded prognosis. D/W patient and family. Palliative on board  
  
patient's Son and daughter Silva Jack ([phone])  
  
Code status: DNR 
DVT prophylaxis: heparin on hold for anemia. Will order SCD 
  
Care Plan discussed with: Patient Disposition: TBD based on PT/OT eval but likely facility Hospital Problems  Date Reviewed: 9/14/2019 Codes Class Noted POA Acute pancreatitis ICD-10-CM: K85.90 ICD-9-CM: 806.7  9/14/2019 Yes TOÑA (acute kidney injury) ([facility] [address] ICD-10-CM: N17.9 ICD-9-CM: 584.9  9/14/2019 Yes Hypokalemia ICD-10-CM: E87.6 ICD-9-CM: 276.8  9/14/2019 Yes GI bleed ICD-10-CM: K92.2 ICD-9-CM: 578.9  9/14/2019 Yes Coffee ground emesis ICD-10-CM: K92.0 ICD-9-CM: 578.0  9/14/2019 Yes Review of Systems: A comprehensive review of systems was negative except for that written in the HPI. Vital Signs:  
 Last 24hrs VS reviewed since prior progress note. Most recent are: 
Visit Vitals /70 (BP 1 Location: Left arm, BP Patient Position: At rest) Pulse 99 Temp 98 °F (36.7 °C) Resp 16  
 Ht 5' 4\" (1.626 m) Wt 54.3 kg (119 lb 11.4 oz) SpO2 98% BMI 20.55 kg/m² Intake/Output Summary (Last 24 hours) at 10/6/2019 1401 Last data filed at 10/6/2019 0600 Gross per 24 hour Intake  Output 700 ml Net -700 ml Physical Examination:  
 
 
     
Constitutional:  No acute distress, cooperative, pleasant ENT:  Oral mucous moist, oropharynx benign. Resp:  CTA bilaterally. No wheezing/rhonchi/rales. No accessory muscle use CV:  Regular rhythm, normal rate, no murmurs, gallops, rubs GI:  Soft, non distended, tender in the epigastric area. normoactive bowel sounds, no hepatosplenomegaly Musculoskeletal:  No edema, warm, 2+ pulses throughout Neurologic:  Moves all extremities. AAOx3, CN II-XII reviewed Skin:  Good turgor, no rashes or ulcers Data Review:  
 Review and/or order of clinical lab test 
 
 
Labs:  
 
Recent Labs 10/05/19 
0143 10/04/19 
1535 WBC 9.1 11.5* HGB 8.6* 8.4* HCT 27.2* 26.3*  
* 161 Recent Labs 10/05/19 
0143 10/04/19 
1179  142  
K 3.8 4.4  
* 115* CO2 20* 19* BUN 37* 60* CREA 1.00 1.18* * 96  
CA 8.5 8.4* No results for input(s): SGOT, GPT, ALT, AP, TBIL, TBILI, TP, ALB, GLOB, GGT, AML, LPSE in the last 72 hours. No lab exists for component: AMYP, HLPSE Recent Labs 10/06/19 
0058 10/05/19 
0148 10/03/19 
1534 APTT 23.5 22.5 62.4* No results for input(s): FE, TIBC, PSAT, FERR in the last 72 hours. No results found for: FOL, RBCF No results for input(s): PH, PCO2, PO2 in the last 72 hours. No results for input(s): CPK, CKNDX, TROIQ in the last 72 hours. No lab exists for component: CPKMB Lab Results Component Value Date/Time Cholesterol, total 85 09/15/2019 04:37 AM  
 HDL Cholesterol 43 09/15/2019 04:37 AM  
 LDL, calculated 29 09/15/2019 04:37 AM  
 Triglyceride 65 09/15/2019 04:37 AM  
 CHOL/HDL Ratio 2.0 09/15/2019 04:37 AM  
 
Lab Results Component Value Date/Time Glucose (POC) 137 (H) 10/05/2019 12:20 AM  
 Glucose (POC) 141 (H) 10/04/2019 12:03 PM  
 Glucose (POC) 133 (H) 10/04/2019 06:28 AM  
 Glucose (POC) 157 (H) 10/03/2019 06:22 PM  
 Glucose (POC) 153 (H) 10/03/2019 12:09 PM  
 
Lab Results Component Value Date/Time Color YELLOW/STRAW 09/15/2019 12:33 AM  
 Appearance CLOUDY (A) 09/15/2019 12:33 AM  
 Specific gravity 1.019 09/15/2019 12:33 AM  
 pH (UA) 6.0 09/15/2019 12:33 AM  
 Protein 100 (A) 09/15/2019 12:33 AM  
 Glucose NEGATIVE  09/15/2019 12:33 AM  
 Ketone NEGATIVE  09/15/2019 12:33 AM  
 Bilirubin NEGATIVE  09/15/2019 12:33 AM  
 Urobilinogen 0.2 09/15/2019 12:33 AM  
 Nitrites NEGATIVE  09/15/2019 12:33 AM  
 Leukocyte Esterase SMALL (A) 09/15/2019 12:33 AM  
 Epithelial cells MODERATE (A) 09/15/2019 12:33 AM  
 Bacteria 2+ (A) 09/15/2019 12:33 AM  
 WBC 10-20 09/15/2019 12:33 AM  
 RBC 0-5 09/15/2019 12:33 AM  
 
 
 
Medications Reviewed:  
 
Current Facility-Administered Medications Medication Dose Route Frequency  TPN ADULT - CENTRAL   IntraVENous TITRATE  
 0.9% sodium chloride infusion 250 mL  250 mL IntraVENous PRN  
 0.9% sodium chloride infusion 250 mL  250 mL IntraVENous PRN  
 metroNIDAZOLE (FLAGYL) IVPB premix 500 mg  500 mg IntraVENous Q12H  levoFLOXacin (LEVAQUIN) 750 mg in D5W IVPB  750 mg IntraVENous Q48H  
 metoclopramide HCl (REGLAN) injection 5 mg  5 mg IntraVENous TIDAC  lactated Ringers infusion  75 mL/hr IntraVENous CONTINUOUS  
 ondansetron (ZOFRAN) injection 4 mg  4 mg IntraVENous Q4H  
 metoprolol (LOPRESSOR) injection 5 mg  5 mg IntraVENous Q6H  
 acetaminophen (TYLENOL) suppository 650 mg  650 mg Rectal Q4H PRN  
 0.9% sodium chloride infusion 250 mL  250 mL IntraVENous PRN  
 morphine injection 2 mg  2 mg IntraVENous Q4H PRN  
 lidocaine (LIDODERM) 5 % patch 1 Patch  1 Patch TransDERmal Q24H  
 fat emulsion 20% (LIPOSYN, INTRAlipid) infusion 500 mL  500 mL IntraVENous EVERY MON & TH  
 lidocaine (XYLOCAINE) 2 % viscous solution 15 mL  15 mL Mouth/Throat PRN  phenol throat spray (CHLORASEPTIC) 1 Spray  1 Spray Oral PRN  
 guaiFENesin ER (MUCINEX) tablet 600 mg  600 mg Oral BID PRN  
 [Held by provider] rosuvastatin (CRESTOR) tablet 10 mg  10 mg Oral QHS  benzocaine-menthol (CEPACOL) lozenge 1 Lozenge  1 Lozenge Mucous Membrane PRN  
 hydrALAZINE (APRESOLINE) 20 mg/mL injection 10 mg  10 mg IntraVENous Q6H PRN  
 sodium chloride (NS) flush 5-40 mL  5-40 mL IntraVENous PRN  
 acetaminophen (TYLENOL) tablet 650 mg  650 mg Oral Q4H PRN  
 [Held by provider] carvedilol (COREG) tablet 6.25 mg  6.25 mg Oral BID WITH MEALS  
 
______________________________________________________________________ EXPECTED LENGTH OF STAY: 4d 19h ACTUAL LENGTH OF STAY:          22 Jose L Elliott MD

## 2019-10-06 NOTE — ROUTINE PROCESS
Bedside shift change report given to Rajeev (oncoming nurse) by Gumaro Oliveira (offgoing nurse). Report included the following information SBAR, Kardex, Intake/Output, MAR and Recent Results.

## 2019-10-06 NOTE — PROGRESS NOTES
Progress Note Patient: Timothy Ramírez MRN: [id number]  SSN: [id number] YOB: 1940  Age: 70 y.o. Sex: female Admit Date: 2019 
 
9 Days Post-Op Procedure:  Procedure(s): 
GASTROJEJUNOSTOMY Subjective: No acute surgical issues. Pt overall is doing well. Tolerating full liquids. Hemoglobin is stable. Minimal oozing noted from midline abdominal wound Objective:  
 
Visit Vitals /65 (BP 1 Location: Left arm, BP Patient Position: At rest) Pulse 89 Temp 98.5 °F (36.9 °C) Resp 16 Ht 5' 4\" (1.626 m) Wt 119 lb 11.4 oz (54.3 kg) SpO2 99% BMI 20.55 kg/m² Temp (24hrs), Av.2 °F (36.8 °C), Min:98 °F (36.7 °C), Max:98.5 °F (36.9 °C) Physical Exam:   
Gen:  NAD Pulm:  Unlabored Abd:  S/mildly distended/appropriate TTP Wound:  Ecchymosis with edema periwound site in abdomen and minimal venous oozing from old blood but no active bleeding Recent Results (from the past 24 hour(s)) PTT Collection Time: 10/06/19 12:58 AM  
Result Value Ref Range aPTT 23.5 22.1 - 32.0 sec  
 aPTT, therapeutic range     58.0 - 77.0 SECS  
CBC W/O DIFF Collection Time: 10/06/19  3:19 PM  
Result Value Ref Range WBC 7.7 3.6 - 11.0 K/uL  
 RBC 2.49 (L) 3.80 - 5.20 M/uL HGB 7.8 (L) 11.5 - 16.0 g/dL HCT 24.7 (L) 35.0 - 47.0 % MCV 99.2 (H) 80.0 - 99.0 FL  
 MCH 31.3 26.0 - 34.0 PG  
 MCHC 31.6 30.0 - 36.5 g/dL  
 RDW 16.6 (H) 11.5 - 14.5 % PLATELET 467 (L) 090 - 400 K/uL MPV 11.4 8.9 - 12.9 FL  
 NRBC 0.0 0  WBC ABSOLUTE NRBC 0.00 0.00 - 0.01 K/uL METABOLIC PANEL, BASIC Collection Time: 10/06/19  3:19 PM  
Result Value Ref Range Sodium 141 136 - 145 mmol/L Potassium 3.3 (L) 3.5 - 5.1 mmol/L Chloride 112 (H) 97 - 108 mmol/L  
 CO2 22 21 - 32 mmol/L Anion gap 7 5 - 15 mmol/L Glucose 100 65 - 100 mg/dL BUN 27 (H) 6 - 20 MG/DL  Creatinine 0.82 0.55 - 1.02 MG/DL  
 BUN/Creatinine ratio 33 (H) 12 - 20    
 GFR est AA >60 >60 ml/min/1.73m2 GFR est non-AA >60 >60 ml/min/1.73m2 Calcium 8.0 (L) 8.5 - 10.1 MG/DL Assessment:  
 
Hospital Problems  Date Reviewed: 9/14/2019 Codes Class Noted POA Acute pancreatitis ICD-10-CM: K85.90 ICD-9-CM: 627.4  9/14/2019 Yes TOÑA (acute kidney injury) ([facility] [address] ICD-10-CM: N17.9 ICD-9-CM: 584.9  9/14/2019 Yes Hypokalemia ICD-10-CM: E87.6 ICD-9-CM: 276.8  9/14/2019 Yes GI bleed ICD-10-CM: K92.2 ICD-9-CM: 578.9  9/14/2019 Yes Coffee ground emesis ICD-10-CM: K92.0 ICD-9-CM: 578.0  9/14/2019 Yes Plan/Recommendations/Medical Decision Making: - Acute anemia:  No evidence of active bleeding. Monitor hemoglobin and transfuse as indicated -  Full liquids tomorrow - Renew TPN

## 2019-10-07 NOTE — PROGRESS NOTES
CM sent referral to these facilities via cc link: 
 
1. [facility] 2. [facility] 3. [facility] Pending acceptance, doesn't need auth. Rosette Martinez,BSW,MSW,[facility] Clinical  [phone]

## 2019-10-07 NOTE — PROGRESS NOTES
A family meeting was held today at 1 pm; where patient's daughter Joseph Brasher [phone], son Betsy Sorto [phone], CM Coordinator Bigg Bustillo, and this writer participated. CM outlined the current status of  patient's transition of care plan and opened up the discussion for future long term care options. Chelle Crandall were open to suggestion/recommendation (s) made by Shey Abdullahi and are appreciative of this opportunity to discuss ROBYN. Both the daughter and son of the patient agreed that rehab in a skilled nursing facility is the immediate plan and they will work on a plan after the discharge form subacute facility. CM Coordinator educated on utilizing patient's resources towards care and tracking it as spend down for future medicaid application. Pt family is agreement that pt doesn't qualify for medicaid at this moment and will have to work with subacute facility to apply medicaid later.  has provided a freedom of choice letter and a list of SNF facilities to pt's daughter. Once they chose facilities of their choice, this writer will sent a referral to those facilities and follow up. Rosette Martinez,BSW,MSW,[facility] Clinical  [phone]

## 2019-10-07 NOTE — PROGRESS NOTES
Ms. Curtis Winkler c/o insomnia. Nausea controlled. Tolerating full liquid diet. Tm 98.8 HR: 96 BP: 117/75 Resp Rate: 16 97% sat on room air. Intake/Output Summary (Last 24 hours) at 10/7/2019 1274 Last data filed at 10/6/2019 2300 Gross per 24 hour Intake  Output 600 ml Net -600 ml Exam: Cor: RRR. Lungs: Bilateral breath sounds. Clear to auscultation. Abd: Soft. Non tender. Non distended. Incision clean. No bleeding from inferior aspect of incision. Bruising of abdominal wall. Labs:  
Recent Results (from the past 12 hour(s)) GLUCOSE, POC Collection Time: 10/07/19 12:06 AM  
Result Value Ref Range Glucose (POC) 134 (H) 65 - 100 mg/dL Performed by Paulino Hill Collection Time: 10/07/19 12:24 AM  
Result Value Ref Range Magnesium 1.6 1.6 - 2.4 mg/dL CBC WITH AUTOMATED DIFF Collection Time: 10/07/19 12:24 AM  
Result Value Ref Range WBC 7.6 3.6 - 11.0 K/uL  
 RBC 2.63 (L) 3.80 - 5.20 M/uL HGB 8.2 (L) 11.5 - 16.0 g/dL HCT 26.0 (L) 35.0 - 47.0 % MCV 98.9 80.0 - 99.0 FL  
 MCH 31.2 26.0 - 34.0 PG  
 MCHC 31.5 30.0 - 36.5 g/dL  
 RDW 16.4 (H) 11.5 - 14.5 % PLATELET 187 (L) 224 - 400 K/uL MPV 11.2 8.9 - 12.9 FL  
 NRBC 0.0 0  WBC ABSOLUTE NRBC 0.00 0.00 - 0.01 K/uL NEUTROPHILS 88 (H) 32 - 75 % LYMPHOCYTES 8 (L) 12 - 49 % MONOCYTES 4 (L) 5 - 13 % EOSINOPHILS 0 0 - 7 % BASOPHILS 0 0 - 1 % IMMATURE GRANULOCYTES 0 %  
 ABS. NEUTROPHILS 6.7 1.8 - 8.0 K/UL  
 ABS. LYMPHOCYTES 0.6 (L) 0.8 - 3.5 K/UL  
 ABS. MONOCYTES 0.3 0.0 - 1.0 K/UL  
 ABS. EOSINOPHILS 0.0 0.0 - 0.4 K/UL  
 ABS. BASOPHILS 0.0 0.0 - 0.1 K/UL  
 ABS. IMM. GRANS. 0.0 K/UL  
 DF AUTOMATED    
 RBC COMMENTS MACROCYTOSIS 1+ 
    
 RBC COMMENTS ANISOCYTOSIS 1+ 
    
 RBC COMMENTS BASOPHILIC STIPPLING 
PRESENT 
    
 RBC COMMENTS POLYCHROMASIA 1+ METABOLIC PANEL, COMPREHENSIVE  
 Collection Time: 10/07/19 12:24 AM  
Result Value Ref Range Sodium 139 136 - 145 mmol/L Potassium 3.1 (L) 3.5 - 5.1 mmol/L Chloride 111 (H) 97 - 108 mmol/L  
 CO2 24 21 - 32 mmol/L Anion gap 4 (L) 5 - 15 mmol/L Glucose 114 (H) 65 - 100 mg/dL BUN 23 (H) 6 - 20 MG/DL Creatinine 0.81 0.55 - 1.02 MG/DL  
 BUN/Creatinine ratio 28 (H) 12 - 20 GFR est AA >60 >60 ml/min/1.73m2 GFR est non-AA >60 >60 ml/min/1.73m2 Calcium 8.1 (L) 8.5 - 10.1 MG/DL Bilirubin, total 0.7 0.2 - 1.0 MG/DL  
 ALT (SGPT) 65 12 - 78 U/L  
 AST (SGOT) 38 (H) 15 - 37 U/L Alk. phosphatase 206 (H) 45 - 117 U/L Protein, total 4.9 (L) 6.4 - 8.2 g/dL Albumin 2.1 (L) 3.5 - 5.0 g/dL Globulin 2.8 2.0 - 4.0 g/dL A-G Ratio 0.8 (L) 1.1 - 2.2 PHOSPHORUS Collection Time: 10/07/19 12:24 AM  
Result Value Ref Range Phosphorus 1.6 (L) 2.6 - 4.7 MG/DL  
TROPONIN I Collection Time: 10/07/19 12:24 AM  
Result Value Ref Range Troponin-I, Qt. <0.05 <0.05 ng/mL EKG, 12 LEAD, INITIAL Collection Time: 10/07/19  6:01 AM  
Result Value Ref Range Ventricular Rate 97 BPM  
 Atrial Rate 97 BPM  
 P-R Interval 140 ms QRS Duration 72 ms Q-T Interval 332 ms QTC Calculation (Bezet) 421 ms Calculated P Axis 58 degrees Calculated R Axis 26 degrees Calculated T Axis 59 degrees Diagnosis Normal sinus rhythm When compared with ECG of 14-SEP-2019 14:03, 
QT has shortened Confirmed by Soco Bonilla M.D., Rafael Dominguez ([id number]) on 10/7/2019 7:45:51 AM 
  
GLUCOSE, POC Collection Time: 10/07/19  6:53 AM  
Result Value Ref Range Glucose (POC) 125 (H) 65 - 100 mg/dL Performed by REID EPPERSON Continue full liquid diet for now. Renew cyclic TPN and Lipids. Saline lock IVF as creatinine improved. Replace K 3.1. Will add Ativan qhs prn. Stop Reglan. Stop IV abx. Continue Physical Therapy. Dr. Russell Valenzuela following,. Plans per Dr. Odilia Brand. Following.

## 2019-10-07 NOTE — PROGRESS NOTES
Hospitalist Progress Note Tejal Mcgrath MD 
Answering service: [phone] OR [phone] from in house phone Date of Service:  10/7/2019 NAME:  Kristel Dolan :  1940 MRN:  [id number] Admission Summary:  
51-year-old female with PMH CAD,HLD, chronic systolic CHF, irritable bowel syndrome and pancreatitis, s/p cholecystectomy ,presented to ER with c/o  progressively worsening nausea and coffee-grounds vomitus. x 3 days associated with  some intermittent mild abdominal pain with some radiation to the upper back. 
  
 
Interval history / Subjective:  
Not able to sleep in \"2\" days Tolerating some PO Still on TPN Had a large BM yesterday Assessment & Plan:  
 
 
1) Acute on chronic recurrent Pancreatitis Adenocarcinoma of pancreas ,metastasis to Liver  
-S/p palliative gastrojejunostomy 
-EGD initially -  Done aborted due to inability to pass scope down to Duodenum 
-CT abd/Pelvis ,GOO and possible stricture along duodenum  causing obstruction 
-NG tube for decompression-removed -CEA- normal 
-Ca19-9  - 3596 
-NPO  
- -EGD showed duodenal obstruction - extrinsic compression causing duodenal obstruction,hiatal hernia and esophagitis. -MRI shows pancreatic mass with liver mets 
-pt continues to have nausea and vomiting. --Oncology eval, ~plan for chemo once pt is stable 
-surgery following  On  TPN  
  
2) Nausea,vomiting-improving  
-KUB with non specific gas pattern -upper GI Series 10/30- show patent gastrojejunal anastomosis with no evident leak 
-risks for aspiration, cxr  with no acute process 
-aspiration precautions 
-prn antiemetics 
-IVF  
-pain control  
-advance diet per surgery recs   
3) Anemia,acute on chronic 
-Hgb 5.4,5.6<- 9.  Hemoglobin today 8.2  
-no overt bleeding 
-CT abd/pelvis with no acute change 
-transfusion as needed  
-heparin stopped 
 
  
 4) Leukocytosis-resolved today 7.6 
-no fever 
-monitor for sepsis 
- monitor on tele 
-check cultures,cxr--> NGTD 
-broad spectrum antibiotics iv vanc,zosyn-->changed to levo/flagyl 
  
 5) TOÑA d/t dehydration resolved Cr today 0.81  
- CT ab with Hydronephrosis  
-Cr. improved with IVF continue to monitor labs   
6) Thrombus ,intrahepatic IVC clot  
- heparin gtt  Stopped due to severe anemia  
-Change to lovenox for outpatient / per Dr Connie Herrera 
 -Continue  low dose of Lovenox since she has not had any overt bleeding or hematemesis recently   
7) Hypokalemia today 3.1 
-will replete, am labs 
-monitor   
8) CAD. -h/O NSTEMI 
-hold meds ,due to hypotension   
9) MalNutrition Body mass index is 19.83 kg/m².  
Due to cancer and GOO 
picc line Central TPN , renewed 10) Insomnia: Will start her on ambien to help with sleep QHS 
  
  
DNR/DNI with a guarded prognosis. D/W patient and family. Palliative on board  
  
patient's Son and daughter Laure Case ([phone])  
  
Code status: DNR 
DVT prophylaxis: heparin on hold for anemia. Will order SCD 
  
Care Plan discussed with: Patient Disposition: TBD based on PT/OT eval but likely facility Hospital Problems  Date Reviewed: 9/14/2019 Codes Class Noted POA Acute pancreatitis ICD-10-CM: K85.90 ICD-9-CM: 311.5  9/14/2019 Yes TOÑA (acute kidney injury) ([facility] [address] ICD-10-CM: N17.9 ICD-9-CM: 584.9  9/14/2019 Yes Hypokalemia ICD-10-CM: E87.6 ICD-9-CM: 276.8  9/14/2019 Yes GI bleed ICD-10-CM: K92.2 ICD-9-CM: 578.9  9/14/2019 Yes Coffee ground emesis ICD-10-CM: K92.0 ICD-9-CM: 578.0  9/14/2019 Yes Review of Systems: A comprehensive review of systems was negative except for that written in the HPI. Vital Signs:  
 Last 24hrs VS reviewed since prior progress note. Most recent are: 
Visit Vitals /75 (BP 1 Location: Left arm, BP Patient Position: At rest) Pulse 96 Temp 98.5 °F (36.9 °C) Resp 16 Ht 5' 4\" (1.626 m) Wt 54.9 kg (121 lb) SpO2 97% BMI 20.77 kg/m² Intake/Output Summary (Last 24 hours) at 10/7/2019 2058 Last data filed at 10/6/2019 2300 Gross per 24 hour Intake  Output 600 ml Net -600 ml Physical Examination:  
 
 
     
Constitutional:  No acute distress, cooperative, pleasant ENT:  Oral mucous moist, oropharynx benign. Resp:  CTA bilaterally. No wheezing/rhonchi/rales. No accessory muscle use CV:  Regular rhythm, normal rate, no murmurs, gallops, rubs GI:  Soft, non distended, tender in the epigastric area. normoactive bowel sounds, no hepatosplenomegaly Musculoskeletal:  No edema, warm, 2+ pulses throughout Neurologic:  Moves all extremities. AAOx3, CN II-XII reviewed Skin:  Good turgor, no rashes or ulcers Data Review:  
 Review and/or order of clinical lab test 
 
 
Labs:  
 
Recent Labs 10/07/19 
0024 10/06/19 
1519 WBC 7.6 7.7 HGB 8.2* 7.8* HCT 26.0* 24.7*  
* 110* Recent Labs 10/07/19 
0024 10/06/19 
1519 10/05/19 
0143  141 140  
K 3.1* 3.3* 3.8 * 112* 114* CO2 24 22 20* BUN 23* 27* 37* CREA 0.81 0.82 1.00 * 100 121* CA 8.1* 8.0* 8.5 MG 1.6  --   --   
PHOS 1.6*  --   --   
 
Recent Labs 10/07/19 
0024 SGOT 38* ALT 65 * TBILI 0.7 TP 4.9* ALB 2.1*  
GLOB 2.8 Recent Labs 10/06/19 
3687 10/05/19 
0148 APTT 23.5 22.5 No results for input(s): FE, TIBC, PSAT, FERR in the last 72 hours. No results found for: FOL, RBCF No results for input(s): PH, PCO2, PO2 in the last 72 hours. Recent Labs 10/07/19 
0024 TROIQ <0.05 Lab Results Component Value Date/Time Cholesterol, total 85 09/15/2019 04:37 AM  
 HDL Cholesterol 43 09/15/2019 04:37 AM  
 LDL, calculated 29 09/15/2019 04:37 AM  
 Triglyceride 65 09/15/2019 04:37 AM  
 CHOL/HDL Ratio 2.0 09/15/2019 04:37 AM  
 
Lab Results Component Value Date/Time Glucose (POC) 125 (H) 10/07/2019 06:53 AM  
 Glucose (POC) 134 (H) 10/07/2019 12:06 AM  
 Glucose (POC) 137 (H) 10/05/2019 12:20 AM  
 Glucose (POC) 141 (H) 10/04/2019 12:03 PM  
 Glucose (POC) 133 (H) 10/04/2019 06:28 AM  
 
Lab Results Component Value Date/Time Color YELLOW/STRAW 09/15/2019 12:33 AM  
 Appearance CLOUDY (A) 09/15/2019 12:33 AM  
 Specific gravity 1.019 09/15/2019 12:33 AM  
 pH (UA) 6.0 09/15/2019 12:33 AM  
 Protein 100 (A) 09/15/2019 12:33 AM  
 Glucose NEGATIVE  09/15/2019 12:33 AM  
 Ketone NEGATIVE  09/15/2019 12:33 AM  
 Bilirubin NEGATIVE  09/15/2019 12:33 AM  
 Urobilinogen 0.2 09/15/2019 12:33 AM  
 Nitrites NEGATIVE  09/15/2019 12:33 AM  
 Leukocyte Esterase SMALL (A) 09/15/2019 12:33 AM  
 Epithelial cells MODERATE (A) 09/15/2019 12:33 AM  
 Bacteria 2+ (A) 09/15/2019 12:33 AM  
 WBC 10-20 09/15/2019 12:33 AM  
 RBC 0-5 09/15/2019 12:33 AM  
 
 
 
Medications Reviewed:  
 
Current Facility-Administered Medications Medication Dose Route Frequency  melatonin tablet 3 mg  3 mg Oral QHS PRN  
 simethicone (MYLICON) tablet 80 mg  80 mg Oral QID PRN  
 enoxaparin (LOVENOX) injection 40 mg  40 mg SubCUTAneous Q24H  
 0.9% sodium chloride infusion 250 mL  250 mL IntraVENous PRN  
 0.9% sodium chloride infusion 250 mL  250 mL IntraVENous PRN  
 metroNIDAZOLE (FLAGYL) IVPB premix 500 mg  500 mg IntraVENous Q12H  levoFLOXacin (LEVAQUIN) 750 mg in D5W IVPB  750 mg IntraVENous Q48H  
 metoclopramide HCl (REGLAN) injection 5 mg  5 mg IntraVENous TIDAC  lactated Ringers infusion  75 mL/hr IntraVENous CONTINUOUS  
 ondansetron (ZOFRAN) injection 4 mg  4 mg IntraVENous Q4H  
 metoprolol (LOPRESSOR) injection 5 mg  5 mg IntraVENous Q6H  
 acetaminophen (TYLENOL) suppository 650 mg  650 mg Rectal Q4H PRN  
 0.9% sodium chloride infusion 250 mL  250 mL IntraVENous PRN  
 morphine injection 2 mg  2 mg IntraVENous Q4H PRN  
  lidocaine (LIDODERM) 5 % patch 1 Patch  1 Patch TransDERmal Q24H  
 fat emulsion 20% (LIPOSYN, INTRAlipid) infusion 500 mL  500 mL IntraVENous EVERY MON & TH  
 lidocaine (XYLOCAINE) 2 % viscous solution 15 mL  15 mL Mouth/Throat PRN  phenol throat spray (CHLORASEPTIC) 1 Spray  1 Spray Oral PRN  
 guaiFENesin ER (MUCINEX) tablet 600 mg  600 mg Oral BID PRN  
 [Held by provider] rosuvastatin (CRESTOR) tablet 10 mg  10 mg Oral QHS  benzocaine-menthol (CEPACOL) lozenge 1 Lozenge  1 Lozenge Mucous Membrane PRN  
 hydrALAZINE (APRESOLINE) 20 mg/mL injection 10 mg  10 mg IntraVENous Q6H PRN  
 sodium chloride (NS) flush 5-40 mL  5-40 mL IntraVENous PRN  
 acetaminophen (TYLENOL) tablet 650 mg  650 mg Oral Q4H PRN  
 [Held by provider] carvedilol (COREG) tablet 6.25 mg  6.25 mg Oral BID WITH MEALS  
 
______________________________________________________________________ EXPECTED LENGTH OF STAY: 4d 19h ACTUAL LENGTH OF STAY:          Ruthann Link MD

## 2019-10-07 NOTE — PROGRESS NOTES
Problem: Mobility Impaired (Adult and Pediatric) Goal: *Acute Goals and Plan of Care (Insert Text) Description FUNCTIONAL STATUS PRIOR TO ADMISSION: Patient was independent and active without use of DME, driving. HOME SUPPORT PRIOR TO ADMISSION: The patient lived alone, required no local support. Physical Therapy Goals Initiated 10/1/2019 1. Patient will move from supine to sit and sit to supine , scoot up and down and roll side to side in bed with modified independence within 7 day(s). 2.  Patient will transfer from bed to chair and chair to bed with supervision/set-up using the least restrictive device within 7 day(s). 3.  Patient will perform sit to stand with supervision/set-up within 7 day(s). 4.  Patient will ambulate with supervision/set-up for 50 feet with the least restrictive device within 7 day(s). Outcome: Progressing Towards Goal 
 PHYSICAL THERAPY TREATMENT Patient: Kenyatta Quintana (50 y.o. female) Date: 10/7/2019 Diagnosis: Acute pancreatitis [K85.90] <principal problem not specified> Procedure(s) (LRB): 
GASTROJEJUNOSTOMY (N/A) 10 Days Post-Op Precautions:  fall Chart, physical therapy assessment, plan of care and goals were reviewed. ASSESSMENT Patient continues with skilled PT services and is progressing towards goals. Pt with improving functional mobility - up to [facility] ad pablo and hand held assist for short distance ambulation. Current Level of Function Impacting Discharge (mobility/balance): Assist x 1 for ambulation, decreased activity tolerance, fall risk Other factors to consider for discharge: pt lives alone PLAN : 
Patient continues to benefit from skilled intervention to address the above impairments. Continue treatment per established plan of care. to address goals. Recommendation for discharge: (in order for the patient to meet his/her long term goals) Therapy up to 5 days/week in SNF setting This discharge recommendation: Has been made in collaboration with the attending provider and/or case management Equipment recommendations for successful discharge (if) home: to be determined by rehab hosp - may benefit from walker SUBJECTIVE:  
Patient stated i'm feeling stronger - I know I need to start sitting up in chair OBJECTIVE DATA SUMMARY:  
Critical Behavior: 
Neurologic State: Alert Orientation Level: Oriented X4 Cognition: Follows commands Functional Mobility Training: 
Bed Mobility: 
Rolling: Supervision Supine to Sit: Supervision Sit to Supine: Supervision Transfers: 
Sit to Stand: Contact guard assistance Stand to Sit: Contact guard assistance Balance: 
Sitting: Intact; Without support Standing: Impaired; Without support Standing - Static: Good;Constant support(hand held assist) Standing - Dynamic : Good;Constant support Ambulation/Gait Training: 
Distance (ft): 40 Feet (ft) Assistive Device: Walker, rolling;Gait belt Ambulation - Level of Assistance: Minimal assistance(hand held assist) Gait Abnormalities: Decreased step clearance Speed/Maritza: Slow Step Length: Right shortened;Left shortened Interventions: Verbal cues Pain Rating: 
 Pt denied pain. Activity Tolerance:  
Fair - encourage OOB to chair for meals Please refer to the flowsheet for vital signs taken during this treatment. After treatment patient left in no apparent distress:  
Supine in bed and Call bell within reach COMMUNICATION/COLLABORATION:  
The patients plan of care was discussed with: Registered Nurse Fernando Razo, PT Time Calculation: 15 mins

## 2019-10-07 NOTE — PROGRESS NOTES
This RT received a call for an ABG, however as of the composition of this note, no order exists for an ABG. Awaiting documented order, will continue to monitor.

## 2019-10-07 NOTE — PROGRESS NOTES
Pt's daughter has requested this writer for a family meeting today at 1 pm. CM plans to meet pt's daughter and son to discuss long term care plan. CM lead will join the meeting. Rosette Martinez,BSW,MSW,[facility] Clinical  [phone]

## 2019-10-07 NOTE — PROGRESS NOTES
[facility] [place] [address] [address], [id number] W: [phone]  F: [phone] Reason for Visit:  
Desean Cox is a 70 y.o. female who is seen in consultation at the request of Dr. Nubia Otero for evaluation of stage IV pancreatic cancer. Treatment History: · Biopsy with adenocarcinoma of the pancreas metastatic to liver History of Present Illness: The patient is a very pleasant 28-year-old be younger than her stated age she is been doing fairly well she started having some GI issues in January of this year. But over the last month or so she is been having more trouble and read more recently has been having trouble with nausea and vomiting. Because of the persistent or GI issues with the nausea and vomiting she is now admitted and was found to have a pancreatic cancer metastatic to her liver. The patient has been able to maintain her weight. The patient has lost a couple pounds since her last admission. According to the family the patient initially had lost weight when she was in the hospital then a little bit afterwards and stabilized her weight pattern and it is continued in a stable pattern The patient denied trouble with urination or with breathing she denied any ENT symptomatology. Has had a little bit of trouble with constipation 9/23/19 The patient seems to be doing fairly well this morning. She still has her NG tube in. Hopefully she will be getting a stent placed soon so she can start back on oral nutrition. Then the plan will be to get her port inserted and then to start her on gemcitabine and Abraxane. I anticipate that she will handle those drugs quite well and hopefully we can get her some quality time. 9/24/2019 Patient still has NG tube in place and the plan is for a stent to be placed.   She is getting hyperalimentation so she is getting the nutrition she would needs. Patient did vomit and does not like to vomit so she is hoping that the stent will be placed soon. 9/25/2019 Patient is doing well this morning still has her NG tube in place. She has had a little bit of nausea. She continues to get her hyperalimentation. Hopefully will get her bypass or stent placed soon so that we can get her treatment started. She will be getting a venous access device. She remains afebrile. Her vital signs are stable. Continue to encourage her to be up and in a chair. Plan on starting chemotherapy as soon as she is taking nutrition and has her Port-A-Cath placed. 9/27/2019 Patient seems to be doing well this morning NG tube is in place she is planning on having her surgery today. She still getting hyperalimentation. Patient is in good spirits. Hopefully she will have her surgery today and be able to get rid of her NG tube in a couple days. Then she will be able to start on nutrition. Then will be able to get her chemotherapy started. 9/28/2019 Patient is doing well postop she remains afebrile her vital signs look good. According to the patient surgery went well. She still has her NG tube in place. Her bowel sounds are quiet. Hopefully her bowels will start working fairly soon. She is in good spirits and her pain is under good control. 9/30/2019 The patient continues to improve every day. She looks much brighter. She still has her NG tube in but apparently that is coming out in the next hour or so hopefully that can get rid of her Joyce catheter as well. The patient is going to try to posterior about the move is much she can. Hopefully should be able be discharged home in a couple days. She is passing gas at this point which is showing her got is working again we will plan on seeing her in the office in about 1 week as we get raised to start her on chemotherapy she will need a venous access device. 10/2/2019 The patient is still having some issues with nausea she is not able to keep things down her NG tube is out she is just weak and is not able to get the nutrition and she does not understand why she is still having trouble with nausea and vomiting. She will be asking the gastroenterologist and surgeons about that this morning. The patient's quality of life right now is not very good and she understands that she wants to have a better quality of life. She is frustrated with not being able to eat. She is not having any other problems. Just weakness. 10/3/2019 Patient still having some vomiting but not as much as she has been obviously if we can get her to where she can get nutrition and will chemotherapy is off the table. Hopefully will get it where she can get nutrition in and we can begin considering other options to try to improve the quality of her life. If the patient can have quality she is not interested in quantity 10/4/2019 She continues to have vomiting she actually looks a little better this morning but she is vomiting unless we can get vomiting under control is not going to do well. Up in a chair and to be staring about as much as possible in hopes that that will help as well 
 
10/7/2019 Patient seems to be doing a little bit better she was able to keep her yogurt down and she is been eating something with each meal.  She is trying to get her nutrition built up. We talked a little bit about that today. She is going to try to sit up in a chair more try to get out of bed try to be moving around and hopefully that will help as well. Past Medical History:  
Diagnosis Date  Coronary artery disease of native artery of native heart with stable angina pectoris ([facility] [address] 2/28/2019  Cough due to ACE inhibitor  Hematoma of rectus sheath 2/28/2019  IBS (irritable bowel syndrome) IBS  NSTEMI (non-ST elevated myocardial infarction) ([address] 2/28/2019  Pancreatitis  Pure hypercholesterolemia 2/28/2019  Systolic CHF, chronic ([address] 2/28/2019 Past Surgical History:  
Procedure Laterality Date  COLONOSCOPY Left 11/1/2017 COLONOSCOPY performed by Elza Cleaning MD at [address]  HX CHOLECYSTECTOMY  HX GI    
 surgery for hiatal hernia  HX ORTHOPAEDIC    
 DJD, [facility]  IR [facility] [address] W SI  1/30/2019 Social History Tobacco Use  Smoking status: Former Smoker  Smokeless tobacco: Never Used  Tobacco comment: quit smoking cigarettes 6 yrs ago Substance Use Topics  Alcohol use: Yes Comment: very occasional  
  
Family History Problem Relation Age of Onset  Dementia Mother   
     alzheimers  Cancer Sister   
     gallbladder  Cancer Brother   
     pancreatic  Breast Cancer Paternal Grandmother  Dementia Sister   
     alzheimers  Heart Surgery Brother   
     bypass Current Facility-Administered Medications Medication Dose Route Frequency  melatonin tablet 3 mg  3 mg Oral QHS PRN  
 simethicone (MYLICON) tablet 80 mg  80 mg Oral QID PRN  potassium chloride 10 mEq in 50 ml IVPB  10 mEq IntraVENous Q6H  
 zolpidem (AMBIEN) tablet 5 mg  5 mg Oral QHS  LORazepam (ATIVAN) tablet 0.5 mg  0.5 mg Oral QHS PRN  
 TPN ADULT - CENTRAL   IntraVENous TITRATE  enoxaparin (LOVENOX) injection 40 mg  40 mg SubCUTAneous Q24H  
 0.9% sodium chloride infusion 250 mL  250 mL IntraVENous PRN  
 ondansetron (ZOFRAN) injection 4 mg  4 mg IntraVENous Q4H  
 metoprolol (LOPRESSOR) injection 5 mg  5 mg IntraVENous Q6H  
 morphine injection 2 mg  2 mg IntraVENous Q4H PRN  
 lidocaine (LIDODERM) 5 % patch 1 Patch  1 Patch TransDERmal Q24H  
 fat emulsion 20% (LIPOSYN, INTRAlipid) infusion 500 mL  500 mL IntraVENous EVERY MON & TH  
 lidocaine (XYLOCAINE) 2 % viscous solution 15 mL  15 mL Mouth/Throat PRN  
  phenol throat spray (CHLORASEPTIC) 1 Spray  1 Spray Oral PRN  
 guaiFENesin ER (MUCINEX) tablet 600 mg  600 mg Oral BID PRN  
 [Held by provider] rosuvastatin (CRESTOR) tablet 10 mg  10 mg Oral QHS  benzocaine-menthol (CEPACOL) lozenge 1 Lozenge  1 Lozenge Mucous Membrane PRN  
 hydrALAZINE (APRESOLINE) 20 mg/mL injection 10 mg  10 mg IntraVENous Q6H PRN  
 sodium chloride (NS) flush 5-40 mL  5-40 mL IntraVENous PRN  
 acetaminophen (TYLENOL) tablet 650 mg  650 mg Oral Q4H PRN  
 [Held by provider] carvedilol (COREG) tablet 6.25 mg  6.25 mg Oral BID WITH MEALS Allergies Allergen Reactions  Penicillins Hives Review of Systems: A complete review of systems was obtained, negative except as described above. Physical Exam:  
 
Visit Vitals /62 (BP 1 Location: Left arm, BP Patient Position: At rest;Lying right side) Pulse 98 Temp 98.2 °F (36.8 °C) Resp 16 Ht 5' 4\" (1.626 m) Wt 121 lb (54.9 kg) SpO2 97% BMI 20.77 kg/m² ECOG PS: 1 General: No distress Eyes: PERRLA, anicteric sclerae HENT: Atraumatic, OP clear Neck: Supple Lymphatic: No cervical, or supraclavicular adenopathy Respiratory: CTAB, normal respiratory effort CV: Heart rate normal 
GI: Status post recent surgery MS: Digits without clubbing or cyanosis. Psych: Alert, oriented, appropriate affect, normal judgment/insight Results:  
 
Lab Results Component Value Date/Time WBC 7.6 10/07/2019 12:24 AM  
 HGB 8.2 (L) 10/07/2019 12:24 AM  
 HCT 26.0 (L) 10/07/2019 12:24 AM  
 PLATELET 648 (L) 94/36/0741 12:24 AM  
 MCV 98.9 10/07/2019 12:24 AM  
 ABS. NEUTROPHILS 6.7 10/07/2019 12:24 AM  
 
Lab Results Component Value Date/Time  Sodium 139 10/07/2019 12:24 AM  
 Potassium 3.1 (L) 10/07/2019 12:24 AM  
 Chloride 111 (H) 10/07/2019 12:24 AM  
 CO2 24 10/07/2019 12:24 AM  
 Glucose 114 (H) 10/07/2019 12:24 AM  
 BUN 23 (H) 10/07/2019 12:24 AM  
 Creatinine 0.81 10/07/2019 12:24 AM  
 GFR est AA >60 10/07/2019 12:24 AM  
 GFR est non-AA >60 10/07/2019 12:24 AM  
 Calcium 8.1 (L) 10/07/2019 12:24 AM  
 Glucose (POC) 125 (H) 10/07/2019 06:53 AM  
 
Lab Results Component Value Date/Time Bilirubin, total 0.7 10/07/2019 12:24 AM  
 ALT (SGPT) 65 10/07/2019 12:24 AM  
 AST (SGOT) 38 (H) 10/07/2019 12:24 AM  
 Alk. phosphatase 206 (H) 10/07/2019 12:24 AM  
 Protein, total 4.9 (L) 10/07/2019 12:24 AM  
 Albumin 2.1 (L) 10/07/2019 12:24 AM  
 Globulin 2.8 10/07/2019 12:24 AM  
 
 
Records reviewed and summarized above. Pathology report(s) reviewed 9/17/19 FINAL PATHOLOGIC DIAGNOSIS 1. Small bowel, duodenal stricture, biopsy:  
Adenocarcinoma Radiology report(s) reviewed above. 9/16/19 CT abdomen pelvis IMPRESSION: 
  
1. Marked gastric and duodenal distention by water attenuation fluid, with 
abrupt transition at the junction of second and third duodenum where a 
duodenal/pancreatic mass or stricture/scar is suggested, which is confluent with 
the pancreatic head and uncinate process. . This is consistent with endoscopic 
findings earlier same day. NG tube placement is advised. 2. Right hydronephrosis, likely due to extrinsic compression by the markedly 
distended stomach and duodenum. 3. Prominence of extrahepatic bile ducts, likely due to extrinsic impression on 
the ampulla of water by the markedly distended stomach and duodenum. 4. Pancreas extrinsically compressed by markedly distended stomach, making 
visualization of the previously described cystic mass is difficult. The 
pancreatic head lies at the site of duodenal obstruction, but is difficult to 
further assess under the circumstances. 5. Other incidental and postoperative findings MRI abdomen and 9/19/19 IMPRESSION: 
  
1. Interval development of diffuse liver metastatic disease. 
  
2.  Large mass centered on the pancreatic head and proximal horizontal portion of the duodenum. . There is narrowing of the duodenal lumen. Possibilities 
include a pancreatic or duodenal primary. The mass appears to be centered on the 
pancreatic head and there is common bile duct dilatation however the pancreatic 
duct is normal in caliber.  
3.  Left lower lobe airspace disease may represent atelectasis or pneumonia. 
  
4.  Possible nonocclusive thrombus in the intrahepatic IVC. 
  
5. The SMV is occluded. Assessment:  
1) adenocarcinoma of the pancreas metastatic to liver with the plan being for chemotherapy once she is able to tolerate nutrition  3596 9/17/19 CEA 16.8 9/17/19 
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  
 
· 1. Patient will need a venous access device for therapy. ·  
· 2. We will go ahead and treat her with heparin and low molecular weight heparin as an outpatient for her blood clot. ·  
· 3. We will plan on doing genetic testing due to her family history of pancreatic cancer. ·  
· 4. We will plan on getting genome wide sequencing of her tumor for therapeutic options. ·  
· 5. We will plan on treating her with gemcitabine and nab paclitaxel and hopefully start treatment within the next week · 6 patient continues to have problems with nausea, but it seems to be improving I appreciate the opportunity to participate in Ms. [facility].  
 
Signed By: Deepika Hull MD

## 2019-10-08 NOTE — PROGRESS NOTES
Problem: Mobility Impaired (Adult and Pediatric) Goal: *Acute Goals and Plan of Care (Insert Text) Description FUNCTIONAL STATUS PRIOR TO ADMISSION: Patient was independent and active without use of DME, driving. HOME SUPPORT PRIOR TO ADMISSION: The patient lived alone, required no local support. Physical Therapy Goals Initiated 10/1/2019 1. Patient will move from supine to sit and sit to supine , scoot up and down and roll side to side in bed with modified independence within 7 day(s). 2.  Patient will transfer from bed to chair and chair to bed with supervision/set-up using the least restrictive device within 7 day(s). 3.  Patient will perform sit to stand with supervision/set-up within 7 day(s). 4.  Patient will ambulate with supervision/set-up for 50 feet with the least restrictive device within 7 day(s). Outcome: Progressing Towards Goal 
PHYSICAL THERAPY TREATMENT Patient: Vince Salgado (88 y.o. female) Date: 10/8/2019 Diagnosis: Acute pancreatitis [K85.90] <principal problem not specified> Procedure(s) (LRB): 
GASTROJEJUNOSTOMY (N/A) 11 Days Post-Op Precautions:  fall Chart, physical therapy assessment, plan of care and goals were reviewed. ASSESSMENT Patient continues with skilled PT services and is progressing towards goals. Pt tolerating increasing time OOB, amb to bathroom with assist and increased amb distance in hallway with use of RW. Current Level of Function Impacting Discharge (mobility/balance): CGA/min assist x 1 for functional mobility/amb with RW Other factors to consider for discharge: lives alone PLAN : 
Patient continues to benefit from skilled intervention to address the above impairments. Continue treatment per established plan of care. to address goals. Recommendation for discharge: (in order for the patient to meet his/her long term goals) Therapy up to 5 days/week in SNF setting This discharge recommendation: Has been made in collaboration with the attending provider and/or case management Equipment recommendations for successful discharge (if) home: to be determined in rehab setting SUBJECTIVE:  
Patient stated I feel more confident with the rolling walker.  OBJECTIVE DATA SUMMARY:  
Critical Behavior: 
Neurologic State: Alert Orientation Level: Oriented X4 Cognition: Appropriate for age attention/concentration Functional Mobility Training: 
Bed Mobility: 
Rolling: Supervision Supine to Sit: Supervision Sit to Supine: Supervision Transfers: 
Sit to Stand: Contact guard assistance Stand to Sit: Contact guard assistance Balance: 
Sitting: Intact Standing: Impaired; With support Standing - Static: Good Standing - Dynamic : Good Ambulation/Gait Training: 
Distance (ft): 190 Feet (ft) Assistive Device: Walker, rolling Ambulation - Level of Assistance: Contact guard assistance Gait Abnormalities: Decreased step clearance Speed/Maritza: Slow Step Length: Right shortened;Left shortened Pain Rating: 
Pt denied pain. Activity Tolerance:  
Fair Please refer to the flowsheet for vital signs taken during this treatment. After treatment patient left in no apparent distress:  
Supine in bed and Call bell within reach COMMUNICATION/COLLABORATION:  
The patients plan of care was discussed with: Registered Nurse Saeid Lagunas, PT Time Calculation: 25 mins

## 2019-10-08 NOTE — PROGRESS NOTES
Problem: Falls - Risk of 
Goal: *Absence of Falls Description Document Parul Faribault Fall Risk and appropriate interventions in the flowsheet. Note:  
Fall Risk Interventions: 
Mobility Interventions: Patient to call before getting OOB Medication Interventions: Patient to call before getting OOB Elimination Interventions: Call light in reach History of Falls Interventions: Door open when patient unattended Problem: Nutrition Deficit Goal: *Optimize nutritional status Outcome: Progressing Towards Goal 
  
Problem: Surgical Pathway Day of Surgery Goal: *No signs and symptoms of infection or wound complications Outcome: Progressing Towards Goal 
  
Problem: Patient Education: Go to Patient Education Activity Goal: Patient/Family Education Outcome: Progressing Towards Goal 
  
Problem: Pressure Injury - Risk of 
Goal: *Prevention of pressure injury Description Document Travis Scale and appropriate interventions in the flowsheet. Outcome: Progressing Towards Goal 
Note:  
Pressure Injury Interventions: 
Sensory Interventions: Maintain/enhance activity level Moisture Interventions: Minimize layers Activity Interventions: Increase time out of bed Mobility Interventions: Pressure redistribution bed/mattress (bed type) Nutrition Interventions: Document food/fluid/supplement intake Friction and Shear Interventions: HOB 30 degrees or less

## 2019-10-08 NOTE — PROGRESS NOTES
Care Management - 
Transition of Care Plan: 1- SNF once medially stable-off TPN and tolerating diet. Accepted by [facility] and [facility]. Spoke with Omid Pierce ([phone]) , admissions director at [facility] and [address] [place] [address]. They have accepted the patient. She asked if it was okay to reach out to patient. Per chart review and speaking with patient's nurse, patient was not involved in meeting yesterday. Patient is alert and oriented x 4, but likes her daughter to be in room. This CM requested she reach out to daughter first. 
 
MCKAY Vazquez Addendum: Spoke with Howard Jerry (cell [phone])), admissions at [facility]. They have accepted patient. They will call daughter. Spoke with Leonid Wen at [facility]. They have accepted patient. She will call daughter.   
 
MCKAY Vazquez

## 2019-10-08 NOTE — PROGRESS NOTES
Bedside shift change report given to Yvon Pineda (oncoming nurse) by Hamzah Roland (offgoing nurse). Report included the following information SBAR, Kardex, MAR and Recent Results.

## 2019-10-08 NOTE — PROGRESS NOTES
Hospitalist Progress Note Annabelle Erwin MD 
Answering service: [phone] OR 2015 from in house phone Date of Service:  10/8/2019 NAME:  Loli Alva :  1940 MRN:  [id number] Admission Summary:  
57-year-old female with PMH CAD,HLD, chronic systolic CHF, irritable bowel syndrome and pancreatitis, s/p cholecystectomy ,presented to ER with c/o  progressively worsening nausea and coffee-grounds vomitus. x 3 days associated with  some intermittent mild abdominal pain with some radiation to the upper back. 
  
 
Interval history / Subjective:  
Patient seen and examined this afternoon. She is having frequent diarrhea since yesterday 4-5 times/24hrs. Liquid brown stool with no blood She still doesn't have appetite Assessment & Plan: # Acute on chronic recurrent Pancreatitis # Adenocarcinoma of pancreas ,metastasis to Liver - S/p palliative gastrojejunostomy 
- EGD initially -  Done aborted due to inability to pass scope down to Duodenum -  -EGD showed duodenal obstruction - extrinsic compression causing duodenal obstruction,hiatal hernia and esophagitis. - MRI shows pancreatic mass with liver mets 
- CT abd/Pelvis ,GOO and possible stricture along duodenum  causing obstruction 
- off NGT for decompression-removed - CEA- normal, Ca19-9  - 3596 
- pt continues to have nausea and vomiting. 
- Oncology eval, ~plan for chemo once pt is stable 
- surgery following - On  TPN and  
- will initiate regular diet  
  
# Nausea,vomiting-improving  
- KUB with non specific gas pattern - upper GI Series 10/30- show patent gastrojejunal anastomosis with no evident leak 
- risks for aspiration, cxr  with no acute process 
- continue gentle IVF  
- aspiration precautions - prn antiemetics - pain control  
- advance diet per surgery recs # Diarrhea  
- symptomatic management for now - ?pancreatic enzyme replacement  
- check c diff if fever or high wbc  
 
  
# Anemia,acute on chronic 
- Hgb stable   
- no overt bleeding 
- CT abd/pelvis with no acute change 
- transfusion as needed  
- heparin stopped 
 
  
# Leukocytosis- resolved  
- no fever 
- cultures negative  
- broad spectrum antibiotics iv vanc,zosyn-->changed to levo/flagyl 
  
# TOÑA d/t dehydration resolved CT ab with Hydronephrosis  
-Cr. improved with IVF continue to monitor labs  
 
  
# Thrombus ,intrahepatic IVC clot  
- heparin gtt  Stopped due to severe anemia  
- Change to lovenox for outpatient / per Dr Camilla Caputo 
- Continue  low dose of Lovenox since she has not had any overt bleeding or hematemesis recently  
  
# Hypokalemia  
- replete as needed  
  
# CAD. -h/O NSTEMI 
-hold meds ,due to hypotension 
  
# MalNutrition Body mass index is 19.83 kg/m².  
Due to cancer and GOO 
picc line Central TPN , renewed # Insomnia: Will start her on ambien to help with sleep QHS 
  
  
DNR/DNI with a guarded prognosis. D/W patient and family. Palliative on board  
  
Patient's Son and daughter Eduardo Cooper ([phone])  
  
Code status: DNR 
DVT prophylaxis: Enoxaparin  
  
Care Plan discussed with: Patient Disposition: TBD based on PT/OT eval but likely facility Hospital Problems  Date Reviewed: 9/14/2019 Codes Class Noted POA Acute pancreatitis ICD-10-CM: K85.90 ICD-9-CM: 962.2  9/14/2019 Yes TOÑA (acute kidney injury) ([facility] [address] ICD-10-CM: N17.9 ICD-9-CM: 584.9  9/14/2019 Yes Hypokalemia ICD-10-CM: E87.6 ICD-9-CM: 276.8  9/14/2019 Yes GI bleed ICD-10-CM: K92.2 ICD-9-CM: 578.9  9/14/2019 Yes Coffee ground emesis ICD-10-CM: K92.0 ICD-9-CM: 578.0  9/14/2019 Yes Review of Systems:  
Pertinent positive mentioned in interval hx/HPI. Other systems reviewed and negative Vital Signs:  
 Last 24hrs VS reviewed since prior progress note. Most recent are: Visit Vitals /63 (BP 1 Location: Left arm, BP Patient Position: At rest) Pulse 85 Temp 97.7 °F (36.5 °C) Resp 18 Ht 5' 4\" (1.626 m) Wt 56 kg (123 lb 7.3 oz) SpO2 100% BMI 21.19 kg/m² Intake/Output Summary (Last 24 hours) at 10/8/2019 1414 Last data filed at 10/8/2019 0600 Gross per 24 hour Intake  Output 701 ml Net -701 ml Physical Examination:  
 
 
     
Constitutional:  No acute distress, cooperative, pleasant ENT:  Oral mucous moist, oropharynx benign. Resp:  CTA bilaterally. No wheezing/rhonchi/rales. No accessory muscle use CV:  Regular rhythm, normal rate, no murmurs, gallops, rubs GI:  Soft, non distended, tender in the epigastric area. normoactive bowel sounds, no hepatosplenomegaly Musculoskeletal:  No edema, warm, 2+ pulses throughout Neurologic:  Moves all extremities. AAOx3, CN II-XII reviewed Data Review:  
I personally review labs and imaging Labs:  
 
Recent Labs 10/08/19 
0102 10/07/19 
0024 WBC 8.4 7.6 HGB 8.4* 8.2* HCT 26.7* 26.0*  
* 123* Recent Labs 10/08/19 
0102 10/07/19 
0024 10/06/19 
1519  139 141  
K 3.3* 3.1* 3.3*  
* 111* 112* CO2 22 24 22 BUN 20 23* 27* CREA 0.85 0.81 0.82 * 114* 100 CA 7.9* 8.1* 8.0*  
MG  --  1.6  --   
PHOS  --  1.6*  --   
 
Recent Labs 10/07/19 
0024 SGOT 38* ALT 65 * TBILI 0.7 TP 4.9* ALB 2.1*  
GLOB 2.8 Recent Labs 10/06/19 
0715 APTT 23.5 No results for input(s): FE, TIBC, PSAT, FERR in the last 72 hours. No results found for: FOL, RBCF No results for input(s): PH, PCO2, PO2 in the last 72 hours. Recent Labs 10/07/19 
0024 TROIQ <0.05 Lab Results Component Value Date/Time  Cholesterol, total 85 09/15/2019 04:37 AM  
 HDL Cholesterol 43 09/15/2019 04:37 AM  
 LDL, calculated 29 09/15/2019 04:37 AM  
 Triglyceride 65 09/15/2019 04:37 AM  
 CHOL/HDL Ratio 2.0 09/15/2019 04:37 AM  
 
Lab Results Component Value Date/Time Glucose (POC) 115 (H) 10/08/2019 12:04 PM  
 Glucose (POC) 124 (H) 10/08/2019 07:13 AM  
 Glucose (POC) 117 (H) 10/07/2019 05:51 PM  
 Glucose (POC) 110 (H) 10/07/2019 11:42 AM  
 Glucose (POC) 125 (H) 10/07/2019 06:53 AM  
 
Lab Results Component Value Date/Time Color YELLOW/STRAW 10/07/2019 11:36 AM  
 Appearance CLEAR 10/07/2019 11:36 AM  
 Specific gravity 1.013 10/07/2019 11:36 AM  
 pH (UA) 5.5 10/07/2019 11:36 AM  
 Protein NEGATIVE  10/07/2019 11:36 AM  
 Glucose NEGATIVE  10/07/2019 11:36 AM  
 Ketone NEGATIVE  10/07/2019 11:36 AM  
 Bilirubin NEGATIVE  10/07/2019 11:36 AM  
 Urobilinogen 0.2 10/07/2019 11:36 AM  
 Nitrites NEGATIVE  10/07/2019 11:36 AM  
 Leukocyte Esterase NEGATIVE  10/07/2019 11:36 AM  
 Epithelial cells FEW 10/07/2019 11:36 AM  
 Bacteria NEGATIVE  10/07/2019 11:36 AM  
 WBC 0-4 10/07/2019 11:36 AM  
 RBC 0-5 10/07/2019 11:36 AM  
 
 
 
Medications Reviewed:  
 
Current Facility-Administered Medications Medication Dose Route Frequency  TPN ADULT - CENTRAL   IntraVENous TITRATE  potassium chloride SR (KLOR-CON 10) tablet 40 mEq  40 mEq Oral DAILY  ondansetron (ZOFRAN ODT) tablet 4 mg  4 mg Oral Q6H  
 melatonin tablet 3 mg  3 mg Oral QHS PRN  
 simethicone (MYLICON) tablet 80 mg  80 mg Oral QID PRN  
 zolpidem (AMBIEN) tablet 5 mg  5 mg Oral QHS  LORazepam (ATIVAN) tablet 0.5 mg  0.5 mg Oral QHS PRN  
 nystatin (MYCOSTATIN) 100,000 unit/gram cream   Topical BID  carvedilol (COREG) tablet 6.25 mg  6.25 mg Oral BID WITH MEALS  
 hydrocortisone (ANUSOL-HC) suppository 25 mg  25 mg Rectal Q12H  
 enoxaparin (LOVENOX) injection 40 mg  40 mg SubCUTAneous Q24H  
 0.9% sodium chloride infusion 250 mL  250 mL IntraVENous PRN  
 morphine injection 2 mg  2 mg IntraVENous Q4H PRN  
 lidocaine (LIDODERM) 5 % patch 1 Patch  1 Patch TransDERmal Q24H  fat emulsion 20% (LIPOSYN, INTRAlipid) infusion 500 mL  500 mL IntraVENous EVERY MON & TH  
 lidocaine (XYLOCAINE) 2 % viscous solution 15 mL  15 mL Mouth/Throat PRN  phenol throat spray (CHLORASEPTIC) 1 Spray  1 Spray Oral PRN  
 guaiFENesin ER (MUCINEX) tablet 600 mg  600 mg Oral BID PRN  
 [Held by provider] rosuvastatin (CRESTOR) tablet 10 mg  10 mg Oral QHS  benzocaine-menthol (CEPACOL) lozenge 1 Lozenge  1 Lozenge Mucous Membrane PRN  
 hydrALAZINE (APRESOLINE) 20 mg/mL injection 10 mg  10 mg IntraVENous Q6H PRN  
 sodium chloride (NS) flush 5-40 mL  5-40 mL IntraVENous PRN  
 acetaminophen (TYLENOL) tablet 650 mg  650 mg Oral Q4H PRN  
 
______________________________________________________________________ EXPECTED LENGTH OF STAY: 4d 19h ACTUAL LENGTH OF STAY:          Anai Laguerre MD

## 2019-10-08 NOTE — PROGRESS NOTES
Ms. Audrey Jefferson feels better today. Tm 98.9 HR: 99 BP: 144/77 Resp Rate: 17 97% sat on 2 liters/minute. Intake/Output Summary (Last 24 hours) at 10/8/2019 1044 Last data filed at 10/8/2019 0600 Gross per 24 hour Intake  Output 1801 ml Net -1801 ml Exam: Cor: RRR. Lungs: Bilateral breath sounds. Clear to auscultation. Abd: Soft. Non distended. Non tender. No associated rebound or guarding. Incision is clean. Labs:  
Recent Results (from the past 12 hour(s)) CBC W/O DIFF Collection Time: 10/08/19  1:02 AM  
Result Value Ref Range WBC 8.4 3.6 - 11.0 K/uL  
 RBC 2.68 (L) 3.80 - 5.20 M/uL HGB 8.4 (L) 11.5 - 16.0 g/dL HCT 26.7 (L) 35.0 - 47.0 % MCV 99.6 (H) 80.0 - 99.0 FL  
 MCH 31.3 26.0 - 34.0 PG  
 MCHC 31.5 30.0 - 36.5 g/dL  
 RDW 16.4 (H) 11.5 - 14.5 % PLATELET 489 (L) 926 - 400 K/uL MPV 11.8 8.9 - 12.9 FL  
 NRBC 0.0 0  WBC ABSOLUTE NRBC 0.00 0.00 - 0.01 K/uL METABOLIC PANEL, BASIC Collection Time: 10/08/19  1:02 AM  
Result Value Ref Range Sodium 139 136 - 145 mmol/L Potassium 3.3 (L) 3.5 - 5.1 mmol/L Chloride 110 (H) 97 - 108 mmol/L  
 CO2 22 21 - 32 mmol/L Anion gap 7 5 - 15 mmol/L Glucose 139 (H) 65 - 100 mg/dL BUN 20 6 - 20 MG/DL Creatinine 0.85 0.55 - 1.02 MG/DL  
 BUN/Creatinine ratio 24 (H) 12 - 20 GFR est AA >60 >60 ml/min/1.73m2 GFR est non-AA >60 >60 ml/min/1.73m2 Calcium 7.9 (L) 8.5 - 10.1 MG/DL  
GLUCOSE, POC Collection Time: 10/08/19  7:13 AM  
Result Value Ref Range Glucose (POC) 124 (H) 65 - 100 mg/dL Performed by Seven Lainez Will try regular diet. Renew cyclic TPN and lipids for one more day. Dr. Tee [facility] input - noted. Continue Physical Therapy. Zofran as needed for nausea. Replace K 3.3. No apparent bleeding as Hgb is stable. Plans per Dr. Leva Severance.

## 2019-10-08 NOTE — PROGRESS NOTES
Problem: Falls - Risk of 
Goal: *Absence of Falls Description Document [facility] Fall Risk and appropriate interventions in the flowsheet. Outcome: Progressing Towards Goal 
Note:  
Fall Risk Interventions: 
Mobility Interventions: Patient to call before getting OOB Medication Interventions: Patient to call before getting OOB, Bed/chair exit alarm Elimination Interventions: Call light in reach, Bed/chair exit alarm History of Falls Interventions: Bed/chair exit alarm, Door open when patient unattended Problem: Pain Goal: *Control of Pain Outcome: Progressing Towards Goal 
  
Problem: Nutrition Deficit Goal: *Optimize nutritional status Outcome: Progressing Towards Goal 
  
Problem: Patient Education: Go to Patient Education Activity Goal: Patient/Family Education Outcome: Progressing Towards Goal

## 2019-10-08 NOTE — PROGRESS NOTES
Faculty or Preceptor Review of Student Work 
 
10/8/2019  - Shift times - 0700 to 1230 The student documentation of patient care for Sarah Jerez has been reviewed and approved. All medications have been administered under the direct supervision of the faculty or preceptor.  
 
Juan Pablo Kim RN

## 2019-10-08 NOTE — PROGRESS NOTES
[facility] [place] [address] [address] W: [phone]  F: [phone] Reason for Visit:  
Jodi Rodriguez is a 70 y.o. female who is seen in consultation at the request of Dr. Jenni Dakins for evaluation of stage IV pancreatic cancer. Treatment History: · Biopsy with adenocarcinoma of the pancreas metastatic to liver History of Present Illness: The patient is a very pleasant 51-year-old be younger than her stated age she is been doing fairly well she started having some GI issues in January of this year. But over the last month or so she is been having more trouble and read more recently has been having trouble with nausea and vomiting. Because of the persistent or GI issues with the nausea and vomiting she is now admitted and was found to have a pancreatic cancer metastatic to her liver. The patient has been able to maintain her weight. The patient has lost a couple pounds since her last admission. According to the family the patient initially had lost weight when she was in the hospital then a little bit afterwards and stabilized her weight pattern and it is continued in a stable pattern The patient denied trouble with urination or with breathing she denied any ENT symptomatology. Has had a little bit of trouble with constipation 9/23/19 The patient seems to be doing fairly well this morning. She still has her NG tube in. Hopefully she will be getting a stent placed soon so she can start back on oral nutrition. Then the plan will be to get her port inserted and then to start her on gemcitabine and Abraxane. I anticipate that she will handle those drugs quite well and hopefully we can get her some quality time. 9/24/2019 Patient still has NG tube in place and the plan is for a stent to be placed.   She is getting hyperalimentation so she is getting the nutrition she would needs. Patient did vomit and does not like to vomit so she is hoping that the stent will be placed soon. 9/25/2019 Patient is doing well this morning still has her NG tube in place. She has had a little bit of nausea. She continues to get her hyperalimentation. Hopefully will get her bypass or stent placed soon so that we can get her treatment started. She will be getting a venous access device. She remains afebrile. Her vital signs are stable. Continue to encourage her to be up and in a chair. Plan on starting chemotherapy as soon as she is taking nutrition and has her Port-A-Cath placed. 9/27/2019 Patient seems to be doing well this morning NG tube is in place she is planning on having her surgery today. She still getting hyperalimentation. Patient is in good spirits. Hopefully she will have her surgery today and be able to get rid of her NG tube in a couple days. Then she will be able to start on nutrition. Then will be able to get her chemotherapy started. 9/28/2019 Patient is doing well postop she remains afebrile her vital signs look good. According to the patient surgery went well. She still has her NG tube in place. Her bowel sounds are quiet. Hopefully her bowels will start working fairly soon. She is in good spirits and her pain is under good control. 9/30/2019 The patient continues to improve every day. She looks much brighter. She still has her NG tube in but apparently that is coming out in the next hour or so hopefully that can get rid of her Joyce catheter as well. The patient is going to try to posterior about the move is much she can. Hopefully should be able be discharged home in a couple days. She is passing gas at this point which is showing her got is working again we will plan on seeing her in the office in about 1 week as we get raised to start her on chemotherapy she will need a venous access device. 10/2/2019 The patient is still having some issues with nausea she is not able to keep things down her NG tube is out she is just weak and is not able to get the nutrition and she does not understand why she is still having trouble with nausea and vomiting. She will be asking the gastroenterologist and surgeons about that this morning. The patient's quality of life right now is not very good and she understands that she wants to have a better quality of life. She is frustrated with not being able to eat. She is not having any other problems. Just weakness. 10/3/2019 Patient still having some vomiting but not as much as she has been obviously if we can get her to where she can get nutrition and will chemotherapy is off the table. Hopefully will get it where she can get nutrition in and we can begin considering other options to try to improve the quality of her life. If the patient can have quality she is not interested in quantity 10/4/2019 She continues to have vomiting she actually looks a little better this morning but she is vomiting unless we can get vomiting under control is not going to do well. Up in a chair and to be staring about as much as possible in hopes that that will help as well 
 
10/7/2019 Patient seems to be doing a little bit better she was able to keep her yogurt down and she is been eating something with each meal.  She is trying to get her nutrition built up. We talked a little bit about that today. She is going to try to sit up in a chair more try to get out of bed try to be moving around and hopefully that will help as well. 10/8/2019 Patient has been able to keep a little bit of the Ensure down as well so each day she seems to be doing a little bit better. She is going to try to get out of bed more today. Past Medical History:  
Diagnosis Date  Coronary artery disease of native artery of native heart with stable angina pectoris ([facility] [address] 2/28/2019  Cough due to ACE inhibitor  Hematoma of rectus sheath 2/28/2019  IBS (irritable bowel syndrome) IBS  NSTEMI (non-ST elevated myocardial infarction) ([facility] [address] 2/28/2019  Pancreatitis  Pure hypercholesterolemia 2/28/2019  Systolic CHF, chronic ([facility] [address] 2/28/2019 Past Surgical History:  
Procedure Laterality Date  COLONOSCOPY Left 11/1/2017 COLONOSCOPY performed by Azalia Spaulding MD at [address]  HX CHOLECYSTECTOMY  HX GI    
 surgery for hiatal hernia  HX ORTHOPAEDIC    
 DJD, doing PT weekly through [facility]  IR OCCL Birdia Genera W SI  1/30/2019 Social History Tobacco Use  Smoking status: Former Smoker  Smokeless tobacco: Never Used  Tobacco comment: quit smoking cigarettes 6 yrs ago Substance Use Topics  Alcohol use: Yes Comment: very occasional  
  
Family History Problem Relation Age of Onset  Dementia Mother   
     alzheimers  Cancer Sister   
     gallbladder  Cancer Brother   
     pancreatic  Breast Cancer Paternal Grandmother  Dementia Sister   
     alzheimers  Heart Surgery Brother   
     bypass Current Facility-Administered Medications Medication Dose Route Frequency  melatonin tablet 3 mg  3 mg Oral QHS PRN  
 simethicone (MYLICON) tablet 80 mg  80 mg Oral QID PRN  
 zolpidem (AMBIEN) tablet 5 mg  5 mg Oral QHS  LORazepam (ATIVAN) tablet 0.5 mg  0.5 mg Oral QHS PRN  
 nystatin (MYCOSTATIN) 100,000 unit/gram cream   Topical BID  carvedilol (COREG) tablet 6.25 mg  6.25 mg Oral BID WITH MEALS  
 hydrocortisone (ANUSOL-HC) suppository 25 mg  25 mg Rectal Q12H  
 enoxaparin (LOVENOX) injection 40 mg  40 mg SubCUTAneous Q24H  
 0.9% sodium chloride infusion 250 mL  250 mL IntraVENous PRN  
 ondansetron (ZOFRAN) injection 4 mg  4 mg IntraVENous Q4H  
 morphine injection 2 mg  2 mg IntraVENous Q4H PRN  
  lidocaine (LIDODERM) 5 % patch 1 Patch  1 Patch TransDERmal Q24H  
 fat emulsion 20% (LIPOSYN, INTRAlipid) infusion 500 mL  500 mL IntraVENous EVERY MON & TH  
 lidocaine (XYLOCAINE) 2 % viscous solution 15 mL  15 mL Mouth/Throat PRN  phenol throat spray (CHLORASEPTIC) 1 Spray  1 Spray Oral PRN  
 guaiFENesin ER (MUCINEX) tablet 600 mg  600 mg Oral BID PRN  
 [Held by provider] rosuvastatin (CRESTOR) tablet 10 mg  10 mg Oral QHS  benzocaine-menthol (CEPACOL) lozenge 1 Lozenge  1 Lozenge Mucous Membrane PRN  
 hydrALAZINE (APRESOLINE) 20 mg/mL injection 10 mg  10 mg IntraVENous Q6H PRN  
 sodium chloride (NS) flush 5-40 mL  5-40 mL IntraVENous PRN  
 acetaminophen (TYLENOL) tablet 650 mg  650 mg Oral Q4H PRN Allergies Allergen Reactions  Penicillins Hives Review of Systems: A complete review of systems was obtained, negative except as described above. Physical Exam:  
 
Visit Vitals /77 (BP 1 Location: Left arm, BP Patient Position: At rest) Pulse 99 Temp 98.9 °F (37.2 °C) Resp 17 Ht 5' 4\" (1.626 m) Wt 123 lb 7.3 oz (56 kg) SpO2 97% BMI 21.19 kg/m² ECOG PS: 1 General: No distress Eyes: PERRLA, anicteric sclerae HENT: Atraumatic, OP clear Neck: Supple Lymphatic: No cervical, or supraclavicular adenopathy Respiratory: CTAB, normal respiratory effort CV: Heart rate normal 
GI: Status post recent surgery MS: Digits without clubbing or cyanosis. Psych: Alert, oriented, appropriate affect, normal judgment/insight Results:  
 
Lab Results Component Value Date/Time WBC 8.4 10/08/2019 01:02 AM  
 HGB 8.4 (L) 10/08/2019 01:02 AM  
 HCT 26.7 (L) 10/08/2019 01:02 AM  
 PLATELET 168 (L) 61/48/5585 01:02 AM  
 MCV 99.6 (H) 10/08/2019 01:02 AM  
 ABS. NEUTROPHILS 6.7 10/07/2019 12:24 AM  
 
Lab Results Component Value Date/Time  Sodium 139 10/08/2019 01:02 AM  
 Potassium 3.3 (L) 10/08/2019 01:02 AM  
 Chloride 110 (H) 10/08/2019 01:02 AM  
 CO2 22 10/08/2019 01:02 AM  
 Glucose 139 (H) 10/08/2019 01:02 AM  
 BUN 20 10/08/2019 01:02 AM  
 Creatinine 0.85 10/08/2019 01:02 AM  
 GFR est AA >60 10/08/2019 01:02 AM  
 GFR est non-AA >60 10/08/2019 01:02 AM  
 Calcium 7.9 (L) 10/08/2019 01:02 AM  
 Glucose (POC) 124 (H) 10/08/2019 07:13 AM  
 
Lab Results Component Value Date/Time Bilirubin, total 0.7 10/07/2019 12:24 AM  
 ALT (SGPT) 65 10/07/2019 12:24 AM  
 AST (SGOT) 38 (H) 10/07/2019 12:24 AM  
 Alk. phosphatase 206 (H) 10/07/2019 12:24 AM  
 Protein, total 4.9 (L) 10/07/2019 12:24 AM  
 Albumin 2.1 (L) 10/07/2019 12:24 AM  
 Globulin 2.8 10/07/2019 12:24 AM  
 
 
Records reviewed and summarized above. Pathology report(s) reviewed 9/17/19 FINAL PATHOLOGIC DIAGNOSIS 1. Small bowel, duodenal stricture, biopsy:  
Adenocarcinoma Radiology report(s) reviewed above. 9/16/19 CT abdomen pelvis IMPRESSION: 
  
1. Marked gastric and duodenal distention by water attenuation fluid, with 
abrupt transition at the junction of second and third duodenum where a 
duodenal/pancreatic mass or stricture/scar is suggested, which is confluent with 
the pancreatic head and uncinate process. . This is consistent with endoscopic 
findings earlier same day. NG tube placement is advised. 2. Right hydronephrosis, likely due to extrinsic compression by the markedly 
distended stomach and duodenum. 3. Prominence of extrahepatic bile ducts, likely due to extrinsic impression on 
the ampulla of water by the markedly distended stomach and duodenum. 4. Pancreas extrinsically compressed by markedly distended stomach, making 
visualization of the previously described cystic mass is difficult. The 
pancreatic head lies at the site of duodenal obstruction, but is difficult to 
further assess under the circumstances. 5. Other incidental and postoperative findings MRI abdomen and 9/19/19 IMPRESSION: 
  
 1.  Interval development of diffuse liver metastatic disease. 
  
2.  Large mass centered on the pancreatic head and proximal horizontal portion 
of the duodenum. . There is narrowing of the duodenal lumen. Possibilities 
include a pancreatic or duodenal primary. The mass appears to be centered on the 
pancreatic head and there is common bile duct dilatation however the pancreatic 
duct is normal in caliber.  
3.  Left lower lobe airspace disease may represent atelectasis or pneumonia. 
  
4.  Possible nonocclusive thrombus in the intrahepatic IVC. 
  
5. The SMV is occluded. Assessment:  
1) adenocarcinoma of the pancreas metastatic to liver with the plan being for chemotherapy once she is able to tolerate nutrition  3596 9/17/19 CEA 16.8 9/17/19 
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  
 
· 1. Patient will need a venous access device for therapy. ·  
· 2. We will go ahead and treat her with heparin and low molecular weight heparin as an outpatient for her blood clot. ·  
· 3. We will plan on doing genetic testing due to her family history of pancreatic cancer. ·  
· 4. We will plan on getting genome wide sequencing of her tumor for therapeutic options. ·  
· 5. We will plan on treating her with gemcitabine and nab paclitaxel and hopefully start treatment within the next week · 6 patient continues to have problems with nausea, but it seems to be improving I appreciate the opportunity to participate in Ms. [address] [facility].  
 
Signed By: Aixa Lagunas MD

## 2019-10-09 NOTE — ROUTINE PROCESS
Bedside shift change report given to Rajeev (oncoming nurse) by Emperatriz Richardson (offgoing nurse). Report included the following information SBAR, Kardex, Intake/Output, MAR and Recent Results.

## 2019-10-09 NOTE — PROGRESS NOTES
NUTRITION COMPLETE ASSESSMENT 
 
RECOMMENDATIONS:  
1. Continue with progression of regular diet/discontinue TPN. 2. Addition of Magic Cup supplement BID. Interventions/Plan:  
Food/Nutrient Delivery:  General/healthful diet(Continue with regular diet progression and discontinued TPN) Commercial supplement(Continue 4 oz. Ensure and addition of Magic Cup)     (continue cyclic TPN) Nutrition Education:(-) Assessment:  
Reason for Assessment: [x]Reassessment Diet: Regular Supplements: Ensure compact TID, Magic cup BID Nutritionally Significant Medications: [x] Reviewed & Includes: Imodium, Ativan, Morphine, Zofran, Miralax, Pericolace, Mylicon, Ambien Meal Intake:  
Patient Vitals for the past 100 hrs: 
 % Diet Eaten 10/09/19 0938 80 % Current Hospitalization:  
Appetite: Fair PO Ability: Independent Average po intake:%(80% today at breakfast ) Average supplements intake:    
  
Subjective: 
Pt reported that she was eating at meal times. Pt did not want to progress to Ensure 8 oz due to fear of over consumption making symptoms worse and \"eating too much at once\" . Objective: 
Pt admitted for acute pancreatitis. PMHx: CAD, HLD, CHF, IBS, pancreatitis. Duodenal obstruction. Stage 4 pancreatic CA with numerous liver lesions. Oncology, GI and surgery following. S/p palliative gastrojejunal bypass on 9/27. TPN planned to be discontinued today on 10/09 due to increasing PO intake (80% at breakfast). Discussed with pt progressing off TPN to regular diet and supplements. Reported slight nausea last night but none reported today. Tolerates yogurt well. Pt likes Ensure Compact TID and willing to try Magic Cup BID(290 kcal 9 g pro in each) in order to increase PO intake of kcal and protein. Will continue to follow for PO intake, plan of care, wt trends. Estimated Nutrition Needs:  
Kcals/day: 4477 Kcals/day(1283-1382kcal) Protein: 63 g(63-74g (1.2-1.4g/kg)) Fluid: 1382 ml(1ml/kcal) Based On: Yael Gay(x 1.3-1.4) Weight Used: Actual wt(52.5kg) Pt expected to meet estimated nutrient needs:  [x]   Yes     []  No [] Unable to predict at this time Nutrition Diagnosis:  
1. Increased nutrient needs related to stage 4 pancreatic cancer as evidenced by duodenal stricture/pancreatits; NPO with TPN for past 3 weeks 2. Inadequate oral intake related to altered GI fx as evidenced by duodenal stricture/pancreatitis Goals:   
 Consume >50% of meals and 2 sypplements daily within the next 48 hours Monitoring & Evaluation: - Total energy intake, Protein intake - Weight/weight change, GI 
 
Previous Nutrition Goals Met:   Yes Previous Recommendations:    Yes 
 
Education & Discharge Needs: 
 [] None Identified 
 [x] Identified and addressed [x] Participated in care plan, discharge planning, and/or interdisciplinary rounds Cultural, Confucianist and ethnic food preferences identified: None Skin Integrity: [x]Intact  []Other Edema: [x]None []Other Last BM: 10/09 Food Allergies: [x]None []Other Diet Restrictions: Cultural/Christianity Preference(s): None Anthropometrics:   
Weight Loss Metrics 10/9/2019 4/17/2019 4/15/2019 3/11/2019 2/21/2019 2/8/2019 1/21/2019 Today's Wt 130 lb 3.2 oz 125 lb 136 lb 136 lb 136 lb 144 lb 6.4 oz 149 lb BMI 22.35 kg/m2 21.46 kg/m2 23.34 kg/m2 23.34 kg/m2 23.34 kg/m2 24.79 kg/m2 25.58 kg/m2 Weight Source: Standing scale (comment) Height: 5' 4\" (162.6 cm), Height Source: (drivers license) Body mass index is 22.35 kg/m². IBW : 54.4 kg (120 lb), % IBW (Calculated): 104.67 % Usual Body Weight: 59 kg (130 lb),   
 
Labs:   
Lab Results Component Value Date/Time  Sodium 140 10/09/2019 01:55 AM  
 Potassium 4.0 10/09/2019 01:55 AM  
 Chloride 112 (H) 10/09/2019 01:55 AM  
 CO2 23 10/09/2019 01:55 AM  
 Glucose 122 (H) 10/09/2019 01:55 AM  
 BUN 24 (H) 10/09/2019 01:55 AM  
 Creatinine 0.78 10/09/2019 01:55 AM  
 Calcium 7.8 (L) 10/09/2019 01:55 AM  
 Magnesium 1.6 10/07/2019 12:24 AM  
 Phosphorus 1.6 (L) 10/07/2019 12:24 AM  
 Albumin 2.1 (L) 10/07/2019 12:24 AM  
 
No results found for: HBA1C, HGBE8, FIQ0PRKL, TLC8VJFH, NXK8NLCE Mingo Echavarria

## 2019-10-09 NOTE — PROGRESS NOTES
General Surgery Daily Progress Note Admit Date: 2019 Post-Operative Day: 12 Days Post-Op from Procedure(s): 
GASTROJEJUNOSTOMY Subjective:  
 
Last 24 hrs: Pt says she is \"about the same\". Eating some of reg diet. No BM in past days OOB in chair sometimes and walks w/ walker. Objective:  
 
Blood pressure 113/68, pulse 87, temperature 97.9 °F (36.6 °C), resp. rate 16, height 5' 4\" (1.626 m), weight 130 lb 3.2 oz (59.1 kg), SpO2 97 %. Temp (24hrs), Av °F (36.7 °C), Min:97.6 °F (36.4 °C), Max:98.7 °F (37.1 °C) 
 
 
_____________________ Physical Exam:    
Alert and Oriented, x3, in no acute distress. Cardiovascular: RRR, no peripheral edema Abdomen: soft, ecchymotic incision, some fullness in incision distally; loud BS Assessment:  
Active Problems: 
  Acute pancreatitis (2019) TOÑA (acute kidney injury) ([facility] [address] (2019) Hypokalemia (2019) GI bleed (2019) Coffee ground emesis (2019) Plan:  
 
Add senna 
miralax prn Cont cycling tpn Cont reg diet 
dvt proph Cont OOB Data Review: 
 
Recent Labs 10/09/19 
0155 10/08/19 
0102 10/07/19 
0024 WBC 7.6 8.4 7.6 HGB 8.2* 8.4* 8.2* HCT 26.8* 26.7* 26.0*  
* 128* 123* Recent Labs 10/09/19 
0155 10/08/19 
0102 10/07/19 
0024  139 139  
K 4.0 3.3* 3.1*  
* 110* 111* CO2 23 22 24 * 139* 114* BUN 24* 20 23* CREA 0.78 0.85 0.81 CA 7.8* 7.9* 8.1*  
MG  --   --  1.6 PHOS  --   --  1.6* ALB  --   --  2.1*  
SGOT  --   --  38* ALT  --   --  65 No results for input(s): AML, LPSE in the last 72 hours. ______________________ Medications: 
 
Current Facility-Administered Medications Medication Dose Route Frequency  senna-docusate (PERICOLACE) 8.6-50 mg per tablet 1 Tab  1 Tab Oral DAILY  polyethylene glycol (MIRALAX) packet 17 g  17 g Oral DAILY PRN  potassium chloride SR (KLOR-CON 10) tablet 40 mEq  40 mEq Oral DAILY  ondansetron (ZOFRAN ODT) tablet 4 mg  4 mg Oral Q6H  
 loperamide (IMODIUM) capsule 2 mg  2 mg Oral Q4H PRN  
 0.9% sodium chloride infusion  50 mL/hr IntraVENous CONTINUOUS  
 melatonin tablet 3 mg  3 mg Oral QHS PRN  
 simethicone (MYLICON) tablet 80 mg  80 mg Oral QID PRN  
 zolpidem (AMBIEN) tablet 5 mg  5 mg Oral QHS  LORazepam (ATIVAN) tablet 0.5 mg  0.5 mg Oral QHS PRN  
 nystatin (MYCOSTATIN) 100,000 unit/gram cream   Topical BID  carvedilol (COREG) tablet 6.25 mg  6.25 mg Oral BID WITH MEALS  
 hydrocortisone (ANUSOL-HC) suppository 25 mg  25 mg Rectal Q12H  
 enoxaparin (LOVENOX) injection 40 mg  40 mg SubCUTAneous Q24H  
 0.9% sodium chloride infusion 250 mL  250 mL IntraVENous PRN  
 morphine injection 2 mg  2 mg IntraVENous Q4H PRN  
 lidocaine (LIDODERM) 5 % patch 1 Patch  1 Patch TransDERmal Q24H  
 fat emulsion 20% (LIPOSYN, INTRAlipid) infusion 500 mL  500 mL IntraVENous EVERY MON & TH  
 lidocaine (XYLOCAINE) 2 % viscous solution 15 mL  15 mL Mouth/Throat PRN  phenol throat spray (CHLORASEPTIC) 1 Spray  1 Spray Oral PRN  
 guaiFENesin ER (MUCINEX) tablet 600 mg  600 mg Oral BID PRN  
 [Held by provider] rosuvastatin (CRESTOR) tablet 10 mg  10 mg Oral QHS  benzocaine-menthol (CEPACOL) lozenge 1 Lozenge  1 Lozenge Mucous Membrane PRN  
 hydrALAZINE (APRESOLINE) 20 mg/mL injection 10 mg  10 mg IntraVENous Q6H PRN  
 sodium chloride (NS) flush 5-40 mL  5-40 mL IntraVENous PRN  
 acetaminophen (TYLENOL) tablet 650 mg  650 mg Oral Q4H PRN Winston Alford NP 
10/9/2019 I have independently examined the patient and have reviewed the chart. I agree with the above plan. Patient continues to slowly improve. No complaints of n/v today. Taking more PO. No BM today. Abd soft, minimally distended, appropriately tender. Incision soft with ecchymosis. No signs of bleeding. Continue diet as tolerated. Can let TPN run out in my opinion. Covering for Dr. Samir Rollins. Nicola Palomino MD 
10/9/2019 
2:26 PM

## 2019-10-09 NOTE — PROGRESS NOTES
[facility] [place] [facility] [place] [address] [address] W: [phone]  F: [phone] Reason for Visit:  
Lilliana Humphreys is a 70 y.o. female who is seen in consultation at the request of Dr. Kinsey House for evaluation of stage IV pancreatic cancer. Treatment History: · Biopsy with adenocarcinoma of the pancreas metastatic to liver History of Present Illness: The patient is a very pleasant 61-year-old be younger than her stated age she is been doing fairly well she started having some GI issues in January of this year. But over the last month or so she is been having more trouble and read more recently has been having trouble with nausea and vomiting. Because of the persistent or GI issues with the nausea and vomiting she is now admitted and was found to have a pancreatic cancer metastatic to her liver. The patient has been able to maintain her weight. The patient has lost a couple pounds since her last admission. According to the family the patient initially had lost weight when she was in the hospital then a little bit afterwards and stabilized her weight pattern and it is continued in a stable pattern The patient denied trouble with urination or with breathing she denied any ENT symptomatology. Has had a little bit of trouble with constipation 9/23/19 The patient seems to be doing fairly well this morning. She still has her NG tube in. Hopefully she will be getting a stent placed soon so she can start back on oral nutrition. Then the plan will be to get her port inserted and then to start her on gemcitabine and Abraxane. I anticipate that she will handle those drugs quite well and hopefully we can get her some quality time. 9/24/2019 Patient still has NG tube in place and the plan is for a stent to be placed.   She is getting hyperalimentation so she is getting the nutrition she would needs. Patient did vomit and does not like to vomit so she is hoping that the stent will be placed soon. 9/25/2019 Patient is doing well this morning still has her NG tube in place. She has had a little bit of nausea. She continues to get her hyperalimentation. Hopefully will get her bypass or stent placed soon so that we can get her treatment started. She will be getting a venous access device. She remains afebrile. Her vital signs are stable. Continue to encourage her to be up and in a chair. Plan on starting chemotherapy as soon as she is taking nutrition and has her Port-A-Cath placed. 9/27/2019 Patient seems to be doing well this morning NG tube is in place she is planning on having her surgery today. She still getting hyperalimentation. Patient is in good spirits. Hopefully she will have her surgery today and be able to get rid of her NG tube in a couple days. Then she will be able to start on nutrition. Then will be able to get her chemotherapy started. 9/28/2019 Patient is doing well postop she remains afebrile her vital signs look good. According to the patient surgery went well. She still has her NG tube in place. Her bowel sounds are quiet. Hopefully her bowels will start working fairly soon. She is in good spirits and her pain is under good control. 9/30/2019 The patient continues to improve every day. She looks much brighter. She still has her NG tube in but apparently that is coming out in the next hour or so hopefully that can get rid of her Joyce catheter as well. The patient is going to try to posterior about the move is much she can. Hopefully should be able be discharged home in a couple days. She is passing gas at this point which is showing her got is working again we will plan on seeing her in the office in about 1 week as we get raised to start her on chemotherapy she will need a venous access device. 10/2/2019 The patient is still having some issues with nausea she is not able to keep things down her NG tube is out she is just weak and is not able to get the nutrition and she does not understand why she is still having trouble with nausea and vomiting. She will be asking the gastroenterologist and surgeons about that this morning. The patient's quality of life right now is not very good and she understands that she wants to have a better quality of life. She is frustrated with not being able to eat. She is not having any other problems. Just weakness. 10/3/2019 Patient still having some vomiting but not as much as she has been obviously if we can get her to where she can get nutrition and will chemotherapy is off the table. Hopefully will get it where she can get nutrition in and we can begin considering other options to try to improve the quality of her life. If the patient can have quality she is not interested in quantity 10/4/2019 She continues to have vomiting she actually looks a little better this morning but she is vomiting unless we can get vomiting under control is not going to do well. Up in a chair and to be staring about as much as possible in hopes that that will help as well 
 
10/7/2019 Patient seems to be doing a little bit better she was able to keep her yogurt down and she is been eating something with each meal.  She is trying to get her nutrition built up. We talked a little bit about that today. She is going to try to sit up in a chair more try to get out of bed try to be moving around and hopefully that will help as well. 10/8/2019 Patient has been able to keep a little bit of the Ensure down as well so each day she seems to be doing a little bit better. She is going to try to get out of bed more today. 10/9/2019 Better with her nutrition yesterday she did vomit once. But the Ensure is going down better in the yogurt as well.   She is going to try to work on her strength and exercising. She is also been to try to be out of bed more. Past Medical History:  
Diagnosis Date  Coronary artery disease of native artery of native heart with stable angina pectoris ([facility] [address] 2/28/2019  Cough due to ACE inhibitor  Hematoma of rectus sheath 2/28/2019  IBS (irritable bowel syndrome) IBS  NSTEMI (non-ST elevated myocardial infarction) ([facility] [address] 2/28/2019  Pancreatitis  Pure hypercholesterolemia 2/28/2019  Systolic CHF, chronic ([facility] [address] 2/28/2019 Past Surgical History:  
Procedure Laterality Date  COLONOSCOPY Left 11/1/2017 COLONOSCOPY performed by Jaquelin Antonio MD at [address]  HX CHOLECYSTECTOMY  HX GI    
 surgery for hiatal hernia  HX ORTHOPAEDIC    
 DJD, doing PT weekly through [facility]  IR OCCL [facility]  1/30/2019 Social History Tobacco Use  Smoking status: Former Smoker  Smokeless tobacco: Never Used  Tobacco comment: quit smoking cigarettes 6 yrs ago Substance Use Topics  Alcohol use: Yes Comment: very occasional  
  
Family History Problem Relation Age of Onset  Dementia Mother   
     alzheimers  Cancer Sister   
     gallbladder  Cancer Brother   
     pancreatic  Breast Cancer Paternal Grandmother  Dementia Sister   
     alzheimers  Heart Surgery Brother   
     bypass Current Facility-Administered Medications Medication Dose Route Frequency  potassium chloride SR (KLOR-CON 10) tablet 40 mEq  40 mEq Oral DAILY  ondansetron (ZOFRAN ODT) tablet 4 mg  4 mg Oral Q6H  
 loperamide (IMODIUM) capsule 2 mg  2 mg Oral Q4H PRN  
 0.9% sodium chloride infusion  50 mL/hr IntraVENous CONTINUOUS  
 melatonin tablet 3 mg  3 mg Oral QHS PRN  
 simethicone (MYLICON) tablet 80 mg  80 mg Oral QID PRN  
 zolpidem (AMBIEN) tablet 5 mg  5 mg Oral QHS  LORazepam (ATIVAN) tablet 0.5 mg  0.5 mg Oral QHS PRN  
  nystatin (MYCOSTATIN) 100,000 unit/gram cream   Topical BID  carvedilol (COREG) tablet 6.25 mg  6.25 mg Oral BID WITH MEALS  
 hydrocortisone (ANUSOL-HC) suppository 25 mg  25 mg Rectal Q12H  
 enoxaparin (LOVENOX) injection 40 mg  40 mg SubCUTAneous Q24H  
 0.9% sodium chloride infusion 250 mL  250 mL IntraVENous PRN  
 morphine injection 2 mg  2 mg IntraVENous Q4H PRN  
 lidocaine (LIDODERM) 5 % patch 1 Patch  1 Patch TransDERmal Q24H  
 fat emulsion 20% (LIPOSYN, INTRAlipid) infusion 500 mL  500 mL IntraVENous EVERY MON & TH  
 lidocaine (XYLOCAINE) 2 % viscous solution 15 mL  15 mL Mouth/Throat PRN  phenol throat spray (CHLORASEPTIC) 1 Spray  1 Spray Oral PRN  
 guaiFENesin ER (MUCINEX) tablet 600 mg  600 mg Oral BID PRN  
 [Held by provider] rosuvastatin (CRESTOR) tablet 10 mg  10 mg Oral QHS  benzocaine-menthol (CEPACOL) lozenge 1 Lozenge  1 Lozenge Mucous Membrane PRN  
 hydrALAZINE (APRESOLINE) 20 mg/mL injection 10 mg  10 mg IntraVENous Q6H PRN  
 sodium chloride (NS) flush 5-40 mL  5-40 mL IntraVENous PRN  
 acetaminophen (TYLENOL) tablet 650 mg  650 mg Oral Q4H PRN Allergies Allergen Reactions  Penicillins Hives Review of Systems: A complete review of systems was obtained, negative except as described above. Physical Exam:  
 
Visit Vitals /68 (BP 1 Location: Left arm, BP Patient Position: Sitting) Pulse 87 Temp 97.9 °F (36.6 °C) Resp 16 Ht 5' 4\" (1.626 m) Wt 130 lb 3.2 oz (59.1 kg) SpO2 97% BMI 22.35 kg/m² ECOG PS: 1 General: No distress Eyes: PERRLA, anicteric sclerae HENT: Atraumatic, OP clear Neck: Supple Lymphatic: No cervical, or supraclavicular adenopathy Respiratory: CTAB, normal respiratory effort CV: Heart rate normal 
GI: Status post recent surgery MS: Digits without clubbing or cyanosis. Psych: Alert, oriented, appropriate affect, normal judgment/insight Results:  
 
Lab Results Component Value Date/Time WBC 7.6 10/09/2019 01:55 AM  
 HGB 8.2 (L) 10/09/2019 01:55 AM  
 HCT 26.8 (L) 10/09/2019 01:55 AM  
 PLATELET 712 (L) 12/41/7892 01:55 AM  
 .1 (H) 10/09/2019 01:55 AM  
 ABS. NEUTROPHILS 6.7 10/07/2019 12:24 AM  
 
Lab Results Component Value Date/Time Sodium 140 10/09/2019 01:55 AM  
 Potassium 4.0 10/09/2019 01:55 AM  
 Chloride 112 (H) 10/09/2019 01:55 AM  
 CO2 23 10/09/2019 01:55 AM  
 Glucose 122 (H) 10/09/2019 01:55 AM  
 BUN 24 (H) 10/09/2019 01:55 AM  
 Creatinine 0.78 10/09/2019 01:55 AM  
 GFR est AA >60 10/09/2019 01:55 AM  
 GFR est non-AA >60 10/09/2019 01:55 AM  
 Calcium 7.8 (L) 10/09/2019 01:55 AM  
 Glucose (POC) 111 (H) 10/09/2019 06:31 AM  
 
Lab Results Component Value Date/Time Bilirubin, total 0.7 10/07/2019 12:24 AM  
 ALT (SGPT) 65 10/07/2019 12:24 AM  
 AST (SGOT) 38 (H) 10/07/2019 12:24 AM  
 Alk. phosphatase 206 (H) 10/07/2019 12:24 AM  
 Protein, total 4.9 (L) 10/07/2019 12:24 AM  
 Albumin 2.1 (L) 10/07/2019 12:24 AM  
 Globulin 2.8 10/07/2019 12:24 AM  
 
 
Records reviewed and summarized above. Pathology report(s) reviewed 9/17/19 FINAL PATHOLOGIC DIAGNOSIS 1. Small bowel, duodenal stricture, biopsy:  
Adenocarcinoma Radiology report(s) reviewed above. 9/16/19 CT abdomen pelvis IMPRESSION: 
  
1. Marked gastric and duodenal distention by water attenuation fluid, with 
abrupt transition at the junction of second and third duodenum where a 
duodenal/pancreatic mass or stricture/scar is suggested, which is confluent with 
the pancreatic head and uncinate process. . This is consistent with endoscopic 
findings earlier same day. NG tube placement is advised. 2. Right hydronephrosis, likely due to extrinsic compression by the markedly 
distended stomach and duodenum. 3. Prominence of extrahepatic bile ducts, likely due to extrinsic impression on 
the ampulla of water by the markedly distended stomach and duodenum. 4. Pancreas extrinsically compressed by markedly distended stomach, making 
visualization of the previously described cystic mass is difficult. The 
pancreatic head lies at the site of duodenal obstruction, but is difficult to 
further assess under the circumstances. 5. Other incidental and postoperative findings MRI abdomen and 9/19/19 IMPRESSION: 
  
1. Interval development of diffuse liver metastatic disease. 
  
2.  Large mass centered on the pancreatic head and proximal horizontal portion 
of the duodenum. . There is narrowing of the duodenal lumen. Possibilities 
include a pancreatic or duodenal primary. The mass appears to be centered on the 
pancreatic head and there is common bile duct dilatation however the pancreatic 
duct is normal in caliber.  
3.  Left lower lobe airspace disease may represent atelectasis or pneumonia. 
  
4.  Possible nonocclusive thrombus in the intrahepatic IVC. 
  
5. The SMV is occluded. Assessment:  
1) adenocarcinoma of the pancreas metastatic to liver with the plan being for chemotherapy once she is able to tolerate nutrition  3596 9/17/19 CEA 16.8 9/17/19 
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  
 
· 1. Patient will need a venous access device for therapy. ·  
· 2. We will go ahead and treat her with heparin and low molecular weight heparin as an outpatient for her blood clot. ·  
· 3. We will plan on doing genetic testing due to her family history of pancreatic cancer. ·  
· 4. We will plan on getting genome wide sequencing of her tumor for therapeutic options. ·  
· 5. We will plan on treating her with gemcitabine and nab paclitaxel and hopefully start treatment within the next week · 6 patient continues to have problems with nausea, but it seems to be improving I appreciate the opportunity to participate in Ms. [address] [facility]. Signed By: Yaquelin Mai MD

## 2019-10-09 NOTE — PROGRESS NOTES
Hospitalist Progress Note Pressley Cooks, MD 
Answering service: [phone] OR [phone] from in house phone Date of Service:  10/9/2019 NAME:  Adilson Aldridge :  1940 MRN:  [id number] Admission Summary:  
68-year-old female with PMH CAD,HLD, chronic systolic CHF, irritable bowel syndrome and pancreatitis, s/p cholecystectomy ,presented to ER with c/o  progressively worsening nausea and coffee-grounds vomitus. x 3 days associated with  some intermittent mild abdominal pain with some radiation to the upper back. 
  
 
Interval history / Subjective:  
Patient seen and examined this morning. She stated the diarrhea has stopped after starting the imodium. Her appetite better. 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  
- EGD initially -  done aborted due to inability to pass scope down to Duodenum -  -EGD showed duodenal obstruction - extrinsic compression causing duodenal obstruction,hiatal hernia and esophagitis. - MRI shows pancreatic mass with liver mets 
- CT abd/Pelvis ,GOO and possible stricture along duodenum  causing obstruction 
- off NGT for decompression-removed - s/p palliative gastrojejunostomy  
- CEA- normal, Ca19-9  - 3596 
- Oncology eval, ~plan for chemo once pt is stable 
- surgery following   
- d/c TPN today and continue with regular diet  
 
  
# Nausea,vomiting-improving  
- KUB with non specific gas pattern - upper GI Series 10/30- show patent gastrojejunal anastomosis with no evident leak 
- risks for aspiration, cxr  with no acute process 
- continue gentle IVF  
- aspiration precautions - prn antiemetics - pain control  
- advanced diet per surgery recs- tolerating well # Diarrhea: improved - symptomatic management for now - ?pancreatic enzyme replacement  
- will check c diff if fever or high wbc or recurrent diarrhea  
 
  
# Anemia,acute on chronic 
- Hgb stable   
- no overt bleeding 
- CT abd/pelvis with no acute change 
- transfusion as needed  
- heparin stopped 
 
  
# Leukocytosis- resolved  
- no fever 
- cultures negative  
- broad spectrum antibiotics iv vanc,zosyn-->changed to levo/flagyl-- completed antibiotic course  
  
# TOÑA d/t dehydration resolved CT ab with Hydronephrosis  
-Cr. improved with IVF continue to monitor labs  
 
  
# Thrombus ,intrahepatic IVC clot  
- heparin gtt  Stopped due to severe anemia  
- Change to lovenox for outpatient / per Dr Katiuska Chakraborty 
- Continue  low dose of Lovenox since she has not had any overt bleeding or hematemesis recently  
  
# Hypokalemia  
- replete as needed  
  
# CAD. -h/O NSTEMI 
-hold meds ,due to hypotension 
  
# MalNutrition Body mass index is 19.83 kg/m².  
Due to cancer and GOO 
- d/c TPN 10/9 # Insomnia: on ambien to help with sleep QHS # Debility due to [facility] hospital  
  
  
DNR/DNI with a guarded prognosis. D/W patient and family. Palliative on board  
  
Patient's Son and daughter Ba Kim ([phone])  
  
Code status: DNR 
DVT prophylaxis: Enoxaparin  
  
Care Plan discussed with: Patient Disposition: TBD based on PT/OT eval but likely facility Hospital Problems  Date Reviewed: 9/14/2019 Codes Class Noted POA Acute pancreatitis ICD-10-CM: K85.90 ICD-9-CM: 562.1  9/14/2019 Yes TOÑA (acute kidney injury) ([facility] [address] ICD-10-CM: N17.9 ICD-9-CM: 584.9  9/14/2019 Yes Hypokalemia ICD-10-CM: E87.6 ICD-9-CM: 276.8  9/14/2019 Yes GI bleed ICD-10-CM: K92.2 ICD-9-CM: 578.9  9/14/2019 Yes Coffee ground emesis ICD-10-CM: K92.0 ICD-9-CM: 578.0  9/14/2019 Yes Review of Systems:  
Pertinent positive mentioned in interval hx/HPI. Other systems reviewed and negative Vital Signs: Last 24hrs VS reviewed since prior progress note. Most recent are: 
Visit Vitals /68 (BP 1 Location: Left arm, BP Patient Position: Sitting) Pulse 87 Temp 97.9 °F (36.6 °C) Resp 16 Ht 5' 4\" (1.626 m) Wt 59.1 kg (130 lb 3.2 oz) SpO2 97% BMI 22.35 kg/m² Intake/Output Summary (Last 24 hours) at 10/9/2019 1018 Last data filed at 10/8/2019 1847 Gross per 24 hour Intake  Output 400 ml Net -400 ml Physical Examination:  
 
 
     
Constitutional:  No acute distress, cooperative, pleasant ENT:  Oral mucous moist, oropharynx benign. Resp:  CTA bilaterally. No wheezing/rhonchi/rales. No accessory muscle use CV:  Regular rhythm, normal rate, no murmurs, gallops, rubs GI:  Soft, non distended, tender in the epigastric area. normoactive bowel sounds, no hepatosplenomegaly Musculoskeletal:  No edema, warm, 2+ pulses throughout Neurologic:  Moves all extremities. AAOx3, CN II-XII reviewed Data Review:  
I personally review labs and imaging Labs:  
 
Recent Labs 10/09/19 
0155 10/08/19 
0102 WBC 7.6 8.4 HGB 8.2* 8.4* HCT 26.8* 26.7*  
* 128* Recent Labs 10/09/19 
0155 10/08/19 
0102 10/07/19 
0024  139 139  
K 4.0 3.3* 3.1*  
* 110* 111* CO2 23 22 24 BUN 24* 20 23* CREA 0.78 0.85 0.81 * 139* 114* CA 7.8* 7.9* 8.1*  
MG  --   --  1.6 PHOS  --   --  1.6* Recent Labs 10/07/19 
0024 SGOT 38* ALT 65 * TBILI 0.7 TP 4.9* ALB 2.1*  
GLOB 2.8 No results for input(s): INR, PTP, APTT, INREXT, INREXT in the last 72 hours. No results for input(s): FE, TIBC, PSAT, FERR in the last 72 hours. No results found for: FOL, RBCF No results for input(s): PH, PCO2, PO2 in the last 72 hours. Recent Labs 10/07/19 
0024 TROIQ <0.05 Lab Results Component Value Date/Time  Cholesterol, total 85 09/15/2019 04:37 AM  
 HDL Cholesterol 43 09/15/2019 04:37 AM  
 LDL, calculated 29 09/15/2019 04:37 AM  
 Triglyceride 65 09/15/2019 04:37 AM  
 CHOL/HDL Ratio 2.0 09/15/2019 04:37 AM  
 
Lab Results Component Value Date/Time Glucose (POC) 111 (H) 10/09/2019 06:31 AM  
 Glucose (POC) 132 (H) 10/08/2019 11:06 PM  
 Glucose (POC) 98 10/08/2019 06:42 PM  
 Glucose (POC) 115 (H) 10/08/2019 12:04 PM  
 Glucose (POC) 124 (H) 10/08/2019 07:13 AM  
 
Lab Results Component Value Date/Time Color YELLOW/STRAW 10/07/2019 11:36 AM  
 Appearance CLEAR 10/07/2019 11:36 AM  
 Specific gravity 1.013 10/07/2019 11:36 AM  
 pH (UA) 5.5 10/07/2019 11:36 AM  
 Protein NEGATIVE  10/07/2019 11:36 AM  
 Glucose NEGATIVE  10/07/2019 11:36 AM  
 Ketone NEGATIVE  10/07/2019 11:36 AM  
 Bilirubin NEGATIVE  10/07/2019 11:36 AM  
 Urobilinogen 0.2 10/07/2019 11:36 AM  
 Nitrites NEGATIVE  10/07/2019 11:36 AM  
 Leukocyte Esterase NEGATIVE  10/07/2019 11:36 AM  
 Epithelial cells FEW 10/07/2019 11:36 AM  
 Bacteria NEGATIVE  10/07/2019 11:36 AM  
 WBC 0-4 10/07/2019 11:36 AM  
 RBC 0-5 10/07/2019 11:36 AM  
 
 
 
Medications Reviewed:  
 
Current Facility-Administered Medications Medication Dose Route Frequency  potassium chloride SR (KLOR-CON 10) tablet 40 mEq  40 mEq Oral DAILY  ondansetron (ZOFRAN ODT) tablet 4 mg  4 mg Oral Q6H  
 loperamide (IMODIUM) capsule 2 mg  2 mg Oral Q4H PRN  
 0.9% sodium chloride infusion  50 mL/hr IntraVENous CONTINUOUS  
 melatonin tablet 3 mg  3 mg Oral QHS PRN  
 simethicone (MYLICON) tablet 80 mg  80 mg Oral QID PRN  
 zolpidem (AMBIEN) tablet 5 mg  5 mg Oral QHS  LORazepam (ATIVAN) tablet 0.5 mg  0.5 mg Oral QHS PRN  
 nystatin (MYCOSTATIN) 100,000 unit/gram cream   Topical BID  carvedilol (COREG) tablet 6.25 mg  6.25 mg Oral BID WITH MEALS  
 hydrocortisone (ANUSOL-HC) suppository 25 mg  25 mg Rectal Q12H  
 enoxaparin (LOVENOX) injection 40 mg  40 mg SubCUTAneous Q24H  
 0.9% sodium chloride infusion 250 mL  250 mL IntraVENous PRN  
  morphine injection 2 mg  2 mg IntraVENous Q4H PRN  
 lidocaine (LIDODERM) 5 % patch 1 Patch  1 Patch TransDERmal Q24H  
 fat emulsion 20% (LIPOSYN, INTRAlipid) infusion 500 mL  500 mL IntraVENous EVERY MON & TH  
 lidocaine (XYLOCAINE) 2 % viscous solution 15 mL  15 mL Mouth/Throat PRN  phenol throat spray (CHLORASEPTIC) 1 Spray  1 Spray Oral PRN  
 guaiFENesin ER (MUCINEX) tablet 600 mg  600 mg Oral BID PRN  
 [Held by provider] rosuvastatin (CRESTOR) tablet 10 mg  10 mg Oral QHS  benzocaine-menthol (CEPACOL) lozenge 1 Lozenge  1 Lozenge Mucous Membrane PRN  
 hydrALAZINE (APRESOLINE) 20 mg/mL injection 10 mg  10 mg IntraVENous Q6H PRN  
 sodium chloride (NS) flush 5-40 mL  5-40 mL IntraVENous PRN  
 acetaminophen (TYLENOL) tablet 650 mg  650 mg Oral Q4H PRN  
 
______________________________________________________________________ EXPECTED LENGTH OF STAY: 4d 19h ACTUAL LENGTH OF STAY:          22 
 
            
Chele Almodovar MD

## 2019-10-09 NOTE — PROGRESS NOTES
Chart reviewed, cleared by nursing, - in am pt observed up in chair visiting with daughter; returned in pm x 2 - pt sleeping. Will continue to follow - [facility], PT

## 2019-10-10 NOTE — PROGRESS NOTES
Bedside shift change report given to Maria M Riddle (oncoming nurse) by Natalia Ng RN (offgoing nurse). Report included the following information SBAR, Kardex and MAR.

## 2019-10-10 NOTE — DISCHARGE INSTRUCTIONS
Dear Hernando Garcia,    Thank you for choosing [facility] for your healthcare needs. We strive to provide EXCELLENT care to you and your family. In an effort to explain clearly why you were here in the hospital, I've also written a very brief summary below. Other details including formal diagnosis, medication changes, and follow up appointment recommendations can also be found in this packet. 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 It is helpful to keep a list of your medication with the names, dosages, and times to be taken in your wallet. Additionally,   - Diet: Regular   - Activity as tolerated     Make sure to also see your primary care doctor for follow-up. Bring these papers with you and be sure to review your medication list with your doctor. I cannot stress the importance of follow up enough. I've included the information for your follow-up appointments below:     Follow-up Information     Follow up With Specialties Details Why [address] [facility]   [address]  [phone]      Garrick Larson MD [facility] Schedule an appointment as soon as possible for a visit in 2 weeks Follow up  [address] [facility]      Tamera Wang MD Oncology, Hematology Schedule an appointment as soon as possible for a visit in 1 week For chemotherapy follow up  [address] [facility]  [address]  [phone]      Donald Castañeda MD General Surgery, Breast Surgery, Oncology In 1 week follow up  [address] [place] [address] [address] [place] [address]      Clarisa Mondragon MD Gastroenterology Schedule an appointment as soon as possible for a visit in 10 days Follow up  [facility]  [phone]            At this time, the following test results are still pending: None      Again, please follow-up these results with your primary care provider. Should you have any fever over 101 degrees for 24 hours, chest pain, shortness of breath, fever, chills, nausea, vomiting, diarrhea, change in mentation, falling, weakness, bleeding, or worsening pain, please seek medical attention immediately. Finally, as your discharging physician, you may be receiving a survey regarding my care. I would greatly value and appreciate your input in the survey as we strive for excellence. If you have any questions, I can be reached at [phone].   Thank you so much again for allowing me to care for you at [facility].    Respectfully yours,  Nazanin Morrow MD

## 2019-10-10 NOTE — PROGRESS NOTES
CM verified patient has a qualifying hospital stay for [facility]. Odalis Bowens RN CRM Ext [phone]

## 2019-10-10 NOTE — PROGRESS NOTES
Problem: Patient Education: Go to Patient Education Activity Goal: Patient/Family Education Outcome: Resolved/Not Met Problem: Pain Goal: *Control of Pain Outcome: Resolved/Not Met Problem: Patient Education: Go to Patient Education Activity Goal: Patient/Family Education Outcome: Resolved/Not Met Problem: Constipation - Risk of 
Goal: *Prevention of constipation Outcome: Resolved/Not Met Problem: Surgical Pathway Day of Surgery Goal: Off Pathway (Use only if patient is Off Pathway) Outcome: Resolved/Not Met 
Goal: Activity/Safety Outcome: Resolved/Not Met 
Goal: Consults, if ordered Outcome: Resolved/Not Met 
Goal: Nutrition/Diet Outcome: Resolved/Not Met 
Goal: Medications Outcome: Resolved/Not Met 
Goal: Respiratory Outcome: Resolved/Not Met 
Goal: Treatments/Interventions/Procedures Outcome: Resolved/Not Met 
Goal: Psychosocial 
Outcome: Resolved/Not Met 
Goal: *No signs and symptoms of infection or wound complications Outcome: Resolved/Not Met 
Goal: *Optimal pain control at patient's stated goal 
Outcome: Resolved/Not Met 
Goal: *Adequate urinary output (equal to or greater than 30 milliliters/hour) Description Ambulatory Surgery patients voiding without difficulty. Outcome: Resolved/Not Met 
Goal: *Hemodynamically stable Outcome: Resolved/Not Met 
Goal: *Tolerating diet Outcome: Resolved/Not Met 
Goal: *Demonstrates progressive activity Outcome: Resolved/Not Met Problem: Patient Education: Go to Patient Education Activity Goal: Patient/Family Education Outcome: Resolved/Not Met Problem: Pressure Injury - Risk of 
Goal: *Prevention of pressure injury Description Document Travis Scale and appropriate interventions in the flowsheet. Outcome: Resolved/Not Met Note:  
Pressure Injury Interventions: 
Sensory Interventions: Pressure redistribution bed/mattress (bed type) Moisture Interventions: Absorbent underpads Activity Interventions: Increase time out of bed Mobility Interventions: Float heels, Pressure redistribution bed/mattress (bed type) Nutrition Interventions: Document food/fluid/supplement intake Friction and Shear Interventions: HOB 30 degrees or less Problem: Patient Education: Go to Patient Education Activity Goal: Patient/Family Education Outcome: Resolved/Not Met Problem: Falls - Risk of 
Goal: *Absence of Falls Description Document Narciso Mancera Fall Risk and appropriate interventions in the flowsheet. Outcome: Resolved/Not Met Note:  
Fall Risk Interventions: 
Mobility Interventions: Communicate number of staff needed for ambulation/transfer, Utilize walker, cane, or other assistive device Medication Interventions: Patient to call before getting OOB Elimination Interventions: Call light in reach History of Falls Interventions: Door open when patient unattended Problem: Nutrition Deficit Goal: *Optimize nutritional status Outcome: Resolved/Not Met

## 2019-10-10 NOTE — DISCHARGE SUMMARY
Discharge Summary PATIENT ID: Jodi Rodriguez MRN: [id number] YOB: 1940 DATE OF ADMISSION: 9/14/2019  3:00 PM   
DATE OF DISCHARGE: 10/10/19 PRIMARY CARE PROVIDER: Joel Rinaldi MD  
 
 
DISCHARGING PROVIDER: Elliot Dawson MD   
To contact this individual call [phone] and ask the  to page. If unavailable ask to be transferred the Adult Hospitalist Department. CONSULTATIONS: IP CONSULT TO GASTROENTEROLOGY 
IP CONSULT TO ONCOLOGY 
IP CONSULT TO GENERAL SURGERY 
 
 
PROCEDURES/SURGERIES: Procedure(s): 
GASTROJEJUNOSTOMY IMAGING Xr Chest Pa Lat Result Date: 10/10/2019 IMPRESSION: 1. No pneumonia 2. Probable mild changes of emphysema. Xr Chest Pa Lat Result Date: 10/2/2019 IMPRESSION: No acute cardiopulmonary disease. Xr Abd (kub) Result Date: 10/1/2019 IMPRESSION: Nonspecific gas pattern Xr Abd (ap And Erect Or Decub) Result Date: 9/17/2019 IMPRESSION: Nasogastric tube tip and sidehole projecting over the stomach. Nonspecific bowel gas pattern without evidence of obstruction. No free intraperitoneal air to suggest perforation. Xr Upper Gi Series W Kub Result Date: 9/30/2019 IMPRESSION: Obstruction of the duodenum at the level of the horizontal duodenum aspect. Patent gastrojejunal anastomosis with no evident leak. Mri Abd W Wo Cont Result Date: 9/19/2019 IMPRESSION: 1. Interval development of diffuse liver metastatic disease. 2.  Large mass centered on the pancreatic head and proximal horizontal portion of the duodenum. . There is narrowing of the duodenal lumen. Possibilities include a pancreatic or duodenal primary. The mass appears to be centered on the pancreatic head and there is common bile duct dilatation however the pancreatic duct is normal in caliber. 3.  Left lower lobe airspace disease may represent atelectasis or pneumonia.  4. Possible nonocclusive thrombus in the intrahepatic IVC. 5.  The SMV is occluded. 6.  Findings were discussed with Dr. Liliana Son Ct Abd Pelv Wo Cont Result Date: 9/16/2019 IMPRESSION: 1. Marked gastric and duodenal distention by water attenuation fluid, with abrupt transition at the junction of second and third duodenum where a duodenal/pancreatic mass or stricture/scar is suggested, which is confluent with the pancreatic head and uncinate process. . This is consistent with endoscopic findings earlier same day. NG tube placement is advised. 2. Right hydronephrosis, likely due to extrinsic compression by the markedly distended stomach and duodenum. 3. Prominence of extrahepatic bile ducts, likely due to extrinsic impression on the ampulla of water by the markedly distended stomach and duodenum. 4. Pancreas extrinsically compressed by markedly distended stomach, making visualization of the previously described cystic mass is difficult. The pancreatic head lies at the site of duodenal obstruction, but is difficult to further assess under the circumstances. 5. Other incidental and postoperative findings. The findings were called to [facility] on 9/16/2019 at 1750 hours by Dr. Peg Delvalle. [address] Ct Abd Pelv W Cont Result Date: 10/4/2019 IMPRESSION: 1. Pancreatic malignancy. 2. Liver metastases. 3. Incisional seroma. Us Abd Comp Result Date: 9/15/2019 IMPRESSION:  1. No sonographic explanation for the patient's acute renal insufficiency. 2. There are multiple focal lesions in the liver, some suggestive of cyst. Other lesions are hyperechoic, nonspecific, possibly representing hemangiomas. 3. Trace amount of perihepatic fluid. Xr Chest [facility] Result Date: 10/6/2019 IMPRESSION: No acute changes. Xr Abd Port  1 V Result Date: 9/27/2019 IMPRESSION: No unexpected radiopaque foreign body. Xr Abd Port  1 V Result Date: 9/20/2019 IMPRESSION: NG tube in appropriate position. Xr Abd Port  1 V Result Date: 9/16/2019 IMPRESSION: NG tube in appropriate position. 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  
- EGD initially -  done aborted due to inability to pass scope down to duodenum - 9/17 -EGD showed duodenal obstruction - extrinsic compression causing duodenal obstruction,hiatal hernia and esophagitis. - MRI shows pancreatic mass with liver mets 
- CT abd/Pelvis 9/16,GOO and possible stricture along duodenum  causing obstruction 
- off NGT for decompression-removed - s/p palliative gastrojejunostomy 9/30 
- CEA- normal, Ca19-9  - 3596 
- Oncology eval,09/20 ~plan for chemo once pt is stable 
- surgery following   
- off TPN since 10/09/2019  
- on regular diet and tolerating well  
  
  
# Nausea,vomiting-improving  
- KUB with non specific gas pattern - upper GI Series 10/30- show patent gastrojejunal anastomosis with no evident leak 
- risks for aspiration, cxr  with no acute process 
- off IVF 
- aspiration precautions - prn antiemetics - pain control  
- advanced diet to regular per surgery recs- tolerating well  
  
# Diarrhea: improved - symptomatic management for now  
- ?pancreatic enzyme replacement  
  
  
# Anemia,acute on chronic 
- Hgb stable   
- no overt bleeding 
- CT abd/pelvis with no acute change 
- heparin stopped 
  
  
# Leukocytosis- resolved  
- no fever 
- cultures negative  
- broad spectrum antibiotics iv vanc,zosyn-->changed to levo/flagyl-- completed antibiotic course  
  
# TOÑA d/t dehydration resolved CT ab with Hydronephrosis  
-Cr. improved  
- off antibiotic  
  
  
# Thrombus ,intrahepatic IVC clot  
- heparin gtt  Stopped due to severe anemia  
- Change to lovenox for outpatient / per Dr Sarkar Ser 
 - Continue  low dose of Lovenox since she has not had any overt bleeding or hematemesis recently  
  
# Hypokalemia  
- replete as needed  
  
# CAD. -h/O NSTEMI 
-hold meds ,due to hypotension 
  
# MalNutrition Body mass index is 19.83 kg/m².  
Due to cancer and GOO 
- d/c TPN 10/9 
  
# Insomnia: on ambien to help with sleep QHS # Debility due to prlonged hospital  
 
 
 
DISCHARGE DIAGNOSES / PLAN:   
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  
# Nausea,vomiting-improving # Diarrhea: improved # Anemia,acute on chronic  
# Leukocytosis- resolved # TOÑA d/t dehydration resolved # CT ab with Hydronephrosis # Thrombus ,intrahepatic IVC clot   
# Hypokalemia # CAD. # MalNutrition Body mass index is 19.83 kg/m².  
# Insomnia # Debility due to prlonged hospital  
 
Patient discharged to [facility] She will continue with low molecular heparin( Lovenox injection) Patient Active Problem List  
Diagnosis Code  Systolic CHF, chronic (HCC) I50.22  
 NSTEMI (non-ST elevated myocardial infarction) (HCC) I21.4  Coronary artery disease of native artery of native heart with stable angina pectoris (HCC) I25.118  
 Pure hypercholesterolemia E78.00  
 Hematoma of rectus sheath S30. [place] Darrel  Cough due to ACE inhibitor R05, T46.4X5A  Acute pancreatitis K85.90  
 TOÑA (acute kidney injury) ([facility] [address] N17.9  Hypokalemia E87.6  GI bleed K92.2  Coffee ground emesis K92.0 PENDING TEST RESULTS:  
At the time of discharge the following test results are still pending: None FOLLOW UP APPOINTMENTS:   
Follow-up Information Follow up With Specialties Details Why Contact Info [facility]) [address] 
[phone]  Tasneem Kwok MD [facility] Schedule an appointment as soon as possible for a visit in 2 weeks Follow up  [address] [place] [address] 
[phone] Jose Michaud MD Oncology, Hematology Schedule an appointment as soon as possible for a visit in 1 week For chemotherapy follow up  [address] [facility] [address] [place] [address] 
[phone] Chelle Vera MD General Surgery, Breast Surgery, Oncology In 1 week follow up  [address] [facility] [address]  [place] [address] 
[phone] Parul Lopez MD Gastroenterology Schedule an appointment as soon as possible for a visit in 10 days Follow up  [address] [facility] [address] [facility] 
[phone] ADDITIONAL CARE RECOMMENDATIONS:  
· It is important that you take the medication exactly as they are prescribed. · Keep your medication in the bottles provided by the pharmacist and keep a list of the medication names, dosages, and times to be taken in your wallet. · Do not take other medications without consulting your doctor. · No drinking alcohol or driving car or operating machinery if you are on narcotic pain medications. Donot take sedating mediations if you are sleepy or confused. · Fall Precautions · Keep Well Hydrated · Report to your medical provider if you feel you have  developed allergies to medications · Follow up with your PCP or Consultant for medication adjustments and refills · Monitor for signs of fevers,chills,bleeding,chest pain and seek medical attention if you do so. DIET: Regular ACTIVITY: As tolerated WOUND CARE: None EQUIPMENT needed: None DISCHARGE MEDICATIONS: 
Current Discharge Medication List  
  
START taking these medications Details  
enoxaparin (LOVENOX) 40 mg/0.4 mL 0.4 mL by SubCUTAneous route every twenty-four (24) hours for 30 days. Qty: 30 Syringe, Refills: 0 LORazepam (ATIVAN) 0.5 mg tablet Take 1 Tab by mouth nightly as needed for Anxiety.  Max Daily Amount: 0.5 mg. 
 Qty: 10 Tab, Refills: 0 Associated Diagnoses: Pancreatic cancer metastasized to liver [facility]); Anxiety  
  
melatonin 3 mg tablet Take 1 Tab by mouth nightly as needed for Other (Sleep). Qty: 30 Tab, Refills: 1 CONTINUE these medications which have NOT CHANGED Details  
guaiFENesin ER (MUCINEX) 600 mg ER tablet Take 600 mg by mouth two (2) times daily as needed for Congestion. polyethylene glycol (MIRALAX) 17 gram packet Take 17 g by mouth daily as needed. carvedilol (COREG) 6.25 mg tablet Take 1 Tab by mouth two (2) times daily (with meals). Qty: 180 Tab, Refills: 3 Associated Diagnoses: Chronic systolic congestive heart failure (HCC)  
  
rosuvastatin (CRESTOR) 10 mg tablet Take 1 Tab by mouth nightly. Qty: 90 Tab, Refills: 1  
  
bumetanide (BUMEX) 1 mg tablet Take 1 mg by mouth daily. pantoprazole (PROTONIX) 40 mg tablet Take 1 Tab by mouth two (2) times a day. Qty: 60 Tab, Refills: 0  
  
nitroglycerin (NITROSTAT) 0.4 mg SL tablet 1 Tab by SubLINGual route every five (5) minutes as needed for Chest Pain. Up to 3 doses. Qty: 1 Bottle, Refills: 0 All Micro Results Procedure Component Value Units Date/Time CULTURE, BLOOD [[id number]] Collected:  10/04/19 1535 Order Status:  Completed Specimen:  Blood Updated:  10/10/19 7826 Special Requests: NO SPECIAL REQUESTS Culture result: NO GROWTH 6 DAYS     
 CULTURE, URINE [[id number]] Collected:  09/15/19 0033 Order Status:  Completed Specimen:  Urine from Clean catch Updated:  09/17/19 1355 Special Requests: NO SPECIAL REQUESTS [place] Count --     
  [address] COLONIES/mL Culture result:    
  MIXED UROGENITAL CHARLES ISOLATED  
     
 CULTURE, URINE [[id number]] Order Status:  Canceled Specimen:  Urine from Clean catch URINE CULTURE HOLD SAMPLE [[id number]] Collected:  09/15/19 0033 Order Status:  Completed Specimen:  Urine from Serum Updated:  09/15/19 0037 Urine culture hold URINE ON HOLD IN MICROBIOLOGY DEPT FOR 3 DAYS. IF UNPRESERVED URINE IS SUBMITTED, IT CANNOT BE USED FOR ADDITIONAL TESTING AFTER 24 HRS, RECOLLECTION WILL BE REQUIRED. Recent Results (from the past 24 hour(s)) GLUCOSE, POC Collection Time: 10/09/19  9:53 PM  
Result Value Ref Range Glucose (POC) 105 (H) 65 - 100 mg/dL Performed by Radha Manzano GLUCOSE, POC Collection Time: 10/10/19  6:46 AM  
Result Value Ref Range Glucose (POC) 101 (H) 65 - 100 mg/dL Performed by Shaun Lou GLUCOSE, POC Collection Time: 10/10/19 11:45 AM  
Result Value Ref Range Glucose (POC) 119 (H) 65 - 100 mg/dL Performed by HEIDY LARSON   
 
 
 
 
NOTIFY YOUR PHYSICIAN FOR ANY OF THE FOLLOWING:  
Fever over 101 degrees for 24 hours. Chest pain, shortness of breath, fever, chills, nausea, vomiting, diarrhea, change in mentation, falling, weakness, bleeding. Severe pain or pain not relieved by medications. Or, any other signs or symptoms that you may have questions about. DISPOSITION: 
  Home With: 
 OT  PT  HH  RN  
  
x Long term SNF/Inpatient Rehab Independent/assisted living Hospice Other:  
 
 
PATIENT CONDITION AT DISCHARGE:  
 
Functional status Poor   
x Deconditioned Independent Cognition Lucid   
x Forgetful Dementia Catheters/lines (plus indication) Joyce PICC   
 PEG   
x None Code status  
 x Full code DNR   
 
PHYSICAL EXAMINATION AT DISCHARGE: 
Gen:  No distress, alert HEENT:  Normal cephalic atraumatic, extra-occular movements are intact. Neck:  Supple, No JVD Lungs:  Clear bilaterally, no wheeze, no rales, normal effort Heart:  Regular Rate and Rhythm, normal S1 and S2, no edema Abdomen:  Mild distension, soft, +BS, with mild ecchymosis on the abdominal wall Extremities:  Well perfused, no cyanosis or edema Neurological:  Awake and alert, CN's are intact, normal strength throughout extremities Skin:  No rashes or moles Mental Status:  Normal thought process, does not appear anxious CHRONIC MEDICAL DIAGNOSES: 
Problem List as of 10/10/2019 Date Reviewed: 9/14/2019 Codes Class Noted - Resolved Acute pancreatitis ICD-10-CM: K85.90 ICD-9-CM: 730.2  9/14/2019 - Present TOÑA (acute kidney injury) ([facility] ICD-10-CM: N17.9 ICD-9-CM: 584.9  9/14/2019 - Present Hypokalemia ICD-10-CM: E87.6 ICD-9-CM: 276.8  9/14/2019 - Present GI bleed ICD-10-CM: K92.2 ICD-9-CM: 578.9  9/14/2019 - Present Coffee ground emesis ICD-10-CM: K92.0 ICD-9-CM: 578.0  9/14/2019 - Present Cough due to ACE inhibitor ICD-10-CM: R05, T46.4X5A 
ICD-9-CM: 786.2, E942.6  5/12/2019 - Present Systolic CHF, chronic (HCC) ICD-10-CM: I50.22 ICD-9-CM: 428.22, 428.0  2/28/2019 - Present NSTEMI (non-ST elevated myocardial infarction) ([facility] ICD-10-CM: I21.4 ICD-9-CM: 410.70  2/28/2019 - Present Coronary artery disease of native artery of native heart with stable angina pectoris [facility]) ICD-10-CM: I25.118 
ICD-9-CM: 414.01, 413.9  2/28/2019 - Present Pure hypercholesterolemia ICD-10-CM: E78.00 ICD-9-CM: 272.0  2/28/2019 - Present Hematoma of rectus sheath ICD-10-CM: S30. Dossie Just ICD-9-CM: 922.2  2/28/2019 - Present RESOLVED: Pancreatitis ICD-10-CM: K85.90 ICD-9-CM: 596.0  1/15/2019 - 5/12/2019 Discussed with patient and family. Explained the importance of following up, Compliance with medications and recommendations on discharge,Side effect profile of medications were explained. Safety precautions at home and while taking pain medications also explained. All questions answered to the satisfaction of the patient/family. Discussed with consultant(s) who are agreeable to the discharge. Verbal and written instructions on discharge given. Explained about Discharge medications and side effect profile.   Advised patient/family to followup with their pcp for medication refills and preauthorization of medications, Home health orders. checkups,screenign programs as appropriate for age. Thank you Yudelka Julian MD for taking care of your patient, Please call with any questions. Greater than 35 minutes were spent with the patient on counseling and coordination of care Signed:  
Gricel Vang MD 
10/10/2019 
3:23 PM

## 2019-10-10 NOTE — PROGRESS NOTES
Transition of Care Plan to SNF/Rehab 
 
SNF/Rehab Transition: 
Patient has been accepted to [facility] and meets criteria for admission. Patient will transported by family and expected to leave at 4 pm. 
 
Communication to Patient/Family: Met with patient and pt's son and daughter at bedside (identified care giver) and they are agreeable to the transition plan. Communication to SNF/Rehab: 
Bedside RN, Jahaira Gan, has been notified to update the transition plan to the facility and call report (phone number [phone]). Discharge information has been updated on the AVS. Discharge instructions to be fax'd to facility at [facility] # cc link). SNF/Rehab Transition: 
Patient to follow-up with Home Health: [facility], other  ,none) PCP/Specialist:  
Ambulatory Care Management:  
 
Reviewed and confirmed with facility, [facility] Admissions Coordinator they can manage the patient care needs for the following:  
 
Rigoberto Wallace with (X) only those applicable: 
 
Medication: 
[]  Medications will be available at the facility []  IV Antibiotics [x]  Controlled Substance - hard copy to be sent with patient  
[]  Weekly Labs Documents: 
[x] Hard RX [x] MAR [x] Kardex [x] AVS 
[]Transfer Summary 
[x]Discharge Equipment: 
[]  CPAP/BiPAP []  Wound Vacuum 
[]  Joyce or Urinary Device 
[]  PICC/Central Line 
[]  Nebulizer 
[]  Ventilator Treatment: 
[]Isolation (for MRSA, VRE, etc.) []Surgical Drain Management []Tracheostomy Care 
[]Dressing Changes []Dialysis with transportation and chair time []PEG Care []Oxygen []Daily Weights for Heart Failure Dietary: 
[]Any diet limitations []Tube Feedings []Total Parenteral Management (TPN) Eligible for Medicaid Long Term Services and Supports Yes: 
[] Eligible for medical assistance or will become eligible within 180 days and UAI completed. [] Provider/Patient and/or support system has requested screening. [] UAI copy provided to patient or responsible party,  
[] UAI unavailable at discharge will send once processed to SNF provider. [] UAI unavailable at discharged mailed to patient No:  
[x] Private pay and is not financially eligible for Medicaid within the next 180 days. [] Reside out-of-state. [] A residents of a state owned/operated facility that is licensed 
by [address] [facility] or [facility] 
[] Enrollment in [facility] hospice services 
[] [address] [place] [address] 
[] Patient /Family declines to have screening completed or provide financial information for screening Financial Resources: 
Medicaid   
[] Initiated and application pending  
[] Full coverage Advanced Care Plan: 
[]Surrogate Decision Maker of Care 
[]POA [x]Communicated Code Status(DDNR\", \"Full\") Other CM verified patient has a qualifying hospital stay for [facility]. Jumana Bhatt RN CRM Ext [phone]

## 2019-10-10 NOTE — PROGRESS NOTES
Transition of Care Plan 
 
? SNF placement once medically stable-off TPN and tolerating diet; accepted by [facility] Carreon, and [facility] (family to chose one) ? Transport: TBD  
? Continue Medical Care ? Follow up with PCP/Specialist  
 
2:25 PM:  
CM spoke with [facility], Admissions Coordinator [phone] at [facility]. They have accepted the patient. Patient can be admitted anytime 24/7 but no medication will be administered for new patients after 8 pm since the pharmacy will be closed at 8 pm.  
 
RN Call to report: [phone] Transfer packet at the bedside chart. Rosette Martinez,BSW,MSW,[facility] Clinical  [phone]

## 2019-10-10 NOTE — PROGRESS NOTES
[facility] 
Daily Progress Note Admit Date: 2019 Post-Operative Day: 13 from Procedure(s): 
GASTROJEJUNOSTOMY Subjective:  
 
Last 24 hrs: Feeling overall well today. Had a BM this morning and doing well with PO. Reports she has been SOB for several days. She is hoping to go home soon, notes plan for discharge to facility prior to going home. Objective:  
 
Blood pressure 124/76, pulse 90, temperature 98.3 °F (36.8 °C), resp. rate 16, height 5' 4\" (1.626 m), weight 59 kg (130 lb 1.1 oz), SpO2 95 %. Temp (24hrs), Av.3 °F (36.8 °C), Min:98.2 °F (36.8 °C), Max:98.4 °F (36.9 °C) 
 
 
_____________________ Physical Exam:    
Alert and Oriented, sitting up in chair, no acute distress. Cardiovascular: RRR, no peripheral edema Lungs:crackles bilateral bases Abdomen: soft, NT. Ecchymosis along incision, dressing in place over small area of scabbing where hematoma was drained. Dry/intact. Assessment:  
Active Problems: 
  Acute pancreatitis (2019) TOÑA (acute kidney injury) ([facility] [address] (2019) Hypokalemia (2019) GI bleed (2019) Coffee ground emesis (2019) SOB - suspect this is fluid overload Plan:  
 
Stop IV fluids Check CXR Re-start her home bumex Stop TPN Diet as tolerated OK to discharge from surgical perspective when breathing is improved ROSEANNA Tompkins - [address] Surgery at [address] [place] [address], [place] [address], [address] [place] [address] [address] [facility] 
[phone] Data Review: 
 
Recent Labs 10/09/19 
0155 10/08/19 
0102 WBC 7.6 8.4 HGB 8.2* 8.4* HCT 26.8* 26.7*  
* 128* Recent Labs 10/09/19 
0155 10/08/19 
0102  139  
K 4.0 3.3*  
* 110* CO2 23 22 * 139* BUN 24* 20  
CREA 0.78 0.85 CA 7.8* 7.9* No results for input(s): AML, LPSE in the last 72 hours. ______________________ Medications: Current Facility-Administered Medications Medication Dose Route Frequency  bumetanide (BUMEX) tablet 1 mg  1 mg Oral DAILY  [Held by provider] senna-docusate (PERICOLACE) 8.6-50 mg per tablet 1 Tab  1 Tab Oral DAILY  polyethylene glycol (MIRALAX) packet 17 g  17 g Oral DAILY PRN  potassium chloride SR (KLOR-CON 10) tablet 40 mEq  40 mEq Oral DAILY  ondansetron (ZOFRAN ODT) tablet 4 mg  4 mg Oral Q6H  
 loperamide (IMODIUM) capsule 2 mg  2 mg Oral Q4H PRN  
 melatonin tablet 3 mg  3 mg Oral QHS PRN  
 simethicone (MYLICON) tablet 80 mg  80 mg Oral QID PRN  
 zolpidem (AMBIEN) tablet 5 mg  5 mg Oral QHS  LORazepam (ATIVAN) tablet 0.5 mg  0.5 mg Oral QHS PRN  
 nystatin (MYCOSTATIN) 100,000 unit/gram cream   Topical BID  carvedilol (COREG) tablet 6.25 mg  6.25 mg Oral BID WITH MEALS  
 hydrocortisone (ANUSOL-HC) suppository 25 mg  25 mg Rectal Q12H  
 enoxaparin (LOVENOX) injection 40 mg  40 mg SubCUTAneous Q24H  
 0.9% sodium chloride infusion 250 mL  250 mL IntraVENous PRN  
 morphine injection 2 mg  2 mg IntraVENous Q4H PRN  
 lidocaine (LIDODERM) 5 % patch 1 Patch  1 Patch TransDERmal Q24H  
 lidocaine (XYLOCAINE) 2 % viscous solution 15 mL  15 mL Mouth/Throat PRN  phenol throat spray (CHLORASEPTIC) 1 Spray  1 Spray Oral PRN  
 guaiFENesin ER (MUCINEX) tablet 600 mg  600 mg Oral BID PRN  
 rosuvastatin (CRESTOR) tablet 10 mg  10 mg Oral QHS  benzocaine-menthol (CEPACOL) lozenge 1 Lozenge  1 Lozenge Mucous Membrane PRN  
 hydrALAZINE (APRESOLINE) 20 mg/mL injection 10 mg  10 mg IntraVENous Q6H PRN  
 sodium chloride (NS) flush 5-40 mL  5-40 mL IntraVENous PRN  
 acetaminophen (TYLENOL) tablet 650 mg  650 mg Oral Q4H PRN I have independently examined the patient and have reviewed the chart. I agree with the above plan. Patient tolerating PO without n/v.  Had complaints of feeling more short of breath today.   CXR without obvious cause.  Bumex restarted - she is likely a little volume overloaded. TPN stopped. Dispo per primary team.   
 
Karlie Fairchild MD 
10/10/2019 5:12 PM

## 2019-10-10 NOTE — PROGRESS NOTES
Problem: Mobility Impaired (Adult and Pediatric) Goal: *Acute Goals and Plan of Care (Insert Text) Description FUNCTIONAL STATUS PRIOR TO ADMISSION: Patient was independent and active without use of DME, driving. HOME SUPPORT PRIOR TO ADMISSION: The patient lived alone, required no local support. Physical Therapy Goals Initiated 10/1/2019 1. Patient will move from supine to sit and sit to supine , scoot up and down and roll side to side in bed with modified independence within 7 day(s). 2.  Patient will transfer from bed to chair and chair to bed with supervision/set-up using the least restrictive device within 7 day(s). 3.  Patient will perform sit to stand with supervision/set-up within 7 day(s). 4.  Patient will ambulate with supervision/set-up for 50 feet with the least restrictive device within 7 day(s). Outcome: Progressing Towards Goal 
PHYSICAL THERAPY TREATMENT Patient: Gudelia Espinosa (56 y.o. female) Date: 10/10/2019 Diagnosis: Acute pancreatitis [K85.90] <principal problem not specified> Procedure(s) (LRB): 
GASTROJEJUNOSTOMY (N/A) 13 Days Post-Op Precautions:  fall Chart, physical therapy assessment, plan of care and goals were reviewed. ASSESSMENT Patient continues with skilled PT services and is progressing towards goals. Per nursing - SOB noted earlier - awaiting chest x-ray. Pre-activity - at rest 99% on room air; during and post ambulation with walker - oxygen 99% with no SOB noted. Recommend discharge to SNF. Madison Valdez Current Level of Function Impacting Discharge (mobility/balance): CGA for functional mobility to ensure safety, fall risk secondary to decreased activity tolerance Other factors to consider for discharge: Lives alone PLAN : 
Patient continues to benefit from skilled intervention to address the above impairments. Continue treatment per established plan of care. to address goals. Recommendation for discharge: (in order for the patient to meet his/her long term goals) Therapy up to 5 days/week in SNF setting This discharge recommendation: 
Has been made in collaboration with the attending provider and/or case management Equipment recommendations for successful discharge (if) home: to be determined at [facility] SUBJECTIVE:  
Patient stated I feel fine - I'm not SOB now.  OBJECTIVE DATA SUMMARY:  
Critical Behavior: 
Neurologic State: Alert Orientation Level: Oriented X4 Cognition: Appropriate decision making Functional Mobility Training: 
Bed Mobility: 
Rolling: Modified independent Supine to Sit: Modified independent Sit to Supine: Modified independent Transfers: 
Sit to Stand: Contact guard assistance Stand to Sit: Contact guard assistance Balance: 
Sitting: Intact Standing: Impaired; With support Standing - Static: Good;Constant support Standing - Dynamic : Good;Constant support Ambulation/Gait Training: 
Distance (ft): 190 Feet (ft) Assistive Device: Walker, rolling;Gait belt Ambulation - Level of Assistance: Contact guard assistance Gait Abnormalities: Decreased step clearance Speed/Maritza: Slow Step Length: Right shortened;Left shortened Patient Education: Pt /family instructed to increase time OOB slowly - up for meals - gradually increase to up from am to after lunch for pm rest/nap - then back up for dinner. Pain Rating: 
Pt denied pain. Activity Tolerance:  
Fair and requires frequent rest breaks Please refer to the flowsheet for vital signs taken during this treatment. After treatment patient left in no apparent distress:  
Supine in bed, Call bell within reach and Caregiver / family present COMMUNICATION/COLLABORATION:  
The patients plan of care was discussed with: Registered Nurse Carol Ann Trevino PT Time Calculation: 15 mins

## 2019-10-10 NOTE — ROUTINE PROCESS
Bedside shift change report given to Rajeev (oncoming nurse) by Luc Mendez (offgoing nurse). Report included the following information SBAR, Kardex, Intake/Output, MAR and Recent Results.

## 2019-10-11 NOTE — TELEPHONE ENCOUNTER
Voicemail left requesting a call back. Called patient at the request of Dr. Marques Zaman to schedule a new patient appointment next week.

## 2019-10-14 NOTE — TELEPHONE ENCOUNTER
Spoke with nurse Bryanna marcano patient was found up ambulating in the bathroom, they noticed her dressing was  Saturated with bloody drainage, wound has opened in a small area on the bottom of the incision line. No purulent drainage, no pain or fever. She will see if they can stick a wick in the wound, clean the area and change dressing daily until follow up appointment. If they cannot fit any packing just clean and cover. Nurse agreed to return call If any other questions or concerns and will schedule earlier post op appointment if needed.

## 2019-10-17 NOTE — PROGRESS NOTES
Community Care Team Documentation for Patient in [facility] Subsequent Follow up Patient remains at [facility] ([facility]). See previous [facility] Team notes. Spoke with SNF team.  Provided needed hospital follow up appointments: Mary Roca MD Oncology in 1 week For chemotherapy follow up ; Debra Hatch MD General Surgery Oncology In 1 week ; Benedicto Jama MD Gastroenterology in 10 days. PT/OT update: Currently stand by assist with bed mobility and transfers. Stand by assist with ADLs. Mod assist with med management. LOS/ Disposition:  Plan to return home alone with strong family support. HH TBD. Medications were not reconciled and general patient assessment was not completed during this skilled nursing facility outreach.

## 2019-10-21 PROBLEM — I95.9 HYPOTENSION: Status: ACTIVE | Noted: 2019-01-01

## 2019-10-21 PROBLEM — C25.0 MALIGNANT NEOPLASM OF HEAD OF PANCREAS (HCC): Status: ACTIVE | Noted: 2019-01-01

## 2019-10-21 PROBLEM — S30.1XXA HEMATOMA OF RECTUS SHEATH: Status: RESOLVED | Noted: 2019-01-01 | Resolved: 2019-01-01

## 2019-10-21 PROBLEM — K83.09 CHOLANGITIS: Status: ACTIVE | Noted: 2019-01-01

## 2019-10-21 PROBLEM — E86.1 HYPOVOLEMIA: Status: ACTIVE | Noted: 2019-01-01

## 2019-10-21 NOTE — ACP (ADVANCE CARE PLANNING)
Advance Care Planning Note Name: Gudelia Espinosa YOB: 1940 MRN: [id number] Admission Date: 10/21/2019 12:41 PM 
 
Date of discussion: 10/21/2019 Active Diagnoses: 
 
Hospital Problems  Date Reviewed: 10/21/2019 Codes Class Noted POA Hypotension ICD-10-CM: I95.9 ICD-9-CM: 458.9  10/21/2019 Yes Hypovolemia ICD-10-CM: E86.1 ICD-9-CM: 276.52  10/21/2019 Yes Malignant neoplasm of head of pancreas ([facility] [address] ICD-10-CM: C25.0 ICD-9-CM: 157.0  10/21/2019 Yes Cholangitis ICD-10-CM: K83.09 
ICD-9-CM: 576.1  10/21/2019 Yes Acute pancreatitis ICD-10-CM: K85.90 ICD-9-CM: 447.6  9/14/2019 Yes These active diagnoses are of sufficient risk that focused discussion on advance care planning is indicated in order to allow the patient to thoughtfully consider personal goals of care, and if situations arise that prevent the ability to personally give input, to ensure appropriate representation of their personal desires for different levels and aggressiveness of care. Discussion:  
 
Persons present and participating in discussion: Moises Boyle MD, Daughter Discussion:  
CPR, Cardioversion,Pacing,Pressors, Intubation,Bipap/CPAP,Antibiotics,Renal failure needing Dialysis. CODE STATUS: Partial , Ok with Pressors, doesn't want Intubation or Pressors Total time spent face-to-face in education and discussion: 17  minutes.   
 
Susan Gallo MD 
10/21/2019 
5:04 PM

## 2019-10-21 NOTE — ED PROVIDER NOTES
HPI  
 
  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 Recently had a palliative gastrojejunostomy due to SBO. Had been doing well and went to rehab but now with decreased oral intake, generalized weakness, and not wanting to get out of bed. Had a follow-up appt with general surgery today and was noted to be hypotensive in the 80's so sent to the ED for further evaluation. Pt does not have any specific complaints other than just feeling poorly. No vomiting. Is having some increased loose stool, but nothing that seemed out of the ordinary given recent surgery. No blood in the stool. No shortness of breath or chest pain. Past Medical History:  
Diagnosis Date  Coronary artery disease of native artery of native heart with stable angina pectoris ([address] 2/28/2019  Cough due to ACE inhibitor  Diarrhea  Hematoma of rectus sheath 2/28/2019  IBS (irritable bowel syndrome) IBS  NSTEMI (non-ST elevated myocardial infarction) ([address] 2/28/2019  Pancreatitis  Poor appetite  Pure hypercholesterolemia 2/28/2019  Systolic CHF, chronic ([address] 2/28/2019 Past Surgical History:  
Procedure Laterality Date  COLONOSCOPY Left 11/1/2017 COLONOSCOPY performed by Carlo Hilliard MD at [address]  HX CHOLECYSTECTOMY  HX GI    
 surgery for hiatal hernia  HX GI  09/27/2019 Gastrojejunostomy  HX ORTHOPAEDIC    
 DJD, doing PT weekly through [facility]  IR [facility]  1/30/2019 Family History:  
Problem Relation Age of Onset  Dementia Mother   
     alzheimers  Cancer Sister   
     gallbladder  Cancer Brother   
     pancreatic  Breast Cancer Paternal Grandmother  Dementia Sister   
     alzheimers  Heart Surgery Brother   
     bypass Social History Socioeconomic History  Marital status:   
 Spouse name: Not on file  Number of children: Not on file  Years of education: Not on file  Highest education level: Not on file Occupational History  Not on file Social Needs  Financial resource strain: Not on file  Food insecurity:  
  Worry: Not on file Inability: Not on file  Transportation needs:  
  Medical: Not on file Non-medical: Not on file Tobacco Use  Smoking status: Former Smoker  Smokeless tobacco: Never Used  Tobacco comment: quit smoking cigarettes 6 yrs ago Substance and Sexual Activity  Alcohol use: Yes Comment: very occasional  
 Drug use: No  
 Sexual activity: Not Currently Lifestyle  Physical activity:  
  Days per week: Not on file Minutes per session: Not on file  Stress: Not on file Relationships  Social connections:  
  Talks on phone: Not on file Gets together: Not on file Attends [employer] service: Not on file Active member of club or organization: Not on file Attends meetings of clubs or organizations: Not on file Relationship status: Not on file  Intimate partner violence:  
  Fear of current or ex partner: Not on file Emotionally abused: Not on file Physically abused: Not on file Forced sexual activity: Not on file Other Topics Concern  Not on file Social History Narrative  Not on file ALLERGIES: Penicillins Review of Systems Review of Systems Constitutional: (+) weight loss. HEENT: (-) stiff neck Eyes: (-) discharge. Respiratory: (-) cough. Cardiovascular: (-) syncope. Gastrointestinal: (-) blood in stool. Genitourinary: (-) hematuria. Musculoskeletal: (-) myalgias. Neurological: (-) seizure. Skin: (-) petechiae Lymph/Immunologic: (-) enlarged lymph nodes All other systems reviewed and are negative. Vitals:  
 10/21/19 1236 BP: (!) 77/52 Pulse: 98 Resp: 16 Temp: 97.4 °F (36.3 °C) SpO2: 95% Physical Exam Nursing note and vitals reviewed. Constitutional: oriented to person, place, and time. appears elderly, frail, deconditioned. No distress. Head: Normocephalic and atraumatic. Sclera anicteric Nose: No rhinorrhea Mouth/Throat: Oropharynx is clear and moist. Pharynx normal 
Eyes: Conjunctivae are normal. Pupils are equal, round, and reactive to light. Right eye exhibits no discharge. Left eye exhibits no discharge. Neck: Painless normal range of motion. Neck supple. No LAD. Cardiovascular: Normal rate, regular rhythm, normal heart sounds and intact distal pulses. Exam reveals no gallop and no friction rub. No murmur heard. Pulmonary/Chest:  No respiratory distress. No wheezes. No rales. No rhonchi. No increased work of breathing. No accessory muscle use. Good air exchange throughout. Abdominal: soft, non-tender, midline incision well-healed, no rebound or guarding. No hepatosplenomegaly. Normal bowel sounds throughout. Back: no tenderness to palpation, no deformities, no CVA tenderness Extremities/Musculoskeletal: Normal range of motion. no tenderness. No edema. Distal extremities are neurovasc intact. Lymphadenopathy:   No adenopathy. Neurological:  Alert and oriented to person, place, and time. Coordination normal. CN 2-12 intact. Motor and sensory function intact. Skin: Skin is warm and dry. No rash noted. No pallor. MDM 69y F here with hypotension, fatigue, decreased PO intake, functional decline. Will need labs, fluids. Likely admit. Procedures Hospitalist Kimi for Admission 2:03 PM 
 
ED Room Number: HH47/82 Patient Name and age:  Timothy Ramírez 70 y.o.  female Working Diagnosis: 1. Hypotension, unspecified hypotension type 2. Dehydration 3. Malignant neoplasm of pancreas, unspecified location of malignancy ([facility] [address] Readmission: yes Isolation Requirements:  no 
Recommended Level of Care:  step down Code Status:  Full Code Department:[facility] Adult ED - [phone] Other:  69y F with pancreatic CA with met recently admitted for SBO. Had palliative duodenojejunectomy. Did well initially and went to rehab but now not eating or drinking and not wanting to get out of bed. Had follow-up today with general surgery - felt to be doing well from a post-op point of view but was hypotensive in the office (80's). Was in the 66's here but now coming up with IVF. New renal failure. LFT's bumped slightly. Lactate is 3.4. Cultures sent. Written for vanc and cefepime. Was DNR at last hospitalization but family now states full code.

## 2019-10-21 NOTE — ED TRIAGE NOTES
Triage:  Pt referred to ED due to concerns over increasing fatigue, weight loss, and low BP. Pt was at a follow up appointment for recent surgical procedure.

## 2019-10-21 NOTE — PROGRESS NOTES
Pharmacist Note - Vancomycin Dosing Consult provided for this 70 y.o. female for indication of intra-abdominal infection. Antibiotic regimen(s): Vanc + Cefepime Recent Labs 10/21/19 
1249 WBC 20.1*  
CREA 2.32* BUN 44* Frequency of BMP: daily Height: 162.6 cm Weight: 50.5 kg Est CrCl: 16 ml/min; UO: -- ml/kg/hr Temp (24hrs), Av.9 °F (36.6 °C), Min:97.4 °F (36.3 °C), Max:98.4 °F (36.9 °C) Cultures: 
10/21 blood - pending 10/21 urine - pending Goal trough = 10 - 15 mcg/mL Therapy will be initiated with a loading dose of 1000 mg IV x 1 and plan to dose by levels due to [place]. Pharmacy to follow patient daily and order levels / make dose adjustments as appropriate.

## 2019-10-21 NOTE — PROGRESS NOTES
Subjective:      Estella Merino is a 70 y.o. female presents for postop care following palliative Gastrojejunostomy on 9/30/2019. Her daughter states that she has not been eating or drinking much in rehab. She was doing well the first few days in rehab but has had a \"steady decline the past week. \"     Denies nausea or vomiting. Patient has an advanced directive:  yes    Ms. Glenn Merchant has a reminder for a \"due or due soon\" health maintenance. I have asked that she contact her primary care provider for follow-up on this health maintenance. Objective:     Visit Vitals  BP (!) 88/66 (BP 1 Location: Right arm, BP Patient Position: Sitting)   Pulse 90   Temp 97.9 °F (36.6 °C) (Oral)   Resp 15   Ht 5' 4\" (1.626 m)   Wt 111 lb 6.4 oz (50.5 kg)   SpO2 93%   BMI 19.12 kg/m²       General:   fatigued, weak, pale   Abdomen: soft, non-tender   Incision:   healing well, small oozing of blood from a pinhole at the base of the incision. Incision is healing well. Assessment:     Mrs. Vernette Kawasaki is a 70year old with  Adenocarcinoma of pancreas metastasis to liver. 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 Plan:     Seen with Dr. Emeli Bradford. Patient sent to the ER. From a surgical standpoint, she is healing well. . However, due to extreme weakness, fatigue, poor appetite, low blood pressure, she needs to be seen in the ER. She had an appointment with Dr. Mayra Villarreal today. Advised daughter to let Dr. Mayra Villarreal office know once she gotten to the ER. Pt verbalized understanding and questions were answered to the best of my knowledge and ability.

## 2019-10-21 NOTE — PROGRESS NOTES
1. Have you been to the ER, urgent care clinic since your last visit? Hospitalized since your last visit? No    2. Have you seen or consulted any other health care providers outside of the [address] [place] [address] since your last visit? Include any pap smears or colon screening. No       NP made aware of patients BP and weight. Per daughter patient has been weak for a few days and doesn't eat much. Down 18.5lbs.

## 2019-10-21 NOTE — H&P
History and Physical 
                                               
 
Primary Care Provider: Girish Caicedo MD 
 
 
 
Subjective:  
 
Patient allows participation of the people present in the room to discuss their medical care. Dory March is a 70 y.o. female who has a history of  Pancreatic mass s/p gastro Jejunostomy done for palliation and discharged to rehab presents with increased weakness and failure to thrive. She went to see her surgeon today and was sen to ER for feeling weak,. In ER she was noted ot be hypotensive, lactic acidosis and septic and was referred for admission, CT abdomen showed increasing mass in abdominal area suspicious for pancreatic mass. There was also a fluid collection in anterior pelvic wall which was  Noted to be stable in nature. Her blood pressure improved with Fluids and she was then referred for admission. She was seen in Er and was also noted to be weak. Her oral intake has been poor per her daughter in room, She is currently at [facility]. She denies pain or fevers Review of Systems: Further 10 point review of systems is benign. Pertinent items are noted in the History of Present Illness. Past Medical History:  
Diagnosis Date  Coronary artery disease of native artery of native heart with stable angina pectoris ([address] 2/28/2019  Cough due to ACE inhibitor  Diarrhea  Hematoma of rectus sheath 2/28/2019  IBS (irritable bowel syndrome) IBS  NSTEMI (non-ST elevated myocardial infarction) ([address] 2/28/2019  Pancreatitis  Poor appetite  Pure hypercholesterolemia 2/28/2019  Systolic CHF, chronic ([address] 2/28/2019 Past Surgical History:  
Procedure Laterality Date  COLONOSCOPY Left 11/1/2017 COLONOSCOPY performed by Andrea Ames MD at [address] [facility] [address]  HX CHOLECYSTECTOMY  HX GI    
 surgery for hiatal hernia  HX GI  09/27/2019 Gastrojejunostomy  HX ORTHOPAEDIC    
 DEYANIRA, doing PT weekly through [id number]  IR [facility]  1/30/2019 Prior to Admission medications Medication Sig Start Date End Date Taking? Authorizing Provider  
acetaminophen (TYLENOL) 325 mg tablet Take 650 mg by mouth. Provider, Historical  
peg 3350-Electrolytes-Vit C (MOVIPREP) 100-7.5-2.691 gram pwpk oral solution Take  by mouth. Provider, Historical  
enoxaparin (LOVENOX) 40 mg/0.4 mL 0.4 mL by SubCUTAneous route every twenty-four (24) hours for 30 days. 10/11/19 11/10/19  Madeleine Laguerre MD  
LORazepam (ATIVAN) 0.5 mg tablet Take 1 Tab by mouth nightly as needed for Anxiety. Max Daily Amount: 0.5 mg. 10/10/19   Madeleine Laguerre MD  
melatonin 3 mg tablet Take 1 Tab by mouth nightly as needed for Other (Sleep). 10/10/19   Madeleine Laguerre MD  
guaiFENesin ER (MUCINEX) 600 mg ER tablet Take 600 mg by mouth two (2) times daily as needed for Congestion. Provider, Historical  
polyethylene glycol (MIRALAX) 17 gram packet Take 17 g by mouth daily as needed. Provider, Historical  
carvedilol (COREG) 6.25 mg tablet Take 1 Tab by mouth two (2) times daily (with meals). 9/4/19   Kwadwo Tapia MD  
rosuvastatin (CRESTOR) 10 mg tablet Take 1 Tab by mouth nightly. 4/17/19   Kwadwo Tapia MD  
bumetanide (BUMEX) 1 mg tablet Take 1 mg by mouth daily. Provider, Historical  
nitroglycerin (NITROSTAT) 0.4 mg SL tablet 1 Tab by SubLINGual route every five (5) minutes as needed for Chest Pain. Up to 3 doses. 2/8/19   Nataliia Gee MD  
pantoprazole (PROTONIX) 40 mg tablet Take 1 Tab by mouth two (2) times a day. 1/19/19   Marce Moore MD  
 
Allergies Allergen Reactions  Penicillins Hives Has tolerated cefazolin, ceftriaxone and cefepime previously Family History Problem Relation Age of Onset  Dementia Mother   
     alzheimers  Cancer Sister   
     gallbladder  Cancer Brother   
     pancreatic  Breast Cancer Paternal Grandmother  Dementia Sister   
     alzheimers  Heart Surgery Brother   
     bypass SOCIAL HISTORY: 
Patient resides at [facility]. Patient ambulates with [facility] . Smoking history: never Alcohol history: Denies Objective:  
 
 
Physical Exam:  
Visit Vitals /43 Pulse 87 Temp 97.4 °F (36.3 °C) Resp 22 SpO2 93% Breastfeeding? No  
 
General:  Alert, cooperative, no distress, appears stated age. Thin, Head:  Normocephalic, without obvious abnormality, atraumatic. Eyes:  Conjunctivae/corneas clear. PERRL, EOMs intact. Fundi benign. Ears:  Normal TMs and external ear canals both ears. Nose: Nares normal. Septum midline. Mucosa normal. No drainage or sinus tenderness. Throat: Lips, mucosa, and tongue normal. Teeth and gums normal.  
Neck: Supple, symmetrical, trachea midline, no adenopathy, thyroid: no enlargement/tenderness/nodules, no carotid bruit and no JVD. Back:   Symmetric, no curvature. ROM normal. No CVA tenderness. Lungs:   Clear to auscultation bilaterally. Chest wall:  No tenderness or deformity. Heart:  Regular rate and rhythm, S1, S2 normal, no murmur, click, rub or gallop. Abdomen:   Soft, non-tender. Bowel sounds normal. No masses,  No organomegaly. Midline Abdominal scars noted. BS+ . No Dehiscence noted. Extremities: Extremities normal, atraumatic, no cyanosis or edema. Pulses: 2+ and symmetric all extremities. Skin: Skin color normal,  turgor decreased. . No rashes or lesions. Lymph nodes: Cervical, supraclavicular, and axillary nodes normal.  
Neurologic: CNII-XII intact. Normal strength, sensation and reflexes throughout. ECG:  normal EKG, normal sinus rhythm, nonspecific ST and T waves changes Data Review:  
 
 
Recent Results (from the past 24 hour(s)) METABOLIC PANEL, COMPREHENSIVE Collection Time: 10/21/19 12:49 PM  
Result Value Ref Range Sodium 132 (L) 136 - 145 mmol/L  Potassium 3.1 (L) 3.5 - 5.1 mmol/L  
 Chloride 89 (L) 97 - 108 mmol/L  
 CO2 34 (H) 21 - 32 mmol/L Anion gap 9 5 - 15 mmol/L Glucose 176 (H) 65 - 100 mg/dL BUN 44 (H) 6 - 20 MG/DL Creatinine 2.32 (H) 0.55 - 1.02 MG/DL  
 BUN/Creatinine ratio 19 12 - 20 GFR est AA 25 (L) >60 ml/min/1.73m2 GFR est non-AA 20 (L) >60 ml/min/1.73m2 Calcium 10.3 (H) 8.5 - 10.1 MG/DL Bilirubin, total 1.1 (H) 0.2 - 1.0 MG/DL  
 ALT (SGPT) 83 (H) 12 - 78 U/L  
 AST (SGOT) 76 (H) 15 - 37 U/L Alk. phosphatase 942 (H) 45 - 117 U/L Protein, total 8.3 (H) 6.4 - 8.2 g/dL Albumin 2.8 (L) 3.5 - 5.0 g/dL Globulin 5.5 (H) 2.0 - 4.0 g/dL A-G Ratio 0.5 (L) 1.1 - 2.2    
CBC WITH AUTOMATED DIFF Collection Time: 10/21/19 12:49 PM  
Result Value Ref Range WBC 20.1 (H) 3.6 - 11.0 K/uL  
 RBC 4.21 3.80 - 5.20 M/uL  
 HGB 12.8 11.5 - 16.0 g/dL HCT 40.6 35.0 - 47.0 % MCV 96.4 80.0 - 99.0 FL  
 MCH 30.4 26.0 - 34.0 PG  
 MCHC 31.5 30.0 - 36.5 g/dL  
 RDW 14.0 11.5 - 14.5 % PLATELET 088 769 - 645 K/uL MPV 11.8 8.9 - 12.9 FL  
 NRBC 0.0 0  WBC ABSOLUTE NRBC 0.00 0.00 - 0.01 K/uL NEUTROPHILS 89 (H) 32 - 75 % LYMPHOCYTES 4 (L) 12 - 49 % MONOCYTES 6 5 - 13 % EOSINOPHILS 0 0 - 7 % BASOPHILS 0 0 - 1 % IMMATURE GRANULOCYTES 1 (H) 0.0 - 0.5 % ABS. NEUTROPHILS 17.9 (H) 1.8 - 8.0 K/UL  
 ABS. LYMPHOCYTES 0.8 0.8 - 3.5 K/UL  
 ABS. MONOCYTES 1.2 (H) 0.0 - 1.0 K/UL  
 ABS. EOSINOPHILS 0.0 0.0 - 0.4 K/UL  
 ABS. BASOPHILS 0.0 0.0 - 0.1 K/UL  
 ABS. IMM. GRANS. 0.2 (H) 0.00 - 0.04 K/UL  
 DF SMEAR SCANNED    
 RBC COMMENTS MACROCYTOSIS 
1+ TROPONIN I Collection Time: 10/21/19 12:49 PM  
Result Value Ref Range Troponin-I, Qt. <0.05 <0.05 ng/mL SAMPLES BEING HELD Collection Time: 10/21/19 12:49 PM  
Result Value Ref Range SAMPLES BEING HELD 1BLU 1RED Eduin Nicholson COMMENT   Add-on orders for these samples will be processed based on acceptable specimen integrity and analyte stability, which may vary by analyte. LIPASE Collection Time: 10/21/19 12:49 PM  
Result Value Ref Range Lipase >3,000 (H) 73 - 393 U/L MAGNESIUM Collection Time: 10/21/19 12:49 PM  
Result Value Ref Range Magnesium 3.0 (H) 1.6 - 2.4 mg/dL PHOSPHORUS Collection Time: 10/21/19 12:49 PM  
Result Value Ref Range Phosphorus 3.0 2.6 - 4.7 MG/DL POC LACTIC ACID Collection Time: 10/21/19 12:56 PM  
Result Value Ref Range Lactic Acid (POC) 3.66 (HH) 0.40 - 2.00 mmol/L  
EKG, 12 LEAD, INITIAL Collection Time: 10/21/19  4:04 PM  
Result Value Ref Range Ventricular Rate 89 BPM  
 Atrial Rate 89 BPM  
 P-R Interval 146 ms  
 QRS Duration 74 ms Q-T Interval 384 ms QTC Calculation (Bezet) 467 ms Calculated P Axis 61 degrees Calculated R Axis 47 degrees Calculated T Axis 63 degrees Diagnosis Normal sinus rhythm Nonspecific ST and T wave abnormality When compared with ECG of 07-OCT-2019 06:01, Nonspecific T wave abnormality now evident in Inferior leads Nonspecific T wave abnormality now evident in Lateral leads Confirmed by Ashok Fox MD., --- ([id number]) on 10/21/2019 5:01:12 PM 
  
 
 
Xr Abd (kub) Result Date: 10/21/2019 IMPRESSION: Non-obstructive bowel gas pattern. .  
 
Ct Abd Pelv Wo Cont Result Date: 10/21/2019 IMPRESSION: 1. Possible increasing fluid collection or mass in the pancreatic body. 2. Diffuse hepatic metastatic deposits, stable to slightly increased since the prior study allowing for differences in technique. 3. Pulmonary nodules (5.5 mm largest) for which follow-up CT could be considered for surveillance. 4. Stable dilatation of the duodenum. 5. Persistent but significantly decreased incisional fluid collection in the anterior abdominal wall. 6. Stable pelvic fluid collection deep to the anterior pelvic wall. Xr Chest [facility] Result Date: 10/21/2019 IMPRESSION: No acute cardiopulmonary disease radiographically. .  . Chest x-ray was negative for acute cardiopulmonary process. Assessment:  
 
Active Problems: 
  Acute pancreatitis (9/14/2019) Hypotension (10/21/2019) Hypovolemia (10/21/2019) Malignant neoplasm of head of pancreas ([facility] (10/21/2019) Cholangitis (10/21/2019) Plan:  
 
1. Weakness with Hypotension and lactic acidosis on admission - suspect hypovolemia due to lack of adequate nutrition and Failure to thrive from enlarging pancreatic mass. IV fluid hydration, One time NSS bolus Now,. She received sepsis bolus - Continue with Fluid hydration. Recheck lactic acid For resolution of acidosis. Followup Blood Cultures Pressors if needed if persistently Hypotensive Monitor Volume Status and avoid Fluid Overload. 2. Pancreatic cancer  With Liver and Lung mets  - S/p palliative gastro Jejunostomy - LFT and Bilirubin higher than before indicating Obstructive Chol angiopathy, Other possibility is cholangitis - Continue with ABX, GI consult to see if she would benefit from  ERCP. Surgery consult . Notify Oncologist.  
 
3. Failure to thrive  - due to underlying cancer status - Nutrition eval ., Would benefit from a palliative care eval.  
 
 
4. H.O NSTEMI/CAD- Continue Asa and statin. Hold BB as she is severely Hypotensive. 5. TOÑA on admission - appears pre renal - hydrate and recheck. Avoid nephrotoxins and renal Dose medications. Hypokalemia - replete, Hypercalcemia-  - Hydrate Aggressively And Recheck , Could Need Zoledronic acid if not trending down. DVT ppx  On Lovenox Patient was explained about the risk associated with hospitalization including (and not a complete) risk of falls,fractures,blood clots,Bleeding from medications (including anticoagulants used for DVT ppx), Sepsis,allergic reactions,infections,radiation risk from the imaging studies performed,transfusions of blood products,Renal failure  and also risk of death. Patient also understands and agrees to the treatment plan including medications and also understand the risk with radiation while undergoing imaging studies. The patient  And  family  (after permission given by the patient) understands the need to be hospitalized and follow medical orders, agree with the admission plan. Thank You Dr Adam Alexander MD for taking care of your patient. Please call if you have any questions. Signed By: Ellyn Cobos MD   
 October 21, 2019

## 2019-10-21 NOTE — PROGRESS NOTES
Admission Medication Reconciliation: 
 
Information obtained from:  Medication list from SNF RxQuery data available¹:  YES Comments/Recommendations: Updated PTA meds/reviewed patient's allergies. 1)  List provided by patient family members 2)  Medication changes (since last review): Added 
- None Adjusted 
- None Removed - Removed Movi-Prep ¹RxQuery pharmacy benefit data reflects medications filled and processed through the patient's insurance, however  
this data does NOT capture whether the medication was picked up or is currently being taken by the patient. Allergies:  Penicillins Significant PMH/Disease States:  
Past Medical History:  
Diagnosis Date Coronary artery disease of native artery of native heart with stable angina pectoris ([facility] 2/28/2019 Cough due to ACE inhibitor Diarrhea Hematoma of rectus sheath 2/28/2019 IBS (irritable bowel syndrome) IBS  
 NSTEMI (non-ST elevated myocardial infarction) ([facility] 2/28/2019 Pancreatitis Poor appetite Pure hypercholesterolemia 2/28/2019 Systolic CHF, chronic ([facility] 2/28/2019 Chief Complaint for this Admission: Chief Complaint Patient presents with Referral Request  
 Hypotension Fatigue Prior to Admission Medications:  
Prior to Admission Medications Prescriptions Last Dose Informant Patient Reported? Taking? LORazepam (ATIVAN) 0.5 mg tablet   No Yes Sig: Take 1 Tab by mouth nightly as needed for Anxiety. Max Daily Amount: 0.5 mg.  
acetaminophen (TYLENOL) 325 mg tablet   Yes Yes Sig: Take 650 mg by mouth every six (6) hours as needed. bumetanide (BUMEX) 1 mg tablet 10/21/2019 at Unknown time  Yes Yes Sig: Take 1 mg by mouth daily. carvedilol (COREG) 6.25 mg tablet 10/21/2019 at Unknown time  No Yes Sig: Take 1 Tab by mouth two (2) times daily (with meals). enoxaparin (LOVENOX) 40 mg/0.4 mL 10/21/2019 at Unknown time  No Yes Si.4 mL by SubCUTAneous route every twenty-four (24) hours for 30 days. guaiFENesin ER (MUCINEX) 600 mg ER tablet   Yes Yes Sig: Take 600 mg by mouth two (2) times daily as needed for Congestion. melatonin 3 mg tablet   No Yes Sig: Take 1 Tab by mouth nightly as needed for Other (Sleep). nitroglycerin (NITROSTAT) 0.4 mg SL tablet   No Yes Si Tab by SubLINGual route every five (5) minutes as needed for Chest Pain. Up to 3 doses. pantoprazole (PROTONIX) 40 mg tablet 10/21/2019 at Unknown time  No Yes Sig: Take 1 Tab by mouth two (2) times a day. polyethylene glycol (MIRALAX) 17 gram packet   Yes Yes Sig: Take 17 g by mouth daily as needed. rosuvastatin (CRESTOR) 10 mg tablet 10/20/2019 at Unknown time  No Yes Sig: Take 1 Tab by mouth nightly. Facility-Administered Medications: None Please contact the main inpatient pharmacy with any questions or concerns at [phone] and we will direct you to the clinical pharmacist covering this patient's care while in-house.   
Wandy Mccoy, HEIDID

## 2019-10-21 NOTE — PROGRESS NOTES
Date of previous inpatient admission/ ED visit? 9/14/19-10/10/19 Acute Pancreatitis (Hospitalization <30 days) What brought the patient back to ED? Patient presents to the ED w/ weakness, fatigue, decreased PO intake and hypotension Did patient decline recommended services during last admission/ ED visit (if yes, what)? No. Referral sent to [facility] and patient accepted. Has patient seen a provider since their last inpatient admission/ED visit (if yes, when)? Yes. Patient is followed at [facility]. CM Interventions: 
From previous inpatient admission/ED visit: Assessment From current inpatient admission/ED visit: Assessment EMR reviewed. History significant for adenocarcinoma of pancreas metastasis to liver. Met w/patient and Evelyn Rodrigez Elder (daughter) and Obie Palomino. (son) - introduced to role of CM. Patient is alert - history provided by children. Informed previous rehab stay at [facility] the beginning of the year after extended hospitalization. Hali Sousa has resided at facility since 10/20/29. Care Planning meeting was scheduled for 10/22/19 to discuss progress and planning. Patient lived home independently prior to rehab. Jeni lives in [place] and Minesh Sanchez in [place] - children are very involved in patient's care. No transitions of care needs verbalized by patient/family at this time. Case Management will continue to follow. [facility] Medicare A/B and [employer] are insurance providers. PCP is Dr.John Lynne Limb Care Management Interventions PCP Verified by CM: Yes Last Visit to PCP: 08/21/19 Palliative Care Criteria Met (RRAT>21 & CHF Dx)?: No 
Transition of Care Consult (CM Consult): Other(hospitalization <30 days) MyChart Signup: No 
Discharge Durable Medical Equipment: No 
Health Maintenance Reviewed: Yes Physical Therapy Consult: No 
Occupational Therapy Consult: No 
Speech Therapy Consult: No 
Current Support Network: [address] [place] [address] Confirm Follow Up Transport: (TBD) Plan discussed with Pt/Family/Caregiver: Yes [employer] Information Provided?: No 
Discharge Location Discharge Placement: (TBD)

## 2019-10-21 NOTE — ROUTINE PROCESS
TRANSFER - OUT REPORT: 
 
Verbal report given to SUKHWINDER Murray (name) on Azra Wiggins  being transferred to 403(unit) for routine progression of care Report consisted of patients Situation, Background, Assessment and  
Recommendations(SBAR). Information from the following report(s) SBAR, ED Summary and MAR was reviewed with the receiving nurse. Lines:  
Peripheral IV 10/21/19 Left Antecubital (Active) Site Assessment Clean, dry, & intact 10/21/2019 12:50 PM  
Phlebitis Assessment 0 10/21/2019 12:50 PM  
Infiltration Assessment 0 10/21/2019 12:50 PM  
  
 
Opportunity for questions and clarification was provided. Patient transported with: 
 [facility] Hospitalist at bedside assessing patient, will transport once he is complete.

## 2019-10-22 NOTE — PROGRESS NOTES
Spiritual Care Assessment/Progress Note [address] 
 
 
NAME: Suresh Valenzuela      MRN: [id number] AGE: 70 y.o. SEX: female Jew Affiliation: Mormonism  
Language: English  
 
10/22/2019     Total Time (in minutes): 15 Spiritual Assessment begun in [facility] IMCU through conversation with: 
  
    [x]Patient        [] Family    [] Friend(s) Reason for Consult: Palliative Care, Initial/Spiritual Assessment Spiritual beliefs: (Please include comment if needed) [x] Identifies with a joo tradition:  Mormonism  
   [] Supported by a joo community:        
   [] Claims no spiritual orientation:       
   [] Seeking spiritual identity:            
   [] Adheres to an individual form of spirituality:       
   [] Not able to assess:                   
 
    
Identified resources for coping:  
   [x] Prayer                           
   [] Music                  [] Guided Imagery [x] Family/friends                 [] Pet visits [] Devotional reading                         [] Unknown 
   [x] Other: [employer] joo Interventions offered during this visit: (See comments for more details) Patient Interventions: Affirmation of emotions/emotional suffering, Iconic (affirming the presence of God/Higher Power), Initial/Spiritual assessment, patient floor, Coping skills reviewed/reinforced, Prayer (actual), Prayer (assurance of) Plan of Care: 
 
 [x] Support spiritual and/or cultural needs  
 [] Support AMD and/or advance care planning process    
 [] Support grieving process 
 [] Coordinate Rites and/or Rituals  
 [] Coordination with community clergy [] No spiritual needs identified at this time 
 [] Detailed Plan of Care below (See Comments)  [] Make referral to Music Therapy 
[] Make referral to Pet Therapy    
[] Make referral to Addiction services 
[] Make referral to [facility] 
[] Make referral to Spiritual Care Partner [] No future visits requested       
[] Follow up visits as needed Comments: Visited Ms. Weston Raman with Palliative Dr. Rebecca Ahumada. Pt was coping with some difficult symptoms and shared that she is feeling very tired. Emotional and spiritual support extended. Pt shared that her ojo in [address] brings her strength and comfort. Shared a prayer at bedside. Pt indicates having support from her family. She expecting her daughter to be here from [place] and she has a son who lives locally. Palliative team will continue to follow, and chaplains are available for spiritual support as needed. Mahogany Jones, Palliative

## 2019-10-22 NOTE — PROGRESS NOTES
Problem: Falls - Risk of 
Goal: *Absence of Falls Description Document Aixa Wagner Fall Risk and appropriate interventions in the flowsheet. Outcome: Progressing Towards Goal 
Note:  
Fall Risk Interventions: 
Mobility Interventions: Communicate number of staff needed for ambulation/transfer, Patient to call before getting OOB Medication Interventions: Evaluate medications/consider consulting pharmacy, Patient to call before getting OOB, Teach patient to arise slowly History of Falls Interventions: Evaluate medications/consider consulting pharmacy, Investigate reason for fall Problem: Patient Education: Go to Patient Education Activity Goal: Patient/Family Education Outcome: Progressing Towards Goal 
  
Problem: Pressure Injury - Risk of 
Goal: *Prevention of pressure injury Description Document Travis Scale and appropriate interventions in the flowsheet. Outcome: Progressing Towards Goal 
Note:  
Pressure Injury Interventions: Activity Interventions: Pressure redistribution bed/mattress(bed type) Mobility Interventions: HOB 30 degrees or less, Pressure redistribution bed/mattress (bed type) Nutrition Interventions: Document food/fluid/supplement intake Problem: Pain Goal: *Control of Pain Outcome: Progressing Towards Goal 
  
Problem: Heart Failure: Day 1 Goal: *Hemodynamically stable Outcome: Progressing Towards Goal

## 2019-10-22 NOTE — PROGRESS NOTES
Hospitalist Progress Note Primo Rollins MD 
Answering service: [phone] OR [phone] from in house phone Date of Service:  10/22/2019 NAME:  Lois Zepeda :  1940 MRN:  [id number] Admission Summary:  
Lois Zepeda is a 70 y.o. female who has a history of  Pancreatic mass s/p gastro Jejunostomy done for palliation and discharged to rehab presents with increased weakness and failure to thrive. She went to see her surgeon today and was sen to ER for feeling weak,. In ER she was noted ot be hypotensive, lactic acidosis and septic and was referred for admission, CT abdomen showed increasing mass in abdominal area suspicious for pancreatic mass. There was also a fluid collection in anterior pelvic wall which was  Noted to be stable in nature. Her blood pressure improved with Fluids and she was then referred for admission. She was seen in Er and was also noted to be weak. Her oral intake has been poor per her daughter in room, She is currently at [facility] Interval history / Subjective:  
  
Patient seen and examined this morning. Patient is not saying much but stated she is having difficulty breathing. No MB since admission. Family having discussion with palliative team.  
 
Assessment & Plan: # Hypotension and lactic acidosis -suspected hypovolemia due to decreased oral intake and failure to thrive from pancreatic cancer r/o sepsis - s/p IV bolus - ct with IV hydration  
- follow up blood culture and urine culture , check UA 
- chest x-ray and KUB unremarkable  
- on Cefepime and Vancomycin- de-escalate if no source of infection # Acute pancreatitis  
- keep NPO for now  
- ct with aggressive hydration and pain management # Pancreatic cancer with liver and lung metastasis - s/p recent palliative gastro jejunostomy - LFT and Bilirubin higher than before indicating Obstructive Chol angiopathy, Other possibility is cholangitis - Continue with ABX  
- GI consult to see if she would benefit from  ERCP 
- surgery and Oncology consult - palliative consult # Failure to thrive  - due to underlying cancer status - Nutrition eval ., Would benefit from a palliative care eval.  
 
# H.O NSTEMI/CAD- Continue Asa and statin. Hold BB as she is severely Hypotensive.  
  
# TOÑA on admission - appears pre-renal  
- cr better after hydration - Avoid nephrotoxins and renal dose medications. # Hypokalemia - replete as needed # Hypercalcemia- likely from underlying malignancy  
- improving  
- ct with Hydrate aggressively and Recheck , may need Zoledronic acid if not trending down. # Chronic anemia due to chronic disease  
- false elevation from hemoconcentration from volume depletion  
- at baseline   
  
DVT ppx : On Lovenox GI prop: PPI Code: Partial  
Disp: home Hospital Problems  Date Reviewed: 10/21/2019 Codes Class Noted POA Hypotension ICD-10-CM: I95.9 ICD-9-CM: 458.9  10/21/2019 Yes Hypovolemia ICD-10-CM: E86.1 ICD-9-CM: 276.52  10/21/2019 Yes Malignant neoplasm of head of pancreas ([facility] [address] ICD-10-CM: C25.0 ICD-9-CM: 157.0  10/21/2019 Yes Cholangitis ICD-10-CM: K83.09 
ICD-9-CM: 576.1  10/21/2019 Yes Acute pancreatitis ICD-10-CM: K85.90 ICD-9-CM: 317.3  9/14/2019 Yes Review of Systems:  
Pertinent positive mentioned in interval hx/HPI. Other systems reviewed and negative Vital Signs:  
 Last 24hrs VS reviewed since prior progress note. Most recent are: 
Visit Vitals /58 (BP 1 Location: Left arm, BP Patient Position: At rest) Pulse 88 Temp 99 °F (37.2 °C) Resp 21 Wt 56.9 kg (125 lb 8 oz) SpO2 97% Breastfeeding? No  
BMI 21.54 kg/m² Intake/Output Summary (Last 24 hours) at 10/22/2019 1994 Last data filed at 10/21/2019 2013 Gross per 24 hour Intake 591.67 ml Output  Net 591.67 ml  
 Physical Examination:  
 
 
     
Constitutional:  No acute distress, cooperative, pleasant ENT:  Oral mucous moist, oropharynx benign. Neck supple, Resp:  CTA bilaterally. No wheezing/rhonchi/rales. No accessory muscle use CV:  Regular rhythm, normal rate, no murmurs, gallops, rubs GI:  Soft, non distended, non tender. normoactive bowel sounds, no hepatosplenomegaly Musculoskeletal:  No edema, warm, 2+ pulses throughout Neurologic:  Moves all extremities. AAOx3, CN II-XII reviewed Data Review:  
I personally reviewed labs and imaging Labs:  
 
Recent Labs 10/22/19 
0253 10/21/19 
1249 WBC 14.7* 20.1* HGB 9.8* 12.8 HCT 31.8* 40.6  318 Recent Labs 10/22/19 
0253 10/21/19 
1249  132* K 3.0* 3.1*  
 89* CO2 28 34* BUN 31* 44* CREA 1.23* 2.32* * 176* CA 8.6 10.3* MG  --  3.0*  
PHOS  --  3.0 Recent Labs 10/22/19 
0253 10/21/19 
1249 SGOT 62* 76* ALT 61 83* * 942* TBILI 0.9 1.1* TP 5.8* 8.3* ALB 1.9* 2.8*  
GLOB 3.9 5.5* LPSE  --  >3,000* No results for input(s): INR, PTP, APTT, INREXT in the last 72 hours. No results for input(s): FE, TIBC, PSAT, FERR in the last 72 hours. No results found for: FOL, RBCF No results for input(s): PH, PCO2, PO2 in the last 72 hours. Recent Labs 10/21/19 
1249 TROIQ <0.05 Lab Results Component Value Date/Time Cholesterol, total 85 09/15/2019 04:37 AM  
 HDL Cholesterol 43 09/15/2019 04:37 AM  
 LDL, calculated 29 09/15/2019 04:37 AM  
 Triglyceride 65 09/15/2019 04:37 AM  
 CHOL/HDL Ratio 2.0 09/15/2019 04:37 AM  
 
Lab Results Component Value Date/Time Glucose (POC) 120 (H) 10/21/2019 05:37 PM  
 Glucose (POC) 119 (H) 10/10/2019 11:45 AM  
 Glucose (POC) 101 (H) 10/10/2019 06:46 AM  
 Glucose (POC) 105 (H) 10/09/2019 09:53 PM  
 Glucose (POC) 137 (H) 10/09/2019 12:02 PM  
 
Lab Results Component Value Date/Time Color YELLOW/STRAW 10/07/2019 11:36 AM  
 Appearance CLEAR 10/07/2019 11:36 AM  
 Specific gravity 1.013 10/07/2019 11:36 AM  
 pH (UA) 5.5 10/07/2019 11:36 AM  
 Protein NEGATIVE  10/07/2019 11:36 AM  
 Glucose NEGATIVE  10/07/2019 11:36 AM  
 Ketone NEGATIVE  10/07/2019 11:36 AM  
 Bilirubin NEGATIVE  10/07/2019 11:36 AM  
 Urobilinogen 0.2 10/07/2019 11:36 AM  
 Nitrites NEGATIVE  10/07/2019 11:36 AM  
 Leukocyte Esterase NEGATIVE  10/07/2019 11:36 AM  
 Epithelial cells FEW 10/07/2019 11:36 AM  
 Bacteria NEGATIVE  10/07/2019 11:36 AM  
 WBC 0-4 10/07/2019 11:36 AM  
 RBC 0-5 10/07/2019 11:36 AM  
 
 
 
Medications Reviewed:  
 
Current Facility-Administered Medications Medication Dose Route Frequency  enoxaparin (LOVENOX) injection 40 mg  40 mg SubCUTAneous Q24H  cefepime (MAXIPIME) 2 g in 0.9% sodium chloride (MBP/ADV) 100 mL  2 g IntraVENous Q12H  
 LORazepam (ATIVAN) tablet 0.5 mg  0.5 mg Oral QHS PRN  
 guaiFENesin ER (MUCINEX) tablet 600 mg  600 mg Oral BID PRN  
 melatonin tablet 3 mg  3 mg Oral QHS PRN  
 nitroglycerin (NITROSTAT) tablet 0.4 mg  0.4 mg SubLINGual Q5MIN PRN  pantoprazole (PROTONIX) tablet 40 mg  40 mg Oral BID  rosuvastatin (CRESTOR) tablet 10 mg  10 mg Oral QHS  aspirin chewable tablet 81 mg  81 mg Oral DAILY  sodium chloride (NS) flush 5-40 mL  5-40 mL IntraVENous Q8H  
 sodium chloride (NS) flush 5-40 mL  5-40 mL IntraVENous PRN  
 ondansetron (ZOFRAN) injection 4 mg  4 mg IntraVENous Q4H PRN  
 acetaminophen (TYLENOL) tablet 650 mg  650 mg Oral Q4H PRN  
 0.9% sodium chloride with KCl 20 mEq/L infusion   IntraVENous CONTINUOUS  Vancomycin - pharmacy to dose   Other Rx Dosing/Monitoring  influenza vaccine 2019-20 (6 mos+)(PF) (FLUARIX/FLULAVAL/FLUZONE QUAD) injection 0.5 mL  0.5 mL IntraMUSCular PRIOR TO DISCHARGE  
 
______________________________________________________________________ EXPECTED LENGTH OF STAY: - - - 
 ACTUAL LENGTH OF STAY:          1 Marla Vargas MD  
Patient has given Verbal permission to discuss medical care with  
persons present in the room and and also with contact as listed on face sheet.

## 2019-10-22 NOTE — ACP (ADVANCE CARE PLANNING)
Family meeting / ACP Note: 
 
Met with patient's two children Rosa Glover and Grand rodriguez this afternoon. Discussed role of palliative care team.  Rosa Glover relayed the course of their mothers' change in health since January of last year. She went to rehab after her stay at [facility] last spring and returned close to her baseline; but never really ate well again. In September her appetite markedly worsened and she was of course hospitalized and found to have pancreatic adenocarcinoma. I clarified with them both that her cancer is in fact stage IV and that her life expectancy is limited - they preferred to know time frame and I shared weeks, could be less if she becomes more somnolent or needs aggressive symptom management. They both acknowledged this and were not surprised by the information. They also acknowledged that she has expressed a desire to not experience further \"poking and prodding. \" They both agree that their mother would not want further invasive interventions. I outlined what a shift to comfort would look like and they agree. I discussed use of opioids for pain and dyspnea, and that if these medicines are introduced she may experience side effects of increased fatigue and sometimes confusion. They expressed good understanding and want her to be comfortable. We addressed code status and they affirmed desire against CPR/shock or use of vasopressors. Order changed to DNR to reflect this and DDNR completed. Ms. Weston Raman did not have capacity to address this directly with her therefore her daughter (Bree) Rosa Glover signed the document. Provided broad overview of Hospice services and where this can take place. They do not think that they can provide 24 hr care giving for her in her home but will explore this vs a NH further with case management. Discussed with  and Dr. Alban Monge. Consult placed to case management for Hospice. Time start:  13:00 Time end:  14:00

## 2019-10-22 NOTE — HOSPICE
Referral received, acceptance pending. Will forward to hospice liaison. Thank you for the referral. 
 
Dominic Bautista,

## 2019-10-22 NOTE — CONSULTS
We saw pt in the office yesterday. See note by Mary Huang NP. Unfortunately we have nothing surgically or otherwise to offer.

## 2019-10-22 NOTE — PROGRESS NOTES
NUTRITION COMPLETE ASSESSMENT 
 
RECOMMENDATIONS:  
 
1. RD will follow up with palliative care meeting/decisions made re: goals of care Interventions/Plan:  
Food/Nutrient Delivery:            
Nutrition Education:    
Coordination of Care:   
 
Assessment:  
Reason for Assessment: MD Consult Diet: Clear Liquids Supplements: none Meal Intake: No data found. Subjective: - 
 
Objective: 
70 y.o. female admitted with Hypotension [I95.9] Pt has a past medical history of Coronary artery disease of native artery of native heart with stable angina pectoris ([facility] [address] (2/28/2019), Cough due to ACE inhibitor, Diarrhea, Hematoma of rectus sheath (2/28/2019), IBS (irritable bowel syndrome), NSTEMI (non-ST elevated myocardial infarction) ([facility] [address] (2/28/2019), Pancreatitis, Poor appetite, Pure hypercholesterolemia (2/70/3392), and Systolic [facility] (2/28/2019). She also has no past medical history of Unspecified sleep apnea. Palliative care note reviewed - \"recently found to have an obstructing mass abutting the duodenum and encasing the SMA (admission 9/14-10/10). Mass was biopsied via EGD on 9/17 confirming adenocarcinoma. She underwent gastrojejunostomy to relieve the obstruction. She was discharged to [facility] and was readmitted on 10/21/2019 after being referred from general surgery office where she was being seen for follow up. At her office visit she was found to be hypotensive, weak and with poor appetite.  
  
CT imaging on admission shows again extensive liver mets and now also small nodules at the base of the lungs concerning for lung mets.   There is also mention of potential increase in size of mass in the pancreatic body though it isn't clear.  
  
Current medical issues leading to Palliative Medicine involvement include: stage IV pancreatic adenocarcinoma with limited life expectancy, associated cancer related symptoms. 
  
 She has an AMD on file which names her daughter Sebastián Montano as primary mPOA and son Guido Conway as secondary. Living will directs for no life prolonging measures at the end of life. \"  
 
Plan at this time is for palliative team to meet with daughter today at 1 pm.   
 
Pt was followed by this service last admission at which time she was on TPN. TPN was d/c on 10/9 and MD note indicates pt was \"tolerating diet well\" and pt was discharged on 10/10/19. Pt was last seen by this service on 10/2 at which time she was NPO with TPN for sole nutrition. Patient Active Problem List  
Diagnosis Code  Systolic CHF, chronic ([facility]) I50.22  
 NSTEMI (non-ST elevated myocardial infarction) ([facility]) I21.4  Coronary artery disease of native artery of native heart with stable angina pectoris ([facility]) I25.118  
 Pure hypercholesterolemia E78.00  Cough due to ACE inhibitor R05, T46.4X5A  Acute pancreatitis K85.90  
 TOÑA (acute kidney injury) ([facility] [address] N17.9  Hypokalemia E87.6  GI bleed K92.2  Coffee ground emesis K92.0  Hypotension I95.9  Hypovolemia E86.1  Malignant neoplasm of head of pancreas ([facility] [address] C25.0  Cholangitis K83.09 Nutritionally Significant Medications:  
NS-KCl @ 125 mL/hr, Maxipime, Lovenox, Protonix, KCl, Crestor, Vancomycin Intake/Output: 
 
Intake/Output Summary (Last 24 hours) at 10/22/2019 1219 Last data filed at 10/22/2019 4370 Gross per 24 hour Intake 591.67 ml Output 600 ml Net -8.33 ml Last BM: PTA Physical Findings: 
 
Nutrition focused physical exam: cachectic Edema: none Abdomen: deferred Skin Integrity: intact Anthropometrics: 
Weight Loss Metrics 10/22/2019 10/21/2019 10/21/2019 10/21/2019 10/10/2019 4/17/2019 4/15/2019 Today's Wt 117 lb 1 oz - 111 lb 6.4 oz - 130 lb 1.1 oz 125 lb 136 lb BMI - 20.09 kg/m2 - 19.12 kg/m2 22.33 kg/m2 21.46 kg/m2 23.34 kg/m2 Weight Source: Bed(admission weight) Height: 5' 4\" (162.6 cm),   
 Body mass index is 20.09 kg/m². IBW : 54.4 kg (120 lb), % IBW (Calculated): 97.55 % Usual Body Weight: 59 kg (130 lb),   
 
Labs:   
 
Recent Labs 10/22/19 
0253 10/21/19 
1249 * 176* BUN 31* 44* CREA 1.23* 2.32*  132* K 3.0* 3.1*  
 89* CO2 28 34* CA 8.6 10.3* PHOS  --  3.0 MG  --  3.0* Recent Labs 10/22/19 
0253 10/21/19 
1249 SGOT 62* 76* ALT 61 83* * 942* TBILI 0.9 1.1* TP 5.8* 8.3* ALB 1.9* 2.8*  
GLOB 3.9 5.5* LPSE  --  >3,000* Recent Labs 10/21/19 
2000 LAC 1.7 Recent Labs 10/22/19 
0253 10/21/19 
1249 WBC 14.7* 20.1* HGB 9.8* 12.8 HCT 31.8* 40.6  318 No results for input(s): PREALB in the last 72 hours. No results for input(s): TRIGL in the last 72 hours. Recent Labs 10/21/19 
1737 GLUCPOC 120* No results found for: HBA1C, HGBE8, VLZ6DVDI, PHP5VJPB, FTH7AVDI Cultural, Baptism and ethnic food preferences identified: None Food Allergies: NKFA Diet Restrictions: Cultural/Tenriism Preference(s): None Estimated Nutrition Needs:  
Kcals/day: 1600 RSJMH/NBU(6446-1252 kcal (catabolic CA pt)) Protein: 85 g(65-85 gm using 7.2-4.5 gm for catabolic CA pt) Fluid: 1600 ml(1 mL/kcal + losses) Based On: Kcal/kg - specify (Comment)(30-35 kcal/kg) Weight Used: Other (comment)(53.1 kg - admission wt) Pt expected to meet estimated nutrient needs:  no 
Nutrition Diagnosis:  
1. Inadequate oral intake related to metastatic disease; altered GI fx as evidenced by clear liquids; unable to meet est needs 2.   related to   as evidenced by 3.   related to   as evidenced by   
 
Goals:   
 decisions made re: goals of care; advance diet vs nutrition support in nexxt 48-72 hours if continuing aggressive measures Monitoring & Evaluation: Total energy intake, Oral fluids amount Weight/weight change, Electrolyte and renal profile, GI 
  
 
 Previous Nutrition Goals Met:   N/A Previous Recommendations:    N/A Education & Discharge Needs: 
 [] None Identified 
 [x] Identified and addressed [x] Participated in care plan, discharge planning, and/or interdisciplinary rounds Crystal Gimenez RD

## 2019-10-22 NOTE — CONSULTS
Patient seen at request of Dr. Johana Mars. Information obtained from patient and review of chart. Yomi Brandt is an 70 y.o. female who was recently admitted with weakness and failure to thrive. Ms. Etienne Sprague has a h/o Stage IV pancreatic cancer and is s/p gastrojejunostomy on 9/27/2019. Discharged to Rehab on 10/10/2019. Ms. Etienne Sprague has not been eating or drinking much and has had a\"steady decline the past week. \" Found to have TOÑA, hypotension, lactic acidosis and elevated serum lipase. CT scan abdomen/pelvis without po/IV contrast - 10/21/2019 - Possible increasing fluid collection or mass in the pancreatic body. Diffuse hepatic metastatic deposits, stable to slightly increased since the 
prior study allowing for differences in technique. Pulmonary nodules (5.5 mm largest) for which follow-up CT could be considered for surveillance. Stable dilatation of the duodenum. Persistent but significantly decreased incisional fluid collection in the 
anterior abdominal wall. Stable pelvic fluid collection deep to the anterior pelvic wall. She was admitted. IV hydration. Started on Vancomycin and Cefepime. Ms. Etienne Sprague is feeling a little better today. She does report difficulty breathing. Allergies - Penicillins Meds - Reviewed. PMH -  
Past Medical History:  
Diagnosis Date  Coronary artery disease of native artery of native heart with stable angina pectoris ([address] 2/28/2019  Cough due to ACE inhibitor  Diarrhea  Hematoma of rectus sheath 2/28/2019  IBS (irritable bowel syndrome) IBS  NSTEMI (non-ST elevated myocardial infarction) ([address] 2/28/2019  Pancreatitis  Poor appetite  Pure hypercholesterolemia 2/28/2019  Systolic CHF, chronic ([address] 2/28/2019 PSH -  
Past Surgical History:  
Procedure Laterality Date  COLONOSCOPY Left 11/1/2017 COLONOSCOPY performed by Jaquelin Antonio MD at [address]  HX CHOLECYSTECTOMY  HX GI    
 surgery for hiatal hernia  HX GI  09/27/2019 Gastrojejunostomy  HX ORTHOPAEDIC    
 DJD, doing PT weekly through [facility]  IR OCCL [facility]angel[facility] W SI  1/30/2019 Fam Hx -  
Family History Problem Relation Age of Onset  Dementia Mother   
     alzheimers  Cancer Sister   
     gallbladder  Cancer Brother   
     pancreatic  Breast Cancer Paternal Grandmother  Dementia Sister   
     alzheimers  Heart Surgery Brother   
     bypass Soc Hx -  
Social History Tobacco Use  Smoking status: Former Smoker  Smokeless tobacco: Never Used  Tobacco comment: quit smoking cigarettes 6 yrs ago Substance Use Topics  Alcohol use: Yes Comment: very occasional  
 
Patient is an older female in no acute distress. Visit Vitals /58 (BP 1 Location: Left arm, BP Patient Position: At rest) Pulse 88 Temp 99 °F (37.2 °C) Resp 21 Wt 125 lb 8 oz (56.9 kg) SpO2 97% Breastfeeding? No  
BMI 21.54 kg/m² HEENT: Anicteric. Neck: Supple without palpable lymphadenopathy. Cor: RRR. Lungs: Clear to auscultation bilaterally. Abd: Soft. Non distended. Non tender. No guarding or rebound. Midline incision is clean. Ext: No edema. Neuro: Grossly Non focal.  
 
Labs - Recent Results (from the past 24 hour(s)) METABOLIC PANEL, COMPREHENSIVE Collection Time: 10/21/19 12:49 PM  
Result Value Ref Range Sodium 132 (L) 136 - 145 mmol/L Potassium 3.1 (L) 3.5 - 5.1 mmol/L Chloride 89 (L) 97 - 108 mmol/L  
 CO2 34 (H) 21 - 32 mmol/L Anion gap 9 5 - 15 mmol/L Glucose 176 (H) 65 - 100 mg/dL BUN 44 (H) 6 - 20 MG/DL Creatinine 2.32 (H) 0.55 - 1.02 MG/DL  
 BUN/Creatinine ratio 19 12 - 20 GFR est AA 25 (L) >60 ml/min/1.73m2 GFR est non-AA 20 (L) >60 ml/min/1.73m2 Calcium 10.3 (H) 8.5 - 10.1 MG/DL  Bilirubin, total 1.1 (H) 0.2 - 1.0 MG/DL  
 ALT (SGPT) 83 (H) 12 - 78 U/L  
 AST (SGOT) 76 (H) 15 - 37 U/L  
 Alk. phosphatase 942 (H) 45 - 117 U/L Protein, total 8.3 (H) 6.4 - 8.2 g/dL Albumin 2.8 (L) 3.5 - 5.0 g/dL Globulin 5.5 (H) 2.0 - 4.0 g/dL A-G Ratio 0.5 (L) 1.1 - 2.2    
CBC WITH AUTOMATED DIFF Collection Time: 10/21/19 12:49 PM  
Result Value Ref Range WBC 20.1 (H) 3.6 - 11.0 K/uL  
 RBC 4.21 3.80 - 5.20 M/uL  
 HGB 12.8 11.5 - 16.0 g/dL HCT 40.6 35.0 - 47.0 % MCV 96.4 80.0 - 99.0 FL  
 MCH 30.4 26.0 - 34.0 PG  
 MCHC 31.5 30.0 - 36.5 g/dL  
 RDW 14.0 11.5 - 14.5 % PLATELET 838 015 - 051 K/uL MPV 11.8 8.9 - 12.9 FL  
 NRBC 0.0 0  WBC ABSOLUTE NRBC 0.00 0.00 - 0.01 K/uL NEUTROPHILS 89 (H) 32 - 75 % LYMPHOCYTES 4 (L) 12 - 49 % MONOCYTES 6 5 - 13 % EOSINOPHILS 0 0 - 7 % BASOPHILS 0 0 - 1 % IMMATURE GRANULOCYTES 1 (H) 0.0 - 0.5 % ABS. NEUTROPHILS 17.9 (H) 1.8 - 8.0 K/UL  
 ABS. LYMPHOCYTES 0.8 0.8 - 3.5 K/UL  
 ABS. MONOCYTES 1.2 (H) 0.0 - 1.0 K/UL  
 ABS. EOSINOPHILS 0.0 0.0 - 0.4 K/UL  
 ABS. BASOPHILS 0.0 0.0 - 0.1 K/UL  
 ABS. IMM. GRANS. 0.2 (H) 0.00 - 0.04 K/UL  
 DF SMEAR SCANNED    
 RBC COMMENTS MACROCYTOSIS 
1+ TROPONIN I Collection Time: 10/21/19 12:49 PM  
Result Value Ref Range Troponin-I, Qt. <0.05 <0.05 ng/mL SAMPLES BEING HELD Collection Time: 10/21/19 12:49 PM  
Result Value Ref Range SAMPLES BEING HELD 1BLU 1RED [facility] Crew COMMENT Add-on orders for these samples will be processed based on acceptable specimen integrity and analyte stability, which may vary by analyte. LIPASE Collection Time: 10/21/19 12:49 PM  
Result Value Ref Range Lipase >3,000 (H) 73 - 393 U/L MAGNESIUM Collection Time: 10/21/19 12:49 PM  
Result Value Ref Range Magnesium 3.0 (H) 1.6 - 2.4 mg/dL PHOSPHORUS Collection Time: 10/21/19 12:49 PM  
Result Value Ref Range Phosphorus 3.0 2.6 - 4.7 MG/DL POC LACTIC ACID Collection Time: 10/21/19 12:56 PM  
Result Value Ref Range Lactic Acid (POC) 3.66 (HH) 0.40 - 2.00 mmol/L  
EKG, 12 LEAD, INITIAL Collection Time: 10/21/19  4:04 PM  
Result Value Ref Range Ventricular Rate 89 BPM  
 Atrial Rate 89 BPM  
 P-R Interval 146 ms  
 QRS Duration 74 ms Q-T Interval 384 ms QTC Calculation (Bezet) 467 ms Calculated P Axis 61 degrees Calculated R Axis 47 degrees Calculated T Axis 63 degrees Diagnosis Normal sinus rhythm Nonspecific ST and T wave abnormality When compared with ECG of 07-OCT-2019 06:01, Nonspecific T wave abnormality now evident in Inferior leads Nonspecific T wave abnormality now evident in Lateral leads Confirmed by Maximo Jackson MD., --- ([id number]) on 10/21/2019 5:01:12 PM 
  
GLUCOSE, POC Collection Time: 10/21/19  5:37 PM  
Result Value Ref Range Glucose (POC) 120 (H) 65 - 100 mg/dL Performed by [employer] LACTIC ACID Collection Time: 10/21/19  8:00 PM  
Result Value Ref Range Lactic acid 1.7 0.4 - 2.0 MMOL/L  
CBC WITH AUTOMATED DIFF Collection Time: 10/22/19  2:53 AM  
Result Value Ref Range WBC 14.7 (H) 3.6 - 11.0 K/uL  
 RBC 3.24 (L) 3.80 - 5.20 M/uL HGB 9.8 (L) 11.5 - 16.0 g/dL HCT 31.8 (L) 35.0 - 47.0 % MCV 98.1 80.0 - 99.0 FL  
 MCH 30.2 26.0 - 34.0 PG  
 MCHC 30.8 30.0 - 36.5 g/dL  
 RDW 14.1 11.5 - 14.5 % PLATELET 065 926 - 283 K/uL MPV 11.3 8.9 - 12.9 FL  
 NRBC 0.0 0  WBC ABSOLUTE NRBC 0.00 0.00 - 0.01 K/uL NEUTROPHILS 94 (H) 32 - 75 % LYMPHOCYTES 3 (L) 12 - 49 % MONOCYTES 3 (L) 5 - 13 % EOSINOPHILS 0 0 - 7 % BASOPHILS 0 0 - 1 % IMMATURE GRANULOCYTES 0 %  
 ABS. NEUTROPHILS 13.9 (H) 1.8 - 8.0 K/UL  
 ABS. LYMPHOCYTES 0.4 (L) 0.8 - 3.5 K/UL  
 ABS. MONOCYTES 0.4 0.0 - 1.0 K/UL  
 ABS. EOSINOPHILS 0.0 0.0 - 0.4 K/UL  
 ABS. BASOPHILS 0.0 0.0 - 0.1 K/UL  
 ABS. IMM. GRANS. 0.0 K/UL  
 DF MANUAL PLATELET COMMENTS Large Platelets RBC COMMENTS ANISOCYTOSIS 1+ 
    
 RBC COMMENTS POLYCHROMASIA 1+ 
    
 METABOLIC PANEL, COMPREHENSIVE Collection Time: 10/22/19  2:53 AM  
Result Value Ref Range Sodium 141 136 - 145 mmol/L Potassium 3.0 (L) 3.5 - 5.1 mmol/L Chloride 106 97 - 108 mmol/L  
 CO2 28 21 - 32 mmol/L Anion gap 7 5 - 15 mmol/L Glucose 105 (H) 65 - 100 mg/dL BUN 31 (H) 6 - 20 MG/DL Creatinine 1.23 (H) 0.55 - 1.02 MG/DL  
 BUN/Creatinine ratio 25 (H) 12 - 20 GFR est AA 51 (L) >60 ml/min/1.73m2 GFR est non-AA 42 (L) >60 ml/min/1.73m2 Calcium 8.6 8.5 - 10.1 MG/DL Bilirubin, total 0.9 0.2 - 1.0 MG/DL  
 ALT (SGPT) 61 12 - 78 U/L  
 AST (SGOT) 62 (H) 15 - 37 U/L Alk. phosphatase 629 (H) 45 - 117 U/L Protein, total 5.8 (L) 6.4 - 8.2 g/dL Albumin 1.9 (L) 3.5 - 5.0 g/dL Globulin 3.9 2.0 - 4.0 g/dL A-G Ratio 0.5 (L) 1.1 - 2.2 URINALYSIS W/ REFLEX CULTURE Collection Time: 10/22/19  9:32 AM  
Result Value Ref Range Color YELLOW/STRAW Appearance CLOUDY (A) CLEAR Specific gravity 1.013 1.003 - 1.030    
 pH (UA) 5.5 5.0 - 8.0 Protein TRACE (A) NEG mg/dL Glucose NEGATIVE  NEG mg/dL Ketone NEGATIVE  NEG mg/dL Bilirubin NEGATIVE  NEG Blood MODERATE (A) NEG Urobilinogen 0.2 0.2 - 1.0 EU/dL Nitrites NEGATIVE  NEG Leukocyte Esterase TRACE (A) NEG    
 WBC PENDING /hpf  
 RBC PENDING /hpf Epithelial cells PENDING /lpf Bacteria PENDING /hpf  
 UA:UC IF INDICATED PENDING   
 
CT Scan - Reviewed. Imp: Ms. Jenn Krishnamurthy is a 70 y.o. female with Stage IV pancreatic cancer s/p gastrojejunostomy with weakness, failure to thrive, pancreatitis and TOÑA. Plan: 1. At this point in time, do not believe that there is an indication for surgical intervention. 2. Clear liquid diet as tolerated. 3. Continue IV hydration - Creatinine improved. 4. Labs in AM. 5. Blood and Urine Cultures pending. Continue Cefepime and Vancomycin. 6. GI to see. 7. Palliative Care to see. 8. Would ask Dr. Jarrett Ruiz to see. 7. Plans per Dr. Arias Canales. Following.

## 2019-10-22 NOTE — PROGRESS NOTES
Transition of Care Plan TBD-- Hospice-- Patient family meeting with [employer]  to discuss plan Cm to assist with transportation when patient is discharged Cm met with patient two children  Eduardo Cooper (Kalpesh)  [phone] and Na Niño [phone] to discuss transition of care planning. Both agree with patient transitioning to hospice. They would like to talk with [facility] about options. Cm and family discussed-- home with hospice, Hospice House or LTC facility, (self pay)  with hospice. Daughter and son do not feel going home with hospice is an option as patient lives alone in her [facility] .there will be no one there with her-- daughter lives in [place] and son works full time and lives in [place]. 24 hour caregivers not an option from a financial standpoint. LTC facility with [facility] is an option as daughter says patient will be able to self pay-- They chose [address] [facility] [phone] as patient's twin sister lives very close by to the facility and can visit. Cm talked with Drew Veliz in admissions and she will have a room for patient and will negotiate the daily rate with daughter. Daughter and son interested in [facility] if this would be an option. Cm will follow and assist with transport when decision made where patient will go.

## 2019-10-22 NOTE — CONSULTS
[facility]. 
 [address] [place] [id number] GASTROENTEROLOGY CONSULTATION NOTE Zora Padilla, [address] [place] [address] Road office [phone] NP in-hospital cell phone M-F until 4:30 After 5pm or on weekends, please call  for physician on call NAME:  Stormy Hughes :   1940 MRN:   [id number] Referring Physician: Angy Hernandez Consult Date: 10/22/2019 9:59 AM 
 
Chief Complaint: pancreatic cancer, elevated LFT ?obstruction History of Present Illness:  Patient is a 70 y.o. who is seen in consultation at the request of Dr. Angy Hernandez for pancreatic cancer, elevated LFT ?obstruction. Medical history as listed below includes pancreatic cancer. She had palliative gastrojejunostomy 19. She presented for failure to thrive and weakness. She does not provide much information, denies complaints. No anticoagulation.  No alcohol or tobacco use.  History of cholecystectomy and hiatal hernia repair. EGD by Dr. René Petersen 19: LA grade C reflux esophagitis, small hiatal hernia, limited visualization of the fundus due to solid food, duodenum bilious liquid, stricture in the 2nd portion unable to pass scope (biopsy +adenocarcinoma) EGD/EUS (4/15/19) by Dr. Richard Muhammad for cyst of pancreas showed a septated cystic lesion of pancreatic body, FNA deferred given its relative decrease in size and history of pancreatitis; pancreatic parenchyma with diffuse heterogeneity and atrophy; non-dilated main pancreatid duct, although not well visualized in the region of the cyst. A repeat CT abdomen with pancreatic protocol was recommended in 3 months. EGD showed a normal esophagus; small hiatal hernia; normal duodenum to second portion, ampulla could not be visualized and likely hidden beneath a fold.    
Colonoscopy (17) by Dr. René Petersen for lower GI bleeding benign appearing colon polyps; moderate internal hemorrhoids as the likely cause of the bleeding.  Pathology showed tubular adenomas.  A repeat colonoscopy was recommended in 5 years. I have reviewed the emergency room note, hospital admission note, notes by all other clinicians who have seen the patient during this hospitalization to date. I have reviewed the problem list and the reason for this hospitalization. I have reviewed the allergies and the medications the patient was taking at home prior to this hospitalization. PMH: 
Past Medical History:  
Diagnosis Date  Coronary artery disease of native artery of native heart with stable angina pectoris ([facility] 2/28/2019  Cough due to ACE inhibitor  Diarrhea  Hematoma of rectus sheath 2/28/2019  IBS (irritable bowel syndrome) IBS  NSTEMI (non-ST elevated myocardial infarction) ([facility] 2/28/2019  Pancreatitis  Poor appetite  Pure hypercholesterolemia 2/28/2019  Systolic CHF, chronic ([facility] 2/28/2019 PSH: 
Past Surgical History:  
Procedure Laterality Date  COLONOSCOPY Left 11/1/2017 COLONOSCOPY performed by [facility] MD at [address]  HX CHOLECYSTECTOMY  HX GI    
 surgery for hiatal hernia  HX GI  09/27/2019 Gastrojejunostomy  HX ORTHOPAEDIC    
 DJD, doing PT weekly through [employer] [facility]  IR OCCL [facility]  1/30/2019 Allergies: Allergies Allergen Reactions  Penicillins Hives Has tolerated cefazolin, ceftriaxone and cefepime previously Home Medications: 
Prior to Admission Medications Prescriptions Last Dose Informant Patient Reported? Taking? LORazepam (ATIVAN) 0.5 mg tablet   No Yes Sig: Take 1 Tab by mouth nightly as needed for Anxiety. Max Daily Amount: 0.5 mg.  
acetaminophen (TYLENOL) 325 mg tablet   Yes Yes Sig: Take 650 mg by mouth every six (6) hours as needed. bumetanide (BUMEX) 1 mg tablet 10/21/2019 at Unknown time  Yes Yes Sig: Take 1 mg by mouth daily. carvedilol (COREG) 6.25 mg tablet 10/21/2019 at Unknown time  No Yes Sig: Take 1 Tab by mouth two (2) times daily (with meals). enoxaparin (LOVENOX) 40 mg/0.4 mL 10/21/2019 at Unknown time  No Yes Si.4 mL by SubCUTAneous route every twenty-four (24) hours for 30 days. guaiFENesin ER (MUCINEX) 600 mg ER tablet   Yes Yes Sig: Take 600 mg by mouth two (2) times daily as needed for Congestion. melatonin 3 mg tablet   No Yes Sig: Take 1 Tab by mouth nightly as needed for Other (Sleep). nitroglycerin (NITROSTAT) 0.4 mg SL tablet   No Yes Si Tab by SubLINGual route every five (5) minutes as needed for Chest Pain. Up to 3 doses. pantoprazole (PROTONIX) 40 mg tablet 10/21/2019 at Unknown time  No Yes Sig: Take 1 Tab by mouth two (2) times a day. polyethylene glycol (MIRALAX) 17 gram packet   Yes Yes Sig: Take 17 g by mouth daily as needed. rosuvastatin (CRESTOR) 10 mg tablet 10/20/2019 at Unknown time  No Yes Sig: Take 1 Tab by mouth nightly. Facility-Administered Medications: None Hospital Medications: 
Current Facility-Administered Medications Medication Dose Route Frequency  enoxaparin (LOVENOX) injection 40 mg  40 mg SubCUTAneous Q24H  cefepime (MAXIPIME) 2 g in 0.9% sodium chloride (MBP/ADV) 100 mL  2 g IntraVENous Q12H  potassium chloride 10 mEq in 50 ml IVPB  10 mEq IntraVENous Q1H  
 LORazepam (ATIVAN) tablet 0.5 mg  0.5 mg Oral QHS PRN  
 guaiFENesin ER (MUCINEX) tablet 600 mg  600 mg Oral BID PRN  
 melatonin tablet 3 mg  3 mg Oral QHS PRN  
 nitroglycerin (NITROSTAT) tablet 0.4 mg  0.4 mg SubLINGual Q5MIN PRN  pantoprazole (PROTONIX) tablet 40 mg  40 mg Oral BID  rosuvastatin (CRESTOR) tablet 10 mg  10 mg Oral QHS  aspirin chewable tablet 81 mg  81 mg Oral DAILY  sodium chloride (NS) flush 5-40 mL  5-40 mL IntraVENous Q8H  
 sodium chloride (NS) flush 5-40 mL  5-40 mL IntraVENous PRN  
  ondansetron (ZOFRAN) injection 4 mg  4 mg IntraVENous Q4H PRN  
 acetaminophen (TYLENOL) tablet 650 mg  650 mg Oral Q4H PRN  
 0.9% sodium chloride with KCl 20 mEq/L infusion   IntraVENous CONTINUOUS  Vancomycin - pharmacy to dose   Other Rx Dosing/Monitoring  influenza vaccine 2019-20 (6 mos+)(PF) (FLUARIX/FLULAVAL/FLUZONE QUAD) injection 0.5 mL  0.5 mL IntraMUSCular PRIOR TO DISCHARGE Social History: 
Social History Tobacco Use  Smoking status: Former Smoker  Smokeless tobacco: Never Used  Tobacco comment: quit smoking cigarettes 6 yrs ago Substance Use Topics  Alcohol use: Yes Comment: very occasional  
 
 
Family History: 
Family History Problem Relation Age of Onset  Dementia Mother   
     alzheimers  Cancer Sister   
     gallbladder  Cancer Brother   
     pancreatic  Breast Cancer Paternal Grandmother  Dementia Sister   
     alzheimers  Heart Surgery Brother   
     bypass Review of Systems: 
Constitutional: negative fever, negative chills, negative weight loss Eyes:   negative visual changes ENT:   negative sore throat, tongue or lip swelling Respiratory:  negative cough, negative dyspnea Cards:  negative for chest pain, palpitations, lower extremity edema GI:   See HPI 
:  negative for frequency, dysuria Integument:  negative for rash and pruritus Heme:  +for easy bruising and gum/nose bleeding Musculoskeletal:negative for myalgias, back pain and muscle weakness Neuro:    negative for headaches, dizziness, vertigo Psych:  negative for feelings of anxiety, depression Objective:  
 
Patient Vitals for the past 8 hrs: 
 BP Temp Pulse Resp SpO2 Weight 10/22/19 0830     97 %   
10/22/19 0807 120/58 99 °F (37.2 °C) 88 21 96 %   
10/22/19 0307 119/52 99.3 °F (37.4 °C) 86 23 95 % 56.9 kg (125 lb 8 oz) No intake/output data recorded. 10/20 1901 - 10/22 0700 In: 591.7 [I.V.:591.7] Out: - EXAM:   
 CONST:  Pleasant lady lying in bed, no acute distress NEURO:  alert and oriented x 2 HEENT: EOMI, no scleral icterus LUNGS: Coarse RUL, (-) wheeze CARD:  S1 S2 
 ABD:  soft, no tenderness, no rebound, bowel sounds (+) all 4 quadrants, no masses, non distended EXT:  no edema, warm PSYCH: full, not anxious Data Review Recent Labs 10/22/19 
0253 10/21/19 
1249 WBC 14.7* 20.1* HGB 9.8* 12.8 HCT 31.8* 40.6  318 Recent Labs 10/22/19 
0253 10/21/19 
1249  132* K 3.0* 3.1*  
 89* CO2 28 34* BUN 31* 44* CREA 1.23* 2.32* * 176* PHOS  --  3.0  
CA 8.6 10.3* Recent Labs 10/22/19 
0253 10/21/19 
1249 SGOT 62* 76* * 942* TP 5.8* 8.3* ALB 1.9* 2.8*  
GLOB 3.9 5.5* LPSE  --  >3,000* No results for input(s): INR, PTP, APTT, INREXT in the last 72 hours. IMPRESSION: 
1. Possible increasing fluid collection or mass in the pancreatic body. 2. Diffuse hepatic metastatic deposits, stable to slightly increased since the 
prior study allowing for differences in technique. 3. Pulmonary nodules (5.5 mm largest) for which follow-up CT could be considered 
for surveillance. 4. Stable dilatation of the duodenum. 5. Persistent but significantly decreased incisional fluid collection in the 
anterior abdominal wall. 6. Stable pelvic fluid collection deep to the anterior pelvic wall. Assessment:  
 
· Elevated LFT's: t bili 0.9, alk phos 629, AST 62, ALT 61 · Leukocytosis · Pancreatic adenocarcinoma with liver and lung metastasis status post palliative gastrojejunostomy for duodenal obstruction: lipase >3,000 Patient Active Problem List  
Diagnosis Code  Systolic CHF, chronic (HCC) I50.22  
 NSTEMI (non-ST elevated myocardial infarction) (HCC) I21.4  Coronary artery disease of native artery of native heart with stable angina pectoris (HCC) I25.118  
 Pure hypercholesterolemia E78.00  
  Cough due to ACE inhibitor R05, T46.4X5A  Acute pancreatitis K85.90  
 TOÑA (acute kidney injury) ([facility] N17.9  Hypokalemia E87.6  GI bleed K92.2  Coffee ground emesis K92.0  Hypotension I95.9  Hypovolemia E86.1  Malignant neoplasm of head of pancreas ([facility] C25.0  Cholangitis K83.09 Plan: · Monitor LFT's · Supportive care - IVF · Agree with palliative consult · Patient discussed with and will be seen by Dr. Ankit Felder · Thank you for allowing me to participate in care of Suresh Valenzuela Signed By: Levy Dancer, NP   
 10/22/2019  9:59 AM 
  
 
Gastroenterology Attending Physician attestation statement and comments. This patient was seen and examined by me in a face-to-face visit today. I reviewed the medical record including lab work, imaging and other provider notes. I confirmed the history as described above. I spoke to the patient, reviewed the medical record including lab work, imaging and other provider notes. I discussed this case in detail with Juvenal JEFFERS. I formulated an  assessment of this patient and developed a treatment plan. I agree with the above consultation note. I agree with the history, exam and assessment and plan as outlined in the note. I would like to add the following:  
 
Abd: normoactive BS, mild distention, generalized tenderness, no rebound/guarding. Pt is hospice currently. She/family do not wish to have invasive procedures. Will sign off. Please call with any questions. Dr. Ankit Felder

## 2019-10-22 NOTE — PROGRESS NOTES
Lovenox Monitoring Indication: DVT Prophylaxis Recent Labs 10/22/19 
0253 10/21/19 
1249 HGB 9.8* 12.8  318 CREA 1.23* 2.32* Current Weight: 56.9 kg 
Est. CrCl = 33.1 ml/min Current Dose: 30 mg subcutaneously every 24 hours. Plan: Change to 40 mg subcutaneously every 24 hours  per [facility] P&T Committee Protocol with respect to renal function. Pharmacy will continue to monitor patient daily and will make dosage adjustments based upon changing renal function.  
 
Anders Black, PharmD, BCPS

## 2019-10-22 NOTE — PROGRESS NOTES
Problem: Falls - Risk of 
Goal: *Absence of Falls Description Document Hannah Lance Fall Risk and appropriate interventions in the flowsheet. Outcome: Progressing Towards Goal 
Note:  
Fall Risk Interventions: 
Mobility Interventions: Communicate number of staff needed for ambulation/transfer, Patient to call before getting OOB Medication Interventions: Evaluate medications/consider consulting pharmacy, Patient to call before getting OOB, Teach patient to arise slowly History of Falls Interventions: Evaluate medications/consider consulting pharmacy, Investigate reason for fall Pt remains free of falls during admission. Call bell and frequently used items within reach. Bedside table within reach. Patient provided non skid socks and instructed to call out for nurse when in need of assistance. Problem: Pain Goal: *Control of Pain Outcome: Progressing Towards Goal 
Note:  
Patient taking tylenol for pain. She denied pain medication for a pain level of 4, she just wanted to go back to sleep. Last 3 Recorded Weights in this Encounter 10/21/19 7623 10/22/19 2006 Weight: 53.1 kg (117 lb) 56.9 kg (125 lb 8 oz) Bedside and Verbal shift change report given to Terri (oncoming nurse) by Luis Easley (offgoing nurse). Report included the following information SBAR, Kardex and Quality Measures.

## 2019-10-22 NOTE — PROGRESS NOTES
Bedside shift change report given to Yani Ontiveros (oncoming nurse) by Jasmeet (offgoing nurse). Report included the following information SBAR, Kardex, Procedure Summary, MAR and Recent ResultsPrimary Nurse Amado Matthews RN and Jessica Abrams RN performed a dual skin assessment on this patient Impairment noted- see wound doc flow sheet Pt has bruises all over,old incision to abdomen with sutures and bruises and abrasion and redness saccrum area.

## 2019-10-22 NOTE — CONSULTS
Palliative Medicine Consult Jose A: [phone] ([id number]) Patient Name: Yomi Brandt YOB: 1940 Date of Initial Consult: 10/22 Reason for Consult: Overwhelming symptoms Requesting Provider: Dr. Karen Morrow Primary Care Physician: Nanda Davis MD 
 
 SUMMARY:  
Yomi Brandt is a 70 y.o. with a past history of chronic systolic heart failure (need for inotropes Jan 2019, EF normalized on nuclear scan in March), hyperlipidemia, h/o pancreatitis who was recently found to have an obstructing mass abutting the duodenum and encasing the SMA (admission 9/14-10/10). Mass was biopsied via EGD on 9/17 confirming adenocarcinoma. She underwent gastrojejunostomy to relieve the obstruction. She was discharged to [facility] and was readmitted on 10/21/2019 after being referred from general surgery office where she was being seen for follow up. At her office visit she was found to be hypotensive, weak and with poor appetite. CT imaging on admission shows again extensive liver mets and now also small nodules at the base of the lungs concerning for lung mets. There is also mention of potential increase in size of mass in the pancreatic body though it isn't clear. Current medical issues leading to Palliative Medicine involvement include: stage IV pancreatic adenocarcinoma with limited life expectancy, associated cancer related symptoms. She has an AMD on file which names her daughter Jackie Escobar as primary mPOA and son José Luis Rivera as secondary. Living will directs for no life prolonging measures at the end of life. Social Hx:  Per chart review- , two children, Jackie Escobar in [place] and Ron in [place]. She lived independently in her own home, independent in all ADLs prior to hospitalization 9/14. PALLIATIVE DIAGNOSES:  
1. Shortness of breath 2. Generalized weakness 3. Debility 4. Cancer related anorexia and cachexia  PLAN:  
 1. Met with patient in IMCU alongside chaplain Valdez Plan. No family at bedside. 2. Introduced selves and role of palliative care team, pt amenable to visit. 3. She was markedly short of breath, tachypneic to the mid 20s, despite normal oxygen saturation on RA. This limited our ability to talk in depth. She also c/o significant fatigue and weakness. 4. Psychosocial:  She confirmed her reliance on her children for support and ok with me reaching out to her daughter Tammi Shaefr to talk more. Endorses a strong joo and reliance on Saleem as her strength. Appreciative of prayer as directed by  Amy. 5. Goals of care: Unclear at this juncture. Given her poor performance status I do not anticipate she will be a candidate for cancer directed therapy (chemo). Dr. Alexander Van was following her in house last admission, she has not established care outpatient. She is very symptomatic and therefore children will need to be involved for further discussions. 6. Reached out to dtr Tammi Shafer- made plan to meet at 1:00 pm today. 7. Initial consult note routed to primary continuity provider and/or primary health care team members 8. Communicated plan of care with: Palliative IDT, [facility] Team 
 
 GOALS OF CARE / TREATMENT PREFERENCES:  
 
GOALS OF CARE: 
Patient/Health Care Proxy Stated Goals: Other (comment)(pending further conversation) TREATMENT PREFERENCES:  
Code Status: Partial Code Advance Care Planning: 
[x] The [facility] Interdisciplinary Team has updated the ACP Navigator with [place] and Patient Capacity Primary Decision Maker (Active): Harjeetcortney David - Daughter - [phone] Secondary Decision Maker: Devorah Moore - Son - [phone] Advance Care Planning 10/21/2019 Patient's Healthcare Decision Maker is: Legal Next of Kin Confirm Advance Directive None Medical Interventions: Limited additional interventions Other: As far as possible, the palliative care team has discussed with patient / health care proxy about goals of care / treatment preferences for patient. HISTORY:  
 
History obtained from:  Patient, dtr, chart review CHIEF COMPLAINT: shortness of breath HPI/SUBJECTIVE: The patient is:  
[x] Verbal and participatory  - limited due to medical condition [] Non-participatory due to:  
 
Pt c/o severe dyspnea, present definitely since yesterday. Her history is limited. She c/o not wanting to eat when food presented, no nausea at present. Clinical Pain Assessment (nonverbal scale for severity on nonverbal patients):  
Clinical Pain Assessment Severity: 0 Duration: for how long has pt been experiencing pain (e.g., 2 days, 1 month, years) Frequency: how often pain is an issue (e.g., several times per day, once every few days, constant) FUNCTIONAL ASSESSMENT:  
 
Palliative Performance Scale (PPS): PPS: 30 
 
 
 PSYCHOSOCIAL/SPIRITUAL SCREENING:  
 
Palliative IDT has assessed this patient for cultural preferences / practices and a referral made as appropriate to needs (Cultural Services, Patient Advocacy, Ethics, etc.) Any spiritual / Latter day concerns: 
[] Yes /  [x] No 
 
Caregiver Burnout: 
[] Yes /  [x] No /  [] No Caregiver Present Anticipatory grief assessment:  
[x] Normal  / [] Maladaptive ESAS Anxiety: Anxiety: 5 ESAS Depression:    
 
 
 REVIEW OF SYSTEMS:  
 
Positive and pertinent negative findings in ROS are noted above in HPI. The following systems were [x] reviewed / [] unable to be reviewed as noted in HPI Other findings are noted below. Systems: constitutional, ears/nose/mouth/throat, respiratory, gastrointestinal, genitourinary, musculoskeletal, integumentary, neurologic, psychiatric, endocrine. Positive findings noted below. Modified ESAS Completed by: provider Fatigue: 8 Pain: 0 Anxiety: 5 Nausea: 5(when food presented)   Dyspnea: 10  
 Constipation: No  
     
 
 
 PHYSICAL EXAM:  
 
From RN flowsheet: 
Wt Readings from Last 3 Encounters:  
10/22/19 56.9 kg (125 lb 8 oz) 10/21/19 50.5 kg (111 lb 6.4 oz) 10/10/19 59 kg (130 lb 1.1 oz) Blood pressure 120/58, pulse 88, temperature 99 °F (37.2 °C), resp. rate 21, weight 56.9 kg (125 lb 8 oz), SpO2 97 %, not currently breastfeeding. Pain Scale 1: Numeric (0 - 10) Pain Intensity 1: 4(refused pain medication, she wanted to go to sleep) Pain Location 1: Abdomen Pain Description 1: Sore Pain Intervention(s) 1: Medication (see MAR) Last bowel movement, if known:  
 
Ill appearing  female Sclerae anicteric Tachypneic to mid 20s, bs clear anteriorly, on room air 
RRR. No peripheral edema. Abd soft, ntnd, bs active Alert, oriented to self, too symptomatic to perform in depth neuro exam, no tremor. Moving all 4. Flat affect, slightly tearful with prayer, no restlessness or agitation. HISTORY:  
 
Active Problems: 
  Acute pancreatitis (9/14/2019) Hypotension (10/21/2019) Hypovolemia (10/21/2019) Malignant neoplasm of head of pancreas ([address] (10/21/2019) Cholangitis (10/21/2019) Past Medical History:  
Diagnosis Date  Coronary artery disease of native artery of native heart with stable angina pectoris ([address] 2/28/2019  Cough due to ACE inhibitor  Diarrhea  Hematoma of rectus sheath 2/28/2019  IBS (irritable bowel syndrome) IBS  NSTEMI (non-ST elevated myocardial infarction) ([address] 2/28/2019  Pancreatitis  Poor appetite  Pure hypercholesterolemia 2/28/2019  Systolic CHF, chronic ([address] 2/28/2019 Past Surgical History:  
Procedure Laterality Date  COLONOSCOPY Left 11/1/2017 COLONOSCOPY performed by Kwasi Ardon MD at [address]  HX CHOLECYSTECTOMY  HX GI    
 surgery for hiatal hernia  HX GI  09/27/2019 Gastrojejunostomy  HX ORTHOPAEDIC    
 DEYANIRA, doing PT weekly through [id number]  IR [facility]  1/30/2019 Family History Problem Relation Age of Onset  Dementia Mother   
     alzheimers  Cancer Sister   
     gallbladder  Cancer Brother   
     pancreatic  Breast Cancer Paternal Grandmother  Dementia Sister   
     alzheimers  Heart Surgery Brother   
     bypass History reviewed, no pertinent family history. Social History Tobacco Use  Smoking status: Former Smoker  Smokeless tobacco: Never Used  Tobacco comment: quit smoking cigarettes 6 yrs ago Substance Use Topics  Alcohol use: Yes Comment: very occasional  
 
Allergies Allergen Reactions  Penicillins Hives Has tolerated cefazolin, ceftriaxone and cefepime previously Current Facility-Administered Medications Medication Dose Route Frequency  enoxaparin (LOVENOX) injection 40 mg  40 mg SubCUTAneous Q24H  cefepime (MAXIPIME) 2 g in 0.9% sodium chloride (MBP/ADV) 100 mL  2 g IntraVENous Q12H  potassium chloride 10 mEq in 50 ml IVPB  10 mEq IntraVENous Q1H  
 LORazepam (ATIVAN) tablet 0.5 mg  0.5 mg Oral QHS PRN  
 guaiFENesin ER (MUCINEX) tablet 600 mg  600 mg Oral BID PRN  
 melatonin tablet 3 mg  3 mg Oral QHS PRN  
 nitroglycerin (NITROSTAT) tablet 0.4 mg  0.4 mg SubLINGual Q5MIN PRN  pantoprazole (PROTONIX) tablet 40 mg  40 mg Oral BID  rosuvastatin (CRESTOR) tablet 10 mg  10 mg Oral QHS  aspirin chewable tablet 81 mg  81 mg Oral DAILY  sodium chloride (NS) flush 5-40 mL  5-40 mL IntraVENous Q8H  
 sodium chloride (NS) flush 5-40 mL  5-40 mL IntraVENous PRN  
 ondansetron (ZOFRAN) injection 4 mg  4 mg IntraVENous Q4H PRN  
 acetaminophen (TYLENOL) tablet 650 mg  650 mg Oral Q4H PRN  
 0.9% sodium chloride with KCl 20 mEq/L infusion   IntraVENous CONTINUOUS  Vancomycin - pharmacy to dose   Other Rx Dosing/Monitoring  influenza vaccine 2019-20 (6 mos+)(PF) (FLUARIX/FLULAVAL/FLUZONE QUAD) injection 0.5 mL  0.5 mL IntraMUSCular PRIOR TO DISCHARGE  
 
 
 
 LAB AND IMAGING FINDINGS:  
 
Lab Results Component Value Date/Time WBC 14.7 (H) 10/22/2019 02:53 AM  
 HGB 9.8 (L) 10/22/2019 02:53 AM  
 PLATELET 055 98/72/8739 02:53 AM  
 
Lab Results Component Value Date/Time Sodium 141 10/22/2019 02:53 AM  
 Potassium 3.0 (L) 10/22/2019 02:53 AM  
 Chloride 106 10/22/2019 02:53 AM  
 CO2 28 10/22/2019 02:53 AM  
 BUN 31 (H) 10/22/2019 02:53 AM  
 Creatinine 1.23 (H) 10/22/2019 02:53 AM  
 Calcium 8.6 10/22/2019 02:53 AM  
 Magnesium 3.0 (H) 10/21/2019 12:49 PM  
 Phosphorus 3.0 10/21/2019 12:49 PM  
  
Lab Results Component Value Date/Time AST (SGOT) 62 (H) 10/22/2019 02:53 AM  
 Alk. phosphatase 629 (H) 10/22/2019 02:53 AM  
 Protein, total 5.8 (L) 10/22/2019 02:53 AM  
 Albumin 1.9 (L) 10/22/2019 02:53 AM  
 Globulin 3.9 10/22/2019 02:53 AM  
 
Lab Results Component Value Date/Time INR 1.0 09/29/2019 07:54 PM  
 Prothrombin time 10.6 09/29/2019 07:54 PM  
 aPTT 23.5 10/06/2019 12:58 AM  
  
Lab Results Component Value Date/Time Iron 61 09/14/2019 01:57 PM  
 TIBC 344 09/14/2019 01:57 PM  
 Iron % saturation 18 (L) 09/14/2019 01:57 PM  
  
No results found for: PH, PCO2, PO2 No components found for: [place] No results found for: CPK, CKMB Total time:  
Counseling / coordination time, spent as noted above:  
> 50% counseling / coordination?:  
 
Prolonged service was provided for  []30 min   []75 min in face to face time in the presence of the patient, spent as noted above. Time Start:  
Time End:  
Note: this can only be billed with 20405 (initial) or 70492 (follow up). If multiple start / stop times, list each separately.

## 2019-10-22 NOTE — PROGRESS NOTES
Renal Dosing/Monitoring Medication: Cefepime Current regimen:  1 gram IV every 24 hr 
Recent Labs 10/22/19 
0253 10/21/19 
1249 CREA 1.23* 2.32* BUN 31* 44* Estimated CrCl:  33.1 ml/min Plan: Change to 2 grams IV Q 12 hours with respect to renal function per P&T protocol. Pharmacy will continue to monitor the patient daily and make changes as needed.   
 
Tae Sams, PharmD, BCPS

## 2019-10-22 NOTE — PROGRESS NOTES
TRANSFER - OUT REPORT: 
 
Verbal report given to Estefanía(name) on Mathieu Gimenez  being transferred to (unit) for routine progression of care Report consisted of patients Situation, Background, Assessment and  
Recommendations(SBAR). Information from the following report(s) SBAR, Kardex, Procedure Summary, MAR and Recent Results was reviewed with the receiving nurse. Lines:  
Peripheral IV 10/21/19 Left Antecubital (Active) Site Assessment Intact 10/22/2019  4:15 PM  
Phlebitis Assessment 0 10/22/2019  4:15 PM  
Infiltration Assessment 0 10/22/2019  4:15 PM  
Dressing Status Clean, dry, & intact 10/22/2019  4:15 PM  
Dressing Type Transparent 10/22/2019  4:15 PM  
Hub Color/Line Status Pink 10/22/2019  4:15 PM  
Action Taken Open ports on tubing capped 10/22/2019  4:15 PM  
Alcohol Cap Used Yes 10/22/2019  4:15 PM  
   
Peripheral IV 10/22/19 Right Arm (Active) Opportunity for questions and clarification was provided. Patient transported with: 
 baseclick

## 2019-10-22 NOTE — PROGRESS NOTES
On and off pain noted Problem: Falls - Risk of 
Goal: *Absence of Falls Description Document Rebbecca Persons Fall Risk and appropriate interventions in the flowsheet. Outcome: Progressing Towards Goal 
Note:  
Fall Risk Interventions: 
Mobility Interventions: Communicate number of staff needed for ambulation/transfer, Patient to call before getting OOB Medication Interventions: Evaluate medications/consider consulting pharmacy, Patient to call before getting OOB, Teach patient to arise slowly History of Falls Interventions: Evaluate medications/consider consulting pharmacy, Investigate reason for fall Problem: Patient Education: Go to Patient Education Activity Goal: Patient/Family Education Outcome: Progressing Towards Goal 
  
Problem: Pressure Injury - Risk of 
Goal: *Prevention of pressure injury Description Document Travis Scale and appropriate interventions in the flowsheet. Outcome: Progressing Towards Goal 
Note:  
Pressure Injury Interventions: Activity Interventions: Pressure redistribution bed/mattress(bed type) Mobility Interventions: HOB 30 degrees or less, Pressure redistribution bed/mattress (bed type) Nutrition Interventions: Document food/fluid/supplement intake Problem: Pain Goal: *Control of Pain Outcome: Progressing Towards Goal

## 2019-10-23 NOTE — DISCHARGE SUMMARY
Discharge Summary PATIENT ID: Kristel Dolan MRN: [id number] YOB: 1940 DATE OF ADMISSION: 10/21/2019 12:41 PM   
DATE OF DISCHARGE: 10/23/19 PRIMARY CARE PROVIDER: Verito Rodriguez MD  
 
 
DISCHARGING PROVIDER: Bert Goetz MD   
To contact this individual call [phone] and ask the  to page. If unavailable ask to be transferred the Adult Hospitalist Department. CONSULTATIONS: IP CONSULT TO HOSPITALIST 
IP CONSULT TO PALLIATIVE CARE - PROVIDER 
IP CONSULT TO GASTROENTEROLOGY 
IP CONSULT TO GENERAL SURGERY 
 
 
PROCEDURES/SURGERIES: * No surgery found * IMAGING Xr Abd (kub) Result Date: 10/21/2019 IMPRESSION: Non-obstructive bowel gas pattern. .  
 
Ct Abd Pelv Wo Cont Result Date: 10/21/2019 IMPRESSION: 1. Possible increasing fluid collection or mass in the pancreatic body. 2. Diffuse hepatic metastatic deposits, stable to slightly increased since the prior study allowing for differences in technique. 3. Pulmonary nodules (5.5 mm largest) for which follow-up CT could be considered for surveillance. 4. Stable dilatation of the duodenum. 5. Persistent but significantly decreased incisional fluid collection in the anterior abdominal wall. 6. Stable pelvic fluid collection deep to the anterior pelvic wall. Xr Chest [facility] Result Date: 10/21/2019 IMPRESSION: No acute cardiopulmonary disease radiographically. .  . [id number] [facility] COURSE: # Hypotension and lactic acidosis -suspected hypovolemia due to decreased oral intake and failure to thrive from pancreatic cancer r/o sepsis - s/p IV bolus - ct with IV hydration  
- follow up blood culture and urine culture , check UA 
- chest x-ray and KUB unremarkable  
- received Cefepime and Vancomycin 
  
  
# Acute pancreatitis  
- kept NPO   
- received aggressive hydration and pain management  
  
 # Pancreatic cancer with liver and lung metastasis - s/p recent palliative gastro jejunostomy - LFT and Bilirubin higher than before indicating Obstructive Chol angiopathy, Other possibility is cholangitis - Continue with ABX  
- GI and surgery input appt - palliative consulted and family wanted hospice - Patient accepted at hospice care   
  
  
# Failure to thrive  - due to underlying cancer status  
  
# H.O NSTEMI/CAD- Continue Asa and statin. Hold BB as she is severely Hypotensive.  
  
# TOÑA on admission - appears pre-renal  
- cr better after hydration - Avoid nephrotoxins and renal dose medications.  
  
# Hypokalemia - replete as needed # Hypercalcemia- likely from underlying malignancy  
- improved, after hydration  
  
# Chronic anemia due to chronic disease  
- false elevation from hemoconcentration from volume depletion  
- at baseline   
  
 
 
 
DISCHARGE DIAGNOSES / PLAN:   
# Hypotension and lactic acidosis -suspected hypovolemia due to decreased oral intake and failure to thrive from pancreatic cancer # Acute pancreatitis   
# Pancreatic cancer with liver and lung metastasis - s/p recent palliative gastro jejunostomy # Failure to thrive   
# H.O NSTEMI/CAD # TOÑA on admission # Hypokalemia # Hypercalcemia # Chronic anemia due to chronic disease Patient discharged to hospice care  
  
Patient Active Problem List  
Diagnosis Code  Systolic CHF, chronic (HCC) I50.22  
 NSTEMI (non-ST elevated myocardial infarction) (HCC) I21.4  Coronary artery disease of native artery of native heart with stable angina pectoris (HCC) I25.118  
 Pure hypercholesterolemia E78.00  Cough due to ACE inhibitor R05, T46.4X5A  Acute pancreatitis K85.90  
 TOÑA (acute kidney injury) ([facility] [address] N17.9  Hypokalemia E87.6  GI bleed K92.2  Coffee ground emesis K92.0  Hypotension I95.9  Hypovolemia E86.1  Malignant neoplasm of head of pancreas ([address] C25.0  Cholangitis K83.09  
 
 PENDING TEST RESULTS:  
At the time of discharge the following test results are still pending: None FOLLOW UP APPOINTMENTS:   
Follow-up Information None ADDITIONAL CARE RECOMMENDATIONS:  
· It is important that you take the medication exactly as they are prescribed. · Keep your medication in the bottles provided by the pharmacist and keep a list of the medication names, dosages, and times to be taken in your wallet. · Do not take other medications without consulting your doctor. · No drinking alcohol or driving car or operating machinery if you are on narcotic pain medications. Donot take sedating mediations if you are sleepy or confused. · Fall Precautions · Keep Well Hydrated · Report to your medical provider if you feel you have  developed allergies to medications · Follow up with your PCP or Consultant for medication adjustments and refills · Monitor for signs of fevers,chills,bleeding,chest pain and seek medical attention if you do so. DIET: As tolerated ACTIVITY: As tolerated WOUND CARE: None EQUIPMENT needed: None DISCHARGE MEDICATIONS: 
Current Discharge Medication List  
  
START taking these medications Details  
senna-docusate (PERICOLACE) 8.6-50 mg per tablet Take 1 Tab by mouth two (2) times a day. Qty: 30 Tab, Refills: 1 CONTINUE these medications which have NOT CHANGED Details  
acetaminophen (TYLENOL) 325 mg tablet Take 650 mg by mouth every six (6) hours as needed. LORazepam (ATIVAN) 0.5 mg tablet Take 1 Tab by mouth nightly as needed for Anxiety. Max Daily Amount: 0.5 mg. 
Qty: 10 Tab, Refills: 0 Associated Diagnoses: Pancreatic cancer metastasized to liver [facility]); Anxiety  
  
melatonin 3 mg tablet Take 1 Tab by mouth nightly as needed for Other (Sleep). Qty: 30 Tab, Refills: 1  
  
polyethylene glycol (MIRALAX) 17 gram packet Take 17 g by mouth daily as needed. carvedilol (COREG) 6.25 mg tablet Take 1 Tab by mouth two (2) times daily (with meals). Qty: 180 Tab, Refills: 3 Associated Diagnoses: Chronic systolic congestive heart failure ([address] bumetanide (BUMEX) 1 mg tablet Take 1 mg by mouth daily. pantoprazole (PROTONIX) 40 mg tablet Take 1 Tab by mouth two (2) times a day. Qty: 60 Tab, Refills: 0 STOP taking these medications  
  
 enoxaparin (LOVENOX) 40 mg/0.4 mL Comments:  
Reason for Stopping:   
   
 guaiFENesin ER (MUCINEX) 600 mg ER tablet Comments:  
Reason for Stopping:   
   
 rosuvastatin (CRESTOR) 10 mg tablet Comments:  
Reason for Stopping:   
   
 nitroglycerin (NITROSTAT) 0.4 mg SL tablet Comments:  
Reason for Stopping:   
   
  
 
 
All Micro Results Procedure Component Value Units Date/Time CULTURE, BLOOD [[id number]] Collected:  10/21/19 1249 Order Status:  Completed Specimen:  Blood Updated:  10/23/19 7297 Special Requests: NO SPECIAL REQUESTS Culture result: NO GROWTH 2 DAYS     
 CULTURE, BLOOD [[id number]] Collected:  10/21/19 1335 Order Status:  Completed Specimen:  Blood Updated:  10/23/19 8647 Special Requests: NO SPECIAL REQUESTS Culture result: NO GROWTH 2 DAYS     
 CULTURE, URINE [[id number]] Collected:  10/22/19 0932 Order Status:  Completed Updated:  10/22/19 1429 CULTURE, URINE [[id number]] Collected:  10/21/19 1256 Order Status:  Completed Specimen:  Cath Urine Updated:  10/22/19 1415 Special Requests: NO SPECIAL REQUESTS [place] Count --     
  8933 COLONIES/mL Culture result:    
  MIXED UROGENITAL CHARLES ISOLATED (INCLUDING 2 DIFFERENT COLONY TYPES COAGULASE NEGATIVE STAPHYLOCOCCUS) No results found for this or any previous visit (from the past 24 hour(s)). NOTIFY YOUR PHYSICIAN FOR ANY OF THE FOLLOWING:  
Fever over 101 degrees for 24 hours.   
Chest pain, shortness of breath, fever, chills, nausea, vomiting, diarrhea, change in mentation, falling, weakness, bleeding. Severe pain or pain not relieved by medications. Or, any other signs or symptoms that you may have questions about. DISPOSITION: 
  Home With: 
 OT  PT  HH  RN  
  
 Long term SNF/Inpatient Rehab Independent/assisted living  
x Hospice Other:  
 
 
PATIENT CONDITION AT DISCHARGE:  
 
Functional status Poor   
x Deconditioned Independent Cognition Elsa Young Forgetful Dementia Catheters/lines (plus indication) x Joyce PICC   
 PEG None Code status Full code   
x DNR PHYSICAL EXAMINATION AT DISCHARGE: 
Gen:  Moderate discomfort HEENT:  Normal cephalic atraumatic, extra-occular movements are intact. Neck:  Supple, No JVD Lungs:  Clear bilaterally, no wheeze, no rales, normal effort Heart:  Regular Rate and Rhythm, normal S1 and S2, no edema Abdomen:  Abdominal tenderness at the epigastric area Extremities:  Well perfused, no cyanosis or edema Neurological:  Awake and alert Skin:  No rashes or moles CHRONIC MEDICAL DIAGNOSES: 
Problem List as of 10/23/2019 Date Reviewed: 10/22/2019 Codes Class Noted - Resolved Hypotension ICD-10-CM: I95.9 ICD-9-CM: 458.9  10/21/2019 - Present Hypovolemia ICD-10-CM: E86.1 ICD-9-CM: 276.52  10/21/2019 - Present Malignant neoplasm of head of pancreas ([facility] ICD-10-CM: C25.0 ICD-9-CM: 157.0  10/21/2019 - Present Cholangitis ICD-10-CM: K83.09 
ICD-9-CM: 576.1  10/21/2019 - Present Acute pancreatitis ICD-10-CM: K85.90 ICD-9-CM: 628.5  9/14/2019 - Present TOÑA (acute kidney injury) ([facility] ICD-10-CM: N17.9 ICD-9-CM: 584.9  9/14/2019 - Present Hypokalemia ICD-10-CM: E87.6 ICD-9-CM: 276.8  9/14/2019 - Present GI bleed ICD-10-CM: K92.2 ICD-9-CM: 578.9  9/14/2019 - Present Coffee ground emesis ICD-10-CM: K92.0 ICD-9-CM: 578.0  9/14/2019 - Present Cough due to ACE inhibitor ICD-10-CM: R05, T46.4X5A 
ICD-9-CM: 786.2, E942.6  5/12/2019 - Present Systolic CHF, chronic (HCC) ICD-10-CM: I50.22 ICD-9-CM: 428.22, 428.0  2/28/2019 - Present NSTEMI (non-ST elevated myocardial infarction) ([facility] [address] ICD-10-CM: I21.4 ICD-9-CM: 410.70  2/28/2019 - Present Coronary artery disease of native artery of native heart with stable angina pectoris [facility]) ICD-10-CM: I25.118 
ICD-9-CM: 414.01, 413.9  2/28/2019 - Present Pure hypercholesterolemia ICD-10-CM: E78.00 ICD-9-CM: 272.0  2/28/2019 - Present RESOLVED: Hematoma of rectus sheath ICD-10-CM: S30. Reida Cristhian ICD-9-CM: 922.2  2/28/2019 - 10/21/2019 RESOLVED: Pancreatitis ICD-10-CM: K85.90 ICD-9-CM: 794.5  1/15/2019 - 5/12/2019 Thank you Nancy Cox MD for taking care of your patient, Please call with any questions. Greater than 35 minutes were spent with the patient on counseling and coordination of care Signed:  
Lelia Cooper MD 
10/23/2019 
2:57 PM

## 2019-10-23 NOTE — PROGRESS NOTES
200 Notified Dr. Sebastian Malik that pt was bladder scanned for 687. Pt comfort measures. Order received for Joyce catheter. 26 Notified Dr. Sebastian Malik that pt dyspneic and c/o chest tightness. Order received for morphine 2mg IV Q6Hprn. 1510 Per Lindsay with Hospice, pt to be discharged with Joyce and all IVs.  
 
1548 I have reviewed discharge instructions with the patient and family. The patient and family verbalized understanding. Anderson 59 report to Margaret at the hospice house.

## 2019-10-23 NOTE — PROGRESS NOTES
Music Therapy Assessment Major Francis [address] Theresa Rodriguez [id number] 1940  70 y.o.  female Patient Telephone Number: [phone] (home) Yazidi Affiliation: [facility]  
Language: [place] Patient Active Problem List  
 Diagnosis Date Noted  Hypotension 10/21/2019  Hypovolemia 10/21/2019  Malignant neoplasm of head of pancreas ([facility] [address] 10/21/2019  Cholangitis 10/21/2019  Acute pancreatitis 09/14/2019  TOÑA (acute kidney injury) ([facility] 09/14/2019  Hypokalemia 09/14/2019  GI bleed 09/14/2019  Coffee ground emesis 09/14/2019  Cough due to ACE inhibitor 05/12/2019  Systolic CHF, chronic ([facility] 02/28/2019  
 NSTEMI (non-ST elevated myocardial infarction) ([facility] 02/28/2019  Coronary artery disease of native artery of native heart with stable angina pectoris ([facility] 02/28/2019  Pure hypercholesterolemia 02/28/2019 Date: 10/23/2019            Total Time (in minutes): 31          [facility] 6E MED ONCOLOGY Mental Status:   Blayre.Aland ] Alert [  ] Haven.Po [  ]  Confused  [  ] Minimally responsive  [  ] Sleeping Communication Status: Clear speech, soft-spoken [  ] Impaired Speech [  ] Nonverbal  
 
Physical Status: Pt received MT lying in bed 
[  ] Oxygen in use  [  ] Hard of Hearing [  ] Vision Impaired [  ] Ambulatory  [  ] Ambulatory with assistance [  ] Non-ambulatory Music Preferences, Background: Prefers Country, Traditional Hymns Clinical Problem addressed: Socio-emotional Support, Relaxation, Spiritual Care Goal(s) met in session: 
Physical/Pain management (Scale of 1-10): Pre-session rating ___________    Post-session rating __________  
[X] Increased relaxation   [  ] Affected breathing patterns [  ] Decreased muscle tension   [  ] Decreased agitation [  ] Affected heart rate    [X] Increased alertness Emotional/Psychological: 
[ X] Increased self-expression  [  ] Decreased aggressive behavior [  ] Decreased feelings of stress  [  ] Discussed healthy coping skills [  ] Improved mood    [  ] Decreased withdrawn behavior Social: 
[  ] Decreased feelings of isolation/loneliness [X] Positive social interaction [  ] Provided support and/or comfort for family/friends Spiritual: 
[X] Spiritual support    [  ] Expressed peace [  ] Expressed joo    [  ] Discussed beliefs Techniques Utilized (Check all that apply):  
[  ] Procedural support MT Debbie.Handy ] Music for relaxation [X] Patient preferred music 
[  ] Crystal analysis  Debbie.Handy ] Song choice  [  ] Music for validation [  ] Entrainment  [  ] Movement to music [  ] Guided visualization [  ] Aldean Frisk  [  ] Patient instrument playing [  ] [place] Gave writing [  ] Eston Fonder along   [  ] De Donate  [  ] Sensory stimulation NimoHandy ] Active Listening  [  ] Music for spiritual support [  ] Making of CDs as gifts Session Observations:  Referred by Palliative NP. Ms. Bella Cantor was lying in bed, and had a worried expression on her face, responding with only a few words in a low volume. She was agreeable to MT providing active listening, and after the first song, quietly said,\"That was beautiful. \" Pt then chose, Godfrey,from a list of songs, and when it ended, spoke with a little more volume, stated her appreciation for the music, smiled and said she liked country music. Her next choices were, I Saw the Light; What a Friend We Have in [place] [id number]. While listening, sometimes Ms. Bella Cantor had her eyes closed, and gradually her facial muscles relaxed. Between songs, she became increasingly alert, with greater eye contact. MT will follow as able to provide relaxation and support. Edwin Martinez MT-BC (Music Therapist-Board Certified) Spiritual Care Services Referral- based service

## 2019-10-23 NOTE — WOUND CARE
Wound Care Note:  
 
New consult placed by nurse request for blanchable area on the sacrum Chart shows: 
Admitted for acute pancreatitis, hypotension,  Hypovolemia, malignant neoplasm of head of pancreas and cholangitis Past Medical History:  
Diagnosis Date  Coronary artery disease of native artery of native heart with stable angina pectoris ([facility] [address] 2/28/2019  Cough due to ACE inhibitor  Diarrhea  Hematoma of rectus sheath 2/28/2019  IBS (irritable bowel syndrome) IBS  NSTEMI (non-ST elevated myocardial infarction) ([facility] [address] 2/28/2019  Pancreatitis  Poor appetite  Pure hypercholesterolemia 2/28/2019  Systolic CHF, chronic ([facility] [address] 2/28/2019 WBC = 14.7 on 10/22/19 Admitted from [facility] Assessment:  
Patient is A&O x 4, communicative, incontinent with no assistance needed in repositioning. Bed: [facility] Patient wearing briefs for incontinence. Diet: Clear liquid Patient reports pain 5/10 in abdomen; refused pain medication. Bilateral heels and buttocks skin intact and without erythema. Left buttock with circular light ecchymosis. Left waist with purple ecchymosis. 1. POA sacrum with blanchable erythema, no open area, michael-wound intact. Venelex ointment to be ordered. Waffle cushion provided for patient. Spoke with Dr. Salas Guzmán, wound care orders obtained. Patient repositioned supine. Heels offloaded; flat in bed. Recommendations:   
Sacrum and bilateral heels- Every 8 hours liberally apply Venelex ointment. Skin Care & Pressure Prevention: 
Minimize layers of linen/pads under patient to optimize support surface. Turn/reposition approximately every 2 hours and offload heels. Manage incontinence / promote continence Discussed above plan with patient & Benson Lundberg, SUKHWINDER Transition of Care: Plan to follow as needed while admitted to hospital. 
 
 CLIFFORD ZacariasN, RN, [facility], [facility]. 
office [phone] 
pager [phone] or call  to page

## 2019-10-23 NOTE — HOSPICE
[facility] Good Help to Those in Need 
[phone] Patient Name: Rosiland Primrose YOB: 1940 Age: 70 y.o. [employer] LCSW Note:  Hospice consult noted. PT/FAMILY; This LCSW met with, who appeared confused, her twin sister Barrett Reza , and daughter Candi Lam ( primary MPOA) to sign consents for transfer to the [facility] the [facility] level of care. CONSENTS; Daughter Radha Dumas signed consents and financial agreement form. DDNR; Pt is DDNR  
 
DISPOSITION; Daughter will need assistance with facility placement for pt once symptoms are managed. Pt will be private pay for placement. Daughter is waiting to hear back from [address], daughter did not care for [facility], and pt does not like [facility], from a rehab admission there. PSYCHOSOCIAL NEEDS; Daughter reports pt is not coping well with her ES PA CA, daughter reports Stacie Jack wants to go home and shut the door\". Pt was tearful, holding her sister's hand. Daughter lives in [place], [place].,is here at the bedside and is \"overwhelmed\". LCSW provided emotional support for daughter and talked about [facility] stay as a time for daughter to \"catch her breath\" . LCSW provided reassurance for daughter of SW support with placement for pt. [address]; [facility] ( [phone]) [place] [facility] [place] [address] to serve. TRANSPORTATION; CN has arranged transportation to [facility] at 4 pm.  
 
 
Corrina VALENCIAW, MSG [employer] [employer] [facility] [phone] Thank you for the opportunity to be of service to this patient.

## 2019-10-23 NOTE — HOSPICE
[facility] Good Help to Those in Need 
[phone] Patient Name: Gudelia Espinosa YOB: 1940 Age: 70 y.o. [employer] RN Note:  Hospice consult noted. Chart reviewed. Plan of care discussed with patients nurse & care manager. In to meet with patient and patient's twin sister. Patient is complaining of severe chest pain and shortness of breath. Sister requests that hospice conversation be with daughter, Wilfrido Baires. Spoke with Wilfrido Baires by telephone. She lives out of town and has been looking for LTC facilities in the area as patient lives independently in a town home and is unable to care for herself. Daughter feels that round the clock caregivers would be difficult to manage from out of state. Daughter is receptive to the idea of [facility] in the short term for symptom management with subsequent LTC placement. 1409: Patient will be transferred to the [facility] today at 4 pm by [facility]. Consents to be signed by daughter with hospice LCSW, Corrina Colbert at 3 pm.  
 
  Thank you for the opportunity to be of service to this patient.

## 2019-10-23 NOTE — PROGRESS NOTES
1645-Patient arrived to [facility] via ambulance. Patient assisted into bed and made comfortable. Patient alert and able to speak few short words, but unable to elaborate full conversation due to pain. Patient rates pain 40/10 in the epigastric area. Patient very dyspneic with short, fast breaths. 02 increased to 4L for comfort. 1700-Call placed to [facility] requesting orders. Orders given for prn morphine, lorazepam, zofran and compezine 1707-4mg IV morphine administered; family at bedside--pt and family oriented to [facility] and hospice involvement in care; ice chips provided; patient resting in bed; no questions 1745-Patient laying quietly in bed; reports pain is improved; family at bedside 1815-Patient reports pain is now \"50%. \"  Patient still appears uncomfortable with dyspnea and SOB. Patient refused additional pain medication because \"I don't want to get addicted. \"  Patient was educated on appropriate use of pain medication. Ice pack provided; patient refused repositioning.    
1900-Report given to Aly Demkeshia

## 2019-10-23 NOTE — DISCHARGE INSTRUCTIONS
Hospice: Care Instructions  Your Care Instructions  Hospice care provides medical treatment to relieve symptoms at the end of life. The goal is to keep you comfortable, not to try to cure you. Hospice care does not speed up or lengthen dying. It focuses on easing pain and other symptoms. Hospice caregivers want to enhance your quality of life. Hospice care also offers emotional help and spiritual support when you are dying. It also helps family members care for a loved one who is dying. Hospice care can help you review your life, say important things to family and friends, and explore spiritual issues. Hospice also helps your family and friends deal with their grief after you die. You can use hospice care if your illness cannot be cured and doctors believe you have no more than 6 months to live. You do not need to be confined to a bed or in a hospital to benefit from this type of care. The hospice team includes nurses, counselors, therapists, social workers, pastors, home health aides, and trained volunteers. You can get care in your own home or in a hospice center. Some hospice workers also go to nursing homes or hospitals. How can you care for yourself at home? · Prepare a list of advance directives. These are instructions to your doctor and family members about what kind of care you want if you become unable to speak or express yourself. · Find out if your health insurance covers hospice care. · Find hospice programs in your area. People who can help include your doctor, your state health department, and your insurance company. · Decide what kinds of hospice services you want. It helps to know what you want before you enter a hospice program.  · Think about some questions when preparing for hospice care. ? Who do you want to make decisions about your medical care if you are not able to? Many people choose their spouse, child, or doctor. ? What are you most afraid of that might happen?  You might be afraid of having pain or losing your independence. Let your hospice team know your fears. The team can help you deal with them. ? Where would you prefer to die? Choices include your home, a hospital, or a nursing home. ? Do you want to donate organs when you die? Make sure that your family clearly understands this. ? Do you want any Pentecostal rites or practices to be done before you die? Let your hospice team know what you want. Where can you learn more? Go to http://anita-maxime.info/. Enter U499 in the search box to learn more about \"Hospice: Care Instructions. \"  Current as of: April 1, 2019  Content Version: 12.2  © 1580-0911 [employer]. Care instructions adapted under license by [employer] (which disclaims liability or warranty for this information). If you have questions about a medical condition or this instruction, always ask your healthcare professional. [address] any warranty or liability for your use of this information.

## 2019-10-24 NOTE — [facility] IDG MASTER NOTE
[address] Date: 10/24/19 Time: 9:27 AM 
 
___________________ Patient: Kristel Dolan Coverage Information: 
   Payor: Sep[employer] Sheets Plan: [place] MED[place]ARE PART A & B Subscriber ID: [id number] Phone Number: MRN: [id number] CCN:  
[place] Claim No. :  
 
Hospice Election Date:  
Current Benefit Period: Benefit Period 1 Start Date: 10/23/2019 End Date: 1/20/2020 Medical Director: [facility] Attending Provider: MD Jannette Guerra  [address] [address] Phone: [phone] Fax: [phone] Level of Care: General Inpatient Care Having pain and SOB along with N/V having to adjust meds currently on prn meds but does not appear comfortable but currently refusing meds and having confusion MD will reassess 
 
___________________ Diagnoses: There were no encounter diagnoses. Current Medications: 
 
Current Facility-Administered Medications:  
  bisacodyl (DULCOLAX) suppository 10 mg, 10 mg, Rectal, DAILY PRN, Carolynn Gutierrez MD 
  morphine injection 4 mg, 4 mg, IntraVENous, Q15MIN PRN, Winifred Donovan NP, 4 mg at 10/23/19 2228   LORazepam (ATIVAN) injection 1 mg, 1 mg, IntraVENous, Q15MIN PRN, Any Donovan, NP 
  ondansetron (ZOFRAN) injection 4 mg, 4 mg, IntraVENous, Q4H PRN, Any Donovan, ZEYAD 
  prochlorperazine (COMPAZINE) injection 10 mg, 10 mg, IntraVENous, Q6H PRN, Rachel Stapleton NP Orders: 
Orders Placed This Encounter  NURSING-MISCELLANEOUS: (see comments) 1. NO admission labs, x-rays or other diagnostic tests, unless pertinent to symptom control . 2. Discontinue ALL prior medications. CONTINUOUS 1. NO admission labs, x-rays or other diagnostic tests, unless pertinent to symptom control . 2. Discontinue ALL prior medications. Standing Status:   Standing Number of Occurrences:   1 Order Specific Question:   Description of Order: Answer:   (see comments)  COMFORT MEASURES ONLY Standing Status:   Standing Number of Occurrences:   1 Major Monsivais 53 Standing Status:   Standing Number of Occurrences:   1  
 NOTIFY PROVIDER: SPECIFY NOTIFY PROVIDER: FOR PAIN, DYSPNEA, AGITATION, OTHER DISTRESS OR NOT RESPONDING TO ORDERED INTERVENTIONS CONTINUOUS Routine Standing Status:   Standing Number of Occurrences:   1 Order Specific Question:   Please describe the test or procedure you would like to order. Answer:   NOTIFY PROVIDER: FOR PAIN, DYSPNEA, AGITATION, OTHER DISTRESS OR NOT RESPONDING TO ORDERED INTERVENTIONS  
 ORAL CARE Keep mouth moisturized with sponge sticks/toothettes and tap water. Vaseline to lips and nares as needed. Standing Status:   Standing Number of Occurrences:   1  
 BEDREST, COMPLETE Standing Status:   Standing Number of Occurrences:   1  TURN & POSITION  
  TURN & POSITION EVERY 6 HOURS - PATIENT MAY REFUSE Standing Status:   Standing Number of Occurrences:   1  
 NURSING-MISCELLANEOUS: GIP level of care Admit to [facility] level of care; SN visit daily x 14 days with 5 visits PRN for symptom control;  1 x weekly and five visits PRN spiritual support; CNA daily x 14 days. CONTINUOUS Admit to [facility] level of care; SN visit daily x 14 days with 5 visits PRN for symptom control;  1 x weekly and five visits PRN spiritual support; CNA daily x 14 days. Standing Status:   Standing Number of Occurrences:   1 Order Specific Question:   Description of Order: Answer:   [facility] level of care  DO NOT RESUSCITATE Standing Status:   Standing Number of Occurrences:   1  
 OXYGEN CANNULA Liters per minute: 2; Indications for O2 therapy: OTHER PRN Routine Oxygen as needed. Adjust for comfort. Discontinue if not contributing to patient comfort. Standing Status:   Standing Number of Occurrences:   26300 Order Specific Question:   Liters per minute: Answer:   2 Order Specific Question:   Indications for O2 therapy Answer:   OTHER  
 FALL PRECAUTIONS Standing Status:   Standing Number of Occurrences:   1  
 bisacodyl (DULCOLAX) suppository 10 mg  
 morphine injection 4 mg  LORazepam (ATIVAN) injection 1 mg  ondansetron (ZOFRAN) injection 4 mg  prochlorperazine (COMPAZINE) injection 10 mg  
 INITIAL PHYSICIAN ORDER: HOSPICE Level Of Care: General Inpatient; Reason for Admission: pancreatic cancer Standing Status:   Standing Number of Occurrences:   1 Order Specific Question:   Status Answer:   Hospice Order Specific Question:   Level Of Care Answer:   General Inpatient Order Specific Question:   Reason for Admission Answer:   pancreatic cancer Order Specific Question:   Admitting Physician Answer:   Ilda Melendez Order Specific Question:   Attending Physician Answer:   Ilda Melendez Allergies: Allergies Allergen Reactions  Penicillins Hives Has tolerated cefazolin, ceftriaxone and cefepime previously Care Plan: 
Multidisciplinary Problems (Active) Problem: Falls - Risk of   
 Dates: Start: 10/23/19 Description:   
 Disciplines: Interdisciplinary Goal: *Absence of Falls Dates: Start: 10/23/19 Description: Document Rebbecca Persons Fall Risk and appropriate interventions in the flowsheet. Disciplines: Interdisciplinary Problem: Patient Education: Go to Patient Education Activity Dates: Start: 10/23/19 Description:   
 Disciplines: Interdisciplinary Goal: Patient/Family Education Dates: Start: 10/23/19 Description:   
 Disciplines: Interdisciplinary Problem: Patient Education: Go to Patient Education Activity Dates: Start: 10/23/19 Description:   
 Disciplines: Interdisciplinary Goal: Patient/Family Education Dates: Start: 10/23/19 Description: Disciplines: Interdisciplinary Problem: Pressure Injury - Risk of   
 Dates: Start: 10/23/19 Description:   
 Disciplines: Interdisciplinary Goal: *Prevention of pressure injury Dates: Start: 10/23/19 Description: Document Travis Scale and appropriate interventions in the flowsheet. Disciplines: Interdisciplinary Care Plan Problems/Goals Progressing Towards Goal (2) *Prevention of pressure injury Problem: Pressure Injury - Risk of  
 Disciplines: Interdisciplinary Expected end:  -   
 Progressing Towards Goal By Jon Centeno on 10/23/19 2244 *Absence of Falls Problem: Falls - Risk of  
 Disciplines: Interdisciplinary Expected end:  -   
 Progressing Towards Goal By Jon Centeno on 10/23/19 2244 No Outcome (2) Patient/Family Education Problem: Patient Education: Go to Patient Education Activity Disciplines:   
 Expected end:  -   
   
  
  
 
 Patient/Family Education Problem: Patient Education: Go to Patient Education Activity Disciplines:   
 Expected end:  -   
   
  
  
 
  
  
  
 
 
___________________ Care Team Notes POC/IDG Notes No notes of this type exist for this encounter. Care Team Present:  
Dr Anai Felton Sat

## 2019-10-24 NOTE — PROGRESS NOTES
Problem: Falls - Risk of 
Goal: *Absence of Falls Description Document Aixa Wagner Fall Risk and appropriate interventions in the flowsheet. Outcome: Progressing Towards Goal 
Note:  
Fall Risk Interventions: 
Mobility Interventions: Bed/chair exit alarm Medication Interventions: Bed/chair exit alarm Elimination Interventions: Bed/chair exit alarm Problem: Pressure Injury - Risk of 
Goal: *Prevention of pressure injury Description Document Travis Scale and appropriate interventions in the flowsheet. Outcome: Progressing Towards Goal 
Note:  
Pressure Injury Interventions: 
Sensory Interventions: Assess changes in LOC, Float heels, Check visual cues for pain, Monitor skin under medical devices, Minimize linen layers, Keep linens dry and wrinkle-free Moisture Interventions: Absorbent underpads, Internal/External urinary devices, Minimize layers, Limit adult briefs, Apply protective barrier, creams and emollients Activity Interventions: Assess need for specialty bed Mobility Interventions: Float heels Nutrition Interventions: Document food/fluid/supplement intake Friction and Shear Interventions: Apply protective barrier, creams and emollients, Minimize layers

## 2019-10-24 NOTE — PROGRESS NOTES
0700  Report received. 0715  Pt lying in bed, awake. No co pain or shortness of breath at this time. Pt states she breaths better with the O2. Lungs are clear but diminished. Dyspnea with conversation. Pt is on O2 at 4lpm.  + bowel sounds. Last reported Bm was 10-22. Dressing on abd is dry and intact. Joyce is draining tea colored urine. No edema noted. Iv in R hand and L ac. Dressings are clear and intact. 0815 Pt asleep. No facial grimacing. No respiratory distress noted. 0920  Pt awake and looking out the window. Pt stated she was comfortably. 1030  Pt watching TV.   
1130  Pt's  in to visit. 200 Dr Kathy Pickens in to visit. Pt co pain and shortness of breath. Pt  requested medication. 1240 Pt medicated with Morphine and Lorazepam 
1300  Pt reports she is feeling some better. 1400  Pt having numerous family members in to visit. No complaints. 1500  Pt requesting something for pain. Pt medicated with Morphine and Lorazepam.  Pt reports pain is 5/1 in her abdomen. 1530  Pt asleep. No facial grimacing. Zaira Rock 20 NP in to visit. IV  Morphine and Lorazepam scheduled. 1630 Pt snoring. NO facial grimacing. 1710  Pt medicated with scheduled meds. Pt continues to snore. No facial grimacing or moaning. No respiratory distress noted. 1820 Pt turned and repositioned. Pt did not respond. No facial grimacing or moaning. No respiratory distress noted. 1900  Report given. NAME OF PATIENT:  Mathieu Gimenez LEVEL OF CARE:  [facility] 
 
REASON FOR GIP:  
Pain, despite numerous changes in medications, Unmanageable respiratory distress and Nausea and vomiting, despite changes to medications *PATIENT REMAINS ELIGIBLE FOR GIP LEVEL OF CARE AS EVIDENCED BY: (MUST BE ADDRESSED OF PATIENT GIP)    Pt received IV Morphine over night for pain. REASON FOR RESPITE:  n/a 
 
O2 SAFETY:  O2 at 4lpm 
Oxygen sign on the door FALL INTERVENTIONS PROVIDED:  
 Implemented/recommended use of fall risk identification flag to all team members, Implemented/recommended assistive devices and encouraged their use, Implemented/recommended resources for alarm system (personal alarm, bed alarm, call bell, etc.)  and Implemented/recommended environmental changes (remove hazards, lower bed, improve lighting, etc.) INTERDISPLINARY COMMUNICATION/COLLABORATION: 
Physician, MSW, Miranda and RN, CNA 
 
NEW MEDICATION INITIATION DOCUMENTATION:  No new medications initiated. Reason medication is being initiated:  n/a 
 
MD / Provider name consulted re: change in status / initiation of new medication:  n/a New Symptom(s):  n/a New Order(s):  n/a Name of the person notified of the changes:  n/a Name of person being taught:  n/a Instructions given:  n/a Side Effects taught:  n/a Response to teaching:  n/a 
 
 
COMFORTABLE DYING MEASURE: 
Is Patient/family satisfied with symptom level?  yes DISCHARGE PLAN:  Pt will remain at the [facility] until her symptoms are managed and then LTC may be a possibility.

## 2019-10-24 NOTE — [facility] IDG NURSE NOTES
Patient: Saud Roque 
 
Date: 10/24/19 Time: 5:27 PM 
 
[facility] Nurse Notes Diagnosis Pancreatic Cancer LOC GIP for symptom management of pain, shortness of breath, nausea/vomiting and secretions. Pt receiving IV Morphine, Lorazepam scheduled. Pt receiving PRN doses of both for breakthrough. Discharge - Pt will remain at the [facility] for EOL care Signed by: Jigar Angel RN

## 2019-10-24 NOTE — H&P
[facility] Good Help to Those in Need 
[phone] Patient Name: Rosiland Primrose YOB: 1940 Date of Provider Hospice Visit: 10/24/19 Level of Care:   [x] General Inpatient (GIP)    [] Routine   [] Respite Current Location of Care: 
[] [facility] [] [facility] [] [facility] [] [facility] [x] Hospice [facility], patient referred from: 
[x] [facility] [] [facility] [] [facility] [] [facility] [] Home [] Other:  
 
Date of Original Hospice Admission: 10-23-19 Hospice Medical Director at time of admission: Dr Cherry Puga Principle Hospice Diagnosis: Malignant neoplasm of pancreas, unspecified location of malignancy Lanny Raider Rosiland Primrose is a 70y.o. year old who was admitted to [facility]. The patient's principle diagnosis has resulted in weakness, debility, fatigue, shortness of breath, pain, anxiety, agitation, delirium, confusion. With end stage pancreatic cancer and multiple symptom issues. Functionally, the patient's Karnofsky and/or Palliative Performance Scale has declined over a period of days and is estimated at 20%. The patient is dependent on the following ADLs: pt is completely dependent for all ADLs. Objective information that support this patients limited prognosis includes: . The patient/family chose comfort measures with the support of Hospice. HOSPICE DIAGNOSES Active Symptoms: 1. Shortness of breath 2. Confusion at times 3. Generalized pain issues 4. Anxiety and agitation 5. Nausea PLAN 1. Admit to GIP/Routine level of care for overwhelming symptoms, transitions of care, high risk for decline, this care cannot be provided in the home, the need for IV medications, frequent assessments are required by the medical team. 
2. Medications include the following:morphine 4 mg IV has had 4 doses in 24 hrs, also now has scheduled dosing, lorazepam 1mg IV q 15 min has had 2 doses and now has scheduled dosing every 4 hrs 3. Zofran and compazine 4. Continue comfort care 5.  and SW to support family needs 6. Disposition: to routine level of care once symptoms resolve Prognosis estimated based on 10/24/19 clinical assessment is:  
[] Hours to Days   
[] Days to Weeks   
[] Other: 
 
Communicated plan of care with: Hospice Case Manager; Hospice IDT; Care Team 
 
 GOALS OF CARE Patient/Medical POA stated Goal of Care: comfort care 
 
[x] I have reviewed and/or updated ACP information in the Advance Care Planning Navigator. This information is available in the [address] link in the patient's chart header. Primary Decision [facility] (Health Care Agent):   Primary Decision Maker (Active): Princess Echeverria - Daughter - [phone] Secondary Decision Maker: Derick Evans - Son - [phone] Resuscitation Status: DNR If DNR is there a Durable DNR on file? : [] Yes had inpt code order DNR [] No (If no, complete Durable DNR) HISTORY History obtained from: chart, SN, CM, family, patient CHIEF COMPLAINT:   
The patient is:  
[] Verbal 
[x] Nonverbal 
[] Unresponsive HPI/SUBJECTIVE: 70year old female, weakness and fatigue, nausea and pain with shortness of breath, end stage pancreatic cancer REVIEW OF SYSTEMS The following systems were: [] reviewed  [] unable to be reviewed Positive ROS include: 
Constitutional: fatigue, weakness, in pain, short of breath Ears/nose/mouth/throat: increased airway secretions Respiratory:shortness of breath, wheezing Gastrointestinal:poor appetite, nausea, vomiting, abdominal pain, constipation, diarrhea Musculoskeletal:pain, deformities, swelling legs Neurologic:confusion, hallucinations, weakness Psychiatric:anxiety, feeling depressed, poor sleep Endocrine:  
 
Adult Non-Verbal Pain Assessment Score: denies at this time Face 
[] 0   No particular expression or smile 
[] 1   Occasional grimace, tearing, frowning, wrinkled forehead [] 2   Frequent grimace, tearing, frowning, wrinkled forehead Activity (movement) [] 0   Lying quietly, normal position 
[] 1   Seeking attention through movement or slow, cautious movement 
[] 2   Restless, excessive activity and/or withdrawal reflexes Guarding 
[] 0   Lying quietly, no positioning of hands over areas of body 
[] 1   Splinting areas of the body, tense 
[] 2   Rigid, stiff Physiology (vital signs) 
[] 0   Stable vital signs [] 1   Change in any of the following: SBP > 20mm Hg; HR > 20/minute 
[] 2   Change in any of the following: SBP > 30mm Hg; HR > 25/minute Respiratory 
[] 0   Baseline RR/SpO2, compliant with ventilator 
[] 1   RR > 10 above baseline, or 5% drop SpO2, mild asynchrony with ventilator 
[] 2   RR > 20 above baseline, or 10% drop SpO2, asynchrony with ventilator FUNCTIONAL ASSESSMENT Palliative Performance Scale (PPS): 20% PSYCHOSOCIAL/SPIRITUAL ASSESSMENT Active Problems: * No active hospital problems. * 
 
Past Medical History:  
Diagnosis Date  Coronary artery disease of native artery of native heart with stable angina pectoris ([facility] [address] 2/28/2019  Cough due to ACE inhibitor  Diarrhea  Hematoma of rectus sheath 2/28/2019  IBS (irritable bowel syndrome) IBS  NSTEMI (non-ST elevated myocardial infarction) ([facility] [address] 2/28/2019  Pancreatitis  Poor appetite  Pure hypercholesterolemia 2/28/2019  Systolic CHF, chronic ([facility] [address] 2/28/2019 Past Surgical History:  
Procedure Laterality Date  COLONOSCOPY Left 11/1/2017 COLONOSCOPY performed by Elza Cleaning MD at [address]  HX CHOLECYSTECTOMY  HX GI    
 surgery for hiatal hernia  HX GI  09/27/2019 Gastrojejunostomy  HX ORTHOPAEDIC    
 DJD, doing PT weekly through [address]  IR [facility] Felix Beckman [facility]  1/30/2019 Social History Tobacco Use  Smoking status: Former Smoker  Smokeless tobacco: Never Used  Tobacco comment: quit smoking cigarettes 6 yrs ago Substance Use Topics  Alcohol use: Yes Comment: very occasional  
 
Family History Problem Relation Age of Onset  Dementia Mother   
     alzheimers  Cancer Sister   
     gallbladder  Cancer Brother   
     pancreatic  Breast Cancer Paternal Grandmother  Dementia Sister   
     alzheimers  Heart Surgery Brother   
     bypass Allergies Allergen Reactions  Penicillins Hives Has tolerated cefazolin, ceftriaxone and cefepime previously Current Facility-Administered Medications Medication Dose Route Frequency  morphine injection 4 mg  4 mg IntraVENous Q4H  
 LORazepam (ATIVAN) 2 mg/mL injection 1 mg  1 mg IntraVENous Q4H  
 bisacodyl (DULCOLAX) suppository 10 mg  10 mg Rectal DAILY PRN  
 morphine injection 4 mg  4 mg IntraVENous Q15MIN PRN  
 LORazepam (ATIVAN) injection 1 mg  1 mg IntraVENous Q15MIN PRN  
 ondansetron (ZOFRAN) injection 4 mg  4 mg IntraVENous Q4H PRN  prochlorperazine (COMPAZINE) injection 10 mg  10 mg IntraVENous Q6H PRN PHYSICAL EXAM  
 
Wt Readings from Last 3 Encounters:  
10/23/19 57.8 kg (127 lb 8 oz) 10/21/19 50.5 kg (111 lb 6.4 oz) 10/10/19 59 kg (130 lb 1.1 oz) Visit Vitals /68 Pulse 91 Temp 98.5 °F (36.9 °C) Resp 18 SpO2 98% Supplemental O2  [] Yes  [] NO Last bowel movement: PTA Currently this patient has: 
[] Peripheral IV [] PICC  [] PORT [] ICD [] Joyce Catheter [] NG Tube   [] PEG Tube   
[] Rectal Tube [] Drain 
[] Other:  
 
Constitutional: recently medicated, she does respond Eyes: pupils equal, anicteric ENMT: no nasal discharge, moist mucous membranes Cardiovascular: regular rhythm, distal pulses intact Respiratory: breathing not labored, symmetric Gastrointestinal: soft non-tender, +bowel sounds Musculoskeletal: no deformity, no tenderness to palpation Skin: warm, dry Neurologic:pt is/ not able to follow commands, pt is/ not moving all extremities Psychiatric:  confusion Pertinent Lab and or Imaging Tests: 
Lab Results Component Value Date/Time Sodium 141 10/22/2019 02:53 AM  
 Potassium 3.0 (L) 10/22/2019 02:53 AM  
 Chloride 106 10/22/2019 02:53 AM  
 CO2 28 10/22/2019 02:53 AM  
 Anion gap 7 10/22/2019 02:53 AM  
 Glucose 105 (H) 10/22/2019 02:53 AM  
 BUN 31 (H) 10/22/2019 02:53 AM  
 Creatinine 1.23 (H) 10/22/2019 02:53 AM  
 BUN/Creatinine ratio 25 (H) 10/22/2019 02:53 AM  
 GFR est AA 51 (L) 10/22/2019 02:53 AM  
 GFR est non-AA 42 (L) 10/22/2019 02:53 AM  
 Calcium 8.6 10/22/2019 02:53 AM  
 
Lab Results Component Value Date/Time  Protein, total 5.8 (L) 10/22/2019 02:53 AM  
 Albumin 1.9 (L) 10/22/2019 02:53 AM  
 
   
 
  
 
Sterling Pringle NP

## 2019-10-24 NOTE — [facility] IDG SOCIAL WORKER NOTES
Patient: Rosiland Primrose 
 
Date: 10/24/19 Time: 4:22 PM 
 
[facility]  Notes LCSW met with patient at bedside at [facility]. She was awake and alert in hospital bed and able to respond to questions appropriately. Patient has nasal canula and says she is \"about the same\" in terms of how she is feeling today. Patient was previously living by herself in a 2 story town home in [place] [place] [facility].  Her twin sister lives here toward the [place] [address] and her son, Nadia Motta is also local.  Her daughter, Radha Dumas lives in [place] but has come into town to assist with decision making for next steps. Patient says of her son \"poor little thing,\" she says she cannot rely on him for primary caregiving responsibilities. No MPOA documented and children have been agreeable to decision making together thus far. Signed by: Joe Graham

## 2019-10-24 NOTE — PROGRESS NOTES
NAME OF PATIENT:  Lilliana Humphreys LEVEL OF CARE: [facility] 
 
REASON FOR GIP:  
Pain, despite numerous changes in medications, Nausea and vomiting, despite changes to medications, Medication adjustment that must be monitored 24/7 and Stabilizing treatment that cannot take place at home *PATIENT REMAINS ELIGIBLE FOR [facility] LEVEL OF CARE AS EVIDENCED BY: (MUST BE ADDRESSED OF PATIENT GIP) REASON FOR RESPITE: 
NA 
 
O2 SAFETY: 
Concentrator positioning (6\" from furniture/drapes), Tanks stored in judd , No petroleum based products on face while oxygen in use and Oxygen sign on the door FALL INTERVENTIONS PROVIDED:  
Implemented/recommended use of non-skid footwear, Implemented/recommended use of fall risk identification flag to all team members, Implemented/recommended assistive devices and encouraged their use, Implemented/recommended resources for alarm system (personal alarm, bed alarm, call bell, etc.) , Implemented/recommended environmental changes (remove hazards, lower bed, improve lighting, etc.) and Implemented/recommended increased supervision/assistance INTERDISPLINARY COMMUNICATION/COLLABORATION: 
Physician, MSW, [place] and RN, CNA 
 
NEW MEDICATION INITIATION DOCUMENTATION: 
NA 
 
Reason medication is being initiated:  NA 
 
MD / Provider name consulted re: change in status / initiation of new medication:  NA New Symptom(s):  NA New Order(s):  NA 
 
Name of the person notified of the changes:  NA 
 
Name of person being taught:  NA Instructions given:  NA Side Effects taught:  NA 
 
Response to teaching:  NA 
 
 
COMFORTABLE DYING MEASURE: 
Is Patient/family satisfied with symptom level?  yes DISCHARGE PLAN:  Pending 19:15 Shift report received from [address] [place] [address] 
19:45 Patient alert with some confusion, sitting up in bed watching TV. Denies pain stated \" Bring me something for pain later\" O2 @4l/nc respirations unlabored and even, no dyspnea noted, appears comfortable. 21:00 Patient lying in bed eyes closed, no signs and symptoms of pain or discomfort noted. Will cont to monitor. 22:00 Patient medicated with prn Morphine 4mg/IV for generalized pain.

## 2019-10-24 NOTE — PROGRESS NOTES
Pt turned and repositioned. .  Pillows used for pressure relief. Bed alarm on, CB within reach. Wheels locked Bed in low position.

## 2019-10-24 NOTE — HOSPICE
[address] [facility] Good Help to Those in Need 
[phone] Social Work Admission Note Patient Name: Yomi Brandt YOB: 1940 Age: 70 y.o. Date of Visit: 10/24/19 Facility of Care: [facility] Patient Room: 11/01 Hospice Attending: Kelli Leach MD 
Hospice Diagnosis: pancreatic cancer Level of Care:  
 [x]  GIP []  Respite 
 []  Routine NARRATIVE  
LCSW met with patient at bedside at [facility]. She was awake and alert in hospital bed and able to respond to questions appropriately. Patient has nasal canula and says she is \"about the same\" in terms of how she is feeling today. Patient was previously living by herself in a 2 story town home in [place] [facility] [place].  Her twin sister lives here toward the [place] [id number] and her son, Fortunato Farooq is also local.  Her daughter, Jackie Escobar lives in [place] but has come into town to assist with decision making for next steps. Patient says of her son \"poor little thing,\" she says she cannot rely on him for primary caregiving responsibilities. No MPOA documented and children have been agreeable to decision making together thus far. Daughter not at bedside during visit or bedside rounds with medical team. LCSW called her later to check in and she states that she believes LTC placement will be the discharge plan for her mother should she need to leave [facility]. The family will private pay and prefer a facility in the [place] near patient's twin, and require a private room for patient. LCSW to provide list of LTC facilities and daughter wants to make sure that it is an acceptable location. She does plan to reach out to [address] [place] [address] for more information. ADVANCE CARE PLANNING Code Status: DNR Durable DNR: X Yes  _ No 
Advance Care Planning 10/21/2019 Patient's Healthcare Decision Maker is: Legal Next of Kin Confirm Advance Directive None Relationship Status: 
[]  Single    
[]       
[]     
[]  Domestic Partner    
[]  / []  Common Law 
[]   
[x]  Unknown If in a relationship, name of partner/spouse: 
Duration of relationship: 
 
Latter day: Scientology  Home: [place] ([phone]) in . [place] [address] for final arrangements Resources Provided: LTC facility information Social Work Initial Assessment Gender: 
female Race/Ethnicity: (alvarez all that apply) []  [place] ([facility]) or [place] Native 
[]   
[]  Black or [place] American 
[]   or  
[]   or [place] 
[x]  Balaji Massey 
[]  Unknown 
  
 Service:   
[]  Yes  
[x]  No      
[]  Unknown Appropriate for Pinning Ceremony:  
[]  Yes     
[x]  No 
Is patient using VA benefits? []  Yes     
[x]  No 
  
Primary Language: English 
[]   Needed 
[]   utilized during visit Ability to express thoughts/needs/feelings 
[x]  Expressed thoughts/feelings/needs without difficulty 
[]  Requires extra time and cuing 
[]  Speech limited single words 
[]  Uses only gestures (eye, blinking eye or head movement/pointing) []  Unable to express thoughts/feelings/needs (speech unintelligible or inappropriate) []  Unresponsive Notes:  
  
Mental Status: 
[x]  Alert-oriented to:   
 [x]  Person   
 [x]  Place   
 [x]  Time 
[]  Comatose-responds to:  
 []   Verbal stimuli  
 []  Tactile stimuli  
 []  Painful stimuli 
[]  Forgetful 
[]  Disoriented/Confused 
[]  Lethargic 
[]  Agitated 
[]  Other (specify):   
Notes:  
  
Patients description of Illness/Current Health Status:   
[]  Patient unable to discuss 
[]  Patient unwilling to discuss 
[]  (Specify) Knowledge/Understanding of Disease Process Patient:  
 [x]  Demonstrates knowledge/understanding of disease process [x]  Demonstrates knowledge/understanding of treatment plan 
 [x]  Demonstrates knowledge/understanding of prognosis []  Demonstrates acceptance of prognosis []  Demonstrates knowledge/understanding of resuscitation status []  Other (specify) Caregiver: 
 [x]  Demonstrates knowledge/understanding of disease process [x]  Demonstrates knowledge/understanding of treatment plan 
 [x]  Demonstrates knowledge/understanding of prognosis []  Demonstrates acceptance of prognosis []  Demonstrates knowledge/understanding of resuscitation status 
 []  Other (specify) Notes:  
  
Patients living arrangement/care setting: 
Use the PRIOR COLUMN when the PATIENTS current health status necessitated a change in his/her primary residence. Prior Current Response [x]             []    Patients own home/residence []             []    Home of family member/friend []             []    Boarding home  
           []             []    Assisted living facility/care home center []             [x]    Hospital/Acute care facility []             []    Skilled nursing facility []             []    Long term care facility/Nursing home  
           []             []    Hospice in Patient Primary Caregiver: 
[]  No Primary Caregiver Name of Primary Caregiver: John Richard ([phone]) Relationship or Primary Caregiver:  
 []  Spouse/Significant other     
 [x]  Natural Child      
 []  Step child     
 []  Sibling 
 []  Parent 
 []  Friend/Neighbor 
 []  Community/Confucianism Volunteer 
 []  Paid help 
 []  Other (specify):___________ Notes:   
  
Family members/Significant others: 
Name: Sandra Durbin Relationship: Son Phone Number: [phone] Actively involved in care? []  Yes  []  No 
 
Name: 
Relationship: 
Phone Number: Actively involved in care? []  Yes  []  No 
 
Name: 
Relationship: 
Phone Number: Actively involved in care? []  Yes  []  No 
 
Social support systems: (select ONE best description) []  Excellent social support system which includes three or more family members or friends [x]  Good social support system which includes two or less members or friends 
[]  Michelle Loomis Avcortney support which includes one family member or friend 
[]  Poor social support; no family members or friends; basically ALONE Notes:  
  
Emotional Status: (alvarez all that apply) Patient Caregiver Response  
              []                [x]    Mood/Affect stable and appropriate    
              []                []    Angry  
              []                []    Anxious []                []    Apprehensive []                []    Avoidant  
              []                []    Clinging  
              []                []    Depressed  
              []                []    Distraught  
              []                []    Elated []                []    Euphoric  
              []                []    Fearful  
              []                []    Flat Affect  
              []                []    Helpless []                []    Hostile []                []    Impulsive []                []    Irritable  
              []                []    Labile  
              []                []    Manic  
              []                []    Restlessness []                []    Sad  
              []                []    Suspicious []                []    Tearful  
              []                []    Withdrawn Notes:  
 
Coping Skills (strengths/weakness):  
 Patient: Coping Skills (strength/weakness):  
 Family/caregiver (strength/weakness): 
  
[place] of care (alvarez all that apply):    
[]  No burden evident  
[]  Family must administer medications  
[]  Illness causing financial strain  
[x]  Family/Support feels overwhelmed  
[]  Family/Support sleep disturbed with patients care  
[]  Patients care causes extra physical stress  of death 
[]  Illness causes changes in family lifestyle []  Illness impacting family/support employment 
[x]  Family experiencing increased time demands 
[]  Patients behavior endangers family 
[]  Denial of patients illness 
[]  Concern over outcome of illness/fear 
[]  Patients behavior embarrassing to family Notes:  
  
Risk Factors: (alvarez all that apply):   
[x]  No burden evident  
[]  Alcohol abuse 
[]  Financial resources inadequate to meet basic needs (food/house/etc) []  Financial resources inadequate to meet health care needs (supplies/equipment/medications) 
[]  Food/nutrition resources inadequate 
[]  Home environment unsafe/inadequate for home care 
[]  Homicidal risk 
[]  Lives alone or without concerned relatives 
[]  Multiple medications/complex schedule 
[]  Physical limitations increase likelihood of falls 
[]  Plan of care/treatments complicated 
[]  Substance use/abuse 
[]  Suicidal risk 
[]  Visual impairment threatens safety/ability to perform self-care 
[]  Other (specify): 
  
Abuse/Neglect (actual/potential risks): 
[x]  No signs of abuse/neglect 
[]  History of abuse/neglect                 []  HKNVDEPZ          []  Sexual 
[]  History of domestic violence 
[]  Lacks adequate physical care 
[]  Lacks emotional nurturing/support 
[]  Lacks appropriate stimulation/cognitive experiences 
[]  Left alone inappropriately 
[]  Lacks necessary supervision 
[]  Inadequate or delayed medical care 
[]  Unsafe environment (i.e guns/drug use/history of violence in the home/etc.) []  Bruising or other physical signs of injury present 
[]  Other (specify): 
Notes:  
[]  Refer to child/adult protective services Current Sources of Stress (in Addition to Current Illness):  
[x]  None reported 
[]  Bills/Debt   
[]  Career/Job change   
[]   (short term)   
[]   (long term)   
[]  Death of a child (recent)   
[]  Death of a parent (recent)  
[]  Death of a spouse (recent)  
[]  Employment status changed  
[]  Family discord   
 []  Financial loss/Inadequate inther (specify):come 
[]  Job loss 
[]  Legal issues unresolved 
[]  Lifestyle change 
[]  Marital discord 
[]  Marriage within the last year 
[]  Paperwork (insurance/legal/etc) overwhelming 
[]  Separation/Divorce 
[]  Other (specify): 
Notes:  
  
Current [employer] Being Utilized 1. [employer] 2. List of LTC facilities Interventions/Plan of Care 1. Assess social and emotional factors related to coping with end of life issues 2. Community resource planning/referral  
3. Relocation to different care setting if/when symptoms stabilize Discharge Planning 1. [facility] ([phone]) in [facility] for final arrangements 2. Discharge to LTC facility if stabilized MSW Assessment Completed by: Tracey Saha 10/24/19 Time In: 1200p Time Out: 1230p

## 2019-10-24 NOTE — PROGRESS NOTES
Pharmacist Medication Review There were no patient \"home supplied\" medications for the pharmacist to review. Current orders are supplied from [facility]. The current medication orders have been reviewed and are appropriate. Comments/Recommendations:  
 
 
Signed: Mary Cortes PharmD   Phone: [phone]

## 2019-10-24 NOTE — PROGRESS NOTES
2145 Report received from [facility]. Patient asleep in bed with eyes closed. O2 @ 4lpm via NC. Easily aroused and acknowledged writer. Skin warm and dry. 2300 Patient had prn pain medication earlier and is sleeping well. No signs of distress. 0000 Patient in bed asleep. Respirations even and unlabored. No facial grimacing or signs of discomfort. 0100 Patient asleep in bed with no signs of distress 0200 No acute change 
0300 Patient asleep in bed with no signs of pain or discomfort. Skin warm and dry. 0400 Patient asleep in bed with HOB elevated. Skin warm and dry. Respirations even and unlabored. 0500 Patient asleep with no signs of distress. 0600 Patient awake in bed, denies pain and discomfort.

## 2019-10-25 NOTE — HOSPICE
LCSW met with patient and her children at bedside this morning. Patient appears to be transitioning, however LCSW provided daughter with LTC facilities as promised should they be needed. Daughter prefers single room and LCSW able to locate one at [facility]. Daughter shared that she believes her mother is comfortable and they had a funny interaction yesterday. She is enjoying those small moments but both children can see that their mother is less responsive today. MCKAY De Leon, [facility] Social Worker 
[phone]

## 2019-10-25 NOTE — PROGRESS NOTES
[employer] Good Help to Those in Need 
[phone] Patient Name: Desean Cox YOB: 1940 Date of Provider Hospice Visit: 10/25/19 Level of Care:   [x] General Inpatient (GIP)    [] Routine   [] Respite Current Location of Care: 
[] [facility] [] [facility] [] [facility] [] [facility] [x] Hospice [facility] IF [facility], patient referred from: 
[x] [facility] [] [facility] [] [facility] [] [facility] [] Home [] Other:  
 
Date of Original Hospice Admission: 10-23-19 Hospice Medical Director at time of admission: Dr Amira Chavez Principle Hospice Diagnosis: Malignant neoplasm of pancreas, unspecified location of malignancy Benson Cox is a 70y.o. year old who was admitted to [facility]. The patient's principle diagnosis has resulted in weakness, debility, fatigue, shortness of breath, pain, anxiety, agitation, delirium, confusion. With end stage pancreatic cancer and multiple symptom issues. Functionally, the patient's Karnofsky and/or Palliative Performance Scale has declined over a period of days and is estimated at 20%. The patient is dependent on the following ADLs: pt is completely dependent for all ADLs. Objective information that support this patients limited prognosis includes: . The patient/family chose comfort measures with the support of Hospice. HOSPICE DIAGNOSES Active Symptoms: 1. Shortness of breath 2. Increased secretions 3. Generalized pain: non verbal 
4. Anxiety 5. Nausea 6. Decreased responsiveness PLAN 1. Continue GIP level of care for overwhelming symptoms, the need for IV medications, frequent assessments. 2. Continue scheduled and prn morphine and lorazepam  
3. Add atropine drops as needed for secretions management in addition to robinul 4. Zofran and compazine as needed 5. Continue comfort care 6.  and SW to support family needs 7. Disposition: to routine level of care once symptoms resolve Prognosis estimated based on 10/25/19 clinical assessment is:  
[x] Hours to Days   
[] Days to Weeks   
[] Other: 
 
Communicated plan of care with: Hospice Case Manager; Hospice IDT; Care Team 
 
 GOALS OF CARE Patient/Medical POA stated Goal of Care: comfort care 
 
[x] I have reviewed and/or updated ACP information in the Advance Care Planning Navigator. This information is available in the [address] link in the patient's chart header. Primary Decision [address] (Health Care Agent):   Primary Decision Maker (Active): Haily Allen - Daughter - [phone] Secondary Decision Maker: Rosalina Meier - Son - [phone] Resuscitation Status: DNR If DNR is there a Durable DNR on file? : [] Yes had inpt code order DNR [] No (If no, complete Durable DNR) HISTORY History obtained from: chart, SN, CM, family, patient CHIEF COMPLAINT:   
The patient is:  
[] Verbal 
[x] Nonverbal 
[] Unresponsive HPI/SUBJECTIVE:pt remains lethargic, some secretions. Got scheduled doses of morphine and ativan and 1 prn morphine and 2 prn ativam 
 
 REVIEW OF SYSTEMS The following systems were: [] reviewed  [] unable to be reviewed Positive ROS include: 
Constitutional: fatigue, weakness, in pain, short of breath Ears/nose/mouth/throat: increased airway secretions Respiratory:shortness of breath, wheezing Gastrointestinal:poor appetite, nausea, vomiting, abdominal pain, constipation, diarrhea Musculoskeletal:pain, deformities, swelling legs Neurologic:confusion, hallucinations, weakness Psychiatric:anxiety, feeling depressed, poor sleep Endocrine:  
 
Adult Non-Verbal Pain Assessment Score: denies at this time 5/10 Face 
[] 0   No particular expression or smile 
[x] 1   Occasional grimace, tearing, frowning, wrinkled forehead 
[] 2   Frequent grimace, tearing, frowning, wrinkled forehead Activity (movement) [] 0   Lying quietly, normal position [x] 1   Seeking attention through movement or slow, cautious movement 
[] 2   Restless, excessive activity and/or withdrawal reflexes Guarding 
[x] 0   Lying quietly, no positioning of hands over areas of body 
[] 1   Splinting areas of the body, tense 
[] 2   Rigid, stiff Physiology (vital signs) 
[] 0   Stable vital signs [x] 1   Change in any of the following: SBP > 20mm Hg; HR > 20/minute 
[] 2   Change in any of the following: SBP > 30mm Hg; HR > 25/minute Respiratory 
[] 0   Baseline RR/SpO2, compliant with ventilator 
[] 1   RR > 10 above baseline, or 5% drop SpO2, mild asynchrony with ventilator 
[x] 2   RR > 20 above baseline, or 10% drop SpO2, asynchrony with ventilator FUNCTIONAL ASSESSMENT Palliative Performance Scale (PPS): 20% PSYCHOSOCIAL/SPIRITUAL ASSESSMENT Active Problems: * No active hospital problems. * 
 
Past Medical History:  
Diagnosis Date  Coronary artery disease of native artery of native heart with stable angina pectoris ([facility] [address] 2/28/2019  Cough due to ACE inhibitor  Diarrhea  Hematoma of rectus sheath 2/28/2019  IBS (irritable bowel syndrome) IBS  NSTEMI (non-ST elevated myocardial infarction) ([address] 2/28/2019  Pancreatitis  Poor appetite  Pure hypercholesterolemia 2/28/2019  Systolic CHF, chronic ([address] 2/28/2019 Past Surgical History:  
Procedure Laterality Date  COLONOSCOPY Left 11/1/2017 COLONOSCOPY performed by Garcia Henry MD at [address]  HX CHOLECYSTECTOMY  HX GI    
 surgery for hiatal hernia  HX GI  09/27/2019 Gastrojejunostomy  HX ORTHOPAEDIC    
 DJD, doing PT weekly through [facility]  IR [facility]  1/30/2019 Social History Tobacco Use  Smoking status: Former Smoker  Smokeless tobacco: Never Used  Tobacco comment: quit smoking cigarettes 6 yrs ago Substance Use Topics  Alcohol use: Yes   Comment: very occasional  
 Family History Problem Relation Age of Onset  Dementia Mother   
     alzheimers  Cancer Sister   
     gallbladder  Cancer Brother   
     pancreatic  Breast Cancer Paternal Grandmother  Dementia Sister   
     alzheimers  Heart Surgery Brother   
     bypass Allergies Allergen Reactions  Penicillins Hives Has tolerated cefazolin, ceftriaxone and cefepime previously Current Facility-Administered Medications Medication Dose Route Frequency  glycopyrrolate (ROBINUL) injection 0.2 mg  0.2 mg IntraVENous Q4H PRN  
 morphine injection 4 mg  4 mg IntraVENous Q4H  
 LORazepam (ATIVAN) injection 1 mg  1 mg IntraVENous Q4H  
 bisacodyl (DULCOLAX) suppository 10 mg  10 mg Rectal DAILY PRN  
 morphine injection 4 mg  4 mg IntraVENous Q15MIN PRN  
 LORazepam (ATIVAN) injection 1 mg  1 mg IntraVENous Q15MIN PRN  
 ondansetron (ZOFRAN) injection 4 mg  4 mg IntraVENous Q4H PRN  prochlorperazine (COMPAZINE) injection 10 mg  10 mg IntraVENous Q6H PRN PHYSICAL EXAM  
 
Wt Readings from Last 3 Encounters:  
10/23/19 57.8 kg (127 lb 8 oz) 10/21/19 50.5 kg (111 lb 6.4 oz) 10/10/19 59 kg (130 lb 1.1 oz) Visit Vitals /52 (BP 1 Location: Left arm, BP Patient Position: Lying left side) Pulse 91 Temp 99.6 °F (37.6 °C) Resp 8 SpO2 (!) 89% Supplemental O2  [] Yes  [] NO Last bowel movement: PTA Currently this patient has: 
[] Peripheral IV [] PICC  [] PORT [] ICD [] Joyce Catheter [] NG Tube   [] PEG Tube   
[] Rectal Tube [] Drain 
[] Other:  
 
Constitutional: lethargic, some secretions in airways Eyes: pupils equal, anicteric ENMT: increased secretions Cardiovascular: regular rhythm, distal pulses intact Respiratory: breathing slightly labored, symmetric Gastrointestinal: soft non-tender, +bowel sounds Musculoskeletal: no deformity, no tenderness to palpation Skin: warm, dry, mottling + Neurologic:unresponsive due to lethargy Psychiatric:   
 
 
 
Pertinent Lab and or Imaging Tests: 
Lab Results Component Value Date/Time Sodium 141 10/22/2019 02:53 AM  
 Potassium 3.0 (L) 10/22/2019 02:53 AM  
 Chloride 106 10/22/2019 02:53 AM  
 CO2 28 10/22/2019 02:53 AM  
 Anion gap 7 10/22/2019 02:53 AM  
 Glucose 105 (H) 10/22/2019 02:53 AM  
 BUN 31 (H) 10/22/2019 02:53 AM  
 Creatinine 1.23 (H) 10/22/2019 02:53 AM  
 BUN/Creatinine ratio 25 (H) 10/22/2019 02:53 AM  
 GFR est AA 51 (L) 10/22/2019 02:53 AM  
 GFR est non-AA 42 (L) 10/22/2019 02:53 AM  
 Calcium 8.6 10/22/2019 02:53 AM  
 
Lab Results Component Value Date/Time Protein, total 5.8 (L) 10/22/2019 02:53 AM  
 Albumin 1.9 (L) 10/22/2019 02:53 AM  
 
   
 
  
 
Clair Phillips MD  
Time; 35mts

## 2019-10-25 NOTE — PROGRESS NOTES
Frequent monitoring and IV medication to provided comfort. Turning and positioning to prevent skin breakdown with focus on comfort. Attention to basic needs of declining patient

## 2019-10-25 NOTE — PROGRESS NOTES
Problem: Pressure Injury - Risk of 
Goal: *Prevention of pressure injury Description Document Travis Scale and appropriate interventions in the flowsheet. Outcome: Progressing Towards Goal 
Note:  
Pressure Injury Interventions: 
Sensory Interventions: Assess changes in LOC Moisture Interventions: Absorbent underpads Activity Interventions: Pressure redistribution bed/mattress(bed type) Mobility Interventions: HOB 30 degrees or less, Pressure redistribution bed/mattress (bed type) Nutrition Interventions: Document food/fluid/supplement intake, Offer support with meals,snacks and hydration Friction and Shear Interventions: Apply protective barrier, creams and emollients, Lift sheet, Minimize layers

## 2019-10-25 NOTE — PROGRESS NOTES
.. 
0700:Report received from [facility]. Pt is unresponsive to verbal and tactile stimuli, pt breathing is labored, grimacing noted 07:43:Medicated by Segun Mane RN with morphine and ativan. 08:15:Pt resting quietly daughter at bedside updated on care comfort and safety maintained. 0900: mouth care done, suction pt times one. 1000:Pt is resting quietly, no distress noted. 11:39:Medicated with morphine and ativan via IV by Kiran Gilbert RN 
12:30:Pt resting comfortable with no distress noted. 13:08:No changes resting quietly. 14:20:PRN morphine and ativan adm by Beckett Ridge Blanks for pain and SOB. 15:30:Repositioned for comfort mouth care done no distress noted. 16:30:Resting quietly, no discomfort noted o in use. 17:39:Schedule morphine and ativan adm by Kiran Gilbert RN  
1800: Turn and repositioned for comfort mouth care done. 1900:Report given to [address]  
 
 
NAME OF PATIENT: Gabrielle Pan 
  LEVEL OF CARE: [facility] 
REASON FOR GIP: N/A 
  
*PATIENT REMAINS ELIGIBLE FOR GIP LEVEL OF CARE AS EVIDENCED BY: Pain SOB and increase secretions  
 REASON FOR RESPITE:N/A 
 
  
O2 SAFETY: 
Concentrator positioning (6\" from furniture/drapes), Tanks stored in judd , No petroleum based products on face while oxygen in use and Oxygen sign on the door 
  
FALL INTERVENTIONS PROVIDED:  
Implemented/recommended use of fall risk identification flag to all team members, Implemented/recommended assistive devices and encouraged their use, Implemented/recommended resources for alarm system (personal alarm, bed alarm, call bell, etc.)  and Implemented/recommended environmental changes (remove hazards, lower bed, improve lighting, etc.) 
  INTERDISPLINARY COMMUNICATION/COLLABORATION: 
Physician, MSW, Blunt and RN, CNA 
  
NEW MEDICATION INITIATION DOCUMENTATION:N/A 
  
 Reason medication is being initiated:  N/A 
  
MD / Provider name consulted re: change in status / initiation of new medication:  N/A 
  
New Symptom(s):  N/A 
   
New Order(s):  N/A 
  
Name of the person notified of the changes:  N/A 
  
Name of person being taught:  N/A 
  
Instructions given:  N/A 
  
Side Effects taught:  N/A 
  
Response to teaching:  N/A 
  
  
COMFORTABLE DYING MEASURE: 
Is Patient/family satisfied with symptom level?  yes 
  
DISCHARGE PLAN:MD and MSW working on discharge.

## 2019-10-25 NOTE — PROGRESS NOTES
NAME OF PATIENT:  Loli Alva LEVEL OF CARE:  GIP 
 
REASON FOR GIP:  
Pain, despite numerous changes in medications, Unmanageable respiratory distress and Nausea and vomiting, despite changes to medications *PATIENT REMAINS ELIGIBLE FOR GIP LEVEL OF CARE AS EVIDENCED BY: Frequent need for IV medications to control Pain, N and V, and respiratory distress O2 SAFETY: 
Concentrator positioning (6\" from furniture/drapes) and On 4 L NC 
 
FALL INTERVENTIONS PROVIDED:  
Implemented/recommended increased supervision/assistance INTERDISPLINARY COMMUNICATION/COLLABORATION: 
Physician, MSW, [place] and RN, CNA 
 
NEW MEDICATION INITIATION DOCUMENTATION: 
N/A at this time Reason medication is being initiated:  N/A 
 
MD / Provider name consulted re: change in status / initiation of new medication:  N/A New Symptom(s):  N/A New Order(s):  N/A Name of the person notified of the changes:    No family present  DTR Rosa Boyleo available by phone Name of person being taught:  As above Instructions given:  N/A Side Effects taught:  N/A Response to teaching:  N/A 
 
 
COMFORTABLE DYING MEASURE: 
Is Patient/family satisfied with symptom level?  yes DISCHARGE PLAN:  Pending   Likely end of life 1900  Report from previous shift 2011  PA completed. Scheduled medications given. No signs of distress noted. Sonorous respirations noted but resolved with turn to R side. Will continue to monitor   
2102  Respirations even unlabored. No signs of distress. Will continue to monitor 2212  Snoring softly on R side without signs of distress. Will continue to monitor [address] soundly without signs of distress 2358  Held lorazepam 2nd to continued sedation vs decline. Scheduled morphine as per MARs. Changed abdominal dressing 2nd to saturated with bloody drainage. Client remains snoring without signs of distress. PP easily palpable. Will continue to monitor 0120  Client with coarse lung sounds. Suctions for sm amount of oral secretions. HRR at 96  Temp 99.6  Sats at 98%. Pulses remain easily palpable. Will continue to monitor 0145 Bed and bath completed. During turning client was able to cough and clear some secretions. Opened eyes some without tracking noted. Positioned on L side. Snoring softly s/p procedure. Will continue to monitor 0784  Snoring quietly on L side. No signs of distress. Will continue to monitor 0310 Snoring quietly on L side with RR of 12 unlabored in nature and HRR at 90  O2 sats of 98. Will continue to monitor 0060  Held scheduled lorazepam at this time as client relaxed without signs of agitation. Morphine given RR at 14 but some use of abdominal muscles to aid breathing. Will continue to monitor 0444 Suctioned for moderate amount of oral secretions leading to quieter breathing. RR at 14 HRR and strong radially. Will continue to monitor 0548  Resting quietly with unlabored respirations and no signs of distress. Will continue to monitor 0645  Continues to appear comfortable with coarse respirations noted. Will continue to monitor [id number] BeCouply Rian Rick to advise of noted decline and client now having some problems managing her secretions. Advised she may want to come to the hospice house this morning soon. Will continue to monitor 4113  Scheduled medications given. No signs of distress. Will continue to monitor

## 2019-10-26 NOTE — PROGRESS NOTES
Problem: Pressure Injury - Risk of 
Goal: *Prevention of pressure injury Description Document Travis Scale and appropriate interventions in the flowsheet. Outcome: Progressing Towards Goal 
Note:  
Pressure Injury Interventions: 
Sensory Interventions: Assess changes in LOC Moisture Interventions: Absorbent underpads Activity Interventions: Pressure redistribution bed/mattress(bed type) Mobility Interventions: HOB 30 degrees or less, Pressure redistribution bed/mattress (bed type) Nutrition Interventions: Document food/fluid/supplement intake, Offer support with meals,snacks and hydration Friction and Shear Interventions: Apply protective barrier, creams and emollients, Lift sheet, Minimize layers Problem: Pressure Injury - Risk of 
Goal: *Prevention of pressure injury Description Document Travis Scale and appropriate interventions in the flowsheet. Outcome: Progressing Towards Goal 
Note:  
Pressure Injury Interventions: 
Sensory Interventions: Assess changes in LOC Moisture Interventions: Absorbent underpads Activity Interventions: Pressure redistribution bed/mattress(bed type) Mobility Interventions: HOB 30 degrees or less, Pressure redistribution bed/mattress (bed type) Nutrition Interventions: Document food/fluid/supplement intake, Offer support with meals,snacks and hydration Friction and Shear Interventions: Apply protective barrier, creams and emollients, Lift sheet, Minimize layers Problem: Pressure Injury - Risk of 
Goal: *Prevention of pressure injury Description Document Travis Scale and appropriate interventions in the flowsheet. Outcome: Progressing Towards Goal 
Note:  
Pressure Injury Interventions: 
Sensory Interventions: Assess changes in LOC Moisture Interventions: Absorbent underpads Activity Interventions: Pressure redistribution bed/mattress(bed type) Mobility Interventions: HOB 30 degrees or less, Pressure redistribution bed/mattress (bed type) Nutrition Interventions: Document food/fluid/supplement intake, Offer support with meals,snacks and hydration Friction and Shear Interventions: Apply protective barrier, creams and emollients, Lift sheet, Minimize layers

## 2019-10-26 NOTE — PROGRESS NOTES
1605-Scheduled morphine and lorazepam administered; PRN robinol administered for upper airway secretions; patient turned to left side

## 2019-10-26 NOTE — PROGRESS NOTES
NAME OF PATIENT:  Nora Landry LEVEL OF CARE: GIP 
 
REASON FOR GIP:  
Pain, despite numerous changes in medications, Unmanageable respiratory distress, Medication adjustment that must be monitored 24/7 and Stabilizing treatment that cannot take place at home *PATIENT REMAINS ELIGIBLE FOR [facility] LEVEL OF CARE AS EVIDENCED BY: (MUST BE ADDRESSED OF PATIENT GIP) IV medications required to manage symptoms of pain. Frequent assessment and medication adjustments required. REASON FOR RESPITE: 
na 
 
O2 SAFETY: 
Concentrator positioning (6\" from furniture/drapes), No petroleum based products on face while oxygen in use and Oxygen sign on the door FALL INTERVENTIONS PROVIDED:  
Implemented/recommended use of fall risk identification flag to all team members, Implemented/recommended resources for alarm system (personal alarm, bed alarm, call bell, etc.) , Implemented/recommended environmental changes (remove hazards, lower bed, improve lighting, etc.) and Implemented/recommended increased supervision/assistance INTERDISPLINARY COMMUNICATION/COLLABORATION: 
Physician, MSW, [place] and RN, CNA 
 
NEW MEDICATION INITIATION DOCUMENTATION: 
Documentation completed in Clinical Note in [address] [facility], [place] Reason medication is being initiated:  na 
 
MD / Provider name consulted re: change in status / initiation of new medication:  n New Symptom(s):  NA New Order(s):  na 
 
Name of the person notified of the changes:  na 
 
Name of person being taught:  Jill Tavarez Instructions given:  [place] 
 
Side Effects taught:  [place] 
 
Response to teaching:  na 
 
 
COMFORTABLE DYING MEASURE: 
Is Patient/family satisfied with symptom level? Yes DISCHARGE PLAN:  Remain GIP until symptoms are managed. 1900 Report received from Terell anu, [facility]. Pt is [facility] level of care for management of pain. Dx Met Pancreatic Cancer. 2000 Pt is resting quietly in bed, unresponsive to stimulus.  Respirations are slow and shallow. Family at the bedside. Scheduled IV medications given to manage pain PRN dose of Robinul given for secretions heard in throat. Weak cough. 2030 Family are going to get food but will return. 2130 Son in law has returned to stay the night. 2230 Resting quietly. Scheduled IV medication effective to manage symptoms. 2330 Continue to monitor. Appears in comfort. 0010 Scheduled medication given IV to manage symptom of pain. Son in law remains awake, sitting in chair at bedside. 0100 Resting quietly. Secretions improved with Robinul given earlier. 56 Son in law resting on sofa. Pt appears in comfort. 
0300 Continue to monitor and assess for signs of ain. 0400 Scheduled medication given IV to manage pain. Pt repositioned to her left side. No s/s of pain. Medication is effective and required to manage pain. 0500 Resting quietly, appears to be sleeping. 0530 Complete bath given by CNA. Pt repositioned for comfort. 0630 Resting quietly, continue to monitor. 0700 Report to oncoming nurse.

## 2019-10-26 NOTE — PROGRESS NOTES
Problem: Pressure Injury - Risk of 
Goal: *Prevention of pressure injury Description Document Travis Scale and appropriate interventions in the flowsheet. 10/26/2019 0039 by Vita Matias RN Outcome: Progressing Towards Goal 
Note:  
Pressure Injury Interventions: 
Sensory Interventions: Assess changes in LOC Moisture Interventions: Absorbent underpads Activity Interventions: Pressure redistribution bed/mattress(bed type) Mobility Interventions: HOB 30 degrees or less, Pressure redistribution bed/mattress (bed type) Nutrition Interventions: Document food/fluid/supplement intake, Offer support with meals,snacks and hydration Friction and Shear Interventions: Apply protective barrier, creams and emollients, Lift sheet, Minimize layers

## 2019-10-26 NOTE — PROGRESS NOTES
.. 
0700:Report received from [facility] and [facility] Pt is unresponsive to verbal and tactile stimuli, no shallow resp noted bowel sound hypo active in all 4 quadrants 0800:Schedule IV meds adm by Karen Melendez RN 
0900:Pt resting comfortable. 10:10:Pt continues to rest comfortable 11:07:Pt med with schedule morphine ativan and PRN Robinul. 1200:Pt remain comfortable from meds that was adm, secretions are less. 13:45:Turn and repositioned for comfort mouth care done. 1500:Remains comfortable. 1600:Schedule meds adm by Mane Campbell RN pt repositioned, mouth care done. PRN Robinul adm . 17:30:Pt remains comfortable. 18:14:Resting quietly, continue to monitor. 1900:Report given to [facility] RN. NAME OF PATIENT:Nasir Ambrosio 
  LEVEL OF CARE:GIP 
REASON FOR GIP: N/A 
  
*PATIENT REMAINS ELIGIBLE FOR [facility] LEVEL OF CARE AS EVIDENCED BY:Pain resp distress and secretions  
 REASON FOR RESPITE:N/A 
 
  
O2 SAFETY: 
Concentrator positioning (6\" from furniture/drapes), Tanks stored in judd , No petroleum based products on face while oxygen in use and Oxygen sign on the door 
  
FALL INTERVENTIONS PROVIDED:  
Implemented/recommended use of fall risk identification flag to all team members, Implemented/recommended assistive devices and encouraged their use, Implemented/recommended resources for alarm system (personal alarm, bed alarm, call bell, etc.)  and Implemented/recommended environmental changes (remove hazards, lower bed, improve lighting, etc.) 
  INTERDISPLINARY COMMUNICATION/COLLABORATION: 
Physician, MSW, [place] and RN, CNA 
  
NEW MEDICATION INITIATION DOCUMENTATION:N/A 
  
 Reason medication is being initiated:  N/A 
  
MD / Provider name consulted re: change in status / initiation of new medication:  N/A 
  
New Symptom(s):  N/A 
  
New Order(s):  N/A 
  
Name of the person notified of the changes:  N/A 
  
Name of person being taught:  N/A 
  
Instructions given:  N/A 
  
 Side Effects taught:  N/A 
  
Response to teaching:  N/A 
  
  
COMFORTABLE DYING MEASURE: 
Is Patient/family satisfied with symptom level?  yes 
  
DISCHARGE PLAN:Pt will remain at [facility] until she passes.

## 2019-10-26 NOTE — PROGRESS NOTES
Problem: Pressure Injury - Risk of 
Goal: *Prevention of pressure injury Description Document Travis Scale and appropriate interventions in the flowsheet. Outcome: Not Progressing Towards Goal 
Note:  
Pressure Injury Interventions: 
Sensory Interventions: Assess changes in LOC Moisture Interventions: Absorbent underpads Activity Interventions: Pressure redistribution bed/mattress(bed type) Mobility Interventions: HOB 30 degrees or less, Pressure redistribution bed/mattress (bed type) Nutrition Interventions: Document food/fluid/supplement intake, Offer support with meals,snacks and hydration Friction and Shear Interventions: Apply protective barrier, creams and emollients, HOB 30 degrees or less, Lift sheet, Minimize layers Problem: Patient Education: Go to Patient Education Activity Goal: Patient/Family Education Outcome: Progressing Towards Goal 
  
Problem: Falls - Risk of 
Goal: *Absence of Falls Description Document Suzette Dougherty Fall Risk and appropriate interventions in the flowsheet. Outcome: Progressing Towards Goal 
Note:  
Fall Risk Interventions: 
Mobility Interventions: Bed/chair exit alarm Medication Interventions: Bed/chair exit alarm, Evaluate medications/consider consulting pharmacy Elimination Interventions: Bed/chair exit alarm History of Falls Interventions: Bed/chair exit alarm, Evaluate medications/consider consulting pharmacy Problem: [employer] Goal: Patient/family will receive support from community resources Description LCSW assisting family with locating LTC facility for planning of next steps for patient's disposition.   
Outcome: Progressing Towards Goal

## 2019-10-27 NOTE — PROGRESS NOTES
NAME OF PATIENT:  Edy Melgar LEVEL OF CARE:  [facility] 
 
REASON FOR GIP:  
Pain, despite numerous changes in medications, Medication adjustment that must be monitored 24/7 and Stabilizing treatment that cannot take place at home *PATIENT REMAINS ELIGIBLE FOR [facility] LEVEL OF CARE AS EVIDENCED BY: (MUST BE ADDRESSED OF PATIENT GIP) REASON FOR RESPITE: 
NA 
 
O2 SAFETY: 
Concentrator positioning (6\" from furniture/drapes), Tanks stored in judd , No petroleum based products on face while oxygen in use and Oxygen sign on the door FALL INTERVENTIONS PROVIDED:  
Implemented/recommended use of non-skid footwear, Implemented/recommended use of fall risk identification flag to all team members, Implemented/recommended assistive devices and encouraged their use, Implemented/recommended resources for alarm system (personal alarm, bed alarm, call bell, etc.) , Implemented/recommended environmental changes (remove hazards, lower bed, improve lighting, etc.) and Implemented/recommended increased supervision/assistance INTERDISPLINARY COMMUNICATION/COLLABORATION: 
Physician, MSW, [place] and RN, CNA 
 
NEW MEDICATION INITIATION DOCUMENTATION: 
NA 
 
Reason medication is being initiated:  GIP 
 
MD / Provider name consulted re: change in status / initiation of new medication:  GIP New Symptom(s):  NA New Order(s):  NA 
 
Name of the person notified of the changes:  NA 
 
Name of person being taught:  NA Instructions given:  NA Side Effects taught:  NA 
 
Response to teaching:  NA 
 
 
COMFORTABLE DYING MEASURE: 
Is Patient/family satisfied with symptom level?  yes DISCHARGE PLAN:  Pending 19:15 Shift report received from [facility] LPN 
49:66 Patient lying in bed eyes closed unresponsive to verbal and tactile stimulus. No signs of pain or agitations. Respirations shallow with periods of apnea and increased secretions.  
20:00 Patient medicated with scheduled Morphine, Lorazepam and prn Robinul, turned and repositioned for comfort. Will cont to monitor. 21:00 Patient resting quietly, shallow respirations with periods of apnea, secretions improved with prn Robinul. 22:00 Patient resting quietly, medications effective, no signs of pain or discomfort noted. Will cont to monitor. 23:45 Patient medicated with scheduled Morphine and Lorazepam and prn Robinul. Turned and repositioned for comfort, no signs of pain or discomfort noted. 00:45 Patient resting quietly, no signs of pain or discomfort noted, shallow breathing with periods of apnea. 02:00 Patient resting quietly, no signs of pain or discomfort noted, shallow breathing with periods of apnea, mouth care provided. Will cont to monitor. 03:00 Patient resting quietly, no signs of pain or discomfort noted,appears comfortable. 04:00 Patient medicated with scheduled Morphine and Lorazepam, complete bed bath and linen change provided by Tiffanie Preciado CNA, turned and repositioned for comfort, facial grimacing noted. Will cont to monitor. 05:00 Patient resting quietly, medications managing symptoms, appears comfortable. Will cont to monitor. 06:00 Patient remains unresponsive to verbal and tactile stimulus, turned and repositioned for comfort, mouth care provided. No facial grimacing or signs of discomfort noted. 07:00 Shift report given to oncoming Nurse.

## 2019-10-27 NOTE — PROGRESS NOTES
Problem: Pressure Injury - Risk of 
Goal: *Prevention of pressure injury Description Document Travis Scale and appropriate interventions in the flowsheet. Outcome: Progressing Towards Goal 
Note:  
Pressure Injury Interventions: 
Sensory Interventions: Assess changes in LOC Moisture Interventions: Absorbent underpads Activity Interventions: Pressure redistribution bed/mattress(bed type) Mobility Interventions: HOB 30 degrees or less, Pressure redistribution bed/mattress (bed type) Nutrition Interventions: Document food/fluid/supplement intake, Offer support with meals,snacks and hydration Friction and Shear Interventions: Apply protective barrier, creams and emollients, HOB 30 degrees or less, Lift sheet, Minimize layers

## 2019-10-27 NOTE — PROGRESS NOTES
[facility] Good Help to Those in Need 
[phone] Patient Name: Timothy Ramírez YOB: 1940 Date of Provider Hospice Visit: 10/27/19 Level of Care:   [x] General Inpatient (GIP)    [] Routine   [] Respite Current Location of Care: 
[] [facility] [] [facility] [] [address] [] [facility] [x] Hospice House [facility], patient referred from: 
[x] [facility] [] [facility] [] [address] [] [facility] [] Home [] Other:  
 
Date of Original Hospice Admission: 10-23-19 Hospice Medical Director at time of admission: Dr Shahab Galloway Principle Hospice Diagnosis: Malignant neoplasm of pancreas, unspecified location of malignancy Sarah Ramírez is a 70y.o. year old who was admitted to [facility]. The patient's principle diagnosis has resulted in weakness, debility, fatigue, shortness of breath, pain, anxiety, agitation, delirium, confusion. With end stage pancreatic cancer and multiple symptom issues. Functionally, the patient's Karnofsky and/or Palliative Performance Scale has declined over a period of days and is estimated at 20%. The patient is dependent on the following ADLs: pt is completely dependent for all ADLs. Objective information that support this patients limited prognosis includes: 
 . CT ABD/PELV on 10/21/19: IMPRESSION: 
 1. Possible increasing fluid collection or mass in the pancreatic body. 2. Diffuse hepatic metastatic deposits, stable to slightly increased since the 
prior study allowing for differences in technique. 3. Pulmonary nodules (5.5 mm largest) for which follow-up CT could be considered 
for surveillance. 4. Stable dilatation of the duodenum. 5. Persistent but significantly decreased incisional fluid collection in the 
anterior abdominal wall. 6. Stable pelvic fluid collection deep to the anterior pelvic wall. The patient/family chose comfort measures with the support of Hospice. HOSPICE DIAGNOSES Active Symptoms: 1. Shortness of breath 2. Increased secretions 3. Generalized pain: non verbal 
4. Anxiety 5. Nausea 6. Decreased responsiveness PLAN 1. Continue GIP level of care for overwhelming symptoms, the need for IV medications, frequent assessments. 2. Continue scheduled and prn morphine and lorazepam  
3. Add atropine drops as needed for secretions management in addition to robinul 4. Zofran and compazine as needed 5. Continue comfort care 6.  and SW to support family needs 7. Disposition: to routine level of care once symptoms resolve Prognosis estimated based on 10/27/19 clinical assessment is:  
[x] Hours to Days   
[] Days to Weeks   
[] Other: 
 
Communicated plan of care with: Hospice Case Manager; Hospice IDT; Care Team 
 
 GOALS OF CARE Patient/Medical POA stated Goal of Care: comfort care 
 
[x] I have reviewed and/or updated ACP information in the Advance Care Planning Navigator. This information is available in the [address] [place] [address] Drive link in the patient's chart header. Primary Decision [facility] (Health Care Agent):   Primary Decision Maker (Active): Dinh Deacon - Daughter - [phone] Secondary Decision Maker: Betty Roberson - Son - [phone] Resuscitation Status: DNR If DNR is there a Durable DNR on file? : [] Yes had inpt code order DNR [] No (If no, complete Durable DNR) HISTORY History obtained from: chart, SN, CM, family, CHIEF COMPLAINT:   
The patient is:  
[] Verbal 
[x] Nonverbal 
[] Unresponsive HPI/SUBJECTIVE: 
 
Decline over the weekend. Secretions improved with robinul and atropine drops. Scheduled medications have improved comfort. No longer responsive to stimuli REVIEW OF SYSTEMS The following systems were: [] reviewed  [x] unable to be reviewed Positive ROS include: 
Constitutional: fatigue, weakness, in pain, short of breath Ears/nose/mouth/throat: increased airway secretions Respiratory:shortness of breath, wheezing Gastrointestinal:poor appetite, nausea, vomiting, abdominal pain, constipation, diarrhea Musculoskeletal:pain, deformities, swelling legs Neurologic:confusion, hallucinations, weakness Psychiatric:anxiety, feeling depressed, poor sleep Endocrine:  
 
Adult Non-Verbal Pain Assessment Score: denies at this time 4/10 Face 
[] 0   No particular expression or smile 
[x] 1   Occasional grimace, tearing, frowning, wrinkled forehead 
[] 2   Frequent grimace, tearing, frowning, wrinkled forehead Activity (movement) [x] 0   Lying quietly, normal position 
[] 1   Seeking attention through movement or slow, cautious movement 
[] 2   Restless, excessive activity and/or withdrawal reflexes Guarding 
[x] 0   Lying quietly, no positioning of hands over areas of body 
[] 1   Splinting areas of the body, tense 
[] 2   Rigid, stiff Physiology (vital signs) 
[] 0   Stable vital signs [x] 1   Change in any of the following: SBP > 20mm Hg; HR > 20/minute 
[] 2   Change in any of the following: SBP > 30mm Hg; HR > 25/minute Respiratory 
[] 0   Baseline RR/SpO2, compliant with ventilator 
[] 1   RR > 10 above baseline, or 5% drop SpO2, mild asynchrony with ventilator 
[x] 2   RR > 20 above baseline, or 10% drop SpO2, asynchrony with ventilator FUNCTIONAL ASSESSMENT Palliative Performance Scale:10% PSYCHOSOCIAL/SPIRITUAL ASSESSMENT Active Problems: * No active hospital problems. * 
 
Past Medical History:  
Diagnosis Date  Coronary artery disease of native artery of native heart with stable angina pectoris ([facility] 2/28/2019  Cough due to ACE inhibitor  Diarrhea  Hematoma of rectus sheath 2/28/2019  IBS (irritable bowel syndrome) IBS  NSTEMI (non-ST elevated myocardial infarction) ([facility] 2/28/2019  Pancreatitis  Poor appetite  Pure hypercholesterolemia 2/28/2019  Systolic CHF, chronic ([address] 2/28/2019 Past Surgical History:  
Procedure Laterality Date  COLONOSCOPY Left 11/1/2017 COLONOSCOPY performed by Erik Fuentes MD at [address]  HX CHOLECYSTECTOMY  HX GI    
 surgery for hiatal hernia  HX GI  09/27/2019 Gastrojejunostomy  HX ORTHOPAEDIC    
 DJD, doing PT weekly through [facility]  IR [facility]  1/30/2019 Social History Tobacco Use  Smoking status: Former Smoker  Smokeless tobacco: Never Used  Tobacco comment: quit smoking cigarettes 6 yrs ago Substance Use Topics  Alcohol use: Yes Comment: very occasional  
 
Family History Problem Relation Age of Onset  Dementia Mother   
     alzheimers  Cancer Sister   
     gallbladder  Cancer Brother   
     pancreatic  Breast Cancer Paternal Grandmother  Dementia Sister   
     alzheimers  Heart Surgery Brother   
     bypass Allergies Allergen Reactions  Penicillins Hives Has tolerated cefazolin, ceftriaxone and cefepime previously Current Facility-Administered Medications Medication Dose Route Frequency  glycopyrrolate (ROBINUL) injection 0.2 mg  0.2 mg IntraVENous Q4H PRN  
 atropine 1 % ophthalmic solution 3 Drop  3 Drop SubLINGual TID PRN  
 morphine injection 4 mg  4 mg IntraVENous Q4H  
 LORazepam (ATIVAN) injection 1 mg  1 mg IntraVENous Q4H  
 bisacodyl (DULCOLAX) suppository 10 mg  10 mg Rectal DAILY PRN  
 morphine injection 4 mg  4 mg IntraVENous Q15MIN PRN  
 LORazepam (ATIVAN) injection 1 mg  1 mg IntraVENous Q15MIN PRN  
 ondansetron (ZOFRAN) injection 4 mg  4 mg IntraVENous Q4H PRN  prochlorperazine (COMPAZINE) injection 10 mg  10 mg IntraVENous Q6H PRN PHYSICAL EXAM  
 
Wt Readings from Last 3 Encounters:  
10/23/19 57.8 kg (127 lb 8 oz) 10/21/19 50.5 kg (111 lb 6.4 oz) 10/10/19 59 kg (130 lb 1.1 oz) Visit Vitals /50 (BP 1 Location: Left arm, BP Patient Position: Lying right side) Pulse (!) 107 Temp 99.7 °F (37.6 °C) Resp 20 SpO2 95% Supplemental O2  [x] Yes  [] NO Last bowel movement: PTA Currently this patient has: 
[x] Peripheral IV [] PICC  [] PORT [] ICD [x] Joyce Catheter [] NG Tube   [] PEG Tube   
[] Rectal Tube [] Drain 
[] Other:  
 
Constitutional: unresponsive, appears comfortable Eyes:eyes closed ENMT:mouth closed,no evidence of excess secretions Cardiovascular: regular rhythm, distal pulses intact Respiratory: breathing slightly labored, symmetric Gastrointestinal: soft non-tender, +bowel sounds Musculoskeletal: no deformity, no tenderness to palpation Skin: warm, dry, mottling + Neurologic:unresponsive Psychiatric:  TOR Pertinent Lab and or Imaging Tests: 
Lab Results Component Value Date/Time Sodium 141 10/22/2019 02:53 AM  
 Potassium 3.0 (L) 10/22/2019 02:53 AM  
 Chloride 106 10/22/2019 02:53 AM  
 CO2 28 10/22/2019 02:53 AM  
 Anion gap 7 10/22/2019 02:53 AM  
 Glucose 105 (H) 10/22/2019 02:53 AM  
 BUN 31 (H) 10/22/2019 02:53 AM  
 Creatinine 1.23 (H) 10/22/2019 02:53 AM  
 BUN/Creatinine ratio 25 (H) 10/22/2019 02:53 AM  
 GFR est AA 51 (L) 10/22/2019 02:53 AM  
 GFR est non-AA 42 (L) 10/22/2019 02:53 AM  
 Calcium 8.6 10/22/2019 02:53 AM  
 
Lab Results Component Value Date/Time  Protein, total 5.8 (L) 10/22/2019 02:53 AM  
 Albumin 1.9 (L) 10/22/2019 02:53 AM  
 
   
 
  
 
Anita Najera NP

## 2019-10-27 NOTE — PROGRESS NOTES
0800 Patient unresponsive at this time. Patient does not appear to be in any pain . IV to right ac dressing dry and intact. Family at bedside and patient appears to be Comfortable at this time. Hypoactive bowel sounds. Lungs clear and diminished in the bases. No edema noted. Joyce draining dark steph urine. Respirations at 20. Oxygen via nasal cannula at 4 liters. Will continue to monitor . 9731  Scheduled Ativan and morphine were given. Patient appears to be comfortable at this time. Respirations are at 20.  
 
1025  Patient turned to left side at this time. Patient appears to be comfortable and respirations at 20. Redness noted to sacrum and ointment applied. Will continue to monitor patient. 1130  Patient appears to be comfortable at this time. Respirations are at 12. Daughter at bedside will continue to monitor. 1205  Scheduled Morphine  and Ativan given at this time. Patient appears to be resting comfortably at this time. Family at bedside. 1300  Patient appears to be comfortable at this time. Respirations are 12. Son at bedside. 1445  Patient turned to right side. Patient appears to be comfortable at this time. Respirations at 12 . 1600 Scheduled Morphine and Ativan given at this time. Patient appears to be comfortable at this time. Respirations remain at 12 oxygen via nc at 4 liters. [id number]  Patient appears to be comfortable at this time. Son at bedside. Respirations at 12.  
 
1755  Patient appears  To be comfortable. Son and daughter at bedside. Respirations are 9 at this time.

## 2019-10-27 NOTE — PROGRESS NOTES
NAME OF PATIENT:  Kristel Dolan LEVEL OF CARE:  [facility] 
 
REASON FOR GIP:  
Pain, despite numerous changes in medications, Nausea and vomiting, despite changes to medications, Medication adjustment that must be monitored 24/7 and Stabilizing treatment that cannot take place at home *PATIENT REMAINS ELIGIBLE FOR [facility] LEVEL OF CARE AS EVIDENCED BY: (MUST BE ADDRESSED OF PATIENT GIP) REASON FOR RESPITE: 
NA 
 
O2 SAFETY: 
Concentrator positioning (6\" from furniture/drapes), Tanks stored in judd , No petroleum based products on face while oxygen in use and Oxygen sign on the door FALL INTERVENTIONS PROVIDED:  
Implemented/recommended use of non-skid footwear, Implemented/recommended use of fall risk identification flag to all team members, Implemented/recommended assistive devices and encouraged their use, Implemented/recommended resources for alarm system (personal alarm, bed alarm, call bell, etc.) , Implemented/recommended environmental changes (remove hazards, lower bed, improve lighting, etc.) and Implemented/recommended increased supervision/assistance INTERDISPLINARY COMMUNICATION/COLLABORATION: 
Physician, MSW, Miranda and RN, CNA 
 
NEW MEDICATION INITIATION DOCUMENTATION: 
NA 
 
Reason medication is being initiated:  NA 
 
MD / Provider name consulted re: change in status / initiation of new medication:  NA New Symptom(s):  NA New Order(s):  NA 
 
Name of the person notified of the changes:  NA 
 
Name of person being taught:  NA Instructions given:  NA Side Effects taught:  NA 
 
Response to teaching:  NA 
 
 
COMFORTABLE DYING MEASURE: 
Is Patient/family satisfied with symptom level?  yes DISCHARGE PLAN:  Pending 19:00 Shift report received from [address] [place] [address] 
19:30 Patient lying in bed, eyes closed unresponsive to verbal or tactile stimulus,no sign of pain or discomfort noted.  
20:00 Patient medicated with scheduled Morphine and Lorazepam. Turned and repositioned, no signs or symptoms of pain or discomfort noted. 21:00 Patient remains unresponsive to pain and tactile stimulus, respirations shallow. No signs of pain or discomfort noted. Daughter at bedside emotional support provided. 22:00 Patient turned and repositioned for comfort, no signs of pain or discomfort noted, appears comfortable. 23:45 Patient medicated with scheduled Morphine and Lorazepam, no facial grimacing or signs of discomfort noted, shallow respirations with periods of apnea. 01:00 Patient resting quietly,remains unresponsive to verbal and tactile stimulus, no signs of pain or discomfort noted. 02:00 Complete bed bath and linen change provided by Mariposa Caldwell CNA, turned and repositioned for comfort, mouth care provided, resting comfortably. Will cont to monitor. 03:00 Patient resting quietly, no facial grimacing or signs of discomfort noted. 04:00 Patient medicated with scheduled Morphine and Lorazepam, no signs or symptoms of pain or discomfort noted, medications managing symptoms, resting quietly. 05:00 Patient resting quietly, no signs of pain or discomfort noted. 06:00 Patient turned and repositioned for comfort, remains unresponsive to verbal and tactile stimulus,no signs of pain or discomfort, medications managing symptoms.

## 2019-10-28 NOTE — PROGRESS NOTES
0710 report received from [facility]. 
0720 pt in bed, resp even and unlabored with periods of an irregular rate. No s/sx of distress at this time. Bed low, side rails up x2, call bell in reach. Daughter has entered the room to visit with pt.   
0810 pt in bed, unresponsive. resp even and unlabored. Noted some periods of apnea. No current s/sx of distress. dtr at bedside. Bed low, side rails up x2, call bell in reach. 0910 pt in bed, unresponsive. resp even and unlabored. Noted some periods of apnea with gasps. Bed low, side rails up x2, call bell in reach. 1000 pt in bed, unresponsive. Periods of apnea noted. resp unlabored and irregular. No s/sx of distress. Bed low, side rails up x3, Dtr at the bedside. Pt positioned with pillows. 1050 pt in bed, unresponsive. resp even and unlabored. Bed low, side rails up x3, call bell in reach. 1100 notified  for spiritual support for patient and family. 1150 pt in bed, unresponsive. resp even and unlabored, irregular rate at times. Noted some periods of apnea. Bed low, side rails up x2. Call bell in reach. 1230 pt and family had a visit from [facility]. AT [place]. 1240 pt in bed, unresponsive. Pt continues to require IV medications to decrease s/sx of distress. Bed low, side rails  Up x3, call bell in reach. 1300 pt repositioned with pillows. Tolerated well. Noted 2 periods of apnea. Bed low, side rails up x2, call bell in reach. 1330 pt in bed, unresponsive. Pt continues to have periods of apnea. Bed low, side rails up x3. 
1340 discussed pt status with dr. Mónica Blunt. Discussed pts secretions, need for GIP, and end of life medications. 1400 completed rounds with dr. Mónica Blunt. New orders noted. 1520 pt in bed, unresponsive. Family at bedside. Bed low, side rails up x3, call bell in reach. 1600 pt in bed, unresponsive. IV meds given as per order. Family at bedside. Bed low, side rails up x3, call bell in reach. 1640 pt in bed, unresponsive. Family at bedside. resp even and unlabored. Bed low, side rails up x3, call bell in reach. 1700 pt in bed, unresponsive. Family at bedside. Periods of apnea noted. Bed low, side rails up x3, call bell in reach. 1735 IV robinol given per order. Bed low, side rails up x3, call bell in reach. Family at bedside. 1830 pt turned and positioned with pillows. Family at bedside. Bed low, sirde rails up x3, call bell in reach. 1850 pt in bed, unresponsive. resp even and unlabored. Family at bedside. Bed low, side rails up x3, call bell in reach. NAME OF PATIENT:  Rafat Reason LEVEL OF CARE:  GIP 
 
REASON FOR GIP:  
Medication adjustment that must be monitored 24/7 and Stabilizing treatment that cannot take place at home *PATIENT REMAINS ELIGIBLE FOR GIP LEVEL OF CARE AS EVIDENCED BY: (MUST BE ADDRESSED OF PATIENT GIP) REASON FOR RESPITE: 
n/a 
 
O2 SAFETY: 
Concentrator positioning (6\" from furniture/drapes) FALL INTERVENTIONS PROVIDED:  
Implemented/recommended environmental changes (remove hazards, lower bed, improve lighting, etc.) and Implemented/recommended increased supervision/assistance INTERDISPLINARY COMMUNICATION/COLLABORATION: 
Physician, MSW, [place] and RN, CNA 
 
NEW MEDICATION INITIATION DOCUMENTATION: 
Documentation completed in Clinical Note in [address] [facility] Reason medication is being initiated:  secretions MD / Provider name consulted re: change in status / initiation of new medication: Dr. Guevara Barraza New Symptom(s):  secretions New Order(s):  robinol scheduled Name of the person notified of the changes:  Daughter and son Name of person being taught:  dtr and son Instructions given:  verbally Side Effects taught: yes Response to teaching:  Verbalizes understanding COMFORTABLE DYING MEASURE: 
Is Patient/family satisfied with symptom level?  yes DISCHARGE PLAN:  pending

## 2019-10-28 NOTE — PROGRESS NOTES
Problem: Pressure Injury - Risk of 
Goal: *Prevention of pressure injury Description Document Travis Scale and appropriate interventions in the flowsheet. Outcome: Progressing Towards Goal 
Note:  
Pressure Injury Interventions: 
Sensory Interventions: Assess changes in LOC, Check visual cues for pain, Float heels, Keep linens dry and wrinkle-free, Maintain/enhance activity level, Minimize linen layers, Pressure redistribution bed/mattress (bed type) Moisture Interventions: Absorbent underpads, Apply protective barrier, creams and emollients, Limit adult briefs Activity Interventions: Pressure redistribution bed/mattress(bed type) Mobility Interventions: Float heels, HOB 30 degrees or less, Pressure redistribution bed/mattress (bed type) Nutrition Interventions: (npo) Friction and Shear Interventions: Apply protective barrier, creams and emollients, Foam dressings/transparent film/skin sealants, HOB 30 degrees or less, Lift sheet, Minimize layers

## 2019-10-28 NOTE — HOSPICE
LCSW met with daughter and patient at patient's bedside. Daughter in good spirits and plans to switch off visitation with her brother this afternoon to get a small break. She shared that her mom continues to hang on. Twin sister visiting over the weekend and patient's children not staying the night to be able to get some rest. LCSW encourage daughter to take care of herself as well. MCKAY Jefferson, [facility] H[facility] Social Worker 
[phone]

## 2019-10-28 NOTE — PROGRESS NOTES
Problem: Pressure Injury - Risk of 
Goal: *Prevention of pressure injury Description Document Travis Scale and appropriate interventions in the flowsheet. Outcome: Not Progressing Towards Goal 
Note:  
Pressure Injury Interventions: 
Sensory Interventions: Assess changes in LOC, Avoid rigorous massage over bony prominences, Check visual cues for pain, Float heels, Keep linens dry and wrinkle-free, Minimize linen layers, Pad between skin to skin Moisture Interventions: Absorbent underpads, Minimize layers Activity Interventions: Pressure redistribution bed/mattress(bed type) Mobility Interventions: Pressure redistribution bed/mattress (bed type), HOB 30 degrees or less Nutrition Interventions: Document food/fluid/supplement intake Friction and Shear Interventions: HOB 30 degrees or less, Lift sheet, Minimize layers Problem: Patient Education: Go to Patient Education Activity Goal: Patient/Family Education Outcome: Not Progressing Towards Goal 
  
Problem: Falls - Risk of 
Goal: *Absence of Falls Description Document Petros Tapia Fall Risk and appropriate interventions in the flowsheet. Outcome: Not Progressing Towards Goal 
Note:  
Fall Risk Interventions: 
Mobility Interventions: Communicate number of staff needed for ambulation/transfer Medication Interventions: Evaluate medications/consider consulting pharmacy Elimination Interventions: Toileting schedule/hourly rounds History of Falls Interventions: Door open when patient unattended Problem: Patient Education: Go to Patient Education Activity Goal: Patient/Family Education Outcome: Not Progressing Towards Goal 
  
Problem: [employer] Needs Goal: Patient/family will receive support from community resources Description LCSW assisting family with locating LTC facility for planning of next steps for patient's disposition.   
Outcome: Not Progressing Towards Goal

## 2019-10-29 NOTE — [facility] IDG CHAPLAIN NOTES
Patient: Timothy Ramírez 
 
Date: 10/29/19 Time: 12:12 AM 
 
[facility]  Notes Initial visit with pt and family per RN's request. Found pt sleeping, unable to respond, accompanied by son and daughter who was visiting from [place], [place]. Introduced self and role, proceeded to get acquainted with pt's children. Provided active listening while establishing rapport. Pt's children appeared to be coping well and self-reported the same. Explored spiritual needs, family expressed openness to prayer. Provided prayer at bedside. Family expressed appreciation. Advised of availability. Concluded visit as pt's  arrived to visit. Will follow up as able/needed and continue to provide spiritual support. Signed by: Amy Arthur

## 2019-10-29 NOTE — PROGRESS NOTES
Initial visit with pt and family per RN's request. Found pt sleeping, unable to respond, accompanied by son and daughter who was visiting from [place], [place]. Introduced self and role, proceeded to get acquainted with pt's children. Provided active listening while establishing rapport. Pt's children appeared to be coping well and self-reported the same. Explored spiritual needs, family expressed openness to prayer. Provided prayer at bedside. Family expressed appreciation. Advised of availability. Concluded visit as pt's  arrived to visit. Will follow up as able/needed. BONI Gonsalez. Bryanna Siddiqui

## 2019-10-29 NOTE — PROGRESS NOTES
NAME OF PATIENT:  Theresa Rodriguez LEVEL OF CARE:  GIP 
REASON FOR GIP:  
Pain, despite numerous changes in medications, Terminal agitation, despite changes to medications, Medication adjustment that must be monitored 24/7 and Stabilizing treatment that cannot take place at home *PATIENT REMAINS ELIGIBLE FOR GIP LEVEL OF CARE AS EVIDENCED BY: (MUST BE ADDRESSED OF PATIENT GIP) REASON FOR RESPITE: 
N/A 
 
O2 SAFETY: 
N/A 
 
FALL INTERVENTIONS PROVIDED:  
Implemented/recommended use of non-skid footwear, Implemented/recommended resources for alarm system (personal alarm, bed alarm, call bell, etc.) , Implemented/recommended environmental changes (remove hazards, lower bed, improve lighting, etc.) and Implemented/recommended increased supervision/assistance INTERDISPLINARY COMMUNICATION/COLLABORATION: 
Physician, MSW, Aby Peterson RN, CNA and LPN 
 
NEW MEDICATION INITIATION DOCUMENTATION: 
Consulted AT MD to report change in pt status and Obtained Order from Provider for initiation of symptom relief medication /other medication needed Reason medication is being initiated:  Fever 102.3 MD / Provider name consulted re: change in status / initiation of new medication:  Jones Salguero NP New Symptom(s): Fever 102.3 New Order(s):  Toradol 30mg IV Q 6 hours prn Name of the person notified of the changes:  Daughter Name of person being taught:  N/A Instructions given:  N/A Side Effects taught:  N/a Response to teaching:  N/A 
 
COMFORTABLE DYING MEASURE: 
Is Patient/family satisfied with symptom level?  yes DISCHARGE PLAN:  EOL 
 
 
1900 Received shift report from [place], [address] [place] [address]. Patient asleep in bed with HOB elevated. Skin warm and dry. Irregular respiratory pattern. No facial grimacing or signs of distress. 2000 Remains asleep with no signs of distress. Repositioned for comfort. 2120 Patient noted to have elevated Temp of 102.3. MD notified. Temperature lowered in room,blanket and sheet pulled back on patient for comfort. 2200 PRN order for Toradol received. Patient asleep with no signs of distress. 2300 Asleep in bed with no signs of pain or discomfort. Temp 99.6 after PRN med 
0000 Patient asleep in bed. No facial grimacing or signs of discomfort. Skin cool and dry. 0100 Asleep in bed, mouth open with periods of apnea dn irregular breathing. No signs of pain or discomfort. 0200 Asleep in bed with no signs of pain of discomfort.  
0300 Remains asleep in bed with eyes closed and mouth open. No facial grimacing or signs of discomfort. 0400 Patient remains asleep in bed. Very small amount of steph colored urine noted in morales bag. Skin warm and dry. 0500 Remains asleep in bed. No acute change. 0540 Axillary temp 100.4. PRN Med given for fever 5897 Axillary temp 100.5. Daughter at bedside.

## 2019-10-29 NOTE — PROGRESS NOTES
Problem: Pressure Injury - Risk of 
Goal: *Prevention of pressure injury Description Document Travis Scale and appropriate interventions in the flowsheet. Outcome: Progressing Towards Goal 
Note:  
Pressure Injury Interventions: 
Sensory Interventions: Assess changes in LOC, Avoid rigorous massage over bony prominences, Check visual cues for pain, Float heels, Keep linens dry and wrinkle-free, Minimize linen layers, Pad between skin to skin Moisture Interventions: Absorbent underpads, Minimize layers Activity Interventions: Pressure redistribution bed/mattress(bed type) Mobility Interventions: Pressure redistribution bed/mattress (bed type), HOB 30 degrees or less Nutrition Interventions: Document food/fluid/supplement intake Friction and Shear Interventions: HOB 30 degrees or less, Lift sheet, Minimize layers

## 2019-10-29 NOTE — PROGRESS NOTES
Initial visit with pt and family per RN's request. Found pt sleeping, unable to respond, accompanied by son and daughter who was visiting from [place], [place]. Introduced self and role, proceeded to get acquainted with pt's children. Provided active listening while establishing rapport. Pt's children appeared to be coping well and self-reported the same. Explored spiritual needs, family expressed openness to prayer. Provided prayer at bedside. Family expressed appreciation. Advised of availability. Concluded visit as pt's  arrived to visit. Will follow up as able/needed.

## 2019-10-29 NOTE — PROGRESS NOTES
Scheduled medications given for symptom management of dyspnea, upper airway secretions, and pain (non-verbal indicators): 
2040 - Ativan 1 mg IV and Morphine 4 mg IV. 4140 - Robinul 0.2 mg IV and Ativan 1 mg IV. Morphine 4 mg IV. 2174 - Ativan 1 mg IV. Morphine 4 mg IV. 7903 - Robinul 0.2 mg IV. 
 
2155 Rhina Galindo NP, notified of patient's fever 102.3 axillary; new order for Toradol received. Patient's daughter will be notified when she visits the patient during the day. PRN medications given for symptom management of new onset of fever: 
2211 - Toradol 30 mg IV. 
0556 - Toradol 30 mg IV. 0540 - Temperature = 100.4 axillary. No redness, swelling, or leaking at right upper arm IV site, #22.

## 2019-10-29 NOTE — PROGRESS NOTES
[employer] [facility] 
[phone] Patient Name: Azra Wiggins YOB: 1940 Date of Provider Hospice Visit: 10/28/19 Level of Care:   [x] General Inpatient (GIP)    [] Routine   [] Respite Current Location of Care: 
[] [facility] [] [facility] [] [facility] [] [facility] [x] Hospice [facility], patient referred from: 
[x] [facility] [] [facility] [] [facility] [] [facility] [] Home [] Other:  
 
Date of Original Hospice Admission: 10-23-19 Hospice Medical Director at time of admission: Dr Norma Cabrera Principle Hospice Diagnosis: Malignant neoplasm of pancreas, unspecified location of malignancy Dossie Michele From Dr. Norma Cabrera: 
 
Azra Wiggins is a 70y.o. year old who was admitted to [facility] for pancreatic cancer after an admission to [facility] for pancreatic cancer from 10/21 to 10/23/19 with multiple symptoms including weakness, shortness of breath, pain, and delirium. CT on 10/21/19 showed  IMPRESSION: 1. Possible increasing fluid collection or mass in the pancreatic body. 2. Diffuse hepatic metastatic deposits, stable to slightly increased since the prior study allowing for differences in technique. 3. Pulmonary nodules (5.5 mm largest) for which follow-up CT could be considered for surveillance. She is no longer a candidate for further disease directed therapy. Functionally, the patient's Karnofsky and/or Palliative Performance Scale has declined over a period of days and is estimated at 20%. The patient is dependent on the following ADLs: pt is completely dependent for all ADLs. HOSPICE DIAGNOSES Active Symptoms: 1. Shortness of breath 2. Airway secretions 3. Generalized non-verbal pain indicators 4. Decreased responsiveness PLAN 1. Continue GIP level of care for overwhelming symptoms, the need for IV medications, frequent assessments.   
2. Continue scheduled morphine 4mg every 4 hours IV and lorazepam 1mg every 4 hours IV 
 3. Robinul now scheduled 0.2mg IV every 6 hours 4. Fever: Add Toradol 30mg IV PRN fever > 101 
5. No PRN medications used in last 24 hours 6. Remove oxygen as not contributing to comfort at this time 7.  and SW to support family needs 8. Disposition: Unstable for transport / imminent Prognosis estimated based on 10/28/19 clinical assessment is:  
[x] Hours to Days   
[] Days to Weeks   
[] Other: 
 
Communicated plan of care with: Hospice Case Manager; Hospice IDT; Care Team 
 
 GOALS OF CARE Patient/Medical POA stated Goal of Care: comfort care 
 
[x] I have reviewed and/or updated ACP information in the Advance Care Planning Navigator. This information is available in the [address] link in the patient's chart header. Primary Decision [facility] (Health Care Agent):   Primary Decision Maker (Active): David Yang - Daughter - [phone] Secondary Decision Maker: Cathy Sebastian - Son - [phone] Resuscitation Status: DNR If DNR is there a Durable DNR on file? : [] Yes had inpt code order DNR [] No (If no, complete Durable DNR) HISTORY History obtained from: chart, nurse, family CHIEF COMPLAINT:   
The patient is:  
[] Verbal 
[] Nonverbal 
[x] Unresponsive HPI/SUBJECTIVE: Daughter and son in law at bedside. No concerns at this time REVIEW OF SYSTEMS The following systems were: [] reviewed  [x] unable to be reviewed Adult Non-Verbal Pain Assessment Score: denies at this time 0/10 Face [x] 0   No particular expression or smile 
[] 1   Occasional grimace, tearing, frowning, wrinkled forehead 
[] 2   Frequent grimace, tearing, frowning, wrinkled forehead Activity (movement) [x] 0   Lying quietly, normal position 
[] 1   Seeking attention through movement or slow, cautious movement 
[] 2   Restless, excessive activity and/or withdrawal reflexes Guarding 
[x] 0   Lying quietly, no positioning of hands over areas of body [] 1   Splinting areas of the body, tense 
[] 2   Rigid, stiff Physiology (vital signs) [x] 0   Stable vital signs [] 1   Change in any of the following: SBP > 20mm Hg; HR > 20/minute 
[] 2   Change in any of the following: SBP > 30mm Hg; HR > 25/minute Respiratory [x] 0   Baseline RR/SpO2, compliant with ventilator 
[] 1   RR > 10 above baseline, or 5% drop SpO2, mild asynchrony with ventilator 
[] 2   RR > 20 above baseline, or 10% drop SpO2, asynchrony with ventilator FUNCTIONAL ASSESSMENT Palliative Performance Scale:10% PSYCHOSOCIAL/SPIRITUAL ASSESSMENT Active Problems: * No active hospital problems. * 
 
Past Medical History:  
Diagnosis Date  Coronary artery disease of native artery of native heart with stable angina pectoris ([address] 2/28/2019  Cough due to ACE inhibitor  Diarrhea  Hematoma of rectus sheath 2/28/2019  IBS (irritable bowel syndrome) IBS  NSTEMI (non-ST elevated myocardial infarction) ([address] 2/28/2019  Pancreatitis  Poor appetite  Pure hypercholesterolemia 2/28/2019  Systolic CHF, chronic ([address] 2/28/2019 Past Surgical History:  
Procedure Laterality Date  COLONOSCOPY Left 11/1/2017 COLONOSCOPY performed by Andrea Ames MD at [address]  HX CHOLECYSTECTOMY  HX GI    
 surgery for hiatal hernia  HX GI  09/27/2019 Gastrojejunostomy  HX ORTHOPAEDIC    
 DJD, doing PT weekly through [facility]  IR O[facility]hen[facility]  1/30/2019 Social History Tobacco Use  Smoking status: Former Smoker  Smokeless tobacco: Never Used  Tobacco comment: quit smoking cigarettes 6 yrs ago Substance Use Topics  Alcohol use: Yes Comment: very occasional  
 
Family History Problem Relation Age of Onset  Dementia Mother   
     alzheimers  Cancer Sister   
     gallbladder  Cancer Brother   
     pancreatic  Breast Cancer Paternal Grandmother  Dementia Sister   
     alzheimers  Heart Surgery Brother   
     bypass Allergies Allergen Reactions  Penicillins Hives Has tolerated cefazolin, ceftriaxone and cefepime previously Current Facility-Administered Medications Medication Dose Route Frequency  morphine injection 4 mg  4 mg IntraVENous Q4H  
 morphine injection 4 mg  4 mg IntraVENous Q15MIN PRN  
 glycopyrrolate (ROBINUL) injection 0.2 mg  0.2 mg IntraVENous Q6H  
 atropine 1 % ophthalmic solution 3 Drop  3 Drop SubLINGual TID PRN  
 LORazepam (ATIVAN) injection 1 mg  1 mg IntraVENous Q4H  
 bisacodyl (DULCOLAX) suppository 10 mg  10 mg Rectal DAILY PRN  
 LORazepam (ATIVAN) injection 1 mg  1 mg IntraVENous Q15MIN PRN  
 ondansetron (ZOFRAN) injection 4 mg  4 mg IntraVENous Q4H PRN  prochlorperazine (COMPAZINE) injection 10 mg  10 mg IntraVENous Q6H PRN PHYSICAL EXAM  
 
Wt Readings from Last 3 Encounters:  
10/23/19 57.8 kg (127 lb 8 oz) 10/21/19 50.5 kg (111 lb 6.4 oz) 10/10/19 59 kg (130 lb 1.1 oz) Visit Vitals BP (!) 90/38 (BP 1 Location: Left arm, BP Patient Position: At rest) Pulse (!) 138 Temp (!) 102.3 °F (39.1 °C) Resp 20 SpO2 (!) 77% Supplemental O2  [x] Yes  [] NO Last bowel movement: PTA Currently this patient has: 
[x] Peripheral IV [] PICC  [] PORT [] ICD [x] Joyce Catheter [] NG Tube   [] PEG Tube   
[] Rectal Tube [] Drain 
[] Other:  
 
Constitutional: unresponsive, appears comfortable Eyes:eyes closed ENMT:mouth closed, no evidence of excess secretions Cardiovascular: regular rhythm, distal pulses intact Respiratory: breathing slightly labored, symmetric, clear anteriorly Gastrointestinal: soft non-tender, +bowel sounds Musculoskeletal: no deformity, no tenderness to palpation Skin: warm, dry, mottling +, skin feels warm to touch Neurologic: unresponsive Psychiatric:  
 
   
 
  
 
Sawyer Merritt MD

## 2019-10-29 NOTE — PROGRESS NOTES
0700  Report received. 0720  Pt lying in bed, unresponsive. No facial grimacing or moaning. Lungs with scattered rhonchi. No cough noted. Pt is on RA. Secretions noted. No bowel sounds present. Last reported BM is unknown. Abd is soft and non tender. Joyce is draining scant amt of straw urine. No edema noted. Skin is cool to touch. Pt has an IV in her R AC. Dressing is clear and intact. Daughter at the bedside. Support and education given. 0830  Pt medicated with scheduled meds. Respirations shallow. Congestion noted. Pt repositioned in bed. Pt having periods of apnea. 0840  Apnea continues. Respirations shallow, not labored. 0480  Pt's Dtr in the Family room. Rn advised her that things were close. Dtr returned to the room. Rn encouraged pt to go and reassured her that her family was going to be OK. Respirations very shallow. Skin cool to touch. 0900  Pt without a pulse or respirations. Dtr at the bedside and grieving approp. TOD 0900. NAME OF PATIENT:  Lilliana Humphreys LEVEL OF CARE:  GIP 
 
REASON FOR GIP:  
Pain, despite numerous changes in medications, Unmanageable respiratory distress, Medication adjustment that must be monitored 24/7 and Stabilizing treatment that cannot take place at home *PATIENT REMAINS ELIGIBLE FOR GIP LEVEL OF CARE AS EVIDENCED BY: (MUST BE ADDRESSED OF PATIENT GIP) REASON FOR RESPITE:  n/a 
 
O2 SAFETY:  RA 
 
FALL INTERVENTIONS PROVIDED:  
Implemented/recommended resources for alarm system (personal alarm, bed alarm, call bell, etc.)  and Implemented/recommended environmental changes (remove hazards, lower bed, improve lighting, etc.) INTERDISPLINARY COMMUNICATION/COLLABORATION: 
Physician, MSW, [place] and RN, CNA 
 
NEW MEDICATION INITIATION DOCUMENTATION: 
No new medications initiated.    
 
Reason medication is being initiated:  n/a 
 
MD / Provider name consulted re: change in status / initiation of new medication:  n/a 
 
 New Symptom(s):  n/a New Order(s):  n/a Name of the person notified of the changes:  n/a Name of person being taught:  n/a Instructions given:  n/a Side Effects taught:  n/a Response to teaching:  n/a 
 
 
COMFORTABLE DYING MEASURE: 
Is Patient/family satisfied with symptom level? Yes DISCHARGE PLAN:  Pt will remain at the [facility] for EOL care

## 2019-10-29 NOTE — PROGRESS NOTES
Problem: Pressure Injury - Risk of 
Goal: *Prevention of pressure injury Description Document Travis Scale and appropriate interventions in the flowsheet. Outcome: Progressing Towards Goal 
Note:  
Pressure Injury Interventions: 
Sensory Interventions: Assess changes in LOC, Avoid rigorous massage over bony prominences, Check visual cues for pain, Float heels, Keep linens dry and wrinkle-free, Minimize linen layers, Pad between skin to skin Moisture Interventions: Absorbent underpads, Minimize layers Activity Interventions: Pressure redistribution bed/mattress(bed type) Mobility Interventions: Pressure redistribution bed/mattress (bed type), HOB 30 degrees or less Nutrition Interventions: Document food/fluid/supplement intake Friction and Shear Interventions: HOB 30 degrees or less, Lift sheet, Minimize layers Problem: Falls - Risk of 
Goal: *Absence of Falls Description Document Yun Petty Fall Risk and appropriate interventions in the flowsheet. Outcome: Progressing Towards Goal 
Note:  
Fall Risk Interventions: 
Mobility Interventions: Communicate number of staff needed for ambulation/transfer Medication Interventions: Evaluate medications/consider consulting pharmacy Elimination Interventions: Toileting schedule/hourly rounds History of Falls Interventions: Door open when patient unattended

## 2019-10-31 NOTE — HOSPICE
LCSW placed bereavement call to daughter, Gertrudis Anderson. She shared that she and her brother are coping well though it felt strange not to come to [facility] yesterday and she is trying to avoid thoughts of her mother's last moments. Ganga Jain appreciated [facility] and says that she felt her mother was very comfortable during her stay. She is now working on implementing final arrangements. LCSW reiterated bereavement support. MCKAY De Leon, [facility] Social Worker 
[phone]

## 2019-12-02 NOTE — PROCEDURES
The patient feels that the cataract is significantly impacting daily activities and has elected cataract surgery. The risks, benefits, and alternatives to surgery were discussed. The patient elects to proceed with surgery. Zaira Hyde [facility] [address] العلي M.D.  Ally Menard, [address]  [phone]               Esophagogastroduodenoscopy (EGD) Procedure Note    NAME: Mildred Calvert  :    MRN:  [id number]    Indications:  Hematemesis; nausea/vomiting     : Elza Cleaning MD    Referring Provider:  Adam Alexander MD    Medicine:  MAC anesthesia      Procedure Details:  After informed consent was obtained with all risks and benefits of the procedure explained and preprocedure exam completed, the patient was placed in the left lateral decubitus position. Universal protocol for patient identification was performed and documented in the nursing notes. Throughout the procedure, the patient's blood pressure was monitored at least every five minutes; pulse, and oxygen saturations were monitored continuously. All vital signs were documented in the nursing notes. The endoscope was inserted into the mouth and advanced under direct vision to stomach. A careful inspection was made as the gastroscope was withdrawn, a retroflexed view of the proximal stomach was NOT performed; findings and interventions are described below. Findings:   Esophagus: severe esophagitis on limited visualization  Stomach: large quantity of bilious fluid in the stomach, therefore procedure aborted  Duodenum: not intubated    Interventions:    none    Specimens:   * No specimens in log *     EBL: None          Complications:     No immediate complications        Impression:  Aborted due to large quantity of fluid in the stomach    Recommendations:  -Repeat EGD tomorrow with general anesthesia  -PPI BID  -Keep NPO except for meds    Signed by:  Elza Cleaning MD         2019 4:24 PM

## 2020-11-12 NOTE — DISCHARGE INSTRUCTIONS
Zaira Charles [address] Nik[address] Hankins M.D.  [facility], [address]  [phone]          COLONOSCOPY DISCHARGE INSTRUCTIONS    Radhames Gil  [id number]  1940    DISCOMFORT:  Redness at IV site- apply warm compress to area; if redness or soreness persist- contact your physician  There may be a slight amount of blood passed from the rectum  Gaseous discomfort- walking, belching will help relieve any discomfort  You may not operate a vehicle for 12 hours  You may not engage in an occupation involving machinery or appliances for the  rest of today  You may not drink alcoholic beverages for at least 12 hours  Avoid making any critical decisions for at least 24 hours    DIET:   You may resume your normal diet, but some patients find that heavy or large  meals may lead to indigestion or vomiting. I suggest a light meal as first food  intake. ACTIVITY:  You may resume your normal daily activities. It is recommended that you spend the remainder of the day resting - avoid any strenuous activity. CALL M.D. IF ANY SIGN OF:   Increasing pain, nausea, vomiting  Abdominal distension (swelling)  Significant bleeding (oral or rectal)  Fever   Pain in chest area  Shortness of breath    Additional Instructions:   Call Dr. Nikos Hankins if any questions or problems at [phone]   You should receive the biopsy results by phone or mail within 3 weeks, if not, call  my office for the results      Should have a repeat colonoscopy in 5 years. Colon with 2 polyps removed, moderate internal hemorrhoids as the likely cause of bleeding.
57

## 2022-03-03 NOTE — ED NOTES
Conjuntivae and eyelids appear normal, Sclerae : White without injection Patient resting in bed with no signs of distress, denies any needs at this time. Will continue to monitor.

## 2023-08-07 NOTE — PROGRESS NOTES
Get your Prescription filled at [facility]!    [facility] uses the same medical record your doctor uses. More accurate and timely information for your doctor and your pharmacy means more effective and safer health care for you and your family.  [facility] accepts all of the major insurance plans which means you pay the same copay wherever you go.  [facility]ists take the time to explain your medication regimen to you.  [facility] will mail your medications to you free of postage and handling charges. We love josue.       At [facility], one important tool we use to improve our patient services is our Patient Survey.  Following your visit you may receive our survey in the mail.    Please take the time to complete the survey.    If your visit with us was great, we want to hear about it.    If we can improve, please let us know how.          Hospitalist Progress Note Berta Norwood MD 
     
                             Answering service: [phone] or [phone] from in house phone Date of Service:  2019 NAME:  Gudelia Espinosa :  1940 MRN:  [id number] Admission Summary:  
 
61-year-old female with a past medical history significant for coronary artery disease, non-ST-elevation myocardial infarction, dyslipidemia, chronic systolic congestive heart failure, irritable bowel syndrome and pancreatitis, was brought to the emergency room from home via private vehicle accompanied by her sister for complaints of an approximately 3-day history of progressively worsening nausea and coffee-grounds vomitus. The patient also complained of some intermittent mild abdominal pain with some radiation to the upper back. The patient described the pain as being dull achy in nature, at times about 6-7 on 10 in intensity. The patient denied associated headaches, dizziness, visual deficits, chest pains, palpitations, shortness of breath, cough, fevers, chills, hematuria, bladder or bowel irregularities. Reason for follow up:  
   
CC:Nausea, coffee-grounds vomitus. NG tube in place She denies having a bowel movement since admission Not passing gas Assessment & Plan:  
 
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  
 - could be a pancreatic mass vs Inflammation of Pancreas H/O cholecystectomy. Normal triglyceride level. EGD initially -  Done aborted due to inability to pass scope down to Duodenum CT abd/Pelvis  GOO and possible stricture along duodenum which is causing obstruction of  
NGtube for decompression Check CEA- normal 
Ca19-9  -  
NPO now  - Re Endoscopy shows that the duodenum stricture is adenocarcinoma Surgery consulted Continue NPO status Started TPN  
 
 
 H/h stable -  
normochromic normocytic anemia. C/W PPI Hypokalemia Increase K in TPN  
 
 
: TOÑA probably due to eleanor Hydronephrosis noted on Ct scan today-  
 Previous hospitalist D/w radiology Improving with Decompression of Stomach I will check hydronephrosis again on 9/20 
 
 
 
: CAD. H/O NSTEMI. Dyslipidemia. Monitor Fluid Status UTI ruled out Urine cultures negative D/C Ceftriaxone GI and DVT (SCDs due to probable GI bleed). : DNR/DNI with a guarded prognosis. D/W patient and family. patient's Son and daughter Mary Escobedo ([phone]) at bedside Hospital Problems  Date Reviewed: 9/14/2019 Codes Class Noted POA Acute pancreatitis ICD-10-CM: K85.90 ICD-9-CM: 062.8  9/14/2019 Yes TOÑA (acute kidney injury) ([facility] [address] ICD-10-CM: N17.9 ICD-9-CM: 584.9  9/14/2019 Yes Hypokalemia ICD-10-CM: E87.6 ICD-9-CM: 276.8  9/14/2019 Yes GI bleed ICD-10-CM: K92.2 ICD-9-CM: 578.9  9/14/2019 Yes Coffee ground emesis ICD-10-CM: K92.0 ICD-9-CM: 578.0  9/14/2019 Yes Physical Examination:  
 
 Last 24hrs VS reviewed since prior progress note. Most recent are: 
Visit Vitals /80 (BP 1 Location: Right arm, BP Patient Position: At rest) Pulse 94 Temp 98.3 °F (36.8 °C) Resp 16 Ht 5' 4\" (1.626 m) Wt 53.5 kg (118 lb) SpO2 93% BMI 20.25 kg/m² Constitutional:  No acute distress, cooperative, pleasant   
HEENT: Head is a traumatic, Un icteric sclera. Pink conjunctiva,no erythema or discharge. Oral mucous moist, oropharynx benign. Neck supple, NG in place Resp:  Decreased at bases. CV:  S1, S2 present. GI:  Soft, non distended, non tender. hypoactive bowel sounds, no hepatosplenomegaly :  No CVA or suprapubic tenderness Skin  :  No erythema,rash,bullae,dipigmentation Musculoskeletal:  Bipedal edema. Neurologic:  Awake ,alert and oriented. Intake/Output Summary (Last 24 hours) at 9/19/2019 1401 Last data filed at 9/19/2019 7884 Gross per 24 hour Intake  Output 250 ml Net -250 ml Labs:  
 
Recent Labs  
  09/17/19 
0446 WBC 10.9 HGB 10.3* HCT 32.2*  
* Recent Labs  
  09/19/19 
0407 09/18/19 
1305 09/17/19 
0446  145 144  
K 3.2* 3.2* 3.8  109* 108 CO2 29 27 25 BUN 21* 25* 33* CREA 1.07* 1.04* 1.33* * 105* 105* CA 8.8 8.2* 8.0* Recent Labs  
  09/17/19 
0446 SGOT 19 ALT 39  TBILI 0.7 TP 5.8* ALB 2.7*  
GLOB 3.1 No results for input(s): INR, PTP, APTT in the last 72 hours. No lab exists for component: INREXT, INREXT No results for input(s): FE, TIBC, PSAT, FERR in the last 72 hours. No results found for: FOL, RBCF No results for input(s): PH, PCO2, PO2 in the last 72 hours. No results for input(s): CPK, CKNDX, TROIQ in the last 72 hours. No lab exists for component: CPKMB Lab Results Component Value Date/Time Cholesterol, total 85 09/15/2019 04:37 AM  
 HDL Cholesterol 43 09/15/2019 04:37 AM  
 LDL, calculated 29 09/15/2019 04:37 AM  
 Triglyceride 65 09/15/2019 04:37 AM  
 CHOL/HDL Ratio 2.0 09/15/2019 04:37 AM  
 
Lab Results Component Value Date/Time Glucose (POC) 102 (H) 01/18/2019 06:23 AM  
 Glucose (POC) 139 (H) 01/17/2019 09:05 PM  
 Glucose (POC) 114 (H) 01/17/2019 04:31 PM  
 Glucose (POC) 106 (H) 01/17/2019 11:41 AM  
 Glucose (POC) 132 (H) 01/16/2019 09:13 PM  
 
Lab Results Component Value Date/Time  Color YELLOW/STRAW 09/15/2019 12:33 AM  
 Appearance CLOUDY (A) 09/15/2019 12:33 AM  
 Specific gravity 1.019 09/15/2019 12:33 AM  
 pH (UA) 6.0 09/15/2019 12:33 AM  
 Protein 100 (A) 09/15/2019 12:33 AM  
 Glucose NEGATIVE  09/15/2019 12:33 AM  
 Ketone NEGATIVE  09/15/2019 12:33 AM  
 Bilirubin NEGATIVE  09/15/2019 12:33 AM  
 Urobilinogen 0.2 09/15/2019 12:33 AM  
 Nitrites NEGATIVE  09/15/2019 12:33 AM  
 Leukocyte Esterase SMALL (A) 09/15/2019 12:33 AM  
 Epithelial cells MODERATE (A) 09/15/2019 12:33 AM  
 Bacteria 2+ (A) 09/15/2019 12:33 AM  
 WBC 10-20 09/15/2019 12:33 AM  
 RBC 0-5 09/15/2019 12:33 AM  
 
 
 
Medications Reviewed:  
 
Current Facility-Administered Medications Medication Dose Route Frequency  0.9% sodium chloride infusion  50 mL/hr IntraVENous CONTINUOUS  
 TPN ADULT - PERIPHERAL   IntraVENous CONTINUOUS  
 guaiFENesin ER (MUCINEX) tablet 600 mg  600 mg Oral BID PRN  
 [Held by provider] rosuvastatin (CRESTOR) tablet 10 mg  10 mg Oral QHS  [Held by provider] bumetanide (BUMEX) tablet 0.5 mg  0.5 mg Oral DAILY  sodium chloride (NS) flush 5-40 mL  5-40 mL IntraVENous Q8H  
 benzocaine-menthol (CEPACOL) lozenge 1 Lozenge  1 Lozenge Mucous Membrane PRN  
 hydrALAZINE (APRESOLINE) 20 mg/mL injection 10 mg  10 mg IntraVENous Q6H PRN  
 sodium chloride (NS) flush 5-40 mL  5-40 mL IntraVENous PRN  
 acetaminophen (TYLENOL) tablet 650 mg  650 mg Oral Q4H PRN  
 ondansetron (ZOFRAN) injection 4 mg  4 mg IntraVENous Q4H PRN  
 [Held by provider] carvedilol (COREG) tablet 6.25 mg  6.25 mg Oral BID WITH MEALS  pantoprazole (PROTONIX) 40 mg in sodium chloride 0.9% 10 mL injection  40 mg IntraVENous Q12H  
 
______________________________________________________________________ EXPECTED LENGTH OF STAY: 4d 19h ACTUAL LENGTH OF STAY:          Evi Matthews MD

## 2023-10-21 NOTE — CARDIO/PULMONARY
Cardiac Rehab: Consult received and chart reviewed. Patient seen by cardiac rehab on 2/4/19 for MI and HF education.
Cardiac Rehab: MI education folder, with catheterization brochure, to bedside of Amrit Samano. Educated using teach back method. Reviewed MI diagnosis definition and purpose of intervention. Discussed risk factors for CAD to include the following: family history, elevated BMI, hyperlipidemia, hypertension, diabetes, stress, and smoking. . Discussed Heart Healthy/Low Sodium (2000 mg) diet. Discussed follow up appointments with cardiologist, signs and symptoms of angina, and what to report to physician after discharge. Emphasized the value of cardiac rehab. Discussed Cardiac Rehab Program format, benefits, and encouraged enrollment to assist with risk modification and management. Will follow and enroll when appropriate. Living with Heart Failure Booklet  also given to Amrit Samano. HF begun. Educated using teach back method. Discussed diagnosis definition and assessed patient understanding. Reviewed importance of daily weight monitoring and Low Sodium diet (0782-9969 mg. Daily). Weights documented on he HF calender. Encouraged activity and rest periods within symptom limitations and as ordered by physician. Reviewed bumex, purpose of medication, potential side effects, compliance, and what to do if dose is missed. Discussed importance of reporting signs and symptoms of exacerbation, and when to report them to the doctor, to prevent re-hospitalization. Amrit Samano was encouraged to keep all appointments with doctor. She lives alone and admits to eating \"fast prep food\" and being finicky as an eater. Joon Jhaveri RN Amrit Samano could benefit from further education on the following HF topics: medications and diet
no

## (undated) DEVICE — 1200 GUARD II KIT W/5MM TUBE W/O VAC TUBE: Brand: GUARDIAN

## (undated) DEVICE — REM POLYHESIVE ADULT PATIENT RETURN ELECTRODE: Brand: VALLEYLAB

## (undated) DEVICE — CATH IV AUTOGRD BC BLU 22GA 25 -- INSYTE

## (undated) DEVICE — (D)PREP SKN CHLRAPRP APPL 26ML -- CONVERT TO ITEM 371833

## (undated) DEVICE — CLIP LIG L TI MTL LIG SYS 24 COUNT HORZ

## (undated) DEVICE — NEEDLE HYPO 18GA L1.5IN PNK S STL HUB POLYPR SHLD REG BVL

## (undated) DEVICE — MEDI-VAC YANK SUCT HNDL W/TPRD BULBOUS TIP: Brand: CARDINAL HEALTH

## (undated) DEVICE — COVER,TABLE,60X90,STERILE: Brand: MEDLINE

## (undated) DEVICE — SOLUTION IV 1000ML 0.9% SOD CHL

## (undated) DEVICE — SUTURE PDS II SZ 1 L36IN ABSRB VLT L48MM CTX 1/2 CIR Z371T

## (undated) DEVICE — CONTAINER SPEC 20 ML LID NEUT BUFF FORMALIN 10 % POLYPR STS

## (undated) DEVICE — BAG BELONG PT PERS CLEAR HANDL

## (undated) DEVICE — ZINACTIVE USE 2641837 CLIP LIG M BLU TI HRT SHP WIRE HORZ 600 PER BX

## (undated) DEVICE — ROCKER SWITCH PENCIL BLADE ELECTRODE, HOLSTER: Brand: EDGE

## (undated) DEVICE — INFECTION CONTROL KIT SYS

## (undated) DEVICE — BW-400L DISP SNGL-END CLEANINGBRUSH: Brand: OLYMPUS

## (undated) DEVICE — CANN NASAL O2 CAPNOGRAPHY AD -- FILTERLINE

## (undated) DEVICE — SOLUTION IRRIG 1000ML H2O STRL BLT

## (undated) DEVICE — AIRLIFE™ U/CONNECT-IT OXYGEN TUBING 7 FEET (2.1 M) CRUSH-RESISTANT OXYGEN TUBING, VINYL TIPPED: Brand: AIRLIFE™

## (undated) DEVICE — Device

## (undated) DEVICE — FORCEPS BX L240CM JAW DIA2.8MM L CAP W/ NDL MIC MESH TOOTH

## (undated) DEVICE — BAG SPEC BIOHZD LF 2MIL 6X10IN -- CONVERT TO ITEM 357326

## (undated) DEVICE — SNARE ENDOSCP M L240CM W27MM SHTH DIA2.4MM CHN 2.8MM OVL

## (undated) DEVICE — STAPLER SKIN 35CT WD STRL DISP -- MULTIFIRE PREMIUM

## (undated) DEVICE — BW-412T DISP COMBO CLEANING BRUSH: Brand: SINGLE USE COMBINATION CLEANING BRUSH

## (undated) DEVICE — DRAPE FLD WRM W44XL66IN C6L FOR INTRATEMP SYS THERMABASIN

## (undated) DEVICE — CONNECTOR TBNG AUX H2O JET DISP FOR OLY 160/180 SER

## (undated) DEVICE — SPONGE LAP 18X18IN STRL -- 5/PK

## (undated) DEVICE — DUAL LUMEN STOMACH TUBE MULTI-FUNCTIONAL PORT: Brand: SALEM SUMP

## (undated) DEVICE — SUTURE PERMAHAND SZ 2-0 L30IN NONABSORBABLE BLK SILK W/O A305H

## (undated) DEVICE — SUTURE MCRYL SZ 4-0 L27IN ABSRB UD L19MM PS-2 1/2 CIR PRIM Y426H

## (undated) DEVICE — KIT IV STRT W CHLORAPREP PD 1ML

## (undated) DEVICE — HANDPIECE LAP L23MM DIA5MM VES SEAL CUT CRV JAW PSTL GRP

## (undated) DEVICE — Z DISCONTINUED NO SUB IDED SET EXTN W/ 4 W STPCOCK M SPIN LOK 36IN

## (undated) DEVICE — QUILTED PREMIUM COMFORT UNDERPAD,EXTRA HEAVY: Brand: WINGS

## (undated) DEVICE — KENDALL RADIOLUCENT FOAM MONITORING ELECTRODE -RECTANGULAR SHAPE: Brand: KENDALL

## (undated) DEVICE — INTENDED FOR TISSUE SEPARATION, AND OTHER PROCEDURES THAT REQUIRE A SHARP SURGICAL BLADE TO PUNCTURE OR CUT.: Brand: BARD-PARKER ® CARBON RIB-BACK BLADES

## (undated) DEVICE — SOLIDIFIER FLUID 3000 CC ABSORB

## (undated) DEVICE — TOWEL SURG W17XL27IN STD BLU COT NONFENESTRATED PREWASHED

## (undated) DEVICE — STERILE POLYISOPRENE POWDER-FREE SURGICAL GLOVES WITH EMOLLIENT COATING: Brand: PROTEXIS

## (undated) DEVICE — SET ADMIN 16ML TBNG L100IN 2 Y INJ SITE IV PIGGY BK DISP

## (undated) DEVICE — Device: Brand: SINGLE USE SOFT BRUSH

## (undated) DEVICE — SYRINGE MED 20ML STD CLR PLAS LUERLOCK TIP N CTRL DISP

## (undated) DEVICE — SUTURE PERMAHAND SZ 2-0 L18IN NONABSORBABLE BLK L26MM SH C012D

## (undated) DEVICE — SUTURE VCRL SZ 1 L36IN ABSRB VLT L48MM CTX 1/2 CIR J371H

## (undated) DEVICE — POOLE SUCTION INSTRUMENT WITH REMOVABLE SHEATH: Brand: POOLE

## (undated) DEVICE — SET EXTN TBNG L BOR 4 W STPCOCK ST 32IN PRIMING VOL 6ML

## (undated) DEVICE — STRAP,POSITIONING,KNEE/BODY,FOAM,4X60": Brand: MEDLINE

## (undated) DEVICE — ENDO CARRY-ON PROCEDURE KIT INCLUDES ENZYMATIC SPONGE, GAUZE, BIOHAZARD LABEL, TRAY, LUBRICANT, DIRTY SCOPE LABEL, WATER LABEL, TRAY, DRAWSTRING PAD, AND DEFENDO 4-PIECE KIT.: Brand: ENDO CARRY-ON PROCEDURE KIT

## (undated) DEVICE — NEONATAL-ADULT SPO2 SENSOR: Brand: NELLCOR

## (undated) DEVICE — SUTURE VCRL SZ 2-0 L27IN ABSRB VLT L26MM SH 1/2 CIR J317H

## (undated) DEVICE — TUBING HYDR IRR --

## (undated) DEVICE — SURGICAL PROCEDURE PACK BASIN MAJ SET CUST NO CAUT

## (undated) DEVICE — SUTURE VCRL SZ 3-0 L54IN ABSRB VLT LIGAPAK REEL NDL J205G

## (undated) DEVICE — TRAP SURG QUAD PARABOLA SLOT DSGN SFTY SCRN TRAPEASE

## (undated) DEVICE — COVER LT HNDL PLAS RIG 1 PER PK

## (undated) DEVICE — KIT COMPLIANCE W ENDOGLDE + 11 NO BRSH ENDOKT

## (undated) DEVICE — Z DISCONTINUED USE 2751540 TUBING IRRIG L10IN DISP PMP ENDOGATOR

## (undated) DEVICE — TOTAL TRAY, 16FR 10ML SIL FOLEY, URN: Brand: MEDLINE

## (undated) DEVICE — DBD-PACK,LAPAROTOMY,2 REINFORCED GOWNS: Brand: MEDLINE

## (undated) DEVICE — Device: Brand: MEDICAL ACTION INDUSTRIES

## (undated) DEVICE — SUTURE VCRL 2-0 L27IN ABSRB CT BRAID COAT UD J275H

## (undated) DEVICE — SUTURE PDS II SZ 2-0 L27IN ABSRB VLT SH L26MM 1/2 CIR Z317H